# Patient Record
Sex: MALE | Race: WHITE | NOT HISPANIC OR LATINO | Employment: OTHER | URBAN - METROPOLITAN AREA
[De-identification: names, ages, dates, MRNs, and addresses within clinical notes are randomized per-mention and may not be internally consistent; named-entity substitution may affect disease eponyms.]

---

## 2017-01-04 DIAGNOSIS — R93.89 ABNORMAL X-RAY: Primary | ICD-10-CM

## 2017-01-09 ENCOUNTER — OFFICE VISIT (OUTPATIENT)
Dept: INTERNAL MEDICINE | Facility: CLINIC | Age: 47
End: 2017-01-09

## 2017-01-09 ENCOUNTER — PRE VISIT (OUTPATIENT)
Dept: ORTHOPEDICS | Facility: CLINIC | Age: 47
End: 2017-01-09

## 2017-01-09 VITALS
DIASTOLIC BLOOD PRESSURE: 79 MMHG | SYSTOLIC BLOOD PRESSURE: 113 MMHG | TEMPERATURE: 97.9 F | OXYGEN SATURATION: 98 % | RESPIRATION RATE: 18 BRPM | HEART RATE: 116 BPM | WEIGHT: 309 LBS | BODY MASS INDEX: 48.38 KG/M2

## 2017-01-09 DIAGNOSIS — G89.29 BILATERAL CHRONIC KNEE PAIN: Primary | ICD-10-CM

## 2017-01-09 DIAGNOSIS — M25.561 BILATERAL CHRONIC KNEE PAIN: Primary | ICD-10-CM

## 2017-01-09 DIAGNOSIS — M25.562 BILATERAL CHRONIC KNEE PAIN: Primary | ICD-10-CM

## 2017-01-09 DIAGNOSIS — I87.2 VENOUS INSUFFICIENCY (CHRONIC) (PERIPHERAL): ICD-10-CM

## 2017-01-09 DIAGNOSIS — E66.01 MORBID OBESITY, UNSPECIFIED OBESITY TYPE (H): ICD-10-CM

## 2017-01-09 RX ORDER — KETOCONAZOLE 20 MG/ML
SHAMPOO TOPICAL
COMMUNITY
Start: 2013-01-09 | End: 2017-12-21

## 2017-01-09 RX ORDER — FLUOCINONIDE 0.5 MG/G
OINTMENT TOPICAL
COMMUNITY
Start: 2013-01-09 | End: 2018-02-01

## 2017-01-09 RX ORDER — NEOMYCIN SULFATE, POLYMYXIN B SULFATE AND HYDROCORTISONE 10; 3.5; 1 MG/ML; MG/ML; [USP'U]/ML
SUSPENSION/ DROPS AURICULAR (OTIC)
COMMUNITY
Start: 2016-12-29 | End: 2018-02-01

## 2017-01-09 RX ORDER — NYSTATIN 100000 [USP'U]/G
POWDER TOPICAL
COMMUNITY
Start: 2016-01-28 | End: 2017-12-01

## 2017-01-09 RX ORDER — DESONIDE 0.5 MG/G
CREAM TOPICAL
COMMUNITY
Start: 2013-01-09 | End: 2018-02-01

## 2017-01-09 RX ORDER — HYDROCORTISONE 2.5 %
CREAM (GRAM) TOPICAL
COMMUNITY
Start: 2016-06-28 | End: 2018-02-01

## 2017-01-09 RX ORDER — OXYCODONE AND ACETAMINOPHEN 5; 325 MG/1; MG/1
TABLET ORAL
COMMUNITY
Start: 2016-05-13 | End: 2017-12-21

## 2017-01-09 RX ORDER — INFLUENZA A VIRUS A/GUANGDONG-MAONAN/SWL1536/2019 CNIC-1909 (H1N1) ANTIGEN (FORMALDEHYDE INACTIVATED), INFLUENZA A VIRUS A/HONG KONG/2671/2019 (H3N2) ANTIGEN (FORMALDEHYDE INACTIVATED), INFLUENZA B VIRUS B/PHUKET/3073/2013 ANTIGEN (FORMALDEHYDE INACTIVATED), AND INFLUENZA B VIRUS B/WASHINGTON/02/2019 ANTIGEN (FORMALDEHYDE INACTIVATED) 15; 15; 15; 15 UG/.5ML; UG/.5ML; UG/.5ML; UG/.5ML
INJECTION, SUSPENSION INTRAMUSCULAR
Refills: 0 | COMMUNITY
Start: 2016-10-03 | End: 2018-01-18

## 2017-01-09 RX ORDER — OMEGA-3S/DHA/EPA/FISH OIL/D3 300MG-1000
CAPSULE ORAL
Refills: 3 | COMMUNITY
Start: 2016-10-03 | End: 2017-05-31

## 2017-01-09 RX ORDER — OXYCODONE HYDROCHLORIDE 5 MG/1
TABLET ORAL
COMMUNITY
Start: 2016-05-27 | End: 2017-12-21

## 2017-01-09 RX ORDER — KETOCONAZOLE 20 MG/G
CREAM TOPICAL
COMMUNITY
Start: 2016-01-28 | End: 2018-02-01

## 2017-01-09 ASSESSMENT — PAIN SCALES - GENERAL: PAINLEVEL: WORST PAIN (10)

## 2017-01-09 NOTE — PROGRESS NOTES
"Chavez Ca is a 46 year old male who comes in for    CC: knee pain, f/u Xray  HPI:  Mr. Ca reports chronic knee pain, L>R, with problems getting up and down stairs or walking up an incline. Has a fall in 12/2016, when walking uphill, landing on his R knee. The L knee is worse than his R, knee gives out and he \"can't trust it.\"  Has a hard time standing up, takes a long time to any activity due to knee and back pain. Has been resting the knee--staying off it, and keeping the legs elevated for venous insufficiency. Walks 1/2 mile to bus stop. Uses [employer] only when necessary.   Has referral to physical therapy, but has not gone yet, hoping to schedule soon. He wants to know what can be done--he feels no one is addressing his knee pain and he would like to be evaluated for any surgical options.    L knee Xray showed arthritis, was instructed to have a follow-up Xray today. He would like to review these results.  Last knee injections were in September 2016, then had \"released\" twice, was going to do it again, but when he was put on the table, he found out that he had \"more issues than that.\" Fell on the R knee, but L knee hurts worse. Has done PT for years, pool PT, worked hard to get out of medical scooter.    Is on low-calorie diet. Taking Topamax 200 mg BID to lose weight.    Other issues discussed today:     Patient Active Problem List   Diagnosis     VERN (obstructive sleep apnea)     Morbid obesity (H)     Idiopathic edema     Depressive disorder, not elsewhere classified     Psychological factors associated with another disorder     Subglottic stenosis     Hyperlipidemia     Idiopathic progressive polyneuropathy     Varicose veins of left lower extremity     Closed nondisplaced fracture of scaphoid of right wrist, unspecified portion of scaphoid, sequela     Pain in both wrists     Shoulder pain, left     Neck pain     Neck pain, chronic       Current Outpatient Prescriptions   Medication Sig " Dispense Refill     order for DME        desonide (DESOWEN) 0.05 % cream        fluocinonide (LIDEX) 0.05 % ointment        ketoconazole (NIZORAL) 2 % shampoo        cholecalciferol (VITAMIN D) 400 UNITS tablet   3     hydrocortisone 2.5 % cream        FLUZONE QUADRIVALENT 0.5 ML injection   0     ketoconazole (NIZORAL) 2 % cream        neomycin-polymyxin-hydrocortisone (CORTISPORIN) 3.5-39587-5 otic suspension        Nystatin (NYSTOP) 808114 UNIT/GM POWD powder        oxyCODONE (ROXICODONE) 5 MG IR tablet        oxyCODONE-acetaminophen (PERCOCET) 5-325 MG per tablet        neomycin-polymyxin-hydrocortisone (CORTISPORIN) otic solution Place 3 drops Into the left ear 4 times daily 10 mL 1     COMPRESSION STOCKINGS 1 each daily Measure and fit thigh length Circaid compression device.  Bilateral. 2 each 4     zolpidem (AMBIEN) 10 MG tablet Take 1 tablet (10 mg) by mouth nightly as needed for sleep 30 tablet 2     traZODone (DESYREL) 100 MG tablet Take 1 tablet (100 mg) by mouth At Bedtime 90 tablet 1     amitriptyline (ELAVIL) 50 MG tablet Take 2 tablets (100 mg) by mouth At Bedtime 90 tablet 2     furosemide (LASIX) 20 MG tablet Take 3 tablets (60 mg) by mouth 2 times daily 540 tablet 3     topiramate (TOPAMAX) 50 MG tablet Take 4 tablets (200 mg) by mouth 2 times daily 720 tablet 1     phentermine (ADIPEX-P) 37.5 MG tablet Take 1 tablet (37.5 mg) by mouth every morning (before breakfast) 30 tablet 5     CETAPHIL (CETAPHIL) lotion Apply topically 2 times daily       pimecrolimus (ELIDEL) 1 % cream Apply topically 2 times daily 60 g 3     potassium chloride SA (POTASSIUM CHLORIDE) 20 MEQ tablet Take 1 tablet (20 mEq) by mouth daily 90 tablet 3     Vitamin D, Cholecalciferol, 400 UNITS CAPS Take 400 Units by mouth daily 100 capsule 3     COMPRESSION STOCKINGS 20-30 mmHg knee high compression stockings.  Measure and fit.  Style and brand per patient preference. 2 each 2     order for DME Equipment being ordered: Hernan chavarria  Quang.  Style per patient preference. 1 Units 1     Multiple Vitamin (MULTI VITAMIN  MENS) TABS Take 1 tablet by mouth daily 90 tablet 1     [DISCONTINUED] COMPRESSION STOCKINGS 20-30 mmHg knee high compression stockings 2 each 1         ALLERGIES: Fentanyl; Meloxicam; and Minocycline    PAST MEDICAL HX:   Past Medical History   Diagnosis Date     Obstructive sleep apnea      Obesity, Class III, BMI 40-49.9 (morbid obesity) (H)      Neck mass      parapharyngeal, benign     Shortness of breath      Gastro-oesophageal reflux disease      Allergic rhinitis      Trigeminal neuralgia      Idiopathic progressive polyneuropathy 10/15/2012     Bilateral carpal tunnel syndrome 7/18/2015     Hypertension 2008     Depressive disorder      Hearing problem      Reduced vision      Learning disability      Chest pain      Weakness 8/26/2011     Psychological factors associated with another disorder 8/21/2012     Depressive disorder, not elsewhere classified 7/30/2012     Coronary artery disease 3/8/16     right side chest pain       PAST SURGICAL HX:   Past Surgical History   Procedure Laterality Date     Tracheostomy  4/22/2011     Procedure:TRACHEOSTOMY; possible awake; Surgeon:EMELIA PHILLIPS; Location:[facility]     Tracheostomy  6/29/2011     Procedure:TRACHEOSTOMY; REMOVE AND REPLACE SHILEY 6 XLT; Surgeon:EMELIA PHILLIPS; [facility]     Excise lesion intraoral  6/29/2011     Procedure:EXCISE LESION INTRAORAL; TRANSCERVICAL APPROACH TO LEFT PHARYNGEAL SPACE MASS REMOVAL ; Surgeon:EMELIA PHILLIPS; [facility]     Laser co2 laryngoscopy  12/7/2011     Procedure:LASER CO2 LARYNGOSCOPY; Micro-Direct Laryngoscopy with CO2 Laser Standby / Excision Tracheal Grandulization, Trach Tube Change / Kenalog application. / Balloon Dilation of Subglottic Stenosis.*Latex Safe Room* Trach Tube = Shiley 6.4mm I.D. / 10.8MM O.D. / 76MM  Cuffless.; Surgeon:EMELIA PHILLIPS; Location[facility]      Laryngoscopy with biopsy(ies)  6/18/2014     Procedure: LARYNGOSCOPY WITH BIOPSY(IES);  Surgeon: Barbara Manriquez MD;  Location: [facility]     Laparoscopic cholecystectomy N/A 8/23/2015     Procedure: LAPAROSCOPIC CHOLECYSTECTOMY;  Surgeon: Kvng Witt MD;  [facility]     Endoscopic retrograde cholangiopancreatogram N/A 8/27/2015     Procedure: COMBINED ENDOSCOPIC RETROGRADE CHOLANGIOPANCREATOGRAPHY, PLACE TUBE/STENT;  Surgeon: Baldomero Zacarias MD;  [facility]     Endoscopic retrograde cholangiopancreatogram N/A 11/6/2015     Procedure: COMBINED ENDOSCOPIC RETROGRADE CHOLANGIOPANCREATOGRAPHY, REMOVE FOREIGN BODY OR STENT/TUBE;  Surgeon: Baldomero Zacarias MD;  Location: [facility]     Biopsy  2011     To figure out what kind of tumor I had     Orthopedic surgery  5/2016     Broken Right hand & Fracture Right shoulder     Vascular surgery  2008-[facility]     Hc vascular surgery procedure unlist  12/29/15       IMMUNIZATION HX:   Immunization History   Administered Date(s) Administered     Influenza (IIV3) 09/03/2011, 10/02/2012, 10/21/2014, 11/05/2015, 10/03/2016     Pneumococcal 23 valent 08/10/2011     TDAP (BOOSTRIX AGES 10-64) 11/05/2015       SOCIAL HX:   Social History     Social History Narrative    The patient has a 0 pk yr tobacco hx.  He has no active use.  Alcohol use is <1 alcoholic drinks per week.  He denies use of recreational drugs.          He is disability.         The patient is single.  Has 1 child.        Pt adopted at age 14.            ROS:   CONSTITUTIONAL: no fatigue, no unexpected change in weight  SKIN: R knee bruising and scabbing--healing  RESP: no significant cough, no shortness of breath  CV: no chest pain, no palpitations, no worsening peripheral edema  MUSCULOSKELETAL:chronic neck pain, knee pain    OBJECTIVE:  /79 mmHg  Pulse 116  Temp(Src) 97.9  F (36.6  C) (Oral)  Resp 18  Wt 140.161 kg (309 lb)  SpO2 98%   Wt Readings from Last 1  Encounters:   01/09/17 140.161 kg (309 lb)     Constitutional: no distress, pleasant, well-groomed  Cardiovascular: tachycardic with regular rhythm, normal S1 and S2, no murmurs, rubs or gallops  Respiratory: clear to auscultation with good air movement bilaterally, no wheezes or crackles, non-labored  Gastrointestinal: positive bowel sounds, nontender, no hepatosplenomegaly, no masses   Musculoskeletal: bilateral knees with ROM limited by pain, tender to palpation, strength 4/5, no edema  Skin: no concerning lesions or rash, no jaundice, temp normal   Psychological: appropriate mood, slow/pressured speech    ASSESSMENT/PLAN:    1. Bilateral chronic knee pain  Mr. Ca reports multiple attempts for PT--is planning to return to PT today. Reviewed Xray findings--no acute fractures but enthesophyte with chronic appearance. He requests to see what additional options he might have for treatment. Discussed Voltaren gel application to help with knee pain; pt has tolerated Ibuprofen well in the past (despite listed allergy to Meloxicam).  - ORTHOPEDICS ADULT REFERRAL  - diclofenac (VOLTAREN) 1 % GEL topical gel; Apply 4 grams to knees four times daily using enclosed dosing card.  Dispense: 100 g; Refill: 1    2. Venous insufficiency (chronic) (peripheral)  Pt requesting written prescription for compression stockings, as his pharmacy was unable to provide him with these.  - COMPRESSION STOCKINGS; 1 each daily Measure and fit thigh length Circaid compression device.  Bilateral.  Dispense: 2 each; Refill: 4    3. Morbid obesity, unspecified obesity type (H)  Discussed returning to Weight Management clinic for follow-up, as further weight loss will help reduce wear and tear on the knees. He declines this suggestion today, but plans to continue with low-calorie diet and Topamax.    FOLLOW UP: as needed for any changes or concerns.    ELAINE Soriano CNP

## 2017-01-09 NOTE — TELEPHONE ENCOUNTER
1.  Date/reason for appt: 1/16/17 - Bilat Chronic Knee Pain  2.  Referring provider: Dr. Martines  3.  Call to patient (Yes / No - short description): No, pt is referred  4.  Previous care at / records requested from: [facility] -- Records and imaging in Epic/Pacs

## 2017-01-09 NOTE — NURSING NOTE
Chief Complaint   Patient presents with     Results     Patient is here to follow up on x-ray results.      Leandra Gross LPN at 12:26 PM on 1/9/2017.

## 2017-01-09 NOTE — MR AVS SNAPSHOT
After Visit Summary   1/9/2017    Chavez Ca    MRN: [id number]           Patient Information     Date Of Birth          1970        Visit Information        Provider Department      1/9/2017 12:30 PM Natacha Greene APRN [facility]        Today's Diagnoses     Bilateral chronic knee pain    -  1     Venous insufficiency (chronic) (peripheral)           Care Instructions    Primary Care Center Medication Refill Request Information:  * Please contact your pharmacy regarding ANY request for medication refills.  ** [facility] Prescription Fax = [phone]  * Please allow 3 business days for routine medication refills.  * Please allow 5 business days for controlled substance medication refills.     Primary Care Center Test Result notification information:  *You will be notified with in 7-10 days of your appointment day regarding the results of your test.  If you are on MyChart you will be notified as soon as the provider has reviewed the results and signed off on them.    Primary Care Center Phone Number [phone]    [facility] [phone] (4th Floor [facility])          Follow-ups after your visit        Additional Services     ORTHOPEDICS ADULT REFERRAL       Your provider has referred you to: [facility]: Orthopaedic Clinic [facility] [phone]   [email or web address]      Please be aware that coverage of these services is subject to the terms and limitations of your health insurance plan.  Call member services at your health plan with any benefit or coverage questions.      Please bring the following to your appointment:    >>   Any x-rays, CTs or MRIs which have been performed.  Contact the facility where they were done to arrange for  prior to your scheduled appointment.    >>   List of current medications   >>   This referral request   >>   Any documents/labs given to you for this referral                  Your next  10 appointments already scheduled     Jan 09, 2017  5:05 PM   XR KNEE LEFT 3 VIEWS with UCXR1   [facility] ([facility])    [address]  1st Floor  [facility] [id number] [phone]           Please bring a list of your current medicines to your exam. (Include vitamins, minerals and over-thecounter medicines.) Leave your valuables at home.  Tell your doctor if there is a chance you may be pregnant.  You do not need to do anything special for this exam.            Jan 16, 2017  2:00 PM   (Arrive by 1:45 PM)   NEW KNEE with Geraldo Tejada MD   [facility] ([facility])    [address]  4th Floor  [facility] [id number] [phone]            Mar 03, 2017  8:30 AM   (Arrive by 8:15 AM)   Return Visit with Alexis Davies MD   [facility] ([facility] [place])    [address]  3rd [facility] [id number] [phone]              Who to contact     Please call your clinic at [phone] to:    Ask questions about your health    Make or cancel appointments    Discuss your medicines    Learn about your test results    Speak to your doctor   If you have compliments or concerns about an experience at your clinic, or if you wish to file a complaint, please contact [facility] Patient Relations at [phone] or email us at [email or web address]         Additional Information About Your Visit        [facility] Information     [employer] gives you secure access to your electronic health record. If you see a primary care provider, you can also send messages to your care team and make appointments. If you have questions, please call your primary care clinic.  If you do not have a primary care provider, please call [phone] and they will assist you.      [employer] is an electronic gateway that provides easy, online access to your  medical records. With [employer], you can request a clinic appointment, read your test results, renew a prescription or communicate with your care team.     To access your existing account, please contact your [facility] or call [phone] for assistance.        Care EveryWhere ID     This is your Care EveryWhere ID. This could be used by other organizations to access your [facility] medical records  [id number]        Your Vitals Were     Pulse Temperature Respirations Pulse Oximetry          116 97.9  F (36.6  C) (Oral) 18 98%         Blood Pressure from Last 3 Encounters:   01/09/17 113/79   12/29/16 110/75   11/30/16 113/80    Weight from Last 3 Encounters:   01/09/17 140.161 kg (309 lb)   12/29/16 138.483 kg (305 lb 4.8 oz)   11/30/16 138.619 kg (305 lb 9.6 oz)              We Performed the Following     ORTHOPEDICS ADULT REFERRAL          Today's Medication Changes          These changes are accurate as of: 1/9/17  1:28 PM.  If you have any questions, ask your nurse or doctor.               Start taking these medicines.        Dose/Directions    diclofenac 1 % Gel topical gel   Commonly known as:  VOLTAREN   Used for:  Bilateral chronic knee pain   Started by:  Natacha Greene APRN CNP        Apply 4 grams to knees four times daily using enclosed dosing card.   Quantity:  100 g   Refills:  1            Where to get your medicines      These medications were sent to [facility] - [place], [place] - [facility] [address]  [facility] [address], [place] [address]     Phone:  [phone]    - diclofenac 1 % Gel topical gel      Some of these will need a paper prescription and others can be bought over the counter.  Ask your nurse if you have questions.     Bring a paper prescription for each of these medications    - COMPRESSION STOCKINGS             Primary Care Provider Office Phone # Fax #    Cassidy Martines [phone]       [place] [address]  [facility] [address]  [facility] [address]        Thank you!     Thank you for choosing [facility]  for your care. Our goal is always to provide you with excellent care. Hearing back from our patients is one way we can continue to improve our services. Please take a few minutes to complete the written survey that you may receive in the mail after your visit with us. Thank you!             Your Updated Medication List - Protect others around you: Learn how to safely use, store and throw away your medicines at www.disposemymeds.org.          This list is accurate as of: 1/9/17  1:28 PM.  Always use your most recent med list.                   Brand Name Dispense Instructions for use    amitriptyline 50 MG tablet    ELAVIL    90 tablet    Take 2 tablets (100 mg) by mouth At Bedtime       CETAPHIL lotion      Apply topically 2 times daily       * COMPRESSION STOCKINGS     2 each    20-30 mmHg knee high compression stockings.  Measure and fit.  Style and brand per patient preference.       * COMPRESSION STOCKINGS     2 each    1 each daily Measure and fit thigh length Circaid compression device.  Bilateral.       DESOWEN 0.05 % cream   Generic drug:  desonide          diclofenac 1 % Gel topical gel    VOLTAREN    100 g    Apply 4 grams to knees four times daily using enclosed dosing card.       fluocinonide 0.05 % ointment    LIDEX         FLUZONE QUADRIVALENT 0.5 ML injection   Generic drug:  influenza quadrivalent (PF) vacc age 3 yrs and older          furosemide 20 MG tablet    LASIX    540 tablet    Take 3 tablets (60 mg) by mouth 2 times daily       hydrocortisone 2.5 % cream          MULTI vitamin  MENS Tabs     90 tablet    Take 1 tablet by mouth daily       * neomycin-polymyxin-hydrocortisone otic solution    CORTISPORIN    10 mL    Place 3 drops Into the left ear 4 times daily       * neomycin-polymyxin-hydrocortisone 3.5-70279-6 otic suspension    CORTISPORIN         * NIZORAL 2 % shampoo    Generic drug:  ketoconazole          * ketoconazole 2 % cream    NIZORAL         NYSTOP 864846 UNIT/GM Powd powder          order for DME     1 Units    Equipment being ordered: Hernan Del Rio.  Style per patient preference.       oxyCODONE 5 MG IR tablet    ROXICODONE         oxyCODONE-acetaminophen 5-325 MG per tablet    PERCOCET         phentermine 37.5 MG tablet    ADIPEX-P    30 tablet    Take 1 tablet (37.5 mg) by mouth every morning (before breakfast)       pimecrolimus 1 % cream    ELIDEL    60 g    Apply topically 2 times daily       potassium chloride SA 20 MEQ CR tablet    potassium chloride    90 tablet    Take 1 tablet (20 mEq) by mouth daily       topiramate 50 MG tablet    TOPAMAX    720 tablet    Take 4 tablets (200 mg) by mouth 2 times daily       traZODone 100 MG tablet    DESYREL    90 tablet    Take 1 tablet (100 mg) by mouth At Bedtime       * Vitamin D (Cholecalciferol) 400 UNITS Caps     100 capsule    Take 400 Units by mouth daily       * cholecalciferol 400 UNITS tablet    Vitamin D         zolpidem 10 MG tablet    AMBIEN    30 tablet    Take 1 tablet (10 mg) by mouth nightly as needed for sleep       * Notice:  This list has 8 medication(s) that are the same as other medications prescribed for you. Read the directions carefully, and ask your doctor or other care provider to review them with you.

## 2017-01-09 NOTE — PATIENT INSTRUCTIONS
Primary Care Center Medication Refill Request Information:  * Please contact your pharmacy regarding ANY request for medication refills.  ** [facility] Prescription Fax = [phone]  * Please allow 3 business days for routine medication refills.  * Please allow 5 business days for controlled substance medication refills.     Primary Care Center Test Result notification information:  *You will be notified with in 7-10 days of your appointment day regarding the results of your test.  If you are on MyChart you will be notified as soon as the provider has reviewed the results and signed off on them.    Primary Care Center Phone Number [phone]    [facility] [phone] (4th Floor [facility])

## 2017-01-30 ENCOUNTER — TELEPHONE (OUTPATIENT)
Dept: INTERNAL MEDICINE | Facility: CLINIC | Age: 47
End: 2017-01-30

## 2017-01-30 NOTE — TELEPHONE ENCOUNTER
Prior Authorization Retail Medication Request  Medication/Dose: voltaren  Diagnosis and ICD code: Chronic Bilateral Knee Pain  New/Renewal/Insurance Change PA: New  Previously Tried and Failed Therapies: see chart    Insurance ID (if provided):   Insurance Phone (if provided):     Any additional info from fax request:     If you received a fax notification from an outside Pharmacy:  Pharmacy Name:  Pharmacy #:  Pharmacy Fax:

## 2017-02-01 NOTE — TELEPHONE ENCOUNTER
[facility] Prior Authorization Team   Phone: [phone]  Fax: [phone]    PA Initiation    Medication: voltaren  Insurance Company: [employer] ([facility]) - Phone [phone] Fax [phone]  Pharmacy Filling the Rx: [facility] - [place], [place] - [address]  Filling Pharmacy Phone: [phone]  Filling Pharmacy Fax: [phone]  Start Date: 2/1/2017    ALSO FAXED CLINIC NOTES.

## 2017-02-02 NOTE — TELEPHONE ENCOUNTER
Prior Authorization Approval    Authorization Effective Date: 2/1/2017  Authorization Expiration Date: 12/31/2017  Medication: voltaren- approved  Approved Dose/Quantity:   Reference #: [id number]   Insurance Company: [employer] ([facility]) - Phone [phone] Fax [phone]  Expected CoPay: $0.00     CoPay Card Available:      Foundation Assistance Needed:    Which Pharmacy is filling the prescription (Not needed for infusion/clinic administered): [facility] - [place], [place] - [facility] [address]  Pharmacy Notified: Yes  Patient Notified: No, pt's phone # on file does NOT accept incoming calls

## 2017-04-05 ENCOUNTER — OFFICE VISIT (OUTPATIENT)
Dept: INTERNAL MEDICINE | Facility: CLINIC | Age: 47
End: 2017-04-05

## 2017-04-05 VITALS
DIASTOLIC BLOOD PRESSURE: 75 MMHG | WEIGHT: 297 LBS | HEART RATE: 102 BPM | SYSTOLIC BLOOD PRESSURE: 119 MMHG | BODY MASS INDEX: 46.52 KG/M2

## 2017-04-05 DIAGNOSIS — R60.9 IDIOPATHIC EDEMA: Primary | ICD-10-CM

## 2017-04-05 DIAGNOSIS — G47.33 OSA (OBSTRUCTIVE SLEEP APNEA): ICD-10-CM

## 2017-04-05 DIAGNOSIS — E87.6 HYPOKALEMIA: ICD-10-CM

## 2017-04-05 DIAGNOSIS — R79.89 OTHER SPECIFIED ABNORMAL FINDINGS OF BLOOD CHEMISTRY: ICD-10-CM

## 2017-04-05 DIAGNOSIS — E78.5 HYPERLIPIDEMIA, UNSPECIFIED HYPERLIPIDEMIA TYPE: ICD-10-CM

## 2017-04-05 DIAGNOSIS — I87.2 VENOUS INSUFFICIENCY (CHRONIC) (PERIPHERAL): ICD-10-CM

## 2017-04-05 DIAGNOSIS — G47.01 INSOMNIA DUE TO MEDICAL CONDITION: ICD-10-CM

## 2017-04-05 LAB
ALBUMIN SERPL-MCNC: 3.6 G/DL (ref 3.4–5)
ALP SERPL-CCNC: 78 U/L (ref 40–150)
ALT SERPL W P-5'-P-CCNC: 27 U/L (ref 0–70)
ANION GAP SERPL CALCULATED.3IONS-SCNC: 10 MMOL/L (ref 3–14)
AST SERPL W P-5'-P-CCNC: 23 U/L (ref 0–45)
BILIRUB SERPL-MCNC: 0.5 MG/DL (ref 0.2–1.3)
BUN SERPL-MCNC: 13 MG/DL (ref 7–30)
CALCIUM SERPL-MCNC: 8.6 MG/DL (ref 8.5–10.1)
CHLORIDE SERPL-SCNC: 103 MMOL/L (ref 94–109)
CHOLEST SERPL-MCNC: 172 MG/DL
CO2 SERPL-SCNC: 27 MMOL/L (ref 20–32)
CREAT SERPL-MCNC: 0.99 MG/DL (ref 0.66–1.25)
GFR SERPL CREATININE-BSD FRML MDRD: 81 ML/MIN/1.7M2
GLUCOSE SERPL-MCNC: 131 MG/DL (ref 70–99)
HBA1C MFR BLD: 5.6 % (ref 4.3–6)
HDLC SERPL-MCNC: 62 MG/DL
LDLC SERPL CALC-MCNC: 92 MG/DL
NONHDLC SERPL-MCNC: 110 MG/DL
POTASSIUM SERPL-SCNC: 2.8 MMOL/L (ref 3.4–5.3)
PROT SERPL-MCNC: 8 G/DL (ref 6.8–8.8)
SODIUM SERPL-SCNC: 139 MMOL/L (ref 133–144)
TRIGL SERPL-MCNC: 88 MG/DL

## 2017-04-05 RX ORDER — ZOLPIDEM TARTRATE 10 MG/1
10 TABLET ORAL
Qty: 30 TABLET | Refills: 2 | Status: SHIPPED | OUTPATIENT
Start: 2017-04-05 | End: 2017-12-21

## 2017-04-05 ASSESSMENT — ANXIETY QUESTIONNAIRES
GAD7 TOTAL SCORE: 16
1. FEELING NERVOUS, ANXIOUS, OR ON EDGE: MORE THAN HALF THE DAYS
7. FEELING AFRAID AS IF SOMETHING AWFUL MIGHT HAPPEN: NEARLY EVERY DAY
IF YOU CHECKED OFF ANY PROBLEMS ON THIS QUESTIONNAIRE, HOW DIFFICULT HAVE THESE PROBLEMS MADE IT FOR YOU TO DO YOUR WORK, TAKE CARE OF THINGS AT HOME, OR GET ALONG WITH OTHER PEOPLE: SOMEWHAT DIFFICULT
5. BEING SO RESTLESS THAT IT IS HARD TO SIT STILL: NEARLY EVERY DAY
2. NOT BEING ABLE TO STOP OR CONTROL WORRYING: MORE THAN HALF THE DAYS
6. BECOMING EASILY ANNOYED OR IRRITABLE: NEARLY EVERY DAY
3. WORRYING TOO MUCH ABOUT DIFFERENT THINGS: MORE THAN HALF THE DAYS

## 2017-04-05 ASSESSMENT — PAIN SCALES - GENERAL: PAINLEVEL: WORST PAIN (10)

## 2017-04-05 ASSESSMENT — PATIENT HEALTH QUESTIONNAIRE - PHQ9: 5. POOR APPETITE OR OVEREATING: SEVERAL DAYS

## 2017-04-05 NOTE — PROGRESS NOTES
Chief complaint:  Chavez Ca is a 46 year old male presents for   Chief Complaint   Patient presents with     Recheck Medication     Pt is here to follow up on his medications.        SUBJECTIVE:  Will be going to [place] for a visit soon.  Probably stay ~2 months.  Previously lived in [place] but moved here when he got sick.  He is hoping to meet with his father in [place], and is excited but nervous.  Has only met his biological father once before and sounds like he has struggled with incarceration and legal issues.    Fell again recently.  Tripped. Continues to have pain in his knees but is going to PT.    Still has not gotten compression stockings.  I'm not clear exactly the problem, but it sounds like if he shows up to the store with his old stockngs he will be able to get them.         Medications and allergies were reviewed by me today.     SocHx:   History   Smoking Status     Never Smoker   Smokeless Tobacco     Never Used     Comment: Never started        Patient Active Problem List   Diagnosis     VERN (obstructive sleep apnea)     Morbid obesity (H)     Idiopathic edema     Depressive disorder, not elsewhere classified     Psychological factors associated with another disorder     Subglottic stenosis     Hyperlipidemia     Idiopathic progressive polyneuropathy     Varicose veins of left lower extremity     Closed nondisplaced fracture of scaphoid of right wrist, unspecified portion of scaphoid, sequela     Pain in both wrists     Shoulder pain, left     Neck pain     Neck pain, chronic       Review Of Systems   5 point ROS completed and negative except noted above, including Gen, CV, Resp, GI, MS    PE:  /75  Pulse 102  Wt 134.7 kg (297 lb)  BMI 46.52 kg/m2  Gen: no distress, comfortable, pleasant   Eyes: anicteric, normal extra-ocular movements   Skin: no concerning lesions, no jaundice   Psychological: appropriate mood       ASSESSMENT/PLAN:    Insomnia due to medical condition  3 mo refill  -  zolpidem (AMBIEN) 10 MG tablet  Dispense: 30 tablet; Refill: 2    Venous insufficiency (chronic) (peripheral)  - COMPRESSION STOCKINGS  Dispense: 2 each; Refill: 2    VERN (obstructive sleep apnea), Hyperlipidemia, unspecified hyperlipidemia type, elevated glucose  - Lipid panel reflex to direct LDL  - Comprehensive metabolic panel  - Hemoglobin A1c    Obesity:   - reviewed weight trend and has been steadily decreasing.  Continue topamax with Dr. Chang    >25 minutes, over half the time of the visit, spent as face to face time in discussion regarding chronic medical issues      HM:  As above    RTC: 3-6 mo    Nida Martines MD

## 2017-04-05 NOTE — PATIENT INSTRUCTIONS
Primary Care Center Medication Refill Request Information:  * Please contact your pharmacy regarding ANY request for medication refills.  ** [facility] Prescription Fax = [phone]  * Please allow 3 business days for routine medication refills.  * Please allow 5 business days for controlled substance medication refills.     Primary Care Center Test Result notification information:  *You will be notified within 7-10 days of your appointment day regarding the results of your test.  If you are on MyChart you will be notified as soon as the provider has reviewed the results and signed off on them.

## 2017-04-05 NOTE — NURSING NOTE
Chief Complaint   Patient presents with     Recheck Medication     Pt is here to follow up on his medications.     Pilar Corona LPN April 5, 2017 1:04 PM

## 2017-04-05 NOTE — MR AVS SNAPSHOT
After Visit Summary   4/5/2017    Chavez Ca    MRN: [id number]           Patient Information     Date Of Birth          1970        Visit Information        Provider Department      4/5/2017 1:00 PM Cassidy Martines MD [facility]        Today's Diagnoses     Idiopathic edema    -  1    Insomnia due to medical condition        Venous insufficiency (chronic) (peripheral)        VERN (obstructive sleep apnea)        Hyperlipidemia, unspecified hyperlipidemia type        Other specified abnormal findings of blood chemistry           Care Instructions    Primary Care Center Medication Refill Request Information:  * Please contact your pharmacy regarding ANY request for medication refills.  ** [facility] Prescription Fax = [phone]  * Please allow 3 business days for routine medication refills.  * Please allow 5 business days for controlled substance medication refills.     Primary Care Center Test Result notification information:  *You will be notified within 7-10 days of your appointment day regarding the results of your test.  If you are on MyChart you will be notified as soon as the provider has reviewed the results and signed off on them.          Follow-ups after your visit        Future tests that were ordered for you today     Open Future Orders        Priority Expected Expires Ordered    Lipid panel reflex to direct LDL Routine 4/5/2017 4/19/2017 4/5/2017    Comprehensive metabolic panel Routine 4/5/2017 4/19/2017 4/5/2017    Hemoglobin A1c Routine 4/5/2017 4/19/2017 4/5/2017            Who to contact     Please call your clinic at [phone] to:    Ask questions about your health    Make or cancel appointments    Discuss your medicines    Learn about your test results    Speak to your doctor   If you have compliments or concerns about an experience at your clinic, or if you wish to file a complaint, please contact [facility] Patient  Relations at [phone] or email us at [email or web address]         Additional Information About Your Visit        [employer] Information     [employer] gives you secure access to your electronic health record. If you see a primary care provider, you can also send messages to your care team and make appointments. If you have questions, please call your primary care clinic.  If you do not have a primary care provider, please call [phone] and they will assist you.      [employer] is an electronic gateway that provides easy, online access to your medical records. With [employer], you can request a clinic appointment, read your test results, renew a prescription or communicate with your care team.     To access your existing account, please contact your [facility] or call [phone] for assistance.        Care EveryWhere ID     This is your Care EveryWhere ID. This could be used by other organizations to access your [facility] medical records  [id number]        Your Vitals Were     Pulse BMI (Body Mass Index)                102 46.52 kg/m2           Blood Pressure from Last 3 Encounters:   04/05/17 119/75   01/09/17 113/79   12/29/16 110/75    Weight from Last 3 Encounters:   04/05/17 134.7 kg (297 lb)   01/09/17 (!) 140.2 kg (309 lb)   12/29/16 (!) 138.5 kg (305 lb 4.8 oz)                 Today's Medication Changes          These changes are accurate as of: 4/5/17  1:37 PM.  If you have any questions, ask your nurse or doctor.               These medicines have changed or have updated prescriptions.        Dose/Directions    * COMPRESSION STOCKINGS   This may have changed:  Another medication with the same name was added. Make sure you understand how and when to take each.   Used for:  Venous insufficiency (chronic) (peripheral)   Changed by:  Natacha Greene APRN CNP        Dose:  1 each   1 each daily Measure and fit thigh length Circaid compression device.  Bilateral.    Quantity:  2 each   Refills:  4       * COMPRESSION STOCKINGS   This may have changed:  You were already taking a medication with the same name, and this prescription was added. Make sure you understand how and when to take each.   Used for:  Venous insufficiency (chronic) (peripheral)   Changed by:  Cassidy Martines MD        20-30 mmHg knee high compression stockings.  Measure and fit.  Style and brand per patient preference.   Quantity:  2 each   Refills:  2       * Notice:  This list has 2 medication(s) that are the same as other medications prescribed for you. Read the directions carefully, and ask your doctor or other care provider to review them with you.         Where to get your medicines      Some of these will need a paper prescription and others can be bought over the counter.  Ask your nurse if you have questions.     Bring a paper prescription for each of these medications     COMPRESSION STOCKINGS    zolpidem 10 MG tablet                Primary Care Provider Office Phone # Fax #    Cassidy Martines [phone]       [address] [address]        Thank you!     Thank you for choosing [facility]  for your care. Our goal is always to provide you with excellent care. Hearing back from our patients is one way we can continue to improve our services. Please take a few minutes to complete the written survey that you may receive in the mail after your visit with us. Thank you!             Your Updated Medication List - Protect others around you: Learn how to safely use, store and throw away your medicines at www.disposemymeds.org.          This list is accurate as of: 4/5/17  1:37 PM.  Always use your most recent med list.                   Brand Name Dispense Instructions for use    amitriptyline 50 MG tablet    ELAVIL    90 tablet    Take 2 tablets (100 mg) by mouth At Bedtime       CETAPHIL lotion      Apply  topically 2 times daily       * COMPRESSION STOCKINGS     2 each    1 each daily Measure and fit thigh length Circaid compression device.  Bilateral.       * COMPRESSION STOCKINGS     2 each    20-30 mmHg knee high compression stockings.  Measure and fit.  Style and brand per patient preference.       DESOWEN 0.05 % cream   Generic drug:  desonide          diclofenac 1 % Gel topical gel    VOLTAREN    100 g    Apply 4 grams to knees four times daily using enclosed dosing card.       fluocinonide 0.05 % ointment    LIDEX         FLUZONE QUADRIVALENT 0.5 ML injection   Generic drug:  influenza quadrivalent (PF) vacc age 3 yrs and older          furosemide 20 MG tablet    LASIX    540 tablet    Take 3 tablets (60 mg) by mouth 2 times daily       hydrocortisone 2.5 % cream          MULTI vitamin  MENS Tabs     90 tablet    Take 1 tablet by mouth daily       * neomycin-polymyxin-hydrocortisone otic solution    CORTISPORIN    10 mL    Place 3 drops Into the left ear 4 times daily       * neomycin-polymyxin-hydrocortisone 3.5-39627-5 otic suspension    CORTISPORIN         * NIZORAL 2 % shampoo   Generic drug:  ketoconazole          * ketoconazole 2 % cream    NIZORAL         NYSTOP 119920 UNIT/GM Powd powder          order for DME     1 Units    Equipment being ordered: Hernan [place].  Style per patient preference.       oxyCODONE 5 MG IR tablet    ROXICODONE         oxyCODONE-acetaminophen 5-325 MG per tablet    PERCOCET         phentermine 37.5 MG tablet    ADIPEX-P    30 tablet    Take 1 tablet (37.5 mg) by mouth every morning (before breakfast)       pimecrolimus 1 % cream    ELIDEL    60 g    Apply topically 2 times daily       potassium chloride SA 20 MEQ CR tablet    potassium chloride    90 tablet    Take 1 tablet (20 mEq) by mouth daily       topiramate 50 MG tablet    TOPAMAX    720 tablet    Take 4 tablets (200 mg) by mouth 2 times daily       traZODone 100 MG tablet    DESYREL    90 tablet    Take 1 tablet (100  mg) by mouth At Bedtime       * Vitamin D (Cholecalciferol) 400 UNITS Caps     100 capsule    Take 400 Units by mouth daily       * cholecalciferol 400 UNITS tablet    Vitamin D         zolpidem 10 MG tablet    AMBIEN    30 tablet    Take 1 tablet (10 mg) by mouth nightly as needed for sleep       * Notice:  This list has 8 medication(s) that are the same as other medications prescribed for you. Read the directions carefully, and ask your doctor or other care provider to review them with you.

## 2017-04-06 ENCOUNTER — TELEPHONE (OUTPATIENT)
Dept: INTERNAL MEDICINE | Facility: CLINIC | Age: 47
End: 2017-04-06

## 2017-04-06 ENCOUNTER — PRE VISIT (OUTPATIENT)
Dept: ORTHOPEDICS | Facility: CLINIC | Age: 47
End: 2017-04-06

## 2017-04-06 DIAGNOSIS — I87.2 VENOUS INSUFFICIENCY (CHRONIC) (PERIPHERAL): ICD-10-CM

## 2017-04-06 ASSESSMENT — PATIENT HEALTH QUESTIONNAIRE - PHQ9: SUM OF ALL RESPONSES TO PHQ QUESTIONS 1-9: 13

## 2017-04-06 ASSESSMENT — ANXIETY QUESTIONNAIRES: GAD7 TOTAL SCORE: 16

## 2017-04-06 NOTE — TELEPHONE ENCOUNTER
Regarding: Prescription  Contact: [phone]  Kim calling from [facility]   States they recieved a prescription order yesterday from Dr. Call for medical supplies  Wants to let us know they do not do medical supplies, please send to medical supplies store instead of them   This is regarding the Pt's compression stocking

## 2017-04-06 NOTE — TELEPHONE ENCOUNTER
1.  Date/reason for appt: 4/18/17 - Bilateral feet calluses, crack     2.  Referring provider: Self     3.  Call to patient (Yes / No - short description): No, patient's records are internal.     4.  Previous care at / records requested from:     1. [facility] - Dr. Martines   2. Established in Ortho with Dr. Hinson for hand    3. [facility]

## 2017-04-12 ENCOUNTER — TELEPHONE (OUTPATIENT)
Dept: INTERVENTIONAL RADIOLOGY/VASCULAR | Facility: CLINIC | Age: 47
End: 2017-04-12

## 2017-04-12 NOTE — TELEPHONE ENCOUNTER
I called to find out patients symptoms and reason for clinic return to see Dr Hubbard.  Per Dr. Hubbard the plan at last encounter was to offer patient additional treatment and no need to see in clinic prior.  The message on his phone number said person not accepting calls at this time, and there was not an option to leave a voicemail.  LANETTE Can RN, BSN  Interventional Radiology Care Coordinator   Phone:  [phone]

## 2017-04-12 NOTE — TELEPHONE ENCOUNTER
I called [facility] emergency contact, she gave me home number [phone], which I called and left a voicemail including my call back information.  LANETTE Can RN, BSN  Interventional Radiology Care Coordinator   Phone:  [phone]  Pt returned my call.  I told him my reason for calling to see what his symptoms are or if he would like to get scheduled for treatment.  We discussed that he was having worse pain in his right leg than left.  He described as pain behind his knee, and that he cannot stand due to the pain in his feet.  We discussed that his last treatment he didn't think that the treatment that Dr. Hubbard helped with his symptoms.  He states he has bulging veins in his leg, but was hostile towards me during the conversation.  I tried to be clear with him that some of the pain and issues he was describing may not be fixed by a treatment that Dr. Hubbard is offering.  Pt started complaining about his primary care physician not caring and not wanting to help with his issues.  I tried to redirect him when he was hostile and angry.  He stated he would seek treatment elsewhere.  I wished him well and cancelled his appointment.  LANETTE Can RN, BSN  Interventional Radiology Care Coordinator   Phone:  [phone]

## 2017-04-19 DIAGNOSIS — E87.6 HYPOKALEMIA: ICD-10-CM

## 2017-04-19 LAB
ANION GAP SERPL CALCULATED.3IONS-SCNC: 6 MMOL/L (ref 3–14)
BUN SERPL-MCNC: 15 MG/DL (ref 7–30)
CALCIUM SERPL-MCNC: 9 MG/DL (ref 8.5–10.1)
CHLORIDE SERPL-SCNC: 102 MMOL/L (ref 94–109)
CO2 SERPL-SCNC: 29 MMOL/L (ref 20–32)
CREAT SERPL-MCNC: 1.09 MG/DL (ref 0.66–1.25)
GFR SERPL CREATININE-BSD FRML MDRD: 72 ML/MIN/1.7M2
GLUCOSE SERPL-MCNC: 87 MG/DL (ref 70–99)
POTASSIUM SERPL-SCNC: 4.1 MMOL/L (ref 3.4–5.3)
SODIUM SERPL-SCNC: 138 MMOL/L (ref 133–144)

## 2017-05-16 ENCOUNTER — TELEPHONE (OUTPATIENT)
Dept: INTERNAL MEDICINE | Facility: CLINIC | Age: 47
End: 2017-05-16

## 2017-05-16 NOTE — TELEPHONE ENCOUNTER
PA Initiation    Medication: Klor-Con 20 MEQ Packets   Insurance Company: [employer] Part D - Phone [phone] Fax [phone]  Pharmacy Filling the Rx: [facility] - [place], [place] - [facility] [address]  Filling Pharmacy Phone: [phone]  Filling Pharmacy Fax: [phone]  Start Date: 5/16/2017

## 2017-05-30 ENCOUNTER — MYC REFILL (OUTPATIENT)
Dept: INTERNAL MEDICINE | Facility: CLINIC | Age: 47
End: 2017-05-30

## 2017-05-30 ENCOUNTER — MYC REFILL (OUTPATIENT)
Dept: ENDOCRINOLOGY | Facility: CLINIC | Age: 47
End: 2017-05-30

## 2017-05-30 DIAGNOSIS — G89.29 BILATERAL CHRONIC KNEE PAIN: ICD-10-CM

## 2017-05-30 DIAGNOSIS — E66.01 MORBID OBESITY DUE TO EXCESS CALORIES (H): ICD-10-CM

## 2017-05-30 DIAGNOSIS — M25.561 BILATERAL CHRONIC KNEE PAIN: ICD-10-CM

## 2017-05-30 DIAGNOSIS — G60.3 IDIOPATHIC PROGRESSIVE NEUROPATHY: ICD-10-CM

## 2017-05-30 DIAGNOSIS — G47.9 SLEEP DISORDER: ICD-10-CM

## 2017-05-30 DIAGNOSIS — E66.2 MORBID OBESITY WITH ALVEOLAR HYPOVENTILATION (H): ICD-10-CM

## 2017-05-30 DIAGNOSIS — I87.2 VENOUS INSUFFICIENCY (CHRONIC) (PERIPHERAL): ICD-10-CM

## 2017-05-30 DIAGNOSIS — E87.6 HYPOKALEMIA: ICD-10-CM

## 2017-05-30 DIAGNOSIS — E55.9 VITAMIN D DEFICIENCY: ICD-10-CM

## 2017-05-30 DIAGNOSIS — G47.01 INSOMNIA DUE TO MEDICAL CONDITION: ICD-10-CM

## 2017-05-30 DIAGNOSIS — M25.562 BILATERAL CHRONIC KNEE PAIN: ICD-10-CM

## 2017-05-30 RX ORDER — FUROSEMIDE 20 MG
60 TABLET ORAL 2 TIMES DAILY
Qty: 540 TABLET | Refills: 3 | Status: CANCELLED | OUTPATIENT
Start: 2017-05-30

## 2017-05-30 RX ORDER — POTASSIUM CHLORIDE 1.5 G/1.58G
60 POWDER, FOR SOLUTION ORAL DAILY
Qty: 100 PACKET | Refills: 6 | Status: CANCELLED | OUTPATIENT
Start: 2017-05-30

## 2017-05-30 RX ORDER — TRAZODONE HYDROCHLORIDE 100 MG/1
100 TABLET ORAL AT BEDTIME
Qty: 90 TABLET | Refills: 1 | Status: CANCELLED | OUTPATIENT
Start: 2017-05-30

## 2017-05-30 RX ORDER — ZOLPIDEM TARTRATE 10 MG/1
10 TABLET ORAL
Qty: 30 TABLET | Refills: 2 | Status: CANCELLED | OUTPATIENT
Start: 2017-05-30

## 2017-05-30 RX ORDER — AMITRIPTYLINE HYDROCHLORIDE 50 MG/1
100 TABLET ORAL AT BEDTIME
Qty: 90 TABLET | Refills: 2 | Status: CANCELLED | OUTPATIENT
Start: 2017-05-30

## 2017-05-30 RX ORDER — SWAB
400 SWAB, NON-MEDICATED MISCELLANEOUS DAILY
Qty: 100 CAPSULE | Refills: 3 | Status: CANCELLED | OUTPATIENT
Start: 2017-05-30

## 2017-05-31 DIAGNOSIS — E55.9 VITAMIN D DEFICIENCY: ICD-10-CM

## 2017-05-31 DIAGNOSIS — G60.3 IDIOPATHIC PROGRESSIVE NEUROPATHY: ICD-10-CM

## 2017-05-31 DIAGNOSIS — I87.2 VENOUS INSUFFICIENCY (CHRONIC) (PERIPHERAL): ICD-10-CM

## 2017-05-31 DIAGNOSIS — G47.01 INSOMNIA DUE TO MEDICAL CONDITION: ICD-10-CM

## 2017-05-31 DIAGNOSIS — G47.9 SLEEP DISORDER: ICD-10-CM

## 2017-05-31 DIAGNOSIS — E87.6 HYPOKALEMIA: ICD-10-CM

## 2017-05-31 RX ORDER — ZOLPIDEM TARTRATE 10 MG/1
10 TABLET ORAL
Qty: 90 TABLET | Refills: 0 | OUTPATIENT
Start: 2017-05-31

## 2017-05-31 NOTE — TELEPHONE ENCOUNTER
ambien   Last Written Prescription Date:  4/5/17  Last Fill Quantity: 30,   # refills: 2  Last Office Visit : 4/5/17  Future Office visit:  no    Routing refill request to provider for review/approval because:  Pt has moved out of state, asking for rx to be sent to Mail Order  Note from pt  Preferred pharmacy: Other - [facility] [id number] [address]     Comment:   I am in need refills on theses medications A.S.A.P. please if you could call this Pharmacy number [phone] they will give you the Fax number to send them also the address is [id number] [address] [address] The state of [place] said I will be covered by [place] until July since it takes a two months to transfer and close my coverage. I decided to stay in [place] after having to go back for a family emergency doctors cant see me until the 14th thanks

## 2017-05-31 NOTE — TELEPHONE ENCOUNTER
Message from [place]:  Original authorizing provider: ELAINE Soriano CNP OCTAVIAJodi Ca would like a refill of the following medications:  diclofenac (VOLTAREN) 1 % GEL topical gel [ELAINE Soriano CNP]    Preferred pharmacy: Other - [facility] [id number] [address]    Comment:  I am in need refills on theses medications A.S.A.P. please if you could call this Pharmacy number [phone] they will give you the Fax number to send them also the address is [id number] [address], [place] [address] [facility] said I will be covered by [place] until July since it takes a two months to transfer and close my coverage. I decided to stay in [place] after having to go back for a family emergency doctors cant see me until the 14th thanks    Medication renewals requested in this message routed to other providers:  phentermine (ADIPEX-P) 37.5 MG tablet [Fortino Chang MD]  topiramate (TOPAMAX) 50 MG tablet [Fortino Chang MD]  pimecrolimus (ELIDEL) 1 % cream [Andrez Anthony MD]  Vitamin D, Cholecalciferol, 400 UNITS CAPS [Cassidy Martines MD]  furosemide (LASIX) 20 MG tablet [Cassidy Martines MD]  traZODone (DESYREL) 100 MG tablet [Cassidy Martines MD]  amitriptyline (ELAVIL) 50 MG tablet [Cassidy Martines MD]  zolpidem (AMBIEN) 10 MG tablet [Cassidy Martines MD]  potassium chloride (KLOR-CON) 20 MEQ Packet [Cassidy Martines MD]

## 2017-05-31 NOTE — TELEPHONE ENCOUNTER
Vit D 400 units     Last Written Prescription Date:  Pt reported    Last Office Visit : 4/5/17  Future Office visit:  No     Routing refill request to provider for review/approval because:  Medication is reported/historical    lasix      Last Written Prescription Date:  9/2/16  Last Fill Quantity: 540,   # refills: 3    trazadone     Last Written Prescription Date:  12/12/16  Last Fill Quantity: 90,   # refills: 1    elavil  Last Written Prescription Date:  12/12/16  Last Fill Quantity: 90,   # refills: 2    KCL  Last Written Prescription Date:  4/26/17  Last Fill Quantity: 100,   # refills: 6  rx was sent to local pharmacy, pt has moved and needs this sent to Mail Order    Potassium   Date Value Ref Range Status   04/19/2017 4.1 3.4 - 5.3 mmol/L Final     Note from pt  Preferred pharmacy: Other - [facility] [id number] [address]     Comment:   I am in need refills on theses medications A.S.A.P. please if you could call this Pharmacy number [phone] they will give you the Fax number to send them also the address is [id number] [address] [address] The state of [place] said I will be covered by [place] until July since it takes a two months to transfer and close my coverage. I decided to stay in [place] after having to go back for a family emergency doctors cant see me until the 14th thanks

## 2017-05-31 NOTE — TELEPHONE ENCOUNTER
Zolpidem denied. It is too soon to refill. If patient is out of state, the remaining refills of zolpidem will need to be transferred from pharmacy to pharmacy.

## 2017-06-01 RX ORDER — TOPIRAMATE 50 MG/1
200 TABLET, FILM COATED ORAL 2 TIMES DAILY
Qty: 720 TABLET | Refills: 0 | Status: SHIPPED | OUTPATIENT
Start: 2017-06-01 | End: 2017-12-01 | Stop reason: DRUGHIGH

## 2017-06-01 RX ORDER — AMITRIPTYLINE HYDROCHLORIDE 50 MG/1
100 TABLET ORAL AT BEDTIME
Qty: 180 TABLET | Refills: 3 | Status: SHIPPED | OUTPATIENT
Start: 2017-06-01 | End: 2018-02-01

## 2017-06-01 RX ORDER — POTASSIUM CHLORIDE 1500 MG/1
60 TABLET, EXTENDED RELEASE ORAL DAILY
Qty: 90 TABLET | Refills: 1 | Status: SHIPPED | OUTPATIENT
Start: 2017-06-01 | End: 2018-01-18

## 2017-06-01 RX ORDER — PHENTERMINE HYDROCHLORIDE 37.5 MG/1
37.5 TABLET ORAL
Qty: 30 TABLET | Refills: 0
Start: 2017-06-01 | End: 2017-12-01

## 2017-06-01 RX ORDER — POTASSIUM CHLORIDE 1.5 G/1.58G
60 POWDER, FOR SOLUTION ORAL DAILY
Qty: 270 PACKET | Refills: 3 | Status: SHIPPED | OUTPATIENT
Start: 2017-06-01 | End: 2018-02-01

## 2017-06-01 RX ORDER — TRAZODONE HYDROCHLORIDE 100 MG/1
100 TABLET ORAL AT BEDTIME
Qty: 90 TABLET | Refills: 3 | Status: SHIPPED | OUTPATIENT
Start: 2017-06-01 | End: 2017-12-21

## 2017-06-01 RX ORDER — SWAB
400 SWAB, NON-MEDICATED MISCELLANEOUS DAILY
Qty: 90 CAPSULE | Refills: 3 | Status: SHIPPED | OUTPATIENT
Start: 2017-06-01 | End: 2020-03-26

## 2017-06-01 RX ORDER — FUROSEMIDE 20 MG
60 TABLET ORAL 2 TIMES DAILY
Qty: 540 TABLET | Refills: 3 | Status: SHIPPED | OUTPATIENT
Start: 2017-06-01 | End: 2018-01-18

## 2017-06-01 NOTE — TELEPHONE ENCOUNTER
Spoke with patient. His insurance hasn't been covering the packets of potassium chloride, so has been taking the K-Dur instead. New prescription sent.

## 2017-06-01 NOTE — TELEPHONE ENCOUNTER
Message from [place]:  Original authorizing provider: Fortino Chang MD    Chavez Ca would like a refill of the following medications:  phentermine (ADIPEX-P) 37.5 MG tablet [Fortino Chang MD]  topiramate (TOPAMAX) 50 MG tablet [Fortino Chang MD]    Preferred pharmacy: Other - [facility] [id number] [address]    Comment:  I am in need refills on theses medications A.S.A.P. please if you could call this Pharmacy number [phone] they will give you the Fax number to send them also the address is [id number] [address], [place] [address] [facility] said I will be covered by [place] until July since it takes a two months to transfer and close my coverage. I decided to stay in [place] after having to go back for a family emergency doctors cant see me until the 14th thanks    Medication renewals requested in this message routed to other providers:  pimecrolimus (ELIDEL) 1 % cream [Andrez Anthony MD]  Vitamin D, Cholecalciferol, 400 UNITS CAPS [Cassidy Martines MD]  furosemide (LASIX) 20 MG tablet [Cassidy Martines MD]  traZODone (DESYREL) 100 MG tablet [Cassidy Martines MD]  amitriptyline (ELAVIL) 50 MG tablet [Cassidy Martines MD]  zolpidem (AMBIEN) 10 MG tablet [Cassidy Martines MD]  potassium chloride (KLOR-CON) 20 MEQ Packet [Cassidy Martines MD]  diclofenac (VOLTAREN) 1 % GEL topical gel [Natacha Greene, APRN CNP]

## 2017-08-10 ENCOUNTER — TRANSFERRED RECORDS (OUTPATIENT)
Dept: HEALTH INFORMATION MANAGEMENT | Facility: CLINIC | Age: 47
End: 2017-08-10

## 2017-08-30 DIAGNOSIS — E66.2 MORBID OBESITY WITH ALVEOLAR HYPOVENTILATION (H): ICD-10-CM

## 2017-09-04 RX ORDER — TOPIRAMATE 50 MG/1
TABLET, FILM COATED ORAL
Qty: 720 TABLET | Refills: 0 | OUTPATIENT
Start: 2017-09-04

## 2017-12-01 ENCOUNTER — PRE VISIT (OUTPATIENT)
Dept: CARDIOLOGY | Facility: CLINIC | Age: 47
End: 2017-12-01

## 2017-12-01 ENCOUNTER — OFFICE VISIT (OUTPATIENT)
Dept: ENDOCRINOLOGY | Facility: CLINIC | Age: 47
End: 2017-12-01

## 2017-12-01 VITALS
HEART RATE: 102 BPM | SYSTOLIC BLOOD PRESSURE: 113 MMHG | DIASTOLIC BLOOD PRESSURE: 81 MMHG | BODY MASS INDEX: 44.76 KG/M2 | WEIGHT: 285.2 LBS | HEIGHT: 67 IN | OXYGEN SATURATION: 100 %

## 2017-12-01 DIAGNOSIS — E66.01 MORBID OBESITY (H): Primary | ICD-10-CM

## 2017-12-01 RX ORDER — VALACYCLOVIR HYDROCHLORIDE 500 MG/1
TABLET, FILM COATED ORAL
COMMUNITY
Start: 2017-08-30 | End: 2018-09-27

## 2017-12-01 RX ORDER — PHENTERMINE HYDROCHLORIDE 37.5 MG/1
37.5 TABLET ORAL EVERY MORNING
Qty: 30 TABLET | Refills: 3 | Status: SHIPPED | OUTPATIENT
Start: 2017-12-01 | End: 2018-01-22

## 2017-12-01 RX ORDER — TOPIRAMATE 50 MG/1
TABLET, FILM COATED ORAL
Qty: 240 TABLET | Refills: 3 | Status: SHIPPED | OUTPATIENT
Start: 2017-12-01 | End: 2018-01-22

## 2017-12-01 RX ORDER — METRONIDAZOLE 10 MG/G
GEL TOPICAL
COMMUNITY
Start: 2017-09-27 | End: 2020-03-26

## 2017-12-01 RX ORDER — PHENTERMINE HYDROCHLORIDE 37.5 MG/1
0.5 TABLET ORAL EVERY MORNING
Qty: 15 TABLET | Refills: 1 | Status: SHIPPED | OUTPATIENT
Start: 2017-12-01 | End: 2017-12-01 | Stop reason: DRUGHIGH

## 2017-12-01 ASSESSMENT — ENCOUNTER SYMPTOMS
DYSPNEA ON EXERTION: 1
NECK PAIN: 1
TASTE DISTURBANCE: 0
BACK PAIN: 1
JOINT SWELLING: 0
DECREASED CONCENTRATION: 1
PALPITATIONS: 0
MYALGIAS: 1
HALLUCINATIONS: 0
HEMOPTYSIS: 0
SINUS CONGESTION: 0
SEIZURES: 0
INCREASED ENERGY: 1
SPUTUM PRODUCTION: 0
NUMBNESS: 1
BRUISES/BLEEDS EASILY: 1
LOSS OF CONSCIOUSNESS: 0
HYPERTENSION: 0
STIFFNESS: 1
COUGH DISTURBING SLEEP: 0
SHORTNESS OF BREATH: 1
SORE THROAT: 0
PARALYSIS: 0
ARTHRALGIAS: 1
HOARSE VOICE: 0
TREMORS: 0
PANIC: 0
SNORES LOUDLY: 1
DEPRESSION: 1
SPEECH CHANGE: 0
TINGLING: 1
NERVOUS/ANXIOUS: 1
HYPOTENSION: 0
SYNCOPE: 0
NECK MASS: 0
DISTURBANCES IN COORDINATION: 1
LIGHT-HEADEDNESS: 0
INSOMNIA: 1
WHEEZING: 1
MEMORY LOSS: 1
HEADACHES: 0
SWOLLEN GLANDS: 0
EXERCISE INTOLERANCE: 0
SLEEP DISTURBANCES DUE TO BREATHING: 1
WEAKNESS: 1
ORTHOPNEA: 1
DIZZINESS: 1
MUSCLE WEAKNESS: 1
SMELL DISTURBANCE: 0
LEG PAIN: 1
MUSCLE CRAMPS: 1
SINUS PAIN: 0
COUGH: 0
TROUBLE SWALLOWING: 1
POSTURAL DYSPNEA: 1

## 2017-12-01 NOTE — LETTER
"2017       RE: Chavez Ca  [address] [place] [address]     Dear Colleague,    Thank you for referring your patient, Chavez Ca, to the [facility]. Please see a copy of my visit note below.          Return Medical Weight Management Note     Chavez Ca  MRN:  [id number]  :  1970  RUI:  2017    Dear Conrad, Cassidy Melendez,    I had the pleasure of seeing your patient Chavez Ca.  He is a 47 year old male who I am continuing to see for treatment of obesity related to:    INTERVAL HISTORY:  Pt was last seen about 6 mo ago--saw Bernardo jacobo, new to me today--aime del rosario note, reviewed and relevant and as extracted from that note is the following:    \"Since February has been on topiramate 200 bid. When last seen had been away for 18 months, off and on topiramate, various surgeries and stresses. Reports not eating much, not doing much activity.\"  At that visit it was decided to add phentermine which pt did--has been taking a full tablet daily.    Discussed barriers to wt loss in detail and potential solutions--  Gaps in meds--needs refills. Struggles with appetite control--needs to continue pharm support.    WEIGHT last visit:   319 lbs 12.8 oz         CURRENT WEIGHT:   285 lbs 3.2 oz    Wt Readings from Last 4 Encounters:   17 129.4 kg (285 lb 3.2 oz)   17 134.7 kg (297 lb)   17 (!) 140.2 kg (309 lb)   16 (!) 138.5 kg (305 lb 4.8 oz)       Height:  5' 6.75\"  Body Mass Index:  Body mass index is 45 kg/(m^2).  Vitals:  B/P: 113/81, P: 102, R: Data Unavailable   /81  Pulse 102  Ht 1.695 m (5' 6.75\")  Wt 129.4 kg (285 lb 3.2 oz)  SpO2 100%  BMI 45 kg/m2    Initial consult weight was 361 on 1/10/12.  Weight change since last seen on Visit date not found is down 29 pounds.   Total loss is 76 pounds.    Diet and Activity Changes Since Last Visit " "Reviewed With Patient 12/1/2017   I have made the following changes to my diet since my last visit: I have about 6-8 18oz boxes of Frosted Shredded Wheat Cereal no milk 6 boxes [place] 32oz no salt no butter just sauce I have lettuce Flour Tortillas 6\" wraps with honey mustard this would be what some of my monthly eating is I switch around    With regards to my diet, I am still struggling with: getting my weight down without having to feel like i am fasting    For breakfast, I typically eat: i don't sit and eat at 7am and done at 7:10am like most i can't do that i snack on my cereal or whatever i make off /on through the day even when i come back from walks or appointments  as long as i take my pills and keep them calories low   For lunch, I typically eat: my lunchtime is late afternoon wraps or pasta maybe piece of chicken   For supper, I typically eat: no later 8pm wraps pasta chicken cereal    For snack(s), I typically eat: cereal    I have made the following changes to my activity/exercise since my last visit: was walking little more on level surface before surgery   With regards to my activity/exercise, I am still struggling with: walking up and down stairs hills shortness of breath        ROS    MEDICATIONS:   Current Outpatient Prescriptions   Medication     metroNIDAZOLE (METROGEL) 1 % gel     valACYclovir (VALTREX) 500 MG tablet     phentermine (ADIPEX-P) 37.5 MG tablet     topiramate (TOPAMAX) 50 MG tablet     amitriptyline (ELAVIL) 50 MG tablet     furosemide (LASIX) 20 MG tablet     traZODone (DESYREL) 100 MG tablet     Vitamin D, Cholecalciferol, 400 UNITS CAPS     potassium chloride (KLOR-CON) 20 MEQ Packet     Potassium Chloride ER 20 MEQ TBCR     diclofenac (VOLTAREN) 1 % GEL topical gel     pimecrolimus (ELIDEL) 1 % cream     COMPRESSION STOCKINGS     zolpidem (AMBIEN) 10 MG tablet     desonide (DESOWEN) 0.05 % cream     fluocinonide (LIDEX) 0.05 % ointment     ketoconazole (NIZORAL) 2 % " shampoo     hydrocortisone 2.5 % cream     FLUZONE QUADRIVALENT 0.5 ML injection     ketoconazole (NIZORAL) 2 % cream     neomycin-polymyxin-hydrocortisone (CORTISPORIN) 3.5-02790-8 otic suspension     oxyCODONE (ROXICODONE) 5 MG IR tablet     oxyCODONE-acetaminophen (PERCOCET) 5-325 MG per tablet     COMPRESSION STOCKINGS     neomycin-polymyxin-hydrocortisone (CORTISPORIN) otic solution     CETAPHIL (CETAPHIL) lotion     order for DME     Multiple Vitamin (MULTI VITAMIN  MENS) TABS     [DISCONTINUED] COMPRESSION STOCKINGS     No current facility-administered medications for this visit.    [place] and doing better since it    Weight Loss Medication History Reviewed With Patient 12/1/2017   Which weight loss medications are you currently taking on a regular basis?  Phentermine, Topamax (topiramate)   If you are not taking a weight loss medication that was prescribed to you, please indicate why: -   Are you having any side effects from the weight loss medication that we have prescribed you? No       ASSESSMENT:   Morbid obesity    PLAN:   Decrease portion sizes  Decrease eating out  No meal skipping  Volumetrics eating plan  Low Calorie/low fat diet    Additionally--  Resume phentermine (adipex) 37.5 mg daily    Return to see Denice in 2 months and me in 4-5 months    return in about 1 wk (same day as another appointment if possible)--nurse visit for vitals    Topiramate--  Resume that as follows:  First week 50 mg daily  Then 50 mg twice daily--week 2,  Then 50 mg AM and 100 mg PM--week 3  Then 100 mg AM and 100 mg PM--week 4,  Then 150 mg AM and 150 mg PM after as tolerated    Please return in 2 months with me or Dr. Anthony or with Denice Teixeira         Time: 25/30 min spent on evaluation, management, counseling, education, & motivational interviewing with greater than 50 % of the total time was spent on counseling and coordinating care    Sincerely,    Tatyana Quinn MD

## 2017-12-01 NOTE — PATIENT INSTRUCTIONS
Resume phentermine (adipex) 37.5 mg daily    Return to see Denice in 2 months and Dr Quinn in 4-5 months    Please return in about 1 wk (same day as another appointment if possible)--nurse visit for vitals    Topiramate--  Resume that as follows:  First week 50 mg daily  Then 50 mg twice daily--week 2,  Then 50 mg AM and 100 mg PM--week 3  Then 100 mg AM and 100 mg PM--week 4,  Then 150 mg AM and 150 mg PM after as tolerated    Please return in 2 months with Dr. Quinn or Dr. Anthony or with Denice Teixeira  MEDICATION STARTED AT THIS APPOINTMENT  We are starting topiramate at bedtime.  Start one tab, 25 mg, for a week. Go up to 50 mg (2 tabs) for the next week. At the third week, take   3 tabs (75 mg).  Stay at 3 tabs until you are seen again. Call the nurse at [phone] if you have any questions or concerns. (Do not stop taking it if you don't think it's working. For some people it works even though they do not feel much different.)    Topiramate (Topamax) is a medication that is used most often to treat migraine headaches or for seizures. It has also been found to help with weight loss. Although it's not currently FDA approved for weight loss, it has been used safely for a number of years to help people who are carrying extra weight.     Just how topiramate helps with weight loss has not been exactly determined. However it seems to work on areas of the brain to quiet down signals related to eating.      Topiramate may make you:    >feel less interest in eating in between meals   >think less about food and eating   >find it easier to push the plate away   >find giving up pop easier    >have an easier time eating less    For some of our patients, the pills work right away. They feel and think quite differently about food. Other patients don't feel much of a change but find in fact they have lost weight! Like all weight loss medications, topiramate works best when you help it work.  This means:    1) Have less  tempting high calorie (fattening) food around the house or office    2) Have lower calorie food (fruits, vegetables,low fat meats and dairy) for snacks    3) Eat out only one time or less each week.   4) Eat your meals at a table with the TV or computer off.    Side-effects. Topiramate is generally well tolerated. The main side-effects we see are:   Tingling in hands,feet, or face (usually not very troublesome)   Mental confusion and word finding trouble (about 10% of patients have this.)     Feeling sleepy or a bit dopey- this goes away very soon after starting.    One of the dangers of topiramate is the possibility of birth defects--if you get pregnant when you are on it, there is the risk that your baby will be born with a cleft lip or palate.  If you are on topiramate and of child bearing age, you need to be on a reliable form of birth control or refrain from sexual intercourse.     Please refer to the pharmacy insert for more information on side-effects. Since many pharmacists are not familiar with the use of topiramate in weight loss, calling the clinic will get you the most accurate information on the use of this medication for weight loss.     In order to get refills of this or any medication we prescribe you must be seen in the medical weight mgmt clinic every 2-3 months. Please have your pharmacy fax a refill request to [phone].

## 2017-12-01 NOTE — PROGRESS NOTES
"      Return Medical Weight Management Note     Chavez Ca  MRN:  [id number]  :  1970  RUI:  2017    Dear Conrad, Cassidy Melendez,    I had the pleasure of seeing your patient Chavez Ca.  He is a 47 year old male who I am continuing to see for treatment of obesity related to:    INTERVAL HISTORY:  Pt was last seen about 6 mo ago--saw Bernardo jacobo, new to me today--aime del rosario note, reviewed and relevant and as extracted from that note is the following:    \"Since February has been on topiramate 200 bid. When last seen had been away for 18 months, off and on topiramate, various surgeries and stresses. Reports not eating much, not doing much activity.\"  At that visit it was decided to add phentermine which pt did--has been taking a full tablet daily.    Discussed barriers to wt loss in detail and potential solutions--  Gaps in meds--needs refills. Struggles with appetite control--needs to continue pharm support.    WEIGHT last visit:   319 lbs 12.8 oz         CURRENT WEIGHT:   285 lbs 3.2 oz    Wt Readings from Last 4 Encounters:   17 129.4 kg (285 lb 3.2 oz)   17 134.7 kg (297 lb)   17 (!) 140.2 kg (309 lb)   16 (!) 138.5 kg (305 lb 4.8 oz)       Height:  5' 6.75\"  Body Mass Index:  Body mass index is 45 kg/(m^2).  Vitals:  B/P: 113/81, P: 102, R: Data Unavailable   /81  Pulse 102  Ht 1.695 m (5' 6.75\")  Wt 129.4 kg (285 lb 3.2 oz)  SpO2 100%  BMI 45 kg/m2    Initial consult weight was 361 on 1/10/12.  Weight change since last seen on Visit date not found is down 29 pounds.   Total loss is 76 pounds.    Diet and Activity Changes Since Last Visit Reviewed With Patient 2017   I have made the following changes to my diet since my last visit: I have about 6-8 18oz boxes of Frosted Shredded Wheat Cereal no milk 6 boxes [place] 32oz no salt no butter just sauce I have lettuce Flour Tortillas 6\" wraps with honey mustard this would be what some of " my monthly eating is I switch around    With regards to my diet, I am still struggling with: getting my weight down without having to feel like i am fasting    For breakfast, I typically eat: i don't sit and eat at 7am and done at 7:10am like most i can't do that i snack on my cereal or whatever i make off /on through the day even when i come back from walks or appointments  as long as i take my pills and keep them calories low   For lunch, I typically eat: my lunchtime is late afternoon wraps or pasta maybe piece of chicken   For supper, I typically eat: no later 8pm wraps pasta chicken cereal    For snack(s), I typically eat: cereal    I have made the following changes to my activity/exercise since my last visit: was walking little more on level surface before surgery   With regards to my activity/exercise, I am still struggling with: walking up and down stairs hills shortness of breath        ROS    MEDICATIONS:   Current Outpatient Prescriptions   Medication     metroNIDAZOLE (METROGEL) 1 % gel     valACYclovir (VALTREX) 500 MG tablet     phentermine (ADIPEX-P) 37.5 MG tablet     topiramate (TOPAMAX) 50 MG tablet     amitriptyline (ELAVIL) 50 MG tablet     furosemide (LASIX) 20 MG tablet     traZODone (DESYREL) 100 MG tablet     Vitamin D, Cholecalciferol, 400 UNITS CAPS     potassium chloride (KLOR-CON) 20 MEQ Packet     Potassium Chloride ER 20 MEQ TBCR     diclofenac (VOLTAREN) 1 % GEL topical gel     pimecrolimus (ELIDEL) 1 % cream     COMPRESSION STOCKINGS     zolpidem (AMBIEN) 10 MG tablet     desonide (DESOWEN) 0.05 % cream     fluocinonide (LIDEX) 0.05 % ointment     ketoconazole (NIZORAL) 2 % shampoo     hydrocortisone 2.5 % cream     FLUZONE QUADRIVALENT 0.5 ML injection     ketoconazole (NIZORAL) 2 % cream     neomycin-polymyxin-hydrocortisone (CORTISPORIN) 3.5-90894-8 otic suspension     oxyCODONE (ROXICODONE) 5 MG IR tablet     oxyCODONE-acetaminophen (PERCOCET) 5-325 MG per tablet     COMPRESSION  STOCKINGS     neomycin-polymyxin-hydrocortisone (CORTISPORIN) otic solution     CETAPHIL (CETAPHIL) lotion     order for DME     Multiple Vitamin (MULTI VITAMIN  MENS) TABS     [DISCONTINUED] COMPRESSION STOCKINGS     No current facility-administered medications for this visit.    [place] and doing better since it    Weight Loss Medication History Reviewed With Patient 12/1/2017   Which weight loss medications are you currently taking on a regular basis?  Phentermine, Topamax (topiramate)   If you are not taking a weight loss medication that was prescribed to you, please indicate why: -   Are you having any side effects from the weight loss medication that we have prescribed you? No       ASSESSMENT:   Morbid obesity    PLAN:   Decrease portion sizes  Decrease eating out  No meal skipping  Volumetrics eating plan  Low Calorie/low fat diet    Additionally--  Resume phentermine (adipex) 37.5 mg daily    Return to see Denice in 2 months and me in 4-5 months    return in about 1 wk (same day as another appointment if possible)--nurse visit for vitals    Topiramate--  Resume that as follows:  First week 50 mg daily  Then 50 mg twice daily--week 2,  Then 50 mg AM and 100 mg PM--week 3  Then 100 mg AM and 100 mg PM--week 4,  Then 150 mg AM and 150 mg PM after as tolerated    Please return in 2 months with me or Dr. Anthony or with Denice Teixeira         Time: 25/30 min spent on evaluation, management, counseling, education, & motivational interviewing with greater than 50 % of the total time was spent on counseling and coordinating care    Sincerely,    Tatyana Quinn MD

## 2017-12-01 NOTE — NURSING NOTE
"Chief Complaint   Patient presents with     Weight Problem     RMWM       Vitals:    12/01/17 1132   BP: 113/81   Pulse: 102   SpO2: 100%   Weight: 285 lb 3.2 oz   Height: 5' 6.75\"       Body mass index is 45 kg/(m^2).  French Christensen CMA                            "

## 2017-12-01 NOTE — MR AVS SNAPSHOT
After Visit Summary   12/1/2017    Chavez Ca    MRN: [id number]           Patient Information     Date Of Birth          1970        Visit Information        Provider Department      12/1/2017 11:20 AM Tatyana Quinn MD [facility]        Today's Diagnoses     Morbid obesity (H)    -  1      Care Instructions    Resume phentermine (adipex) 37.5 mg daily    Return to see Denice in 2 months and Dr Quinn in 4-5 months    Please return in about 1 wk (same day as another appointment if possible)--nurse visit for vitals    Topiramate--  Resume that as follows:  First week 50 mg daily  Then 50 mg twice daily--week 2,  Then 50 mg AM and 100 mg PM--week 3  Then 100 mg AM and 100 mg PM--week 4,  Then 150 mg AM and 150 mg PM after as tolerated    Please return in 2 months with Dr. Quinn or Dr. Anthony or with Denice Teixeira  MEDICATION STARTED AT THIS APPOINTMENT  We are starting topiramate at bedtime.  Start one tab, 25 mg, for a week. Go up to 50 mg (2 tabs) for the next week. At the third week, take   3 tabs (75 mg).  Stay at 3 tabs until you are seen again. Call the nurse at [phone] if you have any questions or concerns. (Do not stop taking it if you don't think it's working. For some people it works even though they do not feel much different.)    Topiramate (Topamax) is a medication that is used most often to treat migraine headaches or for seizures. It has also been found to help with weight loss. Although it's not currently FDA approved for weight loss, it has been used safely for a number of years to help people who are carrying extra weight.     Just how topiramate helps with weight loss has not been exactly determined. However it seems to work on areas of the brain to quiet down signals related to eating.      Topiramate may make you:    >feel less interest in eating in between meals   >think less about food and eating   >find it easier to push  the plate away   >find giving up pop easier    >have an easier time eating less    For some of our patients, the pills work right away. They feel and think quite differently about food. Other patients don't feel much of a change but find in fact they have lost weight! Like all weight loss medications, topiramate works best when you help it work.  This means:    1) Have less tempting high calorie (fattening) food around the house or office    2) Have lower calorie food (fruits, vegetables,low fat meats and dairy) for snacks    3) Eat out only one time or less each week.   4) Eat your meals at a table with the TV or computer off.    Side-effects. Topiramate is generally well tolerated. The main side-effects we see are:   Tingling in hands,feet, or face (usually not very troublesome)   Mental confusion and word finding trouble (about 10% of patients have this.)     Feeling sleepy or a bit dopey- this goes away very soon after starting.    One of the dangers of topiramate is the possibility of birth defects--if you get pregnant when you are on it, there is the risk that your baby will be born with a cleft lip or palate.  If you are on topiramate and of child bearing age, you need to be on a reliable form of birth control or refrain from sexual intercourse.     Please refer to the pharmacy insert for more information on side-effects. Since many pharmacists are not familiar with the use of topiramate in weight loss, calling the clinic will get you the most accurate information on the use of this medication for weight loss.     In order to get refills of this or any medication we prescribe you must be seen in the medical weight mgmt clinic every 2-3 months. Please have your pharmacy fax a refill request to [phone].                  Follow-ups after your visit        Your next 10 appointments already scheduled     Dec 04, 2017  8:30 AM CST   (Arrive by 8:15 AM)   Return Visit with LEONARD Potts  [facility] ([facility])    [address] [place] [id number]   [phone]            Dec 07, 2017 10:00 AM CST   (Arrive by 9:45 AM)   RE-ESTABLISH NEW with Alen Colindres MD   [facility] ([facility])    [address] [place] [id number]   [phone]            Jan 11, 2018  8:00 AM CST   (Arrive by 7:45 AM)   Return Visit with Michele Alfred MD   [facility] ([facility])    [address] [place] [id number] [phone]            Jan 22, 2018  9:15 AM CST   (Arrive by 9:00 AM)   Return Visit with Zafar Glasgow MD   [facility] ([facility])    [address] [place] [id number]   [phone]            Feb 19, 2018  3:15 PM CST   (Arrive by 3:00 PM)   Return Visit with Jaye Nielson PA-C   [facility] ([facility])    [address] [place] [id number] [phone]              Who to contact     Please call your clinic at [phone] to:    Ask questions about your health    Make or cancel appointments    Discuss your medicines    Learn about your test results    Speak to your doctor   If you have compliments or concerns about an experience at your clinic, or if you wish to file a complaint, please contact [facility] Patient Relations at [phone] or email us at [email or web address]         Additional Information About Your Visit        MyChart Information     [employer] gives you secure access to your electronic health record. If you see a primary care provider, you can also send messages to your care team and make appointments. If you have questions, please call your primary care clinic.  If you do not have a primary care provider, please call [phone]  "and they will assist you.      Nabto is an electronic gateway that provides easy, online access to your medical records. With [employer]to, you can request a clinic appointment, read your test results, renew a prescription or communicate with your care team.     To access your existing account, please contact your [facility] or call [phone] for assistance.        Care EveryWhere ID     This is your Care EveryWhere ID. This could be used by other organizations to access your [facility] medical records  [id number]        Your Vitals Were     Pulse Height Pulse Oximetry BMI (Body Mass Index)          102 1.695 m (5' 6.75\") 100% 45 kg/m2         Blood Pressure from Last 3 Encounters:   12/01/17 113/81   04/05/17 119/75   01/09/17 113/79    Weight from Last 3 Encounters:   12/01/17 129.4 kg (285 lb 3.2 oz)   04/05/17 134.7 kg (297 lb)   01/09/17 (!) 140.2 kg (309 lb)              Today, you had the following     No orders found for display         Today's Medication Changes          These changes are accurate as of: 12/1/17 12:38 PM.  If you have any questions, ask your nurse or doctor.               These medicines have changed or have updated prescriptions.        Dose/Directions    * phentermine 37.5 MG tablet   Commonly known as:  ADIPEX-P   This may have changed:  Another medication with the same name was added. Make sure you understand how and when to take each.   Used for:  Morbid obesity due to excess calories (H)   Changed by:  Fortino Chang MD        Dose:  37.5 mg   Take 1 tablet (37.5 mg) by mouth every morning (before breakfast)   Quantity:  30 tablet   Refills:  0       * phentermine 37.5 MG tablet   Commonly known as:  ADIPEX-P   This may have changed:  You were already taking a medication with the same name, and this prescription was added. Make sure you understand how and when to take each.   Used for:  Morbid obesity (H)   Changed by:  Tatyana Quinn " MD Nelda        Dose:  0.5 tablet   Take 0.5 tablets (18.75 mg) by mouth every morning   Quantity:  15 tablet   Refills:  1       * phentermine 37.5 MG tablet   Commonly known as:  ADIPEX-P   This may have changed:  You were already taking a medication with the same name, and this prescription was added. Make sure you understand how and when to take each.   Used for:  Morbid obesity (H)   Changed by:  Tatyana Quinn MD        Dose:  37.5 mg   Take 1 tablet (37.5 mg) by mouth every morning   Quantity:  30 tablet   Refills:  3       * topiramate 50 MG tablet   Commonly known as:  TOPAMAX   This may have changed:  Another medication with the same name was added. Make sure you understand how and when to take each.   Used for:  Morbid obesity with alveolar hypoventilation (H)   Changed by:  Fortino Chang MD        Dose:  200 mg   Take 4 tablets (200 mg) by mouth 2 times daily   Quantity:  720 tablet   Refills:  0       * topiramate 50 MG tablet   Commonly known as:  TOPAMAX   This may have changed:  You were already taking a medication with the same name, and this prescription was added. Make sure you understand how and when to take each.   Used for:  Morbid obesity (H)   Changed by:  Tatyana Quinn MD        First week 50 mg daily Then 50 mg twice daily--week 2, Then 50 mg AM and 100 mg PM--week 3 Then 100 mg AM and 100 mg PM--week 4, Then 150 mg AM and 150 mg PM after as tolerated   Quantity:  240 tablet   Refills:  3       * Notice:  This list has 5 medication(s) that are the same as other medications prescribed for you. Read the directions carefully, and ask your doctor or other care provider to review them with you.      Stop taking these medicines if you haven't already. Please contact your care team if you have questions.     NYSTOP [id number] UNIT/GM Powd   Generic drug:  nystatin   Stopped by:  Tatyana Quinn MD                Where to get your medicines      Some of these  will need a paper prescription and others can be bought over the counter.  Ask your nurse if you have questions.     Bring a paper prescription for each of these medications     phentermine 37.5 MG tablet    phentermine 37.5 MG tablet    topiramate 50 MG tablet                Primary Care Provider Office Phone # Fax #    Cassidy Nora Martines [phone]       [address] [place] [phone]  [facility] [address]        Equal Access to Services     TALHA TEIXEIRA : Hadii aad ku hadasho Soomaali, waaxda luqadaha, qaybta kaalmada adeegyada, waxay idiin hayaan adeeg kharajammie lafátima . So [facility] [phone].    ATENCIÓN: Si taylorla espzenia, tiene a ackerman disposición servicios gratuitos de asistencia lingüística. [facility] [phone].    We comply with applicable federal civil rights laws and [place] laws. We do not discriminate on the basis of race, color, national origin, age, disability, sex, sexual orientation, or gender identity.            Thank you!     Thank you for choosing [facility]ICAL [facility] MANAGEMENT  for your care. Our goal is always to provide you with excellent care. Hearing back from our patients is one way we can continue to improve our services. Please take a few minutes to complete the written survey that you may receive in the mail after your visit with us. Thank you!             Your Updated Medication List - Protect others around you: Learn how to safely use, store and throw away your medicines at www.disposemymeds.org.          This list is accurate as of: 12/1/17 12:38 PM.  Always use your most recent med list.                   Brand Name Dispense Instructions for use Diagnosis    amitriptyline 50 MG tablet    ELAVIL    180 tablet    Take 2 tablets (100 mg) by mouth At Bedtime    Idiopathic progressive neuropathy       CETAPHIL lotion      Apply topically 2 times daily        * COMPRESSION STOCKINGS     2 each    1 each daily Measure and fit thigh length Circaid compression device.   Bilateral.    Venous insufficiency (chronic) (peripheral)       * COMPRESSION STOCKINGS     2 each    20-30 mmHg knee high compression stockings.  Measure and fit.  Style and brand per patient preference.    Venous insufficiency (chronic) (peripheral)       DESOWEN 0.05 % cream   Generic drug:  desonide           diclofenac 1 % Gel topical gel    VOLTAREN    100 g    Apply 4 grams to knees four times daily using enclosed dosing card.    Bilateral chronic knee pain       fluocinonide 0.05 % ointment    LIDEX          FLUZONE QUADRIVALENT 0.5 ML injection   Generic drug:  influenza quadrivalent (PF) vacc age 3 yrs and older           furosemide 20 MG tablet    LASIX    540 tablet    Take 3 tablets (60 mg) by mouth 2 times daily    Venous insufficiency (chronic) (peripheral)       hydrocortisone 2.5 % cream           METROGEL 1 % gel   Generic drug:  metroNIDAZOLE           MULTI vitamin  MENS Tabs     90 tablet    Take 1 tablet by mouth daily    Idiopathic small and large fiber sensory neuropathy       * neomycin-polymyxin-hydrocortisone otic solution    CORTISPORIN    10 mL    Place 3 drops Into the left ear 4 times daily    Ear itching       * neomycin-polymyxin-hydrocortisone 3.5-10118-9 otic suspension    CORTISPORIN          * NIZORAL 2 % shampoo   Generic drug:  ketoconazole           * ketoconazole 2 % cream    NIZORAL          order for DME     1 Units    Equipment being ordered: [place].  Style per patient preference.    Venous insufficiency (chronic) (peripheral)       oxyCODONE IR 5 MG tablet    ROXICODONE          oxyCODONE-acetaminophen 5-325 MG per tablet    PERCOCET          * phentermine 37.5 MG tablet    ADIPEX-P    30 tablet    Take 1 tablet (37.5 mg) by mouth every morning (before breakfast)    Morbid obesity due to excess calories (H)       * phentermine 37.5 MG tablet    ADIPEX-P    15 tablet    Take 0.5 tablets (18.75 mg) by mouth every morning    Morbid obesity (H)       * phentermine 37.5  MG tablet    ADIPEX-P    30 tablet    Take 1 tablet (37.5 mg) by mouth every morning    Morbid obesity (H)       pimecrolimus 1 % cream    ELIDEL    60 g    Apply topically 2 times daily    Periorificial dermatitis       * potassium chloride 20 MEQ Packet    KLOR-CON    270 packet    Take 60 mEq by mouth daily    Hypokalemia       * Potassium Chloride ER 20 MEQ Tbcr     90 tablet    Take 3 tablets (60 mEq) by mouth daily    Hypokalemia       * topiramate 50 MG tablet    TOPAMAX    720 tablet    Take 4 tablets (200 mg) by mouth 2 times daily    Morbid obesity with alveolar hypoventilation (H)       * topiramate 50 MG tablet    TOPAMAX    240 tablet    First week 50 mg daily Then 50 mg twice daily--week 2, Then 50 mg AM and 100 mg PM--week 3 Then 100 mg AM and 100 mg PM--week 4, Then 150 mg AM and 150 mg PM after as tolerated    Morbid obesity (H)       traZODone 100 MG tablet    DESYREL    90 tablet    Take 1 tablet (100 mg) by mouth At Bedtime    Sleep disorder       valACYclovir 500 MG tablet    VALTREX          Vitamin D (Cholecalciferol) 400 UNITS Caps     90 capsule    Take 400 Units by mouth daily    Vitamin D deficiency       zolpidem 10 MG tablet    AMBIEN    30 tablet    Take 1 tablet (10 mg) by mouth nightly as needed for sleep    Insomnia due to medical condition       * Notice:  This list has 13 medication(s) that are the same as other medications prescribed for you. Read the directions carefully, and ask your doctor or other care provider to review them with you.

## 2017-12-01 NOTE — TELEPHONE ENCOUNTER
Sclerotherapy check, left leg :  12/2/2016  Impression:  In the area of clinical symptoms, left lateral lower thigh and upper calf region, there are patent/compressible superficial varicosities.  No areas of noncompressible hypoechoic/trapped blood identified.     Plan:   Additional foam sclerotherapy with Dr. Hubbard at a future date.

## 2017-12-21 ENCOUNTER — RADIANT APPOINTMENT (OUTPATIENT)
Dept: GENERAL RADIOLOGY | Facility: CLINIC | Age: 47
End: 2017-12-21
Attending: INTERNAL MEDICINE
Payer: MEDICAID

## 2017-12-21 ENCOUNTER — OFFICE VISIT (OUTPATIENT)
Dept: INTERNAL MEDICINE | Facility: CLINIC | Age: 47
End: 2017-12-21
Payer: MEDICAID

## 2017-12-21 VITALS
RESPIRATION RATE: 20 BRPM | WEIGHT: 293.6 LBS | DIASTOLIC BLOOD PRESSURE: 84 MMHG | HEART RATE: 123 BPM | OXYGEN SATURATION: 96 % | SYSTOLIC BLOOD PRESSURE: 131 MMHG | BODY MASS INDEX: 46.33 KG/M2

## 2017-12-21 DIAGNOSIS — G47.01 INSOMNIA DUE TO MEDICAL CONDITION: ICD-10-CM

## 2017-12-21 DIAGNOSIS — G47.9 SLEEP DISORDER: ICD-10-CM

## 2017-12-21 DIAGNOSIS — T50.2X5A DIURETIC-INDUCED HYPOKALEMIA: ICD-10-CM

## 2017-12-21 DIAGNOSIS — R05.8 PRODUCTIVE COUGH: ICD-10-CM

## 2017-12-21 DIAGNOSIS — I87.303 STASIS EDEMA OF BOTH LOWER EXTREMITIES: Primary | ICD-10-CM

## 2017-12-21 DIAGNOSIS — I87.303 STASIS EDEMA OF BOTH LOWER EXTREMITIES: ICD-10-CM

## 2017-12-21 DIAGNOSIS — L21.9 SEBORRHEIC DERMATITIS: ICD-10-CM

## 2017-12-21 DIAGNOSIS — E87.6 DIURETIC-INDUCED HYPOKALEMIA: ICD-10-CM

## 2017-12-21 DIAGNOSIS — R06.9 ABNORMALITY OF BREATHING: ICD-10-CM

## 2017-12-21 LAB
ANION GAP SERPL CALCULATED.3IONS-SCNC: 6 MMOL/L (ref 3–14)
BUN SERPL-MCNC: 20 MG/DL (ref 7–30)
CALCIUM SERPL-MCNC: 8.9 MG/DL (ref 8.5–10.1)
CHLORIDE SERPL-SCNC: 105 MMOL/L (ref 94–109)
CO2 SERPL-SCNC: 27 MMOL/L (ref 20–32)
CREAT SERPL-MCNC: 1.05 MG/DL (ref 0.66–1.25)
ERYTHROCYTE [DISTWIDTH] IN BLOOD BY AUTOMATED COUNT: 13.1 % (ref 10–15)
GFR SERPL CREATININE-BSD FRML MDRD: 75 ML/MIN/1.7M2
GLUCOSE SERPL-MCNC: 98 MG/DL (ref 70–99)
HCT VFR BLD AUTO: 45.6 % (ref 40–53)
HGB BLD-MCNC: 14.6 G/DL (ref 13.3–17.7)
MAGNESIUM SERPL-MCNC: 2.2 MG/DL (ref 1.6–2.3)
MCH RBC QN AUTO: 29.6 PG (ref 26.5–33)
MCHC RBC AUTO-ENTMCNC: 32 G/DL (ref 31.5–36.5)
MCV RBC AUTO: 92 FL (ref 78–100)
NT-PROBNP SERPL-MCNC: 15 PG/ML (ref 0–125)
PLATELET # BLD AUTO: 286 10E9/L (ref 150–450)
POTASSIUM SERPL-SCNC: 4.6 MMOL/L (ref 3.4–5.3)
PROCALCITONIN SERPL-MCNC: <0.05 NG/ML
RBC # BLD AUTO: 4.94 10E12/L (ref 4.4–5.9)
SODIUM SERPL-SCNC: 138 MMOL/L (ref 133–144)
WBC # BLD AUTO: 8.6 10E9/L (ref 4–11)

## 2017-12-21 PROCEDURE — 84145 PROCALCITONIN (PCT): CPT | Performed by: INTERNAL MEDICINE

## 2017-12-21 RX ORDER — ZOLPIDEM TARTRATE 10 MG/1
10 TABLET ORAL
Qty: 30 TABLET | Refills: 5 | Status: SHIPPED | OUTPATIENT
Start: 2017-12-21 | End: 2018-02-01

## 2017-12-21 RX ORDER — TRAZODONE HYDROCHLORIDE 100 MG/1
100 TABLET ORAL AT BEDTIME
Qty: 90 TABLET | Refills: 3 | Status: SHIPPED | OUTPATIENT
Start: 2017-12-21 | End: 2018-02-01

## 2017-12-21 RX ORDER — KETOCONAZOLE 20 MG/ML
SHAMPOO TOPICAL
Qty: 120 ML | Refills: 3 | Status: SHIPPED | OUTPATIENT
Start: 2017-12-22 | End: 2018-02-01

## 2017-12-21 RX ORDER — FUROSEMIDE 20 MG
60 TABLET ORAL DAILY
Qty: 90 TABLET | Refills: 0 | Status: SHIPPED | OUTPATIENT
Start: 2017-12-21 | End: 2018-02-01

## 2017-12-21 ASSESSMENT — ENCOUNTER SYMPTOMS
EYE WATERING: 0
LOSS OF CONSCIOUSNESS: 0
NECK MASS: 0
POLYDIPSIA: 1
NERVOUS/ANXIOUS: 1
FATIGUE: 1
ORTHOPNEA: 1
HEMOPTYSIS: 0
DEPRESSION: 1
SPUTUM PRODUCTION: 1
HYPOTENSION: 0
EXERCISE INTOLERANCE: 1
WEIGHT GAIN: 1
DECREASED APPETITE: 0
HEADACHES: 1
BRUISES/BLEEDS EASILY: 1
EYE REDNESS: 1
COUGH: 1
POSTURAL DYSPNEA: 1
CHILLS: 1
HOARSE VOICE: 0
TROUBLE SWALLOWING: 1
DOUBLE VISION: 0
SINUS PAIN: 1
SWOLLEN GLANDS: 0
MUSCLE WEAKNESS: 1
EYE PAIN: 0
EYE IRRITATION: 1
DIZZINESS: 1
INSOMNIA: 1
MUSCLE CRAMPS: 1
SNORES LOUDLY: 1
SEIZURES: 0
TINGLING: 1
BACK PAIN: 1
SYNCOPE: 0
ARTHRALGIAS: 1
HALLUCINATIONS: 0
PALPITATIONS: 0
STIFFNESS: 1
JOINT SWELLING: 0
SORE THROAT: 1
DECREASED CONCENTRATION: 1
SPEECH CHANGE: 0
FEVER: 1
POOR WOUND HEALING: 0
POLYPHAGIA: 0
SLEEP DISTURBANCES DUE TO BREATHING: 1
WHEEZING: 1
PANIC: 0
HYPERTENSION: 0
INCREASED ENERGY: 1
LEG PAIN: 1
NUMBNESS: 1
WEAKNESS: 1
NECK PAIN: 1
COUGH DISTURBING SLEEP: 1
SHORTNESS OF BREATH: 1
MEMORY LOSS: 1
DISTURBANCES IN COORDINATION: 1
SKIN CHANGES: 0
NIGHT SWEATS: 0
LIGHT-HEADEDNESS: 1
SINUS CONGESTION: 0
NAIL CHANGES: 0
WEIGHT LOSS: 0
PARALYSIS: 0
TASTE DISTURBANCE: 0
SMELL DISTURBANCE: 0
DYSPNEA ON EXERTION: 1
MYALGIAS: 1
ALTERED TEMPERATURE REGULATION: 1
TREMORS: 0

## 2017-12-21 ASSESSMENT — PAIN SCALES - GENERAL: PAINLEVEL: WORST PAIN (10)

## 2017-12-21 NOTE — MR AVS SNAPSHOT
After Visit Summary   12/21/2017    Chavez Ca    MRN: [id number]           Patient Information     Date Of Birth          1970        Visit Information        Provider Department      12/21/2017 7:35 AM Tony Pugh MD [facility]        Today's Diagnoses     Stasis edema of both lower extremities    -  1    Diuretic-induced hypokalemia        Productive cough        Abnormality of breathing         Insomnia due to medical condition        Sleep disorder        Seborrheic dermatitis          Care Instructions    Please get blood work and chest xray today.      I will call you regarding restarting lasix and potassium doses.      Your follow-up appointments will be listed on the bottom of your checkout sheet.      Please come back and see me in two weeks.      Primary Care Center: [phone]     Primary Care Center Medication Refill Request Information:  * Please contact your pharmacy regarding ANY request for medication refills.  ** [facility] Prescription Fax = [phone]  * Please allow 3 business days for routine medication refills.  * Please allow 5 business days for controlled substance medication refills.     Primary Care Center Test Result notification information:  *You will be notified with in 7-10 days of your appointment day regarding the results of your test.  If you are on MyChart you will be notified as soon as the provider has reviewed the results and signed off on them.          Follow-ups after your visit        Follow-up notes from your care team     Return in about 2 weeks (around 1/4/2018) for Lab Work.      Your next 10 appointments already scheduled     Dec 21, 2017  8:45 AM CST   LAB with  LAB   [facility] ([facility])    [address] [place] [id number] [phone]           Please do not eat 10-12 hours before your appointment if you are coming in fasting for labs on lipids,  cholesterol, or glucose (sugar). This does not apply to pregnant women. Water, hot tea and black coffee (with nothing added) are okay. Do not drink other fluids, diet soda or chew gum.            Dec 21, 2017  9:45 AM CST   (Arrive by 9:30 AM)   XR CHEST 2 VIEWS with UCXR1   [facility] ([facility])    [address]  [address] [place] [id number] [phone]           Please bring a list of your current medicines to your exam. (Include vitamins, minerals and over-thecounter medicines.) Leave your valuables at home.  Tell your doctor if there is a chance you may be pregnant.  You do not need to do anything special for this exam.            Jan 04, 2018  8:15 AM CST   (Arrive by 8:00 AM)   Return Visit with Tony Pugh MD   [facility] ([facility])    [address] [place] [id number] [phone]            Jan 11, 2018  8:00 AM CST   (Arrive by 7:45 AM)   Return Visit with Michele Alfred MD   [facility] Neurology ([facility])    [address] [place] [id number] [phone]            Jan 22, 2018  9:15 AM CST   (Arrive by 9:00 AM)   Return Visit with Zafar Glasgow MD   [facility] Ear Nose and Throat ([facility])    [address] [place] [address]   [phone]            Feb 02, 2018  9:30 AM CST   (Arrive by 9:15 AM)   Return Weight Management Visit with Denice Teixeira PA-C   [facility] Medical Weight Management ([facility])    [address] [place] [id number] [phone]            Feb 19, 2018  3:15 PM CST   (Arrive by 3:00 PM)   Return Visit with Jaye Nielson PA-C   [facility] Dermatology ([facility])    [address] [place] [address]    [phone]            Apr 13, 2018 11:00 AM CDT   (Arrive by 10:45 AM)   Return Visit with Tatyana Quinn MD   [facility] Weight Management ([facility])    [address] [place] [id number] [phone]              Future tests that were ordered for you today     Open Future Orders        Priority Expected Expires Ordered    XR Chest 2 Views Routine 12/21/2017 12/21/2018 12/21/2017    Basic metabolic panel Routine 12/21/2017 1/4/2018 12/21/2017    CBC with platelets Routine 12/21/2017 1/4/2018 12/21/2017    N terminal pro BNP Routine 12/21/2017 12/21/2018 12/21/2017    Procalcitonin Routine 12/21/2017 1/4/2018 12/21/2017    Magnesium Routine 12/21/2017 1/4/2018 12/21/2017            Who to contact     Please call your clinic at [phone] to:    Ask questions about your health    Make or cancel appointments    Discuss your medicines    Learn about your test results    Speak to your doctor   If you have compliments or concerns about an experience at your clinic, or if you wish to file a complaint, please contact [facility] Patient Relations at [phone] or email us at [email or web address]         Additional Information About Your Visit        MyChart Information     [employer] gives you secure access to your electronic health record. If you see a primary care provider, you can also send messages to your care team and make appointments. If you have questions, please call your primary care clinic.  If you do not have a primary care provider, please call [phone] and they will assist you.      [employer] is an electronic gateway that provides easy, online access to your medical records. With [employer], you can request a clinic appointment, read your test results, renew a prescription or communicate with your care team.     To access your existing account, please contact your [facility]  Clinic or call [phone] for assistance.        Care EveryWhere ID     This is your Care EveryWhere ID. This could be used by other organizations to access your [place] medical records  [id number]        Your Vitals Were     Pulse Respirations Pulse Oximetry BMI (Body Mass Index)          123 20 96% 46.33 kg/m2         Blood Pressure from Last 3 Encounters:   12/21/17 131/84   12/01/17 113/81   04/05/17 119/75    Weight from Last 3 Encounters:   12/21/17 133.2 kg (293 lb 9.6 oz)   12/01/17 129.4 kg (285 lb 3.2 oz)   04/05/17 134.7 kg (297 lb)                 Today's Medication Changes          These changes are accurate as of: 12/21/17  8:37 AM.  If you have any questions, ask your nurse or doctor.               These medicines have changed or have updated prescriptions.        Dose/Directions    * ketoconazole 2 % cream   Commonly known as:  NIZORAL   This may have changed:  Another medication with the same name was changed. Make sure you understand how and when to take each.   Changed by:  Natacha Greene APRN CNP        Refills:  0       * ketoconazole 2 % shampoo   Commonly known as:  NIZORAL   This may have changed:  when to take this   Used for:  Seborrheic dermatitis   Changed by:  Tony Pugh MD        Start taking on:  12/22/2017   Apply topically three times a week   Quantity:  120 mL   Refills:  3       * order for DME   This may have changed:  Another medication with the same name was added. Make sure you understand how and when to take each.   Used for:  Venous insufficiency (chronic) (peripheral)   Changed by:  Alexis Davies MD        Equipment being ordered: Hernan Del Rio.  Style per patient preference.   Quantity:  1 Units   Refills:  1       * order for DME   This may have changed:  You were already taking a medication with the same name, and this prescription was added. Make sure you understand how and when to take each.   Used for:  Stasis edema of both lower  extremities   Changed by:  Tony Pugh MD        Equipment being ordered: Compression stockings 20-30mmHg.  1 pair   Quantity:  1 each   Refills:  2       * Notice:  This list has 4 medication(s) that are the same as other medications prescribed for you. Read the directions carefully, and ask your doctor or other care provider to review them with you.         Where to get your medicines      These medications were sent to [facility] - [place], [place] - [address]  [address], [place] [address]     Phone:  [phone]     ketoconazole 2 % shampoo    traZODone 100 MG tablet         Some of these will need a paper prescription and others can be bought over the counter.  Ask your nurse if you have questions.     Bring a paper prescription for each of these medications     order for DME    zolpidem 10 MG tablet                Primary Care Provider Office Phone # Fax #    Tony Pugh [phone] [address] [facility] [address]        Equal Access to Services     [facility] AH: Hadii aad ku hadasho Soomaali, waaxda luqadaha, qaybta kaalmada adeegyada, waxay idiin hayaan kajal duarte . So [facility] [phone].    ATENCIÓN: Si habla español, tiene a ackerman disposición servicios gratuitos de asistencia lingüística. Ll[facility] [phone].    We comply with applicable federal civil rights laws and [place] laws. We do not discriminate on the basis of race, color, national origin, age, disability, sex, sexual orientation, or gender identity.            Thank you!     Thank you for choosing [facility]  for your care. Our goal is always to provide you with excellent care. Hearing back from our patients is one way we can continue to improve our services. Please take a few minutes to complete the written survey that you may receive in the mail after your visit with us. Thank you!             Your Updated Medication List -  Protect others around you: Learn how to safely use, store and throw away your medicines at www.disposemymeds.org.          This list is accurate as of: 12/21/17  8:37 AM.  Always use your most recent med list.                   Brand Name Dispense Instructions for use Diagnosis    amitriptyline 50 MG tablet    ELAVIL    180 tablet    Take 2 tablets (100 mg) by mouth At Bedtime    Idiopathic progressive neuropathy       CETAPHIL lotion      Apply topically 2 times daily        * COMPRESSION STOCKINGS     2 each    1 each daily Measure and fit thigh length Circaid compression device.  Bilateral.    Venous insufficiency (chronic) (peripheral)       * COMPRESSION STOCKINGS     2 each    20-30 mmHg knee high compression stockings.  Measure and fit.  Style and brand per patient preference.    Venous insufficiency (chronic) (peripheral)       DESOWEN 0.05 % cream   Generic drug:  desonide           diclofenac 1 % Gel topical gel    VOLTAREN    100 g    Apply 4 grams to knees four times daily using enclosed dosing card.    Bilateral chronic knee pain       fluocinonide 0.05 % ointment    LIDEX          FLUZONE QUADRIVALENT 0.5 ML injection   Generic drug:  influenza quadrivalent (PF) vacc age 3 yrs and older           furosemide 20 MG tablet    LASIX    540 tablet    Take 3 tablets (60 mg) by mouth 2 times daily    Venous insufficiency (chronic) (peripheral)       hydrocortisone 2.5 % cream           * ketoconazole 2 % cream    NIZORAL          * ketoconazole 2 % shampoo   Start taking on:  12/22/2017    NIZORAL    120 mL    Apply topically three times a week    Seborrheic dermatitis       METROGEL 1 % gel   Generic drug:  metroNIDAZOLE           MULTI vitamin  MENS Tabs     90 tablet    Take 1 tablet by mouth daily    Idiopathic small and large fiber sensory neuropathy       * neomycin-polymyxin-hydrocortisone otic solution    CORTISPORIN    10 mL    Place 3 drops Into the left ear 4 times daily    Ear itching       *  neomycin-polymyxin-hydrocortisone 3.5-62629-1 otic suspension    CORTISPORIN          * order for DME     1 Units    Equipment being ordered: Hernan Del Rio.  Style per patient preference.    Venous insufficiency (chronic) (peripheral)       * order for DME     1 each    Equipment being ordered: Compression stockings 20-30mmHg.  1 pair    Stasis edema of both lower extremities       oxyCODONE IR 5 MG tablet    ROXICODONE          oxyCODONE-acetaminophen 5-325 MG per tablet    PERCOCET          phentermine 37.5 MG tablet    ADIPEX-P    30 tablet    Take 1 tablet (37.5 mg) by mouth every morning    Morbid obesity (H)       pimecrolimus 1 % cream    ELIDEL    60 g    Apply topically 2 times daily    Periorificial dermatitis       * potassium chloride 20 MEQ Packet    KLOR-CON    270 packet    Take 60 mEq by mouth daily    Hypokalemia       * Potassium Chloride ER 20 MEQ Tbcr     90 tablet    Take 3 tablets (60 mEq) by mouth daily    Hypokalemia       topiramate 50 MG tablet    TOPAMAX    240 tablet    First week 50 mg daily Then 50 mg twice daily--week 2, Then 50 mg AM and 100 mg PM--week 3 Then 100 mg AM and 100 mg PM--week 4, Then 150 mg AM and 150 mg PM after as tolerated    Morbid obesity (H)       traZODone 100 MG tablet    DESYREL    90 tablet    Take 1 tablet (100 mg) by mouth At Bedtime    Sleep disorder       valACYclovir 500 MG tablet    VALTREX          Vitamin D (Cholecalciferol) 400 UNITS Caps     90 capsule    Take 400 Units by mouth daily    Vitamin D deficiency       zolpidem 10 MG tablet    AMBIEN    30 tablet    Take 1 tablet (10 mg) by mouth nightly as needed for sleep    Insomnia due to medical condition       * Notice:  This list has 10 medication(s) that are the same as other medications prescribed for you. Read the directions carefully, and ask your doctor or other care provider to review them with you.

## 2017-12-21 NOTE — NURSING NOTE
Chief Complaint   Patient presents with     Establish Care     Patient is here to re-establish a new PCP.      Medication Refill     Patient is here for medication refills.      Leandra Gross LPN at 7:44 AM on 12/21/2017.

## 2017-12-21 NOTE — PATIENT INSTRUCTIONS
Please get blood work and chest xray today.      I will call you regarding restarting lasix and potassium doses.      Your follow-up appointments will be listed on the bottom of your checkout sheet.      Please come back and see me in two weeks.      [facility]: [phone]     [facility] Medication Refill Request Information:  * Please contact your pharmacy regarding ANY request for medication refills.  ** [facility] Prescription Fax = [phone]  * Please allow 3 business days for routine medication refills.  * Please allow 5 business days for controlled substance medication refills.     [facility] Test Result notification information:  *You will be notified with in 7-10 days of your appointment day regarding the results of your test.  If you are on MyChart you will be notified as soon as the provider has reviewed the results and signed off on them.

## 2017-12-21 NOTE — PROGRESS NOTES
[facility]         HPI:       Chavez Ca is a 47 year old male who presents for the following  Patient presents with: Establish Care (Patient is here to re-establish a new PCP. ) and Medication Refill (Patient is here for medication refills. )    Patient is here to establish care today.  He is a rambling and poor historian. He has complaints of cough for 2 weeks.  He has had subjective fevers and chills.  His roommate's boyfriend was sick.  He has not taken antibiotics in the last month.  His cough has been productive of yellow sputum without blood.  He does not have a thermometer but states he has felt hot.  He also has a sore throat and sinus drainage.  He denies sinus congestion, hearing changes, or ear congestion    He also needs medication refills today.  He has not been taking Lasix regularly last dose was November 30.  He has been intermittently taking potassium due to powder not being covered by insurance.  He is also not been taking K-Dur tabs due to feeling ill for the last few weeks.  He thinks he has gained several pounds since stopping taking his Lasix.  He states his shortness of breath is at its base baseline, and he denies chest pain, wheezing.      He also needs several medications refilled today including Ambien, ketoconazole shampoo, potassium chloride, and trazodone.  His weight loss medications, including phentermine and topamax were refilled by his endocrinologist.      He denies other complaints at this time.  Remainder of complete ROS reviewed and negative except as listed above.      Problem, Medication and Allergy Lists were   reviewed and are current.     Patient Active Problem List    Diagnosis Date Noted     Shoulder pain, left 07/22/2016     Priority: Medium     Neck pain 07/22/2016     Priority: Medium     Neck pain, chronic 07/22/2016     Priority: Medium     Pain in both wrists 06/28/2016     Priority: Medium     EMG 6/4/2015 diagnosed bl carpal tunnel.  Saw   Neema who rec'd trial of splinting and possible carpal tunnel release       Varicose veins of left lower extremity 06/22/2016     Priority: Medium     12/29/15 - Left GSV ablation with varicose vein microphlebectomy       Closed nondisplaced fracture of scaphoid of right wrist, unspecified portion of scaphoid, sequela 06/22/2016     Priority: Medium     Hyperlipidemia 11/18/2014     Priority: Medium     Problem list name updated by automated process. Provider to review       Subglottic stenosis 06/27/2014     Priority: Medium     Complication of trach, placed 1/2012       Psychological factors associated with another disorder 08/21/2012     Priority: Medium     Problem list name updated by automated process. Provider to review       Depressive disorder, not elsewhere classified 07/30/2012     Priority: Medium     Idiopathic progressive polyneuropathy 07/01/2012     Priority: Medium     Class: Chronic     Small fiber neuropathy - see report of EDNF density 11/5/2012.       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  With varicosities.  Followed with lymphedema clinic and Dr. Davies       VERN (obstructive sleep apnea) 02/01/2012     Priority: Medium     Referral for dental device, has not done this yet       Morbid obesity (H) 02/01/2012     Priority: Medium     Followed by Dr. Chang since 2012.  On topiramate/phentermine     ,     Current Outpatient Prescriptions   Medication Sig Dispense Refill     order for DME Equipment being ordered: Compression stockings 20-30mmHg.  1 pair 1 each 2     zolpidem (AMBIEN) 10 MG tablet Take 1 tablet (10 mg) by mouth nightly as needed for sleep 30 tablet 5     traZODone (DESYREL) 100 MG tablet Take 1 tablet (100 mg) by mouth At Bedtime 90 tablet 3     [START ON 12/22/2017] ketoconazole (NIZORAL) 2 % shampoo Apply topically three times a week 120 mL 3     furosemide (LASIX) 20 MG tablet Take 3 tablets (60 mg) by mouth daily 90 tablet 0     metroNIDAZOLE  (METROGEL) 1 % gel        valACYclovir (VALTREX) 500 MG tablet        topiramate (TOPAMAX) 50 MG tablet First week 50 mg daily  Then 50 mg twice daily--week 2,  Then 50 mg AM and 100 mg PM--week 3  Then 100 mg AM and 100 mg PM--week 4,  Then 150 mg AM and 150 mg PM after as tolerated 240 tablet 3     phentermine (ADIPEX-P) 37.5 MG tablet Take 1 tablet (37.5 mg) by mouth every morning 30 tablet 3     amitriptyline (ELAVIL) 50 MG tablet Take 2 tablets (100 mg) by mouth At Bedtime 180 tablet 3     furosemide (LASIX) 20 MG tablet Take 3 tablets (60 mg) by mouth 2 times daily 540 tablet 3     Vitamin D, Cholecalciferol, 400 UNITS CAPS Take 400 Units by mouth daily 90 capsule 3     potassium chloride (KLOR-CON) 20 MEQ Packet Take 60 mEq by mouth daily 270 packet 3     Potassium Chloride ER 20 MEQ TBCR Take 3 tablets (60 mEq) by mouth daily 90 tablet 1     [DISCONTINUED] traZODone (DESYREL) 100 MG tablet Take 1 tablet (100 mg) by mouth At Bedtime 90 tablet 3     diclofenac (VOLTAREN) 1 % GEL topical gel Apply 4 grams to knees four times daily using enclosed dosing card. 100 g 1     pimecrolimus (ELIDEL) 1 % cream Apply topically 2 times daily 60 g 3     COMPRESSION STOCKINGS 20-30 mmHg knee high compression stockings.  Measure and fit.  Style and brand per patient preference. 2 each 2     desonide (DESOWEN) 0.05 % cream        fluocinonide (LIDEX) 0.05 % ointment        hydrocortisone 2.5 % cream        FLUZONE QUADRIVALENT 0.5 ML injection   0     ketoconazole (NIZORAL) 2 % cream        neomycin-polymyxin-hydrocortisone (CORTISPORIN) 3.5-85230-9 otic suspension        COMPRESSION STOCKINGS 1 each daily Measure and fit thigh length Circaid compression device.  Bilateral. 2 each 4     neomycin-polymyxin-hydrocortisone (CORTISPORIN) otic solution Place 3 drops Into the left ear 4 times daily 10 mL 1     CETAPHIL (CETAPHIL) lotion Apply topically 2 times daily       order for DME Equipment being ordered: [place].  Style  per patient preference. 1 Units 1     Multiple Vitamin (MULTI VITAMIN  MENS) TABS Take 1 tablet by mouth daily 90 tablet 1     [DISCONTINUED] COMPRESSION STOCKINGS 20-30 mmHg knee high compression stockings 2 each 1   ,     Allergies   Allergen Reactions     Fentanyl Itching     Patch     Meloxicam Other (See Comments)     Side pains.     Minocycline Rash     Patient reports adverse reaction was pigmentation which has resolved with drug discontinuation.     Patient is   an established patient of this clinic.    Past Medical History:   Diagnosis Date     Allergic rhinitis      Bilateral carpal tunnel syndrome 7/18/2015     Chest pain      Coronary artery disease 3/8/16    right side chest pain     Depressive disorder      Depressive disorder, not elsewhere classified 7/30/2012     Gastro-oesophageal reflux disease      Hearing problem      Hypertension 2008     Idiopathic progressive polyneuropathy 10/15/2012     Learning disability      Neck mass     parapharyngeal, benign     Obesity, Class III, BMI 40-49.9 (morbid obesity) (H)      Obstructive sleep apnea      Psychological factors associated with another disorder 8/21/2012     Reduced vision      Shortness of breath      Trigeminal neuralgia      Weakness 8/26/2011   ,   Family History     *Patient is Adopted       Problem (# of Occurrences) Relation (Name,Age of Onset)    Family History Negative (1) Other: Does not know family       Negative family history of: Unknown/Adopted       and   Social History     Social History     Marital status: Single     Spouse name: N/A     Number of children: N/A     Years of education: N/A     Social History Main Topics     Smoking status: Never Smoker     Smokeless tobacco: Never Used      Comment: Never started     Alcohol use No      Comment: rarely     Drug use: No     Sexual activity: No     Other Topics Concern     Exercise Yes     2-3x/wk     Social History Narrative    The patient has a 0 pk yr tobacco hx.  He has no  active use.  Alcohol use is <1 alcoholic drinks per week.  He denies use of recreational drugs.          He is disability.         The patient is single.  Has 1 child.        Pt adopted at age 14.                 Review of Systems:   ROS  I have personally reviewed and updated the complete ROS on the day of the visit.           Physical Exam:   /84  Pulse 123  Resp 20  Wt 133.2 kg (293 lb 9.6 oz)  SpO2 96%  BMI 46.33 kg/m2  Body mass index is 46.33 kg/(m^2).  Vitals were reviewed       GENERAL APPEARANCE: chronically ill appearing, discheveled, alert and no distress     EYES: EOMI,  PERRL, anicteric      HENT: mouth without ulcers or lesions     NECK: obese, unable to appreciated JVD, trachea midline.      RESP: lungs clear to auscultation - no rales, rhonchi or wheezes, no crackles.  Normal WOB on RA      CV: regular rates and rhythm with frequent premature beats, normal S1 S2, no S3 or S4 and no murmur, click or rub     ABDOMEN:  Obese, nontender, no HSM or masses and bowel sounds normal     MS: 2+ pitting edema bilateral LE, no open venous stasis ulcers noted.      SKIN: scattered ecchymoses and wounds in various states of healing.       NEURO: slowed and rambling speech, CNII-XII grossly intact, normal gait      PSYCH: mentation appears slowed. and affect flattened      Results:   Lab results from today.      CBC and BMP WNL.  Magnesium normal.  Creatinine stable compared to baseline.  BNP 15.    CXR today with no consolidation, pulmonary edema or PTX.      Assessment and Plan     Chavez was seen today for establish care and medication refill.    Diagnoses and all orders for this visit:    Stasis edema of both lower extremities.  Needs compression stocking refill.  Has not been taking lasix, has been out for ~3 weeks and 9 pound weight gain.  Will restart at 60mg daily instead of BID with BMP recheck next week.  No KCl at this time given K of 4.6, but will recheck as above next week.  BNP 15, so no  overt CHF noted based on results.  No pulmonary edema on exam.    -     order for DME; Equipment being ordered: Compression stockings 20-30mmHg.  1 pair  -     N terminal pro BNP; Future  -     furosemide (LASIX) 20 MG tablet; Take 3 tablets (60 mg) by mouth daily    Diuretic-induced hypokalemia.  History of KCl supplementation while on high doses of lasix.  K 4.6 today, so will not order KCl unless hypokalemic on recheck after starting lasix. Powder is apparently not covered and he has not been taking Kdur tabs the past few days due to illness.    -     Basic metabolic panel; Future  -     Magnesium; Future    Productive cough.  Will get CXR, which was negative for opacity or consolidation.  No antibiotics at this time.  Likely just URI with postnasal drip.  CBC without leukocytosis. Procal pending at time of note.    -     CBC with platelets; Future  -     Procalcitonin; Future  -     XR Chest 2 Views; Future    Abnormality of breathing.  Concern for CHF exacerbation given high doses of lasix.  Prior echo in 2014 with normal diastolic/systolic function, however poor acoustic windows.  BNP 15, so no overt CHF at this time.    -     N terminal pro BNP; Future    Insomnia due to medical condition.  Refill of prior Rx.   -     zolpidem (AMBIEN) 10 MG tablet; Take 1 tablet (10 mg) by mouth nightly as needed for sleep    Sleep disorder.  Refill of prior rx.   -     traZODone (DESYREL) 100 MG tablet; Take 1 tablet (100 mg) by mouth At Bedtime    Seborrheic dermatitis.  Refill of prior Rx.  Will NOT refill topical steroids or other derm medications.  Must have derm appt to refill.  -     ketoconazole (NIZORAL) 2 % shampoo; Apply topically three times a week    Options for treatment and follow-up care were reviewed with the patient. Chavez Ca engaged in the decision making process and verbalized understanding of the options discussed and agreed with the final plan.    Tony Pugh MD  PGY-3,  IM  Pager: [phone]  Dec 21, 2017    Pt was seen and examined with Dr. Pugh; confirmed heart, lung exam findings;  I agree with the detailed A/P as documented above.    Lynn Schultz MD

## 2018-01-18 ENCOUNTER — OFFICE VISIT (OUTPATIENT)
Dept: INTERNAL MEDICINE | Facility: CLINIC | Age: 48
End: 2018-01-18
Payer: MEDICAID

## 2018-01-18 VITALS — BODY MASS INDEX: 46.96 KG/M2 | RESPIRATION RATE: 16 BRPM | WEIGHT: 297.6 LBS

## 2018-01-18 DIAGNOSIS — R07.0 THROAT PAIN: Primary | ICD-10-CM

## 2018-01-18 NOTE — NURSING NOTE
Chief Complaint   Patient presents with     Pharyngitis     pt is here to discuss throat pain x 1 month        Sonia Patel CMA at 2:06 PM on 1/18/2018

## 2018-01-18 NOTE — PROGRESS NOTES
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    Notes having burning throat pain for 1 month that has been constant. Saw Dr. Pugh last month. During that visit, patient complained of throat pain and cough. CXR, procal, and NTproBNP neg. Denies having chest pain or chest burning. Has some SOB due to throat burning. No history of GERD. Has pain with swallowing liquids and solids but does not feel that food gets stuck. Endorses subjective fevers and chills. Has cough with yellow sputum but notes that he feels like he is coughing because his throat is burning. Has appointment with ENT on 1/22. No abdominal pain. Has has 3-4 loose BM/day over the last month. No hematochezia, melena, dysuria, or hematuria.    Patient also requests for SW to contact him to assist for possible financial resources. Notes that he previously worked with a SW in [facility] but is no longer working with that SW.    Medications and allergies reviewed by me today.     ROS:   Constitutional, neuro, ENT, endocrine, pulmonary, cardiac, gastrointestinal, genitourinary, musculoskeletal, integument and psychiatric systems are negative, except as otherwise noted.    OBJECTIVE:  BP (P) 106/76  Pulse (P) 121  Temp (P) 98.3  F (36.8  C) (Oral)  Resp 16  Wt 135 kg (297 lb 9.6 oz)  SpO2 (P) 98%  BMI 46.96 kg/m2   Wt Readings from Last 1 Encounters:   01/18/18 135 kg (297 lb 9.6 oz)     Gen: Alert, oriented, pleasant, NAD, not having muffled voice  HEENT: NC/AT, PERRL, EOMI, moist mucous membranes, erythematous oropharynx with no exudates.  CV: RRR, normal S1 and S2, no murmurs, 2+ bilateral radial pulses  Respiratory: CTAB, good air movement bilaterally, no wheezes, rales, or  rhonchi  Abdomen: Soft, NTND. BS present. No palpable masses.  Extremities: No LE edema  Neuro: CN II-XII grossly intact, moves all extremities  Skin: No visible rashes     ASSESSMENT/PLAN:    Chavez was seen today for throat pain.    Throat pain   Suspect that patient has oropharyngeal pain and not esophagitis. Oropharynx is erythematous and appears irritated on exam. Concerned for possible recurrent lesions/nodules/mass or infection. Not likely to be strep (low Centor score - no tonsillar exudates, no recorded fever, has a cough). Unlikely to be influenza given duration of symptoms for 1 week. Patient already has scheduled appointment to see ENT on 1/22. Could be due to GERD, but does not have any chest discomfort.  -- Recommend that ENT consider completing laryngoscopy for further evaluation  -- If laryngoscopy negative, will consider further infectious workup    Social assistance   Sent an Epic message to our SW to try to contact patient.      Patient should return to clinic for follow-up with me PRN.    Patient was seen and plan of care discussed with Dr. Aguilar.     Emmie Back MD, PhD  PGY-2 Internal Medicine  Pager: [phone]    Jan 18, 2018    CC Dr. Pee FERRARI was present during the visit and the patient was seen and examined by me in conjunction with the resident.  I discussed the pertinent history, exam, results and plan with the resident and agree with the documentation above with the following exceptions: none.      Charu Aguilar MD

## 2018-01-18 NOTE — PROGRESS NOTES
Rooming Note  Health Maintenance   Health Maintenance Due   Topic Date Due     URINE DRUG SCREEN Q1 YR  10/03/1984     DEPRESSION ACTION PLAN Q1 YR  10/03/1987     PHQ-9 Q6 MONTHS  10/05/2017    All health maintenance items UP TO DATE

## 2018-01-18 NOTE — MR AVS SNAPSHOT
After Visit Summary   1/18/2018    Chavez Ca    MRN: [id number]           Patient Information     Date Of Birth          1970        Visit Information        Provider Department      1/18/2018 1:55 PM Emmie Back MD [facility]        Today's Diagnoses     Throat pain    -  1      Care Instructions    Primary Care Center Phone Number [phone]  Primary Care Center Medication Refill Request Information:  * Please contact your pharmacy regarding ANY request for medication refills.  ** [facility] Prescription Fax = [phone]  * Please allow 3 business days for routine medication refills.  * Please allow 5 business days for controlled substance medication refills.     Primary Care Center Test Result notification information:  *You will be notified with in 7-10 days of your appointment day regarding the results of your test.  If you are on MyChart you will be notified as soon as the provider has reviewed the results and signed off on them.                  Follow-ups after your visit        Your next 10 appointments already scheduled     Jan 22, 2018  9:15 AM CST   (Arrive by 9:00 AM)   Return Visit with Zafar Glasgow MD   [facility] Ear Nose and Throat ([facility])    [address] [place] [address]   [phone]            Jan 24, 2018  9:30 AM CST   (Arrive by 9:15 AM)   Return Visit with Ryan Mar MD   [facility] Neurology ([facility])    [address] [place] [address]   [phone]            Feb 02, 2018  9:30 AM CST   (Arrive by 9:15 AM)   Return Weight Management Visit with Denice Teixeira PA-C   [facility] Medical Weight Management ([facility])    [address] [place] [address]   [phone]            Feb 19, 2018  3:15 PM CST   (Arrive by 3:00 PM)   Return Visit with Jaye Estrada  LEONARD Nielson   [facility] Dermatology ([facility])    [address]  3rd Floor  [facility] [id number] [phone]            Apr 13, 2018 11:00 AM CDT   (Arrive by 10:45 AM)   Return Visit with Tatyana Quinn MD   [facility] Medical Weight Management ([facility])    [address]  4th Floor  [facility] [address] [phone]              Who to contact     Please call your clinic at [phone] to:    Ask questions about your health    Make or cancel appointments    Discuss your medicines    Learn about your test results    Speak to your doctor   If you have compliments or concerns about an experience at your clinic, or if you wish to file a complaint, please contact [facility] Patient Relations at [phone] or email us at [email or web address]         Additional Information About Your Visit        [employer] Information     [employer] gives you secure access to your electronic health record. If you see a primary care provider, you can also send messages to your care team and make appointments. If you have questions, please call your primary care clinic.  If you do not have a primary care provider, please call [phone] and they will assist you.      [employer] is an electronic gateway that provides easy, online access to your medical records. With [employer], you can request a clinic appointment, read your test results, renew a prescription or communicate with your care team.     To access your existing account, please contact your [facility] or call [phone] for assistance.        Care EveryWhere ID     This is your Care EveryWhere ID. This could be used by other organizations to access your [place] medical records  [id number]        Your Vitals Were     Respirations BMI (Body Mass Index)                16 46.96 kg/m2           Blood Pressure from Last 3  Encounters:   01/18/18 (P) 106/76   12/21/17 131/84   12/01/17 113/81    Weight from Last 3 Encounters:   01/18/18 135 kg (297 lb 9.6 oz)   12/21/17 133.2 kg (293 lb 9.6 oz)   12/01/17 129.4 kg (285 lb 3.2 oz)              Today, you had the following     No orders found for display         Today's Medication Changes          These changes are accurate as of: 1/18/18  2:53 PM.  If you have any questions, ask your nurse or doctor.               These medicines have changed or have updated prescriptions.        Dose/Directions    furosemide 20 MG tablet   Commonly known as:  LASIX   This may have changed:  Another medication with the same name was removed. Continue taking this medication, and follow the directions you see here.   Used for:  Stasis edema of both lower extremities   Changed by:  Tony Pugh MD        Dose:  60 mg   Take 3 tablets (60 mg) by mouth daily   Quantity:  90 tablet   Refills:  0       potassium chloride 20 MEQ Packet   Commonly known as:  KLOR-CON   This may have changed:  Another medication with the same name was removed. Continue taking this medication, and follow the directions you see here.   Used for:  Hypokalemia   Changed by:  Cassidy Martines MD        Dose:  60 mEq   Take 60 mEq by mouth daily   Quantity:  270 packet   Refills:  3         Stop taking these medicines if you haven't already. Please contact your care team if you have questions.     FLUZONE QUADRIVALENT 0.5 ML injection   Generic drug:  influenza quadrivalent (PF) vacc age 3 yrs and older   Stopped by:  Emmie Back MD                    Primary Care Provider Office Phone # Fax #    Tony Pugh [phone] [address] [address]        Equal Access to Services     [facility] Hospital[facility]IST[facility] AH: Ashleigh Freitas, nancy quezada, lacey barr. So [facility] [phone].    ATENCIÓN: Daren calderon  español, tiene a ackerman disposición servicios gratuitos de asistencia lingüística. David gillis [phone].    We comply with applicable federal civil rights laws and [place] laws. We do not discriminate on the basis of race, color, national origin, age, disability, sex, sexual orientation, or gender identity.            Thank you!     Thank you for choosing [facility]  for your care. Our goal is always to provide you with excellent care. Hearing back from our patients is one way we can continue to improve our services. Please take a few minutes to complete the written survey that you may receive in the mail after your visit with us. Thank you!             Your Updated Medication List - Protect others around you: Learn how to safely use, store and throw away your medicines at www.disposemymeds.org.          This list is accurate as of: 1/18/18  2:53 PM.  Always use your most recent med list.                   Brand Name Dispense Instructions for use Diagnosis    amitriptyline 50 MG tablet    ELAVIL    180 tablet    Take 2 tablets (100 mg) by mouth At Bedtime    Idiopathic progressive neuropathy       CETAPHIL lotion      Apply topically 2 times daily        * COMPRESSION STOCKINGS     2 each    1 each daily Measure and fit thigh length Circaid compression device.  Bilateral.    Venous insufficiency (chronic) (peripheral)       * COMPRESSION STOCKINGS     2 each    20-30 mmHg knee high compression stockings.  Measure and fit.  Style and brand per patient preference.    Venous insufficiency (chronic) (peripheral)       DESOWEN 0.05 % cream   Generic drug:  desonide           diclofenac 1 % Gel topical gel    VOLTAREN    100 g    Apply 4 grams to knees four times daily using enclosed dosing card.    Bilateral chronic knee pain       fluocinonide 0.05 % ointment    LIDEX          furosemide 20 MG tablet    LASIX    90 tablet    Take 3 tablets (60 mg) by mouth daily    Stasis edema of both lower extremities        hydrocortisone 2.5 % cream           * ketoconazole 2 % cream    NIZORAL          * ketoconazole 2 % shampoo    NIZORAL    120 mL    Apply topically three times a week    Seborrheic dermatitis       METROGEL 1 % gel   Generic drug:  metroNIDAZOLE           MULTI vitamin  MENS Tabs     90 tablet    Take 1 tablet by mouth daily    Idiopathic small and large fiber sensory neuropathy       * neomycin-polymyxin-hydrocortisone otic solution    CORTISPORIN    10 mL    Place 3 drops Into the left ear 4 times daily    Ear itching       * neomycin-polymyxin-hydrocortisone 3.5-30146-0 otic suspension    CORTISPORIN          * order for DME     1 Units    Equipment being ordered: Hernan Del Rio.  Style per patient preference.    Venous insufficiency (chronic) (peripheral)       * order for DME     1 each    Equipment being ordered: Compression stockings 20-30mmHg.  1 pair    Stasis edema of both lower extremities       phentermine 37.5 MG tablet    ADIPEX-P    30 tablet    Take 1 tablet (37.5 mg) by mouth every morning    Morbid obesity (H)       pimecrolimus 1 % cream    ELIDEL    60 g    Apply topically 2 times daily    Periorificial dermatitis       potassium chloride 20 MEQ Packet    KLOR-CON    270 packet    Take 60 mEq by mouth daily    Hypokalemia       topiramate 50 MG tablet    TOPAMAX    240 tablet    First week 50 mg daily Then 50 mg twice daily--week 2, Then 50 mg AM and 100 mg PM--week 3 Then 100 mg AM and 100 mg PM--week 4, Then 150 mg AM and 150 mg PM after as tolerated    Morbid obesity (H)       traZODone 100 MG tablet    DESYREL    90 tablet    Take 1 tablet (100 mg) by mouth At Bedtime    Sleep disorder       valACYclovir 500 MG tablet    VALTREX          Vitamin D (Cholecalciferol) 400 UNITS Caps     90 capsule    Take 400 Units by mouth daily    Vitamin D deficiency       zolpidem 10 MG tablet    AMBIEN    30 tablet    Take 1 tablet (10 mg) by mouth nightly as needed for sleep    Insomnia due to  medical condition       * Notice:  This list has 8 medication(s) that are the same as other medications prescribed for you. Read the directions carefully, and ask your doctor or other care provider to review them with you.

## 2018-01-18 NOTE — PATIENT INSTRUCTIONS
Primary Care Center Phone Number [phone]  Primary Care Center Medication Refill Request Information:  * Please contact your pharmacy regarding ANY request for medication refills.  ** [facility] Prescription Fax = [phone]  * Please allow 3 business days for routine medication refills.  * Please allow 5 business days for controlled substance medication refills.     Primary Care Center Test Result notification information:  *You will be notified with in 7-10 days of your appointment day regarding the results of your test.  If you are on MyChart you will be notified as soon as the provider has reviewed the results and signed off on them.

## 2018-01-19 ENCOUNTER — TELEPHONE (OUTPATIENT)
Dept: INTERNAL MEDICINE | Facility: CLINIC | Age: 48
End: 2018-01-19

## 2018-01-19 NOTE — TELEPHONE ENCOUNTER
"Social Work Brief Intervention  [facility]    Data/Intervention:    Patient Name:  Chavez Ca  /Age:  1970 (47 year old)    Visit Type: telephone  Referral Source: Dr Back  Reason for Referral:  Financial resources    Collaborated With:    -Chavez    Patient Concerns/Issues:   Familiar with Pt from past contacts a few years ago when he moved to [place] from [place]. Pt moved to [facility] for a particular surgery. He moved back to [place] 3 months ago. He is having difficulty getting MA reinstated due to multiple reasons. He expects action on his case next week and will contact me if there are barriers to that. His Medicare Savings Program ended when MA in [place] was closed and that is causing his SS income to lower until it gets reinstated thru [facility].  He is looking for housing resources. He indicated that he had many services within a few weeks of moving to [facility] [place] and is frustrated with [place]'s lack of access.   He is currently living with a friend \"who is like a sister to me\" in [place]. He doesn't want to be in adult foster care like he was in the past. He would like to find a subsidized apartment. He indicated that he can care for himself.    Intervention/Education/Resources Provided:  Provided info re how to find and get on subsidized housing waiting lists. There are long waiting lists for affordable housing. Sent links via [employer].    Assessment/Plan:  Pt has a history of having multiple living situations. He was in many different placements as a child. He seems to have trust issues at times with medical providers. Encouraged him to get on multiple waiting lists for subsidized housing to increase his chances of an opening.   He has my contact info if further needs.    Daniela Thomas, MARIA T, [facility]    [facility]  [phone]    Provided patient/family with contact information and availability to follow up as " needed.

## 2018-01-22 ENCOUNTER — CARE COORDINATION (OUTPATIENT)
Dept: ENDOCRINOLOGY | Facility: CLINIC | Age: 48
End: 2018-01-22

## 2018-01-22 ENCOUNTER — OFFICE VISIT (OUTPATIENT)
Dept: OTOLARYNGOLOGY | Facility: CLINIC | Age: 48
End: 2018-01-22
Payer: MEDICAID

## 2018-01-22 ENCOUNTER — ALLIED HEALTH/NURSE VISIT (OUTPATIENT)
Dept: INTERNAL MEDICINE | Facility: CLINIC | Age: 48
End: 2018-01-22

## 2018-01-22 VITALS — WEIGHT: 297 LBS | BODY MASS INDEX: 46.62 KG/M2 | HEIGHT: 67 IN

## 2018-01-22 VITALS
HEART RATE: 100 BPM | DIASTOLIC BLOOD PRESSURE: 80 MMHG | OXYGEN SATURATION: 98 % | HEIGHT: 67 IN | SYSTOLIC BLOOD PRESSURE: 126 MMHG | BODY MASS INDEX: 46.62 KG/M2 | WEIGHT: 297 LBS

## 2018-01-22 DIAGNOSIS — Z71.9 VISIT FOR COUNSELING: Primary | ICD-10-CM

## 2018-01-22 DIAGNOSIS — J40 BRONCHITIS: Primary | ICD-10-CM

## 2018-01-22 DIAGNOSIS — E66.01 MORBID OBESITY (H): ICD-10-CM

## 2018-01-22 DIAGNOSIS — R07.0 THROAT PAIN: ICD-10-CM

## 2018-01-22 RX ORDER — TOPIRAMATE 50 MG/1
TABLET, FILM COATED ORAL
Qty: 180 TABLET | Refills: 0 | Status: SHIPPED | OUTPATIENT
Start: 2018-01-22 | End: 2018-03-22

## 2018-01-22 RX ORDER — TOPIRAMATE 50 MG/1
TABLET, FILM COATED ORAL
Qty: 240 TABLET | Refills: 3 | Status: SHIPPED | OUTPATIENT
Start: 2018-01-22 | End: 2018-01-22

## 2018-01-22 RX ORDER — PHENTERMINE HYDROCHLORIDE 37.5 MG/1
37.5 TABLET ORAL EVERY MORNING
Qty: 30 TABLET | Refills: 3 | Status: SHIPPED | OUTPATIENT
Start: 2018-01-22 | End: 2018-09-13

## 2018-01-22 RX ORDER — AZITHROMYCIN 250 MG/1
TABLET, FILM COATED ORAL
Qty: 6 TABLET | Refills: 0 | Status: SHIPPED | OUTPATIENT
Start: 2018-01-22 | End: 2018-06-27

## 2018-01-22 ASSESSMENT — PAIN SCALES - GENERAL: PAINLEVEL: WORST PAIN (10)

## 2018-01-22 NOTE — MR AVS SNAPSHOT
After Visit Summary   1/22/2018    Chavez Ca    MRN: [id number]           Patient Information     Date Of Birth          1970        Visit Information        Provider Department      1/22/2018 9:15 AM Zafar Glasgow MD [facility]        Today's Diagnoses     Bronchitis    -  1      Care Instructions    Plan of Care:  1. ZPack script given to you  2. Referral placed to Sleep Medicine - you may call  3. Follow up with Dr. Glasgow after your sleep study     Clinic Information:  1. To schedule an appointment please call [phone], option 1  2. To talk to a Triage RN please call [phone] select option 3    Flakita Coffey RN  1/22/2018 9:36 AM    [facility]    Multiple locations   [facility]  First Floor, [address]  [address] [place] [address]  [phone]  [facility]  [address], [address]  [place] [address]  Appointments: [phone]  Sleep Center: [phone]  [facility]  [address] [place] [address]  Appointments: [phone]  Information: [phone]  [facility] Address:  [address], [address] [address]  Appointments: [phone]  [address] [place] [address]  Appointments: [phone]                  Follow-ups after your visit        Additional Services     SLEEP EVALUATION & MANAGEMENT REFERRAL - ADULT -[facility] [phone]  (Age 18 and up)       Please be aware that coverage of these services is subject to the terms and limitations of your health insurance plan.  Call member services at your health plan with any benefit or coverage questions.      Please bring the following to your appointment:    >>   List of current medications   >>   This referral request   >>   Any documents/labs given to you for this referral                      Your next 10 appointments already scheduled     Jan 24, 2018   9:30 AM CST   (Arrive by 9:15 AM)   Return Visit with Ryan Mar MD   [facility] Neurology ([facility])    [address] [place] [id number] [phone]            Feb 02, 2018  9:30 AM CST   (Arrive by 9:15 AM)   Return Weight Management Visit with LEONARD Aguilera [facility] Medical Weight Management ([facility])    [address] [place] [address]   [phone]            Feb 05, 2018  2:00 PM CST   New Sleep Patient with Ahsan Bautista MD   [facility] ([facility])    [address] [place] [address] [phone]            Feb 19, 2018  3:15 PM CST   (Arrive by 3:00 PM)   Return Visit with LEONARD Solo [facility] Dermatology ([facility])    [address] [place] [address] [phone]            Apr 13, 2018 11:00 AM CDT   (Arrive by 10:45 AM)   Return Visit with Tatyana Quinn MD   [facility] Weight Management ([facility])    [address] [place] [id number] [phone]              Future tests that were ordered for you today     Open Future Orders        Priority Expected Expires Ordered    SLEEP EVALUATION & MANAGEMENT REFERRAL - ADULT -[facility] [phone]  (Age 18 and up) Routine  1/22/2019 1/22/2018            Who to contact     Please call your clinic at [phone] to:    Ask questions about your health    Make or cancel appointments    Discuss your medicines    Learn about your test results    Speak to your doctor   If you have compliments or concerns about an experience at your clinic, or if you wish to file a complaint, please contact [facility] Patient Relations at [phone] or email us at  [email or web address]         Additional Information About Your Visit        [employer] Information     [employer] gives you secure access to your electronic health record. If you see a primary care provider, you can also send messages to your care team and make appointments. If you have questions, please call your primary care clinic.  If you do not have a primary care provider, please call [phone] and they will assist you.      [employer] is an electronic gateway that provides easy, online access to your medical records. With [employer], you can request a clinic appointment, read your test results, renew a prescription or communicate with your care team.     To access your existing account, please contact your [facility] or call [phone] for assistance.        Care EveryWhere ID     This is your Care EveryWhere ID. This could be used by other organizations to access your [facility] medical records  [id number]        Your Vitals Were     Height BMI (Body Mass Index)                1.702 m (5' 7\") 46.52 kg/m2           Blood Pressure from Last 3 Encounters:   01/18/18 (P) 106/76   12/21/17 131/84   12/01/17 113/81    Weight from Last 3 Encounters:   01/22/18 134.7 kg (297 lb)   01/18/18 135 kg (297 lb 9.6 oz)   12/21/17 133.2 kg (293 lb 9.6 oz)                 Today's Medication Changes          These changes are accurate as of: 1/22/18 10:50 AM.  If you have any questions, ask your nurse or doctor.               Start taking these medicines.        Dose/Directions    azithromycin 250 MG tablet   Commonly known as:  ZITHROMAX   Used for:  Bronchitis   Started by:  Zafar Glasgow MD        Two tablets first day, then one tablet daily for four days.   Quantity:  6 tablet   Refills:  0            Where to get your medicines      Some of these will need a paper prescription and others can be bought over the counter.  Ask your nurse if you have questions.     Bring a paper " prescription for each of these medications     azithromycin 250 MG tablet                Primary Care Provider Office Phone # Fax #    Tony Femrin Pugh [phone] [address] [facility] [address]        Equal Access to Services     TALHA TEIXEIRA : Hadii julia mtz akil Sojaimeali, waaxda luqadaha, qaybta kaalmada adejmai, lacey romero felicia jane. So [facility] [phone].    ATENCIÓN: Si habla español, tiene a ackerman disposición servicios gratuitos de asistencia lingüística. Llame al [phone].    We comply with applicable federal civil rights laws and [place] laws. We do not discriminate on the basis of race, color, national origin, age, disability, sex, sexual orientation, or gender identity.            Thank you!     Thank you for choosing [facility] E[facility] AND TH[facility]AT  for your care. Our goal is always to provide you with excellent care. Hearing back from our patients is one way we can continue to improve our services. Please take a few minutes to complete the written survey that you may receive in the mail after your visit with us. Thank you!             Your Updated Medication List - Protect others around you: Learn how to safely use, store and throw away your medicines at www.disposemymeds.org.          This list is accurate as of: 1/22/18 10:50 AM.  Always use your most recent med list.                   Brand Name Dispense Instructions for use Diagnosis    amitriptyline 50 MG tablet    ELAVIL    180 tablet    Take 2 tablets (100 mg) by mouth At Bedtime    Idiopathic progressive neuropathy       azithromycin 250 MG tablet    ZITHROMAX    6 tablet    Two tablets first day, then one tablet daily for four days.    Bronchitis       CETAPHIL lotion      Apply topically 2 times daily        * COMPRESSION STOCKINGS     2 each    1 each daily Measure and fit thigh length Circaid compression device.  Bilateral.    Venous insufficiency (chronic) (peripheral)       *  COMPRESSION STOCKINGS     2 each    20-30 mmHg knee high compression stockings.  Measure and fit.  Style and brand per patient preference.    Venous insufficiency (chronic) (peripheral)       DESOWEN 0.05 % cream   Generic drug:  desonide           diclofenac 1 % Gel topical gel    VOLTAREN    100 g    Apply 4 grams to knees four times daily using enclosed dosing card.    Bilateral chronic knee pain       fluocinonide 0.05 % ointment    LIDEX          furosemide 20 MG tablet    LASIX    90 tablet    Take 3 tablets (60 mg) by mouth daily    Stasis edema of both lower extremities       hydrocortisone 2.5 % cream           * ketoconazole 2 % cream    NIZORAL          * ketoconazole 2 % shampoo    NIZORAL    120 mL    Apply topically three times a week    Seborrheic dermatitis       METROGEL 1 % gel   Generic drug:  metroNIDAZOLE           MULTI vitamin  MENS Tabs     90 tablet    Take 1 tablet by mouth daily    Idiopathic small and large fiber sensory neuropathy       * neomycin-polymyxin-hydrocortisone otic solution    CORTISPORIN    10 mL    Place 3 drops Into the left ear 4 times daily    Ear itching       * neomycin-polymyxin-hydrocortisone 3.5-41615-4 otic suspension    CORTISPORIN          * order for DME     1 Units    Equipment being ordered: Hernan Del Rio.  Style per patient preference.    Venous insufficiency (chronic) (peripheral)       * order for DME     1 each    Equipment being ordered: Compression stockings 20-30mmHg.  1 pair    Stasis edema of both lower extremities       phentermine 37.5 MG tablet    ADIPEX-P    30 tablet    Take 1 tablet (37.5 mg) by mouth every morning    Morbid obesity (H)       pimecrolimus 1 % cream    ELIDEL    60 g    Apply topically 2 times daily    Periorificial dermatitis       potassium chloride 20 MEQ Packet    KLOR-CON    270 packet    Take 60 mEq by mouth daily    Hypokalemia       topiramate 50 MG tablet    TOPAMAX    240 tablet    First week 50 mg daily Then 50 mg  twice daily--week 2, Then 50 mg AM and 100 mg PM--week 3 Then 100 mg AM and 100 mg PM--week 4, Then 150 mg AM and 150 mg PM after as tolerated    Morbid obesity (H)       traZODone 100 MG tablet    DESYREL    90 tablet    Take 1 tablet (100 mg) by mouth At Bedtime    Sleep disorder       valACYclovir 500 MG tablet    VALTREX          Vitamin D (Cholecalciferol) 400 UNITS Caps     90 capsule    Take 400 Units by mouth daily    Vitamin D deficiency       zolpidem 10 MG tablet    AMBIEN    30 tablet    Take 1 tablet (10 mg) by mouth nightly as needed for sleep    Insomnia due to medical condition       * Notice:  This list has 8 medication(s) that are the same as other medications prescribed for you. Read the directions carefully, and ask your doctor or other care provider to review them with you.

## 2018-01-22 NOTE — PROGRESS NOTES
HISTORY OF PRESENT ILLNESS:  Mr. Ca is back to see us again today.  It has been quite some time since I last saw him.  He has since been out to [place] and apparently had a procedure on his legs as well as a repeat sleep study which did continue to show sleep apnea.  He otherwise is here because he has had a sore throat for six weeks.  He feels that he is sick.  He has been coughing up discolored mucus.  He currently is not using any intervention for his sleep apnea.      PHYSICAL EXAMINATION:  He is in no distress, alert, interactive, breathing without difficulty or stridor.  Eyes are anicteric.  Skin of the head and neck shows an indentation of his previous tracheostomy scar.  Palpation of neck shows no masses, tenderness or lymphadenopathy.  Oral cavity shows normal tongue, buccal mucosa, oropharynx.      PROCEDURE:  At that point, verbal consent is obtained.  The nose is sprayed with lidocaine and Afrin and endoscopy is performed.  Nasopharynx is normal.  Oropharynx is normal.  Hypopharynx shows no lesions, masses or ulcerations.  Larynx shows mild interarytenoid edema.  The patient does have a small indentation on the anterior wall of his trachea in conjunction with his tracheostomy scar.      ASSESSMENT AND PLAN:  A 47-year-old man with history of sleep apnea.  At this point, we need to get his records from [place] and have him initiate a trial of CPAP in conjunction with the sleep medicine doctors here.  He should followup with these results.  Going forward, we could consider revision of his tracheostomy scar as it probably does cause some dynamic collapse within his trachea.  He also will be going on azithromycin for bronchitis.

## 2018-01-22 NOTE — MR AVS SNAPSHOT
After Visit Summary   1/22/2018    Chavez Ca    MRN: [id number]           Patient Information     Date Of Birth          1970        Visit Information        Provider Department      1/22/2018 11:54 AM Daniela Thomas LICSW [facility]        Today's Diagnoses     Visit for counseling    -  1       Follow-ups after your visit        Your next 10 appointments already scheduled     Jan 24, 2018  9:30 AM CST   (Arrive by 9:15 AM)   Return Visit with Ryan Mar MD   [facility] Neurology ([facility] [place])    [address]  [address] [facility] [address]   [phone]            Feb 02, 2018  9:30 AM CST   (Arrive by 9:15 AM)   Return Weight Management Visit with Denice Teixeira PA-C   [facility] Medical Weight Management ([facility] [place])    [address] [place] [address]   [phone]            Feb 05, 2018  2:00 PM CST   New Sleep Patient with Ahsan Bautista MD   [facility] ([facility])    [address] [place] [address]   [phone]            Feb 19, 2018  3:15 PM CST   (Arrive by 3:00 PM)   Return Visit with Jaye Nielson PA-C   [facility] Dermatology ([facility] [place])    [address] [place] [address]   [phone]            Apr 13, 2018 11:00 AM CDT   (Arrive by 10:45 AM)   Return Visit with Tatyana Quinn MD   [facility] Management ([facility] [place])    [address] [place] [id number] [phone]              Future tests that were ordered for you today     Open Future Orders        Priority Expected Expires Ordered    SLEEP EVALUATION & MANAGEMENT REFERRAL - ADULT -[facility] [phone]  (Age 18 and up) Routine  1/22/2019 1/22/2018            Who to  contact     Please call your clinic at [phone] to:    Ask questions about your health    Make or cancel appointments    Discuss your medicines    Learn about your test results    Speak to your doctor   If you have compliments or concerns about an experience at your clinic, or if you wish to file a complaint, please contact [facility] Patient Relations at [phone] or email us at [email or web address]         Additional Information About Your Visit        [employer] Information     [employer] gives you secure access to your electronic health record. If you see a primary care provider, you can also send messages to your care team and make appointments. If you have questions, please call your primary care clinic.  If you do not have a primary care provider, please call [phone] and they will assist you.      [employer] is an electronic gateway that provides easy, online access to your medical records. With [employer], you can request a clinic appointment, read your test results, renew a prescription or communicate with your care team.     To access your existing account, please contact your [facility] or call [phone] for assistance.        Care EveryWhere ID     This is your Care EveryWhere ID. This could be used by other organizations to access your [place] medical records  [id number]         Blood Pressure from Last 3 Encounters:   01/18/18 (P) 106/76   12/21/17 131/84   12/01/17 113/81    Weight from Last 3 Encounters:   01/22/18 134.7 kg (297 lb)   01/18/18 135 kg (297 lb 9.6 oz)   12/21/17 133.2 kg (293 lb 9.6 oz)              Today, you had the following     No orders found for display         Today's Medication Changes          These changes are accurate as of: 1/22/18 11:59 AM.  If you have any questions, ask your nurse or doctor.               Start taking these medicines.        Dose/Directions    azithromycin 250 MG tablet   Commonly  known as:  ZITHROMAX   Used for:  Bronchitis   Started by:  Zafar Glasgow MD        Two tablets first day, then one tablet daily for four days.   Quantity:  6 tablet   Refills:  0            Where to get your medicines      Some of these will need a paper prescription and others can be bought over the counter.  Ask your nurse if you have questions.     Bring a paper prescription for each of these medications     azithromycin 250 MG tablet                Primary Care Provider Office Phone # Fax #    Tony Fermin Pugh [phone] [address] [address]        Equal Access to Services     [facility]: Hadii ujlia mtz hadasho Sosammy, waaxda luqadaha, qaybta kaalmada zainab, lacey duarte . So [facility] [phone].    ATENCIÓN: Si habla español, tiene a ackerman disposición servicios gratuitos de asistencia lingüística. [facility] [phone].    We comply with applicable federal civil rights laws and [place] laws. We do not discriminate on the basis of race, color, national origin, age, disability, sex, sexual orientation, or gender identity.            Thank you!     Thank you for choosing [facility]  for your care. Our goal is always to provide you with excellent care. Hearing back from our patients is one way we can continue to improve our services. Please take a few minutes to complete the written survey that you may receive in the mail after your visit with us. Thank you!             Your Updated Medication List - Protect others around you: Learn how to safely use, store and throw away your medicines at www.disposemymeds.org.          This list is accurate as of: 1/22/18 11:59 AM.  Always use your most recent med list.                   Brand Name Dispense Instructions for use Diagnosis    amitriptyline 50 MG tablet    ELAVIL    180 tablet    Take 2 tablets (100 mg) by mouth At Bedtime    Idiopathic progressive neuropathy        azithromycin 250 MG tablet    ZITHROMAX    6 tablet    Two tablets first day, then one tablet daily for four days.    Bronchitis       CETAPHIL lotion      Apply topically 2 times daily        * COMPRESSION STOCKINGS     2 each    1 each daily Measure and fit thigh length Circaid compression device.  Bilateral.    Venous insufficiency (chronic) (peripheral)       * COMPRESSION STOCKINGS     2 each    20-30 mmHg knee high compression stockings.  Measure and fit.  Style and brand per patient preference.    Venous insufficiency (chronic) (peripheral)       DESOWEN 0.05 % cream   Generic drug:  desonide           diclofenac 1 % Gel topical gel    VOLTAREN    100 g    Apply 4 grams to knees four times daily using enclosed dosing card.    Bilateral chronic knee pain       fluocinonide 0.05 % ointment    LIDEX          furosemide 20 MG tablet    LASIX    90 tablet    Take 3 tablets (60 mg) by mouth daily    Stasis edema of both lower extremities       hydrocortisone 2.5 % cream           * ketoconazole 2 % cream    NIZORAL          * ketoconazole 2 % shampoo    NIZORAL    120 mL    Apply topically three times a week    Seborrheic dermatitis       METROGEL 1 % gel   Generic drug:  metroNIDAZOLE           MULTI vitamin  MENS Tabs     90 tablet    Take 1 tablet by mouth daily    Idiopathic small and large fiber sensory neuropathy       * neomycin-polymyxin-hydrocortisone otic solution    CORTISPORIN    10 mL    Place 3 drops Into the left ear 4 times daily    Ear itching       * neomycin-polymyxin-hydrocortisone 3.5-39968-6 otic suspension    CORTISPORIN          * order for DME     1 Units    Equipment being ordered: [place].  Style per patient preference.    Venous insufficiency (chronic) (peripheral)       * order for DME     1 each    Equipment being ordered: Compression stockings 20-30mmHg.  1 pair    Stasis edema of both lower extremities       phentermine 37.5 MG tablet    ADIPEX-P    30 tablet    Take 1  tablet (37.5 mg) by mouth every morning    Morbid obesity (H)       pimecrolimus 1 % cream    ELIDEL    60 g    Apply topically 2 times daily    Periorificial dermatitis       potassium chloride 20 MEQ Packet    KLOR-CON    270 packet    Take 60 mEq by mouth daily    Hypokalemia       topiramate 50 MG tablet    TOPAMAX    240 tablet    First week 50 mg daily Then 50 mg twice daily--week 2, Then 50 mg AM and 100 mg PM--week 3 Then 100 mg AM and 100 mg PM--week 4, Then 150 mg AM and 150 mg PM after as tolerated    Morbid obesity (H)       traZODone 100 MG tablet    DESYREL    90 tablet    Take 1 tablet (100 mg) by mouth At Bedtime    Sleep disorder       valACYclovir 500 MG tablet    VALTREX          Vitamin D (Cholecalciferol) 400 UNITS Caps     90 capsule    Take 400 Units by mouth daily    Vitamin D deficiency       zolpidem 10 MG tablet    AMBIEN    30 tablet    Take 1 tablet (10 mg) by mouth nightly as needed for sleep    Insomnia due to medical condition       * Notice:  This list has 8 medication(s) that are the same as other medications prescribed for you. Read the directions carefully, and ask your doctor or other care provider to review them with you.

## 2018-01-22 NOTE — NURSING NOTE
Chief Complaint   Patient presents with     RECHECK     Return throat pain of 10 today.        MOISES Shabazz LPN

## 2018-01-22 NOTE — LETTER
1/22/2018       RE: Chavez Ca  [address]  [place] [address]     Dear Colleague,    Thank you for referring your patient, Chavez Ca, to the [facility] at [facility]. Please see a copy of my visit note below.    HISTORY OF PRESENT ILLNESS:  Mr. Ca is back to see us again today.  It has been quite some time since I last saw him.  He has since been out to [place] and apparently had a procedure on his legs as well as a repeat sleep study which did continue to show sleep apnea.  He otherwise is here because he has had a sore throat for six weeks.  He feels that he is sick.  He has been coughing up discolored mucus.  He currently is not using any intervention for his sleep apnea.      PHYSICAL EXAMINATION:  He is in no distress, alert, interactive, breathing without difficulty or stridor.  Eyes are anicteric.  Skin of the head and neck shows an indentation of his previous tracheostomy scar.  Palpation of neck shows no masses, tenderness or lymphadenopathy.  Oral cavity shows normal tongue, buccal mucosa, oropharynx.      PROCEDURE:  At that point, verbal consent is obtained.  The nose is sprayed with lidocaine and Afrin and endoscopy is performed.  Nasopharynx is normal.  Oropharynx is normal.  Hypopharynx shows no lesions, masses or ulcerations.  Larynx shows mild interarytenoid edema.  The patient does have a small indentation on the anterior wall of his trachea in conjunction with his tracheostomy scar.      ASSESSMENT AND PLAN:  A 47-year-old man with history of sleep apnea.  At this point, we need to get his records from [place] and have him initiate a trial of CPAP in conjunction with the sleep medicine doctors here.  He should followup with these results.  Going forward, we could consider revision of his tracheostomy scar as it probably does cause some dynamic collapse within his trachea.  He also will be going on  azithromycin for bronchitis.         Again, thank you for allowing me to participate in the care of your patient.      Sincerely,    Zafar Glasgow MD

## 2018-01-22 NOTE — PROGRESS NOTES
Social Work Follow-Up  [facility]    Data/Intervention:  Patient Name:  Chavez Ca  /Age:  1970 (47 year old)    Reason for Follow-Up:  Pt request to see me in clinic    Collaborated With:    -Chavez    Intervention/Education/Resources Provided:  Pt in clinic today and requesting I review some mail he received from his RX plan. Pt concerned that the insurance company is paying for medications he did not receive.   He plans to f/u on the links I sent for housing.     Assessment/Plan:  No f/u planned at this time.    Previously provided patient/family with writer's contact information and availability.

## 2018-01-22 NOTE — PATIENT INSTRUCTIONS
Plan of Care:  1. ZPack script given to you  2. Referral placed to Sleep Medicine - you may call  3. Follow up with Dr. Glasgow after your sleep study     Clinic Information:  1. To schedule an appointment please call [phone], option 1  2. To talk to a Triage RN please call [phone] select option 3    Flakita Coffey RN  1/22/2018 9:36 AM    [facility]    Multiple locations   [facility]  First Floor, [address]  [address] [place] [address]  [phone]  [place]  [address], [address]  [place] [address]  Appointments: [phone]  Sleep Center: [phone]  [place]  [address] [place] [address]  Appointments: [phone]  Information: [phone]  [place]  Clinic Address:  [facility] Monica DYER, [address] [address]  Appointments: [phone]  [address]   [place] [address]  Appointments: [phone]

## 2018-01-22 NOTE — PROGRESS NOTES
Patient stopped by to have phentermine RX from 12/2017 reordered as he has been ill with throat issues and never picked it up. Has switched pharmacy to [facility] . Brought in written Prescription.B/P WNL. Appointments scheduled for F/U.

## 2018-02-01 ENCOUNTER — OFFICE VISIT (OUTPATIENT)
Dept: INTERNAL MEDICINE | Facility: CLINIC | Age: 48
End: 2018-02-01
Payer: MEDICAID

## 2018-02-01 VITALS
BODY MASS INDEX: 47.9 KG/M2 | DIASTOLIC BLOOD PRESSURE: 83 MMHG | WEIGHT: 305.8 LBS | HEART RATE: 116 BPM | SYSTOLIC BLOOD PRESSURE: 125 MMHG

## 2018-02-01 DIAGNOSIS — L71.0 PERIORIFICIAL DERMATITIS: ICD-10-CM

## 2018-02-01 DIAGNOSIS — R00.0 TACHYCARDIA: ICD-10-CM

## 2018-02-01 DIAGNOSIS — T50.2X5A DIURETIC-INDUCED HYPOKALEMIA: ICD-10-CM

## 2018-02-01 DIAGNOSIS — M25.562 BILATERAL CHRONIC KNEE PAIN: ICD-10-CM

## 2018-02-01 DIAGNOSIS — G60.3 IDIOPATHIC PROGRESSIVE NEUROPATHY: ICD-10-CM

## 2018-02-01 DIAGNOSIS — E66.01 MORBID OBESITY (H): ICD-10-CM

## 2018-02-01 DIAGNOSIS — R19.7 DIARRHEA, UNSPECIFIED TYPE: ICD-10-CM

## 2018-02-01 DIAGNOSIS — E87.6 HYPOKALEMIA: ICD-10-CM

## 2018-02-01 DIAGNOSIS — G47.9 SLEEP DISORDER: ICD-10-CM

## 2018-02-01 DIAGNOSIS — M25.561 BILATERAL CHRONIC KNEE PAIN: ICD-10-CM

## 2018-02-01 DIAGNOSIS — E55.9 VITAMIN D DEFICIENCY: ICD-10-CM

## 2018-02-01 DIAGNOSIS — G89.29 BILATERAL CHRONIC KNEE PAIN: ICD-10-CM

## 2018-02-01 DIAGNOSIS — G47.01 INSOMNIA DUE TO MEDICAL CONDITION: ICD-10-CM

## 2018-02-01 DIAGNOSIS — B00.9 HSV (HERPES SIMPLEX VIRUS) INFECTION: Primary | ICD-10-CM

## 2018-02-01 DIAGNOSIS — I87.303 STASIS EDEMA OF BOTH LOWER EXTREMITIES: ICD-10-CM

## 2018-02-01 DIAGNOSIS — E87.6 DIURETIC-INDUCED HYPOKALEMIA: ICD-10-CM

## 2018-02-01 RX ORDER — PIMECROLIMUS 10 MG/G
CREAM TOPICAL 2 TIMES DAILY
Qty: 60 G | Refills: 3 | Status: SHIPPED | OUTPATIENT
Start: 2018-02-01 | End: 2018-08-07

## 2018-02-01 RX ORDER — POTASSIUM CHLORIDE 1.5 G/1.58G
60 POWDER, FOR SOLUTION ORAL DAILY
Qty: 270 PACKET | Refills: 3 | Status: SHIPPED | OUTPATIENT
Start: 2018-02-01 | End: 2018-11-08

## 2018-02-01 RX ORDER — METRONIDAZOLE 10 MG/G
GEL TOPICAL
Status: CANCELLED | OUTPATIENT
Start: 2018-02-01

## 2018-02-01 RX ORDER — ZOLPIDEM TARTRATE 10 MG/1
10 TABLET ORAL
Qty: 30 TABLET | Refills: 5 | Status: SHIPPED | OUTPATIENT
Start: 2018-02-01 | End: 2018-08-07

## 2018-02-01 RX ORDER — AMITRIPTYLINE HYDROCHLORIDE 50 MG/1
100 TABLET ORAL AT BEDTIME
Qty: 180 TABLET | Refills: 3 | Status: SHIPPED | OUTPATIENT
Start: 2018-02-01 | End: 2018-08-07

## 2018-02-01 RX ORDER — TRAZODONE HYDROCHLORIDE 100 MG/1
100 TABLET ORAL AT BEDTIME
Qty: 90 TABLET | Refills: 3 | Status: SHIPPED | OUTPATIENT
Start: 2018-02-01 | End: 2018-08-07

## 2018-02-01 RX ORDER — FUROSEMIDE 20 MG
60 TABLET ORAL 2 TIMES DAILY
Qty: 180 TABLET | Refills: 11 | Status: SHIPPED | OUTPATIENT
Start: 2018-02-01 | End: 2018-03-06

## 2018-02-01 RX ORDER — FUROSEMIDE 20 MG
60 TABLET ORAL DAILY
Qty: 90 TABLET | Refills: 0 | Status: SHIPPED | OUTPATIENT
Start: 2018-02-01 | End: 2018-02-01

## 2018-02-01 RX ORDER — VALACYCLOVIR HYDROCHLORIDE 500 MG/1
TABLET, FILM COATED ORAL
Qty: 14 TABLET | Status: CANCELLED | OUTPATIENT
Start: 2018-02-01

## 2018-02-01 ASSESSMENT — PAIN SCALES - GENERAL: PAINLEVEL: EXTREME PAIN (8)

## 2018-02-01 NOTE — MR AVS SNAPSHOT
After Visit Summary   2/1/2018    Chavez Ca    MRN: [id number]           Patient Information     Date Of Birth          1970        Visit Information        Provider Department      2/1/2018 2:45 PM Emmie Back MD [facility]        Today's Diagnoses     HSV (herpes simplex virus) infection    -  1    Idiopathic progressive neuropathy        Bilateral chronic knee pain        Stasis edema of both lower extremities        Periorificial dermatitis        Hypokalemia        Morbid obesity (H)        Sleep disorder        Vitamin D deficiency        Insomnia due to medical condition        Tachycardia        Diarrhea, unspecified type        Diuretic-induced hypokalemia          Care Instructions    Primary Care Center: [phone]     Primary Care Center Medication Refill Request Information:  * Please contact your pharmacy regarding ANY request for medication refills.  ** [facility] Prescription Fax = [phone]  * Please allow 3 business days for routine medication refills.  * Please allow 5 business days for controlled substance medication refills.     Primary Care Center Test Result notification information:  *You will be notified with in 7-10 days of your appointment day regarding the results of your test.  If you are on MyChart you will be notified as soon as the provider has reviewed the results and signed off on them.      1. Get lab work to check potassium and magnesium and to check stool.  2. Return to clinic in 5 weeks.            Follow-ups after your visit        Follow-up notes from your care team     Return in about 5 weeks (around 3/8/2018).      Your next 10 appointments already scheduled     Feb 05, 2018 10:15 AM CST   LAB with  LAB    [facility] ([facility])    [address] [place] [id number] [phone]           Please do not eat 10-12 hours before your appointment if you are coming in  fasting for labs on lipids, cholesterol, or glucose (sugar). This does not apply to pregnant women. Water, hot tea and black coffee (with nothing added) are okay. Do not drink other fluids, diet soda or chew gum.            Feb 05, 2018  2:00 PM CST   Return Sleep Patient with Ahsan Bautista MD   [facility] ([facility])    [address] [address] [phone]            Feb 19, 2018  3:15 PM CST   (Arrive by 3:00 PM)   Return Visit with Jaye Nielson PA-C   [facility] ([facility])    [address] [place] [address]  3rd [facility] [id number] [phone]            Mar 15, 2018  9:15 AM CDT   (Arrive by 9:00 AM)   Return Visit with Emmie Back MD   [facility] ([facility])    [address] [place] [address]  4th [facility] [id number] [phone]            Apr 13, 2018 11:00 AM CDT   (Arrive by 10:45 AM)   Return Visit with Tatyana Quinn MD   [facility] ([facility])    [address] [place] [address]  4th [facility] [id number] [phone]              Future tests that were ordered for you today     Open Future Orders        Priority Expected Expires Ordered    Basic metabolic panel Routine 2/1/2018 2/15/2018 2/1/2018    Magnesium Routine 2/1/2018 2/15/2018 2/1/2018    Stool Enteric Bacteria and Virus Panel Routine 2/1/2018 2/1/2019 2/1/2018    Clostridium difficile toxin B PCR Routine  2/1/2019 2/1/2018            Who to contact     Please call your clinic at [phone] to:    Ask questions about your health    Make or cancel appointments    Discuss your medicines    Learn about your test results    Speak to your doctor   If you have compliments or concerns about an experience at your clinic, or if you wish to file a complaint, please contact [facility] Patient  Relations at [phone] or email us at [email or web address]         Additional Information About Your Visit        mth senseharIdentropy Information     [facility] gives you secure access to your electronic health record. If you see a primary care provider, you can also send messages to your care team and make appointments. If you have questions, please call your primary care clinic.  If you do not have a primary care provider, please call [phone] and they will assist you.      [facility] is an electronic gateway that provides easy, online access to your medical records. With [facility], you can request a clinic appointment, read your test results, renew a prescription or communicate with your care team.     To access your existing account, please contact your [facility] or call [phone] for assistance.        Care EveryWhere ID     This is your Care EveryWhere ID. This could be used by other organizations to access your [facility] medical records  [id number]        Your Vitals Were     Pulse BMI (Body Mass Index)                116 47.9 kg/m2           Blood Pressure from Last 3 Encounters:   02/01/18 125/83   01/22/18 126/80   01/18/18 (P) 106/76    Weight from Last 3 Encounters:   02/01/18 (!) 138.7 kg (305 lb 12.8 oz)   01/22/18 134.7 kg (297 lb)   01/22/18 134.7 kg (297 lb)              We Performed the Following     EKG Performed in Clinic w/ Provider Reading Fee          Today's Medication Changes          These changes are accurate as of 2/1/18  4:31 PM.  If you have any questions, ask your nurse or doctor.               Start taking these medicines.        Dose/Directions    furosemide 20 MG tablet   Commonly known as:  LASIX   Used for:  Stasis edema of both lower extremities   Started by:  Emmie Back MD        Dose:  60 mg   Take 3 tablets (60 mg) by mouth 2 times daily   Quantity:  180 tablet   Refills:  11         These medicines have changed or have updated  prescriptions.        Dose/Directions    neomycin-polymyxin-hydrocortisone otic solution   Commonly known as:  CORTISPORIN   This may have changed:  Another medication with the same name was removed. Continue taking this medication, and follow the directions you see here.   Used for:  Ear itching   Changed by:  Emmie Back MD        Dose:  3 drop   Place 3 drops Into the left ear 4 times daily   Quantity:  10 mL   Refills:  1       order for DME   This may have changed:    - additional instructions  - Another medication with the same name was removed. Continue taking this medication, and follow the directions you see here.   Used for:  Stasis edema of both lower extremities   Changed by:  Emmie Back MD        Equipment being ordered: compression stockings - 2 pairs   Quantity:  2 Units   Refills:  0         Stop taking these medicines if you haven't already. Please contact your care team if you have questions.     fluocinonide 0.05 % ointment   Commonly known as:  LIDEX   Stopped by:  Emmie Back MD           hydrocortisone 2.5 % cream   Stopped by:  Emmie Back MD                Where to get your medicines      These medications were sent to [facility]/pharmacy #[id number] - [place], [place] - [address] [facility]  [address] [place] [address]    Hours:  24-hours Phone:  [phone]     amitriptyline 50 MG tablet    diclofenac 1 % Gel topical gel    furosemide 20 MG tablet    pimecrolimus 1 % cream    potassium chloride 20 MEQ Packet    traZODone 100 MG tablet         Some of these will need a paper prescription and others can be bought over the counter.  Ask your nurse if you have questions.     Bring a paper prescription for each of these medications     order for DME    zolpidem 10 MG tablet                Primary Care Provider Office Phone # Fax #    Emmie Back [phone] [address] [facility] [address]         Equal Access to Services     [facility] Center[facility]IST[facility]IR : Hadii julia mtz akil Freitas, waivoryda luqadaha, qaybta kankechilacey crump. So [facility] [phone].    ATENCIÓN: Si habla español, tiene a ackerman disposición servicios gratuitos de asistencia lingüística. Marioame al [phone].    We comply with applicable federal civil rights laws and [place] laws. We do not discriminate on the basis of race, color, national origin, age, disability, sex, sexual orientation, or gender identity.            Thank you!     Thank you for choosing [facility]  for your care. Our goal is always to provide you with excellent care. Hearing back from our patients is one way we can continue to improve our services. Please take a few minutes to complete the written survey that you may receive in the mail after your visit with us. Thank you!             Your Updated Medication List - Protect others around you: Learn how to safely use, store and throw away your medicines at www.disposemymeds.org.          This list is accurate as of 2/1/18  4:31 PM.  Always use your most recent med list.                   Brand Name Dispense Instructions for use Diagnosis    amitriptyline 50 MG tablet    ELAVIL    180 tablet    Take 2 tablets (100 mg) by mouth At Bedtime    Idiopathic progressive neuropathy       azithromycin 250 MG tablet    ZITHROMAX    6 tablet    Two tablets first day, then one tablet daily for four days.    Bronchitis       CETAPHIL lotion      Apply topically 2 times daily        * COMPRESSION STOCKINGS     2 each    1 each daily Measure and fit thigh length Circaid compression device.  Bilateral.    Venous insufficiency (chronic) (peripheral)       * COMPRESSION STOCKINGS     2 each    20-30 mmHg knee high compression stockings.  Measure and fit.  Style and brand per patient preference.    Venous insufficiency (chronic) (peripheral)       diclofenac 1 % Gel topical gel    VOLTAREN    100 g     Apply 4 grams to knees four times daily using enclosed dosing card.    Bilateral chronic knee pain       furosemide 20 MG tablet    LASIX    180 tablet    Take 3 tablets (60 mg) by mouth 2 times daily    Stasis edema of both lower extremities       METROGEL 1 % gel   Generic drug:  metroNIDAZOLE           MULTI vitamin  MENS Tabs     90 tablet    Take 1 tablet by mouth daily    Idiopathic small and large fiber sensory neuropathy       neomycin-polymyxin-hydrocortisone otic solution    CORTISPORIN    10 mL    Place 3 drops Into the left ear 4 times daily    Ear itching       order for DME     2 Units    Equipment being ordered: compression stockings - 2 pairs    Stasis edema of both lower extremities       phentermine 37.5 MG tablet    ADIPEX-P    30 tablet    Take 1 tablet (37.5 mg) by mouth every morning    Morbid obesity (H)       pimecrolimus 1 % cream    ELIDEL    60 g    Apply topically 2 times daily    Periorificial dermatitis       potassium chloride 20 MEQ Packet    KLOR-CON    270 packet    Take 60 mEq by mouth daily    Hypokalemia       topiramate 50 MG tablet    TOPAMAX    180 tablet    150 mg AM and 150 mg PM after as tolerated    Morbid obesity (H)       traZODone 100 MG tablet    DESYREL    90 tablet    Take 1 tablet (100 mg) by mouth At Bedtime    Sleep disorder       valACYclovir 500 MG tablet    VALTREX          Vitamin D (Cholecalciferol) 400 UNITS Caps     90 capsule    Take 400 Units by mouth daily    Vitamin D deficiency       zolpidem 10 MG tablet    AMBIEN    30 tablet    Take 1 tablet (10 mg) by mouth nightly as needed for sleep    Insomnia due to medical condition       * Notice:  This list has 2 medication(s) that are the same as other medications prescribed for you. Read the directions carefully, and ask your doctor or other care provider to review them with you.

## 2018-02-01 NOTE — NURSING NOTE
EKG performed  Results to provider  Patient tolerated well. Lorraine Viera CMA at 4:20 PM on 2/1/2018

## 2018-02-01 NOTE — PATIENT INSTRUCTIONS
[facility]: [phone]     [facility] Medication Refill Request Information:  * Please contact your pharmacy regarding ANY request for medication refills.  ** [facility] Prescription Fax = [phone]  * Please allow 3 business days for routine medication refills.  * Please allow 5 business days for controlled substance medication refills.     [facility] Test Result notification information:  *You will be notified with in 7-10 days of your appointment day regarding the results of your test.  If you are on MyChart you will be notified as soon as the provider has reviewed the results and signed off on them.      1. Get lab work to check potassium and magnesium and to check stool.  2. Return to clinic in 5 weeks.

## 2018-02-01 NOTE — PROGRESS NOTES
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    History difficult to obtain since patient was occasionally tangential. Last saw patient on 1/18/18 where he was complaining of throat pain for 1 month. Endoscopy completed by ENT showed normal oropharynx and he was prescribed azithromycin for bronchitis. Patient notes that throat pain has improved.    Patient requests for medication refills and would like the medications to be sent to a new pharmacy. Upon review of medications, patient has been taking lasix 60 mg BID. Notes that he stopped taking lasix for about 2 weeks after he saw Dr. Pugh on 12/21 and notes that he was taking lasix 60 mg BID prior to seeing Dr. Pugh. Per Dr. Pugh's note, patient endorsed not taking lasix for ~3 weeks prior to seeing him. He was restarted on lasix 60 mg daily instead of BID, however patient resumed taking lasix 60 mg BID. Last took lasix 6 days ago since he ran out of lasix. Also, patient notes that he is currently taking topiramate 200 mg BID. Appears that topiramate was changed to 150 mg BID on 12/1 per Dr. Quinn in Endocrine but patient continues to take topiramate 200 mg BID.    Medications and allergies reviewed by me today.     ROS:   10 point ROS reviewed and is positive for chronic SOB with throat tightness, no chest tightness. Patient has been following with ENT. No chest pain or palpitations. Has been having diarrhea for the last 2 months with 6 loose BM/day. Does not recall whether he was on antibiotics prior to developing diarrhea. Denies stool incontinence. No recent travel. Denies fevers, abdominal pain, hematochezia, and melena. No chills, night sweats, dysuria, or  hematuria.    OBJECTIVE:    /83  Pulse 116  Wt (!) 138.7 kg (305 lb 12.8 oz)  BMI 47.9 kg/m2   Wt Readings from Last 1 Encounters:   02/01/18 (!) 138.7 kg (305 lb 12.8 oz)     Gen: Alert, oriented, pleasant, NAD  HEENT: NC/AT, PERRL, EOMI, moist mucous membranes, oropharynx w/o erythema, exudate, or lesions.  Neck: No cervical lymphadenopathy  CV: tachycardiac with some irregular heart beats, normal S1 and S2, no murmurs, 2+ bilateral radial pulses  Respiratory: CTAB, good air movement bilaterally, no wheezes, rales, or rhonchi  Abdomen: Soft, obese, NTND. BS present. No palpable masses.  Extremities: Compression stocking on bilateral lower extremities, nonpitting bilateral LE edema  Neuro: CN II-XII grossly intact, moves all extremities  Skin: No visible rashes       ASSESSMENT/PLAN:    Chavez was seen today for medication request.    Idiopathic progressive neuropathy  -     Refilled amitriptyline (ELAVIL) 50 MG tablet; Take 2 tablets (100 mg) by mouth At Bedtime    Bilateral chronic knee pain  -     Refilled diclofenac (VOLTAREN) 1 % GEL topical gel; Apply 4 grams to knees four times daily using enclosed dosing card.    Stasis edema of both lower extremities  -     order for DME; Equipment being ordered: compression stockings - 2 pairs per patient's request  -     Refilled furosemide (LASIX) 20 MG tablet; Take 3 tablets (60 mg) by mouth 2 times daily    Periorificial dermatitis  -     Refilled pimecrolimus (ELIDEL) 1 % cream; Apply topically 2 times daily    Hypokalemia  -     Refilled potassium chloride (KLOR-CON) 20 MEQ Packet; Take 60 mEq by mouth daily    Sleep disorder  -     Refilled traZODone (DESYREL) 100 MG tablet; Take 1 tablet (100 mg) by mouth At Bedtime    Insomnia due to medical condition  -     Refilled zolpidem (AMBIEN) 10 MG tablet; Take 1 tablet (10 mg) by mouth nightly as needed for sleep    Morbid Obesity  Advised patient to call Endocrine to confirm the dose of topiramate that he  should be on. Appears that it was recently refilled for topiramate 150 mg BID.    Tachycardia  EKG showed NSR with HR 92. Patient notes that he was previously diagnosed with atrial flutter/fibrillation but could not find this information in the EMR.  -     EKG Performed in Clinic w/ Provider Reading Fee    Diarrhea, unspecified type  Will assess for possible infectious etiology given prolonged period of diarrhea. Could be C diff since patient was recently on antibiotics, however patient could not remember whether he was on antibiotics prior to developing diarrhea 2 months ago.  -     Stool Enteric Bacteria and Virus Panel; Future  -     Clostridium difficile toxin B PCR; Future    Possible diuretic-induced hypokalemia and hypomagnesemia  -     Basic metabolic panel; Future - If potassium is elevated, will consider lowering potassium supplementation dose  -     Magnesium; Future      Patient should return to clinic for follow-up with me in 5 weeks.     Plan of care discussed with Dr. Aguilar.     Emmie Back MD, PhD  PGY-2 Internal Medicine  Pager: [phone]    Feb 1, 2018      While the patient was in clinic, I reviewed the pertinent medical history and results.  I discussed the current findings on physical examination, as well as the patient s diagnosis and treatment plan with the resident and agree with the information as documented with the following exceptions: none.  Charu Aguilar MD

## 2018-02-01 NOTE — NURSING NOTE
Chief Complaint   Patient presents with     Medication Request     pt here to discuss medications     Lorraine Viera CMA at 3:13 PM on 2/1/2018.

## 2018-02-02 LAB — INTERPRETATION ECG - MUSE: NORMAL

## 2018-02-05 ENCOUNTER — TELEPHONE (OUTPATIENT)
Dept: INTERNAL MEDICINE | Facility: CLINIC | Age: 48
End: 2018-02-05

## 2018-02-05 DIAGNOSIS — M25.561 BILATERAL CHRONIC KNEE PAIN: ICD-10-CM

## 2018-02-05 DIAGNOSIS — M19.90 OSTEOARTHRITIS: Primary | ICD-10-CM

## 2018-02-05 DIAGNOSIS — M25.562 BILATERAL CHRONIC KNEE PAIN: ICD-10-CM

## 2018-02-05 DIAGNOSIS — G89.29 BILATERAL CHRONIC KNEE PAIN: ICD-10-CM

## 2018-02-05 NOTE — TELEPHONE ENCOUNTER
PRIOR AUTHORIZATION DENIED    Medication: VOLTAREN 1% GEL- DENIED    Denial Date: 2/5/2018    Denial Rational:     Appeal Information:

## 2018-02-05 NOTE — LETTER
"    Chavez Ca  [address] [place] [address]  : 1970  MRN:  [id number]      2018    To whom it concerns:    Patient was previously prescribed Voltaren 1% gel for his bilateral knee pain. This was previously ordered and covered under the diagnosis of \"bilateral chronic knee pain\". I renewed the prescription for Voltaren 1% gel on 18 with the same diagnosis that was previously used and found that the prior authorization was denied because of the diagnosis of \"bilateral chronic knee pain\". I would like to prescribe the Voltaren 1% gel under the diagnosis \"osteoarthritis\". With the diagnosis of \"osteoarthritis\", will the Voltaren gel be covered? I would like to minimize the use of oral NSAIDs due to their increased risk of side effects, including, but not limited to, gastrointestinal bleeding and increased cardiovascular risk.    Please let our office know if you have any questions or need any additional information.    Sincerely,    Dr. Emmie Back                            "

## 2018-02-05 NOTE — TELEPHONE ENCOUNTER
Central Prior Authorization Team   Phone: [phone]      PA Initiation    Medication: VOLTAREN 1% GEL-Initiated  Insurance Company: [facility] Part D - Phone [phone] Fax [phone]  Pharmacy Filling the Rx: [id number] - [place], [place] - [address] [facility]  Filling Pharmacy Phone: [phone]  Filling Pharmacy Fax:    Start Date: 2/5/2018

## 2018-02-16 NOTE — TELEPHONE ENCOUNTER
Medication Appeal Initiation    We have initiated an appeal for the requested medication:  Medication: VOLTAREN 1% GEL- DENIED-Appeal Initiated-  Appeal Start Date:  2/16/2018  Insurance Company: [employer] Part D - Phone [phone] Fax [phone]  Comments:  Appeal faxed to [phone]

## 2018-03-05 NOTE — TELEPHONE ENCOUNTER
MEDICATION APPEAL APPROVED    Medication: VOLTAREN 1% GEL- APPEAL APPROVED  Authorization Effective Date: 2/5/2018  Authorization Expiration Date: 12/31/2018  Approved Dose/Quantity:   Reference #: [id number]   Insurance Company: [employer] Part D - Phone [phone] Fax [phone]  Expected CoPay: $0.00     CoPay Card Available:      Foundation Assistance Needed:    Which Pharmacy is filling the prescription (Not needed for infusion/clinic administered): [id number] - [place] - [address] AT [facility]

## 2018-03-06 DIAGNOSIS — I87.303 STASIS EDEMA OF BOTH LOWER EXTREMITIES: ICD-10-CM

## 2018-03-06 RX ORDER — FUROSEMIDE 20 MG
60 TABLET ORAL 2 TIMES DAILY
Qty: 540 TABLET | Refills: 3 | Status: SHIPPED | OUTPATIENT
Start: 2018-02-01 | End: 2019-01-29

## 2018-03-07 NOTE — TELEPHONE ENCOUNTER
Order sent 2/1/18 for qty 180 (1 month supply) with 11 refills.  *request for 90 day supply.  Re-sent Rx as requested per date of original written order.

## 2018-03-21 ENCOUNTER — TELEPHONE (OUTPATIENT)
Dept: PODIATRY | Facility: CLINIC | Age: 48
End: 2018-03-21

## 2018-03-21 NOTE — TELEPHONE ENCOUNTER
[facility] Call Center    Phone Message    May a detailed message be left on voicemail: yes    Reason for Call: Patient called wanting to be seen by Dr. Nguyen as soon as possible for new inserts. I scheduled the 1 PM on 3/27/18 (15 min slot) Patient stated it would not take long, Please let me know if that is ok or if we need to call the patient to reschedule. Thank you    Action Taken: Message routed to:  Adult Clinics: Podiatry p [id number]

## 2018-03-22 DIAGNOSIS — E66.01 MORBID OBESITY (H): ICD-10-CM

## 2018-03-23 RX ORDER — TOPIRAMATE 50 MG/1
TABLET, FILM COATED ORAL
Qty: 180 TABLET | Refills: 0 | Status: SHIPPED | OUTPATIENT
Start: 2018-03-23 | End: 2018-04-26

## 2018-04-20 DIAGNOSIS — E66.01 MORBID OBESITY (H): ICD-10-CM

## 2018-04-23 RX ORDER — TOPIRAMATE 50 MG/1
TABLET, FILM COATED ORAL
Qty: 180 TABLET | Refills: 0 | OUTPATIENT
Start: 2018-04-23

## 2018-04-23 NOTE — TELEPHONE ENCOUNTER
Recieved refill request for topiramate (TOPAMAX) 50 MG   . Patient needs appointment scheduled prior to any refills. Clinic Coordinator notified and will follow up with the patient as appropriate. The pharmacy has been notified that the medication will not be refilled prior to an appointment being scheduled.

## 2018-04-26 DIAGNOSIS — E66.01 MORBID OBESITY (H): ICD-10-CM

## 2018-04-28 DIAGNOSIS — M25.562 BILATERAL CHRONIC KNEE PAIN: ICD-10-CM

## 2018-04-28 DIAGNOSIS — M25.561 BILATERAL CHRONIC KNEE PAIN: ICD-10-CM

## 2018-04-28 DIAGNOSIS — G89.29 BILATERAL CHRONIC KNEE PAIN: ICD-10-CM

## 2018-04-29 RX ORDER — TOPIRAMATE 50 MG/1
TABLET, FILM COATED ORAL
Qty: 180 TABLET | Refills: 1 | Status: SHIPPED | OUTPATIENT
Start: 2018-04-29 | End: 2018-06-29

## 2018-04-30 NOTE — TELEPHONE ENCOUNTER
diclofenac (VOLTAREN) 1 % GEL   Last Written Prescription Date:  2/6/18  Last Fill Quantity: 100g,   # refills: 1  Last Office Visit : 2/1/18  JANELL PALMER    Future Office visit:NONE    Routing refill request to provider for review/approval because:   AUTH/SIGN FOR JANELL PALMER

## 2018-06-13 DIAGNOSIS — E66.01 MORBID OBESITY (H): ICD-10-CM

## 2018-06-13 NOTE — TELEPHONE ENCOUNTER
[facility] Call Center    Phone Message    May a detailed message be left on voicemail: yes    Reason for Call: Medication Refill Request    Has the patient contacted the pharmacy for the refill? Yes   Name of medication being requested: phentermine (ADIPEX-P) 37.5 MG tablet  Provider who prescribed the medication: Dr. Quinn  Pharmacy: [id number] - [place], [place] - [address] AT [facility]  Date medication is needed: ASAP - Pt IS LEAVING ON TRIP TO [place] IN 3 HOURS AND IS REQUESTING MED BEFORE HE LEAVES      Action Taken: Message routed to:  [facility] ([facility]): Weight Mgmt

## 2018-06-13 NOTE — TELEPHONE ENCOUNTER
phentermine (ADIPEX-P) 37.5 MG tablet  Last Written Prescription Date:  1/22/18  Last Fill Quantity: 30,   # refills: 3  Last Office Visit : 12/1/17  Future Office visit:  None currently scheduled    Routing refill request to provider for review/approval because:  Drug not on the [facility], P or [facility] refill protocol or controlled substance

## 2018-06-13 NOTE — TELEPHONE ENCOUNTER
Attempted to call patient back to schedule follow up appointment. Phone is not accepting calls at this time. Unable to leave a message. Patient cancelled appointment with Denice Teixeira on 6/11/18, does not have return appointment scheduled. Patient NEEDS APPOINTMENT scheduled prior to med refill. Patient has not been seen since December and was advised to follow up in 2 months.

## 2018-06-15 RX ORDER — PHENTERMINE HYDROCHLORIDE 37.5 MG/1
TABLET ORAL
Qty: 30 TABLET | Refills: 1 | OUTPATIENT
Start: 2018-06-15

## 2018-06-15 NOTE — TELEPHONE ENCOUNTER
Spoke with Dr. Quinn in regards to refill request. Per Dr. Quinn, pt needs to be seen in office before any refills of Phentermine are given.

## 2018-06-27 ENCOUNTER — TELEPHONE (OUTPATIENT)
Dept: INTERNAL MEDICINE | Facility: CLINIC | Age: 48
End: 2018-06-27

## 2018-06-27 DIAGNOSIS — M25.561 BILATERAL CHRONIC KNEE PAIN: ICD-10-CM

## 2018-06-27 DIAGNOSIS — M25.562 BILATERAL CHRONIC KNEE PAIN: ICD-10-CM

## 2018-06-27 DIAGNOSIS — G89.29 BILATERAL CHRONIC KNEE PAIN: ICD-10-CM

## 2018-06-27 DIAGNOSIS — I87.2 VENOUS INSUFFICIENCY (CHRONIC) (PERIPHERAL): ICD-10-CM

## 2018-06-27 NOTE — TELEPHONE ENCOUNTER
Chavez Perez    We are sorry for the inconvenience.  I can help your refills. Please see the below.    Zolpidem : you still one more refill left at the pharmacy ([facility]), but it looks like you cannot get filled before 7/9 or 7/10. Please get this refill from your pharmacy after 7/9/18. If this does not work for you, Please let me know.  Diclofenac sodium 1 % gel & compression stockings : I will give you hard copy prescriptions   Topiramate & Phentermine : Please call weight management clinic or let your pharmacy send the refill requests to them.    I will put your hard copy prescriptions in the locked box on the 1st floor in our building tomorrow (6/28) around 3 pm. You may ask one of  people and they will hand them to you.  Our building opens between 7 am and 7 pm weekdays.  Thank you.    ALEJANDRA Shelby  ===View-only below this line===      ----- Message -----     From: Chavez Ca     Sent: 6/27/2018  9:48 AM CDT       To: Patient Appointment Cancel Request Mailing List  Subject: RE: Appointment canceled    zolpidem tartrate 10 mg tablet, topiramate 50 mg tablet, diclofenac sodium 1% gel, phentermine 37.5 mg tablet, and two pairs of compression socks and yes some of these have been sent in many of times but then finally told to see the doctor well i missed that appointment and now we are at this point I need them and if you just fill them and send them to [facility] they do me no good since i wont be around on the date they say I can pick then up also I will be go on  my trip for at least two months this is a trip of a lifetime for me going to travel around the country and meet my family for the first time in my life so please can you get the Prescriptions in hard copy please so i can have no more roadblocks thank you enjoy your day  ----- Message -----  From: Stacia JIMENEZ  Sent: 6/27/2018  8:59 AM CDT  To: Chavez Ca  Subject: RE: Appointment canceled    Chavez,    What medications are you trying to get  refilled?  I do not see any requests.  How long are you going to be gone for?    Primary Care    ----- Message -----     From: Gama Ferrari     Sent: 6/26/2018 12:30 PM CDT       To: Patient Appointment Cancel Request Mailing List  Subject: RE: Appointment canceled    if you cant get me in between tuesday & friday this week then I am basically screwed if no doctor will just look over my meds list I have already postponed my trip twice because of medication issues & have lost out on my ticket now I have a ride coming from [place] to pick me up on saturday & 2 start my trip Not sure why so hard for my meds to get prescription [facility] been [facility] I am not going 2 miss this trip because of medications either you have a doctor figure it out & fit me in or you have the doctor explain why I was off the meds I needed refilled every 30 days & needed hard copy prescription so i could get them filled outside of [place] I am sorry but I am so done with the [place] the medical people play  ----- Message -----  From: Van YEAGER  Sent: 6/26/2018 12:11 PM CDT  To: Gama Ferrari  Subject: RE: Appointment canceled    Chris Byrne,  I don't see anything for this Friday.  Any other days you would like me to check?    -Van      ----- Message -----     From: Gama Ferrari     Sent: 6/26/2018 10:46 AM CDT       To: Patient Appointment Cancel Request Mailing List  Subject: RE: Appointment canceled    if you can get me in by this friday the 29th then yes please  ----- Message -----  From: Van YEAGER  Sent: 6/26/2018 10:28 AM CDT  To: Gama Ferrari  Subject: RE: Appointment canceled    Chris Byrne,  Would you like to reschedule this appointment?    -Van      ----- Message -----     From: Gama Ferrari     Sent: 6/26/2018  5:24 AM CDT       To: Patient Appointment Cancel Request Mailing List  Subject: Appointment canceled    Appointment canceled for GAMA FERRARI ([id number])  Visit Type: UMP ACUTE/CHRONIC SINGLE  COND  Date        Time      Length    Provider                  Department  6/26/2018    8:15 AM  30 mins.  Winter Fernandez APRN CNP [facility] MEDICINE    Reason for Cancellation: Cancelled via MyChart    Patient Comments: Sorry I can't make this appointment The doctor & I was going to go over my meds & make sure I had at least two prescriptions each & hard copies for the ones have to be refilled every 30 days also I needed a prescription for two pairs of compression socks

## 2018-06-29 ENCOUNTER — CARE COORDINATION (OUTPATIENT)
Dept: ENDOCRINOLOGY | Facility: CLINIC | Age: 48
End: 2018-06-29

## 2018-06-29 DIAGNOSIS — E66.01 MORBID OBESITY (H): ICD-10-CM

## 2018-06-29 RX ORDER — TOPIRAMATE 50 MG/1
TABLET, FILM COATED ORAL
Qty: 180 TABLET | Refills: 2 | Status: SHIPPED | OUTPATIENT
Start: 2018-06-29 | End: 2018-09-13

## 2018-06-29 NOTE — PROGRESS NOTES
"Nida Vigneshcarlyle came to writer stating patient is in lobby requesting refill for Topiramate. He is going out of town and does not have any medication left.    He has had several no shows and cancellations. He has had no f/u since 12/2017. Looks like there has been several efforts to contact him to advise him no more refills until seen in clinic. As well as this was written on his prescriptions that have been sent.     Spoke to Denice Teixeira over the phone.     Ok to refill Topiramate until next available. NO refills after that. If patient \"no shows\" or cancels the next appointment we have scheduled for him 9/13/2018 at 11:30a, we will no longer prescribe these medications for him. He will need to present to ED or PCP office for additional refills.       "

## 2018-07-02 NOTE — TELEPHONE ENCOUNTER
phentermine (ADIPEX-P) 37.5 MG tablet     Last Written Prescription Date:  1/22/18  Last Fill Quantity: 30,   # refills: 3  Last Office Visit : 12/1/17   Future Office visit:  9/13/18    Routing refill request to provider for review/approval because:  Drug not on the [facility], [facility] or [facility] refill protocol or controlled substance

## 2018-07-03 RX ORDER — TOPIRAMATE 50 MG/1
TABLET, FILM COATED ORAL
Qty: 180 TABLET | Refills: 0 | OUTPATIENT
Start: 2018-07-03

## 2018-07-03 RX ORDER — PHENTERMINE HYDROCHLORIDE 37.5 MG/1
TABLET ORAL
Qty: 30 TABLET | Refills: 1 | OUTPATIENT
Start: 2018-07-03

## 2018-08-07 ENCOUNTER — OFFICE VISIT (OUTPATIENT)
Dept: INTERNAL MEDICINE | Facility: CLINIC | Age: 48
End: 2018-08-07
Payer: MEDICARE

## 2018-08-07 VITALS
RESPIRATION RATE: 20 BRPM | HEART RATE: 114 BPM | BODY MASS INDEX: 49.09 KG/M2 | DIASTOLIC BLOOD PRESSURE: 95 MMHG | WEIGHT: 313.4 LBS | SYSTOLIC BLOOD PRESSURE: 131 MMHG

## 2018-08-07 DIAGNOSIS — R00.0 TACHYCARDIA: ICD-10-CM

## 2018-08-07 DIAGNOSIS — G89.29 BILATERAL CHRONIC KNEE PAIN: ICD-10-CM

## 2018-08-07 DIAGNOSIS — G60.3 IDIOPATHIC PROGRESSIVE NEUROPATHY: ICD-10-CM

## 2018-08-07 DIAGNOSIS — G47.01 INSOMNIA DUE TO MEDICAL CONDITION: ICD-10-CM

## 2018-08-07 DIAGNOSIS — L71.0 PERIORIFICIAL DERMATITIS: ICD-10-CM

## 2018-08-07 DIAGNOSIS — M62.81 GENERALIZED MUSCLE WEAKNESS: ICD-10-CM

## 2018-08-07 DIAGNOSIS — R60.9 EDEMA, UNSPECIFIED TYPE: ICD-10-CM

## 2018-08-07 DIAGNOSIS — M25.562 BILATERAL CHRONIC KNEE PAIN: ICD-10-CM

## 2018-08-07 DIAGNOSIS — G62.9 PERIPHERAL POLYNEUROPATHY: ICD-10-CM

## 2018-08-07 DIAGNOSIS — M17.9 OSTEOARTHRITIS OF KNEE, UNSPECIFIED LATERALITY, UNSPECIFIED OSTEOARTHRITIS TYPE: ICD-10-CM

## 2018-08-07 DIAGNOSIS — G62.9 PERIPHERAL POLYNEUROPATHY: Primary | ICD-10-CM

## 2018-08-07 DIAGNOSIS — G47.9 SLEEP DISORDER: ICD-10-CM

## 2018-08-07 DIAGNOSIS — M25.561 BILATERAL CHRONIC KNEE PAIN: ICD-10-CM

## 2018-08-07 DIAGNOSIS — E66.01 MORBID OBESITY (H): ICD-10-CM

## 2018-08-07 DIAGNOSIS — L21.9 SEBORRHEIC DERMATITIS: ICD-10-CM

## 2018-08-07 DIAGNOSIS — I87.303 STASIS EDEMA OF BOTH LOWER EXTREMITIES: ICD-10-CM

## 2018-08-07 DIAGNOSIS — L71.9 ROSACEA: ICD-10-CM

## 2018-08-07 LAB
ANION GAP SERPL CALCULATED.3IONS-SCNC: 9 MMOL/L (ref 3–14)
BUN SERPL-MCNC: 20 MG/DL (ref 7–30)
CALCIUM SERPL-MCNC: 9.9 MG/DL (ref 8.5–10.1)
CHLORIDE SERPL-SCNC: 103 MMOL/L (ref 94–109)
CHOLEST SERPL-MCNC: 223 MG/DL
CO2 SERPL-SCNC: 24 MMOL/L (ref 20–32)
CREAT SERPL-MCNC: 1.1 MG/DL (ref 0.66–1.25)
ERYTHROCYTE [DISTWIDTH] IN BLOOD BY AUTOMATED COUNT: 13.4 % (ref 10–15)
FOLATE SERPL-MCNC: 38.7 NG/ML
GFR SERPL CREATININE-BSD FRML MDRD: 71 ML/MIN/1.7M2
GLUCOSE SERPL-MCNC: 90 MG/DL (ref 70–99)
HBA1C MFR BLD: 5.8 % (ref 0–5.6)
HCT VFR BLD AUTO: 48.5 % (ref 40–53)
HDLC SERPL-MCNC: 61 MG/DL
HGB BLD-MCNC: 15.6 G/DL (ref 13.3–17.7)
LDLC SERPL CALC-MCNC: 135 MG/DL
MCH RBC QN AUTO: 29.4 PG (ref 26.5–33)
MCHC RBC AUTO-ENTMCNC: 32.2 G/DL (ref 31.5–36.5)
MCV RBC AUTO: 91 FL (ref 78–100)
NONHDLC SERPL-MCNC: 162 MG/DL
PLATELET # BLD AUTO: 236 10E9/L (ref 150–450)
POTASSIUM SERPL-SCNC: 4.7 MMOL/L (ref 3.4–5.3)
RBC # BLD AUTO: 5.31 10E12/L (ref 4.4–5.9)
SODIUM SERPL-SCNC: 136 MMOL/L (ref 133–144)
T4 FREE SERPL-MCNC: 0.83 NG/DL (ref 0.76–1.46)
TRIGL SERPL-MCNC: 133 MG/DL
TSH SERPL DL<=0.005 MIU/L-ACNC: 5.91 MU/L (ref 0.4–4)
VIT B12 SERPL-MCNC: 238 PG/ML (ref 193–986)
WBC # BLD AUTO: 7.7 10E9/L (ref 4–11)

## 2018-08-07 PROCEDURE — 82746 ASSAY OF FOLIC ACID SERUM: CPT | Performed by: INTERNAL MEDICINE

## 2018-08-07 RX ORDER — AMITRIPTYLINE HYDROCHLORIDE 50 MG/1
100 TABLET ORAL AT BEDTIME
Qty: 180 TABLET | Refills: 3 | Status: SHIPPED | OUTPATIENT
Start: 2018-08-07 | End: 2018-11-08

## 2018-08-07 RX ORDER — ZOLPIDEM TARTRATE 10 MG/1
10 TABLET ORAL
Qty: 30 TABLET | Refills: 1 | Status: SHIPPED | OUTPATIENT
Start: 2018-08-07 | End: 2018-09-27

## 2018-08-07 RX ORDER — PIMECROLIMUS 10 MG/G
CREAM TOPICAL 2 TIMES DAILY
Qty: 60 G | Refills: 3 | Status: SHIPPED | OUTPATIENT
Start: 2018-08-07 | End: 2018-11-08

## 2018-08-07 RX ORDER — KETOCONAZOLE 20 MG/ML
SHAMPOO TOPICAL
Qty: 120 ML | Refills: 1 | Status: SHIPPED | OUTPATIENT
Start: 2018-08-07 | End: 2019-01-24

## 2018-08-07 RX ORDER — TRAZODONE HYDROCHLORIDE 100 MG/1
100 TABLET ORAL AT BEDTIME
Qty: 90 TABLET | Refills: 3 | Status: SHIPPED | OUTPATIENT
Start: 2018-08-07 | End: 2018-11-08

## 2018-08-07 NOTE — PROGRESS NOTES
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      Patient agitated and tangential on interview and so history limited.  Patient here to discuss the following issues.     Had recent surgery in [facility] on his 'veins' on September 24, 2017 and they did 'ejections into' his legs. He continues to feel numbness and tingling and shooting leg up legs. No back pain.  Feels some weakness. Walks very slow and was using a cane.  Feels pain is getting worse. Feels like gaining weight. Has an appointment with the weight management but was discontinued on one of his weight pills. Interested in neurology referral and physical therapy referral.     For his sleep he is requesting refill of ambien and trazodone.  Feels like he cannot sleep and has bad imsomnia without these medications.    From weight management gets phentermine and topiramate managed in weight management clinic.     Requesting referral to dermatology for rosacea. Has been using over the counter creams that have not worked.  Now noticing breakouts on chest and back.  Using elidel cream. Needing ketoconazole shampoo.     Requesting a sleep study in order for his ENT to determine best course of action for surgery. Not currently using CPAP.  Was told may consider revision of tracheostomy scar. Feels like there something is there, cannot lay down flat without feeling shortness of breath but this has not changed in some time.      Medications and allergies reviewed by me today.     ROS:   Constitutional, neuro, ENT, endocrine, pulmonary, cardiac, gastrointestinal, genitourinary, musculoskeletal, integument and psychiatric systems are negative, except as otherwise noted.    OBJECTIVE:    BP (!)  131/95  Pulse 114  Resp 20  Wt 142.2 kg (313 lb 6.4 oz)  BMI 49.09 kg/m2   Wt Readings from Last 1 Encounters:   08/07/18 142.2 kg (313 lb 6.4 oz)       GENERAL APPEARANCE: diaphoretic but in NAD      EYES: EOMI,  PERRL     HENT: ear canals and TM's normal and nose and mouth without ulcers or lesions, poor dentition      RESP: lungs clear to auscultation - no rales, rhonchi or wheezes, increased work of breathing at rest, easily winded when bending down to tie his shoe      CV: tachycardic rates and rhythm, normal S1 S2, no S3 or S4 and no murmur, click or rub     ABDOMEN:  Obese, soft, nontender, no HSM or masses and bowel sounds normal     MS: extremities normal- no gross deformities noted (however patient wearing compression stockings) no evidence of inflammation in joints, adequate range of motion,     SKIN: dry skin, erythetamous patches on face, erythetamous and skin colored papules on back      NEURO: Grossly normal strength, moves slowly but able to get on exam table without issue, bend down to tie shoes, walk to the end of the room without loss of balance, sensory exam grossly normal      PSYCH: tangential, agitated     ASSESSMENT/PLAN:    Chavez was seen today for establish care and recheck medication.    Diagnoses and all orders for this visit:    Brief Visit Summary  Patient agitated, tangential, and perseverates on prior negative experiences with health care providers in the past. Will give limited information on his own physical symptoms currently.  Patient appears to have history of feeling as though items that were discussed in clinic were not actually followed up on.  Extensively went through all the orders that were placed with patient. In summary:   1. Sleep clinic referral  2. Neurology referral  3. Dermatology referral  4. Physical therapy referral  5. Medication refills (except for phentermine and topamax which are managed at weight management)  6. Labs as noted below   7. Prescribed  ketoconazole shampoo   8. Discussed will check BMP and if potassium is normal will continue on dose of potassium chloride   9. Discussed will try to wean down medication for insomnia in the future although patient was resistant to this  10. Return to clinic for follow-up and discuss blood presure    Idiopathic progressive neuropathy  Peripheral polyneuropathy  Patient incredibly tangential and is a poor historian. Per chart review appears has had polyneuropathy for a number of years and has been on amitriptyline for this.  Has been previously evaluated by neurology.  Will screen for secondary causes of polyneuropathy prior to him seeing neurology.  No focal deficits appreciated on physical exam.   -     NEUROLOGY ADULT REFERRAL  -     PHYSICAL THERAPY REFERRAL        -     amitriptyline (ELAVIL) 50 MG tablet; Take 2 tablets (100 mg) by mouth At Bedtime  -     Vitamin B12; Future  -     TSH with free T4 reflex; Future  -     Folate; Future    Morbid obesity (H)  -     Hemoglobin A1c; Future  -     Lipid Profile NON-FASTING; Future  -     SLEEP EVALUATION & MANAGEMENT REFERRAL - ADULT -[facility]  [phone] (Age 2 and up)    Edema, unspecified type  Per patient lower extremity edema stable from prior. Most recent echo with normal LVEF  -     Basic metabolic panel; Future    Sinus Tachycardia  Known sinus tachycardia. Denies chest pain. ECG wnl last visit.  Will eval with CBC and TSH to ensure no abnormalities.   -     CBC with platelets; Future  -     TSH with free T4 reflex; Future    Bilateral chronic knee pain  Osteoarthritis of knee, unspecified laterality, unspecified osteoarthritis type  Generalized muscle weakness  -     PHYSICAL THERAPY REFERRAL  -     diclofenac (VOLTAREN) 1 % GEL topical gel; Apply 4 grams to knees four times daily using enclosed dosing card.    Rosacea  Periorificial dermatitis  Seborrheic dermatitis  -     DERMATOLOGY REFERRAL  -      ketoconazole (NIZORAL) 2 % shampoo; Apply to the affected area and wash off after 5 minutes.  -     pimecrolimus (ELIDEL) 1 % cream; Apply topically 2 times daily    Sleep disorder  Discussed with patient that combination of trazodone and ambien could be dangerous especially given he has sleep apnea. Patient insistent he has taken these medications in the past without issue.  Patient referred for sleep clinic and pending results should consider possible taper down on ambien. Patient given education on proper sleep hygiene.   -     traZODone (DESYREL) 100 MG tablet; Take 1 tablet (100 mg) by mouth At Bedtime  -     zolpidem (AMBIEN) 10 MG tablet; Take 1 tablet (10 mg) by mouth nightly as needed for sleep    Hypertension   Will monitor for now and trial of life style modifications (has  Follow-up in weight management clinic) and patient should return to clinic with re-evaluation of blood pressure.     Pt should return to clinic for f/u with me in 4 weeks.     Rod Ruiz MD PhD  PGY-2, Internal Medicine  [phone]   Aug 7, 2018    Pt was discussed with Dr. Garg.     ADDENDUM: Patient with elevated LDL. Will discuss management of cholesterol at next visit.     Teaching Physician Note:    While the patient was in clinic, I reviewed the patient's medical history and results, the resident's findings on physical examination, and the patient's diagnosis and treatment plan with the resident.  I agree with the information documented with the following exceptions: none.    Muriel Garg M.D.  Internal Medicine  [facility]   pager [phone]

## 2018-08-07 NOTE — MR AVS SNAPSHOT
After Visit Summary   8/7/2018    Chavez Ca    MRN: [id number]           Patient Information     Date Of Birth          1970        Visit Information        Provider Department      8/7/2018 9:05 AM Angel Ruiz MD [facility]        Today's Diagnoses     Peripheral polyneuropathy    -  1    Morbid obesity (H)        Generalized muscle weakness        Edema, unspecified type        Tachycardia        Rosacea        Idiopathic progressive neuropathy        Bilateral chronic knee pain        Osteoarthritis of knee, unspecified laterality, unspecified osteoarthritis type        Stasis edema of both lower extremities        Periorificial dermatitis        Sleep disorder        Insomnia due to medical condition        Seborrheic dermatitis          Care Instructions    Primary Care Center Phone Number [phone]  Primary Care Center Medication Refill Request Information:  * Please contact your pharmacy regarding ANY request for medication refills.  ** [facility] Fax = [phone]  * Please allow 3 business days for routine medication refills.  * Please allow 5 business days for controlled substance medication refills.     Primary Care Center Test Result notification information:  *You will be notified with in 7-10 days of your appointment day regarding the results of your test.  If you are on MyChart you will be notified as soon as the provider has reviewed the results and signed off on them.                 [facility]         Internal Medicine Resident                   Continuity Clinic    Who We Are    Resident Continuity Clinic is a part of the [facility].  Resident physicians see patients independently and establish a relationship with them over the course of their three-year residency program.  As with the Primary Care Clinic, our Resident Continuity Clinic models a group practice.  If your doctor is not available, you  will be able to see another resident physician.  At the end of a resident s training, patients will be transitioned to a new resident physician for ongoing care.     We treat patients with a wide array of medical needs from routine physicals, to acute illnesses, to diabetes and blood pressure management, to complex medical illness.  What is a Resident Physician?    Resident physicians hold medical degrees and are doctors. They are training to become specialists in Internal Medicine. They work under the supervision of board-certified faculty physicians.  Expectations for Your Care    We strive to provide accessible, quality care at all times.    In order to provide this care, it is best to see your primary care resident doctor consistently rather switching between providers.  In the event you do see another physician, you should schedule a follow-up visit with your usual primary care doctor.    If you are transitioning your care from another clinic, it is helpful to have your records available for your doctor to review.    We do not prescribe controlled substances, such as ADD medications or narcotic pain medications, on your first visit.  We will review your health records and concerns prior to devising a treatment plan with you in order to provide the best care.      Clinic Services     Extended clinic hours; patient  to help navigate your visit;  parking; laboratory and imaging services with evening and weekend hours    Multiple medical and surgical specialties in one building    Complementary services, including Nutrition, Integrative Medicine, Pharmacy consultations, Mental and Behavioral Health, Sports Medicine and Physical Therapy    Thank You    We would like to thank you for choosing the [facility] Resident Continuity Clinic for your primary care. You are making a priceless contribution to the training of the next generation of health care practitioners.      Contact us at [phone] for appointments or questions.    Resident Clinic Hours are Tuesdays and Thursdays, 7:30am-5:00pm    Residents  Sonia Saleem MD   (Female )   Ly Wetzel MD   (Female)   Dimas Bunch MD  (Male)   Joe Segundo MD  (Male)   Rod Ruiz MD   (Female)   Rah Macdonald MD  (Male)    Virgil Moore MD  (Male)   Bob Dahl MD  (Male)   Joe Colindres MD (Male)   Yrn Beaulieu MD  (Male)   Viviane Caraballo MD (Female)    Sandra Irvin MD (Female)   Michelle Kaye MD  (Male)   Lyubov Hernandez MD(Female)   Viki Mancuso MD  (Female)    Supervising Physicians   MD Sarah Red, MD Glen Tam MD Mary Logeais, MD Tanya Melnik, MD Charles Moldow, MD Heather Thompson Buum, MD Kathleen Watson, MD            Warnings regarding your sleep medication:    Your sleep medication may cause excessive drowsiness, dizziness, sleep talking, sleep eating, sleep walking, amnesia in the middle of the night and (as with all medications) allergic reactions.       Take your sleep medication immediately before you go to bed.  Do not take your medication unless you have 8 hours available to sleep after taking the medication.    Do not drink alcohol or use illicit drugs when you are prescribed sleep medication.  Avoid combining your sleep medication with other sedating medications or supplements..      Do not drive, operate heavy machinery, or be the sole supervisor of young children when you are drowsy.  You are at risk for legal prosecution for driving while impaired if sedating medications are found in your system.    Your sleep medication is typically intended to be for short term use, until the cause is addressed.  See tips for improving sleep below.  If the need for medication persists, you may be asked to consult with a Sleep Specialist.    Here are some tips for improving sleep without medication:  1.  Do not  "drink products with caffeine (coffee, tea, soda, etc.) except for one cup of coffee in the morning is okay for now.  2.  Exercise daily, and avoid exercising in the late evening.  3.  Avoid having heavy meals towards bed time  4.  Make sure you minimize any noises, inside or outside your bedroom, which might disturb your sleep.  5.  Use your bedroom/bed for sleep and intimate relations only.  6.  If you are awake for any more than 15-20 minutes, get out of bed and your bedroom and do something relaxing that would promote drowsiness (e.g. reading).  Return to your bedroom when you are drowsy.  7.  Do not watch TV, work on the computer, check your cell phone, play video games or use any other \"screen\" at night.  8.  If you have to use lights at night, keep them as dim as possible.  9.  Dim the lights in the evening and use low light if you have to wake up at night.  10.  Turn alarm clocks around.  Keep clocks outside your bedroom if possible.  11.  Limit your alcohol intake (beer, wine, hard liquor) to one serving a day and do not drink it at bedtime.  12.  Try some relaxing activites at night including sipping on chamomile tea, reading, taking a bath.  13.  Keep your room on the cool side when sleeping              Follow-ups after your visit        Additional Services     DERMATOLOGY REFERRAL       Your provider has referred you to: [facility]: Dermatology Clinic [facility] [phone]   [email or web address]    Please be aware that coverage of these services is subject to the terms and limitations of your health insurance plan.  Call member services at your health plan with any benefit or coverage questions.      Please bring the following with you to your appointment:    (1) Any X-Rays, CTs or MRIs which have been performed.  Contact the facility where they were done to arrange for  prior to your scheduled appointment.    (2) List of current medications  (3) This referral " request   (4) Any documents/labs given to you for this referral            NEUROLOGY ADULT REFERRAL       Your provider has referred you for the following:   Consult at [facility]: Neurology Clinic [facility] [phone]   [email or web address]  General Neurology    Please be aware that coverage of these services is subject to the terms and limitations of your health insurance plan.  Call member services at your health plan with any benefit or coverage questions.      Please bring the following with you to your appointment:    (1) Any X-Rays, CTs or MRIs which have been performed.  Contact the facility where they were done to arrange for  prior to your scheduled appointment.    (2) List of current medications  (3) This referral request   (4) Any documents/labs given to you for this referral            PHYSICAL THERAPY REFERRAL       *This therapy referral will be filtered to a centralized scheduling office at [facility] and the patient will receive a call to schedule an appointment at a [facility] location most convenient for them. *     [facility] provides Physical Therapy evaluation and treatment and many specialty services across the [facility] system.  If requesting a specialty program, please choose from the list below.    If you have not heard from the scheduling office within 2 business days, please call [phone] for all locations, with the exception of [place], please call [phone] and [facility], please call [phone]  Treatment: Evaluation & Treatment  Special Instructions/Modalities: n/a  Special Programs: None    Please be aware that coverage of these services is subject to the terms and limitations of your health insurance plan.  Call member services at your health plan with any benefit or coverage questions.      **Note to Provider:  If you are referring outside of [facility] for the therapy appointment, please list  "the name of the location in the \"special instructions\" above, print the referral and give to the patient to schedule the appointment.            SLEEP EVALUATION & MANAGEMENT REFERRAL - ADULT -[facility] / [facility]  [phone] (Age 2 and up)       Please be aware that coverage of these services is subject to the terms and limitations of your health insurance plan.  Call member services at your health plan with any benefit or coverage questions.      Please bring the following to your appointment:    >>   List of current medications   >>   This referral request   >>   Any documents/labs given to you for this referral                      Follow-up notes from your care team     Return in about 4 weeks (around 9/4/2018) for medical conditions.      Your next 10 appointments already scheduled     Aug 14, 2018  2:00 PM CDT   New Sleep Patient with ELAINE Cope CNP   [facility] [place], Sleep Study ([facility])    [address] [place] [id number] [phone]            Sep 04, 2018  2:05 PM CDT   (Arrive by 1:50 PM)   Return Visit with Angel Ruiz MD   [facility] ([facility])    [address] [place] [address]  4th [facility] [address]   [phone]            Sep 13, 2018 11:30 AM CDT   (Arrive by 11:15 AM)   Return Weight Management Visit with Denice Teixeira PA-C   [facility] Medical Weight Management ([email or web address] [place])    [address] [place] [address]  4th [facility] [id number] [phone]            Sep 18, 2018  7:30 AM CDT   (Arrive by 7:15 AM)   New Patient Visit with Marcela Abreu MD   [facility] Neurology ([email or web address] [place])    [address] [place] [address]  3rd [facility] [id number] [phone]            Sep 18, 2018  9:10 AM CDT   (Arrive by 8:55 AM)   Return Visit with Andrez Bautista " MD Gabrielle   [facility] Dermatology ([facility])    [address] [facility]  3rd Floor  [facility] [address] [phone]              Future tests that were ordered for you today     Open Future Orders        Priority Expected Expires Ordered    Hemoglobin A1c Routine 8/7/2018 8/21/2018 8/7/2018    Lipid Profile NON-FASTING Routine 8/7/2018 8/21/2018 8/7/2018    Basic metabolic panel Routine 8/7/2018 8/21/2018 8/7/2018    CBC with platelets Routine 8/7/2018 8/21/2018 8/7/2018    Vitamin B12 Routine 8/7/2018 8/7/2019 8/7/2018    TSH with free T4 reflex Routine 8/7/2018 8/21/2018 8/7/2018    Folate Routine 8/7/2018 8/7/2019 8/7/2018            Who to contact     Please call your clinic at [phone] to:    Ask questions about your health    Make or cancel appointments    Discuss your medicines    Learn about your test results    Speak to your doctor            Additional Information About Your Visit        [employer] Information     [employer] gives you secure access to your electronic health record. If you see a primary care provider, you can also send messages to your care team and make appointments. If you have questions, please call your primary care clinic.  If you do not have a primary care provider, please call [phone] and they will assist you.      [employer] is an electronic gateway that provides easy, online access to your medical records. With [employer], you can request a clinic appointment, read your test results, renew a prescription or communicate with your care team.     To access your existing account, please contact your [facility] or call [phone] for assistance.        Care EveryWhere ID     This is your Care EveryWhere ID. This could be used by other organizations to access your [facility] medical records  [id number]        Your Vitals Were     Pulse Respirations BMI (Body Mass Index)             114 20 49.09 kg/m2          Blood Pressure  from Last 3 Encounters:   08/07/18 (!) 131/95   02/01/18 125/83   01/22/18 126/80    Weight from Last 3 Encounters:   08/07/18 142.2 kg (313 lb 6.4 oz)   02/01/18 (!) 138.7 kg (305 lb 12.8 oz)   01/22/18 134.7 kg (297 lb)              We Performed the Following     DERMATOLOGY REFERRAL     NEUROLOGY ADULT REFERRAL     PHYSICAL THERAPY REFERRAL     SLEEP EVALUATION & MANAGEMENT REFERRAL - ADULT [facility] / [facility]  [phone] (Age 2 and up)          Today's Medication Changes          These changes are accurate as of 8/7/18 10:17 AM.  If you have any questions, ask your nurse or doctor.               Start taking these medicines.        Dose/Directions    ketoconazole 2 % shampoo   Commonly known as:  NIZORAL   Used for:  Seborrheic dermatitis   Started by:  Angel Ruiz MD        Apply to the affected area and wash off after 5 minutes.   Quantity:  120 mL   Refills:  1            Where to get your medicines      These medications were sent to [facility]/pharmacy #[id number] - [place], [place] - [address] AT [facility]  [address] [place] [address]    Hours:  24-hours Phone:  [phone]     amitriptyline 50 MG tablet    ketoconazole 2 % shampoo    pimecrolimus 1 % cream    traZODone 100 MG tablet         Some of these will need a paper prescription and others can be bought over the counter.  Ask your nurse if you have questions.     Bring a paper prescription for each of these medications     zolpidem 10 MG tablet         Call your pharmacy to confirm that your medication is ready for pickup. It may take up to 24 hours for them to receive the prescription. If the prescription is not ready within 3 business days, please contact your clinic or your provider.     We will let you know when these medications are ready. If you don't hear back within 3 business days, please contact us.     diclofenac 1 % Gel topical gel                Primary Care  Provider Office Phone # Fax #    Angel Jonelle Ruiz [phone]       [address] [address]        Equal Access to Services     TALHA TEIXEIRA : Ashleigh mtz chestero Sojaimeali, waaxda luqadaha, qaybta kaalmada adejami, lacey romero laTatyanabo jane. So [facility] [phone].    ATENCIÓN: Si habla español, tiene a ackerman disposición servicios gratuitos de asistencia lingüística. Llame al [phone].    We comply with applicable federal civil rights laws and [place] laws. We do not discriminate on the basis of race, color, national origin, age, disability, sex, sexual orientation, or gender identity.            Thank you!     Thank you for choosing [facility]  for your care. Our goal is always to provide you with excellent care. Hearing back from our patients is one way we can continue to improve our services. Please take a few minutes to complete the written survey that you may receive in the mail after your visit with us. Thank you!             Your Updated Medication List - Protect others around you: Learn how to safely use, store and throw away your medicines at www.disposemymeds.org.          This list is accurate as of 8/7/18 10:17 AM.  Always use your most recent med list.                   Brand Name Dispense Instructions for use Diagnosis    amitriptyline 50 MG tablet    ELAVIL    180 tablet    Take 2 tablets (100 mg) by mouth At Bedtime    Idiopathic progressive neuropathy       CETAPHIL lotion      Apply topically 2 times daily        COMPRESSION STOCKINGS     2 each    1 each daily Measure and fit thigh length Circaid compression device.  Bilateral.    Venous insufficiency (chronic) (peripheral)       COMPRESSION STOCKINGS     2 each    20-30 mmHg knee high compression stockings.  Measure and fit.  Style and brand per patient preference.    Venous insufficiency (chronic) (peripheral)       * diclofenac 1 % Gel topical gel     VOLTAREN    100 g    APPLY 4 GRAMS TO KNEES FOUR TIMES DAILY USING ENCLOSED DOSING CARD    Bilateral chronic knee pain       * diclofenac 1 % Gel topical gel    VOLTAREN    100 g    Apply 4 grams to knees four times daily using enclosed dosing card.    Osteoarthritis of knee, unspecified laterality, unspecified osteoarthritis type       furosemide 20 MG tablet    LASIX    540 tablet    Take 3 tablets (60 mg) by mouth 2 times daily    Stasis edema of both lower extremities       ketoconazole 2 % shampoo    NIZORAL    120 mL    Apply to the affected area and wash off after 5 minutes.    Seborrheic dermatitis       METROGEL 1 % gel   Generic drug:  metroNIDAZOLE           MULTI vitamin  MENS Tabs     90 tablet    Take 1 tablet by mouth daily    Idiopathic small and large fiber sensory neuropathy       neomycin-polymyxin-hydrocortisone otic solution    CORTISPORIN    10 mL    Place 3 drops Into the left ear 4 times daily    Ear itching       order for DME     2 Units    Equipment being ordered: compression stockings - 2 pairs    Stasis edema of both lower extremities       phentermine 37.5 MG tablet    ADIPEX-P    30 tablet    Take 1 tablet (37.5 mg) by mouth every morning    Morbid obesity (H)       pimecrolimus 1 % cream    ELIDEL    60 g    Apply topically 2 times daily    Periorificial dermatitis       potassium chloride 20 MEQ Packet    KLOR-CON    270 packet    Take 60 mEq by mouth daily    Hypokalemia       topiramate 50 MG tablet    TOPAMAX    180 tablet    TAKE 3 TABS (150 MG) IN THE AM AND 3 TABS (150 MG) IN THE PM AFTER AS TOLERATED    Morbid obesity (H)       traZODone 100 MG tablet    DESYREL    90 tablet    Take 1 tablet (100 mg) by mouth At Bedtime    Sleep disorder       valACYclovir 500 MG tablet    VALTREX          Vitamin D (Cholecalciferol) 400 units Caps     90 capsule    Take 400 Units by mouth daily    Vitamin D deficiency       zolpidem 10 MG tablet    AMBIEN    30 tablet    Take 1 tablet (10 mg)  by mouth nightly as needed for sleep    Insomnia due to medical condition       * Notice:  This list has 2 medication(s) that are the same as other medications prescribed for you. Read the directions carefully, and ask your doctor or other care provider to review them with you.

## 2018-08-07 NOTE — PATIENT INSTRUCTIONS
Primary Care Center Phone Number [phone]  Primary Care Center Medication Refill Request Information:  * Please contact your pharmacy regarding ANY request for medication refills.  ** [facility] Prescription Fax = [phone]  * Please allow 3 business days for routine medication refills.  * Please allow 5 business days for controlled substance medication refills.     Primary Care Center Test Result notification information:  *You will be notified with in 7-10 days of your appointment day regarding the results of your test.  If you are on MyChart you will be notified as soon as the provider has reviewed the results and signed off on them.                 [facility]         Internal Medicine Resident                   Continuity Clinic    Who We Are    Resident Continuity Clinic is a part of the [facility].  Resident physicians see patients independently and establish a relationship with them over the course of their three-year residency program.  As with the Primary Care Clinic, our Resident Continuity Clinic models a group practice.  If your doctor is not available, you will be able to see another resident physician.  At the end of a resident s training, patients will be transitioned to a new resident physician for ongoing care.     We treat patients with a wide array of medical needs from routine physicals, to acute illnesses, to diabetes and blood pressure management, to complex medical illness.  What is a Resident Physician?    Resident physicians hold medical degrees and are doctors. They are training to become specialists in Internal Medicine. They work under the supervision of board-certified faculty physicians.  Expectations for Your Care    We strive to provide accessible, quality care at all times.    In order to provide this care, it is best to see your primary care resident doctor consistently rather switching between providers.  In the event you do see another physician, you  should schedule a follow-up visit with your usual primary care doctor.    If you are transitioning your care from another clinic, it is helpful to have your records available for your doctor to review.    We do not prescribe controlled substances, such as ADD medications or narcotic pain medications, on your first visit.  We will review your health records and concerns prior to devising a treatment plan with you in order to provide the best care.      Clinic Services     Extended clinic hours; patient  to help navigate your visit;  parking; laboratory and imaging services with evening and weekend hours    Multiple medical and surgical specialties in one building    Complementary services, including Nutrition, Integrative Medicine, Pharmacy consultations, Mental and Behavioral Health, Sports Medicine and Physical Therapy    Thank You    We would like to thank you for choosing the [facility] Resident Continuity Clinic for your primary care. You are making a priceless contribution to the training of the next generation of health care practitioners.     Contact us at [phone] for appointments or questions.    Resident Clinic Hours are Tuesdays and Thursdays, 7:30am-5:00pm    Residents  Sonia Saleem MD   (Female )   Ly Wetzel MD   (Female)   Dimas Bunch MD  (Male)   Joe Segundo MD  (Male)   Rod Ruiz MD   (Female)   Rah Macdonald MD  (Male)    Virgil Moore MD  (Male)   Bob Dahl MD  (Male)   Joe Colindres MD (Male)   Yrn Beaulieu MD  (Male)   Viviane Caraballo MD (Female)    Sandra Irvin MD (Female)   Michelle Kaye MD  (Male)   Lyubov Hernandez MD(Female)   Viki Mancuso MD  (Female)    Supervising Physicians   MD Sarah Red MD Briar Duffy, MD James Langland, MD Mary Logeais, MD Tanya Melnik, MD Charles Moldow, MD Heather Thompson Buum, MD Kathleen Watson, MD Warnings  "regarding your sleep medication:    Your sleep medication may cause excessive drowsiness, dizziness, sleep talking, sleep eating, sleep walking, amnesia in the middle of the night and (as with all medications) allergic reactions.       Take your sleep medication immediately before you go to bed.  Do not take your medication unless you have 8 hours available to sleep after taking the medication.    Do not drink alcohol or use illicit drugs when you are prescribed sleep medication.  Avoid combining your sleep medication with other sedating medications or supplements..      Do not drive, operate heavy machinery, or be the sole supervisor of young children when you are drowsy.  You are at risk for legal prosecution for driving while impaired if sedating medications are found in your system.    Your sleep medication is typically intended to be for short term use, until the cause is addressed.  See tips for improving sleep below.  If the need for medication persists, you may be asked to consult with a Sleep Specialist.    Here are some tips for improving sleep without medication:  1.  Do not drink products with caffeine (coffee, tea, soda, etc.) except for one cup of coffee in the morning is okay for now.  2.  Exercise daily, and avoid exercising in the late evening.  3.  Avoid having heavy meals towards bed time  4.  Make sure you minimize any noises, inside or outside your bedroom, which might disturb your sleep.  5.  Use your bedroom/bed for sleep and intimate relations only.  6.  If you are awake for any more than 15-20 minutes, get out of bed and your bedroom and do something relaxing that would promote drowsiness (e.g. reading).  Return to your bedroom when you are drowsy.  7.  Do not watch TV, work on the computer, check your cell phone, play video games or use any other \"screen\" at night.  8.  If you have to use lights at night, keep them as dim as possible.  9.  Dim the lights in the evening and use low light if " you have to wake up at night.  10.  Turn alarm clocks around.  Keep clocks outside your bedroom if possible.  11.  Limit your alcohol intake (beer, wine, hard liquor) to one serving a day and do not drink it at bedtime.  12.  Try some relaxing activites at night including sipping on chamomile tea, reading, taking a bath.  13.  Keep your room on the cool side when sleeping

## 2018-08-07 NOTE — NURSING NOTE
Chief Complaint   Patient presents with     Establish Care     pt is here to re-establish care with new PCP     Recheck Medication     pt is here for a medication follow up       Sonia Patel CMA at 8:50 AM on 8/7/2018

## 2018-08-14 ENCOUNTER — OFFICE VISIT (OUTPATIENT)
Dept: SLEEP MEDICINE | Facility: CLINIC | Age: 48
End: 2018-08-14
Attending: INTERNAL MEDICINE
Payer: MEDICARE

## 2018-08-14 VITALS
DIASTOLIC BLOOD PRESSURE: 89 MMHG | OXYGEN SATURATION: 98 % | WEIGHT: 315 LBS | BODY MASS INDEX: 49.44 KG/M2 | SYSTOLIC BLOOD PRESSURE: 129 MMHG | HEART RATE: 113 BPM | RESPIRATION RATE: 16 BRPM | HEIGHT: 67 IN

## 2018-08-14 DIAGNOSIS — G47.33 OSA (OBSTRUCTIVE SLEEP APNEA): Primary | ICD-10-CM

## 2018-08-14 PROCEDURE — 99215 OFFICE O/P EST HI 40 MIN: CPT | Performed by: NURSE PRACTITIONER

## 2018-08-14 NOTE — Clinical Note
Dental referral for Mr. Ca, please let him know I will my chart him some personal information regarding the dentists.  ELAINE Sher, CNP-BC Sleep Medicine

## 2018-08-14 NOTE — MR AVS SNAPSHOT
After Visit Summary   8/14/2018    Chavez Ca    MRN: [id number]           Patient Information     Date Of Birth          1970        Visit Information        Provider Department      8/14/2018 2:00 PM Jackie Boles APRN [facility], [place], Sleep Study        Today's Diagnoses     Bronchitis          Care Instructions    Continue to sleep with head of bed elevated  Keep lower extremities elevated as well  Continue to work on weight loss as able-I know this is difficult  I'll review your case with our sleep dentist partners for possible dental appliance  I'll my chart you on the results, will consider face to face      Your BMI is Body mass index is 50.89 kg/(m^2).  Weight management is a personal decision.  If you are interested in exploring weight loss strategies, the following discussion covers the approaches that may be successful. Body mass index (BMI) is one way to tell whether you are at a healthy weight, overweight, or obese. It measures your weight in relation to your height.  A BMI of 18.5 to 24.9 is in the healthy range. A person with a BMI of 25 to 29.9 is considered overweight, and someone with a BMI of 30 or greater is considered obese. More than two-thirds of American adults are considered overweight or obese.  Being overweight or obese increases the risk for further weight gain. Excess weight may lead to heart disease and diabetes.  Creating and following plans for healthy eating and physical activity may help you improve your health.  Weight control is part of healthy lifestyle and includes exercise, emotional health, and healthy eating habits. Careful eating habits lifelong are the mainstay of weight control. Though there are significant health benefits from weight loss, long-term weight loss with diet alone may be very difficult to achieve- studies show long-term success with dietary management in less than 10% of people. Attaining a healthy weight may be  especially difficult to achieve in those with severe obesity. In some cases, medications, devices and surgical management might be considered.  What can you do?  If you are overweight or obese and are interested in methods for weight loss, you should discuss this with your provider.     Consider reducing daily calorie intake by 500 calories.     Keep a food journal.     Avoiding skipping meals, consider cutting portions instead.    Diet combined with exercise helps maintain muscle while optimizing fat loss. Strength training is particularly important for building and maintaining muscle mass. Exercise helps reduce stress, increase energy, and improves fitness. Increasing exercise without diet control, however, may not burn enough calories to loose weight.       Start walking three days a week 10-20 minutes at a time    Work towards walking thirty minutes five days a week     Eventually, increase the speed of your walking for 1-2 minutes at time    In addition, we recommend that you review healthy lifestyles and methods for weight loss available through the National Institutes of Health patient information sites:  http://win.niddk.nih.gov/publications/index.htm    And look into health and wellness programs that may be available through your health insurance provider, employer, local community center, or edith club.    Weight management plan: Patient was referred to their PCP to discuss a diet and exercise plan.              Follow-ups after your visit        Your next 10 appointments already scheduled     Aug 21, 2018  3:00 PM CDT   Evaluation with Monisha Montanez PT   [facility] [place], Physical Therapy - Outpatient ([facility])    [address], [address]  [place] [address]   [phone]            Sep 04, 2018  2:05 PM CDT   (Arrive by 1:50 PM)   Return Visit with Angel Ruiz MD   [facility] ([facility])    [address] [address]   [phone]            Sep 13, 2018 11:30 AM CDT   (Arrive by 11:15 AM)   Return Weight Management Visit with Denice Teixeira PA-C   [facility] Medical Weight Management ([facility])    [address] [place] [id number]   [phone]            Sep 18, 2018  7:30 AM CDT   (Arrive by 7:15 AM)   New Patient Visit with Marcela Abreu MD   [facility] Neurology ([facility])    [address] [place] [id number]   [phone]            Sep 18, 2018  9:10 AM CDT   (Arrive by 8:55 AM)   Return Visit with Andrez Anthony MD   [facility] Dermatology ([facility])    [address] [place] [id number] [phone]              Who to contact     If you have questions or need follow up information about today's clinic visit or your schedule please contact [facility], [place], SLEEP STUDY directly at [phone].  Normal or non-critical lab and imaging results will be communicated to you by [employer], letter or phone within 4 business days after the clinic has received the results. If you do not hear from us within 7 days, please contact the clinic through [employer] or phone. If you have a critical or abnormal lab result, we will notify you by phone as soon as possible.  Submit refill requests through [employer] or call your pharmacy and they will forward the refill request to us. Please allow 3 business days for your refill to be completed.          Additional Information About Your Visit        [employer] Information     [employer] gives you secure access to your electronic health record. If you see a primary care provider, you can also send messages to your care team and make appointments. If you have questions, please call your primary care clinic.  If you do not have a primary care provider, please call [phone]  "and they will assist you.        Care EveryWhere ID     This is your Care EveryWhere ID. This could be used by other organizations to access your [facility] medical records  [id number]        Your Vitals Were     Pulse Respirations Height Pulse Oximetry BMI (Body Mass Index)       113 16 1.702 m (5' 7.01\") 98% 50.89 kg/m2        Blood Pressure from Last 3 Encounters:   08/14/18 129/89   08/07/18 (!) 131/95   02/01/18 125/83    Weight from Last 3 Encounters:   08/14/18 147.4 kg (325 lb)   08/07/18 142.2 kg (313 lb 6.4 oz)   02/01/18 (!) 138.7 kg (305 lb 12.8 oz)              We Performed the Following     SLEEP EVALUATION & MANAGEMENT REFERRAL - ADULT -[facility] [phone]  (Age 18 and up)        Primary Care Provider Office Phone # Fax #    Angel Jonelle Ruiz [phone]       [place] [address]        Equal Access to Services     TALHA TEIXEIRA : Hadii julia barnard Sosammy, waaxda luyosvany, qaybta kaalsergei lamb, lacey jane. So [facility] [phone].    ATENCIÓN: Si habla español, tiene a ackerman disposición servicios gratuitos de asistencia lingüística. David al [phone].    We comply with applicable federal civil rights laws and [place] laws. We do not discriminate on the basis of race, color, national origin, age, disability, sex, sexual orientation, or gender identity.            Thank you!     Thank you for choosing [facility], SLEEP STUDY  for your care. Our goal is always to provide you with excellent care. Hearing back from our patients is one way we can continue to improve our services. Please take a few minutes to complete the written survey that you may receive in the mail after your visit with us. Thank you!             Your Updated Medication List - Protect others around you: Learn how to safely use, store and throw away your medicines at www.disposemymeds.org.          This list is " accurate as of 8/14/18  3:16 PM.  Always use your most recent med list.                   Brand Name Dispense Instructions for use Diagnosis    amitriptyline 50 MG tablet    ELAVIL    180 tablet    Take 2 tablets (100 mg) by mouth At Bedtime    Idiopathic progressive neuropathy       CETAPHIL lotion      Apply topically 2 times daily        COMPRESSION STOCKINGS     2 each    1 each daily Measure and fit thigh length Circaid compression device.  Bilateral.    Venous insufficiency (chronic) (peripheral)       COMPRESSION STOCKINGS     2 each    20-30 mmHg knee high compression stockings.  Measure and fit.  Style and brand per patient preference.    Venous insufficiency (chronic) (peripheral)       * diclofenac 1 % Gel topical gel    VOLTAREN    100 g    APPLY 4 GRAMS TO KNEES FOUR TIMES DAILY USING ENCLOSED DOSING CARD    Bilateral chronic knee pain       * diclofenac 1 % Gel topical gel    VOLTAREN    100 g    Apply 4 grams to knees four times daily using enclosed dosing card.    Osteoarthritis of knee, unspecified laterality, unspecified osteoarthritis type       furosemide 20 MG tablet    LASIX    540 tablet    Take 3 tablets (60 mg) by mouth 2 times daily    Stasis edema of both lower extremities       ketoconazole 2 % shampoo    NIZORAL    120 mL    Apply to the affected area and wash off after 5 minutes.    Seborrheic dermatitis       METROGEL 1 % gel   Generic drug:  metroNIDAZOLE           MULTI vitamin  MENS Tabs     90 tablet    Take 1 tablet by mouth daily    Idiopathic small and large fiber sensory neuropathy       neomycin-polymyxin-hydrocortisone otic solution    CORTISPORIN    10 mL    Place 3 drops Into the left ear 4 times daily    Ear itching       order for DME     2 Units    Equipment being ordered: compression stockings - 2 pairs    Stasis edema of both lower extremities       phentermine 37.5 MG tablet    ADIPEX-P    30 tablet    Take 1 tablet (37.5 mg) by mouth every morning    Morbid obesity  (H)       pimecrolimus 1 % cream    ELIDEL    60 g    Apply topically 2 times daily    Periorificial dermatitis       potassium chloride 20 MEQ Packet    KLOR-CON    270 packet    Take 60 mEq by mouth daily    Hypokalemia       topiramate 50 MG tablet    TOPAMAX    180 tablet    TAKE 3 TABS (150 MG) IN THE AM AND 3 TABS (150 MG) IN THE PM AFTER AS TOLERATED    Morbid obesity (H)       traZODone 100 MG tablet    DESYREL    90 tablet    Take 1 tablet (100 mg) by mouth At Bedtime    Sleep disorder       valACYclovir 500 MG tablet    VALTREX          Vitamin D (Cholecalciferol) 400 units Caps     90 capsule    Take 400 Units by mouth daily    Vitamin D deficiency       zolpidem 10 MG tablet    AMBIEN    30 tablet    Take 1 tablet (10 mg) by mouth nightly as needed for sleep    Insomnia due to medical condition       * Notice:  This list has 2 medication(s) that are the same as other medications prescribed for you. Read the directions carefully, and ask your doctor or other care provider to review them with you.

## 2018-08-14 NOTE — PROGRESS NOTES
Allergies:    Allergies   Allergen Reactions     Fentanyl Itching     Patch     Meloxicam Other (See Comments)     Side pains.     Minocycline Rash     Patient reports adverse reaction was pigmentation which has resolved with drug discontinuation.       Medications:    Current Outpatient Prescriptions   Medication Sig Dispense Refill     amitriptyline (ELAVIL) 50 MG tablet Take 2 tablets (100 mg) by mouth At Bedtime 180 tablet 3     CETAPHIL (CETAPHIL) lotion Apply topically 2 times daily       COMPRESSION STOCKINGS 20-30 mmHg knee high compression stockings.  Measure and fit.  Style and brand per patient preference. 2 each 0     COMPRESSION STOCKINGS 1 each daily Measure and fit thigh length Circaid compression device.  Bilateral. 2 each 4     diclofenac (VOLTAREN) 1 % GEL topical gel Apply 4 grams to knees four times daily using enclosed dosing card. 100 g 1     diclofenac (VOLTAREN) 1 % GEL topical gel APPLY 4 GRAMS TO KNEES FOUR TIMES DAILY USING ENCLOSED DOSING CARD 100 g 1     furosemide (LASIX) 20 MG tablet Take 3 tablets (60 mg) by mouth 2 times daily 540 tablet 3     ketoconazole (NIZORAL) 2 % shampoo Apply to the affected area and wash off after 5 minutes. 120 mL 1     metroNIDAZOLE (METROGEL) 1 % gel        Multiple Vitamin (MULTI VITAMIN  MENS) TABS Take 1 tablet by mouth daily 90 tablet 1     neomycin-polymyxin-hydrocortisone (CORTISPORIN) otic solution Place 3 drops Into the left ear 4 times daily 10 mL 1     order for DME Equipment being ordered: compression stockings - 2 pairs 2 Units 0     pimecrolimus (ELIDEL) 1 % cream Apply topically 2 times daily 60 g 3     potassium chloride (KLOR-CON) 20 MEQ Packet Take 60 mEq by mouth daily 270 packet 3     topiramate (TOPAMAX) 50 MG tablet TAKE 3 TABS (150 MG) IN THE AM AND 3 TABS (150 MG) IN THE PM AFTER AS TOLERATED 180 tablet 2     traZODone (DESYREL) 100 MG tablet Take 1 tablet (100 mg) by mouth At Bedtime 90 tablet 3     valACYclovir (VALTREX) 500 MG  tablet        Vitamin D, Cholecalciferol, 400 UNITS CAPS Take 400 Units by mouth daily 90 capsule 3     zolpidem (AMBIEN) 10 MG tablet Take 1 tablet (10 mg) by mouth nightly as needed for sleep 30 tablet 1     phentermine (ADIPEX-P) 37.5 MG tablet Take 1 tablet (37.5 mg) by mouth every morning (Patient not taking: Reported on 8/7/2018) 30 tablet 3     [DISCONTINUED] COMPRESSION STOCKINGS 20-30 mmHg knee high compression stockings 2 each 1       Problem List:  Patient Active Problem List    Diagnosis Date Noted     Shoulder pain, left 07/22/2016     Priority: Medium     Neck pain 07/22/2016     Priority: Medium     Neck pain, chronic 07/22/2016     Priority: Medium     Pain in both wrists 06/28/2016     Priority: Medium     EMG 6/4/2015 diagnosed bl carpal tunnel.  Saw Dr. Bethea who rec'd trial of splinting and possible carpal tunnel release       Varicose veins of left lower extremity 06/22/2016     Priority: Medium     12/29/15 - Left GSV ablation with varicose vein microphlebectomy       Closed nondisplaced fracture of scaphoid of right wrist, unspecified portion of scaphoid, sequela 06/22/2016     Priority: Medium     Hyperlipidemia 11/18/2014     Priority: Medium     Problem list name updated by automated process. Provider to review       Subglottic stenosis 06/27/2014     Priority: Medium     Complication of trach, placed 1/2012       Psychological factors associated with another disorder 08/21/2012     Priority: Medium     Problem list name updated by automated process. Provider to review       Depressive disorder, not elsewhere classified 07/30/2012     Priority: Medium     Idiopathic progressive polyneuropathy 07/01/2012     Priority: Medium     Class: Chronic     Small fiber neuropathy - see report of EDNF density 11/5/2012.       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  With varicosities.  Followed with lymphedema clinic and Dr. Davies       VERN (obstructive sleep apnea) 02/01/2012      Priority: Medium     Referral for dental device, has not done this yet       Morbid obesity (H) 02/01/2012     Priority: Medium     Followed by Dr. Chang since 2012.  On topiramate/phentermine          Past Medical/Surgical History:  Past Medical History:   Diagnosis Date     Allergic rhinitis      Benign positional vertigo 2011    never quite went away     Bilateral carpal tunnel syndrome 7/18/2015     Chest pain      Chronic sinusitis 2017    I don't know Please look     Chronic tonsillitis      Coronary artery disease 3/8/16    right side chest pain     Depressive disorder      Depressive disorder, not elsewhere classified 7/30/2012     Gastro-oesophageal reflux disease      Hearing problem      Hypertension 2008     Idiopathic progressive polyneuropathy 10/15/2012     Learning disability      Neck mass     parapharyngeal, benign     Obesity, Class III, BMI 40-49.9 (morbid obesity) (H)      Obstructive sleep apnea      Psychological factors associated with another disorder 8/21/2012     Recurrent otitis media 2016    might be what the pain in my ears     Reduced vision      Shortness of breath      Tinnitus 2016    I would hear a steady light buzzing sound and other can't     Trigeminal neuralgia      Weakness 8/26/2011     Past Surgical History:   Procedure Laterality Date     BIOPSY  2011    To figure out what kind of tumor I had     ENDOSCOPIC RETROGRADE CHOLANGIOPANCREATOGRAM N/A 8/27/2015    Procedure: COMBINED ENDOSCOPIC RETROGRADE CHOLANGIOPANCREATOGRAPHY, PLACE TUBE/STENT;  Surgeon: Baldomero Zacarias MD;  Location: [facility] OR     ENDOSCOPIC RETROGRADE CHOLANGIOPANCREATOGRAM N/A 11/6/2015    Procedure: COMBINED ENDOSCOPIC RETROGRADE CHOLANGIOPANCREATOGRAPHY, REMOVE FOREIGN BODY OR STENT/TUBE;  Surgeon: Baldomero Zacarias MD;  Location: [facility] OR     EXCISE LESION INTRAORAL  6/29/2011    Procedure:EXCISE LESION INTRAORAL; TRANSCERVICAL APPROACH TO LEFT PHARYNGEAL SPACE MASS REMOVAL ;  "Surgeon:BARBARA PHILLIPS; Location: OR      VASCULAR SURGERY PROCEDURE UNLIST  12/29/15     LAPAROSCOPIC CHOLECYSTECTOMY N/A 8/23/2015    Procedure: LAPAROSCOPIC CHOLECYSTECTOMY;  Surgeon: Kvng Witt MD;  Location: [facility] OR     LARYNGOSCOPY WITH BIOPSY(IES)  6/18/2014    Procedure: LARYNGOSCOPY WITH BIOPSY(IES);  Surgeon: Barbara Phillips MD;  Location:  OR     LASER CO2 LARYNGOSCOPY  12/7/2011    Procedure:LASER CO2 LARYNGOSCOPY; Micro-Direct Laryngoscopy with CO2 Laser Standby / Excision Tracheal Grandulization, Trach Tube Change / Kenalog application. / Balloon Dilation of Subglottic Stenosis.*Latex Safe Room* Trach Tube = Shiley 6.4mm I.D. / 10.8MM O.D. / 76MM  Cuffless.; Surgeon:BARBARA PHILLIPS; [facility]     ORTHOPEDIC SURGERY  5/2016    Broken Right hand & Fracture Right shoulder     TRACHEOSTOMY  4/22/2011    Procedure:TRACHEOSTOMY; possible awake; Surgeon:BARBARA PHILLIPS; Location:[facility] OR     TRACHEOSTOMY  6/29/2011    Procedure:TRACHEOSTOMY; REMOVE AND REPLACE SHILEY 6 XLT; Surgeon:BARBARA PHILLIPS; Location: OR     VASCULAR SURGERY  2008-[facility]asant           Physical Examination:  Vitals: /89  Pulse 113  Resp 16  Ht 1.702 m (5' 7.01\")  Wt 147.4 kg (325 lb)  SpO2 98%  BMI 50.89 kg/m2  BMI= Body mass index is 50.89 kg/(m^2).    Neck Cir (cm): 49 cm    [place] Total Score 8/14/2018   Total score - [place] 15       GENERAL APPEARANCE: alert, moderate distress and cooperative  EYES: Eyes grossly normal to inspection  HENT: oropharynx crowded, soft palate dependent, tongue base enlarged and nares patent  NECK: thyroid normal to palpation  RESP: lungs clear to auscultation - no rales, rhonchi or wheezes  CV: regular rates and rhythm, normal S1 S2, no S3 or S4 and no murmur, click or rub  Extremities are without clubbing, LE are wrapped with mild bilateral edema  Musculoskeletal:Moves without difficulty  Mallampati Class: " IV.  Tonsillar Stage: 1  hidden by pillars.  Dental: missing molars lower jaw both R & L, class III occlusion with some observed mild advancement of ja  Skin: with facial rash: rosacea on nose-pt reports inside nose as well

## 2018-08-14 NOTE — NURSING NOTE
"    Chief Complaint   Patient presents with     Sleep Problem     Follow up from visit on 07/28/16       Initial /89  Pulse 113  Resp 16  Ht 1.702 m (5' 7.01\")  Wt 147.4 kg (325 lb)  SpO2 98%  BMI 50.89 kg/m2 Estimated body mass index is 50.89 kg/(m^2) as calculated from the following:    Height as of this encounter: 1.702 m (5' 7.01\").    Weight as of this encounter: 147.4 kg (325 lb).    Medication Reconciliation: complete    Neck circumference: 19 inches / 48.5 centimeters.    DME:     [facility] ~[place]       "

## 2018-08-14 NOTE — PATIENT INSTRUCTIONS
Continue to sleep with head of bed elevated  Keep lower extremities elevated as well  Continue to work on weight loss as able-I know this is difficult  I'll review your case with our sleep dentist partners for possible dental appliance  I'll my chart you on the results, will consider face to face      Your BMI is Body mass index is 50.89 kg/(m^2).  Weight management is a personal decision.  If you are interested in exploring weight loss strategies, the following discussion covers the approaches that may be successful. Body mass index (BMI) is one way to tell whether you are at a healthy weight, overweight, or obese. It measures your weight in relation to your height.  A BMI of 18.5 to 24.9 is in the healthy range. A person with a BMI of 25 to 29.9 is considered overweight, and someone with a BMI of 30 or greater is considered obese. More than two-thirds of American adults are considered overweight or obese.  Being overweight or obese increases the risk for further weight gain. Excess weight may lead to heart disease and diabetes.  Creating and following plans for healthy eating and physical activity may help you improve your health.  Weight control is part of healthy lifestyle and includes exercise, emotional health, and healthy eating habits. Careful eating habits lifelong are the mainstay of weight control. Though there are significant health benefits from weight loss, long-term weight loss with diet alone may be very difficult to achieve- studies show long-term success with dietary management in less than 10% of people. Attaining a healthy weight may be especially difficult to achieve in those with severe obesity. In some cases, medications, devices and surgical management might be considered.  What can you do?  If you are overweight or obese and are interested in methods for weight loss, you should discuss this with your provider.     Consider reducing daily calorie intake by 500 calories.     Keep a food  journal.     Avoiding skipping meals, consider cutting portions instead.    Diet combined with exercise helps maintain muscle while optimizing fat loss. Strength training is particularly important for building and maintaining muscle mass. Exercise helps reduce stress, increase energy, and improves fitness. Increasing exercise without diet control, however, may not burn enough calories to loose weight.       Start walking three days a week 10-20 minutes at a time    Work towards walking thirty minutes five days a week     Eventually, increase the speed of your walking for 1-2 minutes at time    In addition, we recommend that you review healthy lifestyles and methods for weight loss available through the National [employer] of [employer] patient information sites:  http://win.niddk.nih.gov/publications/index.htm    And look into health and wellness programs that may be available through your health insurance provider, employer, local community center, or edith club.    Weight management plan: Patient was referred to their PCP to discuss a diet and exercise plan.

## 2018-08-14 NOTE — Clinical Note
Dr. Glasgow-is Psg necessary for determination of need for scar revision?  2014 psg done in [place], sleeping in similar elevated state with about same results as HST done last month in [place].  However, wt is up with a change in meds.  Outcome would likely be similar with mild moderate marie, mild hypoxemia.

## 2018-08-16 NOTE — PROGRESS NOTES
Visit Date:   08/14/2018      I have been asked by Dr. Jonelle Ruiz in July of this year as well as a previous referral by Dr. Glasgow in ENT to evaluate Mr. Ca for what Dr. Glasgow had proposed, a return to a trial of CPAP and to obtain outside medical records from [place], and as requested by Dr. Ruiz and patient that a sleep study be repeated for ENT to determine best course of action for possible surgery.      HISTORY OF PRESENT ILLNESS:  Mr. Ca is a 47-year-old male with a long complicated sleep history.  Original study was done in [place], [place] at [facility] and he was noted to have an AHI of 115.  Treated with CPAP up to 14 cm with a reduction in AHI down to 5.  At that point, he was noted to have kissing tonsils and was referred to ENT and recommended to start on CPAP.  He was then seen by Dr. 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  It was resected and because of his morbid obesity and longstanding obstructive sleep apnea, he required a preoperative tracheostomy.  This procedure was complicated by subglottic stenosis which required dilatation in the OR.   After that study, had a repeat polysomnogram on 04/24/2012 which revealed an AHI of 9.9 and an RDI of 18.5.  Part of the study was done with his trach plugged, sleeping laterally, resulting in an RDI of 30 and an AHI 16.  PCO2 jamal from 37 to 43.  At that time, he weighed 335 pounds.  He then slept with the trach unplugged supine and on a wedge and was noted to have an AHI of 4 and RDI of 9.4.  The titration study was done at [facility] on 05/17/2012 with a weight of 335 pounds and showed a sleep efficiency of 91.3% and the trach while supine on a wedge.  He was titrated to 11 cm with REM supine and residual AHI of 0.3, RDI of 1.6.  Six weeks after removal of his tracheostomy, weight of 303 pounds, he did  have a split-night study on 10/09/2012.  His AHI was 25.9, RDI was 46.2.  He slept supine and CPAP at a pressure of 15 cm was effective in eliminating his sleep apnea.  Last polysomnogram was done in 11/2014 with a split-night study while supine with head of bed at 60 degrees, revealing an RDI of 14 and an AHI of 11.2.  Time spent with O2 sat less than 89% was 2.5 minutes.  He was titrated to a pressure of 5 cm with a residual AHI of 0.6.  At that time, he had a hospital bed.  [place].  He was seen by Dr. Glasgow in 04/2015 and both he and Dr. Martines were hoping that of small mouth guard could be a solution to his sleep apnea, or did he need a tracheostomy or further surgery.  He returns to our sleep services today on 08/14/2018 with a previous sleep study done while he was in [place].  Study date was 08/10/2017.  This is an HST with hypopneas scored at 4%.  The patient fell asleep in 1 minute and slept about 8 hours, recording good quality of sleep.  His AHI is not calculated, but his respiratory events included 46 apneas, none of which were central, and 44 hypopneas.  His MANDY, which may be an RDI, was 13.  Oxygen desaturation index was 12.  Minimal O2 sat was 72%, baseline was 96% and mean was 96%.  He spent 7.47 minutes with O2 sats equal to or less than 88%.  This study shows evidence of mild obstructive sleep apnea with an MANDY of 13.  The patient was in the supine position throughout the entire study and I believe he did indicate to me that he was in a recliner for this night's test.  These results seem very similar to previous results that had been done as recent as 2014 in the [facility] lab.      SLEEP SCHEDULE:  Enters bed at midnight, up at 8:00 a.m.  He is usually asleep in 5-60 minutes, wakes up 1-5 times a night.  Wakeups can be about 10 minutes.  If he should fall back asleep, it becomes more difficult toward the end of his night's rest.  He wakes up due  to shortness of air and a very dry mouth and with some coughing at night.  Estimates his total sleep time anywhere between 4 and 8 hours.  He does take some naps.  He says he tries his best not to take any, but 3 is low, as far as how many days per week.  Likely, he is taking 3 or more naps a week for about an hour and usually at 2:00 p.m.  He does feel rested after his naps.  No screens or other diversions from sleep in bed.      SLEEP DISRUPTIONS:  Denies classic symptoms of RLS.  Reports pressure from swelling.   In [place] he did have a vascular surgery.  Currently, he is seeing some people now because he is having some bruising around his sides.  Does have some nightmares; however, they do not affect his daytime function.  He does clench or grind his teeth.  Bed partner reports snoring 7 days a week.  It is loud.  He does wake up with a snort or gasping arousal, has been told he does stop breathing in his sleep.  Has a dry throat in the morning, and trouble breathing through his nose.  Currently he is sleeping sitting up on a love seat.  On occasion, may use a wedge but the love seat seems to be his most comfortable.  He denies heartburn with sleeping in this fashion.  Two times in his life, he has had sleep paralysis.  Otherwise, no signs of orexin deficiency.      SOCIAL, PERSONAL, FAMILY HISTORY AND SLEEP SCALES:  Marital Status:  He is single, lives by himself with 2 roommates.  Previously worked in maintenance, construction.  Sleep environment:  Sleep apnea and issues with his throat do awaken him.      Family history:  Unaware of sleep disorders.        Substances include alcohol once a month.  He will have 5 beers when he does  his check on the 3rd.  No use of any other recreational drugs such as marijuana or cocaine.  Does drink caffeine but less than 3 servings in a day.  No use of tobacco.  Does exercise.      [place] Sleepiness Scale today is 15/21.  He does not drive.  Denies any  problems with sleepiness affecting his work since he is not employed, but 30/30 days is troubled by tiredness and fatigue.  Mental health indicates 30/30 days, mental health is not good, however, he humor has helped him handle some of his life challenges.      PAST SURGICAL HISTORY:  Vascular surgery on both legs in [place].      REVIEW OF SYSTEMS:  A 14-point comprehensive review of systems completed and negative with the exception of the following:   CONSTITUTIONAL:  Weight is up by 40 pounds.  He was taking Topamax and another weight loss drug; however, his dosage was increased and he is having a hard time getting that increased.  He was doing quite well.  His weight is down from his last polysomnogram.  He can wake up with night sweats.  He does not tolerate heat well.   EAR, NOSE AND THROAT:  Ear pain, sore throat, sinus pain.  Pain inside and outside of his nose.  He has a very bad case of rosacea which would also make masking quite difficult.   CARDIOVASCULAR:  Irregular beats at night.  He denies shortness of breath when lying down.  He does not lie down.  Swelling in feet is present and hypertension.   NERVOUS SYSTEM:  Weakness in arms and legs.   SKIN:  Rosacea on his face.   PULMONARY:  Shortness of breath at rest when lying down with an apneic event.  Two flights of stairs, his legs are too weak to do that.  Coughing up mucus, infrequently.  Occasional some wheezing.   MUSCLES AND BONES:  Muscle pain, joint pain and both upper thighs have some pain.   ENDOCRINE:  Increased thirst and depression.  ROMAN severity index is 22.     Allergies:    Allergies   Allergen Reactions     Fentanyl Itching     Patch     Meloxicam Other (See Comments)     Side pains.     Minocycline Rash     Patient reports adverse reaction was pigmentation which has resolved with drug discontinuation.       Medications:    Current Outpatient Prescriptions   Medication Sig Dispense Refill     amitriptyline (ELAVIL) 50 MG tablet Take 2  tablets (100 mg) by mouth At Bedtime 180 tablet 3     CETAPHIL (CETAPHIL) lotion Apply topically 2 times daily       COMPRESSION STOCKINGS 20-30 mmHg knee high compression stockings.  Measure and fit.  Style and brand per patient preference. 2 each 0     COMPRESSION STOCKINGS 1 each daily Measure and fit thigh length Circaid compression device.  Bilateral. 2 each 4     diclofenac (VOLTAREN) 1 % GEL topical gel Apply 4 grams to knees four times daily using enclosed dosing card. 100 g 1     diclofenac (VOLTAREN) 1 % GEL topical gel APPLY 4 GRAMS TO KNEES FOUR TIMES DAILY USING ENCLOSED DOSING CARD 100 g 1     furosemide (LASIX) 20 MG tablet Take 3 tablets (60 mg) by mouth 2 times daily 540 tablet 3     ketoconazole (NIZORAL) 2 % shampoo Apply to the affected area and wash off after 5 minutes. 120 mL 1     metroNIDAZOLE (METROGEL) 1 % gel        Multiple Vitamin (MULTI VITAMIN  MENS) TABS Take 1 tablet by mouth daily 90 tablet 1     neomycin-polymyxin-hydrocortisone (CORTISPORIN) otic solution Place 3 drops Into the left ear 4 times daily 10 mL 1     order for DME Equipment being ordered: compression stockings - 2 pairs 2 Units 0     pimecrolimus (ELIDEL) 1 % cream Apply topically 2 times daily 60 g 3     potassium chloride (KLOR-CON) 20 MEQ Packet Take 60 mEq by mouth daily 270 packet 3     topiramate (TOPAMAX) 50 MG tablet TAKE 3 TABS (150 MG) IN THE AM AND 3 TABS (150 MG) IN THE PM AFTER AS TOLERATED 180 tablet 2     traZODone (DESYREL) 100 MG tablet Take 1 tablet (100 mg) by mouth At Bedtime 90 tablet 3     valACYclovir (VALTREX) 500 MG tablet        Vitamin D, Cholecalciferol, 400 UNITS CAPS Take 400 Units by mouth daily 90 capsule 3     zolpidem (AMBIEN) 10 MG tablet Take 1 tablet (10 mg) by mouth nightly as needed for sleep 30 tablet 1     phentermine (ADIPEX-P) 37.5 MG tablet Take 1 tablet (37.5 mg) by mouth every morning (Patient not taking: Reported on 8/7/2018) 30 tablet 3     [DISCONTINUED] COMPRESSION  STOCKINGS 20-30 mmHg knee high compression stockings 2 each 1       Problem List:  Patient Active Problem List    Diagnosis Date Noted     Shoulder pain, left 07/22/2016     Priority: Medium     Neck pain 07/22/2016     Priority: Medium     Neck pain, chronic 07/22/2016     Priority: Medium     Pain in both wrists 06/28/2016     Priority: Medium     EMG 6/4/2015 diagnosed bl carpal tunnel.  Saw Dr. Bethea who rec'd trial of splinting and possible carpal tunnel release       Varicose veins of left lower extremity 06/22/2016     Priority: Medium     12/29/15 - Left GSV ablation with varicose vein microphlebectomy       Closed nondisplaced fracture of scaphoid of right wrist, unspecified portion of scaphoid, sequela 06/22/2016     Priority: Medium     Hyperlipidemia 11/18/2014     Priority: Medium     Problem list name updated by automated process. Provider to review       Subglottic stenosis 06/27/2014     Priority: Medium     Complication of trach, placed 1/2012       Psychological factors associated with another disorder 08/21/2012     Priority: Medium     Problem list name updated by automated process. Provider to review       Depressive disorder, not elsewhere classified 07/30/2012     Priority: Medium     Idiopathic progressive polyneuropathy 07/01/2012     Priority: Medium     Class: Chronic     Small fiber neuropathy - see report of EDNF density 11/5/2012.       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  With varicosities.  Followed with lymphedema clinic and Dr. Davies       VERN (obstructive sleep apnea) 02/01/2012     Priority: Medium     Referral for dental device, has not done this yet       Morbid obesity (H) 02/01/2012     Priority: Medium     Followed by Dr. Chang since 2012.  On topiramate/phentermine          Past Medical/Surgical History:  Past Medical History:   Diagnosis Date     Allergic rhinitis      Benign positional vertigo 2011    never quite went away     Bilateral  carpal tunnel syndrome 7/18/2015     Chest pain      Chronic sinusitis 2017    I don't know Please look     Chronic tonsillitis      Coronary artery disease 3/8/16    right side chest pain     Depressive disorder      Depressive disorder, not elsewhere classified 7/30/2012     Gastro-oesophageal reflux disease      Hearing problem      Hypertension 2008     Idiopathic progressive polyneuropathy 10/15/2012     Learning disability      Neck mass     parapharyngeal, benign     Obesity, Class III, BMI 40-49.9 (morbid obesity) (H)      Obstructive sleep apnea      Psychological factors associated with another disorder 8/21/2012     Recurrent otitis media 2016    might be what the pain in my ears     Reduced vision      Shortness of breath      Tinnitus 2016    I would hear a steady light buzzing sound and other can't     Trigeminal neuralgia      Weakness 8/26/2011     Past Surgical History:   Procedure Laterality Date     BIOPSY  2011    To figure out what kind of tumor I had     ENDOSCOPIC RETROGRADE CHOLANGIOPANCREATOGRAM N/A 8/27/2015    Procedure: COMBINED ENDOSCOPIC RETROGRADE CHOLANGIOPANCREATOGRAPHY, PLACE TUBE/STENT;  Surgeon: Baldomero Zacarias MD;  Location: [facility] OR     ENDOSCOPIC RETROGRADE CHOLANGIOPANCREATOGRAM N/A 11/6/2015    Procedure: COMBINED ENDOSCOPIC RETROGRADE CHOLANGIOPANCREATOGRAPHY, REMOVE FOREIGN BODY OR STENT/TUBE;  Surgeon: Baldomero Zacarias MD;  Location:  OR     EXCISE LESION INTRAORAL  6/29/2011    Procedure:EXCISE LESION INTRAORAL; TRANSCERVICAL APPROACH TO LEFT PHARYNGEAL SPACE MASS REMOVAL ; Surgeon:BARBARA PHILLIPS; Location: OR      VASCULAR SURGERY PROCEDURE UNLIST  12/29/15     LAPAROSCOPIC CHOLECYSTECTOMY N/A 8/23/2015    Procedure: LAPAROSCOPIC CHOLECYSTECTOMY;  Surgeon: Kvng Witt MD;  Location:  OR     LARYNGOSCOPY WITH BIOPSY(IES)  6/18/2014    Procedure: LARYNGOSCOPY WITH BIOPSY(IES);  Surgeon: Barbara Phillips MD;  Location:   "OR     LASER CO2 LARYNGOSCOPY  12/7/2011    Procedure:LASER CO2 LARYNGOSCOPY; Micro-Direct Laryngoscopy with CO2 Laser Standby / Excision Tracheal Grandulization, Trach Tube Change / Kenalog application. / Balloon Dilation of Subglottic Stenosis.*Latex Safe Room* Trach Tube = Shiley 6.4mm I.D. / 10.8MM O.D. / 76MM  Cuffless.; Surgeon:EMELIA PHILLIPS; Location:[facility] OR     ORTHOPEDIC SURGERY  5/2016    Broken Right hand & Fracture Right shoulder     TRACHEOSTOMY  4/22/2011    Procedure:TRACHEOSTOMY; possible awake; Surgeon:EMELIA PHILLIPS; Location:[facility] OR     TRACHEOSTOMY  6/29/2011    Procedure:TRACHEOSTOMY; REMOVE AND REPLACE SHILEY 6 XLT; Surgeon:EMELIA PHILLIPS; Location:[facility] OR     VASCULAR SURGERY  2008-[facility]           Physical Examination:  Vitals: /89  Pulse 113  Resp 16  Ht 1.702 m (5' 7.01\")  Wt 147.4 kg (325 lb)  SpO2 98%  BMI 50.89 kg/m2  BMI= Body mass index is 50.89 kg/(m^2).    Neck Cir (cm): 49 cm    [place] Total Score 8/14/2018   Total score - [place] 15       GENERAL APPEARANCE: alert, moderate distress and cooperative  EYES: Eyes grossly normal to inspection  HENT: oropharynx crowded, soft palate dependent, tongue base enlarged and nares patent  NECK: thyroid normal to palpation  RESP: lungs clear to auscultation - no rales, rhonchi or wheezes  CV: regular rates and rhythm, normal S1 S2, no S3 or S4 and no murmur, click or rub  Extremities are without clubbing, LE are wrapped with mild bilateral edema  Musculoskeletal:Moves without difficulty  Mallampati Class: IV.  Tonsillar Stage: 1  hidden by pillars.  Dental: missing molars lower jaw both R & L, class III occlusion with some observed mild advancement of ja  Skin: with facial rash: rosacea on nose-pt reports inside nose as well     ASSESSMENT AND PLAN:  It is my impression that Mr. Ca does present with about the same presentation that he has in the last couple of years with a curiosity as to whether " he would be a candidate for a repeat polysomnogram, whether a dental appliance would be appropriate and interesting question whether he indicates whether a sleep study should be done for ENT to determine the best course of action for surgery.  I have not seen this request from Dr. Glasgow' notes, but a request to retrial perhaps CPAP, of which patient has been intolerant of, or as he indicated in 2014, whether a dental appliance may be appropriate.  With regard to requirements from Medicare, I will certainly think that for a dental appliance, a home study would likely be appropriate, especially since it is directly in line with the severity that was obtained on his last polysomnogram in 11/2014, while head of bed up around 60 degrees, which is where he is sleeping currently, he had an AHI of 11.12 and an RDI of 14.  His current HST done in a recliner in [place] is about the same.  The largest question at hand is if intolerance of CPAP is the major issue, is he a candidate for a dental appliance.  He does have a class III lower jaw and with both left and right back molars missing.  It may pay to have him see a sleep dentist and see if he is a candidate and the following plan of care has been developed:    1.  Obstructive sleep apnea.  Either in a recliner or with head of bed elevated, his sleep apnea is mild to low moderate.  With a similar weight, he did not have hypoventilation and O2 sats at his latest sleep study did show some hypoxemia but only 2.47 minutes above the minimum requirement of 5 minutes.  The patient is willing to remain sleeping on his love seat at home and I will do some investigation as to what is an appropriate study, since he is Medicare, for qualification for a dental appliance and whether he is a candidate.  I will communicate with Dr. Glasgow as to whether he has a need for an in-lab study, if that is necessary for consideration of surgery.  Otherwise, there are numerous studies here with  similar habitus that certainly reflect how his breathing at this point in time.  Following his possible tracheal scar surgery, it would be interesting to do a study where he is more flat while sleeping to see if his sleep continuity would be better and sleeping more restorative in that position.    2.  Obesity.  Was losing weight but for some reason pt reports medications have been adjusted and are not as effective.  He would certainly benefit from significant weight loss.   3.  Insomnia.  He is on trazodone and Ambien.  Denies any parasomnias or abnormal behaviors with this medication.      Thank you for allowing me to participate in this kind man's care.  It was good to see him again and we will see how we can move forward here with hoping to meet the requirements that are needed for his ENT surgery, if that is still preceding and obtain something to minimize the amount of apnea Mr. Ferrari is currently experiencing, which is mild with an elevated sleep position.      Time spent with patient 60 minutes, of which greater than 50% was spent in counseling, education and coordination of care.         ELAINE ELIZADLE, CNP             D: 08/15/2018   T: 2018   MT: RICK      Name:     GAMA FERRARI   MRN:      [id number]        Account:      [id number]   :      1970           Visit Date:   2018      Document: [id number]

## 2018-08-21 ENCOUNTER — HOSPITAL ENCOUNTER (OUTPATIENT)
Dept: PHYSICAL THERAPY | Facility: CLINIC | Age: 48
Setting detail: THERAPIES SERIES
End: 2018-08-21
Attending: INTERNAL MEDICINE
Payer: MEDICARE

## 2018-08-21 PROCEDURE — G8978 MOBILITY CURRENT STATUS: HCPCS | Mod: GP,CK | Performed by: PHYSICAL THERAPIST

## 2018-08-21 PROCEDURE — 97110 THERAPEUTIC EXERCISES: CPT | Mod: GP | Performed by: PHYSICAL THERAPIST

## 2018-08-21 PROCEDURE — 40000719 ZZHC STATISTIC PT DEPARTMENT NEURO VISIT: Performed by: PHYSICAL THERAPIST

## 2018-08-21 PROCEDURE — 97161 PT EVAL LOW COMPLEX 20 MIN: CPT | Mod: GP | Performed by: PHYSICAL THERAPIST

## 2018-08-21 PROCEDURE — G8979 MOBILITY GOAL STATUS: HCPCS | Mod: GP,CJ | Performed by: PHYSICAL THERAPIST

## 2018-08-21 NOTE — PROGRESS NOTES
" 08/21/18 1400   Quick Adds   Quick Adds Certification   Type of Visit Initial OP PT Evaluation   General Information   Start of Care Date 08/21/18   Referring Physician Muriel Garg   Orders Evaluate and Treat as Indicated   Order Date 08/07/18   Medical Diagnosis Peripheral Neuropathy, Generalized muscle weakness   Onset of illness/injury or Date of Surgery 08/07/18   Precautions/Limitations fall precautions   Surgical/Medical history reviewed Yes   Pertinent history of current problem (include personal factors and/or comorbidities that impact the POC) I'm here because of the neuropathy and the weakness in my hands and legs and feet. Reports having low energy and getting more tired than usual, taking long time to complete daily activities. Pain is getting worse, increased \"lightning bolt shots\" of pain in feet especially on stairs. L ankle twisting/rolls \"feels like it's going to snap\", increasing frequency (3x in past week). Also c/o swelling/edema in ankles. Began worsening 1 year ago when medications were changed (topiramate and fenamine). Gets SOB and throbbing headache with bending forward (picking up things, putting on shoes). Back pain when standing or walking. It used to only bother me when I was walking too much.    Pertinent Visual History     Prior level of function comment Used to walk 1/2 mile to the bus stop (1 year ago)   Current Community Support (Housemate can help if needed)   Patient role/Employment history Disabled   Living environment [facility]  (Lives with best friend and her son)   Home/Community Accessibility Comments 2 steps to enter from outside with 1 bar, full flight (20) to 2nd level. Bedroom and bathroom upstairs, kitchen downstairs.  Pt does not drive, takes the bus.   Assistive Devices Comments Used to own a standard cane and a scooter; does not have either anymore   Patient/Family Goals Statement To know what I should be doing, strengthening. \"They say it's the balance but I " "think it's the weakness.\"    General Information Comments Pt reports are circuitous and requires redirection for clear answers to questions.    Fall Risk Screen   Fall screen completed by PT   Have you fallen 2 or more times in the past year? No   Have you fallen and had an injury in the past year? No   Timed Up and Go score (seconds) 14.62   Is patient a fall risk? Yes;Department fall risk interventions implemented   Fall screen comments Fell during walk to bus stop when legs gave out   Pain   Patient currently in pain Yes   Pain location Bilat lower legs   Pain rating 6/10   Pain description comment Tingling    Pain comments Also reports \"light pain\" in both hands. Back pain \"like a screwdriver\" that comes on with standing for 10 minutes, needs to take a lot of rest breaks, right side lumbar region.  Knee pain with stairs.   Vitals Signs   Heart Rate 89   Blood Pressure 127/89   Vital Signs Comments Seated, LUE   Cognitive Status Examination   Orientation orientation to person, place and time   Level of Consciousness alert   Follows Commands and Answers Questions 100% of the time;able to follow multistep instructions   Personal Safety and Judgment intact   Memory intact   Cognitive Comment Tangential, requires redirection to focus on topic at hand   Integumentary   Integumentary Comments Bilat LE edema   Strength   Strength Comments Sensitivity to MMT due to hand placement on painful distal LEs. Grossly 4/5 hip flex, kne ext, ankle DF.    Gait   Gait Comments Becomes SOB when walking even short distances. Walks without an assistive device at this time. Slow velocity, slightly narrowed JULIET but fair to good balance, turns smoothly, cautious.  Stairs are negotiated with a step-to pattern, ascending with LLE leading, descending with RLE leading, indicating likely functional strength deficit greater in RLE.    Gait Special Tests   Gait Special Tests 25 FOOT TIMED WALK   Gait Special Tests 25 Foot Timed Walk   Seconds " 11.09   Steps 16 Steps   Comments No AD   Balance   Balance Comments 5xSTS: 20.54 seconds    Sensory Examination   Sensory Perception Comments Pt reports tingling in both hands and distal LE's due to neuropathy.   Modality Interventions   Planned Modality Interventions TENS;Cryotherapy;Thermotherapy: Hydrocollator Packs   Planned Therapy Interventions   Planned Therapy Interventions balance training;bed mobility training;gait training;joint mobilization;motor coordination training;neuromuscular re-education;ROM;strengthening;stretching;transfer training   Clinical Impression   Criteria for Skilled Therapeutic Interventions Met yes, treatment indicated   PT Diagnosis Impaired gait and mobility due to sensory, force production, and balance deficits   Influenced by the following impairments Peripheral neuropathy, generalized deconditioning, functional weakness, poor endurance, decreased balance.    Functional limitations due to impairments Walking more than 2 blocks, stairs, fall risk.   Clinical Presentation Stable/Uncomplicated   Clinical Decision Making (Complexity) Low complexity   Therapy Frequency 1 time/week   Predicted Duration of Therapy Intervention (days/wks) 90 days   Risk & Benefits of therapy have been explained Yes   Patient, Family & other staff in agreement with plan of care Yes   Clinical Impression Comments Pt is a 48 y/o male with h/o obesity, idiopathic peripheral polynueropathy, learning disability, CAD, HTN, and vision/hearing changes who presents with reports of increased pain and greater difficulty with mobility in the past year. He attributes his decline to changes in his medications, and subsequent regaining of weight that was previously lost. He is generally deconditioned and also presents with sensory, strength, and balance deficits that impact his overall mobility. He would benefit from skilled PT to address these areas, update his HEP, and maximize his functional mobility and capacity.     GOALS   PT Eval Goals 1;2;3;4;5   Goal 1   Goal Identifier HEP   Goal Description Pt will independently demonstrate his home exercise program to maximize his ability to self-manage his condition and optimize his health.   Target Date 11/19/18   Goal 2   Goal Identifier TUG   Goal Description Pt will decrease his TUG time to no more than 13.5 seconds in order to minimize his falls risk and maximize his household mobility.    Target Date 11/19/18   Goal 3   Goal Identifier Gait speed   Goal Description Pt will complete the 25-foot walk test in no more than 9 seconds to improve his ability to walk to the bus station from his home.    Target Date 11/19/18   Goal 4   Goal Identifier Stairs   Goal Description Pt will negotiate 20 stairs with one railing, reciprocal pattern, and with knee pain no more than 1/10 to maximize his safety on his stairs at home.    Target Date 11/19/18   Goal 5   Goal Identifier 5xSTS   Goal Description Pt will demonstrate increased functional leg strength as measured by completing 5xSTS in 12 seconds or less.    Target Date 11/19/18   Total Evaluation Time   Total Evaluation Time (Minutes) 53   Therapy Certification   Certification date from 08/21/18   Certification date to 11/19/18   Medical Diagnosis Peripheral polyneuropathy, generalized muscle weakness   Certification I certify the need for these services furnished under this plan of treatment and while under my care.  (Physician co-signature of this document indicates review and certification of the therapy plan).

## 2018-08-21 NOTE — PROGRESS NOTES
[facility]        OUTPATIENT PHYSICAL THERAPY FUNCTIONAL EVALUATION  PLAN OF TREATMENT FOR OUTPATIENT REHABILITATION  (COMPLETE FOR INITIAL CLAIMS ONLY)  Patient's Last Name, First Name, M.I.  YOB: 1970  Chavez Ca     Provider's Name   [facility]   Medical Record No.  [id number]     Start of Care Date:  08/21/18   Onset Date:  08/07/18   Type:     _X__PT   ____OT  ____SLP Medical Diagnosis:  Peripheral polyneuropathy, generalized muscle weakness     PT Diagnosis:  Impaired gait and mobility due to sensory, force production, and balance deficits Visits from SOC:  1                              __________________________________________________________________________________  Plan of Treatment/Functional Goals:  balance training, bed mobility training, gait training, joint mobilization, motor coordination training, neuromuscular re-education, ROM, strengthening, stretching, transfer training     TENS, Cryotherapy, Thermotherapy: Hydrocollator Packs     GOALS  HEP  Pt will independently demonstrate his home exercise program to maximize his ability to self-manage his condition and optimize his health.  11/19/18    TUG  Pt will decrease his TUG time to no more than 13.5 seconds in order to minimize his falls risk and maximize his household mobility.   11/19/18    Gait speed  Pt will complete the 25-foot walk test in no more than 9 seconds to improve his ability to walk to the bus station from his home.   11/19/18    Stairs  Pt will negotiate 20 stairs with one railing, reciprocal pattern, and with knee pain no more than 1/10 to maximize his safety on his stairs at home.   11/19/18    5xSTS  Pt will demonstrate increased functional leg strength as measured by completing 5xSTS in 12 seconds or less.   11/19/18       Therapy Frequency:  1 time/week   Predicted  Duration of Therapy Intervention:  90 days    Monisha Montanez, PT                                    I CERTIFY THE NEED FOR THESE SERVICES FURNISHED UNDER        THIS PLAN OF TREATMENT AND WHILE UNDER MY CARE     (Physician co-signature of this document indicates review and certification of the therapy plan).                Certification Date From:  08/21/18   Certification Date To:  11/19/18    Referring Provider:  Muriel Garg    Initial Assessment  See Epic Evaluation- Start of Care Date: 08/21/18

## 2018-08-29 ENCOUNTER — MYC MEDICAL ADVICE (OUTPATIENT)
Dept: SLEEP MEDICINE | Facility: CLINIC | Age: 48
End: 2018-08-29

## 2018-09-07 ENCOUNTER — DOCUMENTATION ONLY (OUTPATIENT)
Dept: SLEEP MEDICINE | Facility: CLINIC | Age: 48
End: 2018-09-07

## 2018-09-13 ENCOUNTER — OFFICE VISIT (OUTPATIENT)
Dept: ENDOCRINOLOGY | Facility: CLINIC | Age: 48
End: 2018-09-13
Payer: MEDICARE

## 2018-09-13 ENCOUNTER — MYC MEDICAL ADVICE (OUTPATIENT)
Dept: FAMILY MEDICINE | Facility: CLINIC | Age: 48
End: 2018-09-13

## 2018-09-13 VITALS
BODY MASS INDEX: 49.44 KG/M2 | DIASTOLIC BLOOD PRESSURE: 88 MMHG | WEIGHT: 315 LBS | HEIGHT: 67 IN | SYSTOLIC BLOOD PRESSURE: 128 MMHG | HEART RATE: 106 BPM | OXYGEN SATURATION: 97 %

## 2018-09-13 DIAGNOSIS — E66.01 MORBID OBESITY (H): ICD-10-CM

## 2018-09-13 RX ORDER — PHENTERMINE HYDROCHLORIDE 37.5 MG/1
37.5 TABLET ORAL EVERY MORNING
Qty: 30 TABLET | Refills: 3 | Status: SHIPPED | OUTPATIENT
Start: 2018-09-13 | End: 2019-01-29

## 2018-09-13 RX ORDER — PHENTERMINE HYDROCHLORIDE 37.5 MG/1
37.5 TABLET ORAL EVERY MORNING
Qty: 30 TABLET | Refills: 5 | Status: SHIPPED | OUTPATIENT
Start: 2018-09-13 | End: 2018-09-27

## 2018-09-13 RX ORDER — TOPIRAMATE 50 MG/1
200 TABLET, FILM COATED ORAL 2 TIMES DAILY
Qty: 720 TABLET | Refills: 1 | Status: SHIPPED | OUTPATIENT
Start: 2018-09-13 | End: 2018-12-12

## 2018-09-13 NOTE — LETTER
"2018       RE: Chavez Ca  [address]  [place] [address]     Dear Colleague,    Thank you for referring your patient, Chavez Ca, to the [facility] MEDICAL WEIGHT MANAGEMENT at [facility]. Please see a copy of my visit note below.    Return Medical Weight Management Note     Chavez Ca  MRN:  [id number]  :  1970  RUI:  2018    Dear Angel Ruiz,    I had the pleasure of seeing your patient Chavez Ca.  He is a 47 year old male who I am continuing to see for treatment of obesity related to:  Patient Active Problem List   Diagnosis     VERN (obstructive sleep apnea)     Morbid obesity (H)     Idiopathic edema     Depressive disorder, not elsewhere classified     Psychological factors associated with another disorder     Subglottic stenosis     Hyperlipidemia     Idiopathic progressive polyneuropathy     Varicose veins of left lower extremity     Closed nondisplaced fracture of scaphoid of right wrist, unspecified portion of scaphoid, sequela     Pain in both wrists     Shoulder pain, left     Neck pain     Neck pain, chronic       INTERVAL HISTORY:  Saw Dr Kai patel in  and plan was:    Resume phentermine (adipex) 37.5 mg daily     Return to see Denice in 2 months and me in 4-5 months     return in about 1 wk (same day as another appointment if possible)--nurse visit for vitals     Topiramate--  Resume that as follows:  First week 50 mg daily  Then 50 mg twice daily--week 2,  Then 50 mg AM and 100 mg PM--week 3  Then 100 mg AM and 100 mg PM--week 4,  Then 150 mg AM and 150 mg PM after as tolerated     Please return in 2 months with me or Dr. Anthony or with Denice Teixeira\"      Gained 40 lbs. Since last visit.  Has been on higher dose topiramate in the past with Dr Anthony and he would like to increase again.  He also would like to resume phentermine 37.5mg      CURRENT WEIGHT:   325 lbs 12.8 " "oz    Wt Readings from Last 4 Encounters:   09/13/18 325 lb 12.8 oz   08/14/18 325 lb   08/07/18 313 lb 6.4 oz   02/01/18 (!) 305 lb 12.8 oz       Height:  5' 7.01\"  Body Mass Index:  Body mass index is 51.01 kg/(m^2).  Vitals:  BP (!) 133/97  Pulse 106  Ht 5' 7.01\"  Wt 325 lb 12.8 oz  SpO2 97%  BMI 51.01 kg/m2      Initial consult weight was 361 on 1/10/12.  Weight change since last seen on 6/29/2018 is up 40 pounds.   Total loss is 36 pounds.    Diet and Activity Changes Since Last Visit Reviewed With Patient 9/13/2018   I have made the following changes to my diet since my last visit: eat less   With regards to my diet, I am still struggling with: gaining weight   For breakfast, I typically eat: -   For lunch, I typically eat: -   For supper, I typically eat: -   For snack(s), I typically eat: -   I have made the following changes to my activity/exercise since my last visit: added physical therapy   With regards to my activity/exercise, I am still struggling with: gaining weight        MEDICATIONS:   Current Outpatient Prescriptions   Medication     amitriptyline (ELAVIL) 50 MG tablet     CETAPHIL (CETAPHIL) lotion     COMPRESSION STOCKINGS     COMPRESSION STOCKINGS     diclofenac (VOLTAREN) 1 % GEL topical gel     diclofenac (VOLTAREN) 1 % GEL topical gel     furosemide (LASIX) 20 MG tablet     ketoconazole (NIZORAL) 2 % shampoo     metroNIDAZOLE (METROGEL) 1 % gel     Multiple Vitamin (MULTI VITAMIN  MENS) TABS     neomycin-polymyxin-hydrocortisone (CORTISPORIN) otic solution     order for DME     pimecrolimus (ELIDEL) 1 % cream     potassium chloride (KLOR-CON) 20 MEQ Packet     topiramate (TOPAMAX) 50 MG tablet     traZODone (DESYREL) 100 MG tablet     valACYclovir (VALTREX) 500 MG tablet     Vitamin D, Cholecalciferol, 400 UNITS CAPS     zolpidem (AMBIEN) 10 MG tablet     phentermine (ADIPEX-P) 37.5 MG tablet     [DISCONTINUED] COMPRESSION STOCKINGS     No current facility-administered medications for " this visit.        Weight Loss Medication History Reviewed With Patient 9/13/2018   Which weight loss medications are you currently taking on a regular basis?  Topamax (topiramate)   If you are not taking a weight loss medication that was prescribed to you, please indicate why: -   Are you having any side effects from the weight loss medication that we have prescribed you? No       ASSESSMENT:   47 y.o. Male here for [facility] follow up.  He has not been seen since 2017.  He has followed with Dr Chang for many years and was up to topiramate 200mg BID.  Currently he is on topiramate 150mg BID and would like to resume higher dose.    He also has been off phentermine due to running out of prescription.  He would like to resume.    PLAN:   Refill and increase topiramate to 200 mg twice daily  Refill phentermine 37.5 mg    Follow up in 1 month to see Berenice Byrne [facility] pharmacist.  [facility] schedule will call you to set up appt. Bring all medications with to appointment.    See Dr Chang in 4-6 months for return medical weight management    FOLLOW-UP:    4 weeks.    Time: 15 min spent on evaluation, management, counseling, education, & motivational interviewing with greater than 50 % of the total time was spent on counseling and coordinating care    Sincerely,    Denice Teixeira PA-C

## 2018-09-13 NOTE — MR AVS SNAPSHOT
After Visit Summary   9/13/2018    Chavez Ca    MRN: [id number]           Patient Information     Date Of Birth          1970        Visit Information        Provider Department      9/13/2018 11:30 AM Denice Teixeira PA-C [facility] Medical Weight Management        Today's Diagnoses     Morbid obesity (H)          Care Instructions      Refill and increase topiramate to 200 mg twice daily  Refill phentermine 37.5 mg    Follow up in 1 month to see Berenice Byrne [facility] pharmacist.  [facility] schedule will call you to set up appt. Bring all medications with to appointment.    See Dr Chang in 4-6 months for return medical weight management          Follow-ups after your visit        Your next 10 appointments already scheduled     Sep 18, 2018  7:30 AM CDT   (Arrive by 7:15 AM)   New Patient Visit with Marcela Abreu MD   [facility] Neurology ([facility])    [address] [place] [address]  3rd [facility] [id number] [phone]            Sep 18, 2018  9:10 AM CDT   (Arrive by 8:55 AM)   Return Visit with Andrez Anthony MD   [facility] Dermatology ([facility])    [address] [place] [address]  3rd [facility] [id number] [phone]            Sep 27, 2018  9:20 AM CDT   (Arrive by 9:05 AM)   Return Visit with Alen Colindres MD   [facility] ([facility])    [address] [place] [id number] [phone]              Who to contact     Please call your clinic at [phone] to:    Ask questions about your health    Make or cancel appointments    Discuss your medicines    Learn about your test results    Speak to your doctor            Additional Information About Your Visit        MyChart Information     [employer] gives you secure access to your electronic health record. If you see a primary care provider, you can also send messages to your care team and  "make appointments. If you have questions, please call your primary care clinic.  If you do not have a primary care provider, please call [phone] and they will assist you.      [employer] is an electronic gateway that provides easy, online access to your medical records. With [employer] you can request a clinic appointment, read your test results, renew a prescription or communicate with your care team.     To access your existing account, please contact your [facility] or call [phone] for assistance.        Care EveryWhere ID     This is your Care EveryWhere ID. This could be used by other organizations to access your [facility] medical records  [id number]        Your Vitals Were     Pulse Height Pulse Oximetry BMI (Body Mass Index)          106 5' 7.01\" 97% 51.01 kg/m2         Blood Pressure from Last 3 Encounters:   09/13/18 (!) 133/97   08/14/18 129/89   08/07/18 (!) 131/95    Weight from Last 3 Encounters:   09/13/18 325 lb 12.8 oz   08/14/18 325 lb   08/07/18 313 lb 6.4 oz              Today, you had the following     No orders found for display         Today's Medication Changes          These changes are accurate as of 9/13/18 12:01 PM.  If you have any questions, ask your nurse or doctor.               These medicines have changed or have updated prescriptions.        Dose/Directions    topiramate 50 MG tablet   Commonly known as:  TOPAMAX   This may have changed:    - how much to take  - how to take this  - when to take this   Used for:  Morbid obesity (H)   Changed by:  Denice Teixeira PA-C        Dose:  200 mg   Take 4 tablets (200 mg) by mouth 2 times daily TAKE 3 TABS (150 MG) IN THE AM AND 3 TABS (150 MG) IN THE PM AFTER AS TOLERATED   Quantity:  720 tablet   Refills:  1            Where to get your medicines      These [id number] - [place], [place] - [address] AT [facility]  [address] [place] [facility] [address]    Hours:  24-hours Phone:  [phone]     topiramate 50 MG tablet         Some of these will need a paper prescription and others can be bought over the counter.  Ask your nurse if you have questions.     Bring a paper prescription for each of these medications     phentermine 37.5 MG tablet                Primary Care Provider Office Phone # Fax #    Angel Jonelle Ruiz [phone]       [address] [address]        Equal Access to Services     TALHA TEIXEIRA AH: Hadii aad ku hadasho Soomaali, waaxda luqadaha, qaybta kaalmada adeegyada, waxay idiin hayaan adeeg khjossy duarte . So [facility] [phone].    ATENCIÓN: Si habla español, tiene a ackerman disposición servicios gratuitos de asistencia lingüística. [facility] [phone].    We comply with applicable federal civil rights laws and [place] laws. We do not discriminate on the basis of race, color, national origin, age, disability, sex, sexual orientation, or gender identity.            Thank you!     Thank you for choosing [facility] MEDICAL WEIGHT MANAGEMENT  for your care. Our goal is always to provide you with excellent care. Hearing back from our patients is one way we can continue to improve our services. Please take a few minutes to complete the written survey that you may receive in the mail after your visit with us. Thank you!             Your Updated Medication List - Protect others around you: Learn how to safely use, store and throw away your medicines at www.disposemymeds.org.          This list is accurate as of 9/13/18 12:01 PM.  Always use your most recent med list.                   Brand Name Dispense Instructions for use Diagnosis    amitriptyline 50 MG tablet    ELAVIL    180 tablet    Take 2 tablets (100 mg) by mouth At Bedtime    Idiopathic progressive neuropathy       COMPRESSION STOCKINGS     2 each    1 each daily Measure and fit thigh length Circaid compression device.  Bilateral.     Venous insufficiency (chronic) (peripheral)       COMPRESSION STOCKINGS     2 each    20-30 mmHg knee high compression stockings.  Measure and fit.  Style and brand per patient preference.    Venous insufficiency (chronic) (peripheral)       * diclofenac 1 % Gel topical gel    VOLTAREN    100 g    APPLY 4 GRAMS TO KNEES FOUR TIMES DAILY USING ENCLOSED DOSING CARD    Bilateral chronic knee pain       * diclofenac 1 % Gel topical gel    VOLTAREN    100 g    Apply 4 grams to knees four times daily using enclosed dosing card.    Osteoarthritis of knee, unspecified laterality, unspecified osteoarthritis type       furosemide 20 MG tablet    LASIX    540 tablet    Take 3 tablets (60 mg) by mouth 2 times daily    Stasis edema of both lower extremities       ketoconazole 2 % shampoo    NIZORAL    120 mL    Apply to the affected area and wash off after 5 minutes.    Seborrheic dermatitis       METROGEL 1 % gel   Generic drug:  metroNIDAZOLE           moisturizing lotion      Apply topically 2 times daily        MULTI vitamin  MENS Tabs     90 tablet    Take 1 tablet by mouth daily    Idiopathic small and large fiber sensory neuropathy       neomycin-polymyxin-hydrocortisone otic solution    CORTISPORIN    10 mL    Place 3 drops Into the left ear 4 times daily    Ear itching       order for DME     2 Units    Equipment being ordered: compression stockings - 2 pairs    Stasis edema of both lower extremities       phentermine 37.5 MG tablet    ADIPEX-P    30 tablet    Take 1 tablet (37.5 mg) by mouth every morning    Morbid obesity (H)       pimecrolimus 1 % cream    ELIDEL    60 g    Apply topically 2 times daily    Periorificial dermatitis       potassium chloride 20 MEQ Packet    KLOR-CON    270 packet    Take 60 mEq by mouth daily    Hypokalemia       topiramate 50 MG tablet    TOPAMAX    720 tablet    Take 4 tablets (200 mg) by mouth 2 times daily TAKE 3 TABS (150 MG) IN THE AM AND 3 TABS (150 MG) IN THE PM AFTER AS  TOLERATED    Morbid obesity (H)       traZODone 100 MG tablet    DESYREL    90 tablet    Take 1 tablet (100 mg) by mouth At Bedtime    Sleep disorder       valACYclovir 500 MG tablet    VALTREX          Vitamin D (Cholecalciferol) 400 units Caps     90 capsule    Take 400 Units by mouth daily    Vitamin D deficiency       zolpidem 10 MG tablet    AMBIEN    30 tablet    Take 1 tablet (10 mg) by mouth nightly as needed for sleep    Insomnia due to medical condition       * Notice:  This list has 2 medication(s) that are the same as other medications prescribed for you. Read the directions carefully, and ask your doctor or other care provider to review them with you.

## 2018-09-13 NOTE — PATIENT INSTRUCTIONS
Refill and increase topiramate to 200 mg twice daily  Refill phentermine 37.5 mg    Follow up in 1 month to see Berenice Byrne MTM pharmacist.  MTM schedule will call you to set up appt. Bring all medications with to appointment.    See Dr Chang in 4-6 months for return medical weight management

## 2018-09-13 NOTE — NURSING NOTE
"  Chief Complaint   Patient presents with     Weight Problem     RMWM     Vitals:    09/13/18 1130   BP: (!) 133/97   Pulse: 106   SpO2: 97%   Weight: 325 lb 12.8 oz   Height: 5' 7.01\"     Body mass index is 51.01 kg/(m^2).  French Christensen CMA    "

## 2018-09-13 NOTE — PROGRESS NOTES
"Return Medical Weight Management Note     Chavez Ca  MRN:  [id number]  :  1970  RUI:  2018    Dear Joseph, Angel Sal,    I had the pleasure of seeing your patient Chavez Ca.  He is a 47 year old male who I am continuing to see for treatment of obesity related to:  Patient Active Problem List   Diagnosis     VERN (obstructive sleep apnea)     Morbid obesity (H)     Idiopathic edema     Depressive disorder, not elsewhere classified     Psychological factors associated with another disorder     Subglottic stenosis     Hyperlipidemia     Idiopathic progressive polyneuropathy     Varicose veins of left lower extremity     Closed nondisplaced fracture of scaphoid of right wrist, unspecified portion of scaphoid, sequela     Pain in both wrists     Shoulder pain, left     Neck pain     Neck pain, chronic       INTERVAL HISTORY:  Saw Dr Quinn last in  and plan was:    Resume phentermine (adipex) 37.5 mg daily     Return to see Denice in 2 months and me in 4-5 months     return in about 1 wk (same day as another appointment if possible)--nurse visit for vitals     Topiramate--  Resume that as follows:  First week 50 mg daily  Then 50 mg twice daily--week 2,  Then 50 mg AM and 100 mg PM--week 3  Then 100 mg AM and 100 mg PM--week 4,  Then 150 mg AM and 150 mg PM after as tolerated     Please return in 2 months with me or Dr. Anthony or with Denice Teixeira\"      Gained 40 lbs. Since last visit.  Has been on higher dose topiramate in the past with Dr Anthony and he would like to increase again.  He also would like to resume phentermine 37.5mg      CURRENT WEIGHT:   325 lbs 12.8 oz    Wt Readings from Last 4 Encounters:   18 325 lb 12.8 oz   18 325 lb   18 313 lb 6.4 oz   18 (!) 305 lb 12.8 oz       Height:  5' 7.01\"  Body Mass Index:  Body mass index is 51.01 kg/(m^2).  Vitals:  BP (!) 133/97  Pulse 106  Ht 5' 7.01\"  Wt 325 lb 12.8 oz  SpO2 97%  BMI " 51.01 kg/m2      Initial consult weight was 361 on 1/10/12.  Weight change since last seen on 6/29/2018 is up 40 pounds.   Total loss is 36 pounds.    Diet and Activity Changes Since Last Visit Reviewed With Patient 9/13/2018   I have made the following changes to my diet since my last visit: eat less   With regards to my diet, I am still struggling with: gaining weight   For breakfast, I typically eat: -   For lunch, I typically eat: -   For supper, I typically eat: -   For snack(s), I typically eat: -   I have made the following changes to my activity/exercise since my last visit: added physical therapy   With regards to my activity/exercise, I am still struggling with: gaining weight        MEDICATIONS:   Current Outpatient Prescriptions   Medication     amitriptyline (ELAVIL) 50 MG tablet     CETAPHIL (CETAPHIL) lotion     COMPRESSION STOCKINGS     COMPRESSION STOCKINGS     diclofenac (VOLTAREN) 1 % GEL topical gel     diclofenac (VOLTAREN) 1 % GEL topical gel     furosemide (LASIX) 20 MG tablet     ketoconazole (NIZORAL) 2 % shampoo     metroNIDAZOLE (METROGEL) 1 % gel     Multiple Vitamin (MULTI VITAMIN  MENS) TABS     neomycin-polymyxin-hydrocortisone (CORTISPORIN) otic solution     order for DME     pimecrolimus (ELIDEL) 1 % cream     potassium chloride (KLOR-CON) 20 MEQ Packet     topiramate (TOPAMAX) 50 MG tablet     traZODone (DESYREL) 100 MG tablet     valACYclovir (VALTREX) 500 MG tablet     Vitamin D, Cholecalciferol, 400 UNITS CAPS     zolpidem (AMBIEN) 10 MG tablet     phentermine (ADIPEX-P) 37.5 MG tablet     [DISCONTINUED] COMPRESSION STOCKINGS     No current facility-administered medications for this visit.        Weight Loss Medication History Reviewed With Patient 9/13/2018   Which weight loss medications are you currently taking on a regular basis?  Topamax (topiramate)   If you are not taking a weight loss medication that was prescribed to you, please indicate why: -   Are you having any side  effects from the weight loss medication that we have prescribed you? No       ASSESSMENT:   47 y.o. Male here for M follow up.  He has not been seen since 2017.  He has followed with Dr Chang for many years and was up to topiramate 200mg BID.  Currently he is on topiramate 150mg BID and would like to resume higher dose.    He also has been off phentermine due to running out of prescription.  He would like to resume.    PLAN:   Refill and increase topiramate to 200 mg twice daily  Refill phentermine 37.5 mg    Follow up in 1 month to see Berenice Byrne [facility] pharmacist.  MT schedule will call you to set up appt. Bring all medications with to appointment.    See Dr Chang in 4-6 months for return medical weight management    FOLLOW-UP:    4 weeks.    Time: 15 min spent on evaluation, management, counseling, education, & motivational interviewing with greater than 50 % of the total time was spent on counseling and coordinating care    Sincerely,    Denice Teixeira PA-C

## 2018-09-17 NOTE — TELEPHONE ENCOUNTER
FUTURE VISIT INFORMATION      FUTURE VISIT INFORMATION:    Date: 09/18/2018    Time: 7: 30 am     Location: [facility]   REFERRAL INFORMATION:  Referring provider:  Muriel Garg    Referring providers clinic:      Reason for visit/diagnosis  Internal Referral - Peripheral polyneuropathy - Schedule Per PT       NOTES (FOR ALL VISITS) STATUS DETAILS   OFFICE NOTE from referring provider Internal 08/07/20188   OFFICE NOTE from other specialist N/A    DISCHARGE SUMMARY from hospital Internal 12/02/2016, 09/30/2016, 09/06/2016, 12/29/2015, 11/06/2015, 08/22/2015, 06/18/2014, 12/07/2011   DISCHARGE REPORT from the ER Internal 05/27/2016, 05/07/2016   OPERATIVE REPORT Internal 11/06/2015, 08/27/2015, 08/23/2015, 06/18/2014, 12/07/2011, 06/29/2011, 04/22/2011   MEDICATION LIST Internal ,    IMAGING  (FOR ALL VISITS)     EMG N/A    EEG N/A    ECT N/A    MRI (HEAD, NECK, SPINE) N/A    CT (HEAD, NECK, SPINE) N/A    OTHER

## 2018-09-18 ENCOUNTER — PRE VISIT (OUTPATIENT)
Dept: NEUROLOGY | Facility: CLINIC | Age: 48
End: 2018-09-18

## 2018-09-27 ENCOUNTER — OFFICE VISIT (OUTPATIENT)
Dept: INTERNAL MEDICINE | Facility: CLINIC | Age: 48
End: 2018-09-27
Payer: MEDICARE

## 2018-09-27 VITALS — HEART RATE: 93 BPM | SYSTOLIC BLOOD PRESSURE: 124 MMHG | RESPIRATION RATE: 20 BRPM | DIASTOLIC BLOOD PRESSURE: 86 MMHG

## 2018-09-27 DIAGNOSIS — L71.9 ROSACEA: ICD-10-CM

## 2018-09-27 DIAGNOSIS — Z97.8 PRESENCE OF ORTHOTIC DEVICE: ICD-10-CM

## 2018-09-27 DIAGNOSIS — G47.33 OSA (OBSTRUCTIVE SLEEP APNEA): Primary | ICD-10-CM

## 2018-09-27 DIAGNOSIS — E66.01 MORBID OBESITY (H): ICD-10-CM

## 2018-09-27 DIAGNOSIS — G47.01 INSOMNIA DUE TO MEDICAL CONDITION: ICD-10-CM

## 2018-09-27 RX ORDER — ZOLPIDEM TARTRATE 10 MG/1
10 TABLET ORAL
Qty: 30 TABLET | Refills: 1 | Status: SHIPPED | OUTPATIENT
Start: 2018-09-27 | End: 2019-04-05

## 2018-09-27 ASSESSMENT — PAIN SCALES - GENERAL: PAINLEVEL: WORST PAIN (10)

## 2018-09-27 NOTE — MR AVS SNAPSHOT
After Visit Summary   9/27/2018    Chavez Ca    MRN: [id number]           Patient Information     Date Of Birth          1970        Visit Information        Provider Department      9/27/2018 9:20 AM Alen Colindres MD [facility]        Today's Diagnoses     VERN (obstructive sleep apnea)    -  1    Presence of orthotic device        Insomnia due to medical condition        Rosacea        Morbid obesity (H)          Care Instructions    Primary Care Center Phone Number [phone]  Primary Care Center Medication Refill Request Information:  * Please contact your pharmacy regarding ANY request for medication refills.  ** [facility] Prescription Fax = [phone]  * Please allow 3 business days for routine medication refills.  * Please allow 5 business days for controlled substance medication refills.     Primary Care Center Test Result notification information:  *You will be notified with in 7-10 days of your appointment day regarding the results of your test.  If you are on MyChart you will be notified as soon as the provider has reviewed the results and signed off on them.             [facility]         Internal Medicine Resident                   Continuity Clinic    Who We Are    Resident Continuity Clinic is a part of the [facility].  Resident physicians see patients independently and establish a relationship with them over the course of their three-year residency program.  As with the Primary Care Clinic, our Resident Continuity Clinic models a group practice.  If your doctor is not available, you will be able to see another resident physician.  At the end of a resident s training, patients will be transitioned to a new resident physician for ongoing care.     We treat patients with a wide array of medical needs from routine physicals, to acute illnesses, to diabetes and blood pressure management, to complex medical illness.  What is a  Resident Physician?    Resident physicians hold medical degrees and are doctors. They are training to become specialists in Internal Medicine. They work under the supervision of board-certified faculty physicians.  Expectations for Your Care    We strive to provide accessible, quality care at all times.    In order to provide this care, it is best to see your primary care resident doctor consistently rather switching between providers.  In the event you do see another physician, you should schedule a follow-up visit with your usual primary care doctor.    If you are transitioning your care from another clinic, it is helpful to have your records available for your doctor to review.    We do not prescribe controlled substances, such as ADD medications or narcotic pain medications, on your first visit.  We will review your health records and concerns prior to devising a treatment plan with you in order to provide the best care.      Clinic Services     Extended clinic hours; patient  to help navigate your visit;  parking; laboratory and imaging services with evening and weekend hours    Multiple medical and surgical specialties in one building    Complementary services, including Nutrition, Integrative Medicine, Pharmacy consultations, Mental and Behavioral Health, Sports Medicine and Physical Therapy    Thank You    We would like to thank you for choosing the [facility] Internal Medicine Resident Continuity Clinic for your primary care. You are making a priceless contribution to the training of the next generation of health care practitioners.     Contact us at [phone] for appointments or questions.    Resident Clinic Hours are Tuesdays and Thursdays, 7:30am-5:00pm    Residents  Sonia Saleem MD   (Female )   Ly Wetzel MD   (Female)   Dimas Bunch MD  (Male)   Joe Segundo MD  (Male)   Rod Ruiz MD   (Female)   Rah Macdonald MD  (Male)    Virgil Moore  MD  (Male)   Bob Dahl MD  (Male)   Joe Colindres MD (Male)   Yrn Beaulieu MD  (Male)   Viviane Caraballo MD (Female)    Sandra Irvin MD (Female)   Michelle Kaye MD  (Male)   Lyubov Hernandez MD(Female)   Viki Mancuso MD  (Female)    Supervising Physicians   Muriel Garg, MD Sarah Pace, MD Donovan Wolfe, MD Glen Aguirre, MD Daniela Uriostegui, MD Charu Aguilar, MD Fortino Feldman, MD Lynn Schultz, MD Malgorzata Payne MD              - schedule dental appointment  - schedule dermatology, podiatry, weight management clinic appointments    Please schedule the following appointments:  Dental  [phone] ([facility] 4th floor)  Dermatology [phone] ([facility] 3rd floor)  [facility] [phone] ([address] [place] [address])  Podiatry [phone] ([facility] 4th floor)  Weight Management [phone] ([facility] 4th floor)            Follow-ups after your visit        Additional Services     DENTAL REFERRAL       Your provider has referred you to: [facility]: Dental Clinic (Adult) - [place] [phone]   [email or web address]    Type: Pre-surgery  Urgency: Routine  Area: evaluate for VERN mouthguard prior to ENT consideration for surgical management    Please be aware that coverage of these services is subject to the terms and limitations of your health insurance plan.  Call member services at your health plan with any benefit or coverage questions.      Please bring the following with you to your appointment:    (1) Any X-Rays, CTs or MRIs which have been performed.  Contact the facility where they were done to arrange for  prior to your scheduled appointment.  Any new CT, MRI or other procedures ordered by your specialist must be performed at a [facility] facility or coordinated by your clinic's referral office.    (2) List of current medications   (3) This referral request   (4) Any documents/labs given to you for this referral             PODIATRY/FOOT & ANKLE SURGERY REFERRAL       Your provider has referred you to: [facility] [address] [place] [id number] Patient Scheduling Line: [phone] option 1 (scheduling and directions)    Please be aware that coverage of these services is subject to the terms and limitations of your health insurance plan.  Call member services at your health plan with any benefit or coverage questions.      Please bring the following to your appointment:  >>   Any x-rays, CTs or MRIs which have been performed.  Contact the facility where they were done to arrange for  prior to your scheduled appointment.    >>   List of current medications   >>   This referral request   >>   Any documents/labs given to you for this referral                  Follow-up notes from your care team     Return in about 6 weeks (around 11/8/2018).      Your next 10 appointments already scheduled     Oct 08, 2018  8:00 AM CDT   (Arrive by 7:45 AM)   New Patient Visit with Marcela Abreu MD   [facility] Neurology ([facility] [place])    [address] [place] [id number] [phone]            Nov 08, 2018  9:55 AM CST   (Arrive by 9:40 AM)   Return Visit with Alen Colindres MD   [facility] ([facility])    [address] [place] [id number] [phone]            Nov 26, 2018 10:40 AM CST   (Arrive by 10:25 AM)   [facility]    [address] [place] [id number] [phone]              Who to contact     Please call your clinic at [phone] to:    Ask questions about your health    Make or cancel appointments    Discuss your medicines    Learn about your test results    Speak to your doctor            Additional Information About Your Visit        MyChart Information      "[employer] gives you secure access to your electronic health record. If you see a primary care provider, you can also send messages to your care team and make appointments. If you have questions, please call your primary care clinic.  If you do not have a primary care provider, please call [phone] and they will assist you.      "[employer] is an electronic gateway that provides easy, online access to your medical records. With "[employer] you can request a clinic appointment, read your test results, renew a prescription or communicate with your care team.     To access your existing account, please contact your [facility] or call [phone] for assistance.        Care EveryWhere ID     This is your Care EveryWhere ID. This could be used by other organizations to access your [facility] medical records  [id number]        Your Vitals Were     Pulse Respirations                93 20           Blood Pressure from Last 3 Encounters:   09/27/18 124/86   09/13/18 128/88   08/14/18 129/89    Weight from Last 3 Encounters:   09/13/18 147.8 kg (325 lb 12.8 oz)   08/14/18 147.4 kg (325 lb)   08/07/18 142.2 kg (313 lb 6.4 oz)              We Performed the Following     DENTAL REFERRAL     PODIATRY/FOOT & ANKLE SURGERY REFERRAL          Today's Medication Changes          These changes are accurate as of 9/27/18 10:50 AM.  If you have any questions, ask your nurse or doctor.               These medicines have changed or have updated prescriptions.        Dose/Directions    diclofenac 1 % Gel topical gel   Commonly known as:  VOLTAREN   This may have changed:  Another medication with the same name was removed. Continue taking this medication, and follow the directions you see here.   Used for:  Osteoarthritis of knee, unspecified laterality, unspecified osteoarthritis type   Changed by:  Alen Colindres MD        Apply 4 grams to knees four times daily using enclosed dosing card.   Quantity:  100 g    Refills:  1       phentermine 37.5 MG tablet   Commonly known as:  ADIPEX-P   This may have changed:  Another medication with the same name was removed. Continue taking this medication, and follow the directions you see here.   Used for:  Morbid obesity (H)   Changed by:  Alen Colindres MD        Dose:  37.5 mg   Take 1 tablet (37.5 mg) by mouth every morning   Quantity:  30 tablet   Refills:  3         Stop taking these medicines if you haven't already. Please contact your care team if you have questions.     COMPRESSION STOCKINGS   Stopped by:  Alen Colindres MD           MULTI vitamin  MENS Tabs   Stopped by:  Alen Colindres MD           neomycin-polymyxin-hydrocortisone otic solution   Commonly known as:  CORTISPORIN   Stopped by:  Alen Colindres MD           order for DME   Stopped by:  Alen Colindres MD           valACYclovir 500 MG tablet   Commonly known as:  VALTREX   Stopped by:  Alen Colindres MD                Where to get your medicines      Some of these will need a paper prescription and others can be bought over the counter.  Ask your nurse if you have questions.     Bring a paper prescription for each of these medications     zolpidem 10 MG tablet                Primary Care Provider Office Phone # Fax #    [facility] [phone]       No address on file        Equal Access to Services     TALHA ETIXEIRA : Ashleigh briggso Sojaimeali, waaxda luqadaha, qaybta kaalmada adeegyada, lacey jane. So [facility] [phone].    ATENCIÓN: Si habla español, tiene a ackerman disposición servicios gratuitos de asistencia lingüística. Llame al [phone].    We comply with applicable federal civil rights laws and [place] laws. We do not discriminate on the basis of race, color, national origin, age, disability, sex, sexual orientation, or gender identity.            Thank you!     Thank you for choosing [facility]  for your care.  Our goal is always to provide you with excellent care. Hearing back from our patients is one way we can continue to improve our services. Please take a few minutes to complete the written survey that you may receive in the mail after your visit with us. Thank you!             Your Updated Medication List - Protect others around you: Learn how to safely use, store and throw away your medicines at www.disposemymeds.org.          This list is accurate as of 9/27/18 10:50 AM.  Always use your most recent med list.                   Brand Name Dispense Instructions for use Diagnosis    amitriptyline 50 MG tablet    ELAVIL    180 tablet    Take 2 tablets (100 mg) by mouth At Bedtime    Idiopathic progressive neuropathy       COMPRESSION STOCKINGS     2 each    20-30 mmHg knee high compression stockings.  Measure and fit.  Style and brand per patient preference.    Venous insufficiency (chronic) (peripheral)       diclofenac 1 % Gel topical gel    VOLTAREN    100 g    Apply 4 grams to knees four times daily using enclosed dosing card.    Osteoarthritis of knee, unspecified laterality, unspecified osteoarthritis type       furosemide 20 MG tablet    LASIX    540 tablet    Take 3 tablets (60 mg) by mouth 2 times daily    Stasis edema of both lower extremities       ketoconazole 2 % shampoo    NIZORAL    120 mL    Apply to the affected area and wash off after 5 minutes.    Seborrheic dermatitis       METROGEL 1 % gel   Generic drug:  metroNIDAZOLE           moisturizing lotion      Apply topically 2 times daily        phentermine 37.5 MG tablet    ADIPEX-P    30 tablet    Take 1 tablet (37.5 mg) by mouth every morning    Morbid obesity (H)       pimecrolimus 1 % cream    ELIDEL    60 g    Apply topically 2 times daily    Periorificial dermatitis       potassium chloride 20 MEQ Packet    KLOR-CON    270 packet    Take 60 mEq by mouth daily    Hypokalemia       topiramate 50 MG tablet    TOPAMAX    720 tablet    Take 4 tablets  (200 mg) by mouth 2 times daily TAKE 3 TABS (150 MG) IN THE AM AND 3 TABS (150 MG) IN THE PM AFTER AS TOLERATED    Morbid obesity (H)       traZODone 100 MG tablet    DESYREL    90 tablet    Take 1 tablet (100 mg) by mouth At Bedtime    Sleep disorder       Vitamin D (Cholecalciferol) 400 units Caps     90 capsule    Take 400 Units by mouth daily    Vitamin D deficiency       zolpidem 10 MG tablet    AMBIEN    30 tablet    Take 1 tablet (10 mg) by mouth nightly as needed for sleep    Insomnia due to medical condition

## 2018-09-27 NOTE — PATIENT INSTRUCTIONS
Primary Care Center Phone Number [phone]  Primary Care Center Medication Refill Request Information:  * Please contact your pharmacy regarding ANY request for medication refills.  ** [facility] Prescription Fax = [phone]  * Please allow 3 business days for routine medication refills.  * Please allow 5 business days for controlled substance medication refills.     Primary Care Center Test Result notification information:  *You will be notified with in 7-10 days of your appointment day regarding the results of your test.  If you are on MyChart you will be notified as soon as the provider has reviewed the results and signed off on them.             [facility]         Internal Medicine Resident                   Continuity Clinic    Who We Are    Resident Continuity Clinic is a part of the [facility].  Resident physicians see patients independently and establish a relationship with them over the course of their three-year residency program.  As with the Primary Care Clinic, our Resident Continuity Clinic models a group practice.  If your doctor is not available, you will be able to see another resident physician.  At the end of a resident s training, patients will be transitioned to a new resident physician for ongoing care.     We treat patients with a wide array of medical needs from routine physicals, to acute illnesses, to diabetes and blood pressure management, to complex medical illness.  What is a Resident Physician?    Resident physicians hold medical degrees and are doctors. They are training to become specialists in Internal Medicine. They work under the supervision of board-certified faculty physicians.  Expectations for Your Care    We strive to provide accessible, quality care at all times.    In order to provide this care, it is best to see your primary care resident doctor consistently rather switching between providers.  In the event you do see another physician, you  should schedule a follow-up visit with your usual primary care doctor.    If you are transitioning your care from another clinic, it is helpful to have your records available for your doctor to review.    We do not prescribe controlled substances, such as ADD medications or narcotic pain medications, on your first visit.  We will review your health records and concerns prior to devising a treatment plan with you in order to provide the best care.      Clinic Services     Extended clinic hours; patient  to help navigate your visit;  parking; laboratory and imaging services with evening and weekend hours    Multiple medical and surgical specialties in one building    Complementary services, including Nutrition, Integrative Medicine, Pharmacy consultations, Mental and Behavioral Health, Sports Medicine and Physical Therapy    Thank You    We would like to thank you for choosing the [facility] Resident Continuity Clinic for your primary care. You are making a priceless contribution to the training of the next generation of health care practitioners.     Contact us at [phone] for appointments or questions.    Resident Clinic Hours are Tuesdays and Thursdays, 7:30am-5:00pm    Residents  Sonia Saleem MD   (Female )   Ly Wetzel MD   (Female)   Dimas Bunch MD  (Male)   Joe Segundo MD  (Male)   Rod Ruiz MD   (Female)   Rah Macdonald MD  (Male)    Virgil Moore MD  (Male)   Bob Dahl MD  (Male)   Joe Colindres MD (Male)   Yrn Beaulieu MD  (Male)   Viviane Caraballo MD (Female)    Sandra Irvin MD (Female)   Michelle Kaye MD  (Male)   Lyubov Hernandez MD(Female)   Viki Mancuso MD  (Female)    Supervising Physicians   MD Sarah Red MD Briar Duffy, MD James Langland, MD Mary Logeais, MD Tanya Melnik, MD Charles Moldow, MD Heather Thompson Buum, MD Kathleen Watson, MD              -  schedule dental appointment  - schedule dermatology, podiatry, weight management clinic appointments    Please schedule the following appointments:  Dental  [phone] ([facility] 4th floor)  Dermatology [phone] ([facility] 3rd floor)  to Dermatology [phone] ([address] [place] [address])  Podiatry [phone] ([facility] 4th floor)  Weight Management [phone] ([facility] 4th floor)

## 2018-09-27 NOTE — PROGRESS NOTES
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      States that he is trying to find out whether he can undergo surgical management for sleep apnea.  Seen in sleep clinic on 8/14.  Reports that ENT needs a dental evaluation prior to consideration for surgery.  Complains that the \"sleep lady putting it all on me\", worried that he doesn't have the appropriate information to provide to dentist in order to set up appointment.     Patient also complains that he has had to change pharmacies due to changes in insurance.  States that he has been told that he does not have refills on multiple medications and would like to have meds refilled at this time.  When asked about which medications he needs specifically refilled, states \"I dont know...my pharmacist won't tell me\".  States that his Elidel is 1 of the meds that he needs a new prescription for.  When going over his meds, it appears that zolpidem is the only medication that should not have any refills left at this time.     He would like referral to podiatry for follow-up of his orthotics which he uses.  When asked why he needs orthotics, states that he \"does not know\" but that if he does not wear them he feels foot pain.  Patient also notes ongoing neuropathy pain and states that he has \"gotten a run around\" for multiple providers will only give him \"5 minutes\" and \"brush me off\".  Reminded patient that he has an upcoming appointment with neurology 10/8.    He was last seen in weight management clinic 9/13.  Patient states that he was taking off of phentermine during the appointment prior to this most recent one and that he noted significant weight gain since that time. " " Expresses upset at this, states that he met with a \"lady doctor\" who he had not seen prior, stating that his primary provider is Dr. Chang.  Assessment and plan from weight management visit 12/2017 noted plan to  resume phentermine and topiramate at that time.    Medications and allergies reviewed by me today.     ROS:   Constitutional, HEENT, cardiovascular, pulmonary, gi and gu systems are negative, except as otherwise noted.    OBJECTIVE:    /86  Pulse 93  Resp 20   Wt Readings from Last 1 Encounters:   09/13/18 147.8 kg (325 lb 12.8 oz)     GEN: No acute distress  HEENT: Normocephalic, atraumatic, EOMI, mild rhinophyma  CV: Regular rate rhythm, no murmurs rubs or gallops  Lung: Clear to auscultation, breathing comfortably  Abdomen: Obese, soft, nontender     ASSESSMENT/PLAN:    Chavez was seen today for recheck medication and forms.    Diagnoses and all orders for this visit:    VERN (obstructive sleep apnea)  Seeing sleep medicine, last 8/14.  Patient has seen ENT for consideration of surgery.  Supposed to see dental clinic for consideration of mouthpiece prior to consideration for surgery, this has not yet been scheduled.  -     DENTAL REFERRAL    Presence of orthotic device  -     PODIATRY/FOOT & ANKLE SURGERY REFERRAL    Insomnia due to medical condition  -     zolpidem (AMBIEN) 10 MG tablet; Take 1 tablet (10 mg) by mouth nightly as needed for sleep    Rosacea  Dermatology was placed by patient's PCP at prior visit, not scheduled.  Patient will schedule prior to leaving clinic today.  Patient on pimecrolimus.    Morbid obesity (H)  Patient following with weight management clinic, last seen 9/13, was supposed to have 4-6-month follow-up.  This is not been scheduled.  Patient will stop and have this visit scheduled prior to leaving clinic today.     Pt should return to clinic for f/u with PCP in  6-8 weeks     Alen Colindres MD  Sep 27, 2018    Pt was seen and plan of care discussed with Dr" Gaston.   Mr ortega was seen and examined with the resident Dr Colindres,I have reviewed his note and plan and I agree.  Fortino Feldman MD

## 2018-09-27 NOTE — NURSING NOTE
Chief Complaint   Patient presents with     Recheck Medication     pt is here for a medication follow up     Forms     pt is here for forms today       Sonia Patel CMA at 9:32 AM on 9/27/2018

## 2018-10-15 ENCOUNTER — APPOINTMENT (OUTPATIENT)
Dept: CT IMAGING | Facility: CLINIC | Age: 48
End: 2018-10-15
Attending: EMERGENCY MEDICINE
Payer: MEDICARE

## 2018-10-15 ENCOUNTER — HOSPITAL ENCOUNTER (EMERGENCY)
Facility: CLINIC | Age: 48
Discharge: HOME | End: 2018-10-15
Attending: EMERGENCY MEDICINE | Admitting: EMERGENCY MEDICINE
Payer: MEDICARE

## 2018-10-15 VITALS
WEIGHT: 315 LBS | OXYGEN SATURATION: 100 % | TEMPERATURE: 98.4 F | SYSTOLIC BLOOD PRESSURE: 147 MMHG | HEIGHT: 67 IN | RESPIRATION RATE: 20 BRPM | BODY MASS INDEX: 49.44 KG/M2 | DIASTOLIC BLOOD PRESSURE: 86 MMHG

## 2018-10-15 DIAGNOSIS — R10.31 ABDOMINAL PAIN, RIGHT LOWER QUADRANT: ICD-10-CM

## 2018-10-15 LAB
ALBUMIN SERPL-MCNC: 3.7 G/DL (ref 3.4–5)
ALP SERPL-CCNC: 82 U/L (ref 40–150)
ALT SERPL W P-5'-P-CCNC: 52 U/L (ref 0–70)
ANION GAP SERPL CALCULATED.3IONS-SCNC: 8 MMOL/L (ref 3–14)
AST SERPL W P-5'-P-CCNC: 32 U/L (ref 0–45)
BASOPHILS # BLD AUTO: 0.1 10E9/L (ref 0–0.2)
BASOPHILS NFR BLD AUTO: 0.7 %
BILIRUB SERPL-MCNC: 0.2 MG/DL (ref 0.2–1.3)
BUN SERPL-MCNC: 20 MG/DL (ref 7–30)
CALCIUM SERPL-MCNC: 8.2 MG/DL (ref 8.5–10.1)
CHLORIDE SERPL-SCNC: 103 MMOL/L (ref 94–109)
CO2 SERPL-SCNC: 26 MMOL/L (ref 20–32)
CREAT SERPL-MCNC: 0.99 MG/DL (ref 0.66–1.25)
DIFFERENTIAL METHOD BLD: NORMAL
EOSINOPHIL # BLD AUTO: 0.3 10E9/L (ref 0–0.7)
EOSINOPHIL NFR BLD AUTO: 3.3 %
ERYTHROCYTE [DISTWIDTH] IN BLOOD BY AUTOMATED COUNT: 13.4 % (ref 10–15)
GFR SERPL CREATININE-BSD FRML MDRD: 80 ML/MIN/1.7M2
GLUCOSE SERPL-MCNC: 118 MG/DL (ref 70–99)
HCT VFR BLD AUTO: 46.3 % (ref 40–53)
HGB BLD-MCNC: 15.1 G/DL (ref 13.3–17.7)
IMM GRANULOCYTES # BLD: 0 10E9/L (ref 0–0.4)
IMM GRANULOCYTES NFR BLD: 0.4 %
LYMPHOCYTES # BLD AUTO: 1.9 10E9/L (ref 0.8–5.3)
LYMPHOCYTES NFR BLD AUTO: 25.8 %
MCH RBC QN AUTO: 30.3 PG (ref 26.5–33)
MCHC RBC AUTO-ENTMCNC: 32.6 G/DL (ref 31.5–36.5)
MCV RBC AUTO: 93 FL (ref 78–100)
MONOCYTES # BLD AUTO: 0.9 10E9/L (ref 0–1.3)
MONOCYTES NFR BLD AUTO: 12.4 %
NEUTROPHILS # BLD AUTO: 4.3 10E9/L (ref 1.6–8.3)
NEUTROPHILS NFR BLD AUTO: 57.4 %
NRBC # BLD AUTO: 0 10*3/UL
NRBC BLD AUTO-RTO: 0 /100
PLATELET # BLD AUTO: 259 10E9/L (ref 150–450)
POTASSIUM SERPL-SCNC: 3.6 MMOL/L (ref 3.4–5.3)
PROT SERPL-MCNC: 8.3 G/DL (ref 6.8–8.8)
RBC # BLD AUTO: 4.98 10E12/L (ref 4.4–5.9)
SODIUM SERPL-SCNC: 137 MMOL/L (ref 133–144)
WBC # BLD AUTO: 7.5 10E9/L (ref 4–11)

## 2018-10-15 PROCEDURE — 80053 COMPREHEN METABOLIC PANEL: CPT | Performed by: EMERGENCY MEDICINE

## 2018-10-15 PROCEDURE — 25000128 H RX IP 250 OP 636: Performed by: STUDENT IN AN ORGANIZED HEALTH CARE EDUCATION/TRAINING PROGRAM

## 2018-10-15 PROCEDURE — 85025 COMPLETE CBC W/AUTO DIFF WBC: CPT | Performed by: EMERGENCY MEDICINE

## 2018-10-15 PROCEDURE — 74177 CT ABD & PELVIS W/CONTRAST: CPT

## 2018-10-15 PROCEDURE — 99285 EMERGENCY DEPT VISIT HI MDM: CPT | Mod: 25

## 2018-10-15 PROCEDURE — 99284 EMERGENCY DEPT VISIT MOD MDM: CPT | Mod: Z6 | Performed by: EMERGENCY MEDICINE

## 2018-10-15 RX ORDER — SIMETHICONE 125 MG
125 TABLET,CHEWABLE ORAL 2 TIMES DAILY
Qty: 120 TABLET | Refills: 0 | Status: SHIPPED | OUTPATIENT
Start: 2018-10-15 | End: 2019-01-29

## 2018-10-15 RX ORDER — IOPAMIDOL 755 MG/ML
135 INJECTION, SOLUTION INTRAVASCULAR ONCE
Status: COMPLETED | OUTPATIENT
Start: 2018-10-15 | End: 2018-10-15

## 2018-10-15 RX ADMIN — IOPAMIDOL 135 ML: 755 INJECTION, SOLUTION INTRAVENOUS at 22:23

## 2018-10-15 NOTE — ED AVS SNAPSHOT
[facility], Emergency Department    [address] [address]    Phone:  [phone]                                       Chavez Ca   MRN: [id number]    Department:  [facility], Emergency Department   Date of Visit:  10/15/2018           Patient Information     Date Of Birth          1970        Your diagnoses for this visit were:     Abdominal pain, right lower quadrant        You were seen by Geraldo Tai MD.        Discharge Instructions       Please make an appointment to follow up with Your Primary Care Provider as soon as possible if you have any concerns.  Results for orders placed or performed during the hospital encounter of 10/15/18   CT Abdomen Pelvis w Contrast    Narrative    PRELIMINARY REPORT - The following report is a preliminary  interpretation.  No acute intra-abdominal pathology to account for  patient's pain.    CBC with platelets differential   Result Value Ref Range    WBC 7.5 4.0 - 11.0 10e9/L    RBC Count 4.98 4.4 - 5.9 10e12/L    Hemoglobin 15.1 13.3 - 17.7 g/dL    Hematocrit 46.3 40.0 - 53.0 %    MCV 93 78 - 100 fl    MCH 30.3 26.5 - 33.0 pg    MCHC 32.6 31.5 - 36.5 g/dL    RDW 13.4 10.0 - 15.0 %    Platelet Count 259 150 - 450 10e9/L    Diff Method Automated Method     % Neutrophils 57.4 %    % Lymphocytes 25.8 %    % Monocytes 12.4 %    % Eosinophils 3.3 %    % Basophils 0.7 %    % Immature Granulocytes 0.4 %    Nucleated RBCs 0 0 /100    Absolute Neutrophil 4.3 1.6 - 8.3 10e9/L    Absolute Lymphocytes 1.9 0.8 - 5.3 10e9/L    Absolute Monocytes 0.9 0.0 - 1.3 10e9/L    Absolute Eosinophils 0.3 0.0 - 0.7 10e9/L    Absolute Basophils 0.1 0.0 - 0.2 10e9/L    Abs Immature Granulocytes 0.0 0 - 0.4 10e9/L    Absolute Nucleated RBC 0.0    Comprehensive metabolic panel   Result Value Ref Range    Sodium 137 133 - 144 mmol/L    Potassium 3.6 3.4 - 5.3 mmol/L    Chloride 103 94 - 109 mmol/L    Carbon Dioxide 26 20 - 32 mmol/L    Anion Gap 8 3 - 14  mmol/L    Glucose 118 (H) 70 - 99 mg/dL    Urea Nitrogen 20 7 - 30 mg/dL    Creatinine 0.99 0.66 - 1.25 mg/dL    GFR Estimate 80 >60 mL/min/1.7m2    GFR Estimate If Black >90 >60 mL/min/1.7m2    Calcium 8.2 (L) 8.5 - 10.1 mg/dL    Bilirubin Total 0.2 0.2 - 1.3 mg/dL    Albumin 3.7 3.4 - 5.0 g/dL    Protein Total 8.3 6.8 - 8.8 g/dL    Alkaline Phosphatase 82 40 - 150 U/L    ALT 52 0 - 70 U/L    AST 32 0 - 45 U/L     *Note: Due to a large number of results and/or encounters for the requested time period, some results have not been displayed. A complete set of results can be found in Results Review.           Your next 10 appointments already scheduled     Nov 08, 2018  9:55 AM CST   (Arrive by 9:40 AM)   Return Visit with Alen Colindres MD   [facility]    [address] [place] [id number] [phone]            Nov 26, 2018 10:40 AM CST   (Arrive by 10:25 AM)   [facility]    [address] [place] [id number] [phone]            Jan 28, 2019  9:45 AM CST   (Arrive by 9:30 AM)   Return Weight Management Visit with Fortino Chang MD   [facility] [place] [facility]    [address] [place] [id number] [phone]              24 Hour Appointment Hotline       To make an appointment at any [facility], call [phone] ([phone]). If you don't have a family doctor or clinic, we will help you find one. [facility] clinics are conveniently located to serve the needs of you and your family.             Review of your medicines      START taking        Dose / Directions Last dose taken    simethicone 125 MG Chew chewable tablet   Commonly known as:  [employer] GAS RELIEF EXTRA STRENGTH   Dose:  125 mg   Quantity:  120 tablet         Take 1 tablet (125 mg) by mouth 2 times daily   Refills:  0          Our records show that you are taking the medicines listed below. If these are incorrect, please call your family doctor or clinic.        Dose / Directions Last dose taken    amitriptyline 50 MG tablet   Commonly known as:  ELAVIL   Dose:  100 mg   Quantity:  180 tablet        Take 2 tablets (100 mg) by mouth At Bedtime   Refills:  3        COMPRESSION STOCKINGS   Quantity:  2 each        20-30 mmHg knee high compression stockings.  Measure and fit.  Style and brand per patient preference.   Refills:  0        diclofenac 1 % Gel topical gel   Commonly known as:  VOLTAREN   Quantity:  100 g        Apply 4 grams to knees four times daily using enclosed dosing card.   Refills:  1        furosemide 20 MG tablet   Commonly known as:  LASIX   Dose:  60 mg   Quantity:  540 tablet        Take 3 tablets (60 mg) by mouth 2 times daily   Refills:  3        ketoconazole 2 % shampoo   Commonly known as:  NIZORAL   Quantity:  120 mL        Apply to the affected area and wash off after 5 minutes.   Refills:  1        METROGEL 1 % gel   Generic drug:  metroNIDAZOLE        Refills:  0        moisturizing lotion        Apply topically 2 times daily   Refills:  0        phentermine 37.5 MG tablet   Commonly known as:  ADIPEX-P   Dose:  37.5 mg   Quantity:  30 tablet        Take 1 tablet (37.5 mg) by mouth every morning   Refills:  3        pimecrolimus 1 % cream   Commonly known as:  ELIDEL   Quantity:  60 g        Apply topically 2 times daily   Refills:  3        potassium chloride 20 MEQ Packet   Commonly known as:  KLOR-CON   Dose:  60 mEq   Quantity:  270 packet        Take 60 mEq by mouth daily   Refills:  3        topiramate 50 MG tablet   Commonly known as:  TOPAMAX   Dose:  200 mg   Quantity:  720 tablet        Take 4 tablets (200 mg) by mouth 2 times daily TAKE 3 TABS (150 MG) IN THE AM AND 3 TABS (150 MG) IN THE PM AFTER AS TOLERATED   Refills:  1         traZODone 100 MG tablet   Commonly known as:  DESYREL   Dose:  100 mg   Quantity:  90 tablet        Take 1 tablet (100 mg) by mouth At Bedtime   Refills:  3        Vitamin D (Cholecalciferol) 400 units Caps   Dose:  400 Units   Quantity:  90 capsule        Take 400 Units by mouth daily   Refills:  3        zolpidem 10 MG tablet   Commonly known as:  AMBIEN   Dose:  10 mg   Quantity:  30 tablet        Take 1 tablet (10 mg) by mouth nightly as needed for sleep   Refills:  1                Prescriptions were sent or printed at these locations (1 Prescription)                   Other Prescriptions                Printed at Department/Unit printer (1 of 1)         simethicone ([employer] GAS RELIEF EXTRA STRENGTH) 125 MG CHEW chewable tablet                Procedures and tests performed during your visit     CBC with platelets differential    CT Abdomen Pelvis w Contrast    Comprehensive metabolic panel      Orders Needing Specimen Collection     None      Pending Results     Date and Time Order Name Status Description    10/15/2018 2203 CT Abdomen Pelvis w Contrast Preliminary             Pending Culture Results     No orders found from 10/13/2018 to 10/16/2018.            Pending Results Instructions     If you had any lab results that were not finalized at the time of your Discharge, you can call the ED Lab Result RN at [phone]. You will be contacted by this team for any positive Lab results or changes in treatment. The nurses are available 7 days a week from 10A to 6:30P.  You can leave a message 24 hours per day and they will return your call.        Thank you for choosing [facility]       Thank you for choosing [facility] for your care. Our goal is always to provide you with excellent care. Hearing back from our patients is one way we can continue to improve our services. Please take a few minutes to complete the written survey that you may receive in the mail after you visit with us. Thank you!        [facility]  Information     [facility] gives you secure access to your electronic health record. If you see a primary care provider, you can also send messages to your care team and make appointments. If you have questions, please call your primary care clinic.  If you do not have a primary care provider, please call [phone] and they will assist you.        Care EveryWhere ID     This is your Care EveryWhere ID. This could be used by other organizations to access your [facility] medical records  [id number]        Equal Access to Services     TALHA TEIXEIRA : Hadii julia briggso Sojaimeali, waaxda luqadaha, qaybta kaalmada adeegyadanae, lacey duarte . So [facility] [phone].    ATENCIÓN: Si habla español, tiene a ackerman disposición servicios gratuitos de asistencia lingüística. Marioame al [phone].    We comply with applicable federal civil rights laws and [place] laws. We do not discriminate on the basis of race, color, national origin, age, disability, sex, sexual orientation, or gender identity.            After Visit Summary       This is your record. Keep this with you and show to your community pharmacist(s) and doctor(s) at your next visit.

## 2018-10-15 NOTE — ED AVS SNAPSHOT
[facility] [place], Emergency Department    [address] [place] [address]    Phone:  [phone]                                       Chavez Ca   MRN: [id number]    Department:  [facility], Emergency Department   Date of Visit:  10/15/2018           After Visit Summary Signature Page     I have received my discharge instructions, and my questions have been answered. I have discussed any challenges I see with this plan with the nurse or doctor.    ..........................................................................................................................................  Patient/Patient Representative Signature      ..........................................................................................................................................  Patient Representative Print Name and Relationship to Patient    ..................................................               ................................................  Date                                   Time    ..........................................................................................................................................  Reviewed by Signature/Title    ...................................................              ..............................................  Date                                               Time          22EPIC Rev 08/18

## 2018-10-15 NOTE — ED TRIAGE NOTES
"Presents with RLQ abdominal pain and bruising for 3 weeks. Patient states that he thought he \"overdid it\" but then noticed bruising in that area. Patient was referred from clinic to come here.   Denies chest pain, states he feels SOB but that is his baseline.  "

## 2018-10-16 NOTE — ED PROVIDER NOTES
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 Patient complains of right lower quadrant pain worsening for the past three weeks which he reports has made it difficult to perform his daily activities. Patient notes that he later developed a bruise located on the abdomen at the RLQ two weeks ago.     I have reviewed the Medications, Allergies, Past Medical and Surgical History, and Social History in the [employer] system.    PAST MEDICAL HISTORY:   Past Medical History:   Diagnosis Date     Allergic rhinitis      Benign positional vertigo 2011    never quite went away     Bilateral carpal tunnel syndrome 7/18/2015     Chest pain      Chronic sinusitis 2017    I don't know Please look     Chronic tonsillitis      Coronary artery disease 3/8/16    right side chest pain     Depressive disorder      Depressive disorder, not elsewhere classified 7/30/2012     Gastro-oesophageal reflux disease      Hearing problem      Hypertension 2008     Idiopathic progressive polyneuropathy 10/15/2012     Learning disability      Neck mass     parapharyngeal, benign     Obesity, Class III, BMI 40-49.9 (morbid obesity) (H)      Obstructive sleep apnea      Psychological factors associated with another disorder 8/21/2012     Recurrent otitis media 2016    might be what the pain in my ears     Reduced vision      Shortness of breath      Tinnitus 2016    I would hear a steady light buzzing sound and other can't     Trigeminal neuralgia      Weakness 8/26/2011       PAST SURGICAL HISTORY:   Past Surgical History:   Procedure Laterality Date     BIOPSY  2011    To figure out  what kind of tumor I had     ENDOSCOPIC RETROGRADE CHOLANGIOPANCREATOGRAM N/A 8/27/2015    Procedure: COMBINED ENDOSCOPIC RETROGRADE CHOLANGIOPANCREATOGRAPHY, PLACE TUBE/STENT;  Surgeon: Baldomero Zacarias MD;  Location: [facility] OR     ENDOSCOPIC RETROGRADE CHOLANGIOPANCREATOGRAM N/A 11/6/2015    Procedure: COMBINED ENDOSCOPIC RETROGRADE CHOLANGIOPANCREATOGRAPHY, REMOVE FOREIGN BODY OR STENT/TUBE;  Surgeon: Baldomero Zacarias MD;  Location: [facility] OR     EXCISE LESION INTRAORAL  6/29/2011    Procedure:EXCISE LESION INTRAORAL; TRANSCERVICAL APPROACH TO LEFT PHARYNGEAL SPACE MASS REMOVAL ; Surgeon:BARBARA PHILLIPS; [facility]     HC VASCULAR SURGERY PROCEDURE UNLIST  12/29/15     LAPAROSCOPIC CHOLECYSTECTOMY N/A 8/23/2015    Procedure: LAPAROSCOPIC CHOLECYSTECTOMY;  Surgeon: Kvng Witt MD;  Location: [facility] OR     LARYNGOSCOPY WITH BIOPSY(IES)  6/18/2014    Procedure: LARYNGOSCOPY WITH BIOPSY(IES);  Surgeon: Barbara Phillips MD;  Location: [facility] OR     LASER CO2 LARYNGOSCOPY  12/7/2011    Procedure:LASER CO2 LARYNGOSCOPY; Micro-Direct Laryngoscopy with CO2 Laser Standby / Excision Tracheal Grandulization, Trach Tube Change / Kenalog application. / Balloon Dilation of Subglottic Stenosis.*Latex Safe Room* Trach Tube = Shiley 6.4mm I.D. / 10.8MM O.D. / 76MM  Cuffless.; Surgeon:BARBARA PHILLIPS; Location: OR     ORTHOPEDIC SURGERY  5/2016    Broken Right hand & Fracture Right shoulder     TRACHEOSTOMY  4/22/2011    Procedure:TRACHEOSTOMY; possible awake; Surgeon:BARBARA PHILLIPS; Location:[facility] OR     TRACHEOSTOMY  6/29/2011    Procedure:TRACHEOSTOMY; REMOVE AND REPLACE SHILEY 6 XLT; Surgeon:BARBARA PHILLIPS; Location: OR     VASCULAR SURGERY  2008-[facility]       FAMILY HISTORY:   Family History   Problem Relation Age of Onset     Adopted: Yes     Family History Negative Other      Does not know family     Unknown/Adopted No family hx of        SOCIAL HISTORY:  "  Social History   Substance Use Topics     Smoking status: Never Smoker     Smokeless tobacco: Never Used      Comment: Never started     Alcohol use No      Comment: rarely     No current facility-administered medications for this encounter.      Current Outpatient Prescriptions   Medication     furosemide (LASIX) 20 MG tablet     simethicone ([employer] GAS RELIEF EXTRA STRENGTH) 125 MG CHEW chewable tablet     topiramate (TOPAMAX) 50 MG tablet     traZODone (DESYREL) 100 MG tablet     zolpidem (AMBIEN) 10 MG tablet     amitriptyline (ELAVIL) 50 MG tablet     CETAPHIL (CETAPHIL) lotion     COMPRESSION STOCKINGS     diclofenac (VOLTAREN) 1 % GEL topical gel     ketoconazole (NIZORAL) 2 % shampoo     metroNIDAZOLE (METROGEL) 1 % gel     phentermine (ADIPEX-P) 37.5 MG tablet     pimecrolimus (ELIDEL) 1 % cream     potassium chloride (KLOR-CON) 20 MEQ Packet     Vitamin D, Cholecalciferol, 400 UNITS CAPS     [DISCONTINUED] COMPRESSION STOCKINGS        Allergies   Allergen Reactions     Fentanyl Itching     Patch     Meloxicam Other (See Comments)     Side pains.     Minocycline Rash     Patient reports adverse reaction was pigmentation which has resolved with drug discontinuation.       Review of Systems    ROS: 14 point ROS neg other than the symptoms noted above in the HPI.      Physical Exam   BP: 123/89  Heart Rate: 120  Temp: 98.4  F (36.9  C)  Resp: 20  Height: 170.2 cm (5' 7\")  Weight: (!) 154.6 kg (340 lb 14.4 oz)  SpO2: 97 %      Physical Exam   Constitutional: He is oriented to person, place, and time. He appears well-developed and well-nourished. No distress.   HENT:   Head: Normocephalic and atraumatic.   Eyes: No scleral icterus.   Neck: Normal range of motion. Neck supple.   Cardiovascular: Tachycardia present.    Pulmonary/Chest: Effort normal.   Abdominal: Soft. There is tenderness.   Morbidly obese with a large pannus  Patient has a small round area of ecchymosis on the right lower quadrant.   Neurological: " He is alert and oriented to person, place, and time.   Skin: Skin is warm and dry. No rash noted. He is not diaphoretic. No erythema. No pallor.       ED Course     ED Course     Procedures             Critical Care time:  none             Labs Ordered and Resulted from Time of ED Arrival Up to the Time of Departure from the ED   COMPREHENSIVE METABOLIC PANEL - Abnormal; Notable for the following:        Result Value    Glucose 118 (*)     Calcium 8.2 (*)     All other components within normal limits   CBC WITH PLATELETS DIFFERENTIAL          Results for orders placed or performed during the hospital encounter of 10/15/18   CT Abdomen Pelvis w Contrast    Narrative    EXAMINATION: CT ABDOMEN PELVIS W CONTRAST, 10/15/2018 10:44 PM    TECHNIQUE:  Helical CT images from the lung bases through the  symphysis pubis were obtained with IV contrast. Contrast dose:  iopamidol (ISOVUE-370) solution 135 mL    COMPARISON: CT 8/26/2018.    HISTORY: RLQ pain with ecchymosis, no trauma;     FINDINGS:  Cholecystectomy. No biliary duct dilatation. The liver, spleen,  pancreas, adrenal glands, and left kidney are normal. Subcentimeter  hypodensity in the inferior pole right kidney, too small to  characterize and likely a cyst. Otherwise right kidney is  unremarkable. No hydronephrosis. The bladder is decompressed with  small diverticulum posterolaterally on the right. Prostate is not  enlarged.    The bowel is nonobstructed. Redundant sigmoid colon. Noninflamed  appendix. Minimal diverticulosis without diverticulitis. No free air  or free fluid. Abdominal aorta is normal caliber. Portal vein and SMV  are patent. No lymphadenopathy.    Lower thorax: Dependent atelectasis in the lung bases. Heart size is  normal without pericardial effusion.    Bones soft tissues: No acute fracture or suspicious bony lesion. Bone  islands in the left acetabulum, left sacral wing, and left eighth rib.  Mild degenerative changes of the spine. Prominent  fat in the inguinal  canals bilaterally. Prominent anterior abdominal wall collateral  vessels. Tiny soft tissue swelling involving the right lower quadrant  abdominal soft tissues.      Impression    IMPRESSION:    1. No acute intra-abdominal pathology to account for patient's pain.   2. Minimal soft tissue swelling along the right anterolateral low  abdomen.    I have personally reviewed the examination and initial interpretation  and I agree with the findings.    MAGALY HERNANDEZ MD   CBC with platelets differential   Result Value Ref Range    WBC 7.5 4.0 - 11.0 10e9/L    RBC Count 4.98 4.4 - 5.9 10e12/L    Hemoglobin 15.1 13.3 - 17.7 g/dL    Hematocrit 46.3 40.0 - 53.0 %    MCV 93 78 - 100 fl    MCH 30.3 26.5 - 33.0 pg    MCHC 32.6 31.5 - 36.5 g/dL    RDW 13.4 10.0 - 15.0 %    Platelet Count 259 150 - 450 10e9/L    Diff Method Automated Method     % Neutrophils 57.4 %    % Lymphocytes 25.8 %    % Monocytes 12.4 %    % Eosinophils 3.3 %    % Basophils 0.7 %    % Immature Granulocytes 0.4 %    Nucleated RBCs 0 0 /100    Absolute Neutrophil 4.3 1.6 - 8.3 10e9/L    Absolute Lymphocytes 1.9 0.8 - 5.3 10e9/L    Absolute Monocytes 0.9 0.0 - 1.3 10e9/L    Absolute Eosinophils 0.3 0.0 - 0.7 10e9/L    Absolute Basophils 0.1 0.0 - 0.2 10e9/L    Abs Immature Granulocytes 0.0 0 - 0.4 10e9/L    Absolute Nucleated RBC 0.0    Comprehensive metabolic panel   Result Value Ref Range    Sodium 137 133 - 144 mmol/L    Potassium 3.6 3.4 - 5.3 mmol/L    Chloride 103 94 - 109 mmol/L    Carbon Dioxide 26 20 - 32 mmol/L    Anion Gap 8 3 - 14 mmol/L    Glucose 118 (H) 70 - 99 mg/dL    Urea Nitrogen 20 7 - 30 mg/dL    Creatinine 0.99 0.66 - 1.25 mg/dL    GFR Estimate 80 >60 mL/min/1.7m2    GFR Estimate If Black >90 >60 mL/min/1.7m2    Calcium 8.2 (L) 8.5 - 10.1 mg/dL    Bilirubin Total 0.2 0.2 - 1.3 mg/dL    Albumin 3.7 3.4 - 5.0 g/dL    Protein Total 8.3 6.8 - 8.8 g/dL    Alkaline Phosphatase 82 40 - 150 U/L    ALT 52 0 - 70 U/L    AST 32 0  - 45 U/L     *Note: Due to a large number of results and/or encounters for the requested time period, some results have not been displayed. A complete set of results can be found in Results Review.       Assessments & Plan (with Medical Decision Making)   This is a 48-year-old male presenting to emergency room with complaints of increasing bilateral abdominal pain with ecchymosis over his right lower quadrant.  Patient was concerned that this might be a hernia.  Physical exam was consistent with his findings of generalized lower abdominal tenderness on palpation with a baseball sized area of ecchymosis in the right lower quadrant.  Rest of exam is completely unremarkable.  Abdominal CT was performed which shows no intra-abdominal pathology that could explain his discomfort.  His labs are otherwise unremarkable.  At this time I believe he can be safely discharged.  I will provide him with a prescription for simethicone as it does appear that he has a large amount of gas which may be contributing to some of his symptoms.    I have reviewed the nursing notes.    I have reviewed the findings, diagnosis, plan and need for follow up with the patient.    Discharge Medication List as of 10/15/2018 11:17 PM      START taking these medications    Details   simethicone ([employer] GAS RELIEF EXTRA STRENGTH) 125 MG CHEW chewable tablet Take 1 tablet (125 mg) by mouth 2 times daily, Disp-120 tablet, R-0, Local Print             Final diagnoses:   Abdominal pain, right lower quadrant     IJillian, am serving as a trained medical scribe to document services personally performed by Vishal Tai MD, based on the provider's statements to me.   IVishal MD, was physically present and have reviewed and verified the accuracy of this note documented by Jillian Swanson.    10/15/2018   [facility], [place], EMERGENCY DEPARTMENT     Geraldo Tai MD  10/17/18 0143

## 2018-10-16 NOTE — DISCHARGE INSTRUCTIONS
Please make an appointment to follow up with Your Primary Care Provider as soon as possible if you have any concerns.  Results for orders placed or performed during the hospital encounter of 10/15/18   CT Abdomen Pelvis w Contrast    Narrative    PRELIMINARY REPORT - The following report is a preliminary  interpretation.  No acute intra-abdominal pathology to account for  patient's pain.    CBC with platelets differential   Result Value Ref Range    WBC 7.5 4.0 - 11.0 10e9/L    RBC Count 4.98 4.4 - 5.9 10e12/L    Hemoglobin 15.1 13.3 - 17.7 g/dL    Hematocrit 46.3 40.0 - 53.0 %    MCV 93 78 - 100 fl    MCH 30.3 26.5 - 33.0 pg    MCHC 32.6 31.5 - 36.5 g/dL    RDW 13.4 10.0 - 15.0 %    Platelet Count 259 150 - 450 10e9/L    Diff Method Automated Method     % Neutrophils 57.4 %    % Lymphocytes 25.8 %    % Monocytes 12.4 %    % Eosinophils 3.3 %    % Basophils 0.7 %    % Immature Granulocytes 0.4 %    Nucleated RBCs 0 0 /100    Absolute Neutrophil 4.3 1.6 - 8.3 10e9/L    Absolute Lymphocytes 1.9 0.8 - 5.3 10e9/L    Absolute Monocytes 0.9 0.0 - 1.3 10e9/L    Absolute Eosinophils 0.3 0.0 - 0.7 10e9/L    Absolute Basophils 0.1 0.0 - 0.2 10e9/L    Abs Immature Granulocytes 0.0 0 - 0.4 10e9/L    Absolute Nucleated RBC 0.0    Comprehensive metabolic panel   Result Value Ref Range    Sodium 137 133 - 144 mmol/L    Potassium 3.6 3.4 - 5.3 mmol/L    Chloride 103 94 - 109 mmol/L    Carbon Dioxide 26 20 - 32 mmol/L    Anion Gap 8 3 - 14 mmol/L    Glucose 118 (H) 70 - 99 mg/dL    Urea Nitrogen 20 7 - 30 mg/dL    Creatinine 0.99 0.66 - 1.25 mg/dL    GFR Estimate 80 >60 mL/min/1.7m2    GFR Estimate If Black >90 >60 mL/min/1.7m2    Calcium 8.2 (L) 8.5 - 10.1 mg/dL    Bilirubin Total 0.2 0.2 - 1.3 mg/dL    Albumin 3.7 3.4 - 5.0 g/dL    Protein Total 8.3 6.8 - 8.8 g/dL    Alkaline Phosphatase 82 40 - 150 U/L    ALT 52 0 - 70 U/L    AST 32 0 - 45 U/L     *Note: Due to a large number of results and/or encounters for the requested time  period, some results have not been displayed. A complete set of results can be found in Results Review.

## 2018-11-08 ENCOUNTER — OFFICE VISIT (OUTPATIENT)
Dept: INTERNAL MEDICINE | Facility: CLINIC | Age: 48
End: 2018-11-08
Payer: MEDICARE

## 2018-11-08 ENCOUNTER — RADIANT APPOINTMENT (OUTPATIENT)
Dept: GENERAL RADIOLOGY | Facility: CLINIC | Age: 48
End: 2018-11-08
Payer: MEDICARE

## 2018-11-08 VITALS
BODY MASS INDEX: 54.82 KG/M2 | OXYGEN SATURATION: 100 % | SYSTOLIC BLOOD PRESSURE: 135 MMHG | HEART RATE: 93 BPM | WEIGHT: 315 LBS | DIASTOLIC BLOOD PRESSURE: 86 MMHG

## 2018-11-08 DIAGNOSIS — G47.9 SLEEP DISORDER: ICD-10-CM

## 2018-11-08 DIAGNOSIS — M17.9 OSTEOARTHRITIS OF KNEE, UNSPECIFIED LATERALITY, UNSPECIFIED OSTEOARTHRITIS TYPE: ICD-10-CM

## 2018-11-08 DIAGNOSIS — E87.6 HYPOKALEMIA: ICD-10-CM

## 2018-11-08 DIAGNOSIS — G60.3 IDIOPATHIC PROGRESSIVE NEUROPATHY: ICD-10-CM
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
## 2018-11-08 DIAGNOSIS — Z23 NEED FOR INFLUENZA VACCINATION: Primary | ICD-10-CM

## 2018-11-08 DIAGNOSIS — L71.0 PERIORIFICIAL DERMATITIS: ICD-10-CM

## 2018-11-08 RX ORDER — PIMECROLIMUS 10 MG/G
CREAM TOPICAL 2 TIMES DAILY
Qty: 60 G | Refills: 3 | Status: SHIPPED | OUTPATIENT
Start: 2018-11-08 | End: 2020-03-26

## 2018-11-08 RX ORDER — POTASSIUM CHLORIDE 1.5 G/1.58G
60 POWDER, FOR SOLUTION ORAL DAILY
Qty: 270 PACKET | Refills: 3 | Status: SHIPPED | OUTPATIENT
Start: 2018-11-08 | End: 2019-09-09

## 2018-11-08 RX ORDER — TRAZODONE HYDROCHLORIDE 100 MG/1
100 TABLET ORAL AT BEDTIME
Qty: 90 TABLET | Refills: 3 | Status: SHIPPED | OUTPATIENT
Start: 2018-11-08 | End: 2019-09-09

## 2018-11-08 RX ORDER — AMITRIPTYLINE HYDROCHLORIDE 50 MG/1
100 TABLET ORAL AT BEDTIME
Qty: 180 TABLET | Refills: 3 | Status: SHIPPED | OUTPATIENT
Start: 2018-11-08 | End: 2020-01-16

## 2018-11-08 ASSESSMENT — PAIN SCALES - GENERAL: PAINLEVEL: WORST PAIN (10)

## 2018-11-08 NOTE — PATIENT INSTRUCTIONS
[facility] Medication Refill Request Information:  * Please contact your pharmacy regarding ANY request for medication refills.  ** [facility] Prescription Fax = [phone]  * Please allow 3 business days for routine medication refills.  * Please allow 5 business days for controlled substance medication refills.     Primary [facility] Test Result notification information:  *You will be notified with in 7-10 days of your appointment day regarding the results of your test.  If you are on MyChart you will be notified as soon as the provider has reviewed the results and signed off on them.    [facility]: [phone]     Dental [phone] ([facility] [place]                [facility]         Internal Medicine Resident                   Continuity Clinic    Who We Are    Resident Continuity Clinic is a part of the [facility] Primary Care Clinic.  Resident physicians see patients independently and establish a relationship with them over the course of their three-year residency program.  As with the Primary Care Clinic, our Resident Continuity Clinic models a group practice.  If your doctor is not available, you will be able to see another resident physician.  At the end of a resident s training, patients will be transitioned to a new resident physician for ongoing care.     We treat patients with a wide array of medical needs from routine physicals, to acute illnesses, to diabetes and blood pressure management, to complex medical illness.  What is a Resident Physician?    Resident physicians hold medical degrees and are doctors. They are training to become specialists in Internal Medicine. They work under the supervision of board-certified faculty physicians.  Expectations for Your Care    We strive to provide accessible, quality care at all times.    In order to provide this care, it is best to see your primary care resident doctor consistently rather switching between providers.  In the event  you do see another physician, you should schedule a follow-up visit with your usual primary care doctor.    If you are transitioning your care from another clinic, it is helpful to have your records available for your doctor to review.    We do not prescribe controlled substances, such as ADD medications or narcotic pain medications, on your first visit.  We will review your health records and concerns prior to devising a treatment plan with you in order to provide the best care.      Clinic Services     Extended clinic hours; patient  to help navigate your visit;  parking; laboratory and imaging services with evening and weekend hours    Multiple medical and surgical specialties in one building    Complementary services, including Nutrition, Integrative Medicine, Pharmacy consultations, Mental and Behavioral Health, Sports Medicine and Physical Therapy    Thank You    We would like to thank you for choosing the [facility] Internal Medicine Resident Continuity Clinic for your primary care. You are making a priceless contribution to the training of the next generation of health care practitioners.     Contact us at [phone] for appointments or questions.    Resident Clinic Hours are Tuesdays and Thursdays, 7:30am-5:00pm    Residents  Sonia Saleem MD   (Female )   Ly Wetzel MD   (Female)   Dimas Bunch MD  (Male)   Joe Segundo MD  (Male)   Rod Ruiz MD   (Female)   Rah Macdonald MD  (Male)    Virgil Moore MD  (Male)   Bob Dahl MD  (Male)   Joe Colindres MD (Male)   Yrn Beaulieu MD  (Male)   Viviane Caraballo MD (Female)    Sandra Irvin MD (Female)   Michelle Kaye MD  (Male)   Lyubov Hernandez MD(Female)   Viki Mancuso MD  (Female)    Supervising Physicians   MD Sarah Red MD Briar Duffy, MD James Langland, MD Mary Logeais, MD Tanya Melnik, MD Charles Moldow, MD Heather Thompson Buum,  MD Malgorzata Payne MD

## 2018-11-08 NOTE — PROGRESS NOTES
"                     PRIMARY CARE CENTER       SUBJECTIVE:  Chavez Ca is a 48 year old male with a PMHx of large left parapharyngeal pleomorphic adenoma that was resected in 2011, tracheostomy 4/2011-8/2012, VERN, idiopathic progressive polyneuropathy, CAD, HTN, obesity who comes in for f/u of multiple issues.    Moceri fall without loss of consciousness on 10/27.  States that he landed on his \"butt\" and slid down the last 3 steps.  He notes worsening right leg and back pain.  Seen in the ED on 10/15 for right lower quadrant abdominal pain and large ecchymosis, CT the time without evidence of intra-abdominal pathology.  Patient cannot recall trauma to the area.  Ecchymosis has improved, still has residual abdominal pain.    Patient was not able to schedule his Derm appointment due to \"insurance issues\".  States that his rosacea is not well controlled, that his nose is \"on fire\".  He states that he [facility].  Expresses some frustration that he was put on meds that \"did not work\".  He does notes that his symptoms are worsened when water touches his skin, develops a \"quarter sized\" areas of redness on his forehead.    Hasn't heard back from dental clinic for evaluation prior to ENT VERN surgery.    Patient notes that he is continued to have difficulty obtaining his prescriptions from his pharmacy.  He continues to be told that he does not have refills available despite evidence to the contrary in our charting system.  States his situation in which he was given only 540 tabs of topiramate to despite release prescription for 720 tablets.  He has enrolled in automatic refill for a few of his meds though states that he declines to refill these when he does not need them.  He states that his ideal situation would be to fill all of his meds for 3 months at a time at the same time.    Medications and allergies reviewed by me today.     ROS:   Constitutional, HEENT, cardiovascular, " pulmonary, gi and gu systems are negative, except as otherwise noted.    OBJECTIVE:    /86 (BP Location: Right arm, Patient Position: Sitting)  Pulse 93  Wt (!) 158.8 kg (350 lb)  SpO2 100%  BMI 54.82 kg/m2   Wt Readings from Last 1 Encounters:   11/08/18 (!) 158.8 kg (350 lb)     General: No acute distress  HEENT: Normocephalic, atraumatic, extraocular movements intact, sclera anicteric  CV: Regular rate rhythm, no murmurs rubs or gallops  Lungs: Clear to auscultation bilaterally  Abdomen: Obese, soft, mild right lower quadrant tenderness, no ecchymosis  Extremities: No lower extremity edema     ASSESSMENT/PLAN:    Chavez was seen today for refill request.    Diagnoses and all orders for this visit:    Fall down stairs, initial encounter  Patient notes worsening lumbar back pain and right leg pain.  Patient does wear orthotics and has appointment with podiatry to have orthotics replaced.  Will evaluate for fracture with x-rays.  Also recommended ongoing weight loss with patient as this will likely improve his lumbar back pain as well.  -     XR Pelvis and Hip Bilateral 2 Views; Future  -     XR Lumbar Spine 2-3 Views; Future    Idiopathic progressive neuropathy  Osteoarthritis of knee, unspecified laterality, unspecified osteoarthritis type  Periorificial dermatitis  Hypokalemia  Sleep disorder  Provided patient today with physical scripts of his medications with refills to take to the pharmacy.  If he is still unable to fill these prescriptions,  there is likely to be a systemic issue or issue with insurance.  -     amitriptyline (ELAVIL) 50 MG tablet; Take 2 tablets (100 mg) by mouth At Bedtime  -     diclofenac (VOLTAREN) 1 % GEL topical gel; Apply 4 grams to knees four times daily using enclosed dosing card.  -     pimecrolimus (ELIDEL) 1 % cream; Apply topically 2 times daily  -     potassium chloride (KLOR-CON) 20 MEQ Packet; Take 60 mEq by mouth daily  -     traZODone (DESYREL) 100 MG tablet; Take  1 tablet (100 mg) by mouth At Bedtime     Need for influenza vaccination  -     FLU VACCINE, 3 YRS +, IM (FLUZONE)  -     VACCINE ADMINISTRATION, INITIAL    Pt should return to clinic for f/u in 3 month     Alen Colindres MD  Nov 8, 2018    Pt was seen and plan of care discussed with Dr Feldman.   Mr Ortega , Well known to  Me) was seen and examined with the resident Dr Colindres .  I have reviewed the note and agreered with his   Assessment.Mr ortega's weight continues ,along with his frustration, to be an impediment to his overall improvement. Will contact Dr Chang re the realistic likely alarcon of Bariatric surgery.  Arnold Feldman MD

## 2018-11-08 NOTE — NURSING NOTE
Chief Complaint   Patient presents with     Refill Request     6 weeks follow up- patient wants paper copies of all of his meds, says that the pharmacy did not get the prescriptions from last visit      Julieta Rivera at 10:21 AM on 11/8/2018.

## 2018-11-08 NOTE — MR AVS SNAPSHOT
After Visit Summary   11/8/2018    Chavez Ca    MRN: [id number]           Patient Information     Date Of Birth          1970        Visit Information        Provider Department      11/8/2018 9:55 AM Alen Colindres MD [facility]        Today's Diagnoses     Need for influenza vaccination    -  1    Fall down stairs, initial encounter        Idiopathic progressive neuropathy        Osteoarthritis of knee, unspecified laterality, unspecified osteoarthritis type        Periorificial dermatitis        Hypokalemia        Sleep disorder          Care Instructions    Primary Care Center Medication Refill Request Information:  * Please contact your pharmacy regarding ANY request for medication refills.  ** [facility] Prescription Fax = [phone]  * Please allow 3 business days for routine medication refills.  * Please allow 5 business days for controlled substance medication refills.     Primary Care Center Test Result notification information:  *You will be notified with in 7-10 days of your appointment day regarding the results of your test.  If you are on MyChart you will be notified as soon as the provider has reviewed the results and signed off on them.    Primary Care Center: [phone]     Dental [phone] ([facility] [place], 4th Floor S)                [facility]         Internal Medicine Resident                   Continuity Clinic    Who We Are    Resident Continuity Clinic is a part of the [facility].  Resident physicians see patients independently and establish a relationship with them over the course of their three-year residency program.  As with the Primary Care Clinic, our Resident Continuity Clinic models a group practice.  If your doctor is not available, you will be able to see another resident physician.  At the end of a resident s training, patients will be transitioned to a new resident physician for ongoing care.     We treat  patients with a wide array of medical needs from routine physicals, to acute illnesses, to diabetes and blood pressure management, to complex medical illness.  What is a Resident Physician?    Resident physicians hold medical degrees and are doctors. They are training to become specialists in Internal Medicine. They work under the supervision of board-certified faculty physicians.  Expectations for Your Care    We strive to provide accessible, quality care at all times.    In order to provide this care, it is best to see your primary care resident doctor consistently rather switching between providers.  In the event you do see another physician, you should schedule a follow-up visit with your usual primary care doctor.    If you are transitioning your care from another clinic, it is helpful to have your records available for your doctor to review.    We do not prescribe controlled substances, such as ADD medications or narcotic pain medications, on your first visit.  We will review your health records and concerns prior to devising a treatment plan with you in order to provide the best care.      Clinic Services     Extended clinic hours; patient  to help navigate your visit;  parking; laboratory and imaging services with evening and weekend hours    Multiple medical and surgical specialties in one building    Complementary services, including Nutrition, Integrative Medicine, Pharmacy consultations, Mental and Behavioral Health, Sports Medicine and Physical Therapy    Thank You    We would like to thank you for choosing the [facility] Internal [facility] Resident Continuity Clinic for your primary care. You are making a priceless contribution to the training of the next generation of health care practitioners.     Contact us at [phone] for appointments or questions.    Resident Clinic Hours are Tuesdays and Thursdays, 7:30am-5:00pm    Residents  Sonia Saleem MD   (Female )   Ly  MD Damari   (Female)   Dimas Bunch MD  (Male)   Joe Segundo MD  (Male)   Rod Ruiz MD   (Female)   Rah Macdonald MD  (Male)    Virgil Moore MD  (Male)   Bob Dahl MD  (Male)   Joe Colindres MD (Male)   Yrn Beaulieu MD  (Male)   Viviane Caraballo MD (Female)    Sandra Irvin MD (Female)   Michelle Kaye MD  (Male)   Lyubov Hernandez MD(Female)   Viki Mancuso MD  (Female)    Supervising Physicians   MD Sarah Red, MD Donovan Wolfe, MD Glen Aguirre, MD Daniela Uriostegui, MD Charu Aguilar, MD Lynn Vasques, MD Malgorzata Payne MD                    Follow-ups after your visit        Follow-up notes from your care team     Return in about 3 months (around 2/8/2019).      Your next 10 appointments already scheduled     Nov 13, 2018  8:30 AM CST   (Arrive by 8:15 AM)   New Patient Visit with Marcela Abreu MD   [facility] ([facility] [place])    [address] [place] [id number] [phone]            Nov 26, 2018 10:40 AM CST   (Arrive by 10:25 AM)   [facility]    [address] [place] [id number] [phone]            Jan 28, 2019  9:45 AM CST   (Arrive by 9:30 AM)   Return Weight Management Visit with Fortino Chang MD   [facility] ([facility])    [address] [place] [id number] [phone]              Future tests that were ordered for you today     Open Future Orders        Priority Expected Expires Ordered    XR Pelvis and Hip Bilateral 2 Views Routine 11/8/2018 11/8/2019 11/8/2018    XR Lumbar Spine 2-3 Views Routine 11/8/2018 11/8/2019 11/8/2018            Who to contact     Please call your clinic at [phone] to:    Ask  questions about your health    Make or cancel appointments    Discuss your medicines    Learn about your test results    Speak to your doctor            Additional Information About Your Visit        [email or web address] Information     Webs gives you secure access to your electronic health record. If you see a primary care provider, you can also send messages to your care team and make appointments. If you have questions, please call your primary care clinic.  If you do not have a primary care provider, please call [phone] and they will assist you.      Webs is an electronic gateway that provides easy, online access to your medical records. With Webs, you can request a clinic appointment, read your test results, renew a prescription or communicate with your care team.     To access your existing account, please contact your [facility] or call [phone] for assistance.        Care EveryWhere ID     This is your Care EveryWhere ID. This could be used by other organizations to access your [place] medical records  [id number]        Your Vitals Were     Pulse Pulse Oximetry BMI (Body Mass Index)             93 100% 54.82 kg/m2          Blood Pressure from Last 3 Encounters:   11/08/18 135/86   10/15/18 147/86   09/27/18 124/86    Weight from Last 3 Encounters:   11/08/18 (!) 158.8 kg (350 lb)   10/15/18 (!) 154.6 kg (340 lb 14.4 oz)   09/13/18 147.8 kg (325 lb 12.8 oz)              We Performed the Following     FLU VACCINE, 3 YRS +, IM (FLUZONE)     VACCINE ADMINISTRATION, INITIAL          Where to get your medicines      Some of these will need a paper prescription and others can be bought over the counter.  Ask your nurse if you have questions.     Bring a paper prescription for each of these medications     amitriptyline 50 MG tablet    diclofenac 1 % Gel topical gel    pimecrolimus 1 % cream    potassium chloride 20 MEQ Packet    traZODone 100 MG tablet          Primary Care  Provider Office Phone # Fax #    [facility] [phone]       No address on file        Equal Access to Services     TALHA TEIXEIRA : Hadagnieszka aad ku hadrolanda Freitas, nancy rembertokia, jd lamb, lacey ferraraaura leonid So [facility] [phone].    ATENCIÓN: Si habla español, tiene a ackerman disposición servicios gratuitos de asistencia lingüística. Llame al [phone].    We comply with applicable federal civil rights laws and [place] laws. We do not discriminate on the basis of race, color, national origin, age, disability, sex, sexual orientation, or gender identity.            Thank you!     Thank you for choosing [facility]  for your care. Our goal is always to provide you with excellent care. Hearing back from our patients is one way we can continue to improve our services. Please take a few minutes to complete the written survey that you may receive in the mail after your visit with us. Thank you!             Your Updated Medication List - Protect others around you: Learn how to safely use, store and throw away your medicines at www.disposemymeds.org.          This list is accurate as of 11/8/18 11:52 AM.  Always use your most recent med list.                   Brand Name Dispense Instructions for use Diagnosis    amitriptyline 50 MG tablet    ELAVIL    180 tablet    Take 2 tablets (100 mg) by mouth At Bedtime    Idiopathic progressive neuropathy       COMPRESSION STOCKINGS     2 each    20-30 mmHg knee high compression stockings.  Measure and fit.  Style and brand per patient preference.    Venous insufficiency (chronic) (peripheral)       diclofenac 1 % Gel topical gel    VOLTAREN    100 g    Apply 4 grams to knees four times daily using enclosed dosing card.    Osteoarthritis of knee, unspecified laterality, unspecified osteoarthritis type       furosemide 20 MG tablet    LASIX    540 tablet    Take 3 tablets (60 mg) by mouth 2 times  daily    Stasis edema of both lower extremities       ketoconazole 2 % shampoo    NIZORAL    120 mL    Apply to the affected area and wash off after 5 minutes.    Seborrheic dermatitis       METROGEL 1 % gel   Generic drug:  metroNIDAZOLE           moisturizing lotion      Apply topically 2 times daily        phentermine 37.5 MG tablet    ADIPEX-P    30 tablet    Take 1 tablet (37.5 mg) by mouth every morning    Morbid obesity (H)       pimecrolimus 1 % cream    ELIDEL    60 g    Apply topically 2 times daily    Periorificial dermatitis       potassium chloride 20 MEQ Packet    KLOR-CON    270 packet    Take 60 mEq by mouth daily    Hypokalemia       simethicone 125 MG Chew chewable tablet    [employer] GAS RELIEF EXTRA STRENGTH    120 tablet    Take 1 tablet (125 mg) by mouth 2 times daily        topiramate 50 MG tablet    TOPAMAX    720 tablet    Take 4 tablets (200 mg) by mouth 2 times daily TAKE 3 TABS (150 MG) IN THE AM AND 3 TABS (150 MG) IN THE PM AFTER AS TOLERATED    Morbid obesity (H)       traZODone 100 MG tablet    DESYREL    90 tablet    Take 1 tablet (100 mg) by mouth At Bedtime    Sleep disorder       Vitamin D (Cholecalciferol) 400 units Caps     90 capsule    Take 400 Units by mouth daily    Vitamin D deficiency       zolpidem 10 MG tablet    AMBIEN    30 tablet    Take 1 tablet (10 mg) by mouth nightly as needed for sleep    Insomnia due to medical condition

## 2018-11-08 NOTE — LETTER
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    Moceri fall without loss of consciousness on 10/27.  States that he landed on his \"butt\" and slid down the last 3 steps.  He notes worsening right leg and back pain.  Seen in the ED on 10/15 for right lower quadrant abdominal pain and large ecchymosis, CT the time without evidence of intra-abdominal pathology.  Patient cannot recall trauma to the area.  Ecchymosis has improved, still has residual abdominal pain.    Patient was not able to schedule his Derm appointment due to \"insurance issues\".  States that his rosacea is not well controlled, that his nose is \"on fire\".  He states that he [facility] [place] [facility].  Expresses some frustration that he was put on meds that \"did not work\".  He does notes that his symptoms are worsened when water touches his skin, develops a \"quarter sized\" areas of redness on his forehead.    Hasn't heard back from dental clinic for evaluation prior to ENT VERN surgery.    Patient notes that he is continued to have difficulty obtaining his prescriptions from his pharmacy.  He continues to be told that he does not have refills available despite evidence to the contrary in our charting system.  States his situation in which he was given only 540 tabs of topiramate to despite release prescription for 720 tablets.  He has enrolled in automatic refill for a few of his meds though states that he declines to refill these when he does not need them.  He states that his ideal situation would be to fill all of his meds for 3 months at a time at the same time.    Medications and " allergies reviewed by me today.     ROS:   Constitutional, HEENT, cardiovascular, pulmonary, gi and gu systems are negative, except as otherwise noted.    OBJECTIVE:    /86 (BP Location: Right arm, Patient Position: Sitting)  Pulse 93  Wt (!) 158.8 kg (350 lb)  SpO2 100%  BMI 54.82 kg/m2   Wt Readings from Last 1 Encounters:   11/08/18 (!) 158.8 kg (350 lb)     General: No acute distress  HEENT: Normocephalic, atraumatic, extraocular movements intact, sclera anicteric  CV: Regular rate rhythm, no murmurs rubs or gallops  Lungs: Clear to auscultation bilaterally  Abdomen: Obese, soft, mild right lower quadrant tenderness, no ecchymosis  Extremities: No lower extremity edema     ASSESSMENT/PLAN:    Chavez was seen today for refill request.    Diagnoses and all orders for this visit:    Fall down stairs, initial encounter  Patient notes worsening lumbar back pain and right leg pain.  Patient does wear orthotics and has appointment with podiatry to have orthotics replaced.  Will evaluate for fracture with x-rays.  Also recommended ongoing weight loss with patient as this will likely improve his lumbar back pain as well.  -     XR Pelvis and Hip Bilateral 2 Views; Future  -     XR Lumbar Spine 2-3 Views; Future    Idiopathic progressive neuropathy  Osteoarthritis of knee, unspecified laterality, unspecified osteoarthritis type  Periorificial dermatitis  Hypokalemia  Sleep disorder  Provided patient today with physical scripts of his medications with refills to take to the pharmacy.  If he is still unable to fill these prescriptions,  there is likely to be a systemic issue or issue with insurance.  -     amitriptyline (ELAVIL) 50 MG tablet; Take 2 tablets (100 mg) by mouth At Bedtime  -     diclofenac (VOLTAREN) 1 % GEL topical gel; Apply 4 grams to knees four times daily using enclosed dosing card.  -     pimecrolimus (ELIDEL) 1 % cream; Apply topically 2 times daily  -     potassium chloride (KLOR-CON) 20 MEQ  Packet; Take 60 mEq by mouth daily  -     traZODone (DESYREL) 100 MG tablet; Take 1 tablet (100 mg) by mouth At Bedtime     Need for influenza vaccination  -     FLU VACCINE, 3 YRS +, IM (FLUZONE)  -     VACCINE ADMINISTRATION, INITIAL    Pt should return to clinic for f/u in 3 month     Alen Colindres MD  Nov 8, 2018    Pt was seen and plan of care discussed with Dr Feldman.   Mr Ortega , Well known to  Me) was seen and examined with the resident Dr Colindres .  I have reviewed the note and agreered with his   Assessment.Mr ortega's weight continues ,along with his frustration, to be an impediment to his overall improvement. Will contact Dr Chang re the realistic likely alarcon of Bariatric surgery.  Arnold Colindres MD

## 2018-11-21 ENCOUNTER — OFFICE VISIT (OUTPATIENT)
Dept: NEUROLOGY | Facility: CLINIC | Age: 48
End: 2018-11-21
Payer: MEDICARE

## 2018-11-21 VITALS
HEART RATE: 114 BPM | OXYGEN SATURATION: 99 % | WEIGHT: 315 LBS | SYSTOLIC BLOOD PRESSURE: 127 MMHG | DIASTOLIC BLOOD PRESSURE: 85 MMHG | BODY MASS INDEX: 49.44 KG/M2 | HEIGHT: 67 IN

## 2018-11-21 DIAGNOSIS — G89.4 CHRONIC PAIN SYNDROME: ICD-10-CM

## 2018-11-21 DIAGNOSIS — G62.9 SMALL FIBER NEUROPATHY: Primary | ICD-10-CM

## 2018-11-21 ASSESSMENT — ENCOUNTER SYMPTOMS
JAUNDICE: 0
DIZZINESS: 1
BLOATING: 1
STIFFNESS: 1
HOARSE VOICE: 0
LEG PAIN: 1
EXERCISE INTOLERANCE: 1
TROUBLE SWALLOWING: 1
SORE THROAT: 1
BACK PAIN: 1
DECREASED CONCENTRATION: 1
LOSS OF CONSCIOUSNESS: 1
FATIGUE: 1
LIGHT-HEADEDNESS: 1
HYPERTENSION: 1
SYNCOPE: 1
DISTURBANCES IN COORDINATION: 1
SEIZURES: 0
NAUSEA: 0
CONSTIPATION: 0
HYPOTENSION: 0
DIARRHEA: 0
SINUS CONGESTION: 0
SWOLLEN GLANDS: 0
WHEEZING: 1
SINUS PAIN: 1
MUSCLE CRAMPS: 1
NIGHT SWEATS: 1
PANIC: 0
NAIL CHANGES: 0
ARTHRALGIAS: 1
SKIN CHANGES: 0
MEMORY LOSS: 1
COUGH DISTURBING SLEEP: 1
DEPRESSION: 1
NECK PAIN: 1
BRUISES/BLEEDS EASILY: 1
BLOOD IN STOOL: 0
POOR WOUND HEALING: 1
NERVOUS/ANXIOUS: 1
RECTAL PAIN: 1
PALPITATIONS: 0
HEMATURIA: 0
NECK MASS: 0
ABDOMINAL PAIN: 1
DIFFICULTY URINATING: 1
COUGH: 0
TREMORS: 0
FEVER: 0
HEARTBURN: 0
ALTERED TEMPERATURE REGULATION: 1
DYSPNEA ON EXERTION: 1
HEADACHES: 0
POLYDIPSIA: 1
WEAKNESS: 1
SPEECH CHANGE: 1
JOINT SWELLING: 0
HALLUCINATIONS: 0
MUSCLE WEAKNESS: 1
WEIGHT LOSS: 0
SPUTUM PRODUCTION: 1
HEMOPTYSIS: 0
DYSURIA: 0
PARALYSIS: 0
FLANK PAIN: 0
DECREASED APPETITE: 0
ORTHOPNEA: 1
CHILLS: 1
VOMITING: 1
POSTURAL DYSPNEA: 1
TASTE DISTURBANCE: 0
SLEEP DISTURBANCES DUE TO BREATHING: 1
WEIGHT GAIN: 1
BOWEL INCONTINENCE: 0
POLYPHAGIA: 0
SHORTNESS OF BREATH: 1
TINGLING: 1
INCREASED ENERGY: 1
SMELL DISTURBANCE: 0
NUMBNESS: 1
MYALGIAS: 1
INSOMNIA: 1
SNORES LOUDLY: 1

## 2018-11-21 NOTE — PATIENT INSTRUCTIONS
It was a pleasure to see you in clinic today.    After discussing your symptoms and overall concerns, we decided upon the following plan:    - Start alpha lipoic acid for your chronic nerve pain. This is an over-the-counter medication.     - Please make appointment to be seen in pain clinic    - Follow-up in neurology clinic as needed      Please call into neurology clinic or send a message via [employer] if you have any further questions or concerns.       Piedad Islas MD  Neurology resident

## 2018-11-21 NOTE — MR AVS SNAPSHOT
After Visit Summary   11/21/2018    Chavez Ca    MRN: [id number]           Patient Information     Date Of Birth          1970        Visit Information        Provider Department      11/21/2018 2:25 PM Piedad Islas MD [facility] Neurology        Today's Diagnoses     Small fiber neuropathy    -  1    Chronic pain syndrome          Care Instructions    It was a pleasure to see you in clinic today.    After discussing your symptoms and overall concerns, we decided upon the following plan:    - Start alpha lipoic acid for your chronic nerve pain. This is an over-the-counter medication.     - Please make appointment to be seen in pain clinic    - Follow-up in neurology clinic as needed      Please call into neurology clinic or send a message via JayCut if you have any further questions or concerns.       Piedad Islas MD  Neurology resident             Follow-ups after your visit        Additional Services     PAIN MANAGEMENT REFERRAL       Your provider has referred you to: [facility]: [facility] [place] [phone] [email or web address]      Please call [phone] to make an appointment. Clinic is located: [facility] [address] [place] [address] [id number] 4th [place] [address]      Please complete the following questions:    Procedure/Referral: Referral Only -  Comprehensive Evaluation and Management    What is your diagnosis for the patient's pain? Small fiber neuropathy with chronic pain     What are your specific questions for the pain specialist? Long term pain management     Are there any red flags that may impact the assessment or management of the patient? None         Please note the Pre-Op Pain Consult must be scheduled 2-3 weeks prior to the patient's surgery.  Patient's trying to schedule within 2 weeks of surgery may not be accommodated.     Pre-Op  Pain Consults are only good for 30 days.    **ANY DIAGNOSTIC TESTS THAT ARE NOT IN EPIC SHOULD BE SENT TO THE PAIN CENTER**    REGARDING OPIOID MEDICATIONS:  The discussion of opioids management, appropriateness of therapy, and dosing will be discussed in patients being seen for evaluation.  The pain management clinics are not long-term prescribing clinics, with transition of prescribing of medications ultimately going back to the referring provider/PCP.  If prescribing is taken over at the pain clinic, it is in actively involved patients whom are appropriate for opioids, urine drug screening is completed, and long-term prescribing plan has been determined.  Therefore, we will not be automatically taking over prescribing at the patient's first visit.  Is this agreeable to you? agrees.     Please be aware that coverage of these services is subject to the terms and limitations of your health insurance plan.  Call member services at your health plan with any benefit or coverage questions.      Please bring the following with you to your appointment:    (1) Any X-Rays, CTs or MRIs which have been performed.  Contact the facility where they were done to arrange for  prior to your scheduled appointment.    (2) List of current medications   (3) This referral request   (4) Any documents/labs given to you for this referral                  Follow-up notes from your care team     Return if symptoms worsen or fail to improve.      Your next 10 appointments already scheduled     Nov 26, 2018 10:40 AM CST   (Arrive by 10:25 AM)   [facility]    [address] [place] [id number]   [phone]            Jan 28, 2019  9:45 AM CST   (Arrive by 9:30 AM)   Return Weight Management Visit with Fortino Chang MD   [facility] ([facility])    [facility]  "[address] [id number] [phone]              Who to contact     Please call your clinic at [phone] to:    Ask questions about your health    Make or cancel appointments    Discuss your medicines    Learn about your test results    Speak to your doctor            Additional Information About Your Visit        MyChart Information     DIYt gives you secure access to your electronic health record. If you see a primary care provider, you can also send messages to your care team and make appointments. If you have questions, please call your primary care clinic.  If you do not have a primary care provider, please call [phone] and they will assist you.      [employer] is an electronic gateway that provides easy, online access to your medical records. With [employer], you can request a clinic appointment, read your test results, renew a prescription or communicate with your care team.     To access your existing account, please contact your [facility] or call [phone] for assistance.        Care EveryWhere ID     This is your Care EveryWhere ID. This could be used by other organizations to access your [place] medical records  [id number]        Your Vitals Were     Pulse Height Pulse Oximetry BMI (Body Mass Index)          114 1.702 m (5' 7\") 99% 54.66 kg/m2         Blood Pressure from Last 3 Encounters:   11/21/18 127/85   11/08/18 135/86   10/15/18 147/86    Weight from Last 3 Encounters:   11/21/18 (!) 158.3 kg (349 lb)   11/08/18 (!) 158.8 kg (350 lb)   10/15/18 (!) 154.6 kg (340 lb 14.4 oz)              We Performed the Following     PAIN MANAGEMENT REFERRAL        Primary Care Provider Office Phone # Fax #    [facility] [phone]       No address on file        Equal Access to Services     TALHA TEIXEIRA : nancy Chicas, lacey barr " sekouitalojammie epsteinTatyanaaaaura ah. So [facility] [phone].    ATENCIÓN: Si yumiko guerra, tiene a ackerman disposición servicios gratuitos de asistencia lingüística. David gillis [phone].    We comply with applicable federal civil rights laws and [place] laws. We do not discriminate on the basis of race, color, national origin, age, disability, sex, sexual orientation, or gender identity.            Thank you!     Thank you for choosing [facility] NEUROLOGY  for your care. Our goal is always to provide you with excellent care. Hearing back from our patients is one way we can continue to improve our services. Please take a few minutes to complete the written survey that you may receive in the mail after your visit with us. Thank you!             Your Updated Medication List - Protect others around you: Learn how to safely use, store and throw away your medicines at www.disposemymeds.org.          This list is accurate as of 11/21/18  3:06 PM.  Always use your most recent med list.                   Brand Name Dispense Instructions for use Diagnosis    amitriptyline 50 MG tablet    ELAVIL    180 tablet    Take 2 tablets (100 mg) by mouth At Bedtime    Idiopathic progressive neuropathy       COMPRESSION STOCKINGS     2 each    20-30 mmHg knee high compression stockings.  Measure and fit.  Style and brand per patient preference.    Venous insufficiency (chronic) (peripheral)       diclofenac 1 % topical gel    VOLTAREN    100 g    Apply 4 grams to knees four times daily using enclosed dosing card.    Osteoarthritis of knee, unspecified laterality, unspecified osteoarthritis type       furosemide 20 MG tablet    LASIX    540 tablet    Take 3 tablets (60 mg) by mouth 2 times daily    Stasis edema of both lower extremities       ketoconazole 2 % shampoo    NIZORAL    120 mL    Apply to the affected area and wash off after 5 minutes.    Seborrheic dermatitis       METROGEL 1 % gel   Generic drug:  metroNIDAZOLE           moisturizing lotion       Apply topically 2 times daily        phentermine 37.5 MG tablet    ADIPEX-P    30 tablet    Take 1 tablet (37.5 mg) by mouth every morning    Morbid obesity (H)       pimecrolimus 1 % cream    ELIDEL    60 g    Apply topically 2 times daily    Periorificial dermatitis       potassium chloride 20 MEQ Packet    KLOR-CON    270 packet    Take 60 mEq by mouth daily    Hypokalemia       simethicone 125 MG Chew chewable tablet    [employer] GAS RELIEF EXTRA STRENGTH    120 tablet    Take 1 tablet (125 mg) by mouth 2 times daily        topiramate 50 MG tablet    TOPAMAX    720 tablet    Take 4 tablets (200 mg) by mouth 2 times daily TAKE 3 TABS (150 MG) IN THE AM AND 3 TABS (150 MG) IN THE PM AFTER AS TOLERATED    Morbid obesity (H)       traZODone 100 MG tablet    DESYREL    90 tablet    Take 1 tablet (100 mg) by mouth At Bedtime    Sleep disorder       vitamin D3 400 units capsule    CHOLECALCIFEROL    90 capsule    Take 400 Units by mouth daily    Vitamin D deficiency       zolpidem 10 MG tablet    AMBIEN    30 tablet    Take 1 tablet (10 mg) by mouth nightly as needed for sleep    Insomnia due to medical condition

## 2018-11-21 NOTE — LETTER
"2018       RE: Chavez Ca  [address]  [place] [address]     Dear Colleague,    Thank you for referring your patient, Chavez Ca, to the [facility]UR[facility] at [facility]. Please see a copy of my visit note below.          DEPARTMENT OF NEUROLOGY    Patient Name:  Chavez Ca  MRN:  [id number]    :  1970  Date of Clinic Visit:  2018  Primary Care Provider:  Clinic, [facility]    CHIEF COMPLAINT: \"nerve pain\"    HISTORY OF PRESENT ILLNESS:  Chavez Ca is a 48 year old male, history of chronic pain, small fiber neuropathy (seen on previous biopsy in ), prediabetes, obstructive sleep apnea, CAD, hypertension, morbid obesity, who presents for new opinion on his ongoing nerve pain.  History is taken from the patient and available notes.  The following is known:    Patient has a years long history of multiple nerve pains.  He is previously seen Dr. Bethea in the neurology clinic, most recently in .  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  The small fiber neuropathy symptom onset was in .  Diagnosis was confirmed in  by biopsy.  Patient is somewhat tangential during the interview and has several outbursts of anger.  He states that he is frustrated that nothing has been done over his pain and he would like to know what the next step is.  He says that he is in something of a holding pattern because none of his doctors will move forward without the permission of other doctors.  When asked specifically about the pain, he notes that it is the ongoing burning stabbing pain that he is always had.  He does think it was slightly better when he lost some weight recently but that it is now worsened when he gained back that way.  Further questions try to delineate the nature and severity of the pain elicited " angry, tangential responses.  He denies any other new complaints.  He does multiple times stated his frustration with medical care is received to date.    Patient does also speak about a recent trip to [place].  He says that while he was there, doctors immediately went about fixing his leg pains.  He describes surgeries and shows some bruises over both legs.  He cannot state the exact nature of the pain but by his description it does appear that he had varicose veins removed.  As before, further questions trended to delineate the nature of this trip to [place] elicited some tangential thought and intermittent anger.    REVIEW OF SYSTEMS  A 10 point review of symptoms was negative except as indicated above in the HPI    ALLERGIES:  Allergies   Allergen Reactions     Fentanyl Itching     Patch     Meloxicam Other (See Comments)     Side pains.     Minocycline Rash     Patient reports adverse reaction was pigmentation which has resolved with drug discontinuation.     MEDICATIONS:  Current Outpatient Prescriptions   Medication Sig Dispense Refill     amitriptyline (ELAVIL) 50 MG tablet Take 2 tablets (100 mg) by mouth At Bedtime 180 tablet 3     CETAPHIL (CETAPHIL) lotion Apply topically 2 times daily       COMPRESSION STOCKINGS 20-30 mmHg knee high compression stockings.  Measure and fit.  Style and brand per patient preference. 2 each 0     diclofenac (VOLTAREN) 1 % GEL topical gel Apply 4 grams to knees four times daily using enclosed dosing card. 100 g 1     furosemide (LASIX) 20 MG tablet Take 3 tablets (60 mg) by mouth 2 times daily 540 tablet 3     ketoconazole (NIZORAL) 2 % shampoo Apply to the affected area and wash off after 5 minutes. 120 mL 1     metroNIDAZOLE (METROGEL) 1 % gel        phentermine (ADIPEX-P) 37.5 MG tablet Take 1 tablet (37.5 mg) by mouth every morning 30 tablet 3     pimecrolimus (ELIDEL) 1 % cream Apply topically 2 times daily 60 g 3     potassium chloride (KLOR-CON) 20 MEQ  Packet Take 60 mEq by mouth daily 270 packet 3     simethicone ([employer] GAS RELIEF EXTRA STRENGTH) 125 MG CHEW chewable tablet Take 1 tablet (125 mg) by mouth 2 times daily 120 tablet 0     topiramate (TOPAMAX) 50 MG tablet Take 4 tablets (200 mg) by mouth 2 times daily TAKE 3 TABS (150 MG) IN THE AM AND 3 TABS (150 MG) IN THE PM AFTER AS TOLERATED 720 tablet 1     traZODone (DESYREL) 100 MG tablet Take 1 tablet (100 mg) by mouth At Bedtime 90 tablet 3     Vitamin D, Cholecalciferol, 400 UNITS CAPS Take 400 Units by mouth daily 90 capsule 3     zolpidem (AMBIEN) 10 MG tablet Take 1 tablet (10 mg) by mouth nightly as needed for sleep 30 tablet 1     [DISCONTINUED] COMPRESSION STOCKINGS 20-30 mmHg knee high compression stockings 2 each 1     PAST MEDICAL HISTORY:  Past Medical History:   Diagnosis Date     Allergic rhinitis      Benign positional vertigo 2011    never quite went away     Bilateral carpal tunnel syndrome 7/18/2015     Chest pain      Chronic sinusitis 2017    I don't know Please look     Chronic tonsillitis      Coronary artery disease 3/8/16    right side chest pain     Depressive disorder      Depressive disorder, not elsewhere classified 7/30/2012     Gastro-oesophageal reflux disease      Hearing problem      Hypertension 2008     Idiopathic progressive polyneuropathy 10/15/2012     Learning disability      Neck mass     parapharyngeal, benign     Obesity, Class III, BMI 40-49.9 (morbid obesity) (H)      Obstructive sleep apnea      Psychological factors associated with another disorder 8/21/2012     Recurrent otitis media 2016    might be what the pain in my ears     Reduced vision      Shortness of breath      Tinnitus 2016    I would hear a steady light buzzing sound and other can't     Trigeminal neuralgia      Weakness 8/26/2011     PAST SURGICAL HISTORY:  Past Surgical History:   Procedure Laterality Date     BIOPSY  2011    To figure out what kind of tumor I had     ENDOSCOPIC RETROGRADE  CHOLANGIOPANCREATOGRAM N/A 8/27/2015    Procedure: COMBINED ENDOSCOPIC RETROGRADE CHOLANGIOPANCREATOGRAPHY, PLACE TUBE/STENT;  Surgeon: Baldomero Zacarias MD;  Location: [facility] OR     ENDOSCOPIC RETROGRADE CHOLANGIOPANCREATOGRAM N/A 11/6/2015    Procedure: COMBINED ENDOSCOPIC RETROGRADE CHOLANGIOPANCREATOGRAPHY, REMOVE FOREIGN BODY OR STENT/TUBE;  Surgeon: Baldomero Zacarias MD;  Location: [facility] OR     EXCISE LESION INTRAORAL  6/29/2011    Procedure:EXCISE LESION INTRAORAL; TRANSCERVICAL APPROACH TO LEFT PHARYNGEAL SPACE MASS REMOVAL ; Surgeon:BARBARA PHILLIPS; Location:[facility] OR     HC VASCULAR SURGERY PROCEDURE UNLIST  12/29/15     LAPAROSCOPIC CHOLECYSTECTOMY N/A 8/23/2015    Procedure: LAPAROSCOPIC CHOLECYSTECTOMY;  Surgeon: Kvng Witt MD;  Location: [facility] OR     LARYNGOSCOPY WITH BIOPSY(IES)  6/18/2014    Procedure: LARYNGOSCOPY WITH BIOPSY(IES);  Surgeon: Barbara Phillips MD;  Location: [facility] OR     LASER CO2 LARYNGOSCOPY  12/7/2011    Procedure:LASER CO2 LARYNGOSCOPY; Micro-Direct Laryngoscopy with CO2 Laser Standby / Excision Tracheal Grandulization, Trach Tube Change / Kenalog application. / Balloon Dilation of Subglottic Stenosis.*Latex Safe Room* Trach Tube = Shiley 6.4mm I.D. / 10.8MM O.D. / 76MM  Cuffless.; Surgeon:BARBARA PHILLIPS; Location: OR     ORTHOPEDIC SURGERY  5/2016    Broken Right hand & Fracture Right shoulder     TRACHEOSTOMY  4/22/2011    Procedure:TRACHEOSTOMY; possible awake; Surgeon:BARBARA PHILLIPS; Location:[facility] OR     TRACHEOSTOMY  6/29/2011    Procedure:TRACHEOSTOMY; REMOVE AND REPLACE SHILEY 6 XLT; Surgeon:BARBARA PHILLIPS; Location: OR     VASCULAR SURGERY  2008-[facility]     SOCIAL HISTORY:  Social History     Social History     Marital status: Single     Spouse name: N/A     Number of children: N/A     Years of education: N/A     Occupational History     Not on file.     Social History Main Topics     Smoking status: Never  "Smoker     Smokeless tobacco: Never Used      Comment: Never started     Alcohol use No      Comment: rarely     Drug use: No     Sexual activity: No     Other Topics Concern     Exercise Yes     2-3x/wk     Social History Narrative    The patient has a 0 pk yr tobacco hx.  He has no active use.  Alcohol use is <1 alcoholic drinks per week.  He denies use of recreational drugs.          He is disability.         The patient is single.  Has 1 child.        Pt adopted at age 14.          FAMILY HISTORY:  Family History   Problem Relation Age of Onset     Adopted: Yes     Family History Negative Other      Does not know family     Unknown/Adopted No family hx of          PHYSICAL EXAMINATION:    Vitals:   /85 (BP Location: Left arm)  Pulse 114  Ht 1.702 m (5' 7\")  Wt (!) 158.3 kg (349 lb)  SpO2 99%  BMI 54.66 kg/m2    -General: Man sitting comfortably on the chair. No acute distress.  Affect ranges from angry to subdued    -HEENT: No skin discolorations noted, no carotid bruits     -Chest: RRR without murmurs or bruits     -Abdomen: Positive bowel sounds, soft, non-tender, no organomegaly    -Musculoskeletal: No abnormalities noted     -Neurological:     --MS: Patient is alert, attentive, and oriented. Speech is clear and fluid. Names normally. Registration, recall and remote memory intact. Mental math normal.     --CNs: Visual fields are full to confrontation. Pupils are briskly reactive to light. Visual fields full. Ocular motility full without nystagmus, facial sensation intact, muscles of mastication and facial expression normal, hearing intact, gag and palate elevation normal, sternomastoid and trapezius function normal, tongue motions normal     --Motor: Normal muscle tone and bulk. 5/5 muscle strength bilaterally     --Reflexes: 2+ reflexes at knees and biceps.  1+ at the ankles. Plantar responses flexorbilaterally    --Sensory: Light touch, vibration and PP intact bilaterally in upper and lower " extremities     --Coordination: Rapidly alternating movements of fingers intact. Heel-shin and finger-nose-finger is intact. Negative Romberg.     --Gait: Stands with feet normally spaced. Gait is slow but steady.       INVESTIGATIONS:   All available and relevant labs, imaging, and other procedures were reviewed with the patient at this visit.       IMPRESSION AND RECOMMENDATIONS:  48 year old male, history of chronic pain, small fiber neuropathy (seen on previous biopsy in 2015), prediabetes, obstructive sleep apnea, CAD, hypertension, morbid obesity, who presents for new opinion on his ongoing nerve pain.  Patient is understandably quite frustrated about his ongoing pain.  He does however have a difficult time describing the exact nature of his symptoms, what is worse, and what his ultimate goals of care are related to this pain.  His examination does look grossly the same to that previously described when he was seen in neurology in 2015.  There is no evidence for a new large fiber neuropathy.  As above, he does have a known small fiber neuropathy that is confirmed via biopsy.  I do suspect that his ongoing pains are in the setting of the small fiber neuropathy as well as his known bilateral median neuropathies at the wrist.  I do worry about a component of metabolic syndrome that would allow for continued worsening as time goes on.  We did discuss that he will need to continue to work on issues related to his weight.  Does appear that he is prediabetic and will need to continue to work on this.  No indication from the neurology standpoint at this time to repeat procedure such as an EMG.  We discussed numerous medications such as gabapentin, Lyrica, and others.  However the patient, was quite adamant that he try these before and they did not work, and therefore he was not interested in discussing starting them again.  Ultimately, we recommended alpha lipoic acid which would be bought over-the-counter.  We will  also send him to pain clinic for ongoing evaluation and potential management of his chronic pain.  Patient did register his understanding and agreement with this plan.  He may return to neurology clinic at any time.    - metabolic syndrome    Patient was seen and discussed with Dr. Roberto Guillen     I saw and examined this patient, and have read and edited the resident's note.  I agree with the findings, assessment, and plan.    Roberto Guillen  Staff Neurologist   12/19/18       Piedad Islas MD  [facility] Department of Neurology PGY4  Pager: [phone]      Answers for HPI/ROS submitted by the patient on 11/21/2018   General Symptoms: Yes  Skin Symptoms: Yes  HENT Symptoms: Yes  EYE SYMPTOMS: No  HEART SYMPTOMS: Yes  LUNG SYMPTOMS: Yes  INTESTINAL SYMPTOMS: Yes  URINARY SYMPTOMS: Yes  REPRODUCTIVE SYMPTOMS: No  SKELETAL SYMPTOMS: Yes  BLOOD SYMPTOMS: Yes  NERVOUS SYSTEM SYMPTOMS: Yes  MENTAL HEALTH SYMPTOMS: Yes  Fever: No  Loss of appetite: No  Weight loss: No  Weight gain: Yes  Fatigue: Yes  Night sweats: Yes  Chills: Yes  Increased stress: Yes  Excessive hunger: No  Excessive thirst: Yes  Feeling hot or cold when others believe the temperature is normal: Yes  Loss of height: No  Post-operative complications: No  Surgical site pain: Yes  Hallucinations: No  Change in or Loss of Energy: Yes  Hyperactivity: No  Confusion: Yes  Changes in hair: No  Changes in moles/birth marks: No  Itching: Yes  Rashes: Yes  Changes in nails: No  Acne: No  Change in facial hair: No  Warts: No  Non-healing sores: Yes  Scarring: No  Flaking of skin: Yes  Color changes of hands/feet in cold : No  Sun sensitivity: Yes  Skin thickening: Yes  Ear pain: Yes  Ear discharge: Yes  Hearing loss: No  Tinnitus: Yes  Nosebleeds: Yes  Congestion: No  Sinus pain: Yes  Trouble swallowing: Yes   Voice hoarseness: No  Mouth sores: No  Sore throat: Yes  Tooth pain: Yes  Gum tenderness: No  Bleeding gums: No  Change in taste:  No  Change in sense of smell: No  Dry mouth: Yes  Hearing aid used: No  Neck lump: No  Cough: No  Sputum or phlegm: Yes  Coughing up blood: No  Difficulty breating or shortness of breath: Yes  Snoring: Yes  Wheezing: Yes  Difficulty breathing on exertion: Yes  Nighttime Cough: Yes  Difficulty breathing when lying flat: Yes  Chest pain or pressure: Yes  Fast or irregular heartbeat: No  Pain in legs with walking: Yes  Trouble breathing while lying down: Yes  Fingers or toes appear blue: Yes  High blood pressure: Yes  Low blood pressure: No  Fainting: Yes  Murmurs: No  Pacemaker: No  Varicose veins: Yes  Edema or swelling: Yes  Wake up at night with shortness of breath: Yes  Light-headedness: Yes  Exercise intolerance: Yes  Heart burn or indigestion: No  Nausea: No  Vomiting: Yes  Abdominal pain: Yes  Bloating: Yes  Constipation: No  Diarrhea: No  Blood in stool: No  Black stools: No  Rectal or Anal pain: Yes  Fecal incontinence: No  Yellowing of skin or eyes: No  Vomit with blood: No  Change in stools: No  Trouble holding urine or incontinence: No  Pain or burning: No  Trouble starting or stopping: Yes  Increased frequency of urination: No  Blood in urine: No  Decreased frequency of urination: No  Frequent nighttime urination: No  Flank pain: No  Difficulty emptying bladder: Yes  Back pain: Yes  Muscle aches: Yes  Neck pain: Yes  Swollen joints: No  Joint pain: Yes  Bone pain: Yes  Muscle cramps: Yes  Muscle weakness: Yes  Joint stiffness: Yes  Bone fracture: No  Anemia: No  Swollen glands: No  Easy bleeding or bruising: Yes  Trouble with coordination: Yes  Dizziness or trouble with balance: Yes  Fainting or black-out spells: Yes  Memory loss: Yes  Headache: No  Seizures: No  Speech problems: Yes  Tingling: Yes  Tremor: No  Weakness: Yes  Difficulty walking: Yes  Paralysis: No  Numbness: Yes  Nervous or Anxious: Yes  Depression: Yes  Trouble sleeping: Yes  Trouble thinking or concentrating: Yes  Mood changes:  No  Panic attacks: No

## 2018-11-21 NOTE — PROGRESS NOTES
"  DEPARTMENT OF NEUROLOGY    Patient Name:  Chavez Ca  MRN:  [id number]    :  1970  Date of Clinic Visit:  2018  Primary Care Provider:  Clinic, [facility]    CHIEF COMPLAINT: \"nerve pain\"    HISTORY OF PRESENT ILLNESS:  Chavez Ca is a 48 year old male, history of chronic pain, small fiber neuropathy (seen on previous biopsy in 2015), prediabetes, obstructive sleep apnea, CAD, hypertension, morbid obesity, who presents for new opinion on his ongoing nerve pain.  History is taken from the patient and available notes.  The following is known:    Patient has a years long history of multiple nerve pains.  He is previously seen Dr. Bethea in the neurology clinic, most recently in .  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  The small fiber neuropathy symptom onset was in .  Diagnosis was confirmed in  by biopsy.  Patient is somewhat tangential during the interview and has several outbursts of anger.  He states that he is frustrated that nothing has been done over his pain and he would like to know what the next step is.  He says that he is in something of a holding pattern because none of his doctors will move forward without the permission of other doctors.  When asked specifically about the pain, he notes that it is the ongoing burning stabbing pain that he is always had.  He does think it was slightly better when he lost some weight recently but that it is now worsened when he gained back that way.  Further questions try to delineate the nature and severity of the pain elicited angry, tangential responses.  He denies any other new complaints.  He does multiple times stated his frustration with medical care is received to date.    Patient does also speak about a recent trip to [place].  He says that while he was there, doctors immediately went about fixing his leg " pains.  He describes surgeries and shows some bruises over both legs.  He cannot state the exact nature of the pain but by his description it does appear that he had varicose veins removed.  As before, further questions trended to delineate the nature of this trip to [place] elicited some tangential thought and intermittent anger.    REVIEW OF SYSTEMS  A 10 point review of symptoms was negative except as indicated above in the HPI    ALLERGIES:  Allergies   Allergen Reactions     Fentanyl Itching     Patch     Meloxicam Other (See Comments)     Side pains.     Minocycline Rash     Patient reports adverse reaction was pigmentation which has resolved with drug discontinuation.     MEDICATIONS:  Current Outpatient Prescriptions   Medication Sig Dispense Refill     amitriptyline (ELAVIL) 50 MG tablet Take 2 tablets (100 mg) by mouth At Bedtime 180 tablet 3     CETAPHIL (CETAPHIL) lotion Apply topically 2 times daily       COMPRESSION STOCKINGS 20-30 mmHg knee high compression stockings.  Measure and fit.  Style and brand per patient preference. 2 each 0     diclofenac (VOLTAREN) 1 % GEL topical gel Apply 4 grams to knees four times daily using enclosed dosing card. 100 g 1     furosemide (LASIX) 20 MG tablet Take 3 tablets (60 mg) by mouth 2 times daily 540 tablet 3     ketoconazole (NIZORAL) 2 % shampoo Apply to the affected area and wash off after 5 minutes. 120 mL 1     metroNIDAZOLE (METROGEL) 1 % gel        phentermine (ADIPEX-P) 37.5 MG tablet Take 1 tablet (37.5 mg) by mouth every morning 30 tablet 3     pimecrolimus (ELIDEL) 1 % cream Apply topically 2 times daily 60 g 3     potassium chloride (KLOR-CON) 20 MEQ Packet Take 60 mEq by mouth daily 270 packet 3     simethicone ([employer] GAS RELIEF EXTRA STRENGTH) 125 MG CHEW chewable tablet Take 1 tablet (125 mg) by mouth 2 times daily 120 tablet 0     topiramate (TOPAMAX) 50 MG tablet Take 4 tablets (200 mg) by mouth 2 times daily TAKE 3 TABS (150 MG) IN THE AM  AND 3 TABS (150 MG) IN THE PM AFTER AS TOLERATED 720 tablet 1     traZODone (DESYREL) 100 MG tablet Take 1 tablet (100 mg) by mouth At Bedtime 90 tablet 3     Vitamin D, Cholecalciferol, 400 UNITS CAPS Take 400 Units by mouth daily 90 capsule 3     zolpidem (AMBIEN) 10 MG tablet Take 1 tablet (10 mg) by mouth nightly as needed for sleep 30 tablet 1     [DISCONTINUED] COMPRESSION STOCKINGS 20-30 mmHg knee high compression stockings 2 each 1     PAST MEDICAL HISTORY:  Past Medical History:   Diagnosis Date     Allergic rhinitis      Benign positional vertigo 2011    never quite went away     Bilateral carpal tunnel syndrome 7/18/2015     Chest pain      Chronic sinusitis 2017    I don't know Please look     Chronic tonsillitis      Coronary artery disease 3/8/16    right side chest pain     Depressive disorder      Depressive disorder, not elsewhere classified 7/30/2012     Gastro-oesophageal reflux disease      Hearing problem      Hypertension 2008     Idiopathic progressive polyneuropathy 10/15/2012     Learning disability      Neck mass     parapharyngeal, benign     Obesity, Class III, BMI 40-49.9 (morbid obesity) (H)      Obstructive sleep apnea      Psychological factors associated with another disorder 8/21/2012     Recurrent otitis media 2016    might be what the pain in my ears     Reduced vision      Shortness of breath      Tinnitus 2016    I would hear a steady light buzzing sound and other can't     Trigeminal neuralgia      Weakness 8/26/2011     PAST SURGICAL HISTORY:  Past Surgical History:   Procedure Laterality Date     BIOPSY  2011    To figure out what kind of tumor I had     ENDOSCOPIC RETROGRADE CHOLANGIOPANCREATOGRAM N/A 8/27/2015    Procedure: COMBINED ENDOSCOPIC RETROGRADE CHOLANGIOPANCREATOGRAPHY, PLACE TUBE/STENT;  Surgeon: Baldomero Zacarias MD;  [facility]     ENDOSCOPIC RETROGRADE CHOLANGIOPANCREATOGRAM N/A 11/6/2015    Procedure: COMBINED ENDOSCOPIC RETROGRADE  CHOLANGIOPANCREATOGRAPHY, REMOVE FOREIGN BODY OR STENT/TUBE;  Surgeon: Baldomero Zacarias MD;  Location:  OR     EXCISE LESION INTRAORAL  6/29/2011    Procedure:EXCISE LESION INTRAORAL; TRANSCERVICAL APPROACH TO LEFT PHARYNGEAL SPACE MASS REMOVAL ; Surgeon:BARBARA PHILLIPS; Location: OR      VASCULAR SURGERY PROCEDURE UNLIST  12/29/15     LAPAROSCOPIC CHOLECYSTECTOMY N/A 8/23/2015    Procedure: LAPAROSCOPIC CHOLECYSTECTOMY;  Surgeon: Kvng Witt MD;  Location: [facility]     LARYNGOSCOPY WITH BIOPSY(IES)  6/18/2014    Procedure: LARYNGOSCOPY WITH BIOPSY(IES);  Surgeon: Barbara Phillips MD;  Location:  OR     LASER CO2 LARYNGOSCOPY  12/7/2011    Procedure:LASER CO2 LARYNGOSCOPY; Micro-Direct Laryngoscopy with CO2 Laser Standby / Excision Tracheal Grandulization, Trach Tube Change / Kenalog application. / Balloon Dilation of Subglottic Stenosis.*Latex Safe Room* Trach Tube = Shiley 6.4mm I.D. / 10.8MM O.D. / 76MM  Cuffless.; Surgeon:BARBARA PHILLIPS; Location: OR     ORTHOPEDIC SURGERY  5/2016    Broken Right hand & Fracture Right shoulder     TRACHEOSTOMY  4/22/2011    Procedure:TRACHEOSTOMY; possible awake; Surgeon:BARBARA PHILLIPS; Location: OR     TRACHEOSTOMY  6/29/2011    Procedure:TRACHEOSTOMY; REMOVE AND REPLACE SHILEY 6 XLT; Surgeon:BARBARA PHILLIPS; Location: OR     VASCULAR SURGERY  2008-[facility]     SOCIAL HISTORY:  Social History     Social History     Marital status: Single     Spouse name: N/A     Number of children: N/A     Years of education: N/A     Occupational History     Not on file.     Social History Main Topics     Smoking status: Never Smoker     Smokeless tobacco: Never Used      Comment: Never started     Alcohol use No      Comment: rarely     Drug use: No     Sexual activity: No     Other Topics Concern     Exercise Yes     2-3x/wk     Social History Narrative    The patient has a 0 pk yr tobacco hx.  He has no  "active use.  Alcohol use is <1 alcoholic drinks per week.  He denies use of recreational drugs.          He is disability.         The patient is single.  Has 1 child.        Pt adopted at age 14.          FAMILY HISTORY:  Family History   Problem Relation Age of Onset     Adopted: Yes     Family History Negative Other      Does not know family     Unknown/Adopted No family hx of          PHYSICAL EXAMINATION:    Vitals:   /85 (BP Location: Left arm)  Pulse 114  Ht 1.702 m (5' 7\")  Wt (!) 158.3 kg (349 lb)  SpO2 99%  BMI 54.66 kg/m2    -General: Man sitting comfortably on the chair. No acute distress.  Affect ranges from angry to subdued    -HEENT: No skin discolorations noted, no carotid bruits     -Chest: RRR without murmurs or bruits     -Abdomen: Positive bowel sounds, soft, non-tender, no organomegaly    -Musculoskeletal: No abnormalities noted     -Neurological:     --MS: Patient is alert, attentive, and oriented. Speech is clear and fluid. Names normally. Registration, recall and remote memory intact. Mental math normal.     --CNs: Visual fields are full to confrontation. Pupils are briskly reactive to light. Visual fields full. Ocular motility full without nystagmus, facial sensation intact, muscles of mastication and facial expression normal, hearing intact, gag and palate elevation normal, sternomastoid and trapezius function normal, tongue motions normal     --Motor: Normal muscle tone and bulk. 5/5 muscle strength bilaterally     --Reflexes: 2+ reflexes at knees and biceps.  1+ at the ankles. Plantar responses flexorbilaterally    --Sensory: Light touch, vibration and PP intact bilaterally in upper and lower extremities     --Coordination: Rapidly alternating movements of fingers intact. Heel-shin and finger-nose-finger is intact. Negative Romberg.     --Gait: Stands with feet normally spaced. Gait is slow but steady.       INVESTIGATIONS:   All available and relevant labs, imaging, and other " procedures were reviewed with the patient at this visit.       IMPRESSION AND RECOMMENDATIONS:  48 year old male, history of chronic pain, small fiber neuropathy (seen on previous biopsy in 2015), prediabetes, obstructive sleep apnea, CAD, hypertension, morbid obesity, who presents for new opinion on his ongoing nerve pain.  Patient is understandably quite frustrated about his ongoing pain.  He does however have a difficult time describing the exact nature of his symptoms, what is worse, and what his ultimate goals of care are related to this pain.  His examination does look grossly the same to that previously described when he was seen in neurology in 2015.  There is no evidence for a new large fiber neuropathy.  As above, he does have a known small fiber neuropathy that is confirmed via biopsy.  I do suspect that his ongoing pains are in the setting of the small fiber neuropathy as well as his known bilateral median neuropathies at the wrist.  I do worry about a component of metabolic syndrome that would allow for continued worsening as time goes on.  We did discuss that he will need to continue to work on issues related to his weight.  Does appear that he is prediabetic and will need to continue to work on this.  No indication from the neurology standpoint at this time to repeat procedure such as an EMG.  We discussed numerous medications such as gabapentin, Lyrica, and others.  However the patient, was quite adamant that he try these before and they did not work, and therefore he was not interested in discussing starting them again.  Ultimately, we recommended alpha lipoic acid which would be bought over-the-counter.  We will also send him to pain clinic for ongoing evaluation and potential management of his chronic pain.  Patient did register his understanding and agreement with this plan.  He may return to neurology clinic at any time.    - metabolic syndrome    Patient was seen and discussed with Dr. Mejia  Wilmer Islas MD  [facility] Department of Neurology PGY4  Pager: [phone]     I saw and examined this patient, and have read and edited the resident's note.  I agree with the findings, assessment, and plan.    Roberto Guillen  Staff Neurologist   12/19/18           Answers for HPI/ROS submitted by the patient on 11/21/2018   General Symptoms: Yes  Skin Symptoms: Yes  HENT Symptoms: Yes  EYE SYMPTOMS: No  HEART SYMPTOMS: Yes  LUNG SYMPTOMS: Yes  INTESTINAL SYMPTOMS: Yes  URINARY SYMPTOMS: Yes  REPRODUCTIVE SYMPTOMS: No  SKELETAL SYMPTOMS: Yes  BLOOD SYMPTOMS: Yes  NERVOUS SYSTEM SYMPTOMS: Yes  MENTAL HEALTH SYMPTOMS: Yes  Fever: No  Loss of appetite: No  Weight loss: No  Weight gain: Yes  Fatigue: Yes  Night sweats: Yes  Chills: Yes  Increased stress: Yes  Excessive hunger: No  Excessive thirst: Yes  Feeling hot or cold when others believe the temperature is normal: Yes  Loss of height: No  Post-operative complications: No  Surgical site pain: Yes  Hallucinations: No  Change in or Loss of Energy: Yes  Hyperactivity: No  Confusion: Yes  Changes in hair: No  Changes in moles/birth marks: No  Itching: Yes  Rashes: Yes  Changes in nails: No  Acne: No  Change in facial hair: No  Warts: No  Non-healing sores: Yes  Scarring: No  Flaking of skin: Yes  Color changes of hands/feet in cold : No  Sun sensitivity: Yes  Skin thickening: Yes  Ear pain: Yes  Ear discharge: Yes  Hearing loss: No  Tinnitus: Yes  Nosebleeds: Yes  Congestion: No  Sinus pain: Yes  Trouble swallowing: Yes   Voice hoarseness: No  Mouth sores: No  Sore throat: Yes  Tooth pain: Yes  Gum tenderness: No  Bleeding gums: No  Change in taste: No  Change in sense of smell: No  Dry mouth: Yes  Hearing aid used: No  Neck lump: No  Cough: No  Sputum or phlegm: Yes  Coughing up blood: No  Difficulty breating or shortness of breath: Yes  Snoring: Yes  Wheezing: Yes  Difficulty breathing on exertion: Yes  Nighttime Cough:  Yes  Difficulty breathing when lying flat: Yes  Chest pain or pressure: Yes  Fast or irregular heartbeat: No  Pain in legs with walking: Yes  Trouble breathing while lying down: Yes  Fingers or toes appear blue: Yes  High blood pressure: Yes  Low blood pressure: No  Fainting: Yes  Murmurs: No  Pacemaker: No  Varicose veins: Yes  Edema or swelling: Yes  Wake up at night with shortness of breath: Yes  Light-headedness: Yes  Exercise intolerance: Yes  Heart burn or indigestion: No  Nausea: No  Vomiting: Yes  Abdominal pain: Yes  Bloating: Yes  Constipation: No  Diarrhea: No  Blood in stool: No  Black stools: No  Rectal or Anal pain: Yes  Fecal incontinence: No  Yellowing of skin or eyes: No  Vomit with blood: No  Change in stools: No  Trouble holding urine or incontinence: No  Pain or burning: No  Trouble starting or stopping: Yes  Increased frequency of urination: No  Blood in urine: No  Decreased frequency of urination: No  Frequent nighttime urination: No  Flank pain: No  Difficulty emptying bladder: Yes  Back pain: Yes  Muscle aches: Yes  Neck pain: Yes  Swollen joints: No  Joint pain: Yes  Bone pain: Yes  Muscle cramps: Yes  Muscle weakness: Yes  Joint stiffness: Yes  Bone fracture: No  Anemia: No  Swollen glands: No  Easy bleeding or bruising: Yes  Trouble with coordination: Yes  Dizziness or trouble with balance: Yes  Fainting or black-out spells: Yes  Memory loss: Yes  Headache: No  Seizures: No  Speech problems: Yes  Tingling: Yes  Tremor: No  Weakness: Yes  Difficulty walking: Yes  Paralysis: No  Numbness: Yes  Nervous or Anxious: Yes  Depression: Yes  Trouble sleeping: Yes  Trouble thinking or concentrating: Yes  Mood changes: No  Panic attacks: No

## 2018-11-21 NOTE — NURSING NOTE
Chief Complaint   Patient presents with     RECHECK     PERIPHERRAL POLYNEUROPATHY       Ammy Bowie MA

## 2018-12-11 DIAGNOSIS — E66.01 MORBID OBESITY (H): ICD-10-CM

## 2018-12-12 RX ORDER — TOPIRAMATE 50 MG/1
200 TABLET, FILM COATED ORAL 2 TIMES DAILY
Qty: 240 TABLET | Refills: 1 | Status: SHIPPED | OUTPATIENT
Start: 2018-12-12 | End: 2019-01-29 | Stop reason: DRUGHIGH

## 2018-12-12 NOTE — TELEPHONE ENCOUNTER
"  topiramate (TOPAMAX) 50 MG tablet  Last Written Prescription Date:  9/13/18  Last Fill Quantity: 720,   # refills: 1  Last Office Visit : 9/13/18  Future Office visit:  1/28/19 9/13/18  KATERINA Teixeira.  \" Refill and increase topiramate to 200 mg twice daily\"  "

## 2019-01-24 ENCOUNTER — OFFICE VISIT (OUTPATIENT)
Dept: INTERNAL MEDICINE | Facility: CLINIC | Age: 49
End: 2019-01-24
Payer: MEDICARE

## 2019-01-24 VITALS
HEART RATE: 124 BPM | DIASTOLIC BLOOD PRESSURE: 80 MMHG | SYSTOLIC BLOOD PRESSURE: 131 MMHG | BODY MASS INDEX: 52.44 KG/M2 | WEIGHT: 315 LBS

## 2019-01-24 DIAGNOSIS — G60.3 IDIOPATHIC PROGRESSIVE POLYNEUROPATHY: Chronic | ICD-10-CM

## 2019-01-24 DIAGNOSIS — J38.6 SUBGLOTTIC STENOSIS: Primary | ICD-10-CM

## 2019-01-24 DIAGNOSIS — L21.9 SEBORRHEIC DERMATITIS: ICD-10-CM

## 2019-01-24 DIAGNOSIS — R60.0 LOCALIZED EDEMA: ICD-10-CM

## 2019-01-24 RX ORDER — KETOCONAZOLE 20 MG/ML
SHAMPOO TOPICAL
Qty: 120 ML | Refills: 1 | Status: SHIPPED | OUTPATIENT
Start: 2019-01-24 | End: 2019-09-09

## 2019-01-24 ASSESSMENT — PAIN SCALES - GENERAL: PAINLEVEL: WORST PAIN (10)

## 2019-01-24 NOTE — PROGRESS NOTES
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    Patient already on disability, does not know specific diagnoses to use.  Symptoms discussed with patient, he is most limited by shortness of breath as well as foot pain and lack of dexterity.  Patient has small fiber neuropathy confirmed by right foot biopsy and 11/2012 which showed decrease epidermal nerve fiber density.  States that his foot pain prevents her from standing for more than 5-10 minutes.  Patient also has bilateral median neuropathy and left ulnar neuropathy.  He is unable to use his hands for manual tasks, states that he does not hold glasses anymore because he has dropped from so many times, unable to use computer.  Also experiences severe shortness of breath that prevents him from walking greater than 2 blocks or 20 steps.  Patient has history of subglottic stenosis secondary to preoperative tracheostomy for removal of left parapharyngeal pleomorphic adenoma 2011 s/p dilation.  Saw ENT 1/2018, laryngoscope found small indentation in the anterior wall of his trachea in conjunction with tracheostomy scar.  Concern for some dynamic airway collapse.    Needs refill of Nizoral shampoo and knee high compression stockings.    Medications and allergies reviewed by me today.     ROS:   Constitutional, HEENT, cardiovascular, pulmonary, gi and gu systems are negative, except as otherwise noted.    OBJECTIVE:    /80   Pulse 124   Wt (!) 151.9 kg (334 lb 12.8 oz)   BMI 52.44 kg/m     Wt Readings from Last 1 Encounters:   11/21/18 (!) 158.3 kg (349 lb)     General: Pleasant, no acute distress  HEENT: Normocephalic, atraumatic, EOMI, mucous members moist,  Mallampati 4  Neck: Midline tracheostomy scar present, supple, left-sided tenderness to palpation, no lymphadenopathy, no stridor or wheeze  CV: Rapid rate, regular rhythm, no murmurs rubs or gallops  Lung: Clear to auscultation bilaterally  Abdomen: Obese  Extremities: Trace lower extremity edema     ASSESSMENT/PLAN:    Chavez was seen today for consult.    Diagnoses and all orders for this visit:    Subglottic stenosis  Idiopathic progressive polyneuropathy  Is the diagnosis that appeared to be most limiting to patient.  Provided patient with letter documenting these diagnoses.  Clinic  will reach out to patient to determine whether further official documentation is needed.    Seborrheic dermatitis  -     ketoconazole (NIZORAL) 2 % external shampoo; Apply to the affected area and wash off after 5 minutes.    Localized edema  -     order for DME; Equipment being ordered: Knee high compression stockings    Pt should return to clinic for f/u with me in prn     Alen Colindres MD  Jan 24, 2019    Pt was seen and examined by me; I agree with the detailed A/P documentation above    Lynn Andersen MD

## 2019-01-24 NOTE — LETTER
Chavez Ca  [address] [place] [address]  : 1970  MRN:  [id number]      2019          To whom it may concern,    Mr Ca is experiencing disability due to small fiber neuropathy affecting hands and feet and subglottic stenosis limiting ability to walk, stand, and manual dexterity. Experiences shortness of breath that limits ability to ambulate.      Thank you,      Joe Colindres MD

## 2019-01-24 NOTE — NURSING NOTE
Chief Complaint   Patient presents with     Consult     pt here to discuss disability, would like doctor's note stating what they are       Lorraine Viera CMA at 7:44 AM on 1/24/2019.

## 2019-01-29 ENCOUNTER — OFFICE VISIT (OUTPATIENT)
Dept: ENDOCRINOLOGY | Facility: CLINIC | Age: 49
End: 2019-01-29
Payer: MEDICARE

## 2019-01-29 ENCOUNTER — TELEPHONE (OUTPATIENT)
Dept: INTERNAL MEDICINE | Facility: CLINIC | Age: 49
End: 2019-01-29

## 2019-01-29 VITALS
HEART RATE: 100 BPM | DIASTOLIC BLOOD PRESSURE: 92 MMHG | WEIGHT: 315 LBS | HEIGHT: 67 IN | OXYGEN SATURATION: 100 % | BODY MASS INDEX: 49.44 KG/M2 | SYSTOLIC BLOOD PRESSURE: 138 MMHG

## 2019-01-29 DIAGNOSIS — I87.303 STASIS EDEMA OF BOTH LOWER EXTREMITIES: ICD-10-CM

## 2019-01-29 DIAGNOSIS — E66.01 MORBID OBESITY (H): ICD-10-CM

## 2019-01-29 RX ORDER — TOPIRAMATE 100 MG/1
200 TABLET, FILM COATED ORAL 2 TIMES DAILY
Qty: 360 TABLET | Refills: 2 | Status: SHIPPED | OUTPATIENT
Start: 2019-01-29 | End: 2019-09-23

## 2019-01-29 RX ORDER — PHENTERMINE HYDROCHLORIDE 37.5 MG/1
37.5 TABLET ORAL EVERY MORNING
Qty: 30 TABLET | Refills: 3 | Status: SHIPPED | OUTPATIENT
Start: 2019-01-29 | End: 2019-09-19

## 2019-01-29 RX ORDER — FUROSEMIDE 20 MG
60 TABLET ORAL 2 TIMES DAILY
Qty: 540 TABLET | Refills: 3 | Status: SHIPPED | OUTPATIENT
Start: 2019-01-29 | End: 2019-09-09

## 2019-01-29 RX ORDER — TOPIRAMATE 100 MG/1
100 TABLET, FILM COATED ORAL 2 TIMES DAILY
Qty: 180 TABLET | Refills: 3 | Status: SHIPPED | OUTPATIENT
Start: 2019-01-29 | End: 2019-01-29

## 2019-01-29 ASSESSMENT — PATIENT HEALTH QUESTIONNAIRE - PHQ9
SUM OF ALL RESPONSES TO PHQ QUESTIONS 1-9: 16
10. IF YOU CHECKED OFF ANY PROBLEMS, HOW DIFFICULT HAVE THESE PROBLEMS MADE IT FOR YOU TO DO YOUR WORK, TAKE CARE OF THINGS AT HOME, OR GET ALONG WITH OTHER PEOPLE: VERY DIFFICULT
SUM OF ALL RESPONSES TO PHQ QUESTIONS 1-9: 16

## 2019-01-29 ASSESSMENT — MIFFLIN-ST. JEOR: SCORE: 2332.75

## 2019-01-29 NOTE — TELEPHONE ENCOUNTER
[facility] Prior Authorization Team Request    Medication: Diclofenac Sodium 1% Gel  Dosing: Apply 4 gram onto knees four times a day, Using Enclosed Dosing Card  NDC (required for Medicaid members):     Insurance   BIN: [id number]  PCN:[id number]  Grp:[id number]  ID:[id number]    CoverMyMeds Key (if applicable):     Additional documentation:     Filling Pharmacy:[facility]  Phone Number:[phone]  Contact: BRANDY [employer] JUANITO PA ([id number]) please send all responses to this pool.  Pharmacy NPI (required for Medicaid members):

## 2019-01-29 NOTE — PATIENT INSTRUCTIONS
Keep appointment with Dr. Chang as planned.   Schedule new consult for bariatric with Denice or Marlee, along with dietitian visit.

## 2019-01-29 NOTE — PROGRESS NOTES
"Return Medical Weight Management Note     Chavez Ca  MRN:  [id number]  :  1970  RUI:  2019    Dear Angel Ruiz,    I had the pleasure of seeing your patient Chavez Ca.  He is a 48 year old male who I am continuing to see for treatment of obesity related to: venous insufficiency with edema, VERN, hyperlipidemia, idiopathic progressive polyneuropathy, CAD, hypertension, depression. 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     INTERVAL HISTORY:  Saw Denice Teixeira 2018. He used to see Margret Chang and Kai in 2017.   Pt is currently taking phentermine 37.5 mg dialy and topriamate 200 mg bid. Weight is fluctuating. His main barrier is financial issue for medication and diet, lack of consistency on dietary changes and non adherence to clinic visit.    A1c 5.8% in 2018.   He does not feel that medications are effective anymore. He would like to move forward for bariatric surgery.    He has seen ENT 2019 and found to have small indentation in the anterior wall of his trachea in conjunction with tracheostomy scar.  Concern for some dynamic airway collapse. He might need to have surgery.     CURRENT WEIGHT:   332 lbs 11.2 oz    Wt Readings from Last 4 Encounters:   19 (!) 150.9 kg (332 lb 11.2 oz)   19 (!) 151.9 kg (334 lb 12.8 oz)   18 (!) 158.3 kg (349 lb)   18 (!) 158.8 kg (350 lb)       Height:  5' 7\"  Body Mass Index:  Body mass index is 52.11 kg/m .  Vitals:  BP (!) 138/92   Pulse 100   Ht 1.702 m (5' 7\")   Wt (!) 150.9 kg (332 lb 11.2 oz)   SpO2 100%   BMI 52.11 kg/m        Initial consult weight was 361 on 1/10/12.  Weight change since last seen on 2018 is gain 7 pounds.   Total loss is 29 pounds.    Diet and Activity Changes Since Last Visit Reviewed With Patient 2019   I have made the following changes to my diet since " my last visit: added more fruits and vegetables to put in smoothies or salads   With regards to my diet, I am still struggling with: gaining weight and not having very hard time being able to walk as much the distance to either a destination or my Goal for that Day   For breakfast, I typically eat: -   For lunch, I typically eat: -   For supper, I typically eat: -   For snack(s), I typically eat: -   I have made the following changes to my activity/exercise since my last visit: I bought new exercise stretch cords with carabiner clips   With regards to my activity/exercise, I am still struggling with: bend down and up without hurting and very short of breath and can't stand sit walk for more than a couple minutes before back and side and feet hurt       MEDICATIONS:   Current Outpatient Medications   Medication Sig Dispense Refill     amitriptyline (ELAVIL) 50 MG tablet Take 2 tablets (100 mg) by mouth At Bedtime 180 tablet 3     CETAPHIL (CETAPHIL) lotion Apply topically 2 times daily       COMPRESSION STOCKINGS 20-30 mmHg knee high compression stockings.  Measure and fit.  Style and brand per patient preference. 2 each 0     diclofenac (VOLTAREN) 1 % GEL topical gel Apply 4 grams to knees four times daily using enclosed dosing card. 100 g 1     furosemide (LASIX) 20 MG tablet Take 3 tablets (60 mg) by mouth 2 times daily 540 tablet 3     ketoconazole (NIZORAL) 2 % external shampoo Apply to the affected area and wash off after 5 minutes. 120 mL 1     metroNIDAZOLE (METROGEL) 1 % gel        order for DME Equipment being ordered: Knee high compression stockings 2 Units 0     phentermine (ADIPEX-P) 37.5 MG tablet Take 1 tablet (37.5 mg) by mouth every morning 30 tablet 3     pimecrolimus (ELIDEL) 1 % cream Apply topically 2 times daily 60 g 3     potassium chloride (KLOR-CON) 20 MEQ Packet Take 60 mEq by mouth daily 270 packet 3     simethicone ([employer] GAS RELIEF EXTRA STRENGTH) 125 MG CHEW chewable tablet Take 1 tablet  (125 mg) by mouth 2 times daily (Patient not taking: Reported on 1/24/2019) 120 tablet 0     topiramate (TOPAMAX) 50 MG tablet Take 4 tablets (200 mg) by mouth 2 times daily 240 tablet 1     traZODone (DESYREL) 100 MG tablet Take 1 tablet (100 mg) by mouth At Bedtime 90 tablet 3     Vitamin D, Cholecalciferol, 400 UNITS CAPS Take 400 Units by mouth daily 90 capsule 3     zolpidem (AMBIEN) 10 MG tablet Take 1 tablet (10 mg) by mouth nightly as needed for sleep 30 tablet 1         Weight Loss Medication History Reviewed With Patient 1/29/2019   Which weight loss medications are you currently taking on a regular basis?  Phentermine, Topamax (topiramate)   If you are not taking a weight loss medication that was prescribed to you, please indicate why: -   Are you having any side effects from the weight loss medication that we have prescribed you? No       ASSESSMENT:   Chavez Ca is a 48 year old male who I am continuing to see for treatment of obesity related to: venous insufficiency with edema, VERN, hyperlipidemia, idiopathic progressive polyneuropathy, CAD, hypertension, depression. 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, subglottic stenosis in 5840-5287.     He has hx of non adherence, irregular follow-up visit and financial issue  Currently he is on topiramate 200 mg bid and phentermine 37.5 mg daily  Weight is fluctuating. He lost about 30 lbs from the baseline in 2012.   Still has knowledge gap in diet/nutrition    PLAN:   continue topiramate 200 mg twice daily  Continue phentermine 37.5 mg  Discussed healthy diet    FOLLOW-UP:    Refer dietitian  Refer bariatric team for evaluation  Keep follow up visit with  in March 2019.    Time: 25 min spent on evaluation, management, counseling, education, & motivational interviewing with greater than 50 % of the total time was spent on counseling and coordinating  care    Sincerely,    Alma Pa MD

## 2019-01-29 NOTE — LETTER
"2019       RE: Chavez Ca  [address]  [place] [address]     Dear Colleague,    Thank you for referring your patient, Chavez Ca, to the [facility] MEDICAL WEIGHT MANAGEMENT at [facility]. Please see a copy of my visit note below.    Return Medical Weight Management Note     Chavez Ca  MRN:  [id number]  :  1970  RUI:  2019    Dear Angel Ruiz,    I had the pleasure of seeing your patient Chavez Ca.  He is a 48 year old male who I am continuing to see for treatment of obesity related to: venous insufficiency with edema, VERN, hyperlipidemia, idiopathic progressive polyneuropathy, CAD, hypertension, depression. 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     INTERVAL HISTORY:  Saw Denice Teixeira 2018. He used to see Margret Chang and Kai in .   Pt is currently taking phentermine 37.5 mg dialy and topriamate 200 mg bid. Weight is fluctuating. His main barrier is financial issue for medication and diet, lack of consistency on dietary changes and non adherence to clinic visit.    A1c 5.8% in 2018.   He does not feel that medications are effective anymore. He would like to move forward for bariatric surgery.    He has seen ENT 2019 and found to have small indentation in the anterior wall of his trachea in conjunction with tracheostomy scar.  Concern for some dynamic airway collapse. He might need to have surgery.     CURRENT WEIGHT:   332 lbs 11.2 oz    Wt Readings from Last 4 Encounters:   19 (!) 150.9 kg (332 lb 11.2 oz)   19 (!) 151.9 kg (334 lb 12.8 oz)   18 (!) 158.3 kg (349 lb)   18 (!) 158.8 kg (350 lb)       Height:  5' 7\"  Body Mass Index:  Body mass index is 52.11 kg/m .  Vitals:  BP (!) 138/92   Pulse 100   Ht 1.702 m (5' 7\")   Wt (!) 150.9 kg (332 lb 11.2 " oz)   SpO2 100%   BMI 52.11 kg/m         Initial consult weight was 361 on 1/10/12.  Weight change since last seen on 9/13/2018 is gain 7 pounds.   Total loss is 29 pounds.    Diet and Activity Changes Since Last Visit Reviewed With Patient 1/29/2019   I have made the following changes to my diet since my last visit: added more fruits and vegetables to put in smoothies or salads   With regards to my diet, I am still struggling with: gaining weight and not having very hard time being able to walk as much the distance to either a destination or my Goal for that Day   For breakfast, I typically eat: -   For lunch, I typically eat: -   For supper, I typically eat: -   For snack(s), I typically eat: -   I have made the following changes to my activity/exercise since my last visit: I bought new exercise stretch cords with carabiner clips   With regards to my activity/exercise, I am still struggling with: bend down and up without hurting and very short of breath and can't stand sit walk for more than a couple minutes before back and side and feet hurt       MEDICATIONS:   Current Outpatient Medications   Medication Sig Dispense Refill     amitriptyline (ELAVIL) 50 MG tablet Take 2 tablets (100 mg) by mouth At Bedtime 180 tablet 3     CETAPHIL (CETAPHIL) lotion Apply topically 2 times daily       COMPRESSION STOCKINGS 20-30 mmHg knee high compression stockings.  Measure and fit.  Style and brand per patient preference. 2 each 0     diclofenac (VOLTAREN) 1 % GEL topical gel Apply 4 grams to knees four times daily using enclosed dosing card. 100 g 1     furosemide (LASIX) 20 MG tablet Take 3 tablets (60 mg) by mouth 2 times daily 540 tablet 3     ketoconazole (NIZORAL) 2 % external shampoo Apply to the affected area and wash off after 5 minutes. 120 mL 1     metroNIDAZOLE (METROGEL) 1 % gel        order for DME Equipment being ordered: Knee high compression stockings 2 Units 0     phentermine (ADIPEX-P) 37.5 MG tablet Take  1 tablet (37.5 mg) by mouth every morning 30 tablet 3     pimecrolimus (ELIDEL) 1 % cream Apply topically 2 times daily 60 g 3     potassium chloride (KLOR-CON) 20 MEQ Packet Take 60 mEq by mouth daily 270 packet 3     simethicone ([employer] GAS RELIEF EXTRA STRENGTH) 125 MG CHEW chewable tablet Take 1 tablet (125 mg) by mouth 2 times daily (Patient not taking: Reported on 1/24/2019) 120 tablet 0     topiramate (TOPAMAX) 50 MG tablet Take 4 tablets (200 mg) by mouth 2 times daily 240 tablet 1     traZODone (DESYREL) 100 MG tablet Take 1 tablet (100 mg) by mouth At Bedtime 90 tablet 3     Vitamin D, Cholecalciferol, 400 UNITS CAPS Take 400 Units by mouth daily 90 capsule 3     zolpidem (AMBIEN) 10 MG tablet Take 1 tablet (10 mg) by mouth nightly as needed for sleep 30 tablet 1         Weight Loss Medication History Reviewed With Patient 1/29/2019   Which weight loss medications are you currently taking on a regular basis?  Phentermine, Topamax (topiramate)   If you are not taking a weight loss medication that was prescribed to you, please indicate why: -   Are you having any side effects from the weight loss medication that we have prescribed you? No       ASSESSMENT:   Chavez Ca is a 48 year old male who I am continuing to see for treatment of obesity related to: venous insufficiency with edema, VERN, hyperlipidemia, idiopathic progressive polyneuropathy, CAD, hypertension, depression. 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, subglottic stenosis in 1775-3832.     He has hx of non adherence, irregular follow-up visit and financial issue  Currently he is on topiramate 200 mg bid and phentermine 37.5 mg daily  Weight is fluctuating. He lost about 30 lbs from the baseline in 2012.   Still has knowledge gap in diet/nutrition    PLAN:   continue topiramate 200 mg twice daily  Continue phentermine 37.5 mg  Discussed healthy diet    FOLLOW-UP:    Refer  dietitian  Refer bariatric team for evaluation  Keep follow up visit with  in March 2019.    Time: 25 min spent on evaluation, management, counseling, education, & motivational interviewing with greater than 50 % of the total time was spent on counseling and coordinating care    Sincerely,    Alma Pa MD

## 2019-01-29 NOTE — NURSING NOTE
"  Chief Complaint   Patient presents with     Weight Problem     RMWM     Vitals:    01/29/19 1331   BP: (!) 138/92   Pulse: 100   SpO2: 100%   Weight: (!) 150.9 kg (332 lb 11.2 oz)   Height: 1.702 m (5' 7\")     Body mass index is 52.11 kg/m .  French Christensen CMA    "

## 2019-02-08 NOTE — TELEPHONE ENCOUNTER
Central Prior Authorization Team   [phone]    PA Initiation    Medication: Diclofenac Sodium 1% Gel  Insurance Company: [employer] Part D - Phone [phone] Fax [phone]  Pharmacy Filling the Rx: [facility] [place], [place] - [address] [place] [address] [id number]  Filling Pharmacy Phone: [phone]  Filling Pharmacy Fax: [phone]  Start Date: 2/8/2019

## 2019-02-09 NOTE — TELEPHONE ENCOUNTER
Prior Authorization Approval    Authorization Effective Date: 2/8/2019  Authorization Expiration Date: 12/31/2019  Medication: Diclofenac Sodium 1% Gel-PA APPROVED   Approved Dose/Quantity:   Reference #: [id number]   Insurance Company: [employer] Part D - Phone [phone] Fax [phone]  Expected CoPay:       CoPay Card Available:      Foundation Assistance Needed:    Which Pharmacy is filling the prescription (Not needed for infusion/clinic administered): [facility] [id number]  Pharmacy Notified: Yes  Patient Notified: Yes

## 2019-03-28 ENCOUNTER — TELEPHONE (OUTPATIENT)
Dept: INTERNAL MEDICINE | Facility: CLINIC | Age: 49
End: 2019-03-28

## 2019-03-28 NOTE — TELEPHONE ENCOUNTER
[facility] Call Center    Phone Message    May a detailed message be left on voicemail: yes    Reason for Call: Other: Riya with [facility] states pt arrived with an order from Dr. Colindres for compression stockings; she states they are needing to know the compression level. She states pt has previously had 20 or 30, and just need confirmation. Please advise.    Action Taken: Message routed to:  [facility] ([facility] [id number]

## 2019-04-01 ENCOUNTER — OFFICE VISIT (OUTPATIENT)
Dept: ENDOCRINOLOGY | Facility: CLINIC | Age: 49
End: 2019-04-01
Payer: MEDICARE

## 2019-04-01 ENCOUNTER — MEDICAL CORRESPONDENCE (OUTPATIENT)
Dept: HEALTH INFORMATION MANAGEMENT | Facility: CLINIC | Age: 49
End: 2019-04-01

## 2019-04-01 VITALS
OXYGEN SATURATION: 98 % | HEART RATE: 107 BPM | RESPIRATION RATE: 18 BRPM | BODY MASS INDEX: 49.44 KG/M2 | WEIGHT: 315 LBS | DIASTOLIC BLOOD PRESSURE: 85 MMHG | SYSTOLIC BLOOD PRESSURE: 126 MMHG | TEMPERATURE: 98.1 F | HEIGHT: 67 IN

## 2019-04-01 DIAGNOSIS — E66.01 MORBID OBESITY (H): Primary | ICD-10-CM

## 2019-04-01 ASSESSMENT — PAIN SCALES - GENERAL: PAINLEVEL: NO PAIN (0)

## 2019-04-01 ASSESSMENT — MIFFLIN-ST. JEOR: SCORE: 2301

## 2019-04-01 NOTE — NURSING NOTE
"Chief Complaint   Patient presents with     Weight Problem     Pt here for weight management follow up       Vitals:    04/01/19 1023   BP: 126/85   BP Location: Left arm   Patient Position: Sitting   Cuff Size: Adult Large   Pulse: 107   Resp: 18   Temp: 98.1  F (36.7  C)   TempSrc: Oral   SpO2: 98%   Weight: 147.7 kg (325 lb 11.2 oz)   Height: 1.702 m (5' 7\")       Body mass index is 51.01 kg/m .      KYMBERLY Chow NREMT                      "

## 2019-04-01 NOTE — LETTER
"2019       RE: Chavez Ca  [address] [place] [address]     Dear Colleague,    Thank you for referring your patient, Chavez Ca, to the [facility] MEDICAL WEIGHT MANAGEMENT at [facility]. Please see a copy of my visit note below.    Return Medical Weight Management Note     Chavez Ca  MRN:  [id number]  :  1970  RUI:  2019    Dear Dr. Colindres,    I had the pleasure of seeing your patient Chavez Ca.  He is a 48 year old male who I am continuing to see for treatment of obesity related to: venous insufficiency with edema, VERN, hyperlipidemia, idiopathic progressive polyneuropathy, CAD, hypertension, depression. 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, subglottic stenosis in 3031-4506.     CURRENT WEIGHT:   325 lbs 11.2 oz    Wt Readings from Last 4 Encounters:   19 147.7 kg (325 lb 11.2 oz)   19 (!) 150.9 kg (332 lb 11.2 oz)   19 (!) 151.9 kg (334 lb 12.8 oz)   18 (!) 158.3 kg (349 lb)     Height:  5' 7\"  Body Mass Index:  Body mass index is 51.01 kg/m .  Vitals:  /85 (BP Location: Left arm, Patient Position: Sitting, Cuff Size: Adult Large)   Pulse 107   Temp 98.1  F (36.7  C) (Oral)   Resp 18   Ht 1.702 m (5' 7\")   Wt 147.7 kg (325 lb 11.2 oz)   SpO2 98%   BMI 51.01 kg/m       Initial consult weight was 361 on 1/10/12.  Weight change since last seen on 2019 is down 7 pounds.   Total loss is 36 pounds.    INTERVAL HISTORY:  Pt is currently taking phentermine 37.5 mg dialy and topriamate 200 mg bid. Weight is fluctuating. His main barrier is financial issue for medication and diet, lack of consistency on dietary changes and non adherence to clinic visit. He does not feel that medications are effective anymore. He would like to move forward for bariatric surgery.    He has seen ENT 2019 and found to have small " indentation in the anterior wall of his trachea in conjunction with tracheostomy scar. Concern for some dynamic airway collapse. He might need to have surgery.     Diet and Activity Changes Since Last Visit Reviewed With Patient 4/1/2019   I have made the following changes to my diet since my last visit: I have eaten even more less & tried other fruits & vegetables   With regards to my diet, I am still struggling with: weight no matter walk 1 to 3 mile a day eat all day or not at all I can't fight this any longer   For breakfast, I typically eat: -   For lunch, I typically eat: -   For supper, I typically eat: -   For snack(s), I typically eat: -   I have made the following changes to my activity/exercise since my last visit: I have walked as much a day I can I am doing something  at least 18 hours a day if not more I struggle but I don't sit around   With regards to my activity/exercise, I am still struggling with: wieght back pain shortness of breath legs in total pain giving out feet     MEDICATIONS:   Current Outpatient Medications   Medication Sig Dispense Refill     amitriptyline (ELAVIL) 50 MG tablet Take 2 tablets (100 mg) by mouth At Bedtime 180 tablet 3     CETAPHIL (CETAPHIL) lotion Apply topically 2 times daily       COMPRESSION STOCKINGS 20-30 mmHg knee high compression stockings.  Measure and fit.  Style and brand per patient preference. 2 each 0     diclofenac (VOLTAREN) 1 % GEL topical gel Apply 4 grams to knees four times daily using enclosed dosing card. 100 g 1     furosemide (LASIX) 20 MG tablet Take 3 tablets (60 mg) by mouth 2 times daily 540 tablet 3     ketoconazole (NIZORAL) 2 % external shampoo Apply to the affected area and wash off after 5 minutes. 120 mL 1     metroNIDAZOLE (METROGEL) 1 % gel        order for DME Equipment being ordered: Knee high compression stockings 2 Units 0     phentermine (ADIPEX-P) 37.5 MG tablet Take 1 tablet (37.5 mg) by mouth every morning 30 tablet 3      pimecrolimus (ELIDEL) 1 % cream Apply topically 2 times daily 60 g 3     potassium chloride (KLOR-CON) 20 MEQ Packet Take 60 mEq by mouth daily 270 packet 3     topiramate (TOPAMAX) 100 MG tablet Take 2 tablets (200 mg) by mouth 2 times daily 360 tablet 2     traZODone (DESYREL) 100 MG tablet Take 1 tablet (100 mg) by mouth At Bedtime 90 tablet 3     Vitamin D, Cholecalciferol, 400 UNITS CAPS Take 400 Units by mouth daily 90 capsule 3     zolpidem (AMBIEN) 10 MG tablet Take 1 tablet (10 mg) by mouth nightly as needed for sleep 30 tablet 1       Weight Loss Medication History Reviewed With Patient 4/1/2019   Which weight loss medications are you currently taking on a regular basis?  Phentermine, Topamax (topiramate)   If you are not taking a weight loss medication that was prescribed to you, please indicate why: -   Are you having any side effects from the weight loss medication that we have prescribed you? Yes     ASSESSMENT:   Continue topiramate 200 mg twice daily but check topiramate level. Continue phentermine 37.5 mg. Refer to monthly conference    FOLLOW-UP:    RTC 3 months  Time: 15 min spent on evaluation, management, counseling, education, & motivational interviewing with greater than 50 % of the total time was spent on counseling and coordinating care    Sincerely,    Fortino Chang MD

## 2019-04-01 NOTE — PROGRESS NOTES
"Return Medical Weight Management Note     Chavez Ca  MRN:  [id number]  :  1970  RUI:  2019    Dear Dr. Colindres,    I had the pleasure of seeing your patient Chavez Ca.  He is a 48 year old male who I am continuing to see for treatment of obesity related to: venous insufficiency with edema, VERN, hyperlipidemia, idiopathic progressive polyneuropathy, CAD, hypertension, depression. 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     CURRENT WEIGHT:   325 lbs 11.2 oz    Wt Readings from Last 4 Encounters:   19 147.7 kg (325 lb 11.2 oz)   19 (!) 150.9 kg (332 lb 11.2 oz)   19 (!) 151.9 kg (334 lb 12.8 oz)   18 (!) 158.3 kg (349 lb)     Height:  5' 7\"  Body Mass Index:  Body mass index is 51.01 kg/m .  Vitals:  /85 (BP Location: Left arm, Patient Position: Sitting, Cuff Size: Adult Large)   Pulse 107   Temp 98.1  F (36.7  C) (Oral)   Resp 18   Ht 1.702 m (5' 7\")   Wt 147.7 kg (325 lb 11.2 oz)   SpO2 98%   BMI 51.01 kg/m      Initial consult weight was 361 on 1/10/12.  Weight change since last seen on 2019 is down 7 pounds.   Total loss is 36 pounds.    INTERVAL HISTORY:  Pt is currently taking phentermine 37.5 mg dialy and topriamate 200 mg bid. Weight is fluctuating. His main barrier is financial issue for medication and diet, lack of consistency on dietary changes and non adherence to clinic visit. He does not feel that medications are effective anymore. He would like to move forward for bariatric surgery.    He has seen ENT 2019 and found to have small indentation in the anterior wall of his trachea in conjunction with tracheostomy scar. Concern for some dynamic airway collapse. He might need to have surgery.     Diet and Activity Changes Since Last Visit Reviewed With Patient 2019   I have made the following changes to my diet since my last " visit: I have eaten even more less & tried other fruits & vegetables   With regards to my diet, I am still struggling with: weight no matter walk 1 to 3 mile a day eat all day or not at all I can't fight this any longer   For breakfast, I typically eat: -   For lunch, I typically eat: -   For supper, I typically eat: -   For snack(s), I typically eat: -   I have made the following changes to my activity/exercise since my last visit: I have walked as much a day I can I am doing something  at least 18 hours a day if not more I struggle but I don't sit around   With regards to my activity/exercise, I am still struggling with: wieght back pain shortness of breath legs in total pain giving out feet     MEDICATIONS:   Current Outpatient Medications   Medication Sig Dispense Refill     amitriptyline (ELAVIL) 50 MG tablet Take 2 tablets (100 mg) by mouth At Bedtime 180 tablet 3     CETAPHIL (CETAPHIL) lotion Apply topically 2 times daily       COMPRESSION STOCKINGS 20-30 mmHg knee high compression stockings.  Measure and fit.  Style and brand per patient preference. 2 each 0     diclofenac (VOLTAREN) 1 % GEL topical gel Apply 4 grams to knees four times daily using enclosed dosing card. 100 g 1     furosemide (LASIX) 20 MG tablet Take 3 tablets (60 mg) by mouth 2 times daily 540 tablet 3     ketoconazole (NIZORAL) 2 % external shampoo Apply to the affected area and wash off after 5 minutes. 120 mL 1     metroNIDAZOLE (METROGEL) 1 % gel        order for DME Equipment being ordered: Knee high compression stockings 2 Units 0     phentermine (ADIPEX-P) 37.5 MG tablet Take 1 tablet (37.5 mg) by mouth every morning 30 tablet 3     pimecrolimus (ELIDEL) 1 % cream Apply topically 2 times daily 60 g 3     potassium chloride (KLOR-CON) 20 MEQ Packet Take 60 mEq by mouth daily 270 packet 3     topiramate (TOPAMAX) 100 MG tablet Take 2 tablets (200 mg) by mouth 2 times daily 360 tablet 2     traZODone (DESYREL) 100 MG tablet Take 1  tablet (100 mg) by mouth At Bedtime 90 tablet 3     Vitamin D, Cholecalciferol, 400 UNITS CAPS Take 400 Units by mouth daily 90 capsule 3     zolpidem (AMBIEN) 10 MG tablet Take 1 tablet (10 mg) by mouth nightly as needed for sleep 30 tablet 1       Weight Loss Medication History Reviewed With Patient 4/1/2019   Which weight loss medications are you currently taking on a regular basis?  Phentermine, Topamax (topiramate)   If you are not taking a weight loss medication that was prescribed to you, please indicate why: -   Are you having any side effects from the weight loss medication that we have prescribed you? Yes     ASSESSMENT:   Continue topiramate 200 mg twice daily but check topiramate level. Continue phentermine 37.5 mg. Refer to monthly conference    FOLLOW-UP:    RTC 3 months  Time: 15 min spent on evaluation, management, counseling, education, & motivational interviewing with greater than 50 % of the total time was spent on counseling and coordinating care    Sincerely,    Fortino Chang MD

## 2019-04-04 ENCOUNTER — MEDICAL CORRESPONDENCE (OUTPATIENT)
Dept: HEALTH INFORMATION MANAGEMENT | Facility: CLINIC | Age: 49
End: 2019-04-04

## 2019-04-04 DIAGNOSIS — G47.01 INSOMNIA DUE TO MEDICAL CONDITION: ICD-10-CM

## 2019-04-05 RX ORDER — ZOLPIDEM TARTRATE 10 MG/1
10 TABLET ORAL
Qty: 30 TABLET | Refills: 1 | Status: SHIPPED | OUTPATIENT
Start: 2019-04-05 | End: 2019-09-09

## 2019-04-12 ENCOUNTER — TEAM CONFERENCE (OUTPATIENT)
Dept: SURGERY | Facility: CLINIC | Age: 49
End: 2019-04-12

## 2019-04-12 NOTE — TELEPHONE ENCOUNTER
Patient discussed at weight management conference. He has been in our program in the past and determined to be too high risk for surgery due to airway issues.  Dr Chang has been seeing him again and would like us to evaluate for sleeve gastrectomy.    Discussed it is unclear if he has seen ENT recently.  I will discuss this with him at upcoming visit.  Should also consider anesthesia evaluation for airway risk.  Can review with PAC when he comes to clinic to see if they can see him.    Recent A1C 5.8, may consider GLP1 start.    I will ask Silviano to schedule Chavez for New Bariatric Consult with Denice or Marlee and JUNIOR on a Thursday when Dr Moeller is in clinic and also for meet and greet with Dr Moeller.    Denice Teixeira PA-C

## 2019-09-09 ENCOUNTER — MYC REFILL (OUTPATIENT)
Dept: ENDOCRINOLOGY | Facility: CLINIC | Age: 49
End: 2019-09-09

## 2019-09-09 ENCOUNTER — MYC REFILL (OUTPATIENT)
Dept: INTERNAL MEDICINE | Facility: CLINIC | Age: 49
End: 2019-09-09

## 2019-09-09 DIAGNOSIS — I87.303 STASIS EDEMA OF BOTH LOWER EXTREMITIES: ICD-10-CM

## 2019-09-09 DIAGNOSIS — G47.01 INSOMNIA DUE TO MEDICAL CONDITION: ICD-10-CM

## 2019-09-09 DIAGNOSIS — E87.6 HYPOKALEMIA: ICD-10-CM

## 2019-09-09 DIAGNOSIS — L21.9 SEBORRHEIC DERMATITIS: ICD-10-CM

## 2019-09-09 DIAGNOSIS — E66.01 MORBID OBESITY (H): ICD-10-CM

## 2019-09-09 DIAGNOSIS — G47.9 SLEEP DISORDER: ICD-10-CM

## 2019-09-09 RX ORDER — ZOLPIDEM TARTRATE 10 MG/1
10 TABLET ORAL
Qty: 30 TABLET | Refills: 1 | Status: SHIPPED | OUTPATIENT
Start: 2019-09-09 | End: 2020-01-16

## 2019-09-09 RX ORDER — TRAZODONE HYDROCHLORIDE 100 MG/1
100 TABLET ORAL AT BEDTIME
Qty: 90 TABLET | Refills: 0 | Status: SHIPPED | OUTPATIENT
Start: 2019-09-09 | End: 2020-01-16

## 2019-09-09 RX ORDER — FUROSEMIDE 20 MG
60 TABLET ORAL 2 TIMES DAILY
Qty: 540 TABLET | Refills: 3 | Status: SHIPPED | OUTPATIENT
Start: 2019-09-09 | End: 2020-03-26

## 2019-09-09 RX ORDER — POTASSIUM CHLORIDE 1.5 G/1.58G
60 POWDER, FOR SOLUTION ORAL DAILY
Qty: 270 PACKET | Refills: 0 | Status: SHIPPED | OUTPATIENT
Start: 2019-09-09 | End: 2020-07-07

## 2019-09-09 RX ORDER — KETOCONAZOLE 20 MG/ML
SHAMPOO TOPICAL
Qty: 120 ML | Refills: 1 | Status: SHIPPED | OUTPATIENT
Start: 2019-09-09 | End: 2020-03-26

## 2019-09-09 NOTE — TELEPHONE ENCOUNTER
Patient Requested  zolpidem (AMBIEN) 10 MG tablet  Last Filled  01/29/2019  Last Office Visit  01/24/2019-Resident  Next Office Visit  Nothing Scheduled   Checked  09/09/2019    DX: Insomnia due to medical condition     Pharmacy: [facility] [place], [place] - [address] [place] [address] [phone]    LEYDI WINCHESTER CMA at 8:12 AM on 9/9/2019.

## 2019-09-09 NOTE — TELEPHONE ENCOUNTER
Phentermine      Last Written Prescription Date:  1-29-19  Last Fill Quantity: 30,   # refills: 3  Last Office Visit : 4-1-19  Future Office visit:  10-14-19    Routing refill request to provider for review/approval because:  Controlled medication.    Kathleen M Doege RN

## 2019-09-23 ENCOUNTER — MYC REFILL (OUTPATIENT)
Dept: ENDOCRINOLOGY | Facility: CLINIC | Age: 49
End: 2019-09-23

## 2019-09-23 DIAGNOSIS — E66.01 MORBID OBESITY (H): ICD-10-CM

## 2019-09-23 RX ORDER — TOPIRAMATE 100 MG/1
200 TABLET, FILM COATED ORAL 2 TIMES DAILY
Qty: 360 TABLET | Refills: 2 | Status: SHIPPED | OUTPATIENT
Start: 2019-09-23 | End: 2020-05-07 | Stop reason: DRUGHIGH

## 2019-09-23 RX ORDER — PHENTERMINE HYDROCHLORIDE 37.5 MG/1
37.5 TABLET ORAL EVERY MORNING
Qty: 30 TABLET | Refills: 3 | Status: SHIPPED | OUTPATIENT
Start: 2019-09-23 | End: 2020-03-23

## 2019-09-23 NOTE — TELEPHONE ENCOUNTER
Received a refill request for Topiramate. Patient has made his appointment with Dr Chang. Refill e-scribed to patient's preferred pharmacy

## 2019-12-17 ENCOUNTER — TELEPHONE (OUTPATIENT)
Dept: INTERNAL MEDICINE | Facility: CLINIC | Age: 49
End: 2019-12-17

## 2019-12-17 NOTE — TELEPHONE ENCOUNTER
Left a message for Jamila that the patient would need to be seen in clinic to discuss further, as the last time the patient was seen in clinic was 1/23/19. Patient will need to schedule an appointment with a provider to re-establish care as Dr. Colindres is no longer at the clinic. Ivy Rausch LPN 12/17/2019 11:17 AM

## 2019-12-17 NOTE — TELEPHONE ENCOUNTER
Health Call Center    Phone Message    May a detailed message be left on voicemail: yes    Reason for Call: Other: The pt was seen for an annual visit by [facility]. The NP states the pt is hypertensive, 150/110 with a heart rate of 98. He is not on an hypertensive rx, so should be seen to discuiss poss meds. He is also not up to date on his flu shot, etc. Please call Jamila Campuzano at [phone], and this is a conf vm. Thanks     Action Taken: Message routed to:  [facility]

## 2020-01-16 ENCOUNTER — MYC REFILL (OUTPATIENT)
Dept: INTERNAL MEDICINE | Facility: CLINIC | Age: 50
End: 2020-01-16

## 2020-01-16 DIAGNOSIS — I87.303 STASIS EDEMA OF BOTH LOWER EXTREMITIES: ICD-10-CM

## 2020-01-16 DIAGNOSIS — G60.3 IDIOPATHIC PROGRESSIVE NEUROPATHY: ICD-10-CM

## 2020-01-16 DIAGNOSIS — R60.0 LOCALIZED EDEMA: ICD-10-CM

## 2020-01-16 DIAGNOSIS — G47.9 SLEEP DISORDER: ICD-10-CM

## 2020-01-16 DIAGNOSIS — G47.01 INSOMNIA DUE TO MEDICAL CONDITION: ICD-10-CM

## 2020-01-16 DIAGNOSIS — I87.2 VENOUS INSUFFICIENCY (CHRONIC) (PERIPHERAL): ICD-10-CM

## 2020-01-16 RX ORDER — AMITRIPTYLINE HYDROCHLORIDE 50 MG/1
100 TABLET ORAL AT BEDTIME
Qty: 180 TABLET | Refills: 0 | Status: SHIPPED | OUTPATIENT
Start: 2020-01-16 | End: 2020-03-26

## 2020-01-16 RX ORDER — FUROSEMIDE 20 MG
60 TABLET ORAL 2 TIMES DAILY
Qty: 540 TABLET | Refills: 3 | Status: CANCELLED | OUTPATIENT
Start: 2020-01-16

## 2020-01-16 RX ORDER — TRAZODONE HYDROCHLORIDE 100 MG/1
100 TABLET ORAL AT BEDTIME
Qty: 90 TABLET | Refills: 0 | Status: SHIPPED | OUTPATIENT
Start: 2020-01-16 | End: 2020-03-26

## 2020-01-16 NOTE — TELEPHONE ENCOUNTER
Last Clinic Visit: 1/24/2019  [facility]  Scheduling has been notified to contact the pt for appointment.

## 2020-01-20 NOTE — TELEPHONE ENCOUNTER
Last office visit 1/24/19 with Dr. Colindres. Dr. Colindres has graduated residency, so patient does need to re-establish care.  site reviewed today. Last zolpidem refill received 01/29/19. Lasix refills are available, though patient does need updated labs and clinic visit. No appointment scheduled at this time.     Will send to Dr. Andersen for review. MyChart will also be sent to Elicia Carroll RN (Brasch)

## 2020-01-22 RX ORDER — ZOLPIDEM TARTRATE 10 MG/1
10 TABLET ORAL
Qty: 30 TABLET | Refills: 0 | Status: SHIPPED | OUTPATIENT
Start: 2020-01-22 | End: 2020-03-26

## 2020-03-01 ENCOUNTER — HEALTH MAINTENANCE LETTER (OUTPATIENT)
Age: 50
End: 2020-03-01

## 2020-03-23 ENCOUNTER — VIRTUAL VISIT (OUTPATIENT)
Dept: ENDOCRINOLOGY | Facility: CLINIC | Age: 50
End: 2020-03-23
Payer: COMMERCIAL

## 2020-03-23 DIAGNOSIS — E66.01 MORBID OBESITY (H): ICD-10-CM

## 2020-03-23 RX ORDER — TOPIRAMATE 25 MG/1
TABLET, FILM COATED ORAL
Qty: 90 TABLET | Refills: 5 | Status: SHIPPED | OUTPATIENT
Start: 2020-03-23 | End: 2020-05-07

## 2020-03-23 RX ORDER — PHENTERMINE HYDROCHLORIDE 37.5 MG/1
37.5 TABLET ORAL EVERY MORNING
Qty: 30 TABLET | Refills: 3 | Status: SHIPPED | OUTPATIENT
Start: 2020-03-23 | End: 2020-08-24

## 2020-03-23 NOTE — PROGRESS NOTES
Return Medical Weight Management Note     Chavez Ca  MRN:  [id number]  :  1970  RUI:  3/23/20    Dear Dr. Colindres,    I had the pleasure of seeing your patient Chavez Ca.  He is a 48 year old male who I am continuing to see for treatment of obesity related to: venous insufficiency with edema, VERN, hyperlipidemia, idiopathic progressive polyneuropathy, CAD, hypertension, depression. 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     CURRENT WEIGHT:   0 lbs 0 oz Self report 365    Wt Readings from Last 4 Encounters:   19 147.7 kg (325 lb 11.2 oz)   19 (!) 150.9 kg (332 lb 11.2 oz)   19 (!) 151.9 kg (334 lb 12.8 oz)   18 (!) 158.3 kg (349 lb)     Height:  Data Unavailable  Body Mass Index:  There is no height or weight on file to calculate BMI.  Vitals:  There were no vitals taken for this visit.    Initial consult weight was 361 on 1/10/12.  Weight change since last seen on 19 is down 7 pounds.   Total gain is 4 pounds.    INTERVAL HISTORY:  No medication since September. Has gained much weight. Still wants barisurgery but is too high risk even for tracheal issues.    Saw ENT 2019 and found to have small indentation in the anterior wall of his trachea in conjunction with tracheostomy scar. Concern for some dynamic airway collapse.     No flowsheet data found.     MEDICATIONS:   Current Outpatient Medications   Medication Sig Dispense Refill     amitriptyline (ELAVIL) 50 MG tablet Take 2 tablets (100 mg) by mouth At Bedtime Call clinic to schedule follow up appointment. [phone] 180 tablet 0     CETAPHIL (CETAPHIL) lotion Apply topically 2 times daily       COMPRESSION STOCKINGS 20-30 mmHg knee high compression stockings.  Measure and fit.  Style and brand per patient preference. 2 each 0     diclofenac (VOLTAREN) 1 % GEL topical gel Apply 4 grams to knees four  "times daily using enclosed dosing card. 100 g 1     furosemide (LASIX) 20 MG tablet Take 3 tablets (60 mg) by mouth 2 times daily 540 tablet 3     ketoconazole (NIZORAL) 2 % external shampoo Apply to the affected area and wash off after 5 minutes. 120 mL 1     metroNIDAZOLE (METROGEL) 1 % gel        phentermine (ADIPEX-P) 37.5 MG tablet Take 1 tablet (37.5 mg) by mouth every morning 30 tablet 3     phentermine (ADIPEX-P) 37.5 MG tablet Take 1 tablet (37.5 mg) by mouth every morning 30 tablet 1     pimecrolimus (ELIDEL) 1 % cream Apply topically 2 times daily 60 g 3     potassium chloride (KLOR-CON) 20 MEQ packet Take 60 mEq by mouth daily 270 packet 0     topiramate (TOPAMAX) 100 MG tablet Take 2 tablets (200 mg) by mouth 2 times daily 360 tablet 2     traZODone (DESYREL) 100 MG tablet Take 1 tablet (100 mg) by mouth At Bedtime Call clinic to schedule follow up appointment. [phone] 90 tablet 0     Vitamin D, Cholecalciferol, 400 UNITS CAPS Take 400 Units by mouth daily 90 capsule 3     zolpidem (AMBIEN) 10 MG tablet Take 1 tablet (10 mg) by mouth nightly as needed for sleep 30 tablet 0       Weight Loss Medication History Reviewed With Patient 3/23/2020   Which weight loss medications are you currently taking on a regular basis?  Phentermine, Topamax (topiramate)   If you are not taking a weight loss medication that was prescribed to you, please indicate why: Other   Are you having any side effects from the weight loss medication that we have prescribed you? No     ASSESSMENT:   Restart topiramate 25=>75. Restart phentermine 37.5 mg.    FOLLOW-UP:    RTC 3 months  The patient is being evaluated via a billable telephone visit and has been notified of following:     \"This telephone visit will be conducted via a call between you and your physician/provider. We have found that certain health care needs can be provided without the need for a physical exam.  This service lets us provide the care you need with a short " "phone conversation.  If a prescription is necessary we can send it directly to your pharmacy.  If lab work is needed we can place an order for that and you can then stop by our lab to have the test done at a later time.    Telephone visits are billed at different rates depending on your insurance coverage. During this emergency period, for some insurers they may be billed the same as an in-person visit.  Please reach out to your insurance provider with any questions.    If during the course of the call the physician/provider feels a telephone visit is not appropriate, you will not be charged for this service.\"    Patient has given verbal consent for Telephone visit?  Yes    How would you like to obtain your AVS? MyChart    Phone call duration: 15 minutes.    Sincerely,    Fortino Chang MD  "

## 2020-03-26 ENCOUNTER — VIRTUAL VISIT (OUTPATIENT)
Dept: INTERNAL MEDICINE | Facility: CLINIC | Age: 50
End: 2020-03-26
Payer: COMMERCIAL

## 2020-03-26 DIAGNOSIS — R73.03 PREDIABETES: ICD-10-CM

## 2020-03-26 DIAGNOSIS — R60.9 EDEMA, UNSPECIFIED TYPE: Primary | ICD-10-CM

## 2020-03-26 DIAGNOSIS — Z87.898 HISTORY OF PREDIABETES: ICD-10-CM

## 2020-03-26 DIAGNOSIS — L21.9 SEBORRHEIC DERMATITIS: ICD-10-CM

## 2020-03-26 DIAGNOSIS — I87.2 VENOUS INSUFFICIENCY (CHRONIC) (PERIPHERAL): ICD-10-CM

## 2020-03-26 DIAGNOSIS — G47.01 INSOMNIA DUE TO MEDICAL CONDITION: ICD-10-CM

## 2020-03-26 DIAGNOSIS — R06.09 DYSPNEA ON EXERTION: ICD-10-CM

## 2020-03-26 DIAGNOSIS — G47.9 SLEEP DISORDER: ICD-10-CM

## 2020-03-26 DIAGNOSIS — I87.303 STASIS EDEMA OF BOTH LOWER EXTREMITIES: ICD-10-CM

## 2020-03-26 DIAGNOSIS — G60.3 IDIOPATHIC PROGRESSIVE NEUROPATHY: ICD-10-CM

## 2020-03-26 RX ORDER — KETOCONAZOLE 20 MG/ML
SHAMPOO TOPICAL
Qty: 120 ML | Refills: 1 | Status: SHIPPED | OUTPATIENT
Start: 2020-03-26 | End: 2021-01-10

## 2020-03-26 RX ORDER — AMITRIPTYLINE HYDROCHLORIDE 50 MG/1
100 TABLET ORAL AT BEDTIME
Qty: 180 TABLET | Refills: 0 | Status: SHIPPED | OUTPATIENT
Start: 2020-03-26 | End: 2020-03-26

## 2020-03-26 RX ORDER — ZOLPIDEM TARTRATE 10 MG/1
10 TABLET ORAL
Qty: 30 TABLET | Refills: 0 | Status: SHIPPED | OUTPATIENT
Start: 2020-03-26 | End: 2020-06-09

## 2020-03-26 RX ORDER — TRAZODONE HYDROCHLORIDE 100 MG/1
100 TABLET ORAL AT BEDTIME
Qty: 90 TABLET | Refills: 0 | Status: SHIPPED | OUTPATIENT
Start: 2020-03-26 | End: 2020-07-07

## 2020-03-26 RX ORDER — AMITRIPTYLINE HYDROCHLORIDE 50 MG/1
100 TABLET ORAL AT BEDTIME
Qty: 180 TABLET | Refills: 0 | Status: SHIPPED | OUTPATIENT
Start: 2020-03-26 | End: 2020-07-07

## 2020-03-26 RX ORDER — FUROSEMIDE 20 MG
60 TABLET ORAL 2 TIMES DAILY
Qty: 540 TABLET | Refills: 3 | Status: SHIPPED | OUTPATIENT
Start: 2020-03-26 | End: 2021-05-04

## 2020-03-26 ASSESSMENT — PAIN SCALES - GENERAL: PAINLEVEL: EXTREME PAIN (8)

## 2020-03-26 NOTE — PROGRESS NOTES
"Resident telephone visit documentation    This patient is being evaluated via a billable telephone visit; THIS VISIT WAS INITIATED BY THE PT, as AN ALTERNATIVE TO IN PERSON VISIT .       The patient has been notified of following:      \"This telephone visit will be conducted via a call between you and your physician/provider. We have found that certain health care needs can be provided without the need for a physical exam.  This service lets us provide the care you need with a short phone conversation.  If a prescription is necessary we can send it directly to your pharmacy.  If lab work is needed we can place an order for that and you can then stop by our lab to have the test done at a later time.     If during the course of the call the physician/provider feels a telephone visit is not appropriate, you will not be charged for this service.\"     Person spoken to: Chavez Ca    Due to efforts to reduce the spread of COVID-19 in the clinic, state, nation, telephone visits are encouraged currently. Patient understands that diagnose and advice is limited by the inability to exam him/her/them face-to-face.    Time call initiated:  1215  Time call ended:  1245  Total length of call: 30mins    Person spoken to: (patient name and anyone else listening in--family member, care giver, , etc.)     Patient opted to conduct today's return visit via telephone versus an in person visit to the clinic, due to efforts to reduce the spread of COVID-19 clinic, world, nation and state-wide.     Time call initiated: ( for example--7:03 a.m.)  Time call ended: (for example--7:22 a.m.)   Total length of call: ( for example--19 minutes)    Chavez Ca is a 49 year old male who is being evaluated via a billable telephone visit.      The patient has been notified of following:     \"This telephone visit will be conducted via a call between you and your physician/provider. We have found that certain health care needs can " "be provided without the need for a physical exam.  This service lets us provide the care you need with a short phone conversation.  If a prescription is necessary we can send it directly to your pharmacy.  If lab work is needed we can place an order for that and you can then stop by our lab to have the test done at a later time.    If during the course of the call the physician/provider feels a telephone visit is not appropriate, you will not be charged for this service.\"     Chavez Ca complains of    Chief Complaint   Patient presents with     Medication Refill     Pt needs medication refills. Pt would like you to to erase past/old medication on list.        I have reviewed and updated the patient's Past Medical History, Social History, Family History and Medication List.    ALLERGIES  Fentanyl; Meloxicam; and Minocycline    Assessment/Plan:    REFILLS:   Compression stockings  Zolpidem 10mg  amitryptiline 100mg  trazone 100mg  Ketoconazole 2%  Furosemide 60mg BID    He checks his weight at home and it has increased to a weight 370 lbs this morning.  Patient has CRUZ after walking 1-2 blocks.  He tries to walk daily with his dog.  Patient denies CP.      1. Idiopathic progressive neuropathy  - amitriptyline (ELAVIL) 50 MG tablet; Take 2 tablets (100 mg) by mouth At Bedtime Call clinic to schedule follow up appointment. [phone]  Dispense: 180 tablet; Refill: 0    2. Insomnia due to medical condition  - zolpidem (AMBIEN) 10 MG tablet; Take 1 tablet (10 mg) by mouth nightly as needed for sleep  Dispense: 30 tablet; Refill: 0    3. Stasis edema of both lower extremities  - order for DME; Equipment being ordered: compression stockings  Dispense: 2 each; Refill: 0  - furosemide (LASIX) 20 MG tablet; Take 3 tablets (60 mg) by mouth 2 times daily  Dispense: 540 tablet; Refill: 3    4. Seborrheic dermatitis  - ketoconazole (NIZORAL) 2 % external shampoo; Apply to the affected area and wash off after 5 " minutes.  Dispense: 120 mL; Refill: 1    5. Sleep disorder  - traZODone (DESYREL) 100 MG tablet; Take 1 tablet (100 mg) by mouth At Bedtime Call clinic to schedule follow up appointment. [phone]  Dispense: 90 tablet; Refill: 0    6. Edema, unspecified type  - Comprehensive metabolic panel; Future  - N terminal pro BNP; Future    7. Dyspnea on exertion   - N terminal pro BNP; Future    8. Prediabetes  - HgbA1C    Will follow up with me in 1 month.    For staff coding purposes:  5-10 minutes:  LOS [id number]  11-20 minutes:  53711  21-30 minutes:  88636    Sonia Saleem MD  Internal Medicine PGY3    Staffed with:  Dr. Aguilar    Teaching Physician Note:  I was present during the telephone visit.  I discussed the case with the resident and agree with the note as documented by the resident.  Charu Aguilar MD

## 2020-03-26 NOTE — NURSING NOTE
Chief Complaint   Patient presents with     Medication Refill     Pt needs medication refills. Pt would like you to to erase past/old medication on list.      Leandra Gross LPN at 10:48 AM on 3/26/2020.

## 2020-04-07 DIAGNOSIS — R73.03 PREDIABETES: ICD-10-CM

## 2020-04-07 DIAGNOSIS — R60.9 EDEMA, UNSPECIFIED TYPE: ICD-10-CM

## 2020-04-07 DIAGNOSIS — R06.09 DYSPNEA ON EXERTION: ICD-10-CM

## 2020-04-07 DIAGNOSIS — Z87.898 HISTORY OF PREDIABETES: ICD-10-CM

## 2020-04-07 DIAGNOSIS — E66.01 MORBID OBESITY (H): ICD-10-CM

## 2020-04-07 LAB
ALBUMIN SERPL-MCNC: 3.7 G/DL (ref 3.4–5)
ALP SERPL-CCNC: 93 U/L (ref 40–150)
ALT SERPL W P-5'-P-CCNC: 55 U/L (ref 0–70)
ANION GAP SERPL CALCULATED.3IONS-SCNC: 5 MMOL/L (ref 3–14)
AST SERPL W P-5'-P-CCNC: 33 U/L (ref 0–45)
BILIRUB SERPL-MCNC: 0.4 MG/DL (ref 0.2–1.3)
BUN SERPL-MCNC: 17 MG/DL (ref 7–30)
CALCIUM SERPL-MCNC: 9 MG/DL (ref 8.5–10.1)
CHLORIDE SERPL-SCNC: 102 MMOL/L (ref 94–109)
CO2 SERPL-SCNC: 28 MMOL/L (ref 20–32)
CREAT SERPL-MCNC: 0.92 MG/DL (ref 0.66–1.25)
ERYTHROCYTE [DISTWIDTH] IN BLOOD BY AUTOMATED COUNT: 13 % (ref 10–15)
GFR SERPL CREATININE-BSD FRML MDRD: >90 ML/MIN/{1.73_M2}
GLUCOSE SERPL-MCNC: 124 MG/DL (ref 70–99)
HBA1C MFR BLD: 5.9 % (ref 0–5.6)
HCT VFR BLD AUTO: 45.6 % (ref 40–53)
HGB BLD-MCNC: 14.7 G/DL (ref 13.3–17.7)
MCH RBC QN AUTO: 29.5 PG (ref 26.5–33)
MCHC RBC AUTO-ENTMCNC: 32.2 G/DL (ref 31.5–36.5)
MCV RBC AUTO: 91 FL (ref 78–100)
NT-PROBNP SERPL-MCNC: 12 PG/ML (ref 0–125)
PLATELET # BLD AUTO: 243 10E9/L (ref 150–450)
POTASSIUM SERPL-SCNC: 4.2 MMOL/L (ref 3.4–5.3)
PROT SERPL-MCNC: 8.1 G/DL (ref 6.8–8.8)
RBC # BLD AUTO: 4.99 10E12/L (ref 4.4–5.9)
SODIUM SERPL-SCNC: 135 MMOL/L (ref 133–144)
WBC # BLD AUTO: 7.5 10E9/L (ref 4–11)

## 2020-04-08 LAB — TOPIRAMATE SERPL-MCNC: <1.5 UG/ML (ref 5–20)

## 2020-05-06 ENCOUNTER — TELEPHONE (OUTPATIENT)
Dept: INTERNAL MEDICINE | Facility: CLINIC | Age: 50
End: 2020-05-06

## 2020-05-06 ENCOUNTER — TELEPHONE (OUTPATIENT)
Dept: ENDOCRINOLOGY | Facility: CLINIC | Age: 50
End: 2020-05-06

## 2020-05-06 DIAGNOSIS — R60.0 LOWER EXTREMITY EDEMA: Primary | ICD-10-CM

## 2020-05-06 DIAGNOSIS — E66.01 MORBID OBESITY (H): ICD-10-CM

## 2020-05-06 NOTE — TELEPHONE ENCOUNTER
[facility] Call Center    Phone Message    May a detailed message be left on voicemail: yes     Reason for Call: Medication Question or concern regarding medication   Prescription Clarification  Name of Medication:COMPRESSION STOCKINGS   Prescribing Provider: Harper   Pharmacy: [facility]    What on the order needs clarification? Patient is wanting them to be sent to [facility] for them to be delivered.           Action Taken: Message routed to:  [facility]    Travel Screening: Not Applicable

## 2020-05-06 NOTE — TELEPHONE ENCOUNTER
Reason for call:  Other   Patient called regarding (reason for call): prescription  Additional comments: Patient is wanting to up his dose of topiramate, and was instructed if wanting to do so, to reach out to Darleen. Please contact the patient with any further questions.     Phone number to reach patient:  Cell number on file:    Telephone Information:   Mobile [phone]       Best Time:  asap    Can we leave a detailed message on this number?  YES    Travel screening: Not Applicable

## 2020-05-07 ENCOUNTER — DOCUMENTATION ONLY (OUTPATIENT)
Dept: CARE COORDINATION | Facility: CLINIC | Age: 50
End: 2020-05-07

## 2020-05-07 RX ORDER — TOPIRAMATE 25 MG/1
TABLET, FILM COATED ORAL
Qty: 180 TABLET | Refills: 1 | Status: SHIPPED | OUTPATIENT
Start: 2020-05-07 | End: 2020-07-06

## 2020-05-07 NOTE — TELEPHONE ENCOUNTER
Discussed Topiramate dose increase with Dr. Chang. Will increase from 75mg once daily to 75mg BID with ramp up. Called patient to inform. New rx sent to pharmacy.

## 2020-06-04 ENCOUNTER — VIRTUAL VISIT (OUTPATIENT)
Dept: INTERNAL MEDICINE | Facility: CLINIC | Age: 50
End: 2020-06-04
Payer: COMMERCIAL

## 2020-06-04 DIAGNOSIS — J30.2 SEASONAL ALLERGIC RHINITIS, UNSPECIFIED TRIGGER: ICD-10-CM

## 2020-06-04 DIAGNOSIS — G89.29 BILATERAL CHRONIC KNEE PAIN: ICD-10-CM

## 2020-06-04 DIAGNOSIS — R60.0 LOWER EXTREMITY EDEMA: ICD-10-CM

## 2020-06-04 DIAGNOSIS — K64.9 BLEEDING HEMORRHOID: ICD-10-CM

## 2020-06-04 DIAGNOSIS — R06.2 WHEEZING: Primary | ICD-10-CM

## 2020-06-04 DIAGNOSIS — Z12.11 SPECIAL SCREENING FOR MALIGNANT NEOPLASMS, COLON: ICD-10-CM

## 2020-06-04 DIAGNOSIS — I87.303 STASIS EDEMA OF BOTH LOWER EXTREMITIES: ICD-10-CM

## 2020-06-04 DIAGNOSIS — K59.00 CONSTIPATION, UNSPECIFIED CONSTIPATION TYPE: ICD-10-CM

## 2020-06-04 DIAGNOSIS — M62.81 GENERALIZED MUSCLE WEAKNESS: ICD-10-CM

## 2020-06-04 DIAGNOSIS — M25.561 BILATERAL CHRONIC KNEE PAIN: ICD-10-CM

## 2020-06-04 DIAGNOSIS — M25.562 BILATERAL CHRONIC KNEE PAIN: ICD-10-CM

## 2020-06-04 DIAGNOSIS — R29.6 RECURRENT FALLS: ICD-10-CM

## 2020-06-04 RX ORDER — ALBUTEROL SULFATE 90 UG/1
2 AEROSOL, METERED RESPIRATORY (INHALATION) EVERY 6 HOURS PRN
Qty: 1 INHALER | Refills: 3 | Status: ON HOLD | OUTPATIENT
Start: 2020-06-04 | End: 2020-10-16

## 2020-06-04 RX ORDER — CETIRIZINE HYDROCHLORIDE 10 MG/1
10 TABLET ORAL DAILY PRN
Qty: 90 TABLET | Refills: 3 | Status: ON HOLD | OUTPATIENT
Start: 2020-06-04 | End: 2020-10-16

## 2020-06-04 RX ORDER — POLYETHYLENE GLYCOL 3350 17 G/17G
1 POWDER, FOR SOLUTION ORAL DAILY
Qty: 850 G | Refills: 3 | Status: SHIPPED | OUTPATIENT
Start: 2020-06-04 | End: 2020-10-14

## 2020-06-04 ASSESSMENT — PAIN SCALES - GENERAL: PAINLEVEL: WORST PAIN (10)

## 2020-06-04 NOTE — PROGRESS NOTES
"Resident telephone visit documentation    Person spoken to: Chavez Ca (patient)     Patient opted to conduct today's return visit via telephone versus an in person visit to the clinic, due to efforts to reduce the spread of COVID-19 clinic, world, nation and state-wide.     Time call initiated: 8:20  Time call ended: 9:10   Total length of call: 40 mins    Chavez Ca is a 49 year old male who is being evaluated via a billable telephone visit.      The patient has been notified of following:     \"This telephone visit will be conducted via a call between you and your physician/provider. We have found that certain health care needs can be provided without the need for a physical exam.  This service lets us provide the care you need with a short phone conversation.  If a prescription is necessary we can send it directly to your pharmacy.  If lab work is needed we can place an order for that and you can then stop by our lab to have the test done at a later time.    If during the course of the call the physician/provider feels a telephone visit is not appropriate, you will not be charged for this service.\"     Chavez Ca complains of    Chief Complaint   Patient presents with     Medication Refill     Pt would like to discuss medications.        I have reviewed and updated the patient's Past Medical History, Social History, Family History and Medication List.    ALLERGIES  Fentanyl; Meloxicam; and Minocycline      Assessment/Plan:    #falls  #knee and ankle weakness  Right worse than left.  Has had multiple falls but denies hitting his head.  Not related to pain.  He falls without warning feeling like his knees give out.  Patient is able to catch himself.  Patient is willing to work on strengthening and balance with PT.  -PT referral    #lower extremity edema, idiopathic  He needs a prescription compression stockings faxed to [facility] fax #[phone].  He received a printed rx at last " visit that was not accepted at the facility.  -compression stockings  -OT referral    #obesity vs volume status  Weight to 365lbs and was at 359lbs.  No change in SOB or CRUZ.  His legs have increased edema but he hasn't received the compression stockings.  -continue to weigh daily  -begin to increase exercise  -PT  -continue lasix 60mg BID    #possible COVID-19  He felt sick on April 10. Symptoms diarrhea, tight throat, swollen tongue, SOB, weak, fatigue.  He felt sick for 2 weeks and he self quarantined for 3 weeks.  Patient did not get tested.  He is no longer symptomatic.  No fevers.    #seasonal allergies  #possible mild asthma  Rhinitis and wheezing that got worse after picking up mulch with his roommates.  No hx of asthma that he is aware of.  Has episodes of audible wheezing during activity and humid days.  Could get PFTs  -zyrtec PRN  -albuterol PRN    #hidden penis  Patient has to use a urinal because he is unable to aim.     #colonoscopy for screening  -GI referral    #constipation  #possible hemorrhoids  -miralax    Follow up with me in 2-3 weeks for in clinic visit .    For staff coding purposes:  5-10 minutes:  LOS [id number]  11-20 minutes:  59740  21-30 minutes:  98897    Sonia Saleem MD  Internal Medicine PGY3    Staffed with:  Dr. Garg    Teaching Physician Note:  I was not present during the telephone visit.  I discussed the case with the resident, however, and agree with the resident's plan.    Muriel Garg M.D.  Internal Medicine  [facility]   pager [phone]

## 2020-06-08 ENCOUNTER — TELEPHONE (OUTPATIENT)
Dept: INTERNAL MEDICINE | Facility: CLINIC | Age: 50
End: 2020-06-08

## 2020-06-08 DIAGNOSIS — G47.01 INSOMNIA DUE TO MEDICAL CONDITION: ICD-10-CM

## 2020-06-08 NOTE — TELEPHONE ENCOUNTER
[facility] Call Center    Phone Message    May a detailed message be left on voicemail: yes     Reason for Call: Medication Question or concern regarding medication   Prescription Clarification  Name of Medication: albuterol (PROAIR HFA/PROVENTIL HFA/VENTOLIN HFA) 108 (90 Base) MCG/ACT inhaler  Prescribing Provider: Sonia Cain   Pharmacy: [facility]    What on the order needs clarification?  PT states that the pharmacy did not receive this prescription??    Name of Medication: albuterol (PROAIR HFA/PROVENTIL HFA/VENTOLIN HFA) 108 (90 Base) MCG/ACT inhaler  Prescribing Provider: Sonia Cain   [facility]acy[id number]    What on the order needs clarification?   cetirizine (ZYRTEC) 10 MG tablet         Name of Medication: zolpidem (AMBIEN) 10 MG tablet   Prescribing Provider: Dr Aguilar   Pharmacy: [facility]    What on the order needs clarification?   REFILL NEEDED              Action Taken: Message routed to:  [facility]    Travel Screening: Not Applicable

## 2020-06-08 NOTE — TELEPHONE ENCOUNTER
Message sent via my chart to schedule appointment AVS sent via my chart  Lorraine Viera CMA at 12:21 PM on 6/8/2020.

## 2020-06-09 RX ORDER — ZOLPIDEM TARTRATE 10 MG/1
10 TABLET ORAL
Qty: 30 TABLET | Refills: 0 | Status: SHIPPED | OUTPATIENT
Start: 2020-06-09 | End: 2020-11-05

## 2020-06-09 NOTE — TELEPHONE ENCOUNTER
Patient Requested  zolpidem (AMBIEN) 10 MG tablet  Last Filled  03/26/2020  Last Office Visit  06/04/2020  Next Office Visit  Nothing Scheduled   Checked  06/09/2020    DX: Insomnia due to medical condition     Pharmacy: [facility] [place], [place] - [address] [place] [address] [phone]    LEYDI WINCHESTER CMA at 2:44 PM on 6/9/2020.

## 2020-06-18 ENCOUNTER — HOSPITAL ENCOUNTER (OUTPATIENT)
Dept: PHYSICAL THERAPY | Facility: CLINIC | Age: 50
Setting detail: THERAPIES SERIES
End: 2020-06-18
Attending: INTERNAL MEDICINE
Payer: COMMERCIAL

## 2020-06-18 DIAGNOSIS — G89.29 BILATERAL CHRONIC KNEE PAIN: ICD-10-CM

## 2020-06-18 DIAGNOSIS — R29.6 RECURRENT FALLS: ICD-10-CM

## 2020-06-18 DIAGNOSIS — M25.562 BILATERAL CHRONIC KNEE PAIN: ICD-10-CM

## 2020-06-18 DIAGNOSIS — I87.303 STASIS EDEMA OF BOTH LOWER EXTREMITIES: ICD-10-CM

## 2020-06-18 DIAGNOSIS — M62.81 GENERALIZED MUSCLE WEAKNESS: ICD-10-CM

## 2020-06-18 DIAGNOSIS — R60.0 LOWER EXTREMITY EDEMA: ICD-10-CM

## 2020-06-18 DIAGNOSIS — M25.561 BILATERAL CHRONIC KNEE PAIN: ICD-10-CM

## 2020-06-18 PROCEDURE — 97140 MANUAL THERAPY 1/> REGIONS: CPT | Mod: GP

## 2020-06-18 PROCEDURE — 97161 PT EVAL LOW COMPLEX 20 MIN: CPT | Mod: GP | Performed by: PHYSICAL THERAPIST

## 2020-06-18 PROCEDURE — 97110 THERAPEUTIC EXERCISES: CPT | Mod: GP | Performed by: PHYSICAL THERAPIST

## 2020-06-18 PROCEDURE — 97535 SELF CARE MNGMENT TRAINING: CPT | Mod: GO

## 2020-06-18 NOTE — PROGRESS NOTES
"   06/18/20 0900   Quick Adds   Type of Visit Initial OP PT Evaluation   General Information   Start of Care Date 06/18/20   Referring Physician Sonia Saleem MD; Muriel Garg MD   Orders Evaluate and Treat as Indicated   Order Date 06/04/20   Medical Diagnosis Lower extremity edema, Generalized muscle weakness, Bilateral chronic knee pain, Stasis edema of both LE's; Recurrent falls   Onset of illness/injury or Date of Surgery 06/04/20  (date of orders)   Precautions/Limitations fall precautions   Surgical/Medical history reviewed Yes   Pertinent history of current problem (include personal factors and/or comorbidities that impact the POC) Pt reporting ongoing neuropathy of hands/feet and chronic R LBP, also behind R knee. Reports \"things getting worse\" - unable to safely do stairs. States he has been in process for 2 surgeries \"but they aren't getting done\". Frustrated that upcoming MD appointments are \"phone appointments\"   Pertinent Visual History  OK per pt   Transfers Difficult to get out of lower surfaces. Currently has \"beat up\" recliner. Sleeps sitting up in chair.. Needs to elevate legs. Current recliner doesn't allow feet up without back too flat.    Ambulation Stairs are hard. Knees \"buckle\" daya R. \"I push myself to walk\"Tries to mow the lawn. Had a cane but it is broken. Had a medical scooter \"but I worked my way out of that with PT\"   ADL does indep \"but takes forever\"   Previous/Current Treatment Physical Therapy  (\"haven't done more PT since it's just a waste\")   Current Community Support   (reports he contacted [facility] - not helpful)   Living environment House/[facility]  (Lives with friend's son in house)   Home/Community Accessibility Comments 3 steps into house; no rail. Stairs to bedroom level - partial rail.    Current Assistive Devices   (none - cane is broken)   Patient/Family Goals Statement \"I want to be less fatigued, more energetic, to be able to walk to the busstop without " stopping every 10 steps without increase in pain - low back or knee.    General Information Comments Pt L-hand dominant   Fall Risk Screen   Fall screen completed by PT   Have you fallen 2 or more times in the past year? Yes   Have you fallen and had an injury in the past year? No   Timed Up and Go score (seconds) 21.44, no AD  (after TUG: SOB, reports increased pain; SpO2 93%, HR 91)   Is patient a fall risk? Yes;Department fall risk interventions implemented   Fall screen comments reports falls are weekly; improved stability with SEC   Pain   Patient currently in pain Yes   Pain location R low back; posterior R knee   Pain rating 6   Pain description comment worst past 24 hours 10/10 - cleaning garage - uses wagon to avoid carrying things; best past 24 hours 6   Pain comments also paresthesias hands/feet   Vitals Signs   Heart Rate 95   Blood Pressure 116/77  (seated, L UE, pre activity)   Cognitive Status Examination   Orientation orientation to person, place and time   Level of Consciousness alert   Follows Commands and Answers Questions 100% of the time   Personal Safety and Judgment impaired   Cognitive Comment tangential frequently   Integumentary   Integumentary No deficits were identified   Integumentary Comments LE edema - has knee high comrpession stockings on today. Has Eval with Lymphedema today following PT Eval   Posture   Posture Comments R iliac crest lower vs L - compensatory trunk lean to L. Foot/ankle positioning fairly neutral - does usually use shoe inserts but needs new ones   Palpation   Palpation tenderness posterior R knee; B lower legs/ankles    Range of Motion (ROM)   ROM Comment ankle DF 10+* B; knee extension 0*, flexion limited to 90* R, 115* L; hip flexion to 90* B due to soft tissue limits; hip ER/IR ~ 50% limited daya R due to c/o pain (normal end feel); hip abduction WFL B although painful on R at end-range   Strength   Strength Comments LE: 5/5 B hip flexion/ER/IR/hams/glut  "medius/Ant Tibialis on MMT;    Bed Mobility   Bed Mobility Comments indep with high difficulty due to obesity, deconditioning and back pain. Unable to lie flat supine - sleeps in recliner chair. Uncomfortable lying on R side > briefly due to hip pain   Transfer Skills   Transfer Comments unstable on turns without AD; able to do STS without arms from standard height chair   Gait   Gait Comments Pt here without AD - has had cane in past that broke; also has access to a new 4WW that was ordered for someone else - hasn't used yet. Reports in past had power scooter that he no longer needs, doesn't have currently.   Gait Special Tests 25 Foot Timed Walk   Seconds 14.6   Steps 20 Steps   Comments no AD   Balance Special Tests Single Leg Stance Right,   Right, seconds 3 Seconds   Balance Special Tests Single Leg Stance Left   Left, seconds 4 Seconds   Balance Special Tests Sharpened Romberg   Seconds 30 Seconds  (EO)   Comments Eyes closed: 30 sec   Planned Therapy Interventions   Planned Therapy Interventions strengthening;other (see comments)   Planned Therapy Interventions Comment walking    Clinical Impression   Criteria for Skilled Therapeutic Interventions Met yes, treatment indicated   PT Diagnosis deconditioning   Influenced by the following impairments LBP with average rating of 7.3/10; knee ROM limitation R>L; mild postural asymmetries; extremely impaired activity tolerance   Functional limitations due to impairments pt usually uses public transportation but currently unable to walk the 2 blocks to bus stop; reports episodes of R knee \"giving way\" with resultant falls/near-falls; HR/SOB result from brief activity ie/25 foot walk, TUG, bed mobility; likes to help with yard chores ie/mowing but only able to do a few minutes   Clinical Presentation Evolving/Changing   Clinical Presentation Rationale Pt reports increasing difficulty with walking, ADL's; awaiting 2 surgeries that have been post-poned due to Covid-19 " precautions   Clinical Decision Making (Complexity) Low complexity   Therapy Frequency   (1X appointment)   Predicted Duration of Therapy Intervention (days/wks) 1 visit   Risk & Benefits of therapy have been explained Yes   Patient, Family & other staff in agreement with plan of care Yes   Clinical Impression Comments Pt has had extensive past PT - incl pool PT. He is agreeable to do condensed HEP and verbalizes agreement re: 1X visit   Education Assessment   Barriers to Learning Cognitive   Goal 1   Goal Identifier safety, fall prevention   Goal Description 1. Pt to demonstrate ability to safely use SEC/4WW for improved safety in the community and outdoors to prevent falls.   Target Date 06/18/20   Date Met 06/18/20   Goal 2   Goal Identifier HEP   Goal Description 2. Pt able to demo condensed HEP with targeted ex's to maximimize his functional mobility and safety.   Target Date 06/18/20   Date Met 06/18/20   Total Evaluation Time   PT Eval, Low Complexity Minutes ([id number]) 60

## 2020-06-30 ENCOUNTER — MEDICAL CORRESPONDENCE (OUTPATIENT)
Dept: HEALTH INFORMATION MANAGEMENT | Facility: CLINIC | Age: 50
End: 2020-06-30

## 2020-07-06 ENCOUNTER — VIRTUAL VISIT (OUTPATIENT)
Dept: ENDOCRINOLOGY | Facility: CLINIC | Age: 50
End: 2020-07-06
Payer: COMMERCIAL

## 2020-07-06 VITALS — WEIGHT: 315 LBS | BODY MASS INDEX: 49.44 KG/M2 | HEIGHT: 67 IN

## 2020-07-06 DIAGNOSIS — E66.01 MORBID OBESITY (H): ICD-10-CM

## 2020-07-06 RX ORDER — TOPIRAMATE 100 MG/1
100 TABLET, FILM COATED ORAL 2 TIMES DAILY
Qty: 60 TABLET | Refills: 11 | Status: SHIPPED | OUTPATIENT
Start: 2020-07-06 | End: 2020-08-24

## 2020-07-06 ASSESSMENT — PAIN SCALES - GENERAL: PAINLEVEL: NO PAIN (0)

## 2020-07-06 ASSESSMENT — MIFFLIN-ST. JEOR: SCORE: 2451.58

## 2020-07-06 NOTE — PROGRESS NOTES
"Chavez Ca is a 49 year old male who is being evaluated via a billable telephone visit.      The patient has been notified of following:     \"This telephone visit will be conducted via a call between you and your physician/provider. We have found that certain health care needs can be provided without the need for a physical exam.  This service lets us provide the care you need with a short phone conversation.  If a prescription is necessary we can send it directly to your pharmacy.  If lab work is needed we can place an order for that and you can then stop by our lab to have the test done at a later time.    Telephone visits are billed at different rates depending on your insurance coverage. During this emergency period, for some insurers they may be billed the same as an in-person visit.  Please reach out to your insurance provider with any questions.    If during the course of the call the physician/provider feels a telephone visit is not appropriate, you will not be charged for this service.\"    Patient has given verbal consent for Telephone visit?  Yes    What phone number would you like to be contacted at? [phone]    How would you like to obtain your AVS? Shanahart    Phone call duration: 15 minutes.  I explained the conditions and plans (more than 50% of time was counseling/coordination of weight management).    Fortino Chang MD      "

## 2020-07-06 NOTE — NURSING NOTE
"Chief Complaint   Patient presents with     Weight Problem     Virtual follow up       Vitals:    07/06/20 0918   Weight: (!) 163.3 kg (360 lb)   Height: 1.702 m (5' 7\")       Body mass index is 56.38 kg/m .      KYMBERLY ChowT                      "

## 2020-07-06 NOTE — LETTER
"2020       RE: Chavez Ca  [address]  [place] [address]     Dear Colleague,    Thank you for referring your patient, Chavez Ca, to the [facility] MEDICAL WEIGHT MANAGEMENT at [facility]. Please see a copy of my visit note below.    Chavez Ca is a 49 year old male who is being evaluated via a billable telephone visit.      The patient has been notified of following:     \"This telephone visit will be conducted via a call between you and your physician/provider. We have found that certain health care needs can be provided without the need for a physical exam.  This service lets us provide the care you need with a short phone conversation.  If a prescription is necessary we can send it directly to your pharmacy.  If lab work is needed we can place an order for that and you can then stop by our lab to have the test done at a later time.    Telephone visits are billed at different rates depending on your insurance coverage. During this emergency period, for some insurers they may be billed the same as an in-person visit.  Please reach out to your insurance provider with any questions.    If during the course of the call the physician/provider feels a telephone visit is not appropriate, you will not be charged for this service.\"    Patient has given verbal consent for Telephone visit?  Yes    What phone number would you like to be contacted at? [phone]    How would you like to obtain your AVS? Shanahart    Phone call duration: 15 minutes.  I explained the conditions and plans (more than 50% of time was counseling/coordination of weight management).    Fortino Chang MD        Return Medical Weight Management Note     Chavez Ca  MRN:  [id number]  :  1970  RUI:  20    Dear Dr. Colindres,    I had the pleasure of seeing your patient Chavez Ca.  He is a 48 year old male who I am continuing to see for " "treatment of obesity related to: venous insufficiency with edema, VERN, hyperlipidemia, idiopathic progressive polyneuropathy, CAD, hypertension, depression. 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     CURRENT WEIGHT:   360 lbs 0 oz     Wt Readings from Last 4 Encounters:   07/06/20 (!) 163.3 kg (360 lb)   04/01/19 147.7 kg (325 lb 11.2 oz)   01/29/19 (!) 150.9 kg (332 lb 11.2 oz)   01/24/19 (!) 151.9 kg (334 lb 12.8 oz)     Height:  5' 7\"  Body Mass Index:  Body mass index is 56.38 kg/m .  Vitals:  Ht 1.702 m (5' 7\")   Wt (!) 163.3 kg (360 lb)   BMI 56.38 kg/m      Initial consult weight was 361 on 1/10/12.  Weight change since last seen on 3/23/20 is down 5 pounds.   Total gain is 1 pounds.    INTERVAL HISTORY:  Not sure medication is helping. Food life and weight about the same. Still wants barisurgery but is too high risk even for tracheal issues.    Saw ENT 1/2019 and found to have small indentation in the anterior wall of his trachea in conjunction with tracheostomy scar. Concern for some dynamic airway collapse.     No flowsheet data found.     MEDICATIONS:   Current Outpatient Medications   Medication Sig Dispense Refill     albuterol (PROAIR HFA/PROVENTIL HFA/VENTOLIN HFA) 108 (90 Base) MCG/ACT inhaler Inhale 2 puffs into the lungs every 6 hours as needed for shortness of breath / dyspnea or wheezing 1 Inhaler 3     amitriptyline (ELAVIL) 50 MG tablet Take 2 tablets (100 mg) by mouth At Bedtime Call clinic to schedule follow up appointment. [phone] 180 tablet 0     CETAPHIL (CETAPHIL) lotion Apply topically 2 times daily       cetirizine (ZYRTEC) 10 MG tablet Take 1 tablet (10 mg) by mouth daily as needed for allergies 90 tablet 3     COMPRESSION STOCKINGS 20-30 mmHg knee high compression stockings.  Measure and fit.  Style and brand per patient preference. 2 each 0     diclofenac (VOLTAREN) 1 % GEL " topical gel Apply 4 grams to knees four times daily using enclosed dosing card. 100 g 1     furosemide (LASIX) 20 MG tablet Take 3 tablets (60 mg) by mouth 2 times daily 540 tablet 3     ketoconazole (NIZORAL) 2 % external shampoo Apply to the affected area and wash off after 5 minutes. 120 mL 1     order for DME Equipment being ordered: compression stockings    Needs to be faxed to 1 Device 0     order for DME Compression stockings pair, patient preference    To be used 8 hours per day 1 Device 3     order for DME Equipment being ordered: compression stockings 2 each 0     phentermine (ADIPEX-P) 37.5 MG tablet Take 1 tablet (37.5 mg) by mouth every morning 30 tablet 3     polyethylene glycol (MIRALAX) 17 GM/SCOOP powder Take 17 g (1 capful) by mouth daily 850 g 3     potassium chloride (KLOR-CON) 20 MEQ packet Take 60 mEq by mouth daily 270 packet 0     topiramate (TOPAMAX) 25 MG tablet Take 50mg (2 tabs) by mouth BID for 1 week. Then increase to 75mg (3 tabs) by mouth BID thereafter. 180 tablet 1     traZODone (DESYREL) 100 MG tablet Take 1 tablet (100 mg) by mouth At Bedtime Call clinic to schedule follow up appointment. [phone] 90 tablet 0     zolpidem (AMBIEN) 10 MG tablet Take 1 tablet (10 mg) by mouth nightly as needed for sleep 30 tablet 0     phentermine (ADIPEX-P) 37.5 MG tablet Take 1 tablet (37.5 mg) by mouth every morning 30 tablet 1     Weight Loss Medication History Reviewed With Patient 7/5/2020   Which weight loss medications are you currently taking on a regular basis?  Phentermine, Topamax (topiramate)   If you are not taking a weight loss medication that was prescribed to you, please indicate why: -   Are you having any side effects from the weight loss medication that we have prescribed you? No     ASSESSMENT:   Increase topiramate from 75 BID to 100 BID. Maintain phentermine 37.5 mg.    FOLLOW-UP:    Phone call duration: 15 minutes.  I explained the conditions and plans (more than 50% of  "time was counseling/coordination of weight management).  RTC 3 months    Sincerely,    Fortino Chang MD     The patient was evaluated via a billable telephone visit and notified:  \"This visit will be via telephone call between you and your physician. If lab work is needed we can place an order and you can later stop by the lab. Telephone visits are billed at different rates depending on your insurance coverage. During this emergency period, some insurers may be billed the same as an in-person visit.  Please check with your insurance provider with any questions. If the physician feels a telephone visit is not appropriate, you will not be charged for this service.\" Patient has given verbal consent for Telephone visit?  Yes. How would you like to obtain your AVS? MyChart.              "

## 2020-07-06 NOTE — PROGRESS NOTES
"Return Medical Weight Management Note     Chavez Ca  MRN:  [id number]  :  1970  RUI:  20    Dear Dr. Colindres,    I had the pleasure of seeing your patient Chavez Ca.  He is a 48 year old male who I am continuing to see for treatment of obesity related to: venous insufficiency with edema, VERN, hyperlipidemia, idiopathic progressive polyneuropathy, CAD, hypertension, depression. 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     CURRENT WEIGHT:   360 lbs 0 oz     Wt Readings from Last 4 Encounters:   20 (!) 163.3 kg (360 lb)   19 147.7 kg (325 lb 11.2 oz)   19 (!) 150.9 kg (332 lb 11.2 oz)   19 (!) 151.9 kg (334 lb 12.8 oz)     Height:  5' 7\"  Body Mass Index:  Body mass index is 56.38 kg/m .  Vitals:  Ht 1.702 m (5' 7\")   Wt (!) 163.3 kg (360 lb)   BMI 56.38 kg/m      Initial consult weight was 361 on 1/10/12.  Weight change since last seen on 3/23/20 is down 5 pounds.   Total gain is 1 pounds.    INTERVAL HISTORY:  Not sure medication is helping. Food life and weight about the same. Still wants barisurgery but is too high risk even for tracheal issues.    Saw ENT 2019 and found to have small indentation in the anterior wall of his trachea in conjunction with tracheostomy scar. Concern for some dynamic airway collapse.     No flowsheet data found.     MEDICATIONS:   Current Outpatient Medications   Medication Sig Dispense Refill     albuterol (PROAIR HFA/PROVENTIL HFA/VENTOLIN HFA) 108 (90 Base) MCG/ACT inhaler Inhale 2 puffs into the lungs every 6 hours as needed for shortness of breath / dyspnea or wheezing 1 Inhaler 3     amitriptyline (ELAVIL) 50 MG tablet Take 2 tablets (100 mg) by mouth At Bedtime Call clinic to schedule follow up appointment. [phone] 180 tablet 0     CETAPHIL (CETAPHIL) lotion Apply topically 2 times daily       cetirizine (ZYRTEC) 10 " MG tablet Take 1 tablet (10 mg) by mouth daily as needed for allergies 90 tablet 3     COMPRESSION STOCKINGS 20-30 mmHg knee high compression stockings.  Measure and fit.  Style and brand per patient preference. 2 each 0     diclofenac (VOLTAREN) 1 % GEL topical gel Apply 4 grams to knees four times daily using enclosed dosing card. 100 g 1     furosemide (LASIX) 20 MG tablet Take 3 tablets (60 mg) by mouth 2 times daily 540 tablet 3     ketoconazole (NIZORAL) 2 % external shampoo Apply to the affected area and wash off after 5 minutes. 120 mL 1     order for DME Equipment being ordered: compression stockings    Needs to be faxed to 1 Device 0     order for DME Compression stockings pair, patient preference    To be used 8 hours per day 1 Device 3     order for DME Equipment being ordered: compression stockings 2 each 0     phentermine (ADIPEX-P) 37.5 MG tablet Take 1 tablet (37.5 mg) by mouth every morning 30 tablet 3     polyethylene glycol (MIRALAX) 17 GM/SCOOP powder Take 17 g (1 capful) by mouth daily 850 g 3     potassium chloride (KLOR-CON) 20 MEQ packet Take 60 mEq by mouth daily 270 packet 0     topiramate (TOPAMAX) 25 MG tablet Take 50mg (2 tabs) by mouth BID for 1 week. Then increase to 75mg (3 tabs) by mouth BID thereafter. 180 tablet 1     traZODone (DESYREL) 100 MG tablet Take 1 tablet (100 mg) by mouth At Bedtime Call clinic to schedule follow up appointment. [phone] 90 tablet 0     zolpidem (AMBIEN) 10 MG tablet Take 1 tablet (10 mg) by mouth nightly as needed for sleep 30 tablet 0     phentermine (ADIPEX-P) 37.5 MG tablet Take 1 tablet (37.5 mg) by mouth every morning 30 tablet 1     Weight Loss Medication History Reviewed With Patient 7/5/2020   Which weight loss medications are you currently taking on a regular basis?  Phentermine, Topamax (topiramate)   If you are not taking a weight loss medication that was prescribed to you, please indicate why: -   Are you having any side effects from the  "weight loss medication that we have prescribed you? No     ASSESSMENT:   Increase topiramate from 75 BID to 100 BID. Maintain phentermine 37.5 mg.    FOLLOW-UP:    Phone call duration: 15 minutes.  I explained the conditions and plans (more than 50% of time was counseling/coordination of weight management).  RTC 3 months    Sincerely,    Fortino Chang MD     The patient was evaluated via a billable telephone visit and notified:  \"This visit will be via telephone call between you and your physician. If lab work is needed we can place an order and you can later stop by the lab. Telephone visits are billed at different rates depending on your insurance coverage. During this emergency period, some insurers may be billed the same as an in-person visit.  Please check with your insurance provider with any questions. If the physician feels a telephone visit is not appropriate, you will not be charged for this service.\" Patient has given verbal consent for Telephone visit?  Yes. How would you like to obtain your AVS? MyChart.      "

## 2020-07-07 ENCOUNTER — VIRTUAL VISIT (OUTPATIENT)
Dept: INTERNAL MEDICINE | Facility: CLINIC | Age: 50
End: 2020-07-07
Payer: COMMERCIAL

## 2020-07-07 ENCOUNTER — TELEPHONE (OUTPATIENT)
Dept: INTERNAL MEDICINE | Facility: CLINIC | Age: 50
End: 2020-07-07

## 2020-07-07 DIAGNOSIS — E87.6 HYPOKALEMIA: ICD-10-CM

## 2020-07-07 DIAGNOSIS — G47.9 SLEEP DISORDER: ICD-10-CM

## 2020-07-07 DIAGNOSIS — G60.3 IDIOPATHIC PROGRESSIVE NEUROPATHY: ICD-10-CM

## 2020-07-07 RX ORDER — TRAZODONE HYDROCHLORIDE 100 MG/1
100 TABLET ORAL AT BEDTIME
Qty: 90 TABLET | Refills: 1 | Status: SHIPPED | OUTPATIENT
Start: 2020-07-07 | End: 2021-03-16

## 2020-07-07 RX ORDER — POTASSIUM CHLORIDE 1.5 G/1.58G
60 POWDER, FOR SOLUTION ORAL DAILY
Qty: 270 PACKET | Refills: 1 | Status: SHIPPED | OUTPATIENT
Start: 2020-07-07 | End: 2020-10-14

## 2020-07-07 RX ORDER — AMITRIPTYLINE HYDROCHLORIDE 50 MG/1
100 TABLET ORAL AT BEDTIME
Qty: 180 TABLET | Refills: 1 | Status: SHIPPED | OUTPATIENT
Start: 2020-07-07 | End: 2021-03-16

## 2020-07-07 NOTE — PROGRESS NOTES
48 yo M with hx obesity, venous insufficiency, VERN, HL, progressive polyneuropathy, CAD, HBP and depression presents for a phone visit for F/U regarding a cancelled in clinic visit.  He was instructed to come in and not certain what is going on.  Wants medication refills on the trazodone, potassium and amitriptyline which I took care of and instructed him to reschedule his in clinic appointment.  (6 minutes)  Glen Aguirre MD

## 2020-07-07 NOTE — NURSING NOTE
Chief Complaint   Patient presents with     Pain     pain and aching in arms, legs, wrist, and ankles. Chronic      Kimberly Nissen, EMT at 1:30 PM on 7/7/2020

## 2020-07-23 ENCOUNTER — OFFICE VISIT (OUTPATIENT)
Dept: INTERNAL MEDICINE | Facility: CLINIC | Age: 50
End: 2020-07-23
Payer: COMMERCIAL

## 2020-07-23 VITALS
SYSTOLIC BLOOD PRESSURE: 128 MMHG | BODY MASS INDEX: 56.7 KG/M2 | WEIGHT: 315 LBS | HEART RATE: 114 BPM | DIASTOLIC BLOOD PRESSURE: 84 MMHG | OXYGEN SATURATION: 98 %

## 2020-07-23 DIAGNOSIS — D68.9 COAGULOPATHY (H): ICD-10-CM

## 2020-07-23 DIAGNOSIS — M75.41 IMPINGEMENT SYNDROME OF SHOULDER REGION, RIGHT: ICD-10-CM

## 2020-07-23 DIAGNOSIS — L71.9 ROSACEA: Primary | ICD-10-CM

## 2020-07-23 RX ORDER — METRONIDAZOLE 7.5 MG/G
GEL TOPICAL 2 TIMES DAILY
Qty: 45 G | Refills: 0 | Status: ON HOLD | OUTPATIENT
Start: 2020-07-23 | End: 2020-10-16

## 2020-07-23 ASSESSMENT — PATIENT HEALTH QUESTIONNAIRE - PHQ9: SUM OF ALL RESPONSES TO PHQ QUESTIONS 1-9: 22

## 2020-07-23 NOTE — NURSING NOTE
Chief Complaint   Patient presents with     Establish Care     pt here to re-establish care from Dr. Harper Laura Medication     pt would like to discuss charlenes       Roderick Long CMA, EMT at 12:59 PM on 7/23/2020.

## 2020-07-23 NOTE — NURSING NOTE
Chief Complaint   Patient presents with     Establish Care     pt here to establish care       Roderick Long CMA, EMT at 12:55 PM on 7/23/2020.

## 2020-07-23 NOTE — PATIENT INSTRUCTIONS
[facility] Medication Refill Request Information:  * Please contact your pharmacy regarding ANY request for medication refills.  ** [facility] Prescription Fax = [phone]  * Please allow 3 business days for routine medication refills.  * Please allow 5 business days for controlled substance medication refills.     [facility] Test Result notification information:  *You will be notified with in 7-10 days of your appointment day regarding the results of your test.  If you are on MyChart you will be notified as soon as the provider has reviewed the results and signed off on them.    [facility]: [phone]

## 2020-07-23 NOTE — PROGRESS NOTES
CC: Bilateral shoulder pain      HPI: In his usual state of health until 2 weeks ago when he developed bilateral shoulder pain, worse on the right, sudden onset, no preceding trauma, rated as 6/10 on a 10-point scale, worsening in severity, aggravated by reaching overhead to grab things. Also c/o head pain, worse on touching scalp, severe (has avoided dyeing his hair o/a/o pain), currently using ketoconazole shampoo.     Denies cough. Endorses SOB. Endorses orthopnea, bilateral foot swelling, currently on lasix. Concerned about skin irritation in groin region, mentions groin mass. Has had difficulty with urination due to mechanical issues with panniculus.  Attributes groin skin irritation to urine. Denies change in bowel movements.    ROS: Bruising, recurrent, no preceding trauma. Endorses gingival bleeding when brushing. Notes occasional epistaxis, seasonal, attributed to dry air. Loss of dentition, attributed to medications.      Bilateral foot pain with paraesthesias, improved with topiramate.    Sleep difficulties after changing bed.      Past Medical History:   Diagnosis Date     Allergic rhinitis      Benign positional vertigo 2011    never quite went away     Bilateral carpal tunnel syndrome 7/18/2015     Chest pain      Chronic sinusitis 2017    I don't know Please look     Chronic tonsillitis      Coronary artery disease 3/8/16    right side chest pain     Depressive disorder      Depressive disorder, not elsewhere classified 7/30/2012     Gastro-oesophageal reflux disease      Hearing problem      Hypertension 2008     Idiopathic progressive polyneuropathy 10/15/2012     Learning disability      Neck mass     parapharyngeal, benign     Obesity, Class III, BMI 40-49.9 (morbid obesity) (H)      Obstructive sleep apnea      Psychological factors associated with another disorder 8/21/2012     Recurrent otitis media 2016    might be what the pain in my ears     Reduced vision      Shortness of breath       Tinnitus 2016    I would hear a steady light buzzing sound and other can't     Trigeminal neuralgia      Weakness 8/26/2011     Past Surgical History:   Procedure Laterality Date     BIOPSY  2011    To figure out what kind of tumor I had     ENDOSCOPIC RETROGRADE CHOLANGIOPANCREATOGRAM N/A 8/27/2015    Procedure: COMBINED ENDOSCOPIC RETROGRADE CHOLANGIOPANCREATOGRAPHY, PLACE TUBE/STENT;  Surgeon: Baldomero Zacarias MD;  Location: [facility] OR     ENDOSCOPIC RETROGRADE CHOLANGIOPANCREATOGRAM N/A 11/6/2015    Procedure: COMBINED ENDOSCOPIC RETROGRADE CHOLANGIOPANCREATOGRAPHY, REMOVE FOREIGN BODY OR STENT/TUBE;  Surgeon: Baldomero Zacarias MD;  Location: [facility] OR     EXCISE LESION INTRAORAL  6/29/2011    Procedure:EXCISE LESION INTRAORAL; TRANSCERVICAL APPROACH TO LEFT PHARYNGEAL SPACE MASS REMOVAL ; Surgeon:BARBARA PHILLPIS; Location: OR      VASCULAR SURGERY PROCEDURE UNLIST  12/29/15     LAPAROSCOPIC CHOLECYSTECTOMY N/A 8/23/2015    Procedure: LAPAROSCOPIC CHOLECYSTECTOMY;  Surgeon: Kvng Witt MD;  Location:  OR     LARYNGOSCOPY WITH BIOPSY(IES)  6/18/2014    Procedure: LARYNGOSCOPY WITH BIOPSY(IES);  Surgeon: Barbara Phillips MD;  Location:  OR     LASER CO2 LARYNGOSCOPY  12/7/2011    Procedure:LASER CO2 LARYNGOSCOPY; Micro-Direct Laryngoscopy with CO2 Laser Standby / Excision Tracheal Grandulization, Trach Tube Change / Kenalog application. / Balloon Dilation of Subglottic Stenosis.*Latex Safe Room* Trach Tube = Shiley 6.4mm I.D. / 10.8MM O.D. / 76MM  Cuffless.; Surgeon:BARBARA PHILLIPS; Location: OR     ORTHOPEDIC SURGERY  5/2016    Broken Right hand & Fracture Right shoulder     TRACHEOSTOMY  4/22/2011    Procedure:TRACHEOSTOMY; possible awake; Surgeon:BARBARA PHILLIPS; Location: OR     TRACHEOSTOMY  6/29/2011    Procedure:TRACHEOSTOMY; REMOVE AND REPLACE SHILEY 6 XLT; Surgeon:BARBARA PHILLIPS; Location: OR     VASCULAR SURGERY   [facility]     Family History   Adopted: Yes   Problem Relation Age of Onset     Family History Negative Other         Does not know family     Unknown/Adopted No family hx of      Social History     Tobacco Use     Smoking status: Never Smoker     Smokeless tobacco: Never Used     Tobacco comment: Never started   Substance Use Topics     Alcohol use: No     Alcohol/week: 0.0 standard drinks     Comment: rarely     Drug use: No     Current Outpatient Medications   Medication Sig Dispense Refill     amitriptyline (ELAVIL) 50 MG tablet Take 2 tablets (100 mg) by mouth At Bedtime Call clinic to schedule follow up appointment. [phone] 180 tablet 1     CETAPHIL (CETAPHIL) lotion Apply topically 2 times daily       cetirizine (ZYRTEC) 10 MG tablet Take 1 tablet (10 mg) by mouth daily as needed for allergies 90 tablet 3     COMPRESSION STOCKINGS 20-30 mmHg knee high compression stockings.  Measure and fit.  Style and brand per patient preference. 2 each 0     furosemide (LASIX) 20 MG tablet Take 3 tablets (60 mg) by mouth 2 times daily 540 tablet 3     ketoconazole (NIZORAL) 2 % external shampoo Apply to the affected area and wash off after 5 minutes. 120 mL 1     metroNIDAZOLE (METROGEL) 0.75 % external gel Apply topically 2 times daily 45 g 0     order for DME Equipment being ordered: compression stockings    Needs to be faxed to 1 Device 0     order for DME Compression stockings pair, patient preference    To be used 8 hours per day 1 Device 3     order for DME Equipment being ordered: compression stockings 2 each 0     phentermine (ADIPEX-P) 37.5 MG tablet Take 1 tablet (37.5 mg) by mouth every morning 30 tablet 3     potassium chloride (KLOR-CON) 20 MEQ packet Take 60 mEq by mouth daily 270 packet 1     topiramate (TOPAMAX) 100 MG tablet Take 1 tablet (100 mg) by mouth 2 times daily 60 tablet 11     traZODone (DESYREL) 100 MG tablet Take 1 tablet (100 mg) by mouth At Bedtime Call clinic to schedule follow up  appointment. [phone] 90 tablet 1     zolpidem (AMBIEN) 10 MG tablet Take 1 tablet (10 mg) by mouth nightly as needed for sleep 30 tablet 0     albuterol (PROAIR HFA/PROVENTIL HFA/VENTOLIN HFA) 108 (90 Base) MCG/ACT inhaler Inhale 2 puffs into the lungs every 6 hours as needed for shortness of breath / dyspnea or wheezing (Patient not taking: Reported on 7/23/2020) 1 Inhaler 3     phentermine (ADIPEX-P) 37.5 MG tablet Take 1 tablet (37.5 mg) by mouth every morning 30 tablet 1     polyethylene glycol (MIRALAX) 17 GM/SCOOP powder Take 17 g (1 capful) by mouth daily (Patient not taking: Reported on 7/23/2020) 850 g 3        Allergies   Allergen Reactions     Fentanyl Itching     Patch     Meloxicam Other (See Comments)     Side pains.     Minocycline Rash     Patient reports adverse reaction was pigmentation which has resolved with drug discontinuation.         /84 (BP Location: Right arm, Patient Position: Sitting, Cuff Size: Adult Large)   Pulse 114   Wt (!) 164.2 kg (362 lb)   SpO2 98%   BMI 56.70 kg/m    Physical Examination:    General:  Conversant, dyspneic   Head: atraumatic  Eyes:  Pupils 2-3 mm, sclera white, EOM's full, conjunctiva moist and plethoric, no periorbital swelling    Ears:  TM's normal, EAC's patent, clear of cerumen  Nose:  Reddish coarse nasal skin.  Throat/Mouth: Loss of dentition.  No pharyngeal erythema, exudate, ulcers, oral mucosa and tongue moist, normal hard and soft palate  Neck:  Trachea midline, Full AROM, supple, thyroid smooth, symmetric, not enlarged, no nodules, no neck lymphadenopathy  Lungs:  Clear to auscultation throughout, no wheezes, rhonchi or rales. Normal respiratory effort and no intercostal retractions.  C/V:  Regular rate and rhythm, no murmurs, rubs or gallops.  No JVD, no carotid bruits. Radial and DP pulses 2+, equal and regular.  Abdomen:  Obese, soft, non-tender  Neuro: Alert and oriented, face symmetric. Able to get on/off exam table without  assistance.  Strength grossly intact. No tremor.  Gait steady.   M/S:   No joint deformities noted.  No joint swelling.  Skin:   Normal temperature, turgor and texture. Erythematous maculopapular lesions on nape of neck  2 cm ovoid raised firm non-tender mass on mons pubis, or jaundice on exposed skin surfaces.   Extremities:  Bilateral pitting pedal edema, allodynia  Psych:  Alert and oriented. Appropriate affect.  Not psychomotor slowed.  No signs of anxiety or agitation.      A&P:  50 yo gentleman with a pmhx of VERN, neuropathy, and morbid obesity. Presents today with c/o bilateral shoulder pain, worse on raising arms. ROS is significant for bruising, gingival bleeding, and loss of dentition. Physical exam was positive for allodynia, erythematous skin rash on the nape of the neck, and groin skin lesion. Patient will benefit from a dermatology referral for skin lesion and rosacea, and a physical therapy for likely shoulder impingement.  He might require shoulder xrays and possibly, intraarticular injections if pain does not improve.     #Rosacea  #Shoulder impingement  #R/O Coagulopathy vs Nutritional deficiency ([employer])  Loss of dentition , gingival bleeding indicate possible Vitamin C deficiency. He also mentions repeated episodes of bruising and two episodes of epistaxis which raise concern for coagulopathy.   #Polycythemia  Hx of VERN, breathless at rest, plethoric conjunctivae, erythematous skin lesions    Plan:  -- Metrogel 0.75% ointment BD  -- Referral to Dermatology for Rosacea  -- Referral to Physical therapy for shoulder pain  -- Rotator cuff exercises discussed with patient. Exercise sheets given to patient.  -- CBC and diff, PT, PTT, INR, and Vitamin C levels    Marvin Aguirre MD  Internal Medicine Resident PGY 1  Pager: [phone]    Patient was seen and discussed with Dr. Aguirre.  Pt was seen and examined with Dr. Aguirre.  I agree with his documentation as noted above.    My additional  comments: None    Glen Aguirre MD

## 2020-08-12 ENCOUNTER — TELEPHONE (OUTPATIENT)
Dept: DERMATOLOGY | Facility: CLINIC | Age: 50
End: 2020-08-12

## 2020-08-12 NOTE — TELEPHONE ENCOUNTER
Spoke with patient and rosacea is not the real reason for his visit (he has had that for 9 years and its well controlled). He is having painful areas (5) that are bigger than a skin tag. They are painful bumps by the groin. Patient states he is over weight so he does not know if that is the issue for the painful bumps. The bumps are around the penis and groin. He states there was one but now has more.   He is not able to take a photo of the area due to his weight and does not have anyone available to help. Patient states he has tried to take photo in the past but was unsuccessful.

## 2020-08-12 NOTE — TELEPHONE ENCOUNTER
[facility] Call Center    Phone Message    May a detailed message be left on voicemail: yes     Reason for Call: Other: Pt stated that he cannot do virtual appt for this issue, pt stated he needs to get seen in clinic. please call pt back.  Pt stated rosacea is not the main issue.     Action Taken: Message routed to:  [facility] Derm    Travel Screening: Not Applicable

## 2020-08-12 NOTE — TELEPHONE ENCOUNTER
Felipe Arellano MD [place], Kayrene, [facility]    Caller: Unspecified (Today,  9:21 AM)               He was referred for rosacea - what is the other issue he wants to discuss? That will determine whether an in-person visit is appropriate or not.     Thanks,   Darrion

## 2020-08-12 NOTE — TELEPHONE ENCOUNTER
Are you okay with this being an in person visit?    Mukesh White, [facility]

## 2020-08-13 NOTE — TELEPHONE ENCOUNTER
Felipe Arellano MD Stadtlander, Kayrene, [facility]    Caller: Unspecified (Yesterday,  9:21 AM)               I'll see him in-person.     Thanks,   Darrion

## 2020-08-17 ENCOUNTER — OFFICE VISIT (OUTPATIENT)
Dept: DERMATOLOGY | Facility: CLINIC | Age: 50
End: 2020-08-17
Payer: COMMERCIAL

## 2020-08-17 DIAGNOSIS — L82.0 INFLAMED SEBORRHEIC KERATOSIS: ICD-10-CM

## 2020-08-17 DIAGNOSIS — D48.5 NEOPLASM OF UNCERTAIN BEHAVIOR OF SKIN: Primary | ICD-10-CM

## 2020-08-17 DIAGNOSIS — L30.9 DERMATITIS: ICD-10-CM

## 2020-08-17 RX ORDER — TRIAMCINOLONE ACETONIDE 1 MG/G
OINTMENT TOPICAL 2 TIMES DAILY
Qty: 30 G | Refills: 3 | Status: SHIPPED | OUTPATIENT
Start: 2020-08-17 | End: 2020-10-14

## 2020-08-17 RX ORDER — LIDOCAINE HYDROCHLORIDE AND EPINEPHRINE 10; 10 MG/ML; UG/ML
3 INJECTION, SOLUTION INFILTRATION; PERINEURAL ONCE
Status: DISCONTINUED | OUTPATIENT
Start: 2020-08-17 | End: 2021-05-04

## 2020-08-17 ASSESSMENT — PAIN SCALES - GENERAL: PAINLEVEL: NO PAIN (0)

## 2020-08-17 NOTE — PROGRESS NOTES
"[facility] Dermatology Note      Dermatology Problem List:  1. Seborrheic dermatitis  2. Rosacea  3. Perioral dermatitis secondary to topical steroid use  4. Acrochordon/inflamed seborrheic keratoses, inguinal folds  - s/p cryotherapy 08/17/20  5. Sessile plaque on right inguinal fold - DDx condyloma vs seborrheic keratosis vs Bowenoid papulosis  - s/p shave biopsy 08/17/20  6. Buttocks dermatitis - triamcinolone    Encounter Date: Aug 17, 2020    CC:   Chief Complaint   Patient presents with     Derm Problem     Chavez Javed does not think his treatment is very effective.     Derm Problem     Chavez states \" I have lumps and bumps in my groin.\"          History of Present Illness:  Mr. Chavez Ca is a 49 year old male with a past history of seborrheic dermatitis, rosacea, and perioral dermatitis (last seen on 2016), who presents for lesions on his inguinal region and buttock.     Patient notes some bumps on his inguinal region that appeared 1 year ago, with two more smaller bumps appearing within the past 2 months. These bumps are tender when washing or moving around, but they are not itchy or bleeding. The first bump has grown in size since it first appeared, but he has not noticed any changes to the smaller lesions.     Patient also reports a new-onset rash on his buttocks that appeared 3 days ago. It is itchy and tender to touch, but has not grown in size since it first appeared.    Patient notes that his rosacea and seborrheic dermatitis is currently well-controlled, and has kept his medication regimen since his last dermatology visit. For his rosacea, he is on Elidel 1% cream BID, doxycycline 100 mg BID, and ketoconazole 2% cream to the face BID. For his seborrheic dermatitis, he is on ketoconazole 2% shampoo 3x/week, head and shulders shampoo, and fluocinonide 0.05% solution as needed for itching.       Past Medical History:   Patient Active Problem List   Diagnosis     VERN " (obstructive sleep apnea)     Morbid obesity (H)     Idiopathic edema     Depressive disorder, not elsewhere classified     Psychological factors associated with another disorder     Subglottic stenosis     Hyperlipidemia     Idiopathic progressive polyneuropathy     Varicose veins of left lower extremity     Closed nondisplaced fracture of scaphoid of right wrist, unspecified portion of scaphoid, sequela     Pain in both wrists     Shoulder pain, left     Neck pain     Neck pain, chronic     Past Medical History:   Diagnosis Date     Allergic rhinitis      Benign positional vertigo 2011    never quite went away     Bilateral carpal tunnel syndrome 7/18/2015     Chest pain      Chronic sinusitis 2017    I don't know Please look     Chronic tonsillitis      Coronary artery disease 3/8/16    right side chest pain     Depressive disorder      Depressive disorder, not elsewhere classified 7/30/2012     Gastro-oesophageal reflux disease      Hearing problem      Hypertension 2008     Idiopathic progressive polyneuropathy 10/15/2012     Learning disability      Neck mass     parapharyngeal, benign     Obesity, Class III, BMI 40-49.9 (morbid obesity) (H)      Obstructive sleep apnea      Psychological factors associated with another disorder 8/21/2012     Recurrent otitis media 2016    might be what the pain in my ears     Reduced vision      Shortness of breath      Tinnitus 2016    I would hear a steady light buzzing sound and other can't     Trigeminal neuralgia      Weakness 8/26/2011     Past Surgical History:   Procedure Laterality Date     BIOPSY  2011    To figure out what kind of tumor I had     ENDOSCOPIC RETROGRADE CHOLANGIOPANCREATOGRAM N/A 8/27/2015    Procedure: COMBINED ENDOSCOPIC RETROGRADE CHOLANGIOPANCREATOGRAPHY, PLACE TUBE/STENT;  Surgeon: Baldomero Zacarias MD;  Location: [facility] OR     ENDOSCOPIC RETROGRADE CHOLANGIOPANCREATOGRAM N/A 11/6/2015    Procedure: COMBINED ENDOSCOPIC RETROGRADE  CHOLANGIOPANCREATOGRAPHY, REMOVE FOREIGN BODY OR STENT/TUBE;  Surgeon: Baldomero Zacarias MD;  Location:  OR     EXCISE LESION INTRAORAL  6/29/2011    Procedure:EXCISE LESION INTRAORAL; TRANSCERVICAL APPROACH TO LEFT PHARYNGEAL SPACE MASS REMOVAL ; Surgeon:BARBARA PHILLIPS; Location: OR      VASCULAR SURGERY PROCEDURE UNLIST  12/29/15     LAPAROSCOPIC CHOLECYSTECTOMY N/A 8/23/2015    Procedure: LAPAROSCOPIC CHOLECYSTECTOMY;  Surgeon: Kvng Witt MD;  Location: [facility] OR     LARYNGOSCOPY WITH BIOPSY(IES)  6/18/2014    Procedure: LARYNGOSCOPY WITH BIOPSY(IES);  Surgeon: Barbara Phillips MD;  Location:  OR     LASER CO2 LARYNGOSCOPY  12/7/2011    Procedure:LASER CO2 LARYNGOSCOPY; Micro-Direct Laryngoscopy with CO2 Laser Standby / Excision Tracheal Grandulization, Trach Tube Change / Kenalog application. / Balloon Dilation of Subglottic Stenosis.*Latex Safe Room* Trach Tube = Shiley 6.4mm I.D. / 10.8MM O.D. / 76MM  Cuffless.; Surgeon:BARBARA PHILLIPS; Location: OR     ORTHOPEDIC SURGERY  5/2016    Broken Right hand & Fracture Right shoulder     TRACHEOSTOMY  4/22/2011    Procedure:TRACHEOSTOMY; possible awake; Surgeon:BARBARA PHILLIPS; Location: OR     TRACHEOSTOMY  6/29/2011    Procedure:TRACHEOSTOMY; REMOVE AND REPLACE SHILEY 6 XLT; Surgeon:BARBARA PHILLIPS; Location: OR     VASCULAR SURGERY  2008-[facility]       Social History:  Patient reports that he has never smoked. He has never used smokeless tobacco. He reports that he does not drink alcohol or use drugs.    Family History:  Family History   Adopted: Yes   Problem Relation Age of Onset     Family History Negative Other         Does not know family     Unknown/Adopted No family hx of        Medications:  Current Outpatient Medications   Medication Sig Dispense Refill     albuterol (PROAIR HFA/PROVENTIL HFA/VENTOLIN HFA) 108 (90 Base) MCG/ACT inhaler Inhale 2 puffs into the lungs every 6  hours as needed for shortness of breath / dyspnea or wheezing (Patient not taking: Reported on 7/23/2020) 1 Inhaler 3     amitriptyline (ELAVIL) 50 MG tablet Take 2 tablets (100 mg) by mouth At Bedtime Call clinic to schedule follow up appointment. [phone] 180 tablet 1     CETAPHIL (CETAPHIL) lotion Apply topically 2 times daily       cetirizine (ZYRTEC) 10 MG tablet Take 1 tablet (10 mg) by mouth daily as needed for allergies 90 tablet 3     COMPRESSION STOCKINGS 20-30 mmHg knee high compression stockings.  Measure and fit.  Style and brand per patient preference. 2 each 0     furosemide (LASIX) 20 MG tablet Take 3 tablets (60 mg) by mouth 2 times daily 540 tablet 3     ketoconazole (NIZORAL) 2 % external shampoo Apply to the affected area and wash off after 5 minutes. 120 mL 1     metroNIDAZOLE (METROGEL) 0.75 % external gel Apply topically 2 times daily 45 g 0     order for DME Equipment being ordered: compression stockings    Needs to be faxed to 1 Device 0     order for DME Compression stockings pair, patient preference    To be used 8 hours per day 1 Device 3     order for DME Equipment being ordered: compression stockings 2 each 0     phentermine (ADIPEX-P) 37.5 MG tablet Take 1 tablet (37.5 mg) by mouth every morning 30 tablet 3     phentermine (ADIPEX-P) 37.5 MG tablet Take 1 tablet (37.5 mg) by mouth every morning 30 tablet 1     polyethylene glycol (MIRALAX) 17 GM/SCOOP powder Take 17 g (1 capful) by mouth daily (Patient not taking: Reported on 7/23/2020) 850 g 3     potassium chloride (KLOR-CON) 20 MEQ packet Take 60 mEq by mouth daily 270 packet 1     topiramate (TOPAMAX) 100 MG tablet Take 1 tablet (100 mg) by mouth 2 times daily 60 tablet 11     traZODone (DESYREL) 100 MG tablet Take 1 tablet (100 mg) by mouth At Bedtime Call clinic to schedule follow up appointment. [phone] 90 tablet 1     zolpidem (AMBIEN) 10 MG tablet Take 1 tablet (10 mg) by mouth nightly as needed for sleep 30 tablet 0         Allergies   Allergen Reactions     Fentanyl Itching     Patch     Meloxicam Other (See Comments)     Side pains.     Minocycline Rash     Patient reports adverse reaction was pigmentation which has resolved with drug discontinuation.         Review of Systems:  -As per HPI  -Constitutional: Otherwise feeling well today, in usual state of health.  -Skin: As above in HPI. No additional skin concerns.    Physical exam:  Vitals: There were no vitals taken for this visit.  GEN: This is a well developed, well-nourished male in no acute distress, in a pleasant mood.    SKIN: Focused examination of the genitalia and buttocks was performed.  -Quintero skin type: II  - One 1 cm erythematous plaque in the groin with verrucous architecture  - Two 2mm skin-colored pendunculated papules in left and right inguinal crease  - Two ~2 cm erythematous plaques with secondary scale and overlying erosions on the left buttock  -No other lesions of concern on areas examined.     Impression/Plan:  1. Sessile Lesion in left inguinal fold  Patient reports a lesion on his left inguinal fold that is very irritated. Will perform a shave biopsy.  - Ddx Inflamed seborrheic keratosis vs. Verruca Vulgaris vs. Bowenoid Papulosis   - Shave biopsy:  After discussion of benefits and risks including but not limited to bleeding/bruising, pain/swelling, infection, scar, incomplete removal, nerve damage/numbness, recurrence, and non-diagnostic biopsy, written consent, verbal consent and photographs were obtained. Time-out was performed. The area was cleaned with isopropyl alcohol. 0.5ml of 1% lidocaine with 1:100,000 epinephrine was injected to obtain adequate anesthesia. A shave biopsy was performed. Hemostasis was achieved with aluminium chloride. Vaseline was applied. The patient tolerated the procedure and no complications were noted. The patient was provided with verbal and written post care instructions.    2. Acrochordon vs inflamed  seborrheic keratoses x2, inguinal folds  - Cryotherapy procedure note: After verbal consent and discussion of risks and benefits including but no limited to dyspigmentation/scar, blister, and pain, were treated with 1-2mm freeze border for 2 cycles with liquid nitrogen. Post cryotherapy instructions were provided.    3. Dermatitis, left buttock  Two erythematous plaques with secondary excoriation is consistent with dermatitis. Will treat with triamcinolone 0.1% ointment.  - Triamcinolone 0.1% ointment BID to the left buttock    Follow-up prn for new or changing lesions.        staffed the patient.    Staff Involved:  Prabhu Angeles, MS4    Staff Physician:  I was present with the medical student who participated in the service and in the documentation of the note. I have verified the history and personally performed the physical exam and medical decision making. I edited the assessment and plan of care as documented in the note.     The procedure(s) was(were) performed by myself.    Felipe Arellano MD   of Dermatology  Department of Dermatology  [facility]

## 2020-08-17 NOTE — LETTER
"8/17/2020       RE: Chavez Ca  [address] [place] [address]     Dear Colleague,    Thank you for referring your patient, Chavez Ca, to the [facility] DERMATOLOGY at [facility]. Please see a copy of my visit note below.    [facility] Dermatology Note      Dermatology Problem List:  1. Seborrheic dermatitis  2. Rosacea  3. Perioral dermatitis secondary to topical steroid use  4. Acrochordon/inflamed seborrheic keratoses, inguinal folds  - s/p cryotherapy 08/17/20  5. Sessile plaque on right inguinal fold - DDx condyloma vs seborrheic keratosis vs Bowenoid papulosis  - s/p shave biopsy 08/17/20  6. Buttocks dermatitis - triamcinolone    Encounter Date: Aug 17, 2020    CC:   Chief Complaint   Patient presents with     Derm Problem     Rosacea, Chavez does not think his treatment is very effective.     Derm Problem     Chavez states \" I have lumps and bumps in my groin.\"          History of Present Illness:  Mr. Chavez Ca is a 49 year old male with a past history of seborrheic dermatitis, rosacea, and perioral dermatitis (last seen on 2016), who presents for lesions on his inguinal region and buttock.     Patient notes some bumps on his inguinal region that appeared 1 year ago, with two more smaller bumps appearing within the past 2 months. These bumps are tender when washing or moving around, but they are not itchy or bleeding. The first bump has grown in size since it first appeared, but he has not noticed any changes to the smaller lesions.     Patient also reports a new-onset rash on his buttocks that appeared 3 days ago. It is itchy and tender to touch, but has not grown in size since it first appeared.    Patient notes that his rosacea and seborrheic dermatitis is currently well-controlled, and has kept his medication regimen since his last dermatology visit. For his rosacea, he is on Elidel 1% cream BID, " doxycycline 100 mg BID, and ketoconazole 2% cream to the face BID. For his seborrheic dermatitis, he is on ketoconazole 2% shampoo 3x/week, head and shulders shampoo, and fluocinonide 0.05% solution as needed for itching.       Past Medical History:   Patient Active Problem List   Diagnosis     VERN (obstructive sleep apnea)     Morbid obesity (H)     Idiopathic edema     Depressive disorder, not elsewhere classified     Psychological factors associated with another disorder     Subglottic stenosis     Hyperlipidemia     Idiopathic progressive polyneuropathy     Varicose veins of left lower extremity     Closed nondisplaced fracture of scaphoid of right wrist, unspecified portion of scaphoid, sequela     Pain in both wrists     Shoulder pain, left     Neck pain     Neck pain, chronic     Past Medical History:   Diagnosis Date     Allergic rhinitis      Benign positional vertigo 2011    never quite went away     Bilateral carpal tunnel syndrome 7/18/2015     Chest pain      Chronic sinusitis 2017    I don't know Please look     Chronic tonsillitis      Coronary artery disease 3/8/16    right side chest pain     Depressive disorder      Depressive disorder, not elsewhere classified 7/30/2012     Gastro-oesophageal reflux disease      Hearing problem      Hypertension 2008     Idiopathic progressive polyneuropathy 10/15/2012     Learning disability      Neck mass     parapharyngeal, benign     Obesity, Class III, BMI 40-49.9 (morbid obesity) (H)      Obstructive sleep apnea      Psychological factors associated with another disorder 8/21/2012     Recurrent otitis media 2016    might be what the pain in my ears     Reduced vision      Shortness of breath      Tinnitus 2016    I would hear a steady light buzzing sound and other can't     Trigeminal neuralgia      Weakness 8/26/2011     Past Surgical History:   Procedure Laterality Date     BIOPSY  2011    To figure out what kind of tumor I had     ENDOSCOPIC RETROGRADE  CHOLANGIOPANCREATOGRAM N/A 8/27/2015    Procedure: COMBINED ENDOSCOPIC RETROGRADE CHOLANGIOPANCREATOGRAPHY, PLACE TUBE/STENT;  Surgeon: Baldomero Zacarias MD;  Location: [facility] OR     ENDOSCOPIC RETROGRADE CHOLANGIOPANCREATOGRAM N/A 11/6/2015    Procedure: COMBINED ENDOSCOPIC RETROGRADE CHOLANGIOPANCREATOGRAPHY, REMOVE FOREIGN BODY OR STENT/TUBE;  Surgeon: Baldomero Zacarias MD;  Location: [facility] OR     EXCISE LESION INTRAORAL  6/29/2011    Procedure:EXCISE LESION INTRAORAL; TRANSCERVICAL APPROACH TO LEFT PHARYNGEAL SPACE MASS REMOVAL ; Surgeon:BARBARA PHILLIPS; [facility] OR     HC VASCULAR SURGERY PROCEDURE UNLIST  12/29/15     LAPAROSCOPIC CHOLECYSTECTOMY N/A 8/23/2015    Procedure: LAPAROSCOPIC CHOLECYSTECTOMY;  Surgeon: Kvng Witt MD;  Location: [facility] OR     LARYNGOSCOPY WITH BIOPSY(IES)  6/18/2014    Procedure: LARYNGOSCOPY WITH BIOPSY(IES);  Surgeon: Barbara Phillips MD;  Location: [facility] OR     LASER CO2 LARYNGOSCOPY  12/7/2011    Procedure:LASER CO2 LARYNGOSCOPY; Micro-Direct Laryngoscopy with CO2 Laser Standby / Excision Tracheal Grandulization, Trach Tube Change / Kenalog application. / Balloon Dilation of Subglottic Stenosis.*Latex Safe Room* Trach Tube = Shiley 6.4mm I.D. / 10.8MM O.D. / 76MM  Cuffless.; Surgeon:BARBARA PHILLIPS; Location: OR     ORTHOPEDIC SURGERY  5/2016    Broken Right hand & Fracture Right shoulder     TRACHEOSTOMY  4/22/2011    Procedure:TRACHEOSTOMY; possible awake; Surgeon:BARBARA PHILLIPS; Location:[facility] OR     TRACHEOSTOMY  6/29/2011    Procedure:TRACHEOSTOMY; REMOVE AND REPLACE SHILEY 6 XLT; Surgeon:BARBARA PHILLIPS; Location: OR     VASCULAR SURGERY  2008-[facility]       Social History:  Patient reports that he has never smoked. He has never used smokeless tobacco. He reports that he does not drink alcohol or use drugs.    Family History:  Family History   Adopted: Yes   Problem Relation Age of Onset     Family History  Negative Other         Does not know family     Unknown/Adopted No family hx of        Medications:  Current Outpatient Medications   Medication Sig Dispense Refill     albuterol (PROAIR HFA/PROVENTIL HFA/VENTOLIN HFA) 108 (90 Base) MCG/ACT inhaler Inhale 2 puffs into the lungs every 6 hours as needed for shortness of breath / dyspnea or wheezing (Patient not taking: Reported on 7/23/2020) 1 Inhaler 3     amitriptyline (ELAVIL) 50 MG tablet Take 2 tablets (100 mg) by mouth At Bedtime Call clinic to schedule follow up appointment. [phone] 180 tablet 1     CETAPHIL (CETAPHIL) lotion Apply topically 2 times daily       cetirizine (ZYRTEC) 10 MG tablet Take 1 tablet (10 mg) by mouth daily as needed for allergies 90 tablet 3     COMPRESSION STOCKINGS 20-30 mmHg knee high compression stockings.  Measure and fit.  Style and brand per patient preference. 2 each 0     furosemide (LASIX) 20 MG tablet Take 3 tablets (60 mg) by mouth 2 times daily 540 tablet 3     ketoconazole (NIZORAL) 2 % external shampoo Apply to the affected area and wash off after 5 minutes. 120 mL 1     metroNIDAZOLE (METROGEL) 0.75 % external gel Apply topically 2 times daily 45 g 0     order for DME Equipment being ordered: compression stockings    Needs to be faxed to 1 Device 0     order for DME Compression stockings pair, patient preference    To be used 8 hours per day 1 Device 3     order for DME Equipment being ordered: compression stockings 2 each 0     phentermine (ADIPEX-P) 37.5 MG tablet Take 1 tablet (37.5 mg) by mouth every morning 30 tablet 3     phentermine (ADIPEX-P) 37.5 MG tablet Take 1 tablet (37.5 mg) by mouth every morning 30 tablet 1     polyethylene glycol (MIRALAX) 17 GM/SCOOP powder Take 17 g (1 capful) by mouth daily (Patient not taking: Reported on 7/23/2020) 850 g 3     potassium chloride (KLOR-CON) 20 MEQ packet Take 60 mEq by mouth daily 270 packet 1     topiramate (TOPAMAX) 100 MG tablet Take 1 tablet (100 mg) by mouth  2 times daily 60 tablet 11     traZODone (DESYREL) 100 MG tablet Take 1 tablet (100 mg) by mouth At Bedtime Call clinic to schedule follow up appointment. [phone] 90 tablet 1     zolpidem (AMBIEN) 10 MG tablet Take 1 tablet (10 mg) by mouth nightly as needed for sleep 30 tablet 0        Allergies   Allergen Reactions     Fentanyl Itching     Patch     Meloxicam Other (See Comments)     Side pains.     Minocycline Rash     Patient reports adverse reaction was pigmentation which has resolved with drug discontinuation.         Review of Systems:  -As per HPI  -Constitutional: Otherwise feeling well today, in usual state of health.  -Skin: As above in HPI. No additional skin concerns.    Physical exam:  Vitals: There were no vitals taken for this visit.  GEN: This is a well developed, well-nourished male in no acute distress, in a pleasant mood.    SKIN: Focused examination of the genitalia and buttocks was performed.  -Quintero skin type: II  - One 1 cm erythematous plaque in the groin with verrucous architecture  - Two 2mm skin-colored pendunculated papules in left and right inguinal crease  - Two ~2 cm erythematous plaques with secondary scale and overlying erosions on the left buttock  -No other lesions of concern on areas examined.     Impression/Plan:  1. Sessile Lesion in left inguinal fold  Patient reports a lesion on his left inguinal fold that is very irritated. Will perform a shave biopsy.  - Ddx Inflamed seborrheic keratosis vs. Verruca Vulgaris vs. Bowenoid Papulosis   - Shave biopsy:  After discussion of benefits and risks including but not limited to bleeding/bruising, pain/swelling, infection, scar, incomplete removal, nerve damage/numbness, recurrence, and non-diagnostic biopsy, written consent, verbal consent and photographs were obtained. Time-out was performed. The area was cleaned with isopropyl alcohol. 0.5ml of 1% lidocaine with 1:100,000 epinephrine was injected to obtain adequate  anesthesia. A shave biopsy was performed. Hemostasis was achieved with aluminium chloride. Vaseline was applied. The patient tolerated the procedure and no complications were noted. The patient was provided with verbal and written post care instructions.    2. Acrochordon vs inflamed seborrheic keratoses x2, inguinal folds  - Cryotherapy procedure note: After verbal consent and discussion of risks and benefits including but no limited to dyspigmentation/scar, blister, and pain, were treated with 1-2mm freeze border for 2 cycles with liquid nitrogen. Post cryotherapy instructions were provided.    3. Dermatitis, left buttock  Two erythematous plaques with secondary excoriation is consistent with dermatitis. Will treat with triamcinolone 0.1% ointment.  - Triamcinolone 0.1% ointment BID to the left buttock    Follow-up prn for new or changing lesions.        staffed the patient.    Staff Involved:  Prabhu Angeles, MS4    Staff Physician:  I was present with the medical student who participated in the service and in the documentation of the note. I have verified the history and personally performed the physical exam and medical decision making. I edited the assessment and plan of care as documented in the note.     The procedure(s) was(were) performed by myself.    Felipe Arellano MD   of Dermatology  Department of Dermatology  [facility] [employer]

## 2020-08-17 NOTE — PATIENT INSTRUCTIONS
Cryotherapy    What is it?    Use of a very cold liquid, such as liquid nitrogen, to freeze and destroy abnormal skin cells that need to be removed    What should I expect?    Tenderness and redness    A small blister that might grow and fill with dark purple blood. There may be crusting.    More than one treatment may be needed if the lesions do not go away.    How do I care for the treated area?    Gently wash the area with your hands when bathing.    Use a thin layer of Vaseline to help with healing. You may use a Band-Aid.     The area should heal within 7-10 days and may leave behind a pink or lighter color.     Do not use an antibiotic or Neosporin ointment.     You may take acetaminophen (Tylenol) for pain.     Call your Doctor if you have:    Severe pain    Signs of infection (warmth, redness, cloudy yellow drainage, and or a bad smell)    Questions or concerns    Who should I call with questions?       [facility]: [phone]       [facility]: [phone]       For urgent needs outside of business hours call the [facility] at [phone]        and ask for the dermatology resident on call  Wound Care After a Biopsy    What is a skin biopsy?  A skin biopsy allows the doctor to examine a very small piece of tissue under the microscope to determine the diagnosis and the best treatment for the skin condition. A local anesthetic (numbing medicine)  is injected with a very small needle into the skin area to be tested. A small piece of skin is taken from the area. Sometimes a suture (stitch) is used.     What are the risks of a skin biopsy?  I will experience scar, bleeding, swelling, pain, crusting and redness. I may experience incomplete removal or recurrence. Risks of this procedure are excessive bleeding, bruising, infection, nerve damage, numbness, thick (hypertrophic or keloidal) scar and non-diagnostic biopsy.    How should I care for  my wound for the first 24 hours?    Keep the wound dry and covered for 24 hours    If it bleeds, hold direct pressure on the area for 15 minutes. If bleeding does not stop then go to the emergency room    Avoid strenuous exercise the first 1-2 days or as your doctor instructs you    How should I care for the wound after 24 hours?    After 24 hours, remove the bandage    You may bathe or shower as normal    If you had a scalp biopsy, you can shampoo as usual and can use shower water to clean the biopsy site daily    Clean the wound twice a day with gentle soap and water    Do not scrub, be gentle    Apply white petroleum/Vaseline after cleaning the wound with a cotton swab or a clean finger, and keep the site covered with a Bandaid /bandage. Bandages are not necessary with a scalp biopsy    If you are unable to cover the site with a Bandaid /bandage, re-apply ointment 2-3 times a day to keep the site moist. Moisture will help with healing    Avoid strenuous activity for first 1-2 days    Avoid lakes, rivers, pools, and oceans until the stitches are removed or the site is healed    How do I clean my wound?    Wash hands thoroughly with soap or use hand  before all wound care    Clean the wound with gentle soap and water    Apply white petroleum/Vaseline  to wound after it is clean    Replace the Bandaid /bandage to keep the wound covered for the first few days or as instructed by your doctor    If you had a scalp biopsy, warm shower water to the area on a daily basis should suffice    What should I use to clean my wound?     Cotton-tipped applicators (Qtips )    White petroleum jelly (Vaseline ). Use a clean new container and use Q-tips to apply.    Bandaids   as needed    Gentle soap     How should I care for my wound long term?    Do not get your wound dirty    Keep up with wound care for one week or until the area is healed.    A small scab will form and fall off by itself when the area is completely  healed. The area will be red and will become pink in color as it heals. Sun protection is very important for how your scar will turn out. Sunscreen with an SPF 30 or greater is recommended once the area is healed.    If you have stitches, stitches need to be removed in  days. You may return to our clinic for this or you may have it done locally at your doctor s office.    You should have some soreness but it should be mild and slowly go away over several days. Talk to your doctor about using tylenol for pain,    When should I call my doctor?  If you have increased:     Pain or swelling    Pus or drainage (clear or slightly yellow drainage is ok)    Temperature over 100F    Spreading redness or warmth around wound    When will I hear about my results?  The biopsy results can take 2-3 weeks to come back. The clinic will call you with the results, send you a [employer] message, or have you schedule a follow-up clinic or phone time to discuss the results. Contact our clinics if you do not hear from us in 3 weeks.     Who should I call with questions?    [facility]: [phone]     [facility]: [phone]    For urgent needs outside of business hours call the [facility] at [phone] and ask for the dermatology resident on call

## 2020-08-17 NOTE — NURSING NOTE
"Dermatology Rooming Note    Chavez Ca's goals for this visit include:   Chief Complaint   Patient presents with     Derm Problem     Rosacea, Chavez does not think his treatment is very effective.     Derm Problem     Chavez states \" I have lumps and bumps in my groin.\"      Sarah Rolon,TAMEKA  "

## 2020-08-19 LAB — COPATH REPORT: NORMAL

## 2020-08-23 ENCOUNTER — TELEPHONE (OUTPATIENT)
Dept: GASTROENTEROLOGY | Facility: CLINIC | Age: 50
End: 2020-08-23

## 2020-08-23 ENCOUNTER — HOSPITAL ENCOUNTER (OUTPATIENT)
Facility: AMBULATORY SURGERY CENTER | Age: 50
End: 2020-08-23
Attending: INTERNAL MEDICINE
Payer: COMMERCIAL

## 2020-08-23 DIAGNOSIS — Z11.59 ENCOUNTER FOR SCREENING FOR OTHER VIRAL DISEASES: Primary | ICD-10-CM

## 2020-08-23 NOTE — TELEPHONE ENCOUNTER
Patient is scheduled for Colonoscopy with Dr. Leventhal    Spoke with: Patient    Date of Procedure: 9/16/2020    Location: [facility]    Sedation Type MAC    Informed patient they will need an adult  Yes    Informed Patient of COVID Test Requirement Yes    Preferred Pharmacy for Pre Prescription [place], [place] - [address] [place] [address] [phone]     Confirmed Nurse will call to complete assessment Yes    Additional comments: None

## 2020-08-24 ENCOUNTER — VIRTUAL VISIT (OUTPATIENT)
Dept: ENDOCRINOLOGY | Facility: CLINIC | Age: 50
End: 2020-08-24
Payer: COMMERCIAL

## 2020-08-24 VITALS — BODY MASS INDEX: 49.44 KG/M2 | WEIGHT: 315 LBS | HEIGHT: 67 IN

## 2020-08-24 DIAGNOSIS — E66.01 MORBID OBESITY (H): ICD-10-CM

## 2020-08-24 RX ORDER — PHENTERMINE HYDROCHLORIDE 37.5 MG/1
37.5 TABLET ORAL EVERY MORNING
Qty: 30 TABLET | Refills: 5 | Status: SHIPPED | OUTPATIENT
Start: 2020-08-24 | End: 2021-03-05

## 2020-08-24 RX ORDER — TOPIRAMATE 100 MG/1
200 TABLET, FILM COATED ORAL 2 TIMES DAILY
Qty: 120 TABLET | Refills: 11 | Status: SHIPPED | OUTPATIENT
Start: 2020-08-24 | End: 2021-03-15

## 2020-08-24 ASSESSMENT — MIFFLIN-ST. JEOR: SCORE: 2406.22

## 2020-08-24 ASSESSMENT — PAIN SCALES - GENERAL: PAINLEVEL: NO PAIN (0)

## 2020-08-24 NOTE — LETTER
"2020       RE: Chavez Ca  [address]  [place] [address]     Dear Colleague,    Thank you for referring your patient, Chavez Ca, to the [facility] MEDICAL WEIGHT MANAGEMENT at [facility]. Please see a copy of my visit note below.    Chavez Ca is a 49 year old male who is being evaluated via a billable telephone visit.      The patient has been notified of following:     \"This telephone visit will be conducted via a call between you and your physician/provider. We have found that certain health care needs can be provided without the need for a physical exam.  This service lets us provide the care you need with a short phone conversation.  If a prescription is necessary we can send it directly to your pharmacy.  If lab work is needed we can place an order for that and you can then stop by our lab to have the test done at a later time.    Telephone visits are billed at different rates depending on your insurance coverage. During this emergency period, for some insurers they may be billed the same as an in-person visit.  Please reach out to your insurance provider with any questions.    If during the course of the call the physician/provider feels a telephone visit is not appropriate, you will not be charged for this service.\"    Patient has given verbal consent for Telephone visit?  Yes    What phone number would you like to be contacted at?  [phone]    How would you like to [facility] your AV[facility]? [facility]    Phone call duration: 15 minutes.  I explained the conditions and plans (more than 50% of time was counseling/coordination of weight management).    Fortino Chang MD        Return Medical Weight Management Note     Chavez Ca  MRN:  [id number]  :  1970  RUI:  20    Dear Dr. Colindres,    I had the pleasure of seeing your patient Chavez Ca.  He is a 48 year old male who I am continuing to see " "for treatment of obesity related to: venous insufficiency with edema, VERN, hyperlipidemia, idiopathic progressive polyneuropathy, CAD, hypertension, depression. 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     CURRENT WEIGHT:   350 lbs 0 oz     Wt Readings from Last 4 Encounters:   08/24/20 (!) 158.8 kg (350 lb)   07/23/20 (!) 164.2 kg (362 lb)   07/06/20 (!) 163.3 kg (360 lb)   04/01/19 147.7 kg (325 lb 11.2 oz)     Height:  5' 7\"[per pt[  Body Mass Index:  Body mass index is 54.82 kg/m .  Vitals:  Ht 1.702 m (5' 7\")   Wt (!) 158.8 kg (350 lb)   BMI 54.82 kg/m      Initial consult weight was 361 on 1/10/12.  Weight change since last seen on 7/6/20 is down 10 pounds.   Total loss is 11 pounds.    INTERVAL HISTORY:  Struggling and wanting to increase topiramate \"to my full dose\". Food life and weight about the same. Still wants barisurgery but is too high risk even for tracheal issues.    Saw ENT 1/2019 and found to have small indentation in the anterior wall of his trachea in conjunction with tracheostomy scar. Concern for some dynamic airway collapse.     No flowsheet data found.     MEDICATIONS:   Current Outpatient Medications   Medication Sig Dispense Refill     amitriptyline (ELAVIL) 50 MG tablet Take 2 tablets (100 mg) by mouth At Bedtime Call clinic to schedule follow up appointment. [phone] 180 tablet 1     CETAPHIL (CETAPHIL) lotion Apply topically 2 times daily       cetirizine (ZYRTEC) 10 MG tablet Take 1 tablet (10 mg) by mouth daily as needed for allergies 90 tablet 3     COMPRESSION STOCKINGS 20-30 mmHg knee high compression stockings.  Measure and fit.  Style and brand per patient preference. 2 each 0     furosemide (LASIX) 20 MG tablet Take 3 tablets (60 mg) by mouth 2 times daily 540 tablet 3     ketoconazole (NIZORAL) 2 % external shampoo Apply to the affected area and wash off after 5 minutes. 120 " mL 1     metroNIDAZOLE (METROGEL) 0.75 % external gel Apply topically 2 times daily 45 g 0     order for DME Equipment being ordered: compression stockings    Needs to be faxed to 1 Device 0     order for DME Compression stockings pair, patient preference    To be used 8 hours per day 1 Device 3     order for DME Equipment being ordered: compression stockings 2 each 0     phentermine (ADIPEX-P) 37.5 MG tablet Take 1 tablet (37.5 mg) by mouth every morning 30 tablet 3     potassium chloride (KLOR-CON) 20 MEQ packet Take 60 mEq by mouth daily 270 packet 1     topiramate (TOPAMAX) 100 MG tablet Take 1 tablet (100 mg) by mouth 2 times daily 60 tablet 11     traZODone (DESYREL) 100 MG tablet Take 1 tablet (100 mg) by mouth At Bedtime Call clinic to schedule follow up appointment. [phone] 90 tablet 1     triamcinolone (KENALOG) 0.1 % external ointment Apply topically 2 times daily For rash on buttocks 30 g 3     zolpidem (AMBIEN) 10 MG tablet Take 1 tablet (10 mg) by mouth nightly as needed for sleep 30 tablet 0     albuterol (PROAIR HFA/PROVENTIL HFA/VENTOLIN HFA) 108 (90 Base) MCG/ACT inhaler Inhale 2 puffs into the lungs every 6 hours as needed for shortness of breath / dyspnea or wheezing (Patient not taking: Reported on 7/23/2020) 1 Inhaler 3     polyethylene glycol (MIRALAX) 17 GM/SCOOP powder Take 17 g (1 capful) by mouth daily (Patient not taking: Reported on 7/23/2020) 850 g 3     Weight Loss Medication History Reviewed With Patient 8/23/2020   Which weight loss medications are you currently taking on a regular basis?  Phentermine, Topamax (topiramate)   If you are not taking a weight loss medication that was prescribed to you, please indicate why: -   Are you having any side effects from the weight loss medication that we have prescribed you? No     ASSESSMENT:   Increase topiramate to 200 BID. Maintain phentermine 37.5 mg.    FOLLOW-UP:    Phone call duration: 15 minutes.  I explained the conditions and  "plans (more than 50% of time was counseling/coordination of weight management).  RTC 3 months    Sincerely,    Fortino Chang MD     The patient was evaluated via a billable telephone visit and notified:  \"This visit will be via telephone call between you and your physician. If lab work is needed we can place an order and you can later stop by the lab. Telephone visits are billed at different rates depending on your insurance coverage. During this emergency period, some insurers may be billed the same as an in-person visit.  Please check with your insurance provider with any questions. If the physician feels a telephone visit is not appropriate, you will not be charged for this service.\" Patient has given verbal consent for Telephone visit?  Yes. How would you like to obtain your AVS? MyChart.      "

## 2020-08-24 NOTE — PROGRESS NOTES
"Return Medical Weight Management Note     Chavez Ca  MRN:  [id number]  :  1970  RUI:  20    Dear Dr. Colindres,    I had the pleasure of seeing your patient Chavez Ca.  He is a 48 year old male who I am continuing to see for treatment of obesity related to: venous insufficiency with edema, VERN, hyperlipidemia, idiopathic progressive polyneuropathy, CAD, hypertension, depression. 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     CURRENT WEIGHT:   350 lbs 0 oz     Wt Readings from Last 4 Encounters:   20 (!) 158.8 kg (350 lb)   20 (!) 164.2 kg (362 lb)   20 (!) 163.3 kg (360 lb)   19 147.7 kg (325 lb 11.2 oz)     Height:  5' 7\"[per pt[  Body Mass Index:  Body mass index is 54.82 kg/m .  Vitals:  Ht 1.702 m (5' 7\")   Wt (!) 158.8 kg (350 lb)   BMI 54.82 kg/m      Initial consult weight was 361 on 1/10/12.  Weight change since last seen on 20 is down 10 pounds.   Total loss is 11 pounds.    INTERVAL HISTORY:  Struggling and wanting to increase topiramate \"to my full dose\". Food life and weight about the same. Still wants barisurgery but is too high risk even for tracheal issues.    Saw ENT 2019 and found to have small indentation in the anterior wall of his trachea in conjunction with tracheostomy scar. Concern for some dynamic airway collapse.     No flowsheet data found.     MEDICATIONS:   Current Outpatient Medications   Medication Sig Dispense Refill     amitriptyline (ELAVIL) 50 MG tablet Take 2 tablets (100 mg) by mouth At Bedtime Call clinic to schedule follow up appointment. [phone] 180 tablet 1     CETAPHIL (CETAPHIL) lotion Apply topically 2 times daily       cetirizine (ZYRTEC) 10 MG tablet Take 1 tablet (10 mg) by mouth daily as needed for allergies 90 tablet 3     COMPRESSION STOCKINGS 20-30 mmHg knee high compression stockings.  Measure and fit. "  Style and brand per patient preference. 2 each 0     furosemide (LASIX) 20 MG tablet Take 3 tablets (60 mg) by mouth 2 times daily 540 tablet 3     ketoconazole (NIZORAL) 2 % external shampoo Apply to the affected area and wash off after 5 minutes. 120 mL 1     metroNIDAZOLE (METROGEL) 0.75 % external gel Apply topically 2 times daily 45 g 0     order for DME Equipment being ordered: compression stockings    Needs to be faxed to 1 Device 0     order for DME Compression stockings pair, patient preference    To be used 8 hours per day 1 Device 3     order for DME Equipment being ordered: compression stockings 2 each 0     phentermine (ADIPEX-P) 37.5 MG tablet Take 1 tablet (37.5 mg) by mouth every morning 30 tablet 3     potassium chloride (KLOR-CON) 20 MEQ packet Take 60 mEq by mouth daily 270 packet 1     topiramate (TOPAMAX) 100 MG tablet Take 1 tablet (100 mg) by mouth 2 times daily 60 tablet 11     traZODone (DESYREL) 100 MG tablet Take 1 tablet (100 mg) by mouth At Bedtime Call clinic to schedule follow up appointment. [phone] 90 tablet 1     triamcinolone (KENALOG) 0.1 % external ointment Apply topically 2 times daily For rash on buttocks 30 g 3     zolpidem (AMBIEN) 10 MG tablet Take 1 tablet (10 mg) by mouth nightly as needed for sleep 30 tablet 0     albuterol (PROAIR HFA/PROVENTIL HFA/VENTOLIN HFA) 108 (90 Base) MCG/ACT inhaler Inhale 2 puffs into the lungs every 6 hours as needed for shortness of breath / dyspnea or wheezing (Patient not taking: Reported on 7/23/2020) 1 Inhaler 3     polyethylene glycol (MIRALAX) 17 GM/SCOOP powder Take 17 g (1 capful) by mouth daily (Patient not taking: Reported on 7/23/2020) 850 g 3     Weight Loss Medication History Reviewed With Patient 8/23/2020   Which weight loss medications are you currently taking on a regular basis?  Phentermine, Topamax (topiramate)   If you are not taking a weight loss medication that was prescribed to you, please indicate why: -   Are  "you having any side effects from the weight loss medication that we have prescribed you? No     ASSESSMENT:   Increase topiramate to 200 BID. Maintain phentermine 37.5 mg.    FOLLOW-UP:    Phone call duration: 15 minutes.  I explained the conditions and plans (more than 50% of time was counseling/coordination of weight management).  RTC 3 months    Sincerely,    Fortino Chang MD     The patient was evaluated via a billable telephone visit and notified:  \"This visit will be via telephone call between you and your physician. If lab work is needed we can place an order and you can later stop by the lab. Telephone visits are billed at different rates depending on your insurance coverage. During this emergency period, some insurers may be billed the same as an in-person visit.  Please check with your insurance provider with any questions. If the physician feels a telephone visit is not appropriate, you will not be charged for this service.\" Patient has given verbal consent for Telephone visit?  Yes. How would you like to obtain your AVS? MyChart.      "

## 2020-08-24 NOTE — NURSING NOTE
"(   Chief Complaint   Patient presents with     Weight Problem    )    ( Weight: (!) 158.8 kg (350 lb)(per pt) )  ( Height: 170.2 cm (5' 7\")(per pt) )  ( BMI (Calculated): 54.82 )  (   )  ( Cumulative weight loss (lbs): 11 )  ( Last Visits Weight: 163.7 kg (361 lb) )  ( Wt change since last visit (lbs): -11 )  (   )  (   )    (   )  (   )  (   )  (   )  (   )  (   )  (   )    (   Patient Active Problem List   Diagnosis     VERN (obstructive sleep apnea)     Obesity, unspecified     Idiopathic edema     Depressive disorder, not elsewhere classified     Psychological factors associated with another disorder     Subglottic stenosis     Hyperlipidemia     Idiopathic progressive polyneuropathy     Varicose veins of left lower extremity     Closed nondisplaced fracture of scaphoid of right wrist, unspecified portion of scaphoid, sequela     Pain in both wrists     Shoulder pain, left     Neck pain     Neck pain, chronic     Seasonal allergies     Radicular pain of right lower back    )  (   Current Outpatient Medications   Medication Sig Dispense Refill     amitriptyline (ELAVIL) 50 MG tablet Take 2 tablets (100 mg) by mouth At Bedtime Call clinic to schedule follow up appointment. [phone] 180 tablet 1     CETAPHIL (CETAPHIL) lotion Apply topically 2 times daily       cetirizine (ZYRTEC) 10 MG tablet Take 1 tablet (10 mg) by mouth daily as needed for allergies 90 tablet 3     COMPRESSION STOCKINGS 20-30 mmHg knee high compression stockings.  Measure and fit.  Style and brand per patient preference. 2 each 0     furosemide (LASIX) 20 MG tablet Take 3 tablets (60 mg) by mouth 2 times daily 540 tablet 3     ketoconazole (NIZORAL) 2 % external shampoo Apply to the affected area and wash off after 5 minutes. 120 mL 1     metroNIDAZOLE (METROGEL) 0.75 % external gel Apply topically 2 times daily 45 g 0     order for DME Equipment being ordered: compression stockings    Needs to be faxed to 1 Device 0     order for DME " Compression stockings pair, patient preference    To be used 8 hours per day 1 Device 3     order for DME Equipment being ordered: compression stockings 2 each 0     phentermine (ADIPEX-P) 37.5 MG tablet Take 1 tablet (37.5 mg) by mouth every morning 30 tablet 3     potassium chloride (KLOR-CON) 20 MEQ packet Take 60 mEq by mouth daily 270 packet 1     topiramate (TOPAMAX) 100 MG tablet Take 1 tablet (100 mg) by mouth 2 times daily 60 tablet 11     traZODone (DESYREL) 100 MG tablet Take 1 tablet (100 mg) by mouth At Bedtime Call clinic to schedule follow up appointment. [phone] 90 tablet 1     triamcinolone (KENALOG) 0.1 % external ointment Apply topically 2 times daily For rash on buttocks 30 g 3     zolpidem (AMBIEN) 10 MG tablet Take 1 tablet (10 mg) by mouth nightly as needed for sleep 30 tablet 0     albuterol (PROAIR HFA/PROVENTIL HFA/VENTOLIN HFA) 108 (90 Base) MCG/ACT inhaler Inhale 2 puffs into the lungs every 6 hours as needed for shortness of breath / dyspnea or wheezing (Patient not taking: Reported on 7/23/2020) 1 Inhaler 3     polyethylene glycol (MIRALAX) 17 GM/SCOOP powder Take 17 g (1 capful) by mouth daily (Patient not taking: Reported on 7/23/2020) 850 g 3    )  ( Diabetes Eval:    )    ( Pain Eval:  No Pain (0) )    ( Wound Eval:       )    (   History   Smoking Status     Never Smoker   Smokeless Tobacco     Never Used     Comment: Never started    )    ( Signed By:  Reese Quesada, EMT; August 24, 2020; 9:57 AM )

## 2020-09-03 ASSESSMENT — ENCOUNTER SYMPTOMS
COUGH: 0
POSTURAL DYSPNEA: 1
BACK PAIN: 1
SKIN CHANGES: 0
SNORES LOUDLY: 1
DECREASED CONCENTRATION: 1
PALPITATIONS: 0
NECK PAIN: 1
HEADACHES: 0
SPEECH CHANGE: 1
LEG PAIN: 1
SLEEP DISTURBANCES DUE TO BREATHING: 1
POOR WOUND HEALING: 1
MYALGIAS: 1
WEAKNESS: 1
INSOMNIA: 0
SMELL DISTURBANCE: 0
NUMBNESS: 1
MUSCLE CRAMPS: 1
PARALYSIS: 0
EXERCISE INTOLERANCE: 1
DEPRESSION: 1
HYPOTENSION: 0
SYNCOPE: 0
HEMOPTYSIS: 0
MUSCLE WEAKNESS: 1
HOARSE VOICE: 0
WHEEZING: 1
DIZZINESS: 1
DYSPNEA ON EXERTION: 1
SINUS PAIN: 1
HYPERTENSION: 0
SINUS CONGESTION: 0
NECK MASS: 0
SPUTUM PRODUCTION: 0
ARTHRALGIAS: 1
TREMORS: 0
JOINT SWELLING: 1
MEMORY LOSS: 1
DISTURBANCES IN COORDINATION: 0
TROUBLE SWALLOWING: 1
TASTE DISTURBANCE: 0
LOSS OF CONSCIOUSNESS: 1
STIFFNESS: 1
SEIZURES: 0
SHORTNESS OF BREATH: 1
TINGLING: 1
NERVOUS/ANXIOUS: 1
LIGHT-HEADEDNESS: 1
ORTHOPNEA: 0
SORE THROAT: 0
PANIC: 0
COUGH DISTURBING SLEEP: 0
NAIL CHANGES: 1

## 2020-09-04 ENCOUNTER — TELEPHONE (OUTPATIENT)
Dept: INTERNAL MEDICINE | Facility: CLINIC | Age: 50
End: 2020-09-04

## 2020-09-04 ENCOUNTER — OFFICE VISIT (OUTPATIENT)
Dept: INTERNAL MEDICINE | Facility: CLINIC | Age: 50
End: 2020-09-04
Payer: COMMERCIAL

## 2020-09-04 VITALS
TEMPERATURE: 98.2 F | BODY MASS INDEX: 54.82 KG/M2 | OXYGEN SATURATION: 100 % | DIASTOLIC BLOOD PRESSURE: 83 MMHG | HEART RATE: 116 BPM | SYSTOLIC BLOOD PRESSURE: 119 MMHG | WEIGHT: 315 LBS

## 2020-09-04 DIAGNOSIS — R53.83 FATIGUE, UNSPECIFIED TYPE: ICD-10-CM

## 2020-09-04 DIAGNOSIS — R53.83 FATIGUE, UNSPECIFIED TYPE: Primary | ICD-10-CM

## 2020-09-04 DIAGNOSIS — R79.9 ABNORMAL FINDING OF BLOOD CHEMISTRY, UNSPECIFIED: ICD-10-CM

## 2020-09-04 DIAGNOSIS — R06.02 SOB (SHORTNESS OF BREATH): ICD-10-CM

## 2020-09-04 DIAGNOSIS — E87.6 POTASSIUM (K) DEFICIENCY: Primary | ICD-10-CM

## 2020-09-04 DIAGNOSIS — G47.30 SLEEP APNEA, UNSPECIFIED TYPE: ICD-10-CM

## 2020-09-04 LAB
ALBUMIN SERPL-MCNC: 3.9 G/DL (ref 3.4–5)
ALP SERPL-CCNC: 82 U/L (ref 40–150)
ALT SERPL W P-5'-P-CCNC: 29 U/L (ref 0–70)
ANION GAP SERPL CALCULATED.3IONS-SCNC: 6 MMOL/L (ref 3–14)
AST SERPL W P-5'-P-CCNC: 12 U/L (ref 0–45)
BASOPHILS # BLD AUTO: 0.1 10E9/L (ref 0–0.2)
BASOPHILS NFR BLD AUTO: 0.7 %
BILIRUB SERPL-MCNC: 0.5 MG/DL (ref 0.2–1.3)
BUN SERPL-MCNC: 15 MG/DL (ref 7–30)
CALCIUM SERPL-MCNC: 9.3 MG/DL (ref 8.5–10.1)
CHLORIDE SERPL-SCNC: 103 MMOL/L (ref 94–109)
CHOLEST SERPL-MCNC: 203 MG/DL
CO2 SERPL-SCNC: 28 MMOL/L (ref 20–32)
CREAT SERPL-MCNC: 1.14 MG/DL (ref 0.66–1.25)
DIFFERENTIAL METHOD BLD: NORMAL
EOSINOPHIL # BLD AUTO: 0.2 10E9/L (ref 0–0.7)
EOSINOPHIL NFR BLD AUTO: 2.1 %
ERYTHROCYTE [DISTWIDTH] IN BLOOD BY AUTOMATED COUNT: 14 % (ref 10–15)
GFR SERPL CREATININE-BSD FRML MDRD: 74 ML/MIN/{1.73_M2}
GLUCOSE SERPL-MCNC: 110 MG/DL (ref 70–99)
HBA1C MFR BLD: 5.8 % (ref 0–5.6)
HCT VFR BLD AUTO: 43.6 % (ref 40–53)
HDLC SERPL-MCNC: 56 MG/DL
HGB BLD-MCNC: 14.4 G/DL (ref 13.3–17.7)
IMM GRANULOCYTES # BLD: 0 10E9/L (ref 0–0.4)
IMM GRANULOCYTES NFR BLD: 0.3 %
LDLC SERPL CALC-MCNC: 122 MG/DL
LYMPHOCYTES # BLD AUTO: 2.3 10E9/L (ref 0.8–5.3)
LYMPHOCYTES NFR BLD AUTO: 30.7 %
MCH RBC QN AUTO: 29.6 PG (ref 26.5–33)
MCHC RBC AUTO-ENTMCNC: 33 G/DL (ref 31.5–36.5)
MCV RBC AUTO: 90 FL (ref 78–100)
MONOCYTES # BLD AUTO: 0.8 10E9/L (ref 0–1.3)
MONOCYTES NFR BLD AUTO: 10 %
NEUTROPHILS # BLD AUTO: 4.2 10E9/L (ref 1.6–8.3)
NEUTROPHILS NFR BLD AUTO: 56.2 %
NONHDLC SERPL-MCNC: 147 MG/DL
NRBC # BLD AUTO: 0 10*3/UL
NRBC BLD AUTO-RTO: 0 /100
PLATELET # BLD AUTO: 259 10E9/L (ref 150–450)
POTASSIUM SERPL-SCNC: 2.9 MMOL/L (ref 3.4–5.3)
PROT SERPL-MCNC: 8.1 G/DL (ref 6.8–8.8)
RBC # BLD AUTO: 4.87 10E12/L (ref 4.4–5.9)
SODIUM SERPL-SCNC: 137 MMOL/L (ref 133–144)
TRIGL SERPL-MCNC: 128 MG/DL
TSH SERPL DL<=0.005 MIU/L-ACNC: 3.95 MU/L (ref 0.4–4)
WBC # BLD AUTO: 7.5 10E9/L (ref 4–11)

## 2020-09-04 RX ORDER — POTASSIUM CHLORIDE 1.5 G/1.58G
20 POWDER, FOR SOLUTION ORAL DAILY
Qty: 7 PACKET | Refills: 0 | Status: SHIPPED | OUTPATIENT
Start: 2020-09-04 | End: 2021-03-16

## 2020-09-04 RX ORDER — POTASSIUM CHLORIDE 1.5 G/1.58G
20 POWDER, FOR SOLUTION ORAL DAILY
Qty: 7 PACKET | Refills: 0 | COMMUNITY
Start: 2020-09-04 | End: 2020-09-04

## 2020-09-04 NOTE — PROGRESS NOTES
HPI:    Pt. With h/o  obesity related to: venous insufficiency with edema, VERN, hyperlipidemia, idiopathic progressive polyneuropathy, hypertension, depression. 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  He is frustrated with his medical care and condition, particularly his functional status regarding his weight. He states he has no family and only a few friends in the [facility]. He has chronic SOB but no worse than before. He has gotten a walker to be more functional. He has been considered for gastric wt. Loss surgery in the past. He would like to see Dr. Pee REIS again. He has been evaluated in sleep medicine and does not want to see them at this point. He needs to follow up in Dental for poor dentition. He would like to visit with a SW to discuss various issues including changing payment structures and coverage's.     Past Medical History:   Diagnosis Date     Allergic rhinitis      Benign positional vertigo 2011    never quite went away     Bilateral carpal tunnel syndrome 7/18/2015     Chest pain      Chronic sinusitis 2017    I don't know Please look     Chronic tonsillitis      Coronary artery disease 3/8/16    right side chest pain     Depressive disorder      Depressive disorder, not elsewhere classified 7/30/2012     Gastro-oesophageal reflux disease      Hearing problem      Hypertension 2008     Idiopathic progressive polyneuropathy 10/15/2012     Learning disability      Neck mass     parapharyngeal, benign     Obesity, Class III, BMI 40-49.9 (morbid obesity) (H)      Obstructive sleep apnea      Psychological factors associated with another disorder 8/21/2012     Recurrent otitis media 2016    might be what the pain in my ears     Reduced vision      Shortness of breath      Tinnitus 2016    I would hear a steady light buzzing sound and other can't     Trigeminal neuralgia      Weakness 8/26/2011      Past Surgical History:   Procedure Laterality Date     BIOPSY  2011    To figure out what kind of tumor I had     ENDOSCOPIC RETROGRADE CHOLANGIOPANCREATOGRAM N/A 8/27/2015    Procedure: COMBINED ENDOSCOPIC RETROGRADE CHOLANGIOPANCREATOGRAPHY, PLACE TUBE/STENT;  Surgeon: Baldomero Zacarias MD;  Location: [facility] OR     ENDOSCOPIC RETROGRADE CHOLANGIOPANCREATOGRAM N/A 11/6/2015    Procedure: COMBINED ENDOSCOPIC RETROGRADE CHOLANGIOPANCREATOGRAPHY, REMOVE FOREIGN BODY OR STENT/TUBE;  Surgeon: Baldomero Zacarias MD;  Location: [facility] OR     EXCISE LESION INTRAORAL  6/29/2011    Procedure:EXCISE LESION INTRAORAL; TRANSCERVICAL APPROACH TO LEFT PHARYNGEAL SPACE MASS REMOVAL ; Surgeon:BARBARA PHILLIPS; Location: OR      VASCULAR SURGERY PROCEDURE UNLIST  12/29/15     LAPAROSCOPIC CHOLECYSTECTOMY N/A 8/23/2015    Procedure: LAPAROSCOPIC CHOLECYSTECTOMY;  Surgeon: Kvng Witt MD;  Location: [facility] OR     LARYNGOSCOPY WITH BIOPSY(IES)  6/18/2014    Procedure: LARYNGOSCOPY WITH BIOPSY(IES);  Surgeon: Barbara Phillips MD;  Location: [facility] OR     LASER CO2 LARYNGOSCOPY  12/7/2011    Procedure:LASER CO2 LARYNGOSCOPY; Micro-Direct Laryngoscopy with CO2 Laser Standby / Excision Tracheal Grandulization, Trach Tube Change / Kenalog application. / Balloon Dilation of Subglottic Stenosis.*Latex Safe Room* Trach Tube = Shiley 6.4mm I.D. / 10.8MM O.D. / 76MM  Cuffless.; Surgeon:BARBARA PHILLIPS; Location: OR     ORTHOPEDIC SURGERY  5/2016    Broken Right hand & Fracture Right shoulder     TRACHEOSTOMY  4/22/2011    Procedure:TRACHEOSTOMY; possible awake; Surgeon:BARBARA PHILLIPS; Location:[facility] OR     TRACHEOSTOMY  6/29/2011    Procedure:TRACHEOSTOMY; REMOVE AND REPLACE SHILEY 6 XLT; Surgeon:BARBARA PHILLIPS; Location: OR     VASCULAR SURGERY  2008-[facility]     PE:    Vitals noted, gen, nad, cooperative, alert, he breathing is stable but he gets SOB with minor exertion. He  is missing several front teeth. He is wearing a mask, neck with previous anterior scar, neck is thick. Lungs with fair air movement, RRR, S1, S2, abdomen obese no acute findings. He has chronic leg edema. Grossly normal neurological exam.         Procedure 8/28/2014    Echocardiogram with two-dimensional, color and spectral Doppler performed.  Contrast Optison.  Technically difficult study.Extremely poor acoustic windows.     Left Ventricle  Global and regional left ventricular function is normal with an EF of 55-60%.  Left ventricular size is normal.  Left ventricular wall thickness is normal.     Right Ventricle  The right ventricle is normal size.  Global right ventricular function is normal.     Atria  Both atria appear normal.     Mitral Valve  The mitral valve is normal.  Trace mitral insufficiency is present.     Aortic Valve  Aortic valve is normal in structure and function.     Tricuspid Valve  The tricuspid valve is normal.  Trace tricuspid insufficiency is present.     Pulmonic Valve  The pulmonic valve cannot be assessed.     Vessels  The inferior vena cava was normal in size with preserved respiratory   variability.  Sinuses of Valsalva 4.0 cm.     Pericardium  No pericardial effusion is present.        A/P:    1. Obesity; seen by Dr. Chang, Endocrine/Wt. Management 8/24/2020. He would like to consider gastric sugery.  2. Seen by Dr. Arellano, Dermatology 8/17/2020 for several skin issues  3. Seen in Neurology 11/21/2018 for neuropathy  4. Seen in Sleep Clinic 8/14/2018. As mentioned above he does not want to revisit at this time in the Sleep Clinic  5. Venous insufficiency; see Dr. Hubbard's, IR procedure note from 9/30/2016  6. ENT note from Dr. Glasgow 12/28/2015. Placed referral again and he wants to visit with Dr. Glasgow  7. SW referral today  8. Cardiology referral for chronic SOB and issues related to Sleep apnea, and obesity.   9. He needs to follow up for Dental care.  10. Labs  today    Total time spent 40 minutes.  More than 50% of the time spent with Mr. Ca on counseling / coordinating his care

## 2020-09-04 NOTE — TELEPHONE ENCOUNTER
Dear Justine;    I met Mr. Ca today and his K+ is 2.9 and placed order for 20 meq KCL PO every day for 7 days,  today historical. Could you give him a call to see where he wants this sent.    Thanks, RASHARD Zarate

## 2020-09-04 NOTE — NURSING NOTE
Chief Complaint   Patient presents with     Establish Care     previous pt of Dr. Moldow Kimberly Nissen, EMT at 8:55 AM on 9/4/2020

## 2020-09-05 ENCOUNTER — TELEPHONE (OUTPATIENT)
Dept: INTERNAL MEDICINE | Facility: CLINIC | Age: 50
End: 2020-09-05

## 2020-09-05 DIAGNOSIS — I10 BENIGN ESSENTIAL HYPERTENSION: Primary | ICD-10-CM

## 2020-09-05 NOTE — TELEPHONE ENCOUNTER
Placed future lab order for low potassium this encounter      Dear Chavez;    Your labs are more-or-less stable except for lower potassium. My nurse Justine gave you a call yesterday for supplementation and we will recheck next Friday    RASHARD Zarate MD

## 2020-09-09 DIAGNOSIS — Z12.11 SPECIAL SCREENING FOR MALIGNANT NEOPLASMS, COLON: Primary | ICD-10-CM

## 2020-09-09 RX ORDER — BISACODYL 5 MG/1
10 TABLET, DELAYED RELEASE ORAL DAILY
Qty: 4 TABLET | Refills: 0 | Status: SHIPPED | OUTPATIENT
Start: 2020-09-09 | End: 2020-09-11

## 2020-09-10 ENCOUNTER — TELEPHONE (OUTPATIENT)
Dept: GASTROENTEROLOGY | Facility: CLINIC | Age: 50
End: 2020-09-10

## 2020-09-10 NOTE — NURSING NOTE
eRx Dulcolax et 2-day Golytely: [facility] [place], [place] - [phone]    Eliza Hollis RN   [facility]

## 2020-09-14 ENCOUNTER — TELEPHONE (OUTPATIENT)
Dept: OTOLARYNGOLOGY | Facility: CLINIC | Age: 50
End: 2020-09-14

## 2020-09-14 NOTE — TELEPHONE ENCOUNTER
Called patient to schedule, as advised by Alannah Singh RN:    Appointment Type - [facility] NEW  Provider - Dr. Glasgow  Appointment Notes - Previous trach, SOB, sleep apnea - re-referred by Dr Zarate ([facility])    LVM with scheduling instructions and the ENT call center number. Patient is welcome to schedule in next available slot of provider. Please waitlist if sooner availability is desired.

## 2020-09-14 NOTE — PATIENT INSTRUCTIONS
[facility] Medication Refill Request Information:  * Please contact your pharmacy regarding ANY request for medication refills.  ** [facility] Prescription Fax = [phone]  * Please allow 3 business days for routine medication refills.  * Please allow 5 business days for controlled substance medication refills.     [facility] Test Result notification information:  *You will be notified with in 7-10 days of your appointment day regarding the results of your test.  If you are on MyChart you will be notified as soon as the provider has reviewed the results and signed off on them.    [facility] Care Center: [phone]               [facility]         Internal Medicine Resident                   Continuity Clinic    Who We Are    Resident Continuity Clinic is a part of the [facility] Primary Care Clinic.  Resident physicians see patients independently and establish a relationship with them over the course of their three-year residency program.  As with the Primary Care Clinic, our Resident Continuity Clinic models a group practice.  If your doctor is not available, you will be able to see another resident physician.  At the end of a resident s training, patients will be transitioned to a new resident physician for ongoing care.     We treat patients with a wide array of medical needs from routine physicals, to acute illnesses, to diabetes and blood pressure management, to complex medical illness.  What is a Resident Physician?    Resident physicians hold medical degrees and are doctors. They are training to become specialists in Internal Medicine. They work under the supervision of board-certified faculty physicians.  Expectations for Your Care    We strive to provide accessible, quality care at all times.    In order to provide this care, it is best to see your primary care resident doctor consistently rather switching between providers.  In the event you do see another physician, you should  schedule a follow-up visit with your usual primary care doctor.    If you are transitioning your care from another clinic, it is helpful to have your records available for your doctor to review.    We do not prescribe controlled substances, such as ADD medications or narcotic pain medications, on your first visit.  We will review your health records and concerns prior to devising a treatment plan with you in order to provide the best care.      Clinic Services     Extended clinic hours; patient  to help navigate your visit;  parking; laboratory and imaging services with evening and weekend hours    Multiple medical and surgical specialties in one building    Complementary services, including Nutrition, Integrative Medicine, Pharmacy consultations, Mental and Behavioral Health, Sports Medicine and Physical Therapy    Thank You    We would like to thank you for choosing the [facility] Internal Medicine Resident Continuity Clinic for your primary care. You are making a priceless contribution to the training of the next generation of health care practitioners.     Contact us at [phone] for appointments or questions.    Resident Clinic Hours are Tuesdays and Thursdays, 7:30am-5:00pm    Residents   Levi Longo MD   (Male)   Dorothy Markham MD  (Female)  Sonia Saleem MD   (Female)   Ly Wetzel MD   (Female)   Dimas Bunch MD  (Male)   Joe Segundo MD  (Male)   Rod Ruiz MD    (Female)   Armin Desouza MD  (Male)   Rah Macdonald MD  (Male)    Ammy Johnson MD  (Female)   Virgil Moore MD  (Male)   Sunny Dodd MD  (Female)   Eber Sheppard MD    (Female)   Bob Dahl MD  (Male)   Antione Calle MD  (Male)   Joe Colindres MD (Male)   Yrn Beaulieu MD  (Male)   Viviane Caraballo MD (Female)    Sandra Irvin MD (Female)   Michelle Kaye MD  (Male)   Lyubov Hernandez MD    (Female)   Viki Mancuso MD  (Female)    Supervising Physicians   Muriel  MD Sarah Garg MD Briar Duffy, MD James Langland, MD Mary Logeais, MD Tanya Melnik, MD Charles Moldow, MD Heather Thompson Buum, MD Kathleen Watson, MD          - Our  will call you to address further needs for disability documentation  - Your diagnoses are small fiber neuropathy and subglottic stenosis  - Keep your upcoming appointment with Dr Chang   ambulatory

## 2020-09-15 ENCOUNTER — PATIENT OUTREACH (OUTPATIENT)
Dept: CARE COORDINATION | Facility: CLINIC | Age: 50
End: 2020-09-15

## 2020-09-15 NOTE — PROGRESS NOTES
Social Work Intervention  [facility] [place]    Data/Intervention:    Patient Name:  Chavez Ca  /Age:  1970 (49 year old)    Visit Type: telephone  Referral Source: [facility]  Reason for Referral:  Insurance/financial issues    Collaborated With:    -Patient  -Pharmacy billing  -Clinic financial counselor    Patient Concerns/Issues:   Patient reported he has [employer] (Medicare) and Medical Assistance for insurance, but has been receiving bills and doesn't know why.    Intervention/Education/Resources Provided:   checked with a financial counselor here in the clinic and it seems that Patient has no outstanding medical bills.    also called the pharmacy billing office. They indicated that Patient does  have an outstanding balance with them. They said that [employer] plan is the primary insurance, which means Medical Assistance will defer to them when paying for prescriptions. Most of the prescriptions will have a $1.30 copay, however, one prescription (Phentermine) was not covered by any  insurances, so the pharmacy used a patient assistance card which left Patient with $15.38 to pay.    Assessment/Plan:  Patient asked that  communicate with him via [employer], so above information was sent to Patient.  asked that Patient check with his bills to make sure this makes sense and to contact  with any further concerns.    Provided patient/family with contact information and availability.    Marly Martinez [facility]  Outpatient Specialty Clinics  Direct Phone: [phone]  Pager:  [phone]

## 2020-09-26 DIAGNOSIS — Z11.59 ENCOUNTER FOR SCREENING FOR OTHER VIRAL DISEASES: ICD-10-CM

## 2020-09-27 LAB
SARS-COV-2 RNA SPEC QL NAA+PROBE: NOT DETECTED
SPECIMEN SOURCE: NORMAL

## 2020-09-30 ENCOUNTER — ANESTHESIA (OUTPATIENT)
Dept: CALL CENTER | Age: 50
End: 2020-09-30

## 2020-09-30 ENCOUNTER — ANESTHESIA EVENT (OUTPATIENT)
Dept: CALL CENTER | Age: 50
End: 2020-09-30

## 2020-09-30 RX ORDER — LIDOCAINE 40 MG/G
CREAM TOPICAL
Status: CANCELLED | OUTPATIENT
Start: 2020-09-30

## 2020-09-30 RX ORDER — ONDANSETRON 2 MG/ML
4 INJECTION INTRAMUSCULAR; INTRAVENOUS
Status: CANCELLED | OUTPATIENT
Start: 2020-09-30

## 2020-10-02 DIAGNOSIS — Z11.59 ENCOUNTER FOR SCREENING FOR OTHER VIRAL DISEASES: Primary | ICD-10-CM

## 2020-10-05 ENCOUNTER — OFFICE VISIT (OUTPATIENT)
Dept: OTOLARYNGOLOGY | Facility: CLINIC | Age: 50
End: 2020-10-05
Attending: INTERNAL MEDICINE
Payer: COMMERCIAL

## 2020-10-05 VITALS — HEART RATE: 121 BPM | OXYGEN SATURATION: 98 % | BODY MASS INDEX: 52.16 KG/M2 | WEIGHT: 315 LBS

## 2020-10-05 DIAGNOSIS — K02.9 DENTAL DECAY: Primary | ICD-10-CM

## 2020-10-05 PROCEDURE — 99213 OFFICE O/P EST LOW 20 MIN: CPT | Performed by: OTOLARYNGOLOGY

## 2020-10-05 ASSESSMENT — PAIN SCALES - GENERAL: PAINLEVEL: NO PAIN (0)

## 2020-10-05 NOTE — PROGRESS NOTES
HISTORY OF PRESENT ILLNESS:  Mr. Ca was seen here again today.  It has been a couple years since I last saw him.  He is here with a number of concerns, primarily his concern is that his sleep apnea continues to be an issue.  He did not get an oral appliance made.  He has noted that he feels his untreated sleep apnea is contributing to some of his symptoms of fatigue, which has contributed to his weight gain.  He also reports that he has continued to have issues with his teeth.  He has chronic dry mouth and has had many problems with dental decay.  He has substantial pain associated with his remaining teeth, especially sensitivity to cold.      PHYSICAL EXAMINATION:  A 50-year-old gentleman in no acute distress.  Normal ability to communicate.  Alert and interactive.  Breathing without any difficulty or stridor.  Eyes are anicteric.  Examination of the mouth shows numerous teeth in an assortment of states of decay.  Oropharynx shows crowded oropharyngeal tissues, large tongue.  Palpation of the neck shows no masses, tenderness or lymphadenopathy.  The patient does have a previous tracheotomy scar with area of significant indentation.  Anterior nasal exam is unremarkable.  Examination of the ears shows normal external auditory canals and tympanic membranes.      ASSESSMENT:  A 50-year-old with untreated sleep apnea.  I do think that his best treatment option is an oral appliance, but he currently has dentition in such a condition that it would not support an oral appliance.        PLAN:  I have recommended an appointment with Dentistry for consideration of numerous tooth extractions.  Otherwise, after that, we could consider renewed effort to get an oral appliance in place.  I will see him back in three months.

## 2020-10-05 NOTE — PATIENT INSTRUCTIONS
You were seen in the ENT clinic today with Dr. Glasgow    Recommendations for you:    -Dental Referral      We would like you to follow up in 3 months       Please call our clinic for any questions, concerns, and/or worsening symptoms.      Clinic #[phone]       Option 1 for scheduling.    Thank you for allowing us to be apart of your care!    Alannah QUISPE RNCC    If you need to reach me my direct line is: [phone]

## 2020-10-05 NOTE — LETTER
10/5/2020       RE: Chavez Ca  [address]  [place] [address]     Dear Colleague,    Thank you for referring your patient, Chavez Ca, to the [facility] [place] at [facility]. Please see a copy of my visit note below.    HISTORY OF PRESENT ILLNESS:  Mr. Ca was seen here again today.  It has been a couple years since I last saw him.  He is here with a number of concerns, primarily his concern is that his sleep apnea continues to be an issue.  He did not get an oral appliance made.  He has noted that he feels his untreated sleep apnea is contributing to some of his symptoms of fatigue, which has contributed to his weight gain.  He also reports that he has continued to have issues with his teeth.  He has chronic dry mouth and has had many problems with dental decay.  He has substantial pain associated with his remaining teeth, especially sensitivity to cold.      PHYSICAL EXAMINATION:  A 50-year-old gentleman in no acute distress.  Normal ability to communicate.  Alert and interactive.  Breathing without any difficulty or stridor.  Eyes are anicteric.  Examination of the mouth shows numerous teeth in an assortment of states of decay.  Oropharynx shows crowded oropharyngeal tissues, large tongue.  Palpation of the neck shows no masses, tenderness or lymphadenopathy.  The patient does have a previous tracheotomy scar with area of significant indentation.  Anterior nasal exam is unremarkable.  Examination of the ears shows normal external auditory canals and tympanic membranes.      ASSESSMENT:  A 50-year-old with untreated sleep apnea.  I do think that his best treatment option is an oral appliance, but he currently has dentition in such a condition that it would not support an oral appliance.        PLAN:  I have recommended an appointment with Dentistry for consideration of numerous tooth extractions.  Otherwise,  after that, we could consider renewed effort to get an oral appliance in place.  I will see him back in three months.           Again, thank you for allowing me to participate in the care of your patient.      Sincerely,    Zafar Glasgow MD

## 2020-10-05 NOTE — NURSING NOTE
Chief Complaint   Patient presents with     RECHECK     trach follow up         Pulse 121, weight (!) 151 kg (333 lb), SpO2 98 %.    Kamilla Pulliam EMT

## 2020-10-12 DIAGNOSIS — Z11.8 SPECIAL SCREENING EXAMINATION FOR CHLAMYDIAL DISEASE: Primary | ICD-10-CM

## 2020-10-12 RX ORDER — BISACODYL 5 MG/1
10 TABLET, DELAYED RELEASE ORAL DAILY
Qty: 4 TABLET | Refills: 0 | Status: SHIPPED | OUTPATIENT
Start: 2020-10-12 | End: 2020-10-14

## 2020-10-12 NOTE — NURSING NOTE
eRx Dulcolax et Golytely: [facility] - [place], [place] - [phone]     Eliza Hollis RN   [facility]

## 2020-10-14 DIAGNOSIS — Z11.59 ENCOUNTER FOR SCREENING FOR OTHER VIRAL DISEASES: ICD-10-CM

## 2020-10-14 PROCEDURE — U0003 INFECTIOUS AGENT DETECTION BY NUCLEIC ACID (DNA OR RNA); SEVERE ACUTE RESPIRATORY SYNDROME CORONAVIRUS 2 (SARS-COV-2) (CORONAVIRUS DISEASE [COVID-19]), AMPLIFIED PROBE TECHNIQUE, MAKING USE OF HIGH THROUGHPUT TECHNOLOGIES AS DESCRIBED BY CMS-2020-01-R: HCPCS | Performed by: INTERNAL MEDICINE

## 2020-10-15 ENCOUNTER — OFFICE VISIT (OUTPATIENT)
Dept: FAMILY MEDICINE | Facility: CLINIC | Age: 50
End: 2020-10-15
Payer: COMMERCIAL

## 2020-10-15 VITALS
RESPIRATION RATE: 16 BRPM | WEIGHT: 315 LBS | TEMPERATURE: 98 F | HEART RATE: 93 BPM | SYSTOLIC BLOOD PRESSURE: 133 MMHG | HEIGHT: 66 IN | OXYGEN SATURATION: 98 % | BODY MASS INDEX: 50.62 KG/M2 | DIASTOLIC BLOOD PRESSURE: 73 MMHG

## 2020-10-15 DIAGNOSIS — Z01.818 PRE-OP EXAM: Primary | ICD-10-CM

## 2020-10-15 LAB
ALBUMIN SERPL-MCNC: 3.7 G/DL (ref 3.4–5)
ALP SERPL-CCNC: 108 U/L (ref 40–150)
ALT SERPL W P-5'-P-CCNC: 53 U/L (ref 0–70)
ANION GAP SERPL CALCULATED.3IONS-SCNC: 5 MMOL/L (ref 3–14)
AST SERPL W P-5'-P-CCNC: 31 U/L (ref 0–45)
BASOPHILS # BLD AUTO: 0.1 10E9/L (ref 0–0.2)
BASOPHILS NFR BLD AUTO: 1.1 %
BILIRUB SERPL-MCNC: 0.3 MG/DL (ref 0.2–1.3)
BUN SERPL-MCNC: 11 MG/DL (ref 7–30)
CALCIUM SERPL-MCNC: 9.3 MG/DL (ref 8.5–10.1)
CHLORIDE SERPL-SCNC: 110 MMOL/L (ref 94–109)
CO2 SERPL-SCNC: 23 MMOL/L (ref 20–32)
CREAT SERPL-MCNC: 0.88 MG/DL (ref 0.66–1.25)
DIFFERENTIAL METHOD BLD: NORMAL
EOSINOPHIL # BLD AUTO: 0.2 10E9/L (ref 0–0.7)
EOSINOPHIL NFR BLD AUTO: 3.6 %
ERYTHROCYTE [DISTWIDTH] IN BLOOD BY AUTOMATED COUNT: 14.1 % (ref 10–15)
GFR SERPL CREATININE-BSD FRML MDRD: >90 ML/MIN/{1.73_M2}
GLUCOSE SERPL-MCNC: 100 MG/DL (ref 70–99)
HCT VFR BLD AUTO: 47.2 % (ref 40–53)
HGB BLD-MCNC: 14.9 G/DL (ref 13.3–17.7)
IMM GRANULOCYTES # BLD: 0 10E9/L (ref 0–0.4)
IMM GRANULOCYTES NFR BLD: 0.3 %
LABORATORY COMMENT REPORT: NORMAL
LYMPHOCYTES # BLD AUTO: 1.8 10E9/L (ref 0.8–5.3)
LYMPHOCYTES NFR BLD AUTO: 28 %
MCH RBC QN AUTO: 29.2 PG (ref 26.5–33)
MCHC RBC AUTO-ENTMCNC: 31.6 G/DL (ref 31.5–36.5)
MCV RBC AUTO: 93 FL (ref 78–100)
MONOCYTES # BLD AUTO: 0.6 10E9/L (ref 0–1.3)
MONOCYTES NFR BLD AUTO: 9.3 %
NEUTROPHILS # BLD AUTO: 3.7 10E9/L (ref 1.6–8.3)
NEUTROPHILS NFR BLD AUTO: 57.7 %
NRBC # BLD AUTO: 0 10*3/UL
NRBC BLD AUTO-RTO: 0 /100
PLATELET # BLD AUTO: 285 10E9/L (ref 150–450)
POTASSIUM SERPL-SCNC: 4 MMOL/L (ref 3.4–5.3)
PROT SERPL-MCNC: 7.6 G/DL (ref 6.8–8.8)
RBC # BLD AUTO: 5.1 10E12/L (ref 4.4–5.9)
SARS-COV-2 RNA SPEC QL NAA+PROBE: NEGATIVE
SARS-COV-2 RNA SPEC QL NAA+PROBE: NORMAL
SODIUM SERPL-SCNC: 138 MMOL/L (ref 133–144)
SPECIMEN SOURCE: NORMAL
SPECIMEN SOURCE: NORMAL
WBC # BLD AUTO: 6.4 10E9/L (ref 4–11)

## 2020-10-15 ASSESSMENT — PATIENT HEALTH QUESTIONNAIRE - PHQ9
SUM OF ALL RESPONSES TO PHQ QUESTIONS 1-9: 22
5. POOR APPETITE OR OVEREATING: NEARLY EVERY DAY

## 2020-10-15 ASSESSMENT — ANXIETY QUESTIONNAIRES
6. BECOMING EASILY ANNOYED OR IRRITABLE: NEARLY EVERY DAY
GAD7 TOTAL SCORE: 17
IF YOU CHECKED OFF ANY PROBLEMS ON THIS QUESTIONNAIRE, HOW DIFFICULT HAVE THESE PROBLEMS MADE IT FOR YOU TO DO YOUR WORK, TAKE CARE OF THINGS AT HOME, OR GET ALONG WITH OTHER PEOPLE: VERY DIFFICULT
1. FEELING NERVOUS, ANXIOUS, OR ON EDGE: MORE THAN HALF THE DAYS
5. BEING SO RESTLESS THAT IT IS HARD TO SIT STILL: NEARLY EVERY DAY
2. NOT BEING ABLE TO STOP OR CONTROL WORRYING: MORE THAN HALF THE DAYS
7. FEELING AFRAID AS IF SOMETHING AWFUL MIGHT HAPPEN: MORE THAN HALF THE DAYS
3. WORRYING TOO MUCH ABOUT DIFFERENT THINGS: MORE THAN HALF THE DAYS

## 2020-10-15 ASSESSMENT — MIFFLIN-ST. JEOR: SCORE: 2373.55

## 2020-10-15 NOTE — PROGRESS NOTES
[facility]  [address] [place] [id number]  Phone: [phone]  Fax: [phone]  Primary Provider: Sonia Saleem  Pre-op Performing Provider: DELGADO HARRIS    PREOPERATIVE EVALUATION:  Today's date: 10/15/2020    Chavez Ca is a 50 year old male who presents for a preoperative evaluation.    Surgical Information:  Surgery/Procedure: Colonoscopy  Surgery Location: [address] [place] [address]  Surgeon: Dr. Greenberg  Surgery Date: 10/16/2020  Time of Surgery: 10:00 am  Where patient plans to recover: At home with Home Care  Fax number for surgical facility: Note does not need to be faxed, will be available electronically in Epic.    Type of Anesthesia Anticipated: to be determined    Subjective     HPI related to upcoming procedure: Chavez had [place] [facility] to high BMI and history of throat surgery they canceled (when he was there for the procedure) and rescheduled at [facility].  It was also recommended that he have a pre-op exam for this colonoscopy.  This is a screening colonoscopy, he has not had symptoms of concern.  He believes the reason to get a pre-op for this procedure is due to his obesity and his difficulty lying flat.    Chavez's primary care provider is Dr. Zarate, his PMH is extensive and well-documented in the chart.  He denies chest pain.  He does have difficulty breathing when he is lying flat, he also has obstructive sleep apnea, he uses a CPAP machine, he is currently waiting to replace something on that.        Preop Questions 10/14/2020   1. Have you ever had a heart attack or stroke? No   2. Have you ever had surgery on your heart or blood vessels, such as a stent placement, a coronary artery bypass, or surgery on an artery in your head, neck, heart, or legs? No   3. Do you have chest pain with activity? No   4. Do you have a history of  heart failure? No   5. Do you currently have a cold, bronchitis or symptoms of other infection? No    6. Do you have a cough, shortness of breath, or wheezing? YES - no   7. Do you or anyone in your family have previous history of blood clots? UNKNOWN - no   8. Do you or does anyone in your family have a serious bleeding problem such as prolonged bleeding following surgeries or cuts? UNKNOWN - no   9. Have you ever had problems with anemia or been told to take iron pills? No   10. Have you had any abnormal blood loss such as black, tarry or bloody stools? No   11. Have you ever had a blood transfusion? No   12. Are you willing to have a blood transfusion if it is medically needed before, during, or after your surgery? Yes   13. Have you or any of your relatives ever had problems with anesthesia? UNKNOWN - no   14. Do you have sleep apnea, excessive snoring or daytime drowsiness? YES -    14a. Do you have a CPAP machine? Yes   15. Do you have any artifical heart valves or other implanted medical devices like a pacemaker, defibrillator, or continuous glucose monitor? No   16. Do you have artificial joints? No   17. Are you allergic to latex? No     Health Care Directive:  Patient has a Health Care Directive on file      Preoperative Review of :   reviewed - controlled substances reflected in medication list.  956}    Status of Chronic Conditions:  See problem list for active medical problems.  Problems all longstanding and stable, except as noted/documented.  See ROS for pertinent symptoms related to these conditions.      Review of Systems  CONSTITUTIONAL: NEGATIVE for fever, chills, change in weight  INTEGUMENTARY/SKIN: NEGATIVE for worrisome rashes, moles or lesions  EYES: NEGATIVE for vision changes or irritation  ENT/MOUTH: NEGATIVE for ear, mouth and throat problems  RESP:has shortness of breath if lying down.  VERN- uses CPAP  BREAST: NEGATIVE for masses, tenderness or discharge  CV: NEGATIVE for chest pain, palpitations or peripheral edema  GI: NEGATIVE for nausea, abdominal pain, heartburn, or change  in bowel habits  : NEGATIVE for frequency, dysuria, or hematuria  MUSCULOSKELETAL: NEGATIVE for significant arthralgias or myalgia  NEURO: NEGATIVE for weakness, dizziness or paresthesias  ENDOCRINE: NEGATIVE for temperature intolerance, skin/hair changes  HEME: NEGATIVE for bleeding problems  PSYCHIATRIC: NEGATIVE for changes in mood or affect    Patient Active Problem List    Diagnosis Date Noted     Shoulder pain, left 07/22/2016     Priority: Medium     Neck pain 07/22/2016     Priority: Medium     Neck pain, chronic 07/22/2016     Priority: Medium     Pain in both wrists 06/28/2016     Priority: Medium     EMG 6/4/2015 diagnosed bl carpal tunnel.  Saw Dr. Bethea who rec'd trial of splinting and possible carpal tunnel release       Varicose veins of left lower extremity 06/22/2016     Priority: Medium     12/29/15 - Left GSV ablation with varicose vein microphlebectomy       Closed nondisplaced fracture of scaphoid of right wrist, unspecified portion of scaphoid, sequela 06/22/2016     Priority: Medium     Radicular pain of right lower back 01/11/2016     Priority: Medium     IMO Regulatory Load OCT 2020       Hyperlipidemia 11/18/2014     Priority: Medium     Problem list name updated by automated process. Provider to review       Subglottic stenosis 06/27/2014     Priority: Medium     Complication of trach, placed 1/2012       Psychological factors associated with another disorder 08/21/2012     Priority: Medium     Problem list name updated by automated process. Provider to review       Depressive disorder, not elsewhere classified 07/30/2012     Priority: Medium     Idiopathic progressive polyneuropathy 07/01/2012     Priority: Medium     Class: Chronic     Small fiber neuropathy - see report of EDNF density 11/5/2012.       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  With varicosities.  Followed with lymphedema clinic and Dr. Davies       VERN (obstructive sleep apnea) 04/19/2011      Priority: Medium     Referral for dental device, has not done this yet    Overview:   [facility] 12/2010  Severe VERN    Oxygen Sly 51%  CPAP [id number]       Obesity, unspecified 04/19/2011     Priority: Medium     Followed by Dr. Chang since 2012.  On topiramate/phentermine       Seasonal allergies 04/19/2011     Priority: Medium      Past Medical History:   Diagnosis Date     Allergic rhinitis      Benign positional vertigo 2011    never quite went away     Bilateral carpal tunnel syndrome 7/18/2015     Chest pain      Chronic sinusitis 2017    I don't know Please look     Chronic tonsillitis      Coronary artery disease 3/8/16    right side chest pain     Depressive disorder      Depressive disorder, not elsewhere classified 7/30/2012     Gastro-oesophageal reflux disease      Hearing problem      Hypertension 2008     Idiopathic progressive polyneuropathy 10/15/2012     Learning disability      Neck mass     parapharyngeal, benign     Obesity, Class III, BMI 40-49.9 (morbid obesity) (H)      Obstructive sleep apnea      Psychological factors associated with another disorder 8/21/2012     Recurrent otitis media 2016    might be what the pain in my ears     Reduced vision      Shortness of breath      Tinnitus 2016    I would hear a steady light buzzing sound and other can't     Trigeminal neuralgia      Weakness 8/26/2011     Past Surgical History:   Procedure Laterality Date     BIOPSY  2011    To figure out what kind of tumor I had     ENDOSCOPIC RETROGRADE CHOLANGIOPANCREATOGRAM N/A 8/27/2015    Procedure: COMBINED ENDOSCOPIC RETROGRADE CHOLANGIOPANCREATOGRAPHY, PLACE TUBE/STENT;  Surgeon: Baldomero Zacarias MD;  Location: [facility] OR     ENDOSCOPIC RETROGRADE CHOLANGIOPANCREATOGRAM N/A 11/6/2015    Procedure: COMBINED ENDOSCOPIC RETROGRADE CHOLANGIOPANCREATOGRAPHY, REMOVE FOREIGN BODY OR STENT/TUBE;  Surgeon: Baldomero Zacarias MD;  Location:  OR     EXCISE LESION INTRAORAL  6/29/2011     Procedure:EXCISE LESION INTRAORAL; TRANSCERVICAL APPROACH TO LEFT PHARYNGEAL SPACE MASS REMOVAL ; Surgeon:BARBARA PHILLIPS; Location: OR      VASCULAR SURGERY PROCEDURE UNLIST  12/29/15     LAPAROSCOPIC CHOLECYSTECTOMY N/A 8/23/2015    Procedure: LAPAROSCOPIC CHOLECYSTECTOMY;  Surgeon: Kvng Witt MD;  Location: [facility] OR     LARYNGOSCOPY WITH BIOPSY(IES)  6/18/2014    Procedure: LARYNGOSCOPY WITH BIOPSY(IES);  Surgeon: Barbara Phillips MD;  Location: [facility] OR     LASER CO2 LARYNGOSCOPY  12/7/2011    Procedure:LASER CO2 LARYNGOSCOPY; Micro-Direct Laryngoscopy with CO2 Laser Standby / Excision Tracheal Grandulization, Trach Tube Change / Kenalog application. / Balloon Dilation of Subglottic Stenosis.*Latex Safe Room* Trach Tube = Shiley 6.4mm I.D. / 10.8MM O.D. / 76MM  Cuffless.; Surgeon:BARBARA PHILLIPS; [facility]     ORTHOPEDIC SURGERY  5/2016    Broken Right hand & Fracture Right shoulder     TRACHEOSTOMY  4/22/2011    Procedure:TRACHEOSTOMY; possible awake; Surgeon:BARBARA PHILLIPS; Location:[facility] OR     TRACHEOSTOMY  6/29/2011    Procedure:TRACHEOSTOMY; REMOVE AND REPLACE SHILEY 6 XLT; Surgeon:BARBARA PHILLIPS; Location: OR     VASCULAR SURGERY  2008-[facility]     Current Outpatient Medications   Medication Sig Dispense Refill     amitriptyline (ELAVIL) 50 MG tablet Take 2 tablets (100 mg) by mouth At Bedtime Call clinic to schedule follow up appointment. [phone] 180 tablet 1     COMPRESSION STOCKINGS 20-30 mmHg knee high compression stockings.  Measure and fit.  Style and brand per patient preference. 2 each 0     diclofenac (VOLTAREN) 1 % topical gel Place 2 g onto the skin 3 times daily as needed for moderate pain 1 Tube 1     ketoconazole (NIZORAL) 2 % external shampoo Apply to the affected area and wash off after 5 minutes. 120 mL 1     order for DME Equipment being ordered: compression stockings    Needs to be faxed to 1 Device 0     order  for DME Compression stockings pair, patient preference    To be used 8 hours per day 1 Device 3     order for DME Equipment being ordered: compression stockings 2 each 0     phentermine (ADIPEX-P) 37.5 MG tablet Take 1 tablet (37.5 mg) by mouth every morning 30 tablet 5     potassium chloride (KLOR-CON) 20 MEQ packet Take 20 mEq by mouth daily 7 packet 0     topiramate (TOPAMAX) 100 MG tablet Take 2 tablets (200 mg) by mouth 2 times daily 120 tablet 11     traZODone (DESYREL) 100 MG tablet Take 1 tablet (100 mg) by mouth At Bedtime Call clinic to schedule follow up appointment. [phone] 90 tablet 1     zolpidem (AMBIEN) 10 MG tablet Take 1 tablet (10 mg) by mouth nightly as needed for sleep 30 tablet 0     albuterol (PROAIR HFA/PROVENTIL HFA/VENTOLIN HFA) 108 (90 Base) MCG/ACT inhaler Inhale 2 puffs into the lungs every 6 hours as needed for shortness of breath / dyspnea or wheezing (Patient not taking: Reported on 10/15/2020) 1 Inhaler 3     CETAPHIL (CETAPHIL) lotion Apply topically 2 times daily       cetirizine (ZYRTEC) 10 MG tablet Take 1 tablet (10 mg) by mouth daily as needed for allergies (Patient not taking: Reported on 10/15/2020) 90 tablet 3     furosemide (LASIX) 20 MG tablet Take 3 tablets (60 mg) by mouth 2 times daily (Patient not taking: Reported on 10/15/2020) 540 tablet 3     metroNIDAZOLE (METROGEL) 0.75 % external gel Apply topically 2 times daily (Patient not taking: Reported on 10/15/2020) 45 g 0       Allergies   Allergen Reactions     Fentanyl Itching     Patch     Meloxicam Other (See Comments)     Side pains.     Minocycline Rash     Patient reports adverse reaction was pigmentation which has resolved with drug discontinuation.        Social History     Tobacco Use     Smoking status: Never Smoker     Smokeless tobacco: Never Used     Tobacco comment: Never started   Substance Use Topics     Alcohol use: No     Alcohol/week: 0.0 standard drinks     Comment: rarely       History   Drug  "Use No         Objective     /73   Pulse 93   Temp 98  F (36.7  C) (Oral)   Resp 16   Ht 1.687 m (5' 6.4\")   Wt (!) 156.9 kg (346 lb)   SpO2 98%   BMI 55.18 kg/m      Physical Exam    GENERAL APPEARANCE: healthy, alert and no distress     EYES: EOMI,  PERRL     HENT: ear canals and TM's normal and nose and mouth without ulcers or lesions     NECK: no adenopathy, no asymmetry, masses, or scars and thyroid normal to palpation     RESP: lungs clear to auscultation - no rales, rhonchi or wheezes     CV: regular rates and rhythm, normal S1 S2, no S3 or S4 and no murmur, click or rub     ABDOMEN:  soft, nontender, no HSM or masses and bowel sounds normal     MS: extremities normal- no gross deformities noted, no evidence of inflammation in joints, FROM in all extremities.     SKIN: no suspicious lesions or rashes     NEURO: Normal strength and tone, sensory exam grossly normal, mentation intact and speech normal     PSYCH: mentation appears normal. and affect normal/bright     LYMPHATICS: No cervical adenopathy    Recent Labs   Lab Test 09/04/20  1037 04/07/20  1044   HGB 14.4 14.7    243    135   POTASSIUM 2.9* 4.2   CR 1.14 0.92   A1C 5.8* 5.9*        Diagnostics:  Labs pending at this time.  Results will be reviewed when available.   EKG done due to history of VERN    Revised Cardiac Risk Index (RCRI):  The patient has the following serious cardiovascular risks for perioperative complications:   - No serious cardiac risks = 0 points     RCRI Interpretation: 1 point: Class II (low risk - 0.9% complication rate)  }     Assessment & Plan   The proposed surgical procedure is considered LOW risk.    I believe this patient was seen for a pre-procedure exam due to shortness of breath lying down, history of throat surgery, VERN, morbid obesity       1. Pre-op exam    - CBC with platelets differential  - Comprehensive metabolic panel Potassium 2.9 on 09/04/20.  - EKG 12-lead complete w/read - " Clinics    Risks and Recommendations:  The patient has the following additional risks and recommendations for perioperative complications:   - Morbid obesity (BMI >40)    RECOMMENDATION:  APPROVAL GIVEN to proceed with proposed procedure, without further diagnostic evaluation.    Of note- this gentleman has food insecurity due to his living situation.  He will need social service follow up at some time, ordered and followed through his PCP.    Signed Electronically by: Jessica French NP    Copy of this evaluation report is provided to requesting physician.    [facility] Preop Guidelines    Revised Cardiac Risk Index

## 2020-10-15 NOTE — NURSING NOTE
"50 year old  Chief Complaint   Patient presents with     Pre-Op Exam     Patient presents to the clinic today for a pre op prior to colonoscopy 10/16/2020       Blood pressure 133/73, pulse 93, temperature 98  F (36.7  C), temperature source Oral, resp. rate 16, height 1.687 m (5' 6.4\"), weight (!) 156.9 kg (346 lb), SpO2 98 %. Body mass index is 55.18 kg/m .  BP completed using cuff size:    Patient Active Problem List   Diagnosis     VERN (obstructive sleep apnea)     Obesity, unspecified     Idiopathic edema     Depressive disorder, not elsewhere classified     Psychological factors associated with another disorder     Subglottic stenosis     Hyperlipidemia     Idiopathic progressive polyneuropathy     Varicose veins of left lower extremity     Closed nondisplaced fracture of scaphoid of right wrist, unspecified portion of scaphoid, sequela     Pain in both wrists     Shoulder pain, left     Neck pain     Neck pain, chronic     Seasonal allergies     Radicular pain of right lower back       Wt Readings from Last 2 Encounters:   10/15/20 (!) 156.9 kg (346 lb)   10/05/20 (!) 151 kg (333 lb)     BP Readings from Last 3 Encounters:   10/15/20 133/73   09/04/20 119/83   07/23/20 128/84       Allergies   Allergen Reactions     Fentanyl Itching     Patch     Meloxicam Other (See Comments)     Side pains.     Minocycline Rash     Patient reports adverse reaction was pigmentation which has resolved with drug discontinuation.       Current Outpatient Medications   Medication     amitriptyline (ELAVIL) 50 MG tablet     COMPRESSION STOCKINGS     diclofenac (VOLTAREN) 1 % topical gel     ketoconazole (NIZORAL) 2 % external shampoo     order for DME     order for DME     order for DME     phentermine (ADIPEX-P) 37.5 MG tablet     potassium chloride (KLOR-CON) 20 MEQ packet     topiramate (TOPAMAX) 100 MG tablet     traZODone (DESYREL) 100 MG tablet     zolpidem (AMBIEN) 10 MG tablet     albuterol (PROAIR HFA/PROVENTIL " HFA/VENTOLIN HFA) 108 (90 Base) MCG/ACT inhaler     CETAPHIL (CETAPHIL) lotion     cetirizine (ZYRTEC) 10 MG tablet     furosemide (LASIX) 20 MG tablet     metroNIDAZOLE (METROGEL) 0.75 % external gel     Current Facility-Administered Medications   Medication     lidocaine 1% with EPINEPHrine 1:100,000 injection 3 mL       Social History     Tobacco Use     Smoking status: Never Smoker     Smokeless tobacco: Never Used     Tobacco comment: Never started   Substance Use Topics     Alcohol use: No     Alcohol/week: 0.0 standard drinks     Comment: rarely     Drug use: No       Honoring Choices - Health Care Directive Guide offered to patient at time of visit.    Health Maintenance Due   Topic Date Due     URINE DRUG SCREEN  1970     DEPRESSION ACTION PLAN  1969     COLORECTAL CANCER SCREENING  10/03/1979     MEDICARE ANNUAL WELLNESS VISIT  10/03/1987     ZOSTER IMMUNIZATION (1 of 2) 10/03/2019     INFLUENZA VACCINE (1) 09/01/2020       Immunization History   Administered Date(s) Administered     Influenza (IIV3) PF 09/03/2011, 10/02/2012, 10/21/2014, 11/05/2015, 10/03/2016     Influenza Vaccine IM > 6 months Valent IIV4 11/08/2018     Pneumococcal 23 valent 08/10/2011     TDAP Vaccine (Boostrix) 11/05/2015       No results found for: PAP      Recent Labs   Lab Test 09/04/20  1037 04/07/20  1044 10/15/18  1709 08/07/18  1050 04/05/17  1407 04/05/17  1407   A1C 5.8* 5.9*  --  5.8*  --  5.6   *  --   --  135*  --  92   HDL 56  --   --  61  --  62   TRIG 128  --   --  133  --  88   ALT 29 55 52  --   --  27   CR 1.14 0.92 0.99 1.10   < > 0.99   GFRESTIMATED 74 >90 80 71   < > 81   GFRESTBLACK 86 >90 >90 86   < > >90   GFR Calc     ALBUMIN 3.9 3.7 3.7  --   --  3.6   POTASSIUM 2.9* 4.2 3.6 4.7   < > 2.8*   TSH 3.95  --   --  5.91*  --   --     < > = values in this interval not displayed.       PHQ-2 ( 1999 Pfizer) 10/15/2020 7/24/2015   Q1: Little interest or pleasure in doing things 3 1    Q2: Feeling down, depressed or hopeless 3 0   PHQ-2 Score 6 1       PHQ-9 SCORE 4/5/2017 1/29/2019 7/23/2020 10/15/2020   PHQ-9 Total Score - - - -   PHQ-9 Total Score MyChart - 16 (Moderately severe depression) - -   PHQ-9 Total Score 13 16 22 22       KAIN-7 SCORE 4/5/2017 10/15/2020   Total Score 16 17       No flowsheet data found.    EKG was done.      Padmaja Valdez CMA  October 15, 2020 8:22 AM

## 2020-10-16 ENCOUNTER — HOSPITAL ENCOUNTER (OUTPATIENT)
Facility: CLINIC | Age: 50
Discharge: HOME | End: 2020-10-16
Attending: INTERNAL MEDICINE | Admitting: INTERNAL MEDICINE
Payer: COMMERCIAL

## 2020-10-16 ENCOUNTER — ANESTHESIA EVENT (OUTPATIENT)
Dept: SURGERY | Facility: CLINIC | Age: 50
End: 2020-10-16
Payer: COMMERCIAL

## 2020-10-16 ENCOUNTER — ANESTHESIA (OUTPATIENT)
Dept: SURGERY | Facility: CLINIC | Age: 50
End: 2020-10-16
Payer: COMMERCIAL

## 2020-10-16 VITALS
HEART RATE: 85 BPM | DIASTOLIC BLOOD PRESSURE: 81 MMHG | BODY MASS INDEX: 49.44 KG/M2 | RESPIRATION RATE: 16 BRPM | SYSTOLIC BLOOD PRESSURE: 114 MMHG | TEMPERATURE: 98.2 F | OXYGEN SATURATION: 100 % | HEIGHT: 67 IN | WEIGHT: 315 LBS

## 2020-10-16 LAB
COLONOSCOPY: NORMAL
GLUCOSE BLDC GLUCOMTR-MCNC: 105 MG/DL (ref 70–99)

## 2020-10-16 PROCEDURE — 360N000022 HC SURGERY LEVEL 3 1ST 30 MIN - UMMC: Performed by: INTERNAL MEDICINE

## 2020-10-16 PROCEDURE — G0121 COLON CA SCRN NOT HI RSK IND: HCPCS | Mod: 53 | Performed by: INTERNAL MEDICINE

## 2020-10-16 PROCEDURE — 360N000023 HC SURGERY LEVEL 3 EA 15 ADDTL MIN UMMC: Performed by: INTERNAL MEDICINE

## 2020-10-16 PROCEDURE — 250N000009 HC RX 250: Performed by: INTERNAL MEDICINE

## 2020-10-16 PROCEDURE — 999N000139 HC STATISTIC PRE-PROCEDURE ASSESSMENT II: Performed by: INTERNAL MEDICINE

## 2020-10-16 PROCEDURE — 272N000001 HC OR GENERAL SUPPLY STERILE: Performed by: INTERNAL MEDICINE

## 2020-10-16 PROCEDURE — 999N001017 HC STATISTIC GLUCOSE BY METER IP

## 2020-10-16 PROCEDURE — 250N000009 HC RX 250: Performed by: STUDENT IN AN ORGANIZED HEALTH CARE EDUCATION/TRAINING PROGRAM

## 2020-10-16 PROCEDURE — 258N000003 HC RX IP 258 OP 636: Performed by: STUDENT IN AN ORGANIZED HEALTH CARE EDUCATION/TRAINING PROGRAM

## 2020-10-16 PROCEDURE — 370N000002 HC ANESTHESIA TECHNICAL FEE, EACH ADDTL 15 MIN: Performed by: INTERNAL MEDICINE

## 2020-10-16 PROCEDURE — 250N000011 HC RX IP 250 OP 636: Performed by: STUDENT IN AN ORGANIZED HEALTH CARE EDUCATION/TRAINING PROGRAM

## 2020-10-16 PROCEDURE — 761N000007 HC RECOVERY PHASE 2 EACH 15 MINS: Performed by: INTERNAL MEDICINE

## 2020-10-16 PROCEDURE — 258N000003 HC RX IP 258 OP 636: Performed by: INTERNAL MEDICINE

## 2020-10-16 PROCEDURE — 370N000001 HC ANESTHESIA TECHNICAL FEE, 1ST 30 MIN: Performed by: INTERNAL MEDICINE

## 2020-10-16 RX ORDER — PROCHLORPERAZINE MALEATE 10 MG
10 TABLET ORAL EVERY 6 HOURS PRN
Status: CANCELLED | OUTPATIENT
Start: 2020-10-16

## 2020-10-16 RX ORDER — SODIUM CHLORIDE, SODIUM LACTATE, POTASSIUM CHLORIDE, CALCIUM CHLORIDE 600; 310; 30; 20 MG/100ML; MG/100ML; MG/100ML; MG/100ML
INJECTION, SOLUTION INTRAVENOUS CONTINUOUS PRN
Status: DISCONTINUED | OUTPATIENT
Start: 2020-10-16 | End: 2020-10-16

## 2020-10-16 RX ORDER — FENTANYL CITRATE 50 UG/ML
INJECTION, SOLUTION INTRAMUSCULAR; INTRAVENOUS PRN
Status: DISCONTINUED | OUTPATIENT
Start: 2020-10-16 | End: 2020-10-16

## 2020-10-16 RX ORDER — LIDOCAINE 40 MG/G
CREAM TOPICAL
Status: DISCONTINUED | OUTPATIENT
Start: 2020-10-16 | End: 2020-10-16 | Stop reason: HOSPADM

## 2020-10-16 RX ORDER — ONDANSETRON 2 MG/ML
4 INJECTION INTRAMUSCULAR; INTRAVENOUS
Status: DISCONTINUED | OUTPATIENT
Start: 2020-10-16 | End: 2020-10-16 | Stop reason: HOSPADM

## 2020-10-16 RX ORDER — DEXMEDETOMIDINE HYDROCHLORIDE 4 UG/ML
0.2-1.2 INJECTION, SOLUTION INTRAVENOUS CONTINUOUS
Status: DISCONTINUED | OUTPATIENT
Start: 2020-10-16 | End: 2020-10-16 | Stop reason: HOSPADM

## 2020-10-16 RX ORDER — NALOXONE HYDROCHLORIDE 0.4 MG/ML
.1-.4 INJECTION, SOLUTION INTRAMUSCULAR; INTRAVENOUS; SUBCUTANEOUS
Status: CANCELLED | OUTPATIENT
Start: 2020-10-16 | End: 2020-10-17

## 2020-10-16 RX ORDER — FLUMAZENIL 0.1 MG/ML
0.2 INJECTION, SOLUTION INTRAVENOUS
Status: CANCELLED | OUTPATIENT
Start: 2020-10-16 | End: 2020-10-17

## 2020-10-16 RX ORDER — ONDANSETRON 2 MG/ML
4 INJECTION INTRAMUSCULAR; INTRAVENOUS EVERY 6 HOURS PRN
Status: CANCELLED | OUTPATIENT
Start: 2020-10-16

## 2020-10-16 RX ORDER — PROPOFOL 10 MG/ML
INJECTION, EMULSION INTRAVENOUS PRN
Status: DISCONTINUED | OUTPATIENT
Start: 2020-10-16 | End: 2020-10-16

## 2020-10-16 RX ORDER — PROPOFOL 10 MG/ML
INJECTION, EMULSION INTRAVENOUS CONTINUOUS PRN
Status: DISCONTINUED | OUTPATIENT
Start: 2020-10-16 | End: 2020-10-16

## 2020-10-16 RX ORDER — ONDANSETRON 4 MG/1
4 TABLET, ORALLY DISINTEGRATING ORAL EVERY 6 HOURS PRN
Status: CANCELLED | OUTPATIENT
Start: 2020-10-16

## 2020-10-16 RX ADMIN — PROPOFOL 30 MG: 10 INJECTION, EMULSION INTRAVENOUS at 12:10

## 2020-10-16 RX ADMIN — DEXMEDETOMIDINE 0.7 MCG/KG/HR: 100 INJECTION, SOLUTION, CONCENTRATE INTRAVENOUS at 11:56

## 2020-10-16 RX ADMIN — PROPOFOL 30 MG: 10 INJECTION, EMULSION INTRAVENOUS at 12:21

## 2020-10-16 RX ADMIN — DEXMEDETOMIDINE HYDROCHLORIDE 8 MCG: 100 INJECTION, SOLUTION INTRAVENOUS at 12:21

## 2020-10-16 RX ADMIN — MIDAZOLAM 2 MG: 1 INJECTION INTRAMUSCULAR; INTRAVENOUS at 11:48

## 2020-10-16 RX ADMIN — PROPOFOL 30 MG: 10 INJECTION, EMULSION INTRAVENOUS at 12:02

## 2020-10-16 RX ADMIN — PROPOFOL 30 MG: 10 INJECTION, EMULSION INTRAVENOUS at 12:07

## 2020-10-16 RX ADMIN — MIDAZOLAM 2 MG: 1 INJECTION INTRAMUSCULAR; INTRAVENOUS at 11:59

## 2020-10-16 RX ADMIN — PROPOFOL 50 MCG/KG/MIN: 10 INJECTION, EMULSION INTRAVENOUS at 11:59

## 2020-10-16 RX ADMIN — FENTANYL CITRATE 100 MCG: 50 INJECTION, SOLUTION INTRAMUSCULAR; INTRAVENOUS at 11:56

## 2020-10-16 RX ADMIN — SODIUM CHLORIDE, POTASSIUM CHLORIDE, SODIUM LACTATE AND CALCIUM CHLORIDE: 600; 310; 30; 20 INJECTION, SOLUTION INTRAVENOUS at 11:55

## 2020-10-16 ASSESSMENT — MIFFLIN-ST. JEOR: SCORE: 2350.63

## 2020-10-16 ASSESSMENT — ANXIETY QUESTIONNAIRES: GAD7 TOTAL SCORE: 17

## 2020-10-16 NOTE — OR NURSING
Reviewed discharge instructions over the telephone with pt and friend/ashleye Christ Jules.  All questions addressed.  Pt feels ready to go home.

## 2020-10-16 NOTE — ANESTHESIA PREPROCEDURE EVALUATION
Anesthesia Pre-Procedure Evaluation    Patient: Chavez Ca   MRN:     [id number] Gender:   male   Age:    50 year old :      1970        Preoperative Diagnosis: Special screening for malignant neoplasms, colon [Z12.11]   Procedure(s):  INCOMPLETE COLONOSCOPY     LABS:  CBC:   Lab Results   Component Value Date    WBC 6.4 10/15/2020    WBC 7.5 2020    HGB 14.9 10/15/2020    HGB 14.4 2020    HCT 47.2 10/15/2020    HCT 43.6 2020     10/15/2020     2020     BMP:   Lab Results   Component Value Date     10/15/2020     2020    POTASSIUM 4.0 10/15/2020    POTASSIUM 2.9 (L) 2020    CHLORIDE 110 (H) 10/15/2020    CHLORIDE 103 2020    CO2 23 10/15/2020    CO2 28 2020    BUN 11 10/15/2020    BUN 15 2020    CR 0.88 10/15/2020    CR 1.14 2020     (H) 10/15/2020     (H) 2020     COAGS:   Lab Results   Component Value Date    PTT 33 2011    INR 1.16 (H) 2015     POC:   Lab Results   Component Value Date     (H) 10/16/2020     OTHER:   Lab Results   Component Value Date    PH 7.40 2011    LACT 0.8 2015    A1C 5.8 (H) 2020    SUKHDEV 9.3 10/15/2020    PHOS 4.3 2011    MAG 2.2 2017    ALBUMIN 3.7 10/15/2020    PROTTOTAL 7.6 10/15/2020    ALT 53 10/15/2020    AST 31 10/15/2020    ALKPHOS 108 10/15/2020    BILITOTAL 0.3 10/15/2020    LIPASE 214 2015    AMYLASE 41 2015    TSH 3.95 2020    T4 0.83 2018    CRP 9.9 (H) 2016    SED 26 (H) 2016        Preop Vitals    BP Readings from Last 3 Encounters:   10/16/20 112/80   10/15/20 133/73   20 119/83    Pulse Readings from Last 3 Encounters:   10/16/20 84   10/15/20 93   10/05/20 121      Resp Readings from Last 3 Encounters:   10/16/20 16   10/15/20 16   19 18    SpO2 Readings from Last 3 Encounters:   10/16/20 100%   10/15/20 98%   10/05/20 98%      Temp Readings from Last 1  "Encounters:   10/16/20 36.9  C (98.4  F) (Oral)    Ht Readings from Last 1 Encounters:   10/16/20 1.702 m (5' 7\")      Wt Readings from Last 1 Encounters:   10/16/20 (!) 153.7 kg (338 lb 13.6 oz)    Estimated body mass index is 53.07 kg/m  as calculated from the following:    Height as of this encounter: 1.702 m (5' 7\").    Weight as of this encounter: 153.7 kg (338 lb 13.6 oz).     LDA:        Past Medical History:   Diagnosis Date     Allergic rhinitis      Benign positional vertigo 2011    never quite went away     Bilateral carpal tunnel syndrome 7/18/2015     Chest pain      Chronic sinusitis 2017    I don't know Please look     Chronic tonsillitis      Coronary artery disease 3/8/16    right side chest pain     Depressive disorder      Depressive disorder, not elsewhere classified 7/30/2012     Gastro-oesophageal reflux disease      Hearing problem      Hypertension 2008     Idiopathic progressive polyneuropathy 10/15/2012     Learning disability      Neck mass     parapharyngeal, benign     Obesity, Class III, BMI 40-49.9 (morbid obesity) (H)      Obstructive sleep apnea      Psychological factors associated with another disorder 8/21/2012     Recurrent otitis media 2016    might be what the pain in my ears     Reduced vision      Shortness of breath      Tinnitus 2016    I would hear a steady light buzzing sound and other can't     Trigeminal neuralgia      Weakness 8/26/2011      Past Surgical History:   Procedure Laterality Date     BIOPSY  2011    To figure out what kind of tumor I had     ENDOSCOPIC RETROGRADE CHOLANGIOPANCREATOGRAM N/A 8/27/2015    Procedure: COMBINED ENDOSCOPIC RETROGRADE CHOLANGIOPANCREATOGRAPHY, PLACE TUBE/STENT;  Surgeon: Baldomero Zacarias MD;  Location:  OR     ENDOSCOPIC RETROGRADE CHOLANGIOPANCREATOGRAM N/A 11/6/2015    Procedure: COMBINED ENDOSCOPIC RETROGRADE CHOLANGIOPANCREATOGRAPHY, REMOVE FOREIGN BODY OR STENT/TUBE;  Surgeon: Baldomero Zacarias MD;  Location:  " OR     EXCISE LESION INTRAORAL  6/29/2011    Procedure:EXCISE LESION INTRAORAL; TRANSCERVICAL APPROACH TO LEFT PHARYNGEAL SPACE MASS REMOVAL ; Surgeon:BARBARA PHILLIPS; Location: OR      VASCULAR SURGERY PROCEDURE UNLIST  12/29/15     LAPAROSCOPIC CHOLECYSTECTOMY N/A 8/23/2015    Procedure: LAPAROSCOPIC CHOLECYSTECTOMY;  Surgeon: Kvng Witt MD;  [facility]     LARYNGOSCOPY WITH BIOPSY(IES)  6/18/2014    Procedure: LARYNGOSCOPY WITH BIOPSY(IES);  Surgeon: Barbara Phillips MD;  [facility]     LASER CO2 LARYNGOSCOPY  12/7/2011    Procedure:LASER CO2 LARYNGOSCOPY; Micro-Direct Laryngoscopy with CO2 Laser Standby / Excision Tracheal Grandulization, Trach Tube Change / Kenalog application. / Balloon Dilation of Subglottic Stenosis.*Latex Safe Room* Trach Tube = Shiley 6.4mm I.D. / 10.8MM O.D. / 76MM  Cuffless.; Surgeon:BARBARA PHILLIPS; Location: OR     ORTHOPEDIC SURGERY  5/2016    Broken Right hand & Fracture Right shoulder     TRACHEOSTOMY  4/22/2011    Procedure:TRACHEOSTOMY; possible awake; Surgeon:BARBARA PHILLIPS; [facility] OR     TRACHEOSTOMY  6/29/2011    Procedure:TRACHEOSTOMY; REMOVE AND REPLACE SHILEY 6 XLT; Surgeon:BARBARA PHILLIPS; [facility]     VASCULAR SURGERY  2008[facility]      Allergies   Allergen Reactions     Fentanyl Itching     Patch     Meloxicam Other (See Comments)     Side pains.     Minocycline Rash     Patient reports adverse reaction was pigmentation which has resolved with drug discontinuation.        Anesthesia Evaluation     . Pt has had prior anesthetic. Type: General    No history of anesthetic complications          ROS/MED HX    ENT/Pulmonary:     (+)sleep apnea, , . Other pulmonary disease Persistent dyspnea.    Neurologic:     (+)neuropathy     Cardiovascular:  - neg cardiovascular ROS   (+) hypertension--CAD, --. : . . . :. . Previous cardiac testing date:results:date: results:ECG reviewed  date:10/22/15 results:Sinus rhythm with marked sinus arrhythmia  Otherwise normal ECG   date: results:          METS/Exercise Tolerance:     Hematologic:  - neg hematologic  ROS       Musculoskeletal:  - neg musculoskeletal ROS       GI/Hepatic:     (+) GERD       Renal/Genitourinary:         Endo:     (+) Obesity, .      Psychiatric:  - neg psychiatric ROS       Infectious Disease:  - neg infectious disease ROS       Malignancy:      - no malignancy   Other:    - neg other ROS                     PHYSICAL EXAM:   Mental Status/Neuro: A/A/O   Airway: Facies: Feasible  Mallampati: II  Mouth/Opening: Full  TM distance: > 6 cm  Neck ROM: Full   Respiratory:    CV:    Comments: Poor dental hygiene                     Assessment:   ASA SCORE: 3    H&P: History and physical reviewed and following examination; no interval change.   Smoking Status:  Non-Smoker/Unknown   NPO Status: NPO Appropriate     Plan:   Anes. Type:  MAC   Pre-Medication: None   Induction:  N/a   Airway: Native Airway   Access/Monitoring: PIV   Maintenance: N/a     Postop Plan:   Postop Pain: None  Postop Sedation/Airway: Not planned     PONV Management:   Adult Risk Factors:, Non-Smoker     CONSENT: Direct conversation   Plan and risks discussed with: Patient   Blood Products: Consent Deferred (Minimal Blood Loss)                   Elizabeth Reilly MD

## 2020-10-16 NOTE — DISCHARGE INSTRUCTIONS
Resume all medications.  Resume pre-procedure diet  No activity restrictions    [facility], [place]  Same-Day Surgery   Adult Discharge Orders & Instructions     For 24 hours after surgery    1. Get plenty of rest.  A responsible adult must stay with you for at least 24 hours after you leave the hospital.   2. Do not drive or use heavy equipment.  If you have weakness or tingling, don't drive or use heavy equipment until this feeling goes away.  3. Do not drink alcohol.  4. Avoid strenuous or risky activities.  Ask for help when climbing stairs.   5. You may feel lightheaded.  IF so, sit for a few minutes before standing.  Have someone help you get up.   6. If you have nausea (feel sick to your stomach): Drink only clear liquids such as apple juice, ginger ale, broth or 7-Up.  Rest may also help.  Be sure to drink enough fluids.  Move to a regular diet as you feel able.  7. You may have a slight fever. Call the doctor if your fever is over 100 F (37.7 C) (taken under the tongue) or lasts longer than 24 hours.  8. You may have a dry mouth, a sore throat, muscle aches or trouble sleeping.  These should go away after 24 hours.  9. Do not make important or legal decisions.   Call your doctor for any of the followin.  Signs of infection (fever, growing tenderness at the surgery site, a large amount of drainage or bleeding, severe pain, foul-smelling drainage, redness, swelling).    2. It has been over 8 to 10 hours since surgery and you are still not able to urinate (pass water).    3.  Headache for over 24 hours.    4.  Numbness, tingling or weakness the day after surgery (if you had spinal anesthesia).  To contact a doctor, call Dr. Cherry at [phone] (clinic) or [phone] (office)  or:        [phone] and ask for the resident on call for GI (answered 24 hours a day)      Emergency Department:    [facility]: [phone]       (TTY for hearing impaired:  [phone])    [facility]: [phone]       (TTY for hearing impaired: [phone])

## 2020-10-16 NOTE — ANESTHESIA POSTPROCEDURE EVALUATION
Anesthesia POST Procedure Evaluation    Patient: Chavez Ca   MRN:     [id number] Gender:   male   Age:    50 year old :      1970        Preoperative Diagnosis: Special screening for malignant neoplasms, colon [Z12.11]   Procedure(s):  INCOMPLETE COLONOSCOPY   Postop Comments: No value filed.     Anesthesia Type: MAC       Disposition: Outpatient   Postop Pain Control: Uneventful            Sign Out: Well controlled pain   PONV: No   Neuro/Psych: Uneventful            Sign Out: Acceptable/Baseline neuro status   Airway/Respiratory: Uneventful            Sign Out: Acceptable/Baseline resp. status   CV/Hemodynamics: Uneventful            Sign Out: Acceptable CV status   Other NRE: NONE   DID A NON-ROUTINE EVENT OCCUR? No         Last Anesthesia Record Vitals:  CRNA VITALS  10/16/2020 1209 - 10/16/2020 1309      10/16/2020             Pulse:  85    Ht Rate:  99    SpO2:  99 %    Resp Rate (observed):  (!) 1          Last PACU Vitals:  No vitals data found for the desired time range.        Electronically Signed By: Elizabeth Reilly MD, 2020, 1:44 PM

## 2020-10-16 NOTE — ANESTHESIA CARE TRANSFER NOTE
Patient: Chavez Ca    Procedure(s):  INCOMPLETE COLONOSCOPY    Diagnosis: Special screening for malignant neoplasms, colon [Z12.11]  Diagnosis Additional Information: No value filed.    Anesthesia Type:   No value filed.     Note:  Airway :Room Air  Patient transferred to:Phase II        Vitals: (Last set prior to Anesthesia Care Transfer)    CRNA VITALS  10/16/2020 1209 - 10/16/2020 1251      10/16/2020             Pulse:  85    Ht Rate:  99    SpO2:  99 %    Resp Rate (observed):  (!) 1                Electronically Signed By: ELAINE Camacho CRNA  October 16, 2020  12:51 PM

## 2020-10-18 ENCOUNTER — TELEPHONE (OUTPATIENT)
Dept: INTERNAL MEDICINE | Facility: CLINIC | Age: 50
End: 2020-10-18

## 2020-10-18 DIAGNOSIS — Z12.11 SCREEN FOR COLON CANCER: Primary | ICD-10-CM

## 2020-10-18 NOTE — TELEPHONE ENCOUNTER
Dear Justine;    Please see GI note regarding colonoscopy and placed order for a FIT test this encounter    Please help coordinate    RASHARD Garcia    Pt called and states he had the colonoscopy already and will have the fit test on Nov 3rd when he see's Dr Zarate in clinic.  Clinic numbers given to pt for questions or concerns.  Justine Fregoso RN 12:42 PM on 10/19/2020.

## 2020-11-02 ENCOUNTER — TELEPHONE (OUTPATIENT)
Dept: ENDOCRINOLOGY | Facility: CLINIC | Age: 50
End: 2020-11-02

## 2020-11-02 ENCOUNTER — MYC MEDICAL ADVICE (OUTPATIENT)
Dept: NURSING | Facility: CLINIC | Age: 50
End: 2020-11-02

## 2020-11-02 DIAGNOSIS — E66.01 MORBID OBESITY (H): ICD-10-CM

## 2020-11-02 DIAGNOSIS — G47.01 INSOMNIA DUE TO MEDICAL CONDITION: ICD-10-CM

## 2020-11-02 NOTE — PROGRESS NOTES
HPI:    Pt. With h/o  obesity related to: venous insufficiency with edema, VERN, hyperlipidemia, idiopathic progressive polyneuropathy, depression. 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, subglottic stenosis in [id number]      Past Medical History:   Diagnosis Date     Allergic rhinitis      Benign positional vertigo 2011    never quite went away     Bilateral carpal tunnel syndrome 7/18/2015     Chest pain      Chronic sinusitis 2017    I don't know Please look     Chronic tonsillitis      Coronary artery disease 3/8/16    right side chest pain     Depressive disorder      Depressive disorder, not elsewhere classified 7/30/2012     Gastro-oesophageal reflux disease      Hearing problem      Hypertension 2008     Idiopathic progressive polyneuropathy 10/15/2012     Learning disability      Neck mass     parapharyngeal, benign     Obesity, Class III, BMI 40-49.9 (morbid obesity) (H)      Obstructive sleep apnea      Psychological factors associated with another disorder 8/21/2012     Recurrent otitis media 2016    might be what the pain in my ears     Reduced vision      Shortness of breath      Tinnitus 2016    I would hear a steady light buzzing sound and other can't     Trigeminal neuralgia      Weakness 8/26/2011     Past Surgical History:   Procedure Laterality Date     BIOPSY  2011    To figure out what kind of tumor I had     COLONOSCOPY N/A 10/16/2020    Procedure: INCOMPLETE COLONOSCOPY;  Surgeon: Ham Cherry MD;  Location[facility]     ENDOSCOPIC RETROGRADE CHOLANGIOPANCREATOGRAM N/A 8/27/2015    Procedure: COMBINED ENDOSCOPIC RETROGRADE CHOLANGIOPANCREATOGRAPHY, PLACE TUBE/STENT;  Surgeon: Baldomero Zacarias MD;  Location[facility] OR     ENDOSCOPIC RETROGRADE CHOLANGIOPANCREATOGRAM N/A 11/6/2015    Procedure: COMBINED ENDOSCOPIC RETROGRADE CHOLANGIOPANCREATOGRAPHY, REMOVE FOREIGN BODY OR STENT/TUBE;  Surgeon: Baldomero Zacarias MD;   [facility]     EXCISE LESION INTRAORAL  6/29/2011    Procedure:EXCISE LESION INTRAORAL; TRANSCERVICAL APPROACH TO LEFT PHARYNGEAL SPACE MASS REMOVAL ; Surgeon:BARBARA PHILLIPS; [facility]      VASCULAR SURGERY PROCEDURE UNLIST  12/29/15     LAPAROSCOPIC CHOLECYSTECTOMY N/A 8/23/2015    Procedure: LAPAROSCOPIC CHOLECYSTECTOMY;  Surgeon: Kvng Witt MD;  [facility]     LARYNGOSCOPY WITH BIOPSY(IES)  6/18/2014    Procedure: LARYNGOSCOPY WITH BIOPSY(IES);  Surgeon: Barbara Phillips MD;  [facility]     LASER CO2 LARYNGOSCOPY  12/7/2011    Procedure:LASER CO2 LARYNGOSCOPY; Micro-Direct Laryngoscopy with CO2 Laser Standby / Excision Tracheal Grandulization, Trach Tube Change / Kenalog application. / Balloon Dilation of Subglottic Stenosis.*Latex Safe Room* Trach Tube = Shiley 6.4mm I.D. / 10.8MM O.D. / 76MM  Cuffless.; Surgeon:BARBARA PHILLIPS; [facility]     ORTHOPEDIC SURGERY  5/2016    Broken Right hand & Fracture Right shoulder     TRACHEOSTOMY  4/22/2011    Procedure:TRACHEOSTOMY; possible awake; Surgeon:BARBARA PHILLIPS; [facility]     TRACHEOSTOMY  6/29/2011    Procedure:TRACHEOSTOMY; REMOVE AND REPLACE SHILEY 6 XLT; Surgeon:BARBARA PHILLIPS; Location[facility]     VASCULAR SURGERY  2008-[facility]     PE:    Vitals noted, gen, he is wearing a mask, neck with tracheostomy scar, lungs with fair air movement, RRR, S1, S2, no MRG, abdomen, obese w/o acute findings, grossly unchanged neurological exam.      A/P:    1. Cardiology follow up with Dr. Suzanne Toledo 11/20/2020 for chronic SOB related to VERN? Or other issues.   2. Colon cancer screening; FIT testing ordered 10/18/2020. Colonoscopy 10/16/2020:    Incomplete procedure due to redundant colon with looping                        despite position changes (as able) and manual pressure.                        Right colon was not inspected. Transverse, descending,                         and rectosigmoid colon normal.                        - The recto-sigmoid colon, descending colon and                        transverse colon are normal.                        - Diverticulosis in the recto-sigmoid colon and in the                        descending colon.                        - The examination was otherwise normal on direct and                        retroflexion views.     - Recommend FIT for screening. If positive, then he                        should have colonoscopy with another gastroentologist.                        Alternatively, could consider CT colonography.     3.  Influenza vaccination? Check with insurance regarding Shingrix  4. Seen ENT, Dr. Glasgow, 10/5/2020 for Sleep Apnea follow up and previous tracheostomy. He also recommends dental evaluation.   5. See SW olinda, Ms. Martinez 9/15/2020 regarding financial issues  6. Wt.  Management; seen by Dr. Chang 8/24/2020. He is still on phentermine.   7. A1c 5.8% 9/4/2020; lipids  and HDL 56  8. Ordered PSA today 11/3/2020  9. Neuropathy on Amitriptyline     Total time spent 25 minutes.  More than 50% of the time spent with Mr. Ca on counseling / coordinating his care

## 2020-11-02 NOTE — TELEPHONE ENCOUNTER
Reason for call:  Medication   If this is a refill request, has the caller requested the refill from the pharmacy already? Yes  Will the patient be using a [facility]? Yes  Name of the pharmacy and phone number for the current request: [phone]    Name of the medication requested: Topiramate    Other request:     Phone number to reach patient:  Home number on file [phone] (home)    Best Time:  anytime    Can we leave a detailed message on this number?  YES    Travel screening: Not Applicable

## 2020-11-02 NOTE — TELEPHONE ENCOUNTER
Centralized Medication Refill Team note:   topiramate (TOPAMAX) 100 MG tablet  Take 2 tablets (200 mg) by mouth 2 times daily   Last Written Prescription Date:  8/24/20  Last Fill Quantity: 120,   # refills: 11  [place], [place] - [address] [place] [address] [phone]  Last Office Visit : 8/24/20  Future Office visit:  None    Refills available per Epic medication list. Messaged patient via [employer]ent with this information.

## 2020-11-03 ENCOUNTER — ANCILLARY PROCEDURE (OUTPATIENT)
Dept: GENERAL RADIOLOGY | Facility: CLINIC | Age: 50
End: 2020-11-03
Attending: INTERNAL MEDICINE
Payer: COMMERCIAL

## 2020-11-03 ENCOUNTER — PATIENT OUTREACH (OUTPATIENT)
Dept: CARE COORDINATION | Facility: CLINIC | Age: 50
End: 2020-11-03

## 2020-11-03 ENCOUNTER — OFFICE VISIT (OUTPATIENT)
Dept: INTERNAL MEDICINE | Facility: CLINIC | Age: 50
End: 2020-11-03
Payer: COMMERCIAL

## 2020-11-03 VITALS
DIASTOLIC BLOOD PRESSURE: 66 MMHG | BODY MASS INDEX: 52.41 KG/M2 | WEIGHT: 315 LBS | HEART RATE: 114 BPM | OXYGEN SATURATION: 98 % | SYSTOLIC BLOOD PRESSURE: 109 MMHG

## 2020-11-03 DIAGNOSIS — M25.511 RIGHT SHOULDER PAIN, UNSPECIFIED CHRONICITY: ICD-10-CM

## 2020-11-03 DIAGNOSIS — M79.604 PAIN IN BOTH LOWER EXTREMITIES: ICD-10-CM

## 2020-11-03 DIAGNOSIS — Z23 NEED FOR INFLUENZA VACCINATION: ICD-10-CM

## 2020-11-03 DIAGNOSIS — Z12.5 SCREENING FOR PROSTATE CANCER: Primary | ICD-10-CM

## 2020-11-03 DIAGNOSIS — Z12.5 SCREENING FOR PROSTATE CANCER: ICD-10-CM

## 2020-11-03 DIAGNOSIS — M79.605 PAIN IN BOTH LOWER EXTREMITIES: ICD-10-CM

## 2020-11-03 LAB — PSA SERPL-ACNC: 0.34 UG/L (ref 0–4)

## 2020-11-03 PROCEDURE — 36415 COLL VENOUS BLD VENIPUNCTURE: CPT | Performed by: PATHOLOGY

## 2020-11-03 PROCEDURE — G0008 ADMIN INFLUENZA VIRUS VAC: HCPCS | Performed by: INTERNAL MEDICINE

## 2020-11-03 PROCEDURE — 90686 IIV4 VACC NO PRSV 0.5 ML IM: CPT | Performed by: INTERNAL MEDICINE

## 2020-11-03 PROCEDURE — 99214 OFFICE O/P EST MOD 30 MIN: CPT | Mod: 25 | Performed by: INTERNAL MEDICINE

## 2020-11-03 PROCEDURE — 73030 X-RAY EXAM OF SHOULDER: CPT | Mod: RT | Performed by: RADIOLOGY

## 2020-11-03 PROCEDURE — G0103 PSA SCREENING: HCPCS | Mod: 90 | Performed by: PATHOLOGY

## 2020-11-03 RX ORDER — TOPIRAMATE 25 MG/1
TABLET, FILM COATED ORAL
Qty: 180 TABLET | Refills: 1 | OUTPATIENT
Start: 2020-11-03

## 2020-11-03 ASSESSMENT — PAIN SCALES - GENERAL: PAINLEVEL: NO PAIN (0)

## 2020-11-03 NOTE — NURSING NOTE
Chief Complaint   Patient presents with     Refill Request     Pt would like to recheck medications      Taya Cabrera, EMT at 1:21 PM sign on 11/3/2020

## 2020-11-03 NOTE — TELEPHONE ENCOUNTER
Social Work Intervention  [facility] [place]    Data/Intervention:    Patient Name:  Chavez Ca  /Age:  1970 (50 year old)    Visit Type: in person  Referral Source: Patient contacted   Reason for Referral:  [employer] paperwork    Collaborated With:    -Patient  -[employer]    Patient Concerns/Issues:   Patient presented to [facility] with [employer] recertification paperwork. Patient received an email from [facility] requesting Patient to send recertification paperwork back to [facility] via email. Patient does not have ability to scan documents.     Intervention/Education/Resources Provided:   scanned completed [employer] paperwork and emailed the form to [facility] [employer] ([email or web address] and CC'd Patient ([email or web address]). Original form was given back to Patient for his records.     Assessment/Plan:  [employer] to reach out to Patient with next steps.    Provided patient/family with contact information and availability.    Marly Martinez [facility]  Outpatient Specialty Clinics  Direct Phone: [phone]

## 2020-11-03 NOTE — TELEPHONE ENCOUNTER
"-Per Dr Chang's note of 8/24/20    \"Increase topiramate to 200 BID. Maintain phentermine 37.5 mg.\"    Medication recent history:    1. 5/7/20 Dr Chang visit:  Topiramate 25 MG \"Take 50mg (2 tabs) by mouth BID for 1 week. Then increase to 75mg (3 tabs) by mouth BID thereafter.\"disp 180/ 1 Rf This strength and dosage was  discontinued on 7/6/20.    2. 7/6/20 Dr Chang visit: dose was increased on 7/6/20 to topiramate 100 mg Take 1 tablet (100 mg) by mouth 2 times daily , disp #60/11 RF.Discontinued as reorder with increased dose on 8/24/20     3. On 8/24/20/Dr Chang: dose again increased and topiramate 100 mg:Take 2 tablets (200 mg) by mouth 2 times daily  #120/11 RF prescription to [facility]     "

## 2020-11-03 NOTE — NURSING NOTE
Patient received Flu vaccine. Vaccine was given under the supervision of Dr. Zarate. See immunization list for administration details. Pt was advised to remain in [facility] lobby for 15 minutes in case of an averse reaction. Given by Taya Cabrera CMA, EMT 3:14 PM 11/3/2020

## 2020-11-03 NOTE — TELEPHONE ENCOUNTER
"Spoke with pharmacy. Patient had requested previous dosage of 25 MG topiramate which was discontinued, not the topiramate 100 MG which is current on medication list tab.    -Per Dr Chang's note of 8/24/20    \"Increase topiramate to 200 BID. Maintain phentermine 37.5 mg.\"    Medication recent history:    1. 5/7/20 Dr Chang visit:  Topiramate 25 MG \"Take 50mg (2 tabs) by mouth BID for 1 week. Then increase to 75mg (3 tabs) by mouth BID thereafter.\"disp 180/ 1 Rf This strength and dosage was  discontinued on 7/6/20.    2. 7/6/20 Dr Chang visit: dose was increased on 7/6/20 to topiramate 100 mg Take 1 tablet (100 mg) by mouth 2 times daily , disp #60/11 RF.     3. On 8/24/20/Dr Chang: dose again increased and topiramate 100 mg:Take 2 tablets (200 mg) by mouth 2 times daily  #120/11 RF   "

## 2020-11-04 ENCOUNTER — TELEPHONE (OUTPATIENT)
Dept: OTOLARYNGOLOGY | Facility: CLINIC | Age: 50
End: 2020-11-04

## 2020-11-04 NOTE — TELEPHONE ENCOUNTER
[facility] Call Center    Phone Message    May a detailed message be left on voicemail: yes     Reason for Call: Other: Pt called stating that he called the Dental clinic to schedule as Dr. Glasgow had instructed. Pt stated that they need a referral or an order faxed over to them in order to get him in. Pt provided the fax number which is: [phone]. Please advise, thank you!     Action Taken: Message routed to:  [facility] ([facility] ENT    Travel Screening: Not Applicable

## 2020-11-04 NOTE — TELEPHONE ENCOUNTER
Referral originally placed on 10/5. Repeat referral sent today and faxed to dental clinic requesting they call patient to schedule.     Alannah Singh RN

## 2020-11-05 RX ORDER — ZOLPIDEM TARTRATE 10 MG/1
TABLET ORAL
Qty: 30 TABLET | Refills: 0 | Status: SHIPPED | OUTPATIENT
Start: 2020-11-05 | End: 2020-12-11

## 2020-11-05 NOTE — TELEPHONE ENCOUNTER
Patient Requested  zolpidem (AMBIEN) 10 MG tablet  Last Filled  06/09/2020  Last Office Visit  11/03/2020-[facility]  Next Office Visit  Nothing Scheduled   Checked  11/05/2020    DX: Insomnia due to medical condition     Pharmacy: [facility] [place], [place] - [address] [place] [address] [phone]    LEYDI WINCHESTER CMA at 10:18 AM on 11/5/2020.

## 2020-11-20 ENCOUNTER — PRE VISIT (OUTPATIENT)
Dept: CARDIOLOGY | Facility: CLINIC | Age: 50
End: 2020-11-20

## 2020-11-23 NOTE — TELEPHONE ENCOUNTER
.  RECORDS RECEIVED FROM: Internal    DATE RECEIVED: 1.22.21   NOTES STATUS DETAILS   OFFICE NOTE from referring provider    Internal  MANUEL BAUER    OFFICE NOTE from other cardiologist    Internal  3.26.20 Ziyad DUDLEY     DISCHARGE SUMMARY from hospital    na    DISCHARGE REPORT from the ER   na    OPERATIVE REPORT    na    MEDICATION LIST   Internal     LABS     BMP   Internal  8.7.18     CBC   Internal  4.7.20    CMP   Internal  10.15.20    Lipids   Internal  9.4.20    TSH   Internal  9.4.20    DIAGNOSTIC PROCEDURES     EKG   Internal  10.16.20    Monitor Reports   na    IMAGING (DISC & REPORT)      Echo   na    Stress Tests   na    Cath   na    MRI/MRA   na    CT/CTA   na

## 2020-12-03 DIAGNOSIS — G47.01 INSOMNIA DUE TO MEDICAL CONDITION: ICD-10-CM

## 2020-12-11 RX ORDER — ZOLPIDEM TARTRATE 10 MG/1
TABLET ORAL
Qty: 30 TABLET | Refills: 0 | Status: SHIPPED | OUTPATIENT
Start: 2020-12-11 | End: 2021-01-11

## 2020-12-11 NOTE — TELEPHONE ENCOUNTER
Patient Requested  zolpidem (AMBIEN) 10 MG tablet  Last Filled  11/05/2020  Last Office Visit  11/03/2020-[facility]  Next Office Visit  Nothing Scheduled   Checked  12/11/2020    DX: Insomnia due to medical condition     Pharmacy: [facility] [place], [place] - [address] [place] [address] [phone]    LEYDI WINCHESTER CMA at 9:37 AM on 12/11/2020.

## 2021-01-01 ENCOUNTER — MEDICAL CORRESPONDENCE (OUTPATIENT)
Dept: HEALTH INFORMATION MANAGEMENT | Facility: CLINIC | Age: 51
End: 2021-01-01

## 2021-01-06 DIAGNOSIS — L21.9 SEBORRHEIC DERMATITIS: ICD-10-CM

## 2021-01-06 DIAGNOSIS — G47.01 INSOMNIA DUE TO MEDICAL CONDITION: ICD-10-CM

## 2021-01-10 RX ORDER — KETOCONAZOLE 20 MG/ML
SHAMPOO TOPICAL
Qty: 120 ML | Refills: 1 | Status: SHIPPED | OUTPATIENT
Start: 2021-01-10 | End: 2021-05-04

## 2021-01-10 NOTE — TELEPHONE ENCOUNTER
ketoconazole (NIZORAL) 2 % external shampoo    Last Written Prescription Date:  3/26/20  Last Fill Quantity: 120ml,   # refills: 1  Last Office Visit : 11/3/20  Future Office visit:  None    zolpidem (AMBIEN) 10 MG tablet    Routing refill request to provider for review/approval because:  controlled

## 2021-01-11 RX ORDER — ZOLPIDEM TARTRATE 10 MG/1
TABLET ORAL
Qty: 30 TABLET | Refills: 0 | Status: SHIPPED | OUTPATIENT
Start: 2021-01-11 | End: 2021-03-16

## 2021-01-18 ENCOUNTER — TELEPHONE (OUTPATIENT)
Dept: CARDIOLOGY | Facility: CLINIC | Age: 51
End: 2021-01-18

## 2021-01-18 NOTE — TELEPHONE ENCOUNTER
Patient previously had an appointment scheduled on 1/22 with a general cardiologist per a referral.    Pt cancelled and said he needs an appointment with any general cardiologist after 2/2/21.    Please link order when scheduling.

## 2021-01-19 NOTE — TELEPHONE ENCOUNTER
RECORDS RECEIVED FROM: Internal    DATE RECEIVED: 2.12.21   NOTES STATUS DETAILS   OFFICE NOTE from referring provider     Internal  MANUEL BAUER    OFFICE NOTE from other cardiologist     Internal  3.26.20 Ziyad DUDLEY      DISCHARGE SUMMARY from hospital     na     DISCHARGE REPORT from the ER    na     OPERATIVE REPORT     na     MEDICATION LIST    Internal      LABS       BMP    Internal  8.7.18      CBC    Internal  4.7.20    CMP    Internal  10.15.20    Lipids    Internal  9.4.20    TSH    Internal  9.4.20    DIAGNOSTIC PROCEDURES       EKG    Internal  10.16.20    Monitor Reports    na     IMAGING (DISC & REPORT)       Echo    na     Stress Tests    na     Cath    na     MRI/MRA    na     CT/CTA    na

## 2021-01-22 ENCOUNTER — PRE VISIT (OUTPATIENT)
Dept: CARDIOLOGY | Facility: CLINIC | Age: 51
End: 2021-01-22

## 2021-02-12 ENCOUNTER — PRE VISIT (OUTPATIENT)
Dept: CARDIOLOGY | Facility: CLINIC | Age: 51
End: 2021-02-12

## 2021-03-05 DIAGNOSIS — E66.01 MORBID OBESITY (H): ICD-10-CM

## 2021-03-05 RX ORDER — PHENTERMINE HYDROCHLORIDE 37.5 MG/1
TABLET ORAL
Qty: 30 TABLET | Refills: 5
Start: 2021-03-05

## 2021-03-15 ENCOUNTER — VIRTUAL VISIT (OUTPATIENT)
Dept: ENDOCRINOLOGY | Facility: CLINIC | Age: 51
End: 2021-03-15
Payer: COMMERCIAL

## 2021-03-15 VITALS — HEIGHT: 67 IN | BODY MASS INDEX: 49.44 KG/M2 | WEIGHT: 315 LBS

## 2021-03-15 DIAGNOSIS — E66.01 MORBID OBESITY (H): ICD-10-CM

## 2021-03-15 PROCEDURE — 99212 OFFICE O/P EST SF 10 MIN: CPT | Mod: TEL | Performed by: INTERNAL MEDICINE

## 2021-03-15 RX ORDER — PHENTERMINE HYDROCHLORIDE 37.5 MG/1
37.5 TABLET ORAL EVERY MORNING
Qty: 90 TABLET | Refills: 3 | Status: SHIPPED | OUTPATIENT
Start: 2021-03-15 | End: 2022-04-27

## 2021-03-15 RX ORDER — TOPIRAMATE 100 MG/1
200 TABLET, FILM COATED ORAL 2 TIMES DAILY
Qty: 360 TABLET | Refills: 3 | Status: SHIPPED | OUTPATIENT
Start: 2021-03-15 | End: 2022-04-27

## 2021-03-15 ASSESSMENT — PAIN SCALES - GENERAL: PAINLEVEL: EXTREME PAIN (8)

## 2021-03-15 ASSESSMENT — MIFFLIN-ST. JEOR: SCORE: 2310.5

## 2021-03-15 NOTE — PROGRESS NOTES
"Return Medical Weight Management Note     Chavez Ca  MRN:  [id number]  :  1970  RUI:  20    Dear Dr. Colindres,    I had the pleasure of seeing your patient Chavez Ca.  He is a 48 year old male who I am continuing to see for treatment of obesity related to: venous insufficiency with edema, VERN, hyperlipidemia, idiopathic progressive polyneuropathy, CAD, hypertension, depression. 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     CURRENT WEIGHT:   330 lbs 0 oz     Wt Readings from Last 4 Encounters:   03/15/21 149.7 kg (330 lb)   20 (!) 151.8 kg (334 lb 9.6 oz)   10/16/20 (!) 153.7 kg (338 lb 13.6 oz)   10/15/20 (!) 156.9 kg (346 lb)     Height:  5' 7\"  Body Mass Index:  Body mass index is 51.69 kg/m .  Vitals:  Ht 1.702 m (5' 7\")   Wt 149.7 kg (330 lb)   BMI 51.69 kg/m      Initial consult weight was 361 on 1/10/12.  Weight change since last seen on 20 is down 20 pounds.   Total loss is 31 pounds.    INTERVAL HISTORY:  Maintain topiramate \"full dose\". Food life and weight about the same. Says his eating like a ''rabbit\" and is \"not going to starve myself\". Still wants barisurgery but is too high risk even for tracheal issues.    Saw ENT 2019 and found to have small indentation in the anterior wall of his trachea in conjunction with tracheostomy scar. Concern for some dynamic airway collapse.     No flowsheet data found.     MEDICATIONS:   Current Outpatient Medications   Medication Sig Dispense Refill     amitriptyline (ELAVIL) 50 MG tablet Take 2 tablets (100 mg) by mouth At Bedtime Call clinic to schedule follow up appointment. [phone] 180 tablet 1     CETAPHIL (CETAPHIL) lotion Apply topically 2 times daily       COMPRESSION STOCKINGS 20-30 mmHg knee high compression stockings.  Measure and fit.  Style and brand per patient preference. 2 each 0     diclofenac (VOLTAREN) " "1 % topical gel Place 2 g onto the skin 3 times daily as needed for moderate pain 1 Tube 1     furosemide (LASIX) 20 MG tablet Take 3 tablets (60 mg) by mouth 2 times daily 540 tablet 3     ketoconazole (NIZORAL) 2 % external shampoo APPLY TO AFFECTED AREA AND WASH OFF AFTER 5 MINUTES 120 mL 1     order for DME Equipment being ordered: compression stockings    Needs to be faxed to 1 Device 0     order for DME Compression stockings pair, patient preference    To be used 8 hours per day 1 Device 3     order for DME Equipment being ordered: compression stockings 2 each 0     phentermine (ADIPEX-P) 37.5 MG tablet Take 1 tablet (37.5 mg) by mouth every morning 30 tablet 3     topiramate (TOPAMAX) 100 MG tablet Take 2 tablets (200 mg) by mouth 2 times daily 120 tablet 11     traZODone (DESYREL) 100 MG tablet Take 1 tablet (100 mg) by mouth At Bedtime Call clinic to schedule follow up appointment. [phone] 90 tablet 1     zolpidem (AMBIEN) 10 MG tablet TAKE ONE TABLET BY MOUTH NIGHTLY AS NEEDED FOR SLEEP 30 tablet 0     potassium chloride (KLOR-CON) 20 MEQ packet Take 20 mEq by mouth daily (Patient not taking: Reported on 3/15/2021) 7 packet 0     Weight Loss Medication History Reviewed With Patient 3/15/2021   Which weight loss medications are you currently taking on a regular basis?  Phentermine, Topamax (topiramate)   If you are not taking a weight loss medication that was prescribed to you, please indicate why: -   Are you having any side effects from the weight loss medication that we have prescribed you? No     ASSESSMENT:   Maintain topiramate 200 BID and phentermine 37.5 mg. Reinforce food life of \"eating like a rabbit\".    FOLLOW-UP:    Phone call duration: 15 minutes.  I explained the conditions and plans (more than 50% of time was counseling/coordination of weight management).  RTC 3 months    Sincerely,  Fortino Chang MD     The patient was evaluated via a billable telephone visit and notified:  " "\"This visit will be via telephone call between you and your physician. If lab work is needed we can place an order and you can later stop by the lab. Telephone visits are billed at different rates depending on your insurance coverage. During this emergency period, some insurers may be billed the same as an in-person visit.  Please check with your insurance provider with any questions. If the physician feels a telephone visit is not appropriate, you will not be charged for this service.\" Patient has given verbal consent for Telephone visit?  Yes. How would you like to obtain your AVS? MyChart.      "

## 2021-03-15 NOTE — LETTER
"3/15/2021       RE: Chavez Ca  [address]  [place] [address]     Dear Colleague,    Thank you for referring your patient, Chavez Ca, to the [facility] [place] [facility]. Please see a copy of my visit note below.    Chavez is a 50 year old who is being evaluated via a billable telephone visit.      What phone number would you like to be contacted at? [phone]  How would you like to obtain your AVS? MyChart     During this virtual visit the patient is located in [place], patient verifies this as the location during the entirety of this visit.     Phone call duration: 15 minutes.  I explained the conditions and plans (more than 50% of time was counseling/coordination of weight management).      Return Medical Weight Management Note     Chavez Ca  MRN:  [id number]  :  1970  RUI:  20    Dear Dr. Colindres,    I had the pleasure of seeing your patient Chavez Ca.  He is a 48 year old male who I am continuing to see for treatment of obesity related to: venous insufficiency with edema, VERN, hyperlipidemia, idiopathic progressive polyneuropathy, CAD, hypertension, depression. 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     CURRENT WEIGHT:   330 lbs 0 oz     Wt Readings from Last 4 Encounters:   03/15/21 149.7 kg (330 lb)   20 (!) 151.8 kg (334 lb 9.6 oz)   10/16/20 (!) 153.7 kg (338 lb 13.6 oz)   10/15/20 (!) 156.9 kg (346 lb)     Height:  5' 7\"  Body Mass Index:  Body mass index is 51.69 kg/m .  Vitals:  Ht 1.702 m (5' 7\")   Wt 149.7 kg (330 lb)   BMI 51.69 kg/m      Initial consult weight was 361 on 1/10/12.  Weight change since last seen on 20 is down 20 pounds.   Total loss is 31 pounds.    INTERVAL HISTORY:  Maintain topiramate \"full dose\". Food life and weight " "about the same. Says his eating like a ''rabbit\" and is \"not going to starve myself\". Still wants barisurgery but is too high risk even for tracheal issues.    Saw ENT 1/2019 and found to have small indentation in the anterior wall of his trachea in conjunction with tracheostomy scar. Concern for some dynamic airway collapse.     No flowsheet data found.     MEDICATIONS:   Current Outpatient Medications   Medication Sig Dispense Refill     amitriptyline (ELAVIL) 50 MG tablet Take 2 tablets (100 mg) by mouth At Bedtime Call clinic to schedule follow up appointment. [phone] 180 tablet 1     CETAPHIL (CETAPHIL) lotion Apply topically 2 times daily       COMPRESSION STOCKINGS 20-30 mmHg knee high compression stockings.  Measure and fit.  Style and brand per patient preference. 2 each 0     diclofenac (VOLTAREN) 1 % topical gel Place 2 g onto the skin 3 times daily as needed for moderate pain 1 Tube 1     furosemide (LASIX) 20 MG tablet Take 3 tablets (60 mg) by mouth 2 times daily 540 tablet 3     ketoconazole (NIZORAL) 2 % external shampoo APPLY TO AFFECTED AREA AND WASH OFF AFTER 5 MINUTES 120 mL 1     order for DME Equipment being ordered: compression stockings    Needs to be faxed to 1 Device 0     order for DME Compression stockings pair, patient preference    To be used 8 hours per day 1 Device 3     order for DME Equipment being ordered: compression stockings 2 each 0     phentermine (ADIPEX-P) 37.5 MG tablet Take 1 tablet (37.5 mg) by mouth every morning 30 tablet 3     topiramate (TOPAMAX) 100 MG tablet Take 2 tablets (200 mg) by mouth 2 times daily 120 tablet 11     traZODone (DESYREL) 100 MG tablet Take 1 tablet (100 mg) by mouth At Bedtime Call clinic to schedule follow up appointment. [phone] 90 tablet 1     zolpidem (AMBIEN) 10 MG tablet TAKE ONE TABLET BY MOUTH NIGHTLY AS NEEDED FOR SLEEP 30 tablet 0     potassium chloride (KLOR-CON) 20 MEQ packet Take 20 mEq by mouth daily (Patient not taking: " "Reported on 3/15/2021) 7 packet 0     Weight Loss Medication History Reviewed With Patient 3/15/2021   Which weight loss medications are you currently taking on a regular basis?  Phentermine, Topamax (topiramate)   If you are not taking a weight loss medication that was prescribed to you, please indicate why: -   Are you having any side effects from the weight loss medication that we have prescribed you? No     ASSESSMENT:   Maintain topiramate 200 BID and phentermine 37.5 mg. Reinforce food life of \"eating like a rabbit\".    FOLLOW-UP:    Phone call duration: 15 minutes.  I explained the conditions and plans (more than 50% of time was counseling/coordination of weight management).  RTC 3 months    Sincerely,  Fortino Chang MD     The patient was evaluated via a billable telephone visit and notified:  \"This visit will be via telephone call between you and your physician. If lab work is needed we can place an order and you can later stop by the lab. Telephone visits are billed at different rates depending on your insurance coverage. During this emergency period, some insurers may be billed the same as an in-person visit.  Please check with your insurance provider with any questions. If the physician feels a telephone visit is not appropriate, you will not be charged for this service.\" Patient has given verbal consent for Telephone visit?  Yes. How would you like to obtain your AVS? MyChart.      "

## 2021-03-15 NOTE — PROGRESS NOTES
Chavez is a 50 year old who is being evaluated via a billable telephone visit.      What phone number would you like to be contacted at? [phone]  How would you like to obtain your AVS? Cristina     During this virtual visit the patient is located in [place], patient verifies this as the location during the entirety of this visit.     Phone call duration: 15 minutes.  I explained the conditions and plans (more than 50% of time was counseling/coordination of weight management).

## 2021-03-15 NOTE — NURSING NOTE
"Chief Complaint   Patient presents with     Follow Up     [facility] follow up       Vitals:    03/15/21 1105   Weight: 149.7 kg (330 lb)   Height: 1.702 m (5' 7\")       Body mass index is 51.69 kg/m .                            "

## 2021-03-16 ENCOUNTER — OFFICE VISIT (OUTPATIENT)
Dept: INTERNAL MEDICINE | Facility: CLINIC | Age: 51
End: 2021-03-16
Payer: COMMERCIAL

## 2021-03-16 VITALS
OXYGEN SATURATION: 100 % | WEIGHT: 315 LBS | SYSTOLIC BLOOD PRESSURE: 122 MMHG | BODY MASS INDEX: 52.16 KG/M2 | DIASTOLIC BLOOD PRESSURE: 81 MMHG | HEART RATE: 115 BPM

## 2021-03-16 DIAGNOSIS — G47.01 INSOMNIA DUE TO MEDICAL CONDITION: ICD-10-CM

## 2021-03-16 DIAGNOSIS — E87.6 POTASSIUM (K) DEFICIENCY: ICD-10-CM

## 2021-03-16 DIAGNOSIS — E61.1 IRON DEFICIENCY: ICD-10-CM

## 2021-03-16 DIAGNOSIS — K08.9 POOR DENTITION: ICD-10-CM

## 2021-03-16 DIAGNOSIS — H81.10 BENIGN PAROXYSMAL POSITIONAL VERTIGO, UNSPECIFIED LATERALITY: ICD-10-CM

## 2021-03-16 DIAGNOSIS — A63.0 CONDYLOMA ACUMINATA: ICD-10-CM

## 2021-03-16 DIAGNOSIS — G47.9 SLEEP DISORDER: ICD-10-CM

## 2021-03-16 DIAGNOSIS — R73.03 PREDIABETES: ICD-10-CM

## 2021-03-16 DIAGNOSIS — M62.81 GENERALIZED MUSCLE WEAKNESS: ICD-10-CM

## 2021-03-16 DIAGNOSIS — R60.9 IDIOPATHIC EDEMA: ICD-10-CM

## 2021-03-16 DIAGNOSIS — R06.02 SOB (SHORTNESS OF BREATH): ICD-10-CM

## 2021-03-16 DIAGNOSIS — G60.3 IDIOPATHIC PROGRESSIVE NEUROPATHY: Primary | ICD-10-CM

## 2021-03-16 DIAGNOSIS — G47.33 OSA (OBSTRUCTIVE SLEEP APNEA): ICD-10-CM

## 2021-03-16 DIAGNOSIS — G60.3 IDIOPATHIC PROGRESSIVE POLYNEUROPATHY: Chronic | ICD-10-CM

## 2021-03-16 DIAGNOSIS — E87.6 HYPOKALEMIA: ICD-10-CM

## 2021-03-16 DIAGNOSIS — E66.01 MORBID OBESITY (H): ICD-10-CM

## 2021-03-16 PROCEDURE — 99417 PROLNG OP E/M EACH 15 MIN: CPT | Performed by: PEDIATRICS

## 2021-03-16 PROCEDURE — 99215 OFFICE O/P EST HI 40 MIN: CPT | Performed by: PEDIATRICS

## 2021-03-16 RX ORDER — POTASSIUM CHLORIDE 1.5 G/1.58G
20 POWDER, FOR SOLUTION ORAL DAILY
Qty: 7 PACKET | Refills: 0 | Status: SHIPPED | OUTPATIENT
Start: 2021-03-16 | End: 2021-05-04

## 2021-03-16 RX ORDER — ZOLPIDEM TARTRATE 10 MG/1
10 TABLET ORAL
Qty: 30 TABLET | Refills: 0 | Status: SHIPPED | OUTPATIENT
Start: 2021-03-16 | End: 2021-05-04

## 2021-03-16 RX ORDER — AMITRIPTYLINE HYDROCHLORIDE 50 MG/1
100 TABLET ORAL AT BEDTIME
Qty: 180 TABLET | Refills: 1 | Status: SHIPPED | OUTPATIENT
Start: 2021-03-16 | End: 2021-05-04

## 2021-03-16 RX ORDER — TRAZODONE HYDROCHLORIDE 100 MG/1
100 TABLET ORAL AT BEDTIME
Qty: 90 TABLET | Refills: 1 | Status: SHIPPED | OUTPATIENT
Start: 2021-03-16 | End: 2021-05-04

## 2021-03-16 NOTE — NURSING NOTE
Chief Complaint   Patient presents with     Recheck Medication     med check and refills       Delfina Pa MA, at 4:28 PM on 3/16/2021.

## 2021-03-16 NOTE — PROGRESS NOTES
Dear patient. I use the medical record to document (to the best of my ability) my understanding of:   - What you told me,  - Relevant details from my exam, records review, and/or test results, and  - My assessment and plan  If you have questions, you are welcome to contact me; I will reply as soon as time allows.    About this visit:  PCP: switching to Chavez Milligan MD  Visit type: problem-oriented  Time note ([id number], 69-83'): The total time (on the date of service) for this service was 80 minutes, including discussion/face-to-face, chart review, interpretation not otherwise reported, documentation, and updating of the computerized record.      Subjective     Chavez Ca is a 50 year old man, with concerns including:  Chief Complaint   Patient presents with     med refills     med check and refills - referral for dermatology       Disposition comment: Mr. Ca was seen by several providers since Dr. Feldman retired, most recently on 11/3/2020 by Dr. Zarate (previously has seen several providers). He wishes to see a regular provider. We only had some limited time to explore his history and extensive records, but we made a plan to meet regularly until I can be caught up in detail.     His goal today was medication refill, and a return referral to dermatology.    He has multiple medical problems and has been disabled, but is hoping to recapture some of his functionality.   Dr. Zarate signed off on [employer] mobility paperwork, until next October.       The remainder of this note is problem-oriented, with each problem listed in the Assessment and Plan section with its own subsections.       Review of Systems      Objective     /81 (BP Location: Left arm, Patient Position: Sitting, Cuff Size: Adult Large)   Pulse 115   Wt (!) 151 kg (333 lb)   SpO2 100%   BMI 52.16 kg/m      Physical Exam  Constitutional:       Appearance: He is obese.   HENT:      Head: Normocephalic.      Right Ear: External  ear normal.      Left Ear: External ear normal.      Nose: Nose normal.      Mouth/Throat:      Pharynx: Posterior oropharyngeal erythema (mild, at arches) present.      Comments: Poor dentition, fractures  Eyes:      General: No scleral icterus.        Right eye: No discharge.         Left eye: No discharge.      Extraocular Movements: Extraocular movements intact.   Pulmonary:      Effort: Pulmonary effort is normal. No respiratory distress.      Breath sounds: No stridor. No wheezing.   Skin:     Findings: No rash.   Neurological:      Mental Status: He is alert.   Psychiatric:         Mood and Affect: Mood normal.         Behavior: Behavior normal.         Thought Content: Thought content normal.         Judgment: Judgment normal.                  Assessment & Plan     Disposition comment: Mr. Ca was seen by several providers since Dr. Feldman retired, most recently on 11/3/2020 by Dr. Zarate (previously has seen several providers). He wishes to see a regular provider. We only had some limited time to explore his history and extensive records, but we made a plan       Leg edema  Hypokalemia  UPDATE: He is not sure why he has edema, but he takes regular furosemide to reduce this.  Potassium dosage was reduced in November with his last blood draw  ASSESSMENT & PLAN: There are several possible reasons for leg edema, not the least of which is venous stasis with his abdominal size and pressure blocking return.  I see there have been several attempts to image his heart using echocardiography, with poor acoustic windows.  This will need some further review.  For now I renewed his potassium, and this may need to be the first priority upon his return to me.    Small fiber neuropathy, idiopathic.    UPDATE: Numbness, tingling, sharp pains. He had a biopsy in his foot. He takes amitriptyline.  He doesn't recall being told why he had the SFN.  ASSESSMENT & PLAN: Refilled the amitriptyline, and I will need to do further  chart review, see if further blood tests needed.       Poor dentition, fractured teeth  SUBJECTIVE: This is relevant because he has been stuck between VERN treatment, a dental appliance, and fractured teeth. I gather he has had trouble with appointments and finding a dentist. There are several comments about things that needed to be done.  OBJECTIVE: Several fractures and problematic dentition  ASSESSMENT and PLAN:     It seems like priority is: dental removal (for general health, as much as VERN), then VERN and whatever additional dental care for function and nutrition.         Somnolence  Obstructive sleep apnea, not treated  Insomnia, long-term use of trazodone and zolpidem.  UPDATE: He takes trazodone and ambien for sleeping problems. Sometimes too alert to sleep. Sometimes he dozes off. He has obstructive sleep apnea and 'CPAP doesn't work,' and they are talking about a mouthpiece. Teeth have fallen out over time, and there have been some difficulties with teeth versus mouthguard.  Sleeps sitting up because of breathing obstruction.  ASSESSMENT & PLAN: This will need further chart review. He seemed skeptical about the entire idea of CPAP, and I did not have sufficient time to investigate this.  He commented wryly that 'they might as well just put the trach back in.' I agree his poor dentition makes him a limited candidate for a variety of interventions.     I am concerned about use of 2 sedatives in this patient but I will continue for now given the previous use by several of my colleagues.         Condyloma acuminata  Multiple skin problems  SUBJECTIVE: He wants to return to see dermatology, after having a biopsy. He was told there was just a wart. He has a lingering discomfort there, with what sounds like abrading from movement.   Dr. Arellano's note from 8/17/2020 is the last entry, he had a shave biopsy consistent with condyloma acuminata  OBJECTIVE: (insufficient time for exam today)  ASSESSMENT and PLAN: This  definitely is going to need examination, and from the previous rheumatology note it sounds like there are variety of skin problems. Mr. Ca says that the dermatologist said 'it was just a wart,' although from the note I am not sure that is the extent of what was recommended.  There are a variety of treatments that can be applied.  It looks to me that he can get back into dermatology without another referral, but because of his difficulty with appointments I decided to put in a referral anyway.       Morbid obesity  UPDATE: He reports seeing an obesity doctor to get back on track with meds. There are worries about him being a high-risk patient for gastric bypass.  ASSESSMENT & PLAN: This will need review by me. Clearly he needs help with obesity treatment, but I am not sure about his candidacy for the program.      Vertigo, ear pressure, tinnitus   Ear pressure comes and goes. He gets dizziness and has tinnitus. He doesn't recall the term Meniere's disease.        Additional issues:    Questions about previous vitamin deficiency.  I will try to review his records, but for now I put a ferritin and his planned blood draw.    He has trouble breathing with a variety of odors, chemicals, cat litter box, a bonfire across the street in his neighborhood.    COVID shot set up to get.    History of depression and anxiety. He improves with listening to music and stress management.         TSH normal 9/4/2020.

## 2021-03-17 NOTE — ASSESSMENT & PLAN NOTE
Morbid obesity  UPDATE: He reports seeing an obesity doctor to get back on track with meds. There are worries about him being a high-risk patient for gastric bypass.  ASSESSMENT & PLAN: This will need review by me. Clearly he needs help with obesity treatment, but I am not sure about his candidacy for the program.

## 2021-03-17 NOTE — ASSESSMENT & PLAN NOTE
Ear pressure comes and goes. He gets dizziness and has tinnitus. He doesn't recall the term Meniere's disease.

## 2021-03-17 NOTE — ASSESSMENT & PLAN NOTE
Small fiber neuropathy, idiopathic.    UPDATE: Numbness, tingling, sharp pains. He had a biopsy in his foot. He takes amitriptyline.  He doesn't recall being told why he had the SFN.  ASSESSMENT & PLAN: Refilled the amitriptyline, and I will need to do further chart review, see if further blood tests needed.

## 2021-03-17 NOTE — ASSESSMENT & PLAN NOTE
Condyloma acuminata  Multiple skin problems  SUBJECTIVE: He wants to return to see dermatology, after having a biopsy. He was told there was just a wart. He has a lingering discomfort there, with what sounds like abrading from movement.   Dr. Arellano's note from 8/17/2020 is the last entry, he had a shave biopsy consistent with condyloma acuminata  OBJECTIVE: (insufficient time for exam today)  ASSESSMENT and PLAN: This definitely is going to need examination, and from the previous rheumatology note it sounds like there are variety of skin problems. Mr. Ca says that the dermatologist said 'it was just a wart,' although from the note I am not sure that is the extent of what was recommended.  There are a variety of treatments that can be applied.  It looks to me that he can get back into dermatology without another referral, but because of his difficulty with appointments I decided to put in a referral anyway.

## 2021-03-17 NOTE — ASSESSMENT & PLAN NOTE
Poor dentition, fractured teeth  SUBJECTIVE: This is relevant because he has been stuck between VERN treatment, a dental appliance, and fractured teeth. I gather he has had trouble with appointments and finding a dentist. There are several comments about things that needed to be done.  OBJECTIVE: Several fractures and problematic dentition  ASSESSMENT and PLAN:     It seems like priority is: dental removal (for general health, as much as VERN), then VERN and whatever additional dental care for function and nutrition.

## 2021-03-17 NOTE — ASSESSMENT & PLAN NOTE
Leg edema  Hypokalemia  UPDATE: He is not sure why he has edema, but he takes regular furosemide to reduce this.  Potassium dosage was reduced in November with his last blood draw  ASSESSMENT & PLAN: There are several possible reasons for leg edema, not the least of which is venous stasis with his abdominal size and pressure blocking return.  I see there have been several attempts to image his heart using echocardiography, with poor acoustic windows.  This will need some further review.  For now I renewed his potassium, and this may need to be the first priority upon his return to me.

## 2021-03-17 NOTE — ASSESSMENT & PLAN NOTE
He has trouble breathing with a variety of odors, chemicals, cat litter box, a bonfire across the street in his neighborhood.

## 2021-03-17 NOTE — ASSESSMENT & PLAN NOTE
Somnolence  Obstructive sleep apnea, not treated  Insomnia, long-term use of trazodone and zolpidem.  UPDATE: He takes trazodone and ambien for sleeping problems. Sometimes too alert to sleep. Sometimes he dozes off. He has obstructive sleep apnea and 'CPAP doesn't work,' and they are talking about a mouthpiece. Teeth have fallen out over time, and there have been some difficulties with teeth versus mouthguard.  Sleeps sitting up because of breathing obstruction.  ASSESSMENT & PLAN: This will need further chart review. He seemed skeptical about the entire idea of CPAP, and I did not have sufficient time to investigate this.  He commented wryly that 'they might as well just put the trach back in.' I agree his poor dentition makes him a limited candidate for a variety of interventions.     I am concerned about use of 2 sedatives in this patient but I will continue for now given the previous use by several of my colleagues.

## 2021-03-19 ENCOUNTER — IMMUNIZATION (OUTPATIENT)
Dept: NURSING | Facility: CLINIC | Age: 51
End: 2021-03-19
Payer: COMMERCIAL

## 2021-03-19 PROCEDURE — 0001A PR COVID VAC PFIZER DIL RECON 30 MCG/0.3 ML IM: CPT

## 2021-03-19 PROCEDURE — 91300 PR COVID VAC PFIZER DIL RECON 30 MCG/0.3 ML IM: CPT

## 2021-03-22 NOTE — PROGRESS NOTES
Outpatient Physical Therapy Discharge Note     Patient: Chavez Ca  : 1970    Beginning/End Dates of Reporting Period:  2020 to 3/22/2021    Referring Provider: Sonia Saleem MD    Therapy Diagnosis: Venous insufficiency edema     Client Self Report: Pt was seen for Neuro/ balance/ gait eval immediately prior to Edema session. Required frequent redirection to task. Reports no recent change to BLE, but having difficulty getting new compression stockings. Has multiple other edema garments, but due to weight gain, they do not currently fit.     Objective Measurements:   Objective Measure: Edema  Details: 1+ pitting of dorsal foot, no pitting at pretibial area bilaterally  Objective Measure: Skin  Details: Intact overall, but significant callous to left heel with cracks. Denies any open areas, weeping or infection of either leg.      Outcome Measures (most recent score):  Lymphedema Life Impact Scale (score range 0-72). A higher score indicates greater impairment.: 41    Goals:  Goal Identifier HEP   Goal Description Pt will demonstrate accurate completion of self MLD for independent completion to facilitate improved lymph drainage.   Target Date 20   Date Met      Progress:     Goal Identifier Compression   Goal Description Pt will get and utilize new compression garments to prevent worsening of edema to prevent skin breakdown and improve fit of clothing   Target Date 20   Date Met      Progress:         Progress Toward Goals:   Progress limited due to: Pt failed to return for further Edema therapy appointments with no known progress to report.       Plan:  Discharge from therapy.    Discharge:    Reason for Discharge: Patient has failed to schedule further appointments.    Equipment Issued: HEP, order requested for new compression garments    Discharge Plan: Patient to continue home program.

## 2021-04-09 ENCOUNTER — IMMUNIZATION (OUTPATIENT)
Dept: NURSING | Facility: CLINIC | Age: 51
End: 2021-04-09
Attending: INTERNAL MEDICINE
Payer: COMMERCIAL

## 2021-04-09 PROCEDURE — 0002A PR COVID VAC PFIZER DIL RECON 30 MCG/0.3 ML IM: CPT

## 2021-04-09 PROCEDURE — 91300 PR COVID VAC PFIZER DIL RECON 30 MCG/0.3 ML IM: CPT

## 2021-04-17 ENCOUNTER — HEALTH MAINTENANCE LETTER (OUTPATIENT)
Age: 51
End: 2021-04-17

## 2021-04-30 ENCOUNTER — TELEPHONE (OUTPATIENT)
Dept: INTERNAL MEDICINE | Facility: CLINIC | Age: 51
End: 2021-04-30

## 2021-04-30 NOTE — TELEPHONE ENCOUNTER
Patient walks in to the clinic today asking for refill of controlled medication. Patient had cancelled previous re-check visit with Dr. Milligan as he had not visited referrals. States dental referral is incorrect and derm referral was not placed.     On review of his chart the derm referral was not placed at his last visit. It is likely that [facility] dental clinic is not accepting new patients and that is why he could not schedule. I would maybe recommend social work referral to help him with this, he is not good at following up on these kinds of things on his own.     I let the patient know that the medication he is requesting is a controlled one, and he for sure needs to follow up with the provider in person.

## 2021-05-03 ENCOUNTER — TELEPHONE (OUTPATIENT)
Dept: INTERNAL MEDICINE | Facility: CLINIC | Age: 51
End: 2021-05-03

## 2021-05-03 DIAGNOSIS — G47.01 INSOMNIA DUE TO MEDICAL CONDITION: ICD-10-CM

## 2021-05-03 DIAGNOSIS — E87.6 POTASSIUM (K) DEFICIENCY: ICD-10-CM

## 2021-05-03 RX ORDER — ZOLPIDEM TARTRATE 10 MG/1
10 TABLET ORAL
Qty: 30 TABLET | Refills: 0 | Status: CANCELLED | OUTPATIENT
Start: 2021-05-03

## 2021-05-03 RX ORDER — POTASSIUM CHLORIDE 1.5 G/1.58G
20 POWDER, FOR SOLUTION ORAL DAILY
Status: CANCELLED | OUTPATIENT
Start: 2021-05-03

## 2021-05-03 NOTE — TELEPHONE ENCOUNTER
Fax from [facility] requests refill of zolpidem (AMBIEN) 10 MG tablet.    Louise Willson RN on 5/3/2021 at 7:47 AM

## 2021-05-03 NOTE — TELEPHONE ENCOUNTER
potassium chloride (KLOR-CON) 20 MEQ packet    Last Written Prescription Date:  3/16/2021  Last Fill Quantity: 7,   # refills: 0  Last Office Visit : 3/16/2021  Future Office visit: 5/4/2021    Routing refill request to provider for review/approval because:  Only 7 packets sent to pharm last order.     Would Provider like a 90 day supply with refills?   Or a follow up lab for Pt care?     Please advise       Kusum Wise RN  Central Triage Red Flags/Med Refills

## 2021-05-04 ENCOUNTER — OFFICE VISIT (OUTPATIENT)
Dept: INTERNAL MEDICINE | Facility: CLINIC | Age: 51
End: 2021-05-04
Payer: COMMERCIAL

## 2021-05-04 ENCOUNTER — ANCILLARY PROCEDURE (OUTPATIENT)
Dept: GENERAL RADIOLOGY | Facility: CLINIC | Age: 51
End: 2021-05-04
Attending: PEDIATRICS
Payer: COMMERCIAL

## 2021-05-04 ENCOUNTER — TELEPHONE (OUTPATIENT)
Dept: INTERNAL MEDICINE | Facility: CLINIC | Age: 51
End: 2021-05-04

## 2021-05-04 ENCOUNTER — HOSPITAL ENCOUNTER (INPATIENT)
Facility: CLINIC | Age: 51
LOS: 2 days | Discharge: HOME | DRG: 641 | End: 2021-05-06
Attending: EMERGENCY MEDICINE | Admitting: FAMILY MEDICINE
Payer: COMMERCIAL

## 2021-05-04 VITALS
HEART RATE: 101 BPM | SYSTOLIC BLOOD PRESSURE: 139 MMHG | OXYGEN SATURATION: 99 % | DIASTOLIC BLOOD PRESSURE: 84 MMHG | BODY MASS INDEX: 49.41 KG/M2 | WEIGHT: 315 LBS

## 2021-05-04 DIAGNOSIS — E87.6 POTASSIUM (K) DEFICIENCY: ICD-10-CM

## 2021-05-04 DIAGNOSIS — R06.02 SOB (SHORTNESS OF BREATH): ICD-10-CM

## 2021-05-04 DIAGNOSIS — G60.3 IDIOPATHIC PROGRESSIVE POLYNEUROPATHY: Chronic | ICD-10-CM

## 2021-05-04 DIAGNOSIS — G47.9 SLEEP DISORDER: ICD-10-CM

## 2021-05-04 DIAGNOSIS — G60.3 IDIOPATHIC PROGRESSIVE NEUROPATHY: ICD-10-CM

## 2021-05-04 DIAGNOSIS — G47.01 INSOMNIA DUE TO MEDICAL CONDITION: ICD-10-CM

## 2021-05-04 DIAGNOSIS — L21.9 SEBORRHEIC DERMATITIS: ICD-10-CM

## 2021-05-04 DIAGNOSIS — K08.9 POOR DENTITION: ICD-10-CM

## 2021-05-04 DIAGNOSIS — R06.00 DYSPNEA, UNSPECIFIED TYPE: ICD-10-CM

## 2021-05-04 DIAGNOSIS — R73.03 PREDIABETES: ICD-10-CM

## 2021-05-04 DIAGNOSIS — G89.29 CHRONIC PAIN OF LEFT KNEE: ICD-10-CM

## 2021-05-04 DIAGNOSIS — Z87.19 HISTORY OF DENTAL PROBLEMS: ICD-10-CM

## 2021-05-04 DIAGNOSIS — E61.1 IRON DEFICIENCY: ICD-10-CM

## 2021-05-04 DIAGNOSIS — E87.6 HYPOKALEMIA: ICD-10-CM

## 2021-05-04 DIAGNOSIS — Z20.822 COVID-19 RULED OUT BY LABORATORY TESTING: ICD-10-CM

## 2021-05-04 DIAGNOSIS — M25.562 CHRONIC PAIN OF LEFT KNEE: ICD-10-CM

## 2021-05-04 DIAGNOSIS — M62.81 GENERALIZED MUSCLE WEAKNESS: ICD-10-CM

## 2021-05-04 DIAGNOSIS — I87.303 STASIS EDEMA OF BOTH LOWER EXTREMITIES: ICD-10-CM

## 2021-05-04 DIAGNOSIS — Z74.09 IMPAIRED MOBILITY: Primary | ICD-10-CM

## 2021-05-04 LAB
ALBUMIN SERPL-MCNC: 4.3 G/DL (ref 3.4–5)
ALP SERPL-CCNC: 80 U/L (ref 40–150)
ALT SERPL W P-5'-P-CCNC: 29 U/L (ref 0–70)
ANION GAP SERPL CALCULATED.3IONS-SCNC: 10 MMOL/L (ref 3–14)
AST SERPL W P-5'-P-CCNC: 27 U/L (ref 0–45)
BILIRUB SERPL-MCNC: 0.5 MG/DL (ref 0.2–1.3)
BUN SERPL-MCNC: 28 MG/DL (ref 7–30)
CALCIUM SERPL-MCNC: 9.5 MG/DL (ref 8.5–10.1)
CHLORIDE SERPL-SCNC: 100 MMOL/L (ref 94–109)
CO2 SERPL-SCNC: 27 MMOL/L (ref 20–32)
CREAT SERPL-MCNC: 1.23 MG/DL (ref 0.66–1.25)
ERYTHROCYTE [DISTWIDTH] IN BLOOD BY AUTOMATED COUNT: 13.3 % (ref 10–15)
FERRITIN SERPL-MCNC: 129 NG/ML (ref 26–388)
GFR SERPL CREATININE-BSD FRML MDRD: 67 ML/MIN/{1.73_M2}
GLUCOSE SERPL-MCNC: 105 MG/DL (ref 70–99)
HBA1C MFR BLD: 6 % (ref 0–5.6)
HCT VFR BLD AUTO: 44.1 % (ref 40–53)
HGB BLD-MCNC: 14.9 G/DL (ref 13.3–17.7)
LABORATORY COMMENT REPORT: NORMAL
MAGNESIUM SERPL-MCNC: 2.1 MG/DL (ref 1.6–2.3)
MAGNESIUM SERPL-MCNC: 2.2 MG/DL (ref 1.6–2.3)
MCH RBC QN AUTO: 29.8 PG (ref 26.5–33)
MCHC RBC AUTO-ENTMCNC: 33.8 G/DL (ref 31.5–36.5)
MCV RBC AUTO: 88 FL (ref 78–100)
NT-PROBNP SERPL-MCNC: 29 PG/ML (ref 0–900)
PLATELET # BLD AUTO: 291 10E9/L (ref 150–450)
POTASSIUM SERPL-SCNC: 2.3 MMOL/L (ref 3.4–5.3)
POTASSIUM SERPL-SCNC: 2.4 MMOL/L (ref 3.4–5.3)
PROT SERPL-MCNC: 8.8 G/DL (ref 6.8–8.8)
RBC # BLD AUTO: 5 10E12/L (ref 4.4–5.9)
SARS-COV-2 RNA RESP QL NAA+PROBE: NEGATIVE
SODIUM SERPL-SCNC: 138 MMOL/L (ref 133–144)
SPECIMEN SOURCE: NORMAL
TROPONIN I SERPL-MCNC: <0.015 UG/L (ref 0–0.04)
WBC # BLD AUTO: 9.1 10E9/L (ref 4–11)

## 2021-05-04 PROCEDURE — 85027 COMPLETE CBC AUTOMATED: CPT | Performed by: PATHOLOGY

## 2021-05-04 PROCEDURE — C9803 HOPD COVID-19 SPEC COLLECT: HCPCS | Performed by: EMERGENCY MEDICINE

## 2021-05-04 PROCEDURE — 99285 EMERGENCY DEPT VISIT HI MDM: CPT | Mod: 25 | Performed by: EMERGENCY MEDICINE

## 2021-05-04 PROCEDURE — 93005 ELECTROCARDIOGRAM TRACING: CPT | Mod: 76 | Performed by: EMERGENCY MEDICINE

## 2021-05-04 PROCEDURE — 83880 ASSAY OF NATRIURETIC PEPTIDE: CPT | Performed by: EMERGENCY MEDICINE

## 2021-05-04 PROCEDURE — 250N000013 HC RX MED GY IP 250 OP 250 PS 637: Performed by: STUDENT IN AN ORGANIZED HEALTH CARE EDUCATION/TRAINING PROGRAM

## 2021-05-04 PROCEDURE — 36415 COLL VENOUS BLD VENIPUNCTURE: CPT | Performed by: PATHOLOGY

## 2021-05-04 PROCEDURE — 36415 COLL VENOUS BLD VENIPUNCTURE: CPT | Performed by: FAMILY MEDICINE

## 2021-05-04 PROCEDURE — 99215 OFFICE O/P EST HI 40 MIN: CPT | Performed by: PEDIATRICS

## 2021-05-04 PROCEDURE — 99222 1ST HOSP IP/OBS MODERATE 55: CPT | Mod: AI | Performed by: FAMILY MEDICINE

## 2021-05-04 PROCEDURE — 250N000011 HC RX IP 250 OP 636: Performed by: EMERGENCY MEDICINE

## 2021-05-04 PROCEDURE — 83735 ASSAY OF MAGNESIUM: CPT | Performed by: EMERGENCY MEDICINE

## 2021-05-04 PROCEDURE — 99417 PROLNG OP E/M EACH 15 MIN: CPT | Performed by: PEDIATRICS

## 2021-05-04 PROCEDURE — 120N000002 HC R&B MED SURG/OB UMMC

## 2021-05-04 PROCEDURE — 80053 COMPREHEN METABOLIC PANEL: CPT | Performed by: PATHOLOGY

## 2021-05-04 PROCEDURE — 96365 THER/PROPH/DIAG IV INF INIT: CPT | Performed by: EMERGENCY MEDICINE

## 2021-05-04 PROCEDURE — U0003 INFECTIOUS AGENT DETECTION BY NUCLEIC ACID (DNA OR RNA); SEVERE ACUTE RESPIRATORY SYNDROME CORONAVIRUS 2 (SARS-COV-2) (CORONAVIRUS DISEASE [COVID-19]), AMPLIFIED PROBE TECHNIQUE, MAKING USE OF HIGH THROUGHPUT TECHNOLOGIES AS DESCRIBED BY CMS-2020-01-R: HCPCS | Performed by: EMERGENCY MEDICINE

## 2021-05-04 PROCEDURE — 96366 THER/PROPH/DIAG IV INF ADDON: CPT | Performed by: EMERGENCY MEDICINE

## 2021-05-04 PROCEDURE — 82728 ASSAY OF FERRITIN: CPT | Performed by: PATHOLOGY

## 2021-05-04 PROCEDURE — 84484 ASSAY OF TROPONIN QUANT: CPT | Performed by: EMERGENCY MEDICINE

## 2021-05-04 PROCEDURE — 83036 HEMOGLOBIN GLYCOSYLATED A1C: CPT | Performed by: PATHOLOGY

## 2021-05-04 PROCEDURE — 71046 X-RAY EXAM CHEST 2 VIEWS: CPT | Performed by: RADIOLOGY

## 2021-05-04 PROCEDURE — 83735 ASSAY OF MAGNESIUM: CPT | Performed by: FAMILY MEDICINE

## 2021-05-04 PROCEDURE — 84132 ASSAY OF SERUM POTASSIUM: CPT | Performed by: FAMILY MEDICINE

## 2021-05-04 PROCEDURE — U0005 INFEC AGEN DETEC AMPLI PROBE: HCPCS | Performed by: EMERGENCY MEDICINE

## 2021-05-04 PROCEDURE — 250N000013 HC RX MED GY IP 250 OP 250 PS 637: Performed by: EMERGENCY MEDICINE

## 2021-05-04 PROCEDURE — 93010 ELECTROCARDIOGRAM REPORT: CPT | Performed by: EMERGENCY MEDICINE

## 2021-05-04 PROCEDURE — 93005 ELECTROCARDIOGRAM TRACING: CPT | Performed by: EMERGENCY MEDICINE

## 2021-05-04 RX ORDER — PHENTERMINE HYDROCHLORIDE 37.5 MG/1
37.5 TABLET ORAL EVERY MORNING
Status: DISCONTINUED | OUTPATIENT
Start: 2021-05-05 | End: 2021-05-05

## 2021-05-04 RX ORDER — FUROSEMIDE 20 MG
60 TABLET ORAL 2 TIMES DAILY
Qty: 540 TABLET | Refills: 3 | Status: SHIPPED | OUTPATIENT
Start: 2021-05-04 | End: 2023-02-02

## 2021-05-04 RX ORDER — ACETAMINOPHEN 325 MG/1
650 TABLET ORAL EVERY 4 HOURS PRN
Status: DISCONTINUED | OUTPATIENT
Start: 2021-05-04 | End: 2021-05-06 | Stop reason: HOSPADM

## 2021-05-04 RX ORDER — AMITRIPTYLINE HYDROCHLORIDE 100 MG/1
100 TABLET ORAL AT BEDTIME
Qty: 90 TABLET | Refills: 3 | Status: SHIPPED | OUTPATIENT
Start: 2021-05-04 | End: 2024-01-11

## 2021-05-04 RX ORDER — KETOCONAZOLE 20 MG/ML
SHAMPOO TOPICAL
Qty: 120 ML | Refills: 3 | Status: SHIPPED | OUTPATIENT
Start: 2021-05-04 | End: 2022-08-09

## 2021-05-04 RX ORDER — POLYETHYLENE GLYCOL 3350 17 G/17G
17 POWDER, FOR SOLUTION ORAL DAILY
Status: DISCONTINUED | OUTPATIENT
Start: 2021-05-05 | End: 2021-05-06 | Stop reason: HOSPADM

## 2021-05-04 RX ORDER — TRAZODONE HYDROCHLORIDE 100 MG/1
100 TABLET ORAL AT BEDTIME
Qty: 90 TABLET | Refills: 3 | Status: SHIPPED | OUTPATIENT
Start: 2021-05-04 | End: 2024-01-11

## 2021-05-04 RX ORDER — ZOLPIDEM TARTRATE 10 MG/1
10 TABLET ORAL
Status: DISCONTINUED | OUTPATIENT
Start: 2021-05-04 | End: 2021-05-06 | Stop reason: HOSPADM

## 2021-05-04 RX ORDER — POTASSIUM CHLORIDE 1.5 G/1.58G
20 POWDER, FOR SOLUTION ORAL DAILY
Qty: 7 PACKET | Refills: 0 | Status: CANCELLED | OUTPATIENT
Start: 2021-05-04

## 2021-05-04 RX ORDER — POTASSIUM CHLORIDE 1.5 G/1.58G
20 POWDER, FOR SOLUTION ORAL DAILY
Qty: 90 PACKET | Refills: 3 | Status: ON HOLD | OUTPATIENT
Start: 2021-05-04 | End: 2021-05-06

## 2021-05-04 RX ORDER — ZOLPIDEM TARTRATE 10 MG/1
10 TABLET ORAL
Qty: 30 TABLET | Refills: 2 | Status: SHIPPED | OUTPATIENT
Start: 2021-05-04 | End: 2022-04-23

## 2021-05-04 RX ORDER — LIDOCAINE 40 MG/G
CREAM TOPICAL
Status: DISCONTINUED | OUTPATIENT
Start: 2021-05-04 | End: 2021-05-06 | Stop reason: HOSPADM

## 2021-05-04 RX ORDER — TOPIRAMATE 200 MG/1
200 TABLET, FILM COATED ORAL 2 TIMES DAILY
Status: DISCONTINUED | OUTPATIENT
Start: 2021-05-04 | End: 2021-05-06 | Stop reason: HOSPADM

## 2021-05-04 RX ORDER — AMITRIPTYLINE HYDROCHLORIDE 100 MG/1
100 TABLET ORAL AT BEDTIME
Status: DISCONTINUED | OUTPATIENT
Start: 2021-05-04 | End: 2021-05-06 | Stop reason: HOSPADM

## 2021-05-04 RX ORDER — ONDANSETRON 2 MG/ML
4 INJECTION INTRAMUSCULAR; INTRAVENOUS EVERY 6 HOURS PRN
Status: DISCONTINUED | OUTPATIENT
Start: 2021-05-04 | End: 2021-05-06 | Stop reason: HOSPADM

## 2021-05-04 RX ORDER — ONDANSETRON 4 MG/1
4 TABLET, ORALLY DISINTEGRATING ORAL EVERY 6 HOURS PRN
Status: DISCONTINUED | OUTPATIENT
Start: 2021-05-04 | End: 2021-05-06 | Stop reason: HOSPADM

## 2021-05-04 RX ORDER — POTASSIUM CHLORIDE 1.5 G/1.58G
40 POWDER, FOR SOLUTION ORAL ONCE
Status: COMPLETED | OUTPATIENT
Start: 2021-05-04 | End: 2021-05-04

## 2021-05-04 RX ORDER — POTASSIUM CHLORIDE 7.45 MG/ML
10 INJECTION INTRAVENOUS ONCE
Status: COMPLETED | OUTPATIENT
Start: 2021-05-04 | End: 2021-05-04

## 2021-05-04 RX ADMIN — POTASSIUM CHLORIDE 10 MEQ: 7.46 INJECTION, SOLUTION INTRAVENOUS at 19:07

## 2021-05-04 RX ADMIN — POTASSIUM CHLORIDE 40 MEQ: 1.5 POWDER, FOR SOLUTION ORAL at 19:07

## 2021-05-04 RX ADMIN — AMITRIPTYLINE HYDROCHLORIDE 100 MG: 100 TABLET, FILM COATED ORAL at 21:10

## 2021-05-04 RX ADMIN — TOPIRAMATE 200 MG: 200 TABLET ORAL at 21:10

## 2021-05-04 ASSESSMENT — ENCOUNTER SYMPTOMS
DYSURIA: 0
PALPITATIONS: 0
SHORTNESS OF BREATH: 1
SORE THROAT: 1
ROS GI COMMENTS: INTERMITTENT DIARRHEA
STRIDOR: 0
COUGH: 1
ABDOMINAL PAIN: 0
FEVER: 0
FATIGUE: 1
VOMITING: 0
NAUSEA: 0
DIARRHEA: 1
WHEEZING: 0

## 2021-05-04 ASSESSMENT — MIFFLIN-ST. JEOR: SCORE: 2242.46

## 2021-05-04 NOTE — NURSING NOTE
Chief Complaint   Patient presents with     Refill Request     refill Ambien and potassium     Dental Problem     hasn't been able to see dentist because requiring Ambien prescription       Marisol Villegas, EMT at 11:59 AM on 5/4/2021

## 2021-05-04 NOTE — ED PROVIDER NOTES
ED Provider Note  [facility]      History   No chief complaint on file.    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 Patient was seen in clinic today for multiple concerns, including increased dyspnea on exertion x3 weeks and generalized weakness. He was found to have a potassium of 2.3 and advised to be seen in the ED.     Patient is a difficult and meandering historian and it is difficult to get a clear story from him. It does appear that he went to primary care today to establish primary care, because he has had inconsistant primary care the past several years. The patient reports that while he was there he was discussing his frustrations with primary care physician regards to the inconsistency of medications and refills of medications. While he was speaking to the patient the primary clinic physician noticed that the patient was quite dyspneic. The patient told his primary care physician that he has been quite short of breath more quite some time, though he states it has been getting worse, he has a difficult time nailing down when or for how long it's been getting worse for him. He reports that he has a cough that is productive of clear sputum. He is not certain how long this has been going on. He does report some chronic nasal congestion and dry throat. He denies chest pain or palpitations. He sleeps in a recliner and cannot sleep flat due to SOB. He has previously been evaluated for VERN but cannot tolerate CPAP. The patient reports that he has had both COVID vaccines. The second one in early April. The patient does report that he is quite dyspneic to the extent that he has difficulty  walking across the room without shortness of breath. Patient does not have a history of heart failure. Patient had previously had a tracheotomy many years ago and was trached for approximately 18 months before decannulation. 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 Prior notes make mention of concern for potential dynamic airway collapse but it does not appear he has followed up with regard to this.    This part of the medical record was transcribed by Dony Rosa Scribpatricia.     Past Medical History  Past Medical History:   Diagnosis Date     Allergic rhinitis      Benign positional vertigo 2011    never quite went away     Bilateral carpal tunnel syndrome 7/18/2015     Chest pain      Chronic sinusitis 2017    I don't know Please look     Chronic tonsillitis      Coronary artery disease 3/8/16    right side chest pain     Depressive disorder      Depressive disorder, not elsewhere classified 7/30/2012     Gastro-oesophageal reflux disease      Hearing problem      Hypertension 2008     Idiopathic progressive polyneuropathy 10/15/2012     Learning disability      Neck mass     parapharyngeal, benign     Obesity, Class III, BMI 40-49.9 (morbid obesity) (H)      Obstructive sleep apnea      Psychological factors associated with another disorder 8/21/2012     Recurrent otitis media 2016    might be what the pain in my ears     Reduced vision      Tinnitus 2016    I would hear a steady light buzzing sound and other can't     Trigeminal neuralgia      Weakness 8/26/2011     Past Surgical History:   Procedure Laterality Date     BIOPSY  2011    To figure out what kind of tumor I had     COLONOSCOPY N/A 10/16/2020    Procedure: INCOMPLETE COLONOSCOPY;  Surgeon: Ham Cherry MD;  Location: [facility] OR     ENDOSCOPIC RETROGRADE CHOLANGIOPANCREATOGRAM N/A 8/27/2015    Procedure: COMBINED ENDOSCOPIC RETROGRADE CHOLANGIOPANCREATOGRAPHY,  PLACE TUBE/STENT;  Surgeon: Baldomero Zacarias MD;  Location: [facility] OR     ENDOSCOPIC RETROGRADE CHOLANGIOPANCREATOGRAM N/A 11/6/2015    Procedure: COMBINED ENDOSCOPIC RETROGRADE CHOLANGIOPANCREATOGRAPHY, REMOVE FOREIGN BODY OR STENT/TUBE;  Surgeon: Baldomero Zacarias MD;  Location: [facility] OR     EXCISE LESION INTRAORAL  6/29/2011    Procedure:EXCISE LESION INTRAORAL; TRANSCERVICAL APPROACH TO LEFT PHARYNGEAL SPACE MASS REMOVAL ; Surgeon:BARBARA PHILLIPS; Location:[facility] OR     HC VASCULAR SURGERY PROCEDURE UNLIST  12/29/15     LAPAROSCOPIC CHOLECYSTECTOMY N/A 8/23/2015    Procedure: LAPAROSCOPIC CHOLECYSTECTOMY;  Surgeon: Kvng Witt MD;  Location: [facility] OR     LARYNGOSCOPY WITH BIOPSY(IES)  6/18/2014    Procedure: LARYNGOSCOPY WITH BIOPSY(IES);  Surgeon: Barbara Phillips MD;  Location: [facility] OR     LASER CO2 LARYNGOSCOPY  12/7/2011    Procedure:LASER CO2 LARYNGOSCOPY; Micro-Direct Laryngoscopy with CO2 Laser Standby / Excision Tracheal Grandulization, Trach Tube Change / Kenalog application. / Balloon Dilation of Subglottic Stenosis.*Latex Safe Room* Trach Tube = Shiley 6.4mm I.D. / 10.8MM O.D. / 76MM  Cuffless.; Surgeon:BARBARA PHILLIPS; [facility]     ORTHOPEDIC SURGERY  5/2016    Broken Right hand & Fracture Right shoulder     TRACHEOSTOMY  4/22/2011    Procedure:TRACHEOSTOMY; possible awake; Surgeon:BARBARA PHILLIPS; Location:[facility] OR     TRACHEOSTOMY  6/29/2011    Procedure:TRACHEOSTOMY; REMOVE AND REPLACE SHILEY 6 XLT; Surgeon:BARBARA PHILLIPS; Location:[facility] OR     VASCULAR SURGERY  2008-[facility]     amitriptyline (ELAVIL) 100 MG tablet  CETAPHIL (CETAPHIL) lotion  COMPRESSION STOCKINGS  diclofenac (VOLTAREN) 1 % topical gel  furosemide (LASIX) 20 MG tablet  ketoconazole (NIZORAL) 2 % external shampoo  order for DME  order for DME  order for DME  phentermine (ADIPEX-P) 37.5 MG tablet  potassium chloride (KLOR-CON) 20 MEQ packet  topiramate (TOPAMAX) 100 MG  "tablet  traZODone (DESYREL) 100 MG tablet  zolpidem (AMBIEN) 10 MG tablet      Allergies   Allergen Reactions     Fentanyl Itching     Patch     Meloxicam Other (See Comments)     Side pains.     Minocycline Rash     Patient reports adverse reaction was pigmentation which has resolved with drug discontinuation.     Family History  Family History   Adopted: Yes   Problem Relation Age of Onset     Family History Negative Other         Does not know family     Unknown/Adopted No family hx of      Social History   Social History     Tobacco Use     Smoking status: Never Smoker     Smokeless tobacco: Never Used     Tobacco comment: Never started   Substance Use Topics     Alcohol use: No     Alcohol/week: 0.0 standard drinks     Comment: rarely     Drug use: No      Past medical history, past surgical history, medications, allergies, family history, and social history were reviewed with the patient. No additional pertinent items.       Review of Systems   Constitutional: Positive for fatigue. Negative for fever.   HENT: Positive for congestion and sore throat.    Respiratory: Positive for cough (clear sputum) and shortness of breath. Negative for wheezing and stridor.    Cardiovascular: Positive for leg swelling. Negative for chest pain and palpitations.        Wears compression stockings   Gastrointestinal: Positive for diarrhea. Negative for abdominal pain, nausea and vomiting.        Intermittent diarrhea   Genitourinary: Negative for dysuria.   Musculoskeletal:        Ambulation difficulty (hx of idiopathic neuropathy)   All other systems reviewed and are negative.    A complete review of systems was performed with pertinent positives and negatives noted in the HPI, and all other systems negative.    Physical Exam   BP: (!) 141/97  Pulse: 110  Temp: 98  F (36.7  C)  Resp: 16  Height: 170.2 cm (5' 7\")  Weight: 142.9 kg (315 lb)  SpO2: 97 %  Physical Exam  Vitals signs and nursing note reviewed.   Constitutional:       " Appearance: He is well-developed.      Comments: Adult male, obese, alert, appears to have some difficulty completing full sentences due to dyspnea   HENT:      Head: Normocephalic.      Mouth/Throat:      Mouth: Mucous membranes are moist.   Eyes:      Pupils: Pupils are equal, round, and reactive to light.   Neck:      Comments: Healed trach scar  Cardiovascular:      Rate and Rhythm: Normal rate and regular rhythm.      Heart sounds: Normal heart sounds. No murmur. No gallop.    Pulmonary:      Effort: Pulmonary effort is normal. No respiratory distress.      Breath sounds: Normal breath sounds. No wheezing or rales.   Abdominal:      General: Bowel sounds are normal. There is no distension.      Palpations: Abdomen is soft.      Tenderness: There is no abdominal tenderness. There is no guarding or rebound.      Comments: Obese, soft, no focal TTP   Musculoskeletal:      Comments: Compression stockings in place   Skin:     General: Skin is warm and dry.   Neurological:      Mental Status: He is alert and oriented to person, place, and time.   Psychiatric:      Comments: tangential         ED Course     5:02 PM  The patient was seen and examined by Samina Puente MD in Room ED20.    Procedures             EKG Interpretation:      Interpreted by Samina Puente MD  Time reviewed: 1820  Symptoms at time of EKG: SOB   Rhythm: NSR with PACs  Rate: Normal  Axis: Right Axis Deviation  Ectopy: none  Conduction: normal  ST Segments/ T Waves: No acute ischemic changes  Q Waves: none  Comparison to prior: Unchanged from 2/2018    Clinical Impression: no acute changes      The medical record was reviewed and interpreted.  Current labs reviewed and interpreted.  EKG reviewed and interpreted: no acute changes.       Results for orders placed or performed during the hospital encounter of 05/04/21   Magnesium     Status: None   Result Value Ref Range    Magnesium 2.1 1.6 - 2.3 mg/dL   Nt probnp inpatient     Status: None    Result Value Ref Range    N-Terminal Pro BNP Inpatient 29 0 - 900 pg/mL   Troponin I     Status: None   Result Value Ref Range    Troponin I ES <0.015 0.000 - 0.045 ug/L   Results for orders placed or performed in visit on 05/04/21   XR Chest 2 Views     Status: None    Narrative    Exam: XR CHEST 2 VW, 5/4/2021 1:56 PM    Indication: dyspnea; SOB (shortness of breath)    Comparison: 12/21/2017    Findings:   Borderline cardiomegaly is stable. Pulmonary vasculature within normal  limits. Pleural spaces are clear. Lungs are clear.      Impression    Impression: Borderline cardiomegaly without significant edema. Lungs  are clear.    MAGALY HERNANDEZ MD   Results for orders placed or performed in visit on 05/04/21   Ferritin     Status: None   Result Value Ref Range    Ferritin 129 26 - 388 ng/mL   CBC with platelets     Status: None   Result Value Ref Range    WBC 9.1 4.0 - 11.0 10e9/L    RBC Count 5.00 4.4 - 5.9 10e12/L    Hemoglobin 14.9 13.3 - 17.7 g/dL    Hematocrit 44.1 40.0 - 53.0 %    MCV 88 78 - 100 fl    MCH 29.8 26.5 - 33.0 pg    MCHC 33.8 31.5 - 36.5 g/dL    RDW 13.3 10.0 - 15.0 %    Platelet Count 291 150 - 450 10e9/L   Comprehensive metabolic panel     Status: Abnormal   Result Value Ref Range    Sodium 138 133 - 144 mmol/L    Potassium 2.3 (LL) 3.4 - 5.3 mmol/L    Chloride 100 94 - 109 mmol/L    Carbon Dioxide 27 20 - 32 mmol/L    Anion Gap 10 3 - 14 mmol/L    Glucose 105 (H) 70 - 99 mg/dL    Urea Nitrogen 28 7 - 30 mg/dL    Creatinine 1.23 0.66 - 1.25 mg/dL    GFR Estimate 67 >60 mL/min/[1.73_m2]    GFR Estimate If Black 78 >60 mL/min/[1.73_m2]    Calcium 9.5 8.5 - 10.1 mg/dL    Bilirubin Total 0.5 0.2 - 1.3 mg/dL    Albumin 4.3 3.4 - 5.0 g/dL    Protein Total 8.8 6.8 - 8.8 g/dL    Alkaline Phosphatase 80 40 - 150 U/L    ALT 29 0 - 70 U/L    AST 27 0 - 45 U/L   Hemoglobin A1c     Status: Abnormal   Result Value Ref Range    Hemoglobin A1C 6.0 (H) 0 - 5.6 %     Medications   potassium chloride 10 mEq in  100 mL sterile water intermittent infusion (premix) (10 mEq Intravenous New Bag 5/4/21 1907)   potassium chloride (KLOR-CON) Packet 40 mEq (40 mEq Oral Given 5/4/21 1907)        Assessments & Plan (with Medical Decision Making)   Patient presents for the above complaints. On my evaluation patient is alert, cooperative. Does appear to have some dyspnea and having difficulty speaking complete sentences. Records were reviewed including the note from the primary visit from today along with today's labs. Potassium today was 2.3. The remainder of the electrolytes were within normal limits, the CBC was within normal limits. CXR today was done from the clinic today which shows borderline cardiomegaly without significant edema. EKG was not done today in clinic. I do see that an order for an ECHO was placed for the future.     Given the patient's degree of dyspnea, combined with lack of consistent primary care follow up and severe hypokalemia I believe he would benefit from admission and hospital at this point.  Though his dyspnea could well be chronic, he does have prior history of tracheostomy and subglottic stenosis with prior notes that mention dynamic airway collapse.  He is not had this evaluated.  This could certainly be contributing to his dyspnea as well as any potential cardiac etiology.  Additionally, pulmonary hypertension also needs to be considered.  He has not had a recent echocardiogram and I do believe that he would benefit from an echo.  Chest x-ray from clinic visit today shows cardiomegaly without evidence of pulmonary edema.  BNP here is within normal limits and troponin is negative.  We will add on a magnesium to check his magnesium levels and start replacing his postassium.  Magnesium level was normal.  Potassium replacement has begun with 40 mEq of oral potassium and 10 mEq of IV potassium.  EKG here was order and this was without acute change. Patient will need an ECHO while in the hospital. It may be  reasonable to get ENT to scope him given his known subglottic stenosis and prior tracheostomy, patient is a very poor candidate for outpatient follow up. Patient is agreeable to plan at this time.  Discussed with triage hospitalist who is in agreement with plan.    This part of the medical record was transcribed by Dony Rosa Scribpatricia.     I have reviewed the nursing notes. I have reviewed the findings, diagnosis, plan and need for follow up with the patient.    New Prescriptions    No medications on file       Final diagnoses:   Hypokalemia   Dyspnea, unspecified type       --  Samina Puente MD  formerly [facility] EMERGENCY DEPARTMENT  5/4/2021     Samina Puente MD  05/04/21 1916

## 2021-05-04 NOTE — PROGRESS NOTES
"Dear patient. Thank you for visiting with me. I want you to feel respected, understood, and empowered. \"Respect\" is valuing you as much as I would a close family member. \"Empowerment\" happens when you are fully informed, and can make the best possible decision for you.  Please ask me questions!  Challenge anything that is not clear.    Medical records are primarily used as memory aids for me and my colleagues. Things to know about my documentation style:  - The 'problem list' includes current symptoms or diagnoses, and some problems that are resolved but may return. I use the past medical history for problems not expected to return.  - I use single quotation marks for things that you or I said, when I want to clarify who was speaking.  - I use double quotation marks when copying a term from elsewhere in your records. Italics (besides here) may also denote a quotation.    If you have questions or concerns, please contact me; I will reply as soon as time allows.    About this visit:  PCP: Chavez Milligan MD   Visit type: problem-oriented  Time note ([id number], 69-83'): The total time (on the date of service) for this service was 70 minutes, including discussion/face-to-face, chart review, interpretation not otherwise reported, documentation, and updating of the computerized record.        Subjective     Chavez Ca is a 50 year old man, with concerns including:  Chief Complaint   Patient presents with     Refill Request     refill Ambien and potassium     Dental Problem     hasn't been able to see dentist because requiring Ambien prescription       Mr. Ca has a number of chronic medical issues, and has had intermittent care from several providers. He has had some medication refill problems, including recently receiving an inhaler from a provider he saw '7 years ago.'    He has been taking furosemide for idiopathic edema, along with intermittent use of compression stockings. In the past he was taking " potassium, but I had only renewed the potassium pending a blood draw, which did not happen.     He has been feeling more tired lately.    Objective     /84 (BP Location: Right arm, Patient Position: Sitting, Cuff Size: Adult Regular)   Pulse 101   Wt 143.1 kg (315 lb 8 oz)   SpO2 99%   BMI 49.41 kg/m      Physical Exam     Physical exam  * WNL = within normal limits    General: awake, alert, cooperative, no apparent distress, and appears stated age,  Head: normocephalic, atraumatic  Eyes: extraocular motion intact, sclera and conjunctivae WNL  Nose: externally WNL  Mouth: moist. Movement and mucosa WNL.  Neck exam:, supple, No remarkable lymphadenopathy, thyroid seems prominent but may be nearby adipose tissue  Heart: rate and rhythm WNL, no murmur or extra sounds noted  Respiratory exam:  cannot complete sentences without shortness of breath. He walked down to the bathroom and appeared dyspneic afterwards.    No sweating noted        Assessment & Plan     Dyspnea on exertion  Fatigue  Furosemide use without potassium  I am concerned about several different possible etiologies for his weakness and shortness of breath.  Also with this long-term use of Lasix, I could imagine concerned about long-term/worsening heart condition.  I have no immediate concern for the possibility of coronary syndrome however.  I can start with chest x-ray and blood tests.    Idiopathic edema  I informed him again that I am not sure that a diuretic is the best approach for his condition.  However I agree if he is going to be on furosemide, then he should be on adequate doses of potassium.  The medication refill issues he experienced may be because of intermittent care, which has been an issue for him because of Covid concerns.  I would like to have him come in on a monthly basis for the foreseeable future.        Addendum: Potassium critical low: 2.3.  Chronic hypokalemia is a reasonable explanation for at least some of his recent  dyspnea and subjective weakness.  I assume this has been an issue for several weeks.  With this timeline and his symptomatic status, he will need to have observation for treatment rather than simply restarting his medicine.  I called him immediately and got him on the bus.  He voiced a preference for coming back to the ER tomorrow, but I asked him to turn around and return now.                        No health maintenance letter needed   Noah Wu LPN 9:07 AM February 3, 2016

## 2021-05-04 NOTE — ED TRIAGE NOTES
Triage Assessment & Note:    Patient presents with: PT sent here from clinic and was advised to come to the ED for abnormal labs and an enlarged heart.  PT reports SOB that he has been getting more SOB x 3 weeks.      Home Treatments/Remedies: None    Febrile / Afebrile? Afebrile     Duration of C/o: Months     Jared Riddle RN  May 4, 2021

## 2021-05-04 NOTE — TELEPHONE ENCOUNTER
[facility] Call Center    Phone Message    May a detailed message be left on voicemail: yes     Reason for Call: Other: The patient would like a call back from the care team to understand why he was suggested to check in the [facility], please review and follow up with the patient with clarification.      Action Taken: Message routed to:  [facility] pcc    Travel Screening: Not Applicable

## 2021-05-05 LAB
ALBUMIN UR-MCNC: 30 MG/DL
ANION GAP SERPL CALCULATED.3IONS-SCNC: 8 MMOL/L (ref 3–14)
APPEARANCE UR: CLEAR
BILIRUB UR QL STRIP: NEGATIVE
BUN SERPL-MCNC: 25 MG/DL (ref 7–30)
CALCIUM SERPL-MCNC: 8.9 MG/DL (ref 8.5–10.1)
CHLORIDE SERPL-SCNC: 104 MMOL/L (ref 94–109)
CK SERPL-CCNC: 478 U/L (ref 30–300)
CO2 SERPL-SCNC: 26 MMOL/L (ref 20–32)
COLOR UR AUTO: YELLOW
CREAT SERPL-MCNC: 1.25 MG/DL (ref 0.66–1.25)
ERYTHROCYTE [DISTWIDTH] IN BLOOD BY AUTOMATED COUNT: 13.6 % (ref 10–15)
GFR SERPL CREATININE-BSD FRML MDRD: 66 ML/MIN/{1.73_M2}
GLUCOSE SERPL-MCNC: 104 MG/DL (ref 70–99)
GLUCOSE UR STRIP-MCNC: NEGATIVE MG/DL
HCT VFR BLD AUTO: 39.5 % (ref 40–53)
HGB BLD-MCNC: 12.9 G/DL (ref 13.3–17.7)
HGB UR QL STRIP: NEGATIVE
HYALINE CASTS #/AREA URNS LPF: 1 /LPF (ref 0–2)
INTERPRETATION ECG - MUSE: NORMAL
KETONES UR STRIP-MCNC: NEGATIVE MG/DL
LEUKOCYTE ESTERASE UR QL STRIP: NEGATIVE
MAGNESIUM SERPL-MCNC: 2.4 MG/DL (ref 1.6–2.3)
MCH RBC QN AUTO: 29 PG (ref 26.5–33)
MCHC RBC AUTO-ENTMCNC: 32.7 G/DL (ref 31.5–36.5)
MCV RBC AUTO: 89 FL (ref 78–100)
MUCOUS THREADS #/AREA URNS LPF: PRESENT /LPF
NITRATE UR QL: NEGATIVE
PH UR STRIP: 6.5 PH (ref 5–7)
PLATELET # BLD AUTO: 232 10E9/L (ref 150–450)
POTASSIUM SERPL-SCNC: 2.2 MMOL/L (ref 3.4–5.3)
POTASSIUM SERPL-SCNC: 2.6 MMOL/L (ref 3.4–5.3)
POTASSIUM SERPL-SCNC: 2.6 MMOL/L (ref 3.4–5.3)
POTASSIUM SERPL-SCNC: 3.4 MMOL/L (ref 3.4–5.3)
POTASSIUM SERPL-SCNC: 3.4 MMOL/L (ref 3.4–5.3)
RBC # BLD AUTO: 4.45 10E12/L (ref 4.4–5.9)
RBC #/AREA URNS AUTO: 2 /HPF (ref 0–2)
SODIUM SERPL-SCNC: 138 MMOL/L (ref 133–144)
SOURCE: ABNORMAL
SP GR UR STRIP: 1.03 (ref 1–1.03)
SQUAMOUS #/AREA URNS AUTO: 0 /HPF (ref 0–1)
UROBILINOGEN UR STRIP-MCNC: NORMAL MG/DL (ref 0–2)
WBC # BLD AUTO: 7 10E9/L (ref 4–11)
WBC #/AREA URNS AUTO: 1 /HPF (ref 0–5)

## 2021-05-05 PROCEDURE — 250N000011 HC RX IP 250 OP 636: Performed by: STUDENT IN AN ORGANIZED HEALTH CARE EDUCATION/TRAINING PROGRAM

## 2021-05-05 PROCEDURE — 84132 ASSAY OF SERUM POTASSIUM: CPT | Performed by: INTERNAL MEDICINE

## 2021-05-05 PROCEDURE — 36415 COLL VENOUS BLD VENIPUNCTURE: CPT | Performed by: STUDENT IN AN ORGANIZED HEALTH CARE EDUCATION/TRAINING PROGRAM

## 2021-05-05 PROCEDURE — 80048 BASIC METABOLIC PNL TOTAL CA: CPT | Performed by: STUDENT IN AN ORGANIZED HEALTH CARE EDUCATION/TRAINING PROGRAM

## 2021-05-05 PROCEDURE — 120N000002 HC R&B MED SURG/OB UMMC

## 2021-05-05 PROCEDURE — 99232 SBSQ HOSP IP/OBS MODERATE 35: CPT | Mod: GC | Performed by: FAMILY MEDICINE

## 2021-05-05 PROCEDURE — 84132 ASSAY OF SERUM POTASSIUM: CPT | Performed by: STUDENT IN AN ORGANIZED HEALTH CARE EDUCATION/TRAINING PROGRAM

## 2021-05-05 PROCEDURE — 250N000013 HC RX MED GY IP 250 OP 250 PS 637: Performed by: STUDENT IN AN ORGANIZED HEALTH CARE EDUCATION/TRAINING PROGRAM

## 2021-05-05 PROCEDURE — 82550 ASSAY OF CK (CPK): CPT | Performed by: STUDENT IN AN ORGANIZED HEALTH CARE EDUCATION/TRAINING PROGRAM

## 2021-05-05 PROCEDURE — 83735 ASSAY OF MAGNESIUM: CPT | Performed by: STUDENT IN AN ORGANIZED HEALTH CARE EDUCATION/TRAINING PROGRAM

## 2021-05-05 PROCEDURE — 93005 ELECTROCARDIOGRAM TRACING: CPT | Mod: 76 | Performed by: EMERGENCY MEDICINE

## 2021-05-05 PROCEDURE — 84132 ASSAY OF SERUM POTASSIUM: CPT | Performed by: FAMILY MEDICINE

## 2021-05-05 PROCEDURE — 96366 THER/PROPH/DIAG IV INF ADDON: CPT | Performed by: EMERGENCY MEDICINE

## 2021-05-05 PROCEDURE — 85027 COMPLETE CBC AUTOMATED: CPT | Performed by: STUDENT IN AN ORGANIZED HEALTH CARE EDUCATION/TRAINING PROGRAM

## 2021-05-05 PROCEDURE — 81001 URINALYSIS AUTO W/SCOPE: CPT | Performed by: STUDENT IN AN ORGANIZED HEALTH CARE EDUCATION/TRAINING PROGRAM

## 2021-05-05 RX ORDER — POTASSIUM CHLORIDE 7.45 MG/ML
10 INJECTION INTRAVENOUS ONCE
Status: COMPLETED | OUTPATIENT
Start: 2021-05-05 | End: 2021-05-05

## 2021-05-05 RX ORDER — POTASSIUM CHLORIDE 7.45 MG/ML
10 INJECTION INTRAVENOUS ONCE
Status: DISCONTINUED | OUTPATIENT
Start: 2021-05-05 | End: 2021-05-05

## 2021-05-05 RX ORDER — POTASSIUM CHLORIDE 750 MG/1
40 TABLET, EXTENDED RELEASE ORAL ONCE
Status: COMPLETED | OUTPATIENT
Start: 2021-05-05 | End: 2021-05-05

## 2021-05-05 RX ORDER — POTASSIUM CHLORIDE 7.45 MG/ML
10 INJECTION INTRAVENOUS
Status: COMPLETED | OUTPATIENT
Start: 2021-05-05 | End: 2021-05-05

## 2021-05-05 RX ORDER — POTASSIUM CHLORIDE 1.5 G/1.58G
40 POWDER, FOR SOLUTION ORAL 3 TIMES DAILY
Status: DISCONTINUED | OUTPATIENT
Start: 2021-05-05 | End: 2021-05-06 | Stop reason: HOSPADM

## 2021-05-05 RX ORDER — POTASSIUM CHLORIDE 750 MG/1
40 TABLET, EXTENDED RELEASE ORAL 3 TIMES DAILY
Status: DISCONTINUED | OUTPATIENT
Start: 2021-05-05 | End: 2021-05-05

## 2021-05-05 RX ADMIN — POTASSIUM CHLORIDE 40 MEQ: 750 TABLET, EXTENDED RELEASE ORAL at 01:06

## 2021-05-05 RX ADMIN — TOPIRAMATE 200 MG: 200 TABLET ORAL at 21:09

## 2021-05-05 RX ADMIN — POTASSIUM CHLORIDE 10 MEQ: 7.46 INJECTION, SOLUTION INTRAVENOUS at 09:07

## 2021-05-05 RX ADMIN — POTASSIUM CHLORIDE 40 MEQ: 1.5 POWDER, FOR SOLUTION ORAL at 09:38

## 2021-05-05 RX ADMIN — POTASSIUM CHLORIDE 40 MEQ: 750 TABLET, EXTENDED RELEASE ORAL at 16:06

## 2021-05-05 RX ADMIN — POTASSIUM CHLORIDE 40 MEQ: 1.5 POWDER, FOR SOLUTION ORAL at 21:09

## 2021-05-05 RX ADMIN — ACETAMINOPHEN 650 MG: 325 TABLET, FILM COATED ORAL at 09:37

## 2021-05-05 RX ADMIN — AMITRIPTYLINE HYDROCHLORIDE 100 MG: 100 TABLET, FILM COATED ORAL at 21:10

## 2021-05-05 RX ADMIN — TOPIRAMATE 200 MG: 200 TABLET ORAL at 09:07

## 2021-05-05 RX ADMIN — POTASSIUM CHLORIDE 10 MEQ: 7.46 INJECTION, SOLUTION INTRAVENOUS at 10:40

## 2021-05-05 RX ADMIN — POTASSIUM CHLORIDE 10 MEQ: 7.46 INJECTION, SOLUTION INTRAVENOUS at 12:58

## 2021-05-05 RX ADMIN — POTASSIUM CHLORIDE 10 MEQ: 7.46 INJECTION, SOLUTION INTRAVENOUS at 11:55

## 2021-05-05 RX ADMIN — POTASSIUM CHLORIDE 10 MEQ: 7.46 INJECTION, SOLUTION INTRAVENOUS at 03:43

## 2021-05-05 RX ADMIN — POTASSIUM CHLORIDE 40 MEQ: 1.5 POWDER, FOR SOLUTION ORAL at 13:52

## 2021-05-05 RX ADMIN — POTASSIUM CHLORIDE 40 MEQ: 750 TABLET, EXTENDED RELEASE ORAL at 05:38

## 2021-05-05 RX ADMIN — POTASSIUM CHLORIDE 10 MEQ: 7.46 INJECTION, SOLUTION INTRAVENOUS at 01:05

## 2021-05-05 NOTE — PROGRESS NOTES
BRIEF PROGRESS NOTE    Notified of critical K level of 2.4, from 2.3. Discussed with RN; when orders released, K/Mag replacement protocol labs are drawn immediately, and patient was still receiving IV K and had just finished oral K.    A:   Severe hypokalemia noted when patient had not yet had replacement.     P:  - Plan to recheck K levels at appropriate interval based on oral replacement, ~1200.    Megan Angel MD

## 2021-05-05 NOTE — PHARMACY-ADMISSION MEDICATION HISTORY
Admission medication history interview status for the 5/4/2021 admission is complete. See Epic admission navigator for allergy information, pharmacy, prior to admission medications and immunization status.     Medication history interview sources: Patient, Surescripts    Changes made to PTA medication list (reason)  Added: None  Deleted: None  Changed: None    Additional medication history information (including reliability of information, actions taken by pharmacist):  -Patient states he has not been able to take his potassium supplement for about one week due to lack of supply at home.  -He also uses an OTC nasal spray occasionally as needed. He is not sure of the name and last used 2 sprays in each nostril this morning.  -The patient denies any additional prescription or over-the-counter medications.  -Preferred pharmacy: [phone]      Prior to Admission medications    Medication Sig Last Dose Taking? Auth Provider   amitriptyline (ELAVIL) 100 MG tablet Take 1 tablet (100 mg) by mouth At Bedtime Past Week Yes Chavez Milligan MD   CETAPHIL (CETAPHIL) lotion Apply topically 2 times daily Past Week Yes Reported, Patient   furosemide (LASIX) 20 MG tablet Take 3 tablets (60 mg) by mouth 2 times daily 5/4/2021 at 0600 Yes Chavez Milligan MD   ketoconazole (NIZORAL) 2 % external shampoo APPLY TO AFFECTED AREA AND WASH OFF AFTER 5 MINUTES 5/3/2021 Yes Chavez Milligan MD   phentermine (ADIPEX-P) 37.5 MG tablet Take 1 tablet (37.5 mg) by mouth every morning 5/4/2021 at 0600 Yes Fortino Chang MD   potassium chloride (KLOR-CON) 20 MEQ packet Take 20 mEq by mouth daily Past Week Yes Chavez Milligan MD   topiramate (TOPAMAX) 100 MG tablet Take 2 tablets (200 mg) by mouth 2 times daily 5/4/2021 at 0600 Yes Fortino Chang MD   COMPRESSION STOCKINGS 20-30 mmHg knee high compression stockings.  Measure  and fit.  Style and brand per patient preference.   Charu Aguilar MD   diclofenac (VOLTAREN) 1 % topical gel Apply 2 g topically 4 times daily More than a month  Chavez Milligan MD   traZODone (DESYREL) 100 MG tablet Take 1 tablet (100 mg) by mouth At Bedtime Call clinic to schedule follow up appointment. [phone] 5/2/2021  Chavez Milligan MD   zolpidem (AMBIEN) 10 MG tablet Take 1 tablet (10 mg) by mouth nightly as needed for sleep 5/2/2021  Chavez Milligan MD       Medication history completed by: Marcela Guzman, PD4

## 2021-05-05 NOTE — PHARMACY
"The following home medications were NOT continued on inpatient admission per \"Discontinuation of nonessential home medications during hospitalization\" policy: phentermine    If a therapeutic holiday is deemed inappropriate per the prescriber, please notify the pharmacist regarding the medication order.    The pharmacist is available to answer any questions and/or concerns the patient may have regarding discontinuation of non-essential medications.    Please ensure that these medications are restarted as needed upon discharge via the medication reconciliation discharge process and included on the discharge medication reconciliation report.    Thank you,  Betsey Barraza, PharmD Resident    "

## 2021-05-05 NOTE — PROGRESS NOTES
[facility]    Progress Note - [facility]        Date of Admission:  5/4/2021    Main plans for today:  - continue aggressive K replacement  - echo  - consider PICC placement if requiring prolonged course of IV    Assessment & Plan     Chavez Ca is a 50 year old male admitted on 5/4/2021. 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     # Severe Hypokalemia  # Loop diuretic use  Admitted with moderate-severe hypokalemia 2.3. Mg, EKG normal, Cr normal, . Patient reporting weakness and muscle cramping. Etiology of  likely furosemide use without adequate K+ supplementation.   - Telemetry until K >3  - Hold PTA furosemide (60mg BID) while hypokalemic  - K, mag HIGH replacement protocols + po scheduled  - K rechecks Q4hr (per protocol)     # Dyspnea  # History of subglottic stenosis  # History of benign parapharyngeal space pleomorphic adenoma, s/p resection in 2011  Patient attributes dyspnea to history of ENT surgery and subglottic stenosis.  More likely related to obesity and VERN. Exam is significant for bulky tissue around neck, no stridor, clear lung fields.   - echo pending  - Strict intake/output, daily weights  - Recommend follow-up with ENT at outpatient follow-up     # Obesity Class III, BMI 49  # Possible obesity hypoventilation syndrome  # VERN  Likely contributor of ongoing dyspnea. No polycythemia.  Carries diagnosis of VERN, does not use CPAP at home, has been evaluated for dental appliance, does not currently have due to poor dentition.  - PTA phenteramine  - PTA topamax  - Overnight oximetry     # Tachycardia  Noted on  admission. EKG reassuring with NSR. During exam, pulse was within normal limits, but HR has intermittently been in 100s-110s on vital checks.  Per chart review this is an ongoing, chronic issue.  He is currently hemodynamically stable.  We will continue to monitor closely.  -Telemetry     # Borderline cardiomegaly  Noted on CXR, no pulmonary edema noted.  Pro-BNP WNL, no JVD on exam.  Patient has been evaluated by cardiology in the past, prior echo 8/2014 showed no evidence of right or left-sided heart failure.  Will repeat echo this admission.  - follow up echo     # Idiopathic edema  Followed by cardiology for chronic venous insufficiency and bilateral lower extremity edema.  Per chart review, patient has been chronically hypokalemic in the past, did not tolerate a trial of spironolactone with reduced Lasix.  Will hold Lasix while he is in the hospital now and hypokalemic.  - re-visit adjustment in home regimen: recommending rico + reduced lasix  - Strict intake/output, daily weights  - 2g Na restricted diet  - Hold PTA Lasix        # Insomnia  - PTA ambien  - Holding PTA trazodone        Diet: Combination Diet Regular Diet Adult; 2 gm NA Diet    Fluids: PO  Lines: PIV  DVT Prophylaxis: Pneumatic Compression Devices  Russo Catheter: not present  Code Status: Full Code           Disposition Plan   Expected discharge: 1-2d, recommended to prior living arrangement once off IV K replacement.  Entered: Stacia Flores MD 05/05/2021, 10:51 AM       The patient's care was discussed with the Attending Physician, Dr. Wallace.    Stacia Flores MD  [facility]  Please see sign in/sign out for up to date coverage information  ______________________________________________________________________    Interval History   Overnight recieved >100meq K. Continues to report muscle weakness and cramping. Denies heart racing. Denies n/v. Having normal BMs.     Data reviewed  today: I reviewed all medications, new labs and imaging results over the last 24 hours. I personally reviewed the EKG tracing showing normal sinus and .    Physical Exam   Vital Signs: Temp: 98  F (36.7  C) Temp src: Oral BP: (!) 123/94 Pulse: 91   Resp: 22 SpO2: 97 % O2 Device: None (Room air)    Weight: 315 lbs 0 oz  Physical Exam  Vitals signs and nursing note reviewed.   Constitutional:       General: He is not in acute distress.     Appearance: He is well-developed. He is obese.   HENT:      Head: Normocephalic and atraumatic.   Neck:      Musculoskeletal: Normal range of motion.   Cardiovascular:      Rate and Rhythm: Regular rhythm. Tachycardia present.      Comments: 1-2+ pitting edema noted bilateral on bilateral lower extremities.  Compression stockings in place.  Pulmonary:      Effort: Pulmonary effort is normal. No respiratory distress.      Breath sounds: Normal breath sounds. No wheezing.   Abdominal:      Tenderness: There is no abdominal tenderness.   Musculoskeletal: Normal range of motion.   Skin:     General: Skin is warm and dry.   Neurological:      Mental Status: He is alert and oriented to person, place, and time.   Psychiatric:         Mood and Affect: Mood normal.           Data   Recent Labs   Lab 05/05/21  0645 05/05/21  0512 05/05/21  0028 05/04/21  1656 05/04/21  1656 05/04/21  1350   WBC 7.0  --   --   --   --  9.1   HGB 12.9*  --   --   --   --  14.9   MCV 89  --   --   --   --  88     --   --   --   --  291     --   --   --   --  138   POTASSIUM 2.6* 2.6* 2.2*   < >  --  2.3*   CHLORIDE 104  --   --   --   --  100   CO2 26  --   --   --   --  27   BUN 25  --   --   --   --  28   CR 1.25  --   --   --   --  1.23   ANIONGAP 8  --   --   --   --  10   SUKHDEV 8.9  --   --   --   --  9.5   *  --   --   --   --  105*   ALBUMIN  --   --   --   --   --  4.3   PROTTOTAL  --   --   --   --   --  8.8   BILITOTAL  --   --   --   --   --  0.5   ALKPHOS  --   --   --   --    --  80   ALT  --   --   --   --   --  29   AST  --   --   --   --   --  27   TROPI  --   --   --   --  <0.015  --     < > = values in this interval not displayed.

## 2021-05-05 NOTE — PROGRESS NOTES
Post-event documentation    At 1230 AM was made aware of critically low K at 2.2, despite 10mEq IV and 40 mEq PO given prior. Rechecked EKG, which was stable.     Plan: 10mEq  IV K x2 (back to back), 40mEq K PO. Plan to recheck after.     Megan Angel MD    Addendum [phone] K: 2.6  Plan: 40meq PO, recheck in 4 hours

## 2021-05-05 NOTE — ED NOTES
[facility]   ED Nurse to Floor Handoff     Chavez Ca is a 50 year old male who speaks English and lives alone,  in a home  They arrived in the ED by car from clinic    ED Chief Complaint: No chief complaint on file.    ED Dx;   Final diagnoses:   Hypokalemia   Dyspnea, unspecified type         Needed?: No    Allergies:   Allergies   Allergen Reactions     Fentanyl Itching     Patch     Meloxicam Other (See Comments)     Side pains.     Minocycline Rash     Patient reports adverse reaction was pigmentation which has resolved with drug discontinuation.   .  Past Medical Hx:   Past Medical History:   Diagnosis Date     Allergic rhinitis      Benign positional vertigo 2011    never quite went away     Bilateral carpal tunnel syndrome 7/18/2015     Chest pain      Chronic sinusitis 2017    I don't know Please look     Chronic tonsillitis      Coronary artery disease 3/8/16    right side chest pain     Depressive disorder      Depressive disorder, not elsewhere classified 7/30/2012     Gastro-oesophageal reflux disease      Hearing problem      Hypertension 2008     Idiopathic progressive polyneuropathy 10/15/2012     Learning disability      Neck mass     parapharyngeal, benign     Obesity, Class III, BMI 40-49.9 (morbid obesity) (H)      Obstructive sleep apnea      Psychological factors associated with another disorder 8/21/2012     Recurrent otitis media 2016    might be what the pain in my ears     Reduced vision      Tinnitus 2016    I would hear a steady light buzzing sound and other can't     Trigeminal neuralgia      Weakness 8/26/2011      Baseline Mental status: WDL  Current Mental Status changes: at basesline    Infection present or suspected this encounter: no  Sepsis suspected: No  Isolation type: No active isolations  Patient tested for COVID 19 prior to admission: YES     Activity level - Baseline/Home:  Independent  Activity Level -  "Current:   Independent    Bariatric equipment needed?: Yes    In the ED these meds were given:   Medications   potassium chloride 10 mEq in 100 mL sterile water intermittent infusion (premix) (10 mEq Intravenous New Bag 5/4/21 1907)   potassium chloride (KLOR-CON) Packet 40 mEq (40 mEq Oral Given 5/4/21 1907)       Drips running?  Yes    Home pump  No    Current LDAs  Peripheral IV 05/04/21 Left Lower forearm (Active)   Site Assessment WDL 05/04/21 1655   Line Status Saline locked 05/04/21 1655   Dressing Intervention New dressing  05/04/21 1655   Phlebitis Scale 0-->no symptoms 05/04/21 1655   Number of days: 0       Incision/Surgical Site 08/23/15 Bilateral Abdomen (Active)   Number of days: 2081       Labs results:   Labs Ordered and Resulted from Time of ED Arrival Up to the Time of Departure from the ED   MAGNESIUM   NT PROBNP INPATIENT   TROPONIN I   SARS-COV-2 (COVID-19) VIRUS RT-PCR   INTAKE AND OUTPUT       Imaging Studies:   Recent Results (from the past 24 hour(s))   XR Chest 2 Views    Narrative    Exam: XR CHEST 2 VW, 5/4/2021 1:56 PM    Indication: dyspnea; SOB (shortness of breath)    Comparison: 12/21/2017    Findings:   Borderline cardiomegaly is stable. Pulmonary vasculature within normal  limits. Pleural spaces are clear. Lungs are clear.      Impression    Impression: Borderline cardiomegaly without significant edema. Lungs  are clear.    MAGALY HERNANDEZ MD       Recent vital signs:   /81   Pulse 105   Temp 98  F (36.7  C) (Oral)   Resp 16   Ht 1.702 m (5' 7\")   Wt 142.9 kg (315 lb)   SpO2 99%   BMI 49.34 kg/m      [place] Coma Scale Score: 15 (05/04/21 1548)       Cardiac Rhythm: Tachycardia  Pt needs tele? Yes  Skin/wound Issues: None    Code Status: Full Code    Pain control: pt had none    Nausea control: pt had none    Abnormal labs/tests/findings requiring intervention: potassium low, replaced    Family present during ED course? No   Family Comments/Social Situation comments: "     Tasks needing completion: None    Promise Pacheco, RN  [phone] [facility]

## 2021-05-05 NOTE — H&P
[facility]    History and Physical - Janelle[facility] Service        Date of Admission:  5/4/2021    Assessment & Plan   Chavez Ca is a 50 year old male admitted on 5/4/2021. 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     # Severe Hypokalemia  # Loop diuretic use  Patient admitted with fatigue, found to have moderate-severe hypokalemia to 2.3 on admission.  Normal magnesium.  EKG without any acute changes.  Patient without significant muscle pain or weakness, or GI symptoms.  Likely chronic.  Etiology of this is likely furosemide use without adequate potassium supplementation.  No recent history of GI losses, no history of diabetes.  Per chart review, patient was hypokalemic to 2.9 in September 2020, was sent supplementation, but patient did not receive.    - Admit to family medicine service  - Telemetry  - Hold PTA furosemide (60mg BID) while hypokalemic  - K, mag HIGH replacement protocols  - AM EKG  - VS q4 hours overnight, may then space to every 8 if he remains stable    # Dyspnea  # History of subglottic stenosis  # History of benign parapharyngeal space pleomorphic adenoma, s/p resection in 2011  Patient with significant CRUZ, which he attributes mostly to history of ENT surgery and subglottic stenosis.  More likely, this is related to his obesity and VERN (see A/P below).  His exam is significant for obesity, bulky tissue around neck, no stridor, clear lung fields.  Low concern for tracheal collapse or hemodynamically/respiratorily significant subglottic stenosis at this time.  - follow up echo  - Strict intake/output, daily weights  - Recommend  follow-up with ENT at outpatient follow-up    # Obesity Class III, BMI 49  # Possible obesity hypoventilation syndrome  # VREN  Likely contributor of ongoing dyspnea. No polycythemia, venous CO2 27.  Carries diagnosis of VERN, does not use CPAP at home, has been evaluated for dental appliance, does not currently have due to poor dentition.  - PTA phenteramine  - PTA topamax  - Overnight oximetry    # Tachycardia  Noted on admission. EKG reassuring with NSR. During exam, pulse was within normal limits, but HR has intermittently been in 100s-110s on vital checks.  Per chart review this is an ongoing, chronic issue.  He is currently hemodynamically stable.  We will continue to monitor closely.  - VS q4 overnight  -Telemetry    # Borderline cardiomegaly  Noted on CXR, no pulmonary edema noted.  Pro-BNP WNL, no JVD on exam.  Patient has been evaluated by cardiology in the past, prior echo 8/2014 showed no evidence of right or left-sided heart failure.  Will repeat echo this admission.  - follow up echo    # Idiopathic edema  Followed by cardiology for chronic venous insufficiency and bilateral lower extremity edema.  Per chart review, patient has been chronically hypokalemic in the past, did not tolerate a trial of spironolactone with reduced Lasix.  Will hold Lasix while he is in the hospital now and hypokalemic.  - Strict intake/output, daily weights  - 2g Na restricted diet  - Hold PTA Lasix      # Insomnia  - PTA ambien  - Holding PTA trazodone           Diet: Combination Diet Regular Diet Adult; 2 gm NA Diet    Fluids: PO ad randal  DVT Prophylaxis: Pneumatic Compression Devices, Padua score 1  Russo Catheter: not present  Code Status: Full Code         Disposition Plan   Expected discharge: Tomorrow, recommended to prior living arrangement once Electrolytes stabilized and outpatient follow-up planned..  Entered: Megan Angel MD 05/04/2021, 11:17 PM       The patient's care was discussed with the Attending  Physician, Dr. Deleon.    Megan Angel MD  [facility]  Contact information available via [facility] Paging/Directory  Please see sign in/sign out for up to date coverage information  ______________________________________________________________________    Chief Complaint   Told to present to the emergency department for electrolyte abnormalities  Shortness of breath  Fatigue    History is obtained from the patient    History of Present Illness   Chavez Ca is a 50 year old male who has medical history significant for benign parapharyngeal adenoma s/p resection, complicated by emergent tracheostomy and subsequent glottic stenosis, morbid obesity, 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    Patient reports to me that he has been having ongoing shortness of breath for over 10 years, which he relates exclusively to his a history of parapharyngeal adenoma, which he believes has recurred.  On chart review, it appears that the last CT of his neck was in 2014, which showed no recurrence of neck mass.  In 2011 he underwent resection of that mass, which was complicated by an emergent tracheostomy, and subsequent imaging notes subglottic stenosis.  He reports that he has not seen ENT for at least a few years.  His shortness of breath is much worse with activity, goes away with rest.  Has not changed significantly in the past several years.  He does endorse occasional wheeze with exertion, and notes that he has had a cough over the past few weeks productive of yellow sputum.  There is occasional blood in the cough, but he believes this is from his teeth.  He has frequent gum bleeding, and has poor dentition.    Patient also notes that he has recently changed primary care physicians, and his new primary care doctor was concerned about his use of Lasix for his peripheral edema without  concurrent use of potassium.  Mr. Ca states that up until about a year ago he was prescribed 20 mEq of potassium twice per day, and received refills monthly.  Approximately 1 year ago, the amount of potassium he had received on a monthly basis decreased to a 7-day supply per month.  He does not know why that happened.  He has continued on his regimen of Lasix 60 mg twice daily in the interval.    He denies weakness, muscle aches or pains, abdominal pain, chest pain, fevers, chills, new rashes, problems with urination.  He does endorse occasional constipation and diarrhea.    ED Course:  Labs: Potassium of 2.8.  Magnesium 2.1, CBC WNL, hemoglobin A1c of 6.0, negative N-terminal proBNP, negative troponin.  Imaging: CXR shows borderline cardiomegaly, otherwise clear lungs.  EKG: NSR with PACs, no T wave flattening or inversions, no acute ischemic changes, unchanged from 2/2018.  Medications: KCl 40 mEq p.o., KCl 10 mEq IV      Review of Systems    Negative except as noted in HPI    Past Medical History    I have reviewed this patient's medical history and updated it with pertinent information if needed.   Past Medical History:   Diagnosis Date     Allergic rhinitis      Benign positional vertigo 2011    never quite went away     Bilateral carpal tunnel syndrome 7/18/2015     Chest pain      Chronic sinusitis 2017    I don't know Please look     Chronic tonsillitis      Coronary artery disease 3/8/16    right side chest pain     Depressive disorder      Depressive disorder, not elsewhere classified 7/30/2012     Gastro-oesophageal reflux disease      Hearing problem      Hypertension 2008     Idiopathic progressive polyneuropathy 10/15/2012     Learning disability      Neck mass     parapharyngeal, benign     Obesity, Class III, BMI 40-49.9 (morbid obesity) (H)      Obstructive sleep apnea      Psychological factors associated with another disorder 8/21/2012     Recurrent otitis media 2016    might be what the pain  in my ears     Reduced vision      Tinnitus 2016    I would hear a steady light buzzing sound and other can't     Trigeminal neuralgia      Weakness 8/26/2011        Past Surgical History   I have reviewed this patient's surgical history and updated it with pertinent information if needed.  Past Surgical History:   Procedure Laterality Date     BIOPSY  2011    To figure out what kind of tumor I had     COLONOSCOPY N/A 10/16/2020    Procedure: INCOMPLETE COLONOSCOPY;  Surgeon: Ham Cherry MD;  Location: [facility] OR     ENDOSCOPIC RETROGRADE CHOLANGIOPANCREATOGRAM N/A 8/27/2015    Procedure: COMBINED ENDOSCOPIC RETROGRADE CHOLANGIOPANCREATOGRAPHY, PLACE TUBE/STENT;  Surgeon: Baldomero Zacarias MD;  Location: [facility] OR     ENDOSCOPIC RETROGRADE CHOLANGIOPANCREATOGRAM N/A 11/6/2015    Procedure: COMBINED ENDOSCOPIC RETROGRADE CHOLANGIOPANCREATOGRAPHY, REMOVE FOREIGN BODY OR STENT/TUBE;  Surgeon: Baldomero Zacarias MD;  Location: [facility] OR     EXCISE LESION INTRAORAL  6/29/2011    Procedure:EXCISE LESION INTRAORAL; TRANSCERVICAL APPROACH TO LEFT PHARYNGEAL SPACE MASS REMOVAL ; Surgeon:BARBARA PHILLIPS; Location: OR      VASCULAR SURGERY PROCEDURE UNLIST  12/29/15     LAPAROSCOPIC CHOLECYSTECTOMY N/A 8/23/2015    Procedure: LAPAROSCOPIC CHOLECYSTECTOMY;  Surgeon: Kvng Witt MD;  Location: [facility] OR     LARYNGOSCOPY WITH BIOPSY(IES)  6/18/2014    Procedure: LARYNGOSCOPY WITH BIOPSY(IES);  Surgeon: Barbara Phillips MD;  Location: [facility] OR     LASER CO2 LARYNGOSCOPY  12/7/2011    Procedure:LASER CO2 LARYNGOSCOPY; Micro-Direct Laryngoscopy with CO2 Laser Standby / Excision Tracheal Grandulization, Trach Tube Change / Kenalog application. / Balloon Dilation of Subglottic Stenosis.*Latex Safe Room* Trach Tube = Shiley 6.4mm I.D. / 10.8MM O.D. / 76MM  Cuffless.; Surgeon:BARBARA PHILLIPS; Location:[facility] OR     ORTHOPEDIC SURGERY  5/2016    Broken Right hand & Fracture Right shoulder      TRACHEOSTOMY  2011    Procedure:TRACHEOSTOMY; possible awake; Surgeon:EMELIA PHILLIPS; Location:[facility] OR     TRACHEOSTOMY  2011    Procedure:TRACHEOSTOMY; REMOVE AND REPLACE SHILEY 6 XLT; Surgeon:EMELIA PHILLIPS; Location:[facility] OR     VASCULAR SURGERY  -Preasant        Social History   I have reviewed this patient's social history and updated it with pertinent information if needed. Chavez Ca  reports that he has never smoked. He has never used smokeless tobacco. He reports that he does not drink alcohol or use drugs.    Family History   I have reviewed this patient's family history and updated it with pertinent information if needed.  Family History   Adopted: Yes   Problem Relation Age of Onset     Family History Negative Other         Does not know family     Unknown/Adopted No family hx of        Prior to Admission Medications   Prior to Admission Medications   Prescriptions Last Dose Informant Patient Reported? Taking?   CETAPHIL (CETAPHIL) lotion Past Week  Yes Yes   Sig: Apply topically 2 times daily   COMPRESSION STOCKINGS 2021 at Unknown time  No Yes   Si-30 mmHg knee high compression stockings.  Measure and fit.  Style and brand per patient preference.   amitriptyline (ELAVIL) 100 MG tablet Past Week  No Yes   Sig: Take 1 tablet (100 mg) by mouth At Bedtime   diclofenac (VOLTAREN) 1 % topical gel More than a month  No No   Sig: Apply 2 g topically 4 times daily   furosemide (LASIX) 20 MG tablet 2021 at 0600  No Yes   Sig: Take 3 tablets (60 mg) by mouth 2 times daily   ketoconazole (NIZORAL) 2 % external shampoo 5/3/2021  No Yes   Sig: APPLY TO AFFECTED AREA AND WASH OFF AFTER 5 MINUTES   order for DME   No No   Sig: Equipment being ordered: compression stockings   order for DME Unknown at Unknown time  No No   Sig: Compression stockings pair, patient preference    To be used 8 hours per day   order for DME Unknown at Unknown time  No No   Sig:  Equipment being ordered: compression stockings    Needs to be faxed to   phentermine (ADIPEX-P) 37.5 MG tablet 5/4/2021 at 0600  No Yes   Sig: Take 1 tablet (37.5 mg) by mouth every morning   potassium chloride (KLOR-CON) 20 MEQ packet Past Week  No Yes   Sig: Take 20 mEq by mouth daily   topiramate (TOPAMAX) 100 MG tablet 5/4/2021 at 0600  No Yes   Sig: Take 2 tablets (200 mg) by mouth 2 times daily   traZODone (DESYREL) 100 MG tablet Past Week at Unknown time  No Yes   Sig: Take 1 tablet (100 mg) by mouth At Bedtime Call clinic to schedule follow up appointment. [phone]   zolpidem (AMBIEN) 10 MG tablet 5/3/2021 at Unknown time  No Yes   Sig: Take 1 tablet (10 mg) by mouth nightly as needed for sleep      Facility-Administered Medications: None     Allergies   Allergies   Allergen Reactions     Fentanyl Itching     Patch     Meloxicam Other (See Comments)     Side pains.     Minocycline Rash     Patient reports adverse reaction was pigmentation which has resolved with drug discontinuation.       Physical Exam   Vital Signs: Temp: 98  F (36.7  C) Temp src: Oral BP: 92/67 Pulse: 95   Resp: 16 SpO2: 97 % O2 Device: None (Room air)    Weight: 315 lbs 0 oz    General Appearance: Standing at bedside, becoming very short of breath when getting into bed.  This improves with rest.  He is pleasant and tangential.  Eyes: EOMI, no scleral icterus.  HEENT: NC/AT  Respiratory: CTA B.  Saturating 97% on room air.  Notably short of breath with exertion, but able to speak in full sentences at rest.  Cardiovascular: RRR, NL S1/S2.  No murmurs appreciated.  Radial pulses 2+.  Warm and well-perfused.  1-2+ pitting edema noted bilateral on bilateral lower extremities.  Compression stockings in place.  GI: Abdomen is very obese, nondistended, nontender.  Skin: No obvious rashes or skin breakdown noted.  Dysmorphic toenails noted bilaterally.  Neurologic: No ben muscle weakness.  No focal neurologic deficits.  .  Psychiatric:   Alert and oriented, though tangential    Data   Data reviewed today: I reviewed all medications, new labs and imaging results over the last 24 hours.     Recent Labs   Lab 05/04/21  2124 05/04/21  1656 05/04/21  1350   WBC  --   --  9.1   HGB  --   --  14.9   MCV  --   --  88   PLT  --   --  291   NA  --   --  138   POTASSIUM 2.4*  --  2.3*   CHLORIDE  --   --  100   CO2  --   --  27   BUN  --   --  28   CR  --   --  1.23   ANIONGAP  --   --  10   SUKHDEV  --   --  9.5   GLC  --   --  105*   ALBUMIN  --   --  4.3   PROTTOTAL  --   --  8.8   BILITOTAL  --   --  0.5   ALKPHOS  --   --  80   ALT  --   --  29   AST  --   --  27   TROPI  --  <0.015  --

## 2021-05-05 NOTE — TELEPHONE ENCOUNTER
Yes this is resolved. It appears this call came from him after I called him. I'm glad he was treated for his hypokalemia, and was admitted for evaluation.

## 2021-05-06 ENCOUNTER — PATIENT OUTREACH (OUTPATIENT)
Dept: CARE COORDINATION | Facility: CLINIC | Age: 51
End: 2021-05-06

## 2021-05-06 ENCOUNTER — APPOINTMENT (OUTPATIENT)
Dept: CARDIOLOGY | Facility: CLINIC | Age: 51
DRG: 641 | End: 2021-05-06
Payer: COMMERCIAL

## 2021-05-06 VITALS
HEIGHT: 67 IN | BODY MASS INDEX: 49.44 KG/M2 | OXYGEN SATURATION: 100 % | HEART RATE: 98 BPM | RESPIRATION RATE: 20 BRPM | WEIGHT: 315 LBS | DIASTOLIC BLOOD PRESSURE: 64 MMHG | SYSTOLIC BLOOD PRESSURE: 106 MMHG | TEMPERATURE: 95.4 F

## 2021-05-06 LAB
ANION GAP SERPL CALCULATED.3IONS-SCNC: 5 MMOL/L (ref 3–14)
BUN SERPL-MCNC: 25 MG/DL (ref 7–30)
CALCIUM SERPL-MCNC: 9.1 MG/DL (ref 8.5–10.1)
CHLORIDE SERPL-SCNC: 110 MMOL/L (ref 94–109)
CO2 SERPL-SCNC: 24 MMOL/L (ref 20–32)
CREAT SERPL-MCNC: 1.08 MG/DL (ref 0.66–1.25)
ERYTHROCYTE [DISTWIDTH] IN BLOOD BY AUTOMATED COUNT: 13.8 % (ref 10–15)
GFR SERPL CREATININE-BSD FRML MDRD: 79 ML/MIN/{1.73_M2}
GLUCOSE SERPL-MCNC: 95 MG/DL (ref 70–99)
HCT VFR BLD AUTO: 39.3 % (ref 40–53)
HGB BLD-MCNC: 12.5 G/DL (ref 13.3–17.7)
MAGNESIUM SERPL-MCNC: 2.4 MG/DL (ref 1.6–2.3)
MCH RBC QN AUTO: 29 PG (ref 26.5–33)
MCHC RBC AUTO-ENTMCNC: 31.8 G/DL (ref 31.5–36.5)
MCV RBC AUTO: 91 FL (ref 78–100)
PLATELET # BLD AUTO: 210 10E9/L (ref 150–450)
POTASSIUM SERPL-SCNC: 3.6 MMOL/L (ref 3.4–5.3)
POTASSIUM SERPL-SCNC: 3.9 MMOL/L (ref 3.4–5.3)
POTASSIUM SERPL-SCNC: 4 MMOL/L (ref 3.4–5.3)
RBC # BLD AUTO: 4.31 10E12/L (ref 4.4–5.9)
SODIUM SERPL-SCNC: 139 MMOL/L (ref 133–144)
WBC # BLD AUTO: 5.2 10E9/L (ref 4–11)

## 2021-05-06 PROCEDURE — 250N000013 HC RX MED GY IP 250 OP 250 PS 637: Performed by: STUDENT IN AN ORGANIZED HEALTH CARE EDUCATION/TRAINING PROGRAM

## 2021-05-06 PROCEDURE — 999N000208 ECHOCARDIOGRAM COMPLETE

## 2021-05-06 PROCEDURE — 120N000002 HC R&B MED SURG/OB UMMC

## 2021-05-06 PROCEDURE — 80048 BASIC METABOLIC PNL TOTAL CA: CPT | Performed by: STUDENT IN AN ORGANIZED HEALTH CARE EDUCATION/TRAINING PROGRAM

## 2021-05-06 PROCEDURE — 255N000002 HC RX 255 OP 636: Performed by: INTERNAL MEDICINE

## 2021-05-06 PROCEDURE — 93306 TTE W/DOPPLER COMPLETE: CPT | Mod: 26 | Performed by: STUDENT IN AN ORGANIZED HEALTH CARE EDUCATION/TRAINING PROGRAM

## 2021-05-06 PROCEDURE — 36415 COLL VENOUS BLD VENIPUNCTURE: CPT | Performed by: STUDENT IN AN ORGANIZED HEALTH CARE EDUCATION/TRAINING PROGRAM

## 2021-05-06 PROCEDURE — 85027 COMPLETE CBC AUTOMATED: CPT | Performed by: STUDENT IN AN ORGANIZED HEALTH CARE EDUCATION/TRAINING PROGRAM

## 2021-05-06 PROCEDURE — 83735 ASSAY OF MAGNESIUM: CPT | Performed by: STUDENT IN AN ORGANIZED HEALTH CARE EDUCATION/TRAINING PROGRAM

## 2021-05-06 PROCEDURE — 84132 ASSAY OF SERUM POTASSIUM: CPT | Performed by: STUDENT IN AN ORGANIZED HEALTH CARE EDUCATION/TRAINING PROGRAM

## 2021-05-06 PROCEDURE — 99239 HOSP IP/OBS DSCHRG MGMT >30: CPT | Mod: GC | Performed by: FAMILY MEDICINE

## 2021-05-06 RX ORDER — POTASSIUM CHLORIDE 1.5 G/1.58G
20 POWDER, FOR SOLUTION ORAL 3 TIMES DAILY
Qty: 270 PACKET | Refills: 3 | Status: SHIPPED | OUTPATIENT
Start: 2021-05-06 | End: 2022-04-28

## 2021-05-06 RX ADMIN — ACETAMINOPHEN 650 MG: 325 TABLET, FILM COATED ORAL at 14:06

## 2021-05-06 RX ADMIN — POTASSIUM CHLORIDE 40 MEQ: 1.5 POWDER, FOR SOLUTION ORAL at 08:28

## 2021-05-06 RX ADMIN — TOPIRAMATE 200 MG: 200 TABLET ORAL at 08:29

## 2021-05-06 RX ADMIN — HUMAN ALBUMIN MICROSPHERES AND PERFLUTREN 6 ML: 10; .22 INJECTION, SOLUTION INTRAVENOUS at 14:34

## 2021-05-06 RX ADMIN — POTASSIUM CHLORIDE 40 MEQ: 1.5 POWDER, FOR SOLUTION ORAL at 14:06

## 2021-05-06 RX ADMIN — POLYETHYLENE GLYCOL 3350 17 G: 17 POWDER, FOR SOLUTION ORAL at 08:28

## 2021-05-06 ASSESSMENT — ACTIVITIES OF DAILY LIVING (ADL)
TOILETING_ISSUES: NO
ADLS_ACUITY_SCORE: 12
ADLS_ACUITY_SCORE: 12
DRESSING/BATHING_DIFFICULTY: NO
PATIENT_/_FAMILY_COMMUNICATION_STYLE: SPOKEN LANGUAGE (ENGLISH OR BILINGUAL)
DIFFICULTY_COMMUNICATING: NO
EQUIPMENT_CURRENTLY_USED_AT_HOME: CANE, QUAD
CONCENTRATING,_REMEMBERING_OR_MAKING_DECISIONS_DIFFICULTY: NO
DIFFICULTY_EATING/SWALLOWING: NO
WALKING_OR_CLIMBING_STAIRS_DIFFICULTY: NO
HEARING_DIFFICULTY_OR_DEAF: NO
WEAR_GLASSES_OR_BLIND: NO

## 2021-05-06 ASSESSMENT — MIFFLIN-ST. JEOR: SCORE: 2300.52

## 2021-05-06 NOTE — PLAN OF CARE
[id number]:  VSS on room air, AOx4, up ab randal in room, steady on feet. CRUZ- baseline for pt. O2 sats %.  LS- clear, diminished in bases. K replacement given as ordered from ED. Tylenol given for pt's generalized pain. Pt denies n/v. Tolerated regular diet- appetite good. Skin intact. Shower taken upon admission. Echo completed.  Pt medically stable for discharge. Last K drawn: 4.0.  PIV removed without incident, cannula intact. All belongings gathered by pt.  Discharge orders reviewed with pt, home medications (KCL) will be picked up at [facility] discharge pharmacy.  Pt plans to take shuttle to [facility] and then bus home. Pt steady on feet for plan. Pt eager for discharge home.  Pt calls appropriately and can make needs known.

## 2021-05-06 NOTE — ED NOTES
[facility]   ED Nurse to Floor Handoff     Chavez Ca is a 50 year old male who speaks English and lives alone,  in a home  They arrived in the ED by car from home    ED Chief Complaint: Shortness of Breath    ED Dx;   Final diagnoses:   Hypokalemia   Dyspnea, unspecified type         Needed?: No    Allergies:   Allergies   Allergen Reactions     Fentanyl Itching     Patch     Meloxicam Other (See Comments)     Side pains.     Minocycline Rash     Patient reports adverse reaction was pigmentation which has resolved with drug discontinuation.   .  Past Medical Hx:   Past Medical History:   Diagnosis Date     Allergic rhinitis      Benign positional vertigo 2011    never quite went away     Bilateral carpal tunnel syndrome 7/18/2015     Chest pain      Chronic sinusitis 2017    I don't know Please look     Chronic tonsillitis      Coronary artery disease 3/8/16    right side chest pain     Depressive disorder      Depressive disorder, not elsewhere classified 7/30/2012     Gastro-oesophageal reflux disease      Hearing problem      Hypertension 2008     Idiopathic progressive polyneuropathy 10/15/2012     Learning disability      Neck mass     parapharyngeal, benign     Obesity, Class III, BMI 40-49.9 (morbid obesity) (H)      Obstructive sleep apnea      Psychological factors associated with another disorder 8/21/2012     Recurrent otitis media 2016    might be what the pain in my ears     Reduced vision      Tinnitus 2016    I would hear a steady light buzzing sound and other can't     Trigeminal neuralgia      Weakness 8/26/2011      Baseline Mental status: WDL  Current Mental Status changes: at basesline    Infection present or suspected this encounter: no  Sepsis suspected: No  Isolation type: No active isolations  Patient tested for COVID 19 prior to admission: YES     Activity level - Baseline/Home:  Stand with Assist  Activity Level - Current:    Stand with Assist    Bariatric equipment needed?: No    In the ED these meds were given:   Medications   amitriptyline (ELAVIL) tablet 100 mg (100 mg Oral Given 5/5/21 2110)   furosemide (LASIX) tablet 60 mg ( Oral Automatically Held 5/10/21 2000)   topiramate (TOPAMAX) tablet 200 mg (200 mg Oral Given 5/5/21 2109)   zolpidem (AMBIEN) tablet 10 mg (has no administration in time range)   lidocaine 1 % 0.1-1 mL (has no administration in time range)   lidocaine (LMX4) cream (has no administration in time range)   sodium chloride (PF) 0.9% PF flush 3 mL (0 mLs Intracatheter Hold 5/5/21 1230)   sodium chloride (PF) 0.9% PF flush 3 mL (has no administration in time range)   melatonin tablet 1 mg (has no administration in time range)   acetaminophen (TYLENOL) tablet 650 mg ( Oral Canceled Entry 5/5/21 0938)   polyethylene glycol (MIRALAX) Packet 17 g (17 g Oral Not Given 5/5/21 0938)   ondansetron (ZOFRAN-ODT) ODT tab 4 mg (has no administration in time range)     Or   ondansetron (ZOFRAN) injection 4 mg (has no administration in time range)   potassium chloride (KLOR-CON) Packet 40 mEq (40 mEq Oral Given 5/5/21 2109)   potassium chloride 10 mEq in 100 mL sterile water intermittent infusion (premix) (0 mEq Intravenous Stopped 5/4/21 2107)   potassium chloride (KLOR-CON) Packet 40 mEq (40 mEq Oral Given 5/4/21 1907)   potassium chloride ER (KLOR-CON M) CR tablet 40 mEq (40 mEq Oral Given 5/5/21 0106)   potassium chloride 10 mEq in 100 mL sterile water intermittent infusion (premix) (0 mEq Intravenous Stopped 5/5/21 0213)   potassium chloride 10 mEq in 100 mL sterile water intermittent infusion (premix) (0 mEq Intravenous Stopped 5/5/21 0512)   potassium chloride ER (KLOR-CON M) CR tablet 40 mEq (40 mEq Oral Given 5/5/21 0538)   potassium chloride 10 mEq in 100 mL sterile water intermittent infusion (premix) (0 mEq Intravenous Stopped 5/5/21 1454)   potassium chloride ER (KLOR-CON M) CR tablet 40 mEq (40 mEq Oral Given  5/5/21 1606)       Drips running?  No    Home pump  No    Current LDAs  Peripheral IV 05/04/21 Left Lower forearm (Active)   Site Assessment WDL 05/04/21 1655   Line Status Saline locked 05/04/21 1655   Dressing Intervention New dressing  05/04/21 1655   Phlebitis Scale 0-->no symptoms 05/04/21 1655   Number of days: 1       Incision/Surgical Site 08/23/15 Bilateral Abdomen (Active)   Number of days: 2082       Labs results:   Labs Ordered and Resulted from Time of ED Arrival Up to the Time of Departure from the ED   POTASSIUM - Abnormal; Notable for the following components:       Result Value    Potassium 2.4 (*)     All other components within normal limits   POTASSIUM - Abnormal; Notable for the following components:    Potassium 2.2 (*)     All other components within normal limits   BASIC METABOLIC PANEL - Abnormal; Notable for the following components:    Potassium 2.6 (*)     Glucose 104 (*)     All other components within normal limits   MAGNESIUM - Abnormal; Notable for the following components:    Magnesium 2.4 (*)     All other components within normal limits   CBC WITH PLATELETS - Abnormal; Notable for the following components:    Hemoglobin 12.9 (*)     Hematocrit 39.5 (*)     All other components within normal limits   POTASSIUM - Abnormal; Notable for the following components:    Potassium 2.6 (*)     All other components within normal limits   URINE MACROSCOPIC WITH REFLEX TO MICRO - Abnormal; Notable for the following components:    Protein Albumin Urine 30 (*)     Mucous Urine Present (*)     All other components within normal limits   CK TOTAL - Abnormal; Notable for the following components:    CK Total 478 (*)     All other components within normal limits   MAGNESIUM   NT PROBNP INPATIENT   TROPONIN I   SARS-COV-2 (COVID-19) VIRUS RT-PCR   MAGNESIUM   POTASSIUM   POTASSIUM   POTASSIUM   INTAKE AND OUTPUT   IP ASSIGN PROVIDER TEAM TO TREATMENT TEAM   PERIPHERAL IV CATHETER   TELEMETRY MONITORING  "MED/SURG   INTAKE AND OUTPUT   APPLY PNEUMATIC COMPRESSION DEVICE (PCD)   DAILY WEIGHTS   VITAL SIGNS   NOTIFY PROVIDER   NOTIFY PROVIDER       Imaging Studies: No results found for this or any previous visit (from the past 24 hour(s)).    Recent vital signs:   /74   Pulse 95   Temp 98  F (36.7  C) (Oral)   Resp 19   Ht 1.702 m (5' 7\")   Wt 142.9 kg (315 lb)   SpO2 98%   BMI 49.34 kg/m      Josh Coma Scale Score: 15 (05/05/21 0940)       Cardiac Rhythm: Normal Sinus  Pt needs tele? No  Skin/wound Issues: None    Code Status: Full Code    Pain control: good    Nausea control: good    Abnormal labs/tests/findings requiring intervention: hypokalemia     Family present during ED course? No   Family Comments/Social Situation comments: none    Tasks needing completion: None    Winsome Crane, RN    [phone] [facility]    "

## 2021-05-06 NOTE — PROGRESS NOTES
Pt arrived from [facility] ED.  Admission started. Pt would like to take a shower- PIV wrapped.  All belongings staying with pt. Toiletries given and pt now in shower.

## 2021-05-06 NOTE — DISCHARGE SUMMARY
[facility] Medicine Discharge Summary       Date of Admission:  5/4/2021  Date of Discharge:  5/6/2021  Date of Service: 5/6/2021  Discharging Attending Provider: Dr. Natalie Wallace  Discharge Team: [facility] Service    Discharge Diagnoses      Hypokalemia  Dyspnea, unspecified type  Potassium (K) deficiency    Follow-ups Needed After Discharge     Follow-up with your primary care physician within 1 week of hospital discharge, recommend a BMP at that time to check potassium level.    Hospital Course   Chavez Ca is a 50 year old male admitted on 5/4/2021. 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     # Severe Hypokalemia  # Loop diuretic use  Admitted with moderate-severe hypokalemia 2.3. Mg, EKG normal, Cr normal, . Etiology likely furosemide use without adequate K+ supplementation. Following IV and PO potassium repletion, K stable at 4.0 5/6. Patient declining use of spironalactone at this time.  - resume furosemide 60mg PO BID  - potassium chloride packets 20 mEq TID     # Dyspnea  # History of subglottic stenosis  # History of benign parapharyngeal space pleomorphic adenoma, s/p resection in 2011  Patient attributes dyspnea to history of ENT surgery and subglottic stenosis.  More likely related to obesity and VERN. Exam is significant for bulky tissue around neck, no stridor, clear lung fields.   - Recommend follow-up with ENT at outpatient follow-up     # Obesity Class III, BMI 49  # Possible obesity hypoventilation syndrome  # VERN  Likely contributor of ongoing dyspnea. No polycythemia.  Carries diagnosis  of VERN, does not use CPAP at home, has been evaluated for dental appliance, does not currently have due to poor dentition.  - continue phenteramine  - continue topamax  - Recommend review of further VERN evaluation and treatment outpatient     # Tachycardia - improved  Noted on admission. EKG reassuring with NSR. Per chart review this is an ongoing, chronic issue.  He was hemodynamically stable throughout admission.     # Borderline cardiomegaly  Noted on CXR, no pulmonary edema noted.  Pro-BNP WNL, no JVD on exam.  Patient has been evaluated by cardiology in the past, prior echo 8/2014 showed no evidence of right or left-sided heart failure. Echo 5/6/21 RA pressure elevation, normal LV function with EF 55-60%.      # Idiopathic edema  Followed by cardiology for chronic venous insufficiency and bilateral lower extremity edema.  Per chart review, patient has been chronically hypokalemic in the past, did not tolerate a trial of spironolactone with reduced Lasix. Patient not interested in spironalactone addition at this time.  -lasix 60mg po BID     # Insomnia  - resume PTA ambien  - resume PTA trazodone    # Discharge Pain Plan:   - Patient is not being prescribed pain medications on discharge.    Consultations This Hospital Stay   VASCULAR ACCESS FOR PICC PLACEMENT ADULT IP CONSULT    Code Status   Full Code     The patient was discussed with and seen by Dr. Natalie Luis's Family [facility] Inpatient Service  [facility]   Pager: [phone]  ______________________________________________________________________  Review of Systems   Endorses right axillary muscle pain after stretching right arm. Denies muscle weakness and cramping. Denies nausea/vomiting tolerated breakfast without issue. Otherwise neg ROS.    Physical Exam   Vital Signs: Temp: 95.4  F (35.2  C) Temp src: Oral BP: 106/64 Pulse: 98   Resp: 20 SpO2: 100 % O2 Device: None (Room air)    Weight: 327 lbs 12.8  oz    Physical Exam  Vitals signs and nursing note reviewed.   Constitutional:       General: He is not in acute distress.     Appearance: He is well-developed. He is obese.   HENT:      Head: Normocephalic and atraumatic.   Neck:      Musculoskeletal: Normal range of motion.   Cardiovascular:      Rate and Rhythm: Normal rate and regular rhythm.      Comments: 1+ pitting edema noted bilateral on bilateral lower extremities.    Pulmonary:      Effort: Pulmonary effort is normal. No respiratory distress.      Breath sounds: Normal breath sounds. No wheezing.   Abdominal:      Tenderness: There is no abdominal tenderness.   Musculoskeletal: Normal range of motion.   Skin:     General: Skin is warm and dry.   Neurological:      Mental Status: He is alert and oriented to person, place, and time.     Significant Results and Procedures   Most Recent 3 CBC's:  Recent Labs   Lab Test 05/06/21  0602 05/05/21  0645 05/04/21  1350   WBC 5.2 7.0 9.1   HGB 12.5* 12.9* 14.9   MCV 91 89 88    232 291     Most Recent 3 BMP's:  Recent Labs   Lab Test 05/06/21  1205 05/06/21  0602 05/06/21  0123 05/05/21  0645 05/05/21  0645 05/04/21  1350 05/04/21  1350   NA  --  139  --   --  138  --  138   POTASSIUM 4.0 3.6 3.9   < > 2.6*   < > 2.3*   CHLORIDE  --  110*  --   --  104  --  100   CO2  --  24  --   --  26  --  27   BUN  --  25  --   --  25  --  28   CR  --  1.08  --   --  1.25  --  1.23   ANIONGAP  --  5  --   --  8  --  10   SUKHDEV  --  9.1  --   --  8.9  --  9.5   GLC  --  95  --   --  104*  --  105*    < > = values in this interval not displayed.     Most Recent 3 Troponin's:  Recent Labs   Lab Test 05/04/21  1656 08/22/15  0204   TROPI <0.015 <0.015  The 99th percentile for upper reference range is 0.045 ug/L.  Troponin values in   the range of 0.045 - 0.120 ug/L may be associated with risks of adverse   clinical events.       Most Recent 3 BNP's:  Recent Labs   Lab Test 05/04/21  1656 04/07/20  1044 12/21/17  0900  14  1405   NTBNPI 29  --   --   --    NTBNP  --  12 15 15     Most Recent D-dimer:No lab results found.,   Results for orders placed or performed during the hospital encounter of 21   Echo Complete    Narrative    [id number]  [id number]  [id number]  [id number]^STEVIE^NATALIIA     [facility],[place]  Echocardiography Laboratory  [address] [place] [address]     Name: GAMA FERRARI  MRN: [id number]  : 1970  Study Date: 2021 02:21 PM  Age: 50 yrs  Gender: Male  Patient Location: [facility]  Reason For Study: Cardiomegaly  Ordering Physician: NATALIIA HUBBARD  Performed By: Geena Pulliam RDCS     BSA: 2.5 m2  Height: 67 in  Weight: 315 lb  HR: 94  BP: 108/87 mmHg  ______________________________________________________________________________  Procedure  Complete Portable Echo Adult. Contrast Optison. Technically difficult study.  Poor acoustic windows. Optison (NDC #6594-7923-73) given intravenously.  Patient was given 6 ml mixture of 3 ml Optison and 6 ml saline. 3 ml wasted.  ______________________________________________________________________________  Interpretation Summary  Technically difficult study. Poor acoustic windows.  Global and regional left ventricular function is normal with an EF of 55-60%.  Left ventricular diastolic function is normal.  The RV is not clearly visualized but the size appears mildly enlarged with  mild dysfunction.  There are no significant valvular abnormalities.  IVC diameter >2.1 cm collapsing <50% with sniff suggests a high RA pressure  estimated at 15 mmHg or greater.  There is no prior study for direct comparison.  ______________________________________________________________________________  Left Ventricle  Global and regional left ventricular function is normal with an EF of 55-60%.  Left ventricular wall thickness is normal. Left ventricular size is normal.  Left ventricular diastolic function is normal.     Right  Ventricle  The RV is not clearly visualized but the size appears mildly enlarged with  mild dysfunction.     Atria  Both atria appear normal.     Mitral Valve  The mitral valve is normal. Trace mitral insufficiency is present.     Aortic Valve  The aortic valve is tricuspid. On Doppler interrogation, there is no  significant stenosis or regurgitation.     Tricuspid Valve  The valve leaflets are not well visualized. Trace tricuspid insufficiency is  present. Pulmonary artery systolic pressure cannot be assessed.     Pulmonic Valve  The valve leaflets are not well visualized. On Doppler interrogation, there is  no significant stenosis or regurgitation.     Vessels  Sinuses of Valsalva 4.2 cm. Ascending aorta 3.9 cm. Both are normal in caliber  when indexed to BSA. IVC diameter >2.1 cm collapsing <50% with sniff suggests  a high RA pressure estimated at 15 mmHg or greater.     Pericardium  No pericardial effusion is present.     Compared to Previous Study  There is no prior study for direct comparison.  ______________________________________________________________________________  MMode/2D Measurements & Calculations     IVSd: 0.95 cm  LVIDd: 4.2 cm  LVIDs: 3.0 cm  LVPWd: 0.79 cm  FS: 28.7 %  LV mass(C)d: 114.5 grams  LV mass(C)dI: 46.7 grams/m2  Ao root diam: 4.2 cm  asc Aorta Diam: 3.9 cm  LVOT diam: 2.6 cm  LVOT area: 5.3 cm2  RWT: 0.37     Doppler Measurements & Calculations  MV E max dilcia: 50.6 cm/sec  MV A max dilcia: 72.0 cm/sec  MV E/A: 0.70  MV dec slope: 350.0 cm/sec2  MV dec time: 0.14 sec  PA acc time: 0.08 sec  E/E' av.0  Lateral E/e': 4.2  Medial E/e': 5.7     ______________________________________________________________________________  Report approved by: Castro Muhammad 2021 04:07 PM           *Note: Due to a large number of results and/or encounters for the requested time period, some results have not been displayed. A complete set of results can be found in Results Review.       Pending  Results   These results will be followed up by N/A  Unresulted Labs Ordered in the Past 30 Days of this Admission     No orders found from 4/4/2021 to 5/5/2021.             Primary Care Physician   Chavez Milligan    Discharge Disposition   Discharged to home  Condition at discharge: Stable    Discharge Orders      Reason for your hospital stay    You were admitted to treat a critically low potassium level.     Adult [facility] Follow-up and recommended labs and tests    Follow up with primary care provider, Chavez Milligan, within 7 days for hospital follow- up.  The following labs/tests are recommended: BMP.      Appointments on [facility] and/or [facility] (with [facility] or [facility] provider or service). Call [phone] if you haven't heard regarding these appointments within 7 days of discharge.     Activity    Your activity upon discharge: activity as tolerated     When to contact your care team    Call your primary doctor if you have any of the following:  increased shortness of breath.     Full Code     Diet    Follow this diet upon discharge: Orders Placed This Encounter      Combination Diet Regular Diet Adult; 2 gm NA Diet     Discharge Medications   Current Discharge Medication List      CONTINUE these medications which have CHANGED    Details   potassium chloride (KLOR-CON) 20 MEQ packet Take 20 mEq by mouth 3 times daily  Qty: 270 packet, Refills: 3    Associated Diagnoses: Potassium (K) deficiency         CONTINUE these medications which have NOT CHANGED    Details   amitriptyline (ELAVIL) 100 MG tablet Take 1 tablet (100 mg) by mouth At Bedtime  Qty: 90 tablet, Refills: 3    Associated Diagnoses: Insomnia due to medical condition; Idiopathic progressive neuropathy      CETAPHIL (CETAPHIL) lotion Apply topically 2 times daily      COMPRESSION STOCKINGS 20-30 mmHg knee high compression stockings.  Measure and fit.  Style and brand per patient preference.  Qty: 2 each, Refills: 0    Associated  Diagnoses: Venous insufficiency (chronic) (peripheral)      furosemide (LASIX) 20 MG tablet Take 3 tablets (60 mg) by mouth 2 times daily  Qty: 540 tablet, Refills: 3    Associated Diagnoses: Stasis edema of both lower extremities      ketoconazole (NIZORAL) 2 % external shampoo APPLY TO AFFECTED AREA AND WASH OFF AFTER 5 MINUTES  Qty: 120 mL, Refills: 3    Associated Diagnoses: Seborrheic dermatitis      phentermine (ADIPEX-P) 37.5 MG tablet Take 1 tablet (37.5 mg) by mouth every morning  Qty: 90 tablet, Refills: 3    Associated Diagnoses: Morbid obesity (H)      topiramate (TOPAMAX) 100 MG tablet Take 2 tablets (200 mg) by mouth 2 times daily  Qty: 360 tablet, Refills: 3    Associated Diagnoses: Morbid obesity (H)      traZODone (DESYREL) 100 MG tablet Take 1 tablet (100 mg) by mouth At Bedtime Call clinic to schedule follow up appointment. [phone]  Qty: 90 tablet, Refills: 3    Associated Diagnoses: Sleep disorder      zolpidem (AMBIEN) 10 MG tablet Take 1 tablet (10 mg) by mouth nightly as needed for sleep  Qty: 30 tablet, Refills: 2    Associated Diagnoses: Insomnia due to medical condition      diclofenac (VOLTAREN) 1 % topical gel Apply 2 g topically 4 times daily  Qty: 50 g, Refills: 3    Associated Diagnoses: Chronic pain of left knee      !! order for DME Equipment being ordered: compression stockings    Needs to be faxed to  Qty: 1 Device, Refills: 0    Associated Diagnoses: Lower extremity edema; Stasis edema of both lower extremities      !! order for DME Compression stockings pair, patient preference    To be used 8 hours per day  Qty: 1 Device, Refills: 3    Associated Diagnoses: Lower extremity edema      !! order for DME Equipment being ordered: compression stockings  Qty: 2 each, Refills: 0    Associated Diagnoses: Stasis edema of both lower extremities       !! - Potential duplicate medications found. Please discuss with provider.        Allergies   Allergies   Allergen Reactions     Fentanyl  Itching     Patch     Meloxicam Other (See Comments)     Side pains.     Minocycline Rash     Patient reports adverse reaction was pigmentation which has resolved with drug discontinuation.

## 2021-05-07 NOTE — PROGRESS NOTES
Attempted to contact the patient x2 for post-hospital call without answer. Will close encounter at this time.    Adriana Erickson CMA  Post Hospital Discharge Team

## 2021-05-13 ENCOUNTER — OFFICE VISIT (OUTPATIENT)
Dept: INTERNAL MEDICINE | Facility: CLINIC | Age: 51
End: 2021-05-13
Payer: COMMERCIAL

## 2021-05-13 VITALS
DIASTOLIC BLOOD PRESSURE: 87 MMHG | OXYGEN SATURATION: 98 % | WEIGHT: 315 LBS | HEART RATE: 104 BPM | BODY MASS INDEX: 49.81 KG/M2 | SYSTOLIC BLOOD PRESSURE: 129 MMHG

## 2021-05-13 DIAGNOSIS — E87.6 HYPOKALEMIA: Primary | ICD-10-CM

## 2021-05-13 DIAGNOSIS — D68.9 COAGULOPATHY (H): ICD-10-CM

## 2021-05-13 DIAGNOSIS — E87.6 HYPOKALEMIA: ICD-10-CM

## 2021-05-13 LAB
ALBUMIN SERPL-MCNC: 3.9 G/DL (ref 3.4–5)
ALP SERPL-CCNC: 77 U/L (ref 40–150)
ALT SERPL W P-5'-P-CCNC: 28 U/L (ref 0–70)
ANION GAP SERPL CALCULATED.3IONS-SCNC: 5 MMOL/L (ref 3–14)
AST SERPL W P-5'-P-CCNC: 16 U/L (ref 0–45)
BASOPHILS # BLD AUTO: 0.1 10E9/L (ref 0–0.2)
BASOPHILS NFR BLD AUTO: 0.8 %
BILIRUB SERPL-MCNC: 0.5 MG/DL (ref 0.2–1.3)
BUN SERPL-MCNC: 27 MG/DL (ref 7–30)
CALCIUM SERPL-MCNC: 9.5 MG/DL (ref 8.5–10.1)
CHLORIDE SERPL-SCNC: 104 MMOL/L (ref 94–109)
CO2 SERPL-SCNC: 28 MMOL/L (ref 20–32)
CREAT SERPL-MCNC: 1.15 MG/DL (ref 0.66–1.25)
DIFFERENTIAL METHOD BLD: NORMAL
EOSINOPHIL # BLD AUTO: 0.1 10E9/L (ref 0–0.7)
EOSINOPHIL NFR BLD AUTO: 1.4 %
ERYTHROCYTE [DISTWIDTH] IN BLOOD BY AUTOMATED COUNT: 13.8 % (ref 10–15)
GFR SERPL CREATININE-BSD FRML MDRD: 73 ML/MIN/{1.73_M2}
GLUCOSE SERPL-MCNC: 100 MG/DL (ref 70–99)
HCT VFR BLD AUTO: 43.7 % (ref 40–53)
HGB BLD-MCNC: 14.5 G/DL (ref 13.3–17.7)
IMM GRANULOCYTES # BLD: 0 10E9/L (ref 0–0.4)
IMM GRANULOCYTES NFR BLD: 0.3 %
LYMPHOCYTES # BLD AUTO: 1.8 10E9/L (ref 0.8–5.3)
LYMPHOCYTES NFR BLD AUTO: 25.5 %
MAGNESIUM SERPL-MCNC: 2.2 MG/DL (ref 1.6–2.3)
MCH RBC QN AUTO: 29.3 PG (ref 26.5–33)
MCHC RBC AUTO-ENTMCNC: 33.2 G/DL (ref 31.5–36.5)
MCV RBC AUTO: 88 FL (ref 78–100)
MONOCYTES # BLD AUTO: 0.7 10E9/L (ref 0–1.3)
MONOCYTES NFR BLD AUTO: 9.8 %
NEUTROPHILS # BLD AUTO: 4.5 10E9/L (ref 1.6–8.3)
NEUTROPHILS NFR BLD AUTO: 62.2 %
NRBC # BLD AUTO: 0 10*3/UL
NRBC BLD AUTO-RTO: 0 /100
PLATELET # BLD AUTO: 284 10E9/L (ref 150–450)
POTASSIUM SERPL-SCNC: 3.4 MMOL/L (ref 3.4–5.3)
PROT SERPL-MCNC: 8.1 G/DL (ref 6.8–8.8)
RBC # BLD AUTO: 4.95 10E12/L (ref 4.4–5.9)
SODIUM SERPL-SCNC: 137 MMOL/L (ref 133–144)
WBC # BLD AUTO: 7.2 10E9/L (ref 4–11)

## 2021-05-13 PROCEDURE — 83735 ASSAY OF MAGNESIUM: CPT | Performed by: PATHOLOGY

## 2021-05-13 PROCEDURE — 99213 OFFICE O/P EST LOW 20 MIN: CPT | Mod: GE | Performed by: STUDENT IN AN ORGANIZED HEALTH CARE EDUCATION/TRAINING PROGRAM

## 2021-05-13 PROCEDURE — 85025 COMPLETE CBC W/AUTO DIFF WBC: CPT | Performed by: PATHOLOGY

## 2021-05-13 PROCEDURE — 80053 COMPREHEN METABOLIC PANEL: CPT | Performed by: PATHOLOGY

## 2021-05-13 PROCEDURE — 36415 COLL VENOUS BLD VENIPUNCTURE: CPT | Performed by: PATHOLOGY

## 2021-05-13 NOTE — NURSING NOTE
Chief Complaint   Patient presents with     RECHECK     f.u - lab work        Delfina Pa MA, at 2:04 PM on 5/13/2021.

## 2021-05-13 NOTE — PROGRESS NOTES
CC: Hospital follow-up     HPI:  Chavez Ca is a 50 year old male. 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    He reports at being at his baseline. Does report chronic CRUZ. Denies palpitations, CP, nausea, abdominal pain, muscle cramps. He is scheduled to be seen by Dr. Milligan on 6/4. He is currently on lasix 60 BID and KCL 60 q day. Will order CBC/CMP/Mg today.        ROS: Review Of Systems  Skin: negative  Eyes: negative  Ears/Nose/Throat: negative  Respiratory: CRUZ  Cardiovascular: negative  Gastrointestinal: negative  Genitourinary: negative  Musculoskeletal: negative  Neurologic: negative  Psychiatric: negative  Hematologic/Lymphatic/Immunologic: negative  Endocrine: negative    Past Medical History:   Diagnosis Date     Allergic rhinitis      Benign positional vertigo 2011    never quite went away     Bilateral carpal tunnel syndrome 7/18/2015     Chest pain      Chronic sinusitis 2017    I don't know Please look     Chronic tonsillitis      Coronary artery disease 3/8/16    right side chest pain     Depressive disorder      Depressive disorder, not elsewhere classified 7/30/2012     Gastro-oesophageal reflux disease      Hearing problem      Hypertension 2008     Idiopathic progressive polyneuropathy 10/15/2012     Learning disability      Neck mass     parapharyngeal, benign     Obesity, Class III, BMI 40-49.9 (morbid obesity) (H)      Obstructive sleep apnea      Psychological factors associated with another disorder 8/21/2012     Recurrent otitis media 2016    might be what the pain in my ears     Reduced vision      Tinnitus 2016    I would hear a  steady light buzzing sound and other can't     Trigeminal neuralgia      Weakness 8/26/2011     Past Surgical History:   Procedure Laterality Date     BIOPSY  2011    To figure out what kind of tumor I had     COLONOSCOPY N/A 10/16/2020    Procedure: INCOMPLETE COLONOSCOPY;  Surgeon: Ham Cherry MD;  Location: [facility] OR     ENDOSCOPIC RETROGRADE CHOLANGIOPANCREATOGRAM N/A 8/27/2015    Procedure: COMBINED ENDOSCOPIC RETROGRADE CHOLANGIOPANCREATOGRAPHY, PLACE TUBE/STENT;  Surgeon: Baldomero Zacarias MD;  Location: [facility] OR     ENDOSCOPIC RETROGRADE CHOLANGIOPANCREATOGRAM N/A 11/6/2015    Procedure: COMBINED ENDOSCOPIC RETROGRADE CHOLANGIOPANCREATOGRAPHY, REMOVE FOREIGN BODY OR STENT/TUBE;  Surgeon: Baldomero Zacarias MD;  Location: [facility] OR     EXCISE LESION INTRAORAL  6/29/2011    Procedure:EXCISE LESION INTRAORAL; TRANSCERVICAL APPROACH TO LEFT PHARYNGEAL SPACE MASS REMOVAL ; Surgeon:BARBARA PHILLIPS; Location: OR     HC VASCULAR SURGERY PROCEDURE UNLIST  12/29/15     LAPAROSCOPIC CHOLECYSTECTOMY N/A 8/23/2015    Procedure: LAPAROSCOPIC CHOLECYSTECTOMY;  Surgeon: Kvng Witt MD;  [facility]     LARYNGOSCOPY WITH BIOPSY(IES)  6/18/2014    Procedure: LARYNGOSCOPY WITH BIOPSY(IES);  Surgeon: Barbara Phillips MD;  Location:  OR     LASER CO2 LARYNGOSCOPY  12/7/2011    Procedure:LASER CO2 LARYNGOSCOPY; Micro-Direct Laryngoscopy with CO2 Laser Standby / Excision Tracheal Grandulization, Trach Tube Change / Kenalog application. / Balloon Dilation of Subglottic Stenosis.*Latex Safe Room* Trach Tube = Shiley 6.4mm I.D. / 10.8MM O.D. / 76MM  Cuffless.; Surgeon:BARBARA PHILLIPS; Location: OR     ORTHOPEDIC SURGERY  5/2016    Broken Right hand & Fracture Right shoulder     TRACHEOSTOMY  4/22/2011    Procedure:TRACHEOSTOMY; possible awake; Surgeon:BARBARA PHILLIPS; Location: OR     TRACHEOSTOMY  6/29/2011    Procedure:TRACHEOSTOMY; REMOVE AND REPLACE SHILEY  6 XLT; Surgeon:EMELIA PHILLIPS; Location:[facility] OR     VASCULAR SURGERY  2008-[facility]     Family History   Adopted: Yes   Problem Relation Age of Onset     Family History Negative Other         Does not know family     Unknown/Adopted No family hx of      Social History     Tobacco Use     Smoking status: Never Smoker     Smokeless tobacco: Never Used     Tobacco comment: Never started   Substance Use Topics     Alcohol use: No     Alcohol/week: 0.0 standard drinks     Comment: rarely     Drug use: No     Current Outpatient Medications   Medication Sig Dispense Refill     amitriptyline (ELAVIL) 100 MG tablet Take 1 tablet (100 mg) by mouth At Bedtime 90 tablet 3     CETAPHIL (CETAPHIL) lotion Apply topically 2 times daily       COMPRESSION STOCKINGS 20-30 mmHg knee high compression stockings.  Measure and fit.  Style and brand per patient preference. 2 each 0     diclofenac (VOLTAREN) 1 % topical gel Apply 2 g topically 4 times daily 50 g 3     furosemide (LASIX) 20 MG tablet Take 3 tablets (60 mg) by mouth 2 times daily 540 tablet 3     ketoconazole (NIZORAL) 2 % external shampoo APPLY TO AFFECTED AREA AND WASH OFF AFTER 5 MINUTES 120 mL 3     order for DME Equipment being ordered: compression stockings    Needs to be faxed to 1 Device 0     order for DME Compression stockings pair, patient preference    To be used 8 hours per day 1 Device 3     order for DME Equipment being ordered: compression stockings 2 each 0     phentermine (ADIPEX-P) 37.5 MG tablet Take 1 tablet (37.5 mg) by mouth every morning 90 tablet 3     potassium chloride (KLOR-CON) 20 MEQ packet Take 20 mEq by mouth 3 times daily 270 packet 3     topiramate (TOPAMAX) 100 MG tablet Take 2 tablets (200 mg) by mouth 2 times daily 360 tablet 3     traZODone (DESYREL) 100 MG tablet Take 1 tablet (100 mg) by mouth At Bedtime Call clinic to schedule follow up appointment. [phone] 90 tablet 3     zolpidem (AMBIEN) 10 MG tablet Take 1 tablet (10  mg) by mouth nightly as needed for sleep 30 tablet 2        Allergies   Allergen Reactions     Fentanyl Itching     Patch     Meloxicam Other (See Comments)     Side pains.     Minocycline Rash     Patient reports adverse reaction was pigmentation which has resolved with drug discontinuation.         There were no vitals taken for this visit.  Physical Examination:    General:  Conversant, generally healthy appearing, no acute distress  Head: atraumatic  Neck:  Trachea midline, Full AROM, supple, thyroid smooth, symmetric, not enlarged, no nodules, no neck lymphadenopathy  Lungs:  Clear to auscultation throughout, no wheezes, rhonchi or rales. Normal respiratory effort and no intercostal retractions.  C/V:  Regular rate and rhythm, no murmurs, rubs or gallops.  No JVD, no carotid bruits. Radial and DP pulses 2+, equal and regular.  Abdomen:  Not distended.  Bowel sounds active.  No tenderness, no hepatosplenomegaly or masses.  No CVA tenderness or masses.  Lymph:  No cervical lymph nodes.  Neuro: Alert and oriented, face symmetric, No focal deficits.       A&P:  Chavez was seen today for potassium recheck.    Hypokalemia 2/2 loop diuretics   -     Comprehensive metabolic panel FUTURE anytime; Future  -     Magnesium  -     CBC with platelets; Standing  -     Continue KCL 20 TID pending labs.     Manuel Box MD  Internal Medicine Resident (PGY1)  [id number]      This patient was discussed and seen with Dr. Aguilar

## 2021-05-17 NOTE — TELEPHONE ENCOUNTER
Tried calling patient, no answered. Left message with Primary Care clinic number requesting patient to call back to schedule re-establish care with annual physical as Dr. Colindres is no longer here   with patient

## 2021-10-02 ENCOUNTER — HEALTH MAINTENANCE LETTER (OUTPATIENT)
Age: 51
End: 2021-10-02

## 2021-11-24 ENCOUNTER — IMMUNIZATION (OUTPATIENT)
Dept: NURSING | Facility: CLINIC | Age: 51
End: 2021-11-24
Payer: COMMERCIAL

## 2021-11-24 PROCEDURE — 0004A PR COVID VAC PFIZER DIL RECON 30 MCG/0.3 ML IM: CPT

## 2021-11-24 PROCEDURE — 91300 PR COVID VAC PFIZER DIL RECON 30 MCG/0.3 ML IM: CPT

## 2021-11-24 PROCEDURE — G0008 ADMIN INFLUENZA VIRUS VAC: HCPCS

## 2021-11-24 PROCEDURE — 90686 IIV4 VACC NO PRSV 0.5 ML IM: CPT

## 2021-12-14 NOTE — NURSING NOTE
Chief Complaint   Patient presents with     Medication Refill     Pt would like to discuss medications.      Leandra Gross LPN at 7:46 AM on 6/4/2020.    
DC instructions

## 2022-03-16 ENCOUNTER — ANCILLARY PROCEDURE (OUTPATIENT)
Dept: GENERAL RADIOLOGY | Facility: CLINIC | Age: 52
End: 2022-03-16
Attending: PEDIATRICS
Payer: COMMERCIAL

## 2022-03-16 ENCOUNTER — OFFICE VISIT (OUTPATIENT)
Dept: INTERNAL MEDICINE | Facility: CLINIC | Age: 52
End: 2022-03-16
Payer: COMMERCIAL

## 2022-03-16 VITALS
HEIGHT: 67 IN | BODY MASS INDEX: 49.44 KG/M2 | DIASTOLIC BLOOD PRESSURE: 92 MMHG | HEART RATE: 100 BPM | WEIGHT: 315 LBS | SYSTOLIC BLOOD PRESSURE: 146 MMHG | OXYGEN SATURATION: 96 %

## 2022-03-16 DIAGNOSIS — E66.01 MORBID OBESITY (H): ICD-10-CM

## 2022-03-16 DIAGNOSIS — M25.511 CHRONIC RIGHT SHOULDER PAIN: Primary | ICD-10-CM

## 2022-03-16 DIAGNOSIS — G89.29 CHRONIC RIGHT SHOULDER PAIN: ICD-10-CM

## 2022-03-16 DIAGNOSIS — G89.29 CHRONIC RIGHT SHOULDER PAIN: Primary | ICD-10-CM

## 2022-03-16 DIAGNOSIS — M54.2 CERVICALGIA: ICD-10-CM

## 2022-03-16 DIAGNOSIS — G89.29 CHRONIC PAIN OF BOTH SHOULDERS: ICD-10-CM

## 2022-03-16 DIAGNOSIS — M25.511 CHRONIC PAIN OF BOTH SHOULDERS: ICD-10-CM

## 2022-03-16 DIAGNOSIS — M54.41 CHRONIC RIGHT-SIDED LOW BACK PAIN WITH RIGHT-SIDED SCIATICA: ICD-10-CM

## 2022-03-16 DIAGNOSIS — M25.512 CHRONIC PAIN OF BOTH SHOULDERS: ICD-10-CM

## 2022-03-16 DIAGNOSIS — M25.511 CHRONIC RIGHT SHOULDER PAIN: ICD-10-CM

## 2022-03-16 DIAGNOSIS — G89.29 CHRONIC RIGHT-SIDED LOW BACK PAIN WITH RIGHT-SIDED SCIATICA: ICD-10-CM

## 2022-03-16 DIAGNOSIS — G50.0 TRIGEMINAL NEURALGIA: ICD-10-CM

## 2022-03-16 DIAGNOSIS — G60.3 IDIOPATHIC PROGRESSIVE POLYNEUROPATHY: Chronic | ICD-10-CM

## 2022-03-16 PROCEDURE — 99214 OFFICE O/P EST MOD 30 MIN: CPT | Performed by: PEDIATRICS

## 2022-03-16 PROCEDURE — 72100 X-RAY EXAM L-S SPINE 2/3 VWS: CPT | Performed by: RADIOLOGY

## 2022-03-16 PROCEDURE — 73030 X-RAY EXAM OF SHOULDER: CPT | Mod: RT | Performed by: RADIOLOGY

## 2022-03-16 NOTE — PATIENT INSTRUCTIONS
Thank you for visiting the Primary Care Center today at the [facility]! The following is some information about our clinic:     Primary Care Center Frequently-Asked Questions    (1) How do I schedule appointments at the [facility]?     Primary Care--to schedule or make changes to an existing appointment, please call our primary care line at [phone].    Labs--to schedule a lab appointment at the [facility] you can use [employer] or call [phone]. If you have a [facility] location that is closer to home, you can reach out to that location for scheduling options.     Imaging--if you need to schedule a CT, X-ray, MRI, ultrasound, or other imaging study you can call [phone] to schedule at the [facility] or any other [facility] imaging location.     Referrals--if a referral to another specialty was ordered you can expect a phone call from their scheduling team. If you have not heard from them in a week, please call us or send us a [employer] message to check the status or get a scheduling number. Please note that this only applies to internal [facility] referrals. If the referral is external you would need to contact their office for scheduling.     (2) I have a question about my visit, who do I contact?     You can call us at the primary care line at [phone] to ask questions about your visit. You can also send a secure message through [employer], which is reviewed by clinic staff. Please note that [employer] messages have a twenty-four to forty-eight business hour turnaround time and should not be used for urgent concerns.    (3) How will I get the results of my tests?    If you are signed up for [employer] all tests will be released to you within twenty-four hours of resulting. Please allow three to five days for your doctor to review your results and place a note interpreting the results. If you do not have [employer] you will receive your  results through mail seven to ten business days following the return of the tests. Please note that if there should be any urgent or concerning results that your doctor or their registered nurse will reach out to you the same day as the tests come back. If you have follow up questions about your results or would like to discuss the results in detail please schedule a follow up with your provider either in person or virtually.     (4) How do I get refills of my prescriptions?     You should always first contact your pharmacy for refills of your medications. If submitting a refill request on [employer], please be sure to submit the request only once--repeat requests can cause delays in refill. If you are requesting a NEW medication or a medication related to new symptoms you will need to schedule an appointment with a provider prior to approval. Please note: Routine medication refills have up to one to three business day turnaround whereas controlled substances refills have up to five to seven business day turnaround.    (5) I have new symptoms, what do I do?     If you are having an immediate medical emergency, you should dial 911 for assistance.   For anything urgent that needs to be seen within a few hours to one day you should visit a local urgent care for assistance.  For non-urgent symptoms that need to be seen within a few days to a week you can schedule with an available provider in primary care by going to [employer] or calling [phone].   If you are not sure how serious your symptoms are or you would like to receive medical advice you can always call [phone] to speak with a triage nurse.

## 2022-03-16 NOTE — NURSING NOTE
Chief Complaint   Patient presents with     Recheck Medication     Musculoskeletal Problem     Arm and leg pain     Pain       ADRIENNE Alicea at 11:05 AM on 3/16/2022.

## 2022-03-16 NOTE — PROGRESS NOTES
"Dear patient. Thank you for visiting with me. I want you to feel respected, understood, and empowered. \"Respect\" is valuing you as much as I would a close family member. \"Empowerment\" happens when you are fully informed, and can make the best possible decision for you.  Please ask me questions!  Challenge anything that is not clear.    Medical records are primarily used as memory aids for me and my colleagues. Things to know about my documentation style:  - The 'problem list' includes current symptoms or diagnoses, and some problems that are resolved but may return. I use the past medical history for problems not expected to return.  - I use single quotation marks for things that you or I said, when I want to clarify who was speaking.  - I use double quotation marks when copying a term from elsewhere in your records. Italics (besides here) may also denote a quotation.  If you have questions or concerns, please contact me; I will reply as soon as time allows.    Context    Chavez Ca is a 51 year old man, with concerns including:  Chief Complaint   Patient presents with     Recheck Medication     Musculoskeletal Problem     Arm and leg pain     Pain     PCP: Chavez Milligan   Visit type: overview of problems, with several issues identified by patient discussed within limits of available time    BP (!) 146/92 (BP Location: Right arm, Patient Position: Sitting, Cuff Size: Adult Large)   Pulse 100   Ht 1.702 m (5' 7\")   SpO2 96%   BMI 49.81 kg/m      Problems and progress    Disposition comment: The patient reports unhappiness about reaching us via [employer] or telephone, although there are no records of contact attempts in epic.  Last contact we have had for him was a 5/13/2021 office visit with one of the residents.  He feels that other providers have been lying to him about gastric bypass and other issues.  He appears to have been scheduled for some testing, but these were canceled for reasons that " I could not discern in the record.  There are a great many medical issues going on in this patient, and I agree that he needs to be seen more frequently.  I am not sure what happened to his contact difficulties, but it sounds like he has a computer issue locally with my chart.  I am sorry about the telephone challenges, we can try to set him up for appointments today in person.    Morbid obesity  UPDATE: He reports seeing an obesity doctor to get back on track with meds. There are worries about him being a high-risk patient for gastric bypass.  ASSESSMENT & PLAN: This will need review by me. Clearly he needs help with obesity treatment, but I am not sure about his candidacy for the program.  Obesity. Being off his meds, he has gained a lot of weight.  He asked for refill of meds (there are at least 2 written by the weight clinic: phentermine and topiramate - these needs to be prescribed by that clinic, I don't write for phentermine and topiramate for obesity is not within my usual indications.      He had an [facility] ER visit for trigeminal neuralgia on 2/12/2022.  He had reassuring evaluation/imaging (done because of poor dentition).  He was started on Tegretol and told to follow-up with his primary on Monday mid February.          Right shoulder pain for the last year, sometimes locks in place and he has to 'snap' it down.  An acquaintance told him to ask for an MRI.  Plain film 11/3/2020 showed mild to moderate degenerative changes of the AC joint.  He feels that his problem is worsened by physical therapy, although I do not have documentation of what exactly was done.      Right leg/hip pain occurs to variable degrees, and sometimes feels as if it is moving.  He has chronic back pain.  He recalls being told that his ligaments were sore, but he is concerned about the possibility of back pain and sciatica.  He has not had back or hip imaging.  He is falling down, including with walking down steps, needs to go side  ways. Took 35 minutes to walk to bus stop.      He wants all of his medicines to be restarted, the prescription has lapsed with his difficulty in reaching us.  Was taking topiramate and phentermine for weight, which I defer back to them.  Amitriptyline was said to be for restless legs.     We talked about various options, but I will need to review his records further before starting meds that were previously done by other providers. Generally subspecialty meds should be kept in that area.  Will start with imaging (See orders for plan)        About this visit:  Time note ( The total time (on the date of service) for this service was 30 minutes, including discussion/face-to-face, chart review, interpretation not otherwise reported, documentation, and updating of the computerized record.

## 2022-03-17 NOTE — ASSESSMENT & PLAN NOTE
Right shoulder pain for the last year, sometimes locks in place and he has to 'snap' it down.  An acquaintance told him to ask for an MRI.  Plain film 11/3/2020 showed mild to moderate degenerative changes of the AC joint.  He feels that his problem is worsened by physical therapy, although I do not have documentation of what exactly was done.

## 2022-03-17 NOTE — ASSESSMENT & PLAN NOTE
He had an [facility] ER visit for trigeminal neuralgia on 2/12/2022.  He had reassuring evaluation/imaging (done because of poor dentition).  He was started on Tegretol and told to follow-up with his primary on Monday mid February.

## 2022-04-21 ENCOUNTER — OFFICE VISIT (OUTPATIENT)
Dept: INTERNAL MEDICINE | Facility: CLINIC | Age: 52
End: 2022-04-21

## 2022-04-21 VITALS
RESPIRATION RATE: 18 BRPM | HEIGHT: 67 IN | HEART RATE: 115 BPM | BODY MASS INDEX: 49.44 KG/M2 | SYSTOLIC BLOOD PRESSURE: 131 MMHG | OXYGEN SATURATION: 97 % | DIASTOLIC BLOOD PRESSURE: 82 MMHG | WEIGHT: 315 LBS

## 2022-04-21 DIAGNOSIS — G89.29 CHRONIC RIGHT SHOULDER PAIN: ICD-10-CM

## 2022-04-21 DIAGNOSIS — M25.511 CHRONIC RIGHT SHOULDER PAIN: ICD-10-CM

## 2022-04-21 DIAGNOSIS — F32.A DEPRESSION, UNSPECIFIED DEPRESSION TYPE: ICD-10-CM

## 2022-04-21 DIAGNOSIS — M54.16 RADICULOPATHY, LUMBAR REGION: Primary | ICD-10-CM

## 2022-04-21 DIAGNOSIS — E66.01 MORBID OBESITY (H): ICD-10-CM

## 2022-04-21 DIAGNOSIS — G47.01 INSOMNIA DUE TO MEDICAL CONDITION: ICD-10-CM

## 2022-04-21 PROCEDURE — 99214 OFFICE O/P EST MOD 30 MIN: CPT | Mod: GC | Performed by: STUDENT IN AN ORGANIZED HEALTH CARE EDUCATION/TRAINING PROGRAM

## 2022-04-21 ASSESSMENT — PATIENT HEALTH QUESTIONNAIRE - PHQ9
5. POOR APPETITE OR OVEREATING: MORE THAN HALF THE DAYS
SUM OF ALL RESPONSES TO PHQ QUESTIONS 1-9: 16

## 2022-04-21 ASSESSMENT — ANXIETY QUESTIONNAIRES
1. FEELING NERVOUS, ANXIOUS, OR ON EDGE: MORE THAN HALF THE DAYS
IF YOU CHECKED OFF ANY PROBLEMS ON THIS QUESTIONNAIRE, HOW DIFFICULT HAVE THESE PROBLEMS MADE IT FOR YOU TO DO YOUR WORK, TAKE CARE OF THINGS AT HOME, OR GET ALONG WITH OTHER PEOPLE: EXTREMELY DIFFICULT
3. WORRYING TOO MUCH ABOUT DIFFERENT THINGS: MORE THAN HALF THE DAYS
6. BECOMING EASILY ANNOYED OR IRRITABLE: MORE THAN HALF THE DAYS
7. FEELING AFRAID AS IF SOMETHING AWFUL MIGHT HAPPEN: NEARLY EVERY DAY
2. NOT BEING ABLE TO STOP OR CONTROL WORRYING: MORE THAN HALF THE DAYS
GAD7 TOTAL SCORE: 15
5. BEING SO RESTLESS THAT IT IS HARD TO SIT STILL: MORE THAN HALF THE DAYS

## 2022-04-21 NOTE — PATIENT INSTRUCTIONS
HEALTH PSYCHOLOGY    What is Health Psychology?  Health Psychology is a specialty that helps people cope with the stress and anxiety that often occurs with illness, emotional and psychological issues, and preparation for medical treatment including surgical procedures.    Telehealth services are now being provided to patients.    Location:    [facility]                      [address] [place] [id number]                     Please arrive 15 minutes early to check in for in-person appointments.       We can help patients affected by  Adjustment problems  Anxiety  Cancer  Chronic digestive disorders  Depression  Diabetes  Disability  Health-related behavior change  Insomnia  Other medical and nervous system conditions  People experiencing or wishing to prevent health problems  Smoking cessation  Transplants    Treatments we offer include  Psychological assessment  Psychotherapy/counseling  Cognitive behavioral therapy  Relaxation training  Behavior therap  Motivational interviewing    How We Help  Coping: We help people with the emotional issues related to their illness.  Challenges: Difficult challenges seem to increase during illness. We teach steps and strategies for managing stressful situations.  Feelings: We help people deal with anger, anxiety, confusion, depression, fear, frustration, grief, loss of control, sadness, uncertainty, and motivational issues.  Behaviors: When people are ill, it can be harder to take care of themselves. We help people manage weight, follow health regimens, relax, and quit smoking.  Counseling: We offer several types of counseling and can create a plan that meets needs of a patient. Our practice works with individuals, couples, and families.    Our Care Team  Working with primary and specialty physicians at [facility] and [facility], we see patients in the hospital and clinic, allowing us to coordinate our services  with the rest of people's medical needs.       To Schedule an Appointment  New patient appointments are generally scheduled through the Central Scheduling number: [phone].      Patients may contact our psychologists directly with questions or to make an appointment.    Betsey Houser, Ph.D.,                     [phone] website  Tavia Vargas, Ph.D.                          [phone] website  Marly Zeng, Ph.D., A.B.P.P.,       [phone] website         Brenton Santiago, Ph.D., A.B.P.P.,  [phone] website   Ashwini Vasquez, Ph.D.,                     [phone] website  Denice Rodriguez, Ph.D., A.B.P.P.,         [phone]    NOTE: Services are confidential. Insurance coverage varies. Mental health benefits of health plans may have different levels of coverage than medical benefits. Please confirm the coverage details with the insurance plan or our business office.

## 2022-04-21 NOTE — PROGRESS NOTES
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 He presents to clinic today to discuss his medications, and also requests a referral for an open-sided MRI.    Per EMR, he has MR for his lumbar spine and right shoulder ordered. Patient attempted to get these done prior to today's visit but did not fit in the machine. He requests a referral to \"[facility]\" for an open/standing MRI. He states that he is unable to lie flat to sleep.    He endorses worsening CRUZ, he attributes this to his weight gain. He reports a ~60 lbs weight gain in the past year. He states that he had been unable to get a bariatric surgery due to him been high-risk surgery.  He states that he ran out of his Topiramate and phentermine, he has not taken these medications for a couple of weeks. He plans to  his prescription today. Patient follows w/ Endocrinology for his Morbid obesity. Last visit on record was in 03/21.    Patient's PHQ9 was 16, and GAD7 was 15. No thoughts of self-harm. Patient is on amitriptyline and trazodone for RLS and insomnia respectively. He reports missing some doses, but states that he last took these medications last night.      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     Learning disability      Neck mass     parapharyngeal, benign     Obesity, Class III, BMI 40-49.9 (morbid obesity) (H)      Obstructive sleep apnea      Psychological factors associated with another disorder 8/21/2012     Recurrent otitis media 2016    might be what the pain in my ears     Reduced vision      Tinnitus 2016    I would hear a steady light buzzing sound and other can't     Trigeminal neuralgia      Weakness 8/26/2011     Past Surgical History:   Procedure Laterality Date     BIOPSY  2011    To figure out what kind of tumor I had     COLONOSCOPY N/A 10/16/2020    Procedure: INCOMPLETE COLONOSCOPY;  Surgeon: Ham Cherry MD;  Location: [facility] OR     ENDOSCOPIC RETROGRADE CHOLANGIOPANCREATOGRAM N/A 8/27/2015    Procedure: COMBINED ENDOSCOPIC RETROGRADE CHOLANGIOPANCREATOGRAPHY, PLACE TUBE/STENT;  Surgeon: Baldomero Zacarias MD;  Location: [facility] OR     ENDOSCOPIC RETROGRADE CHOLANGIOPANCREATOGRAM N/A 11/6/2015    Procedure: COMBINED ENDOSCOPIC RETROGRADE CHOLANGIOPANCREATOGRAPHY, REMOVE FOREIGN BODY OR STENT/TUBE;  Surgeon: Baldomero Zacarias MD;  Location: [facility] OR     EXCISE LESION INTRAORAL  6/29/2011    Procedure:EXCISE LESION INTRAORAL; TRANSCERVICAL APPROACH TO LEFT PHARYNGEAL SPACE MASS REMOVAL ; Surgeon:BARBARA PHILLIPS; Location:[facility] OR     HC VASCULAR SURGERY PROCEDURE UNLIST  12/29/15     LAPAROSCOPIC CHOLECYSTECTOMY N/A 8/23/2015    Procedure: LAPAROSCOPIC CHOLECYSTECTOMY;  Surgeon: Kvng Witt MD;  Location: [facility] OR     LARYNGOSCOPY WITH BIOPSY(IES)  6/18/2014    Procedure: LARYNGOSCOPY WITH BIOPSY(IES);  Surgeon: Barbara Phillips MD;  Location: [facility] OR     LASER CO2 LARYNGOSCOPY  12/7/2011    Procedure:LASER CO2 LARYNGOSCOPY; Micro-Direct Laryngoscopy with CO2 Laser Standby / Excision Tracheal Grandulization, Trach Tube Change / Kenalog  application. / Balloon Dilation of Subglottic Stenosis.*Latex Safe Room* Trach Tube = Shiley 6.4mm I.D. / 10.8MM O.D. / 76MM  Cuffless.; Surgeon:EMELIA PHILLIPS; Location:[facility] OR     ORTHOPEDIC SURGERY  5/2016    Broken Right hand & Fracture Right shoulder     TRACHEOSTOMY  4/22/2011    Procedure:TRACHEOSTOMY; possible awake; Surgeon:EMELIA PHILLIPS; Location:[facility] OR     TRACHEOSTOMY  6/29/2011    Procedure:TRACHEOSTOMY; REMOVE AND REPLACE SHILEY 6 XLT; Surgeon:EMELIA PHILLIPS; Location:[facility] OR     VASCULAR SURGERY  2008-[facility]     Family History   Adopted: Yes   Problem Relation Age of Onset     Family History Negative Other         Does not know family     Unknown/Adopted No family hx of      Social History     Tobacco Use     Smoking status: Never Smoker     Smokeless tobacco: Never Used     Tobacco comment: Never started   Substance Use Topics     Alcohol use: No     Alcohol/week: 0.0 standard drinks     Comment: rarely     Drug use: No     Current Outpatient Medications   Medication Sig Dispense Refill     amitriptyline (ELAVIL) 100 MG tablet Take 1 tablet (100 mg) by mouth At Bedtime 90 tablet 3     CETAPHIL (CETAPHIL) lotion Apply topically 2 times daily       COMPRESSION STOCKINGS 20-30 mmHg knee high compression stockings.  Measure and fit.  Style and brand per patient preference. 2 each 0     furosemide (LASIX) 20 MG tablet Take 3 tablets (60 mg) by mouth 2 times daily 540 tablet 3     ketoconazole (NIZORAL) 2 % external shampoo APPLY TO AFFECTED AREA AND WASH OFF AFTER 5 MINUTES 120 mL 3     order for DME Equipment being ordered: compression stockings    Needs to be faxed to 1 Device 0     order for DME Compression stockings pair, patient preference    To be used 8 hours per day 1 Device 3     order for DME Equipment being ordered: compression stockings 2 each 0     phentermine (ADIPEX-P) 37.5 MG tablet Take 1 tablet (37.5 mg) by mouth every morning 90 tablet 3     topiramate  "(TOPAMAX) 100 MG tablet Take 2 tablets (200 mg) by mouth 2 times daily 360 tablet 3     traZODone (DESYREL) 100 MG tablet Take 1 tablet (100 mg) by mouth At Bedtime Call clinic to schedule follow up appointment. [phone] 90 tablet 3     zolpidem (AMBIEN) 10 MG tablet Take 1 tablet (10 mg) by mouth nightly as needed for sleep 30 tablet 2     diclofenac (VOLTAREN) 1 % topical gel Apply 2 g topically 4 times daily (Patient not taking: Reported on 4/21/2022) 50 g 3     potassium chloride (KLOR-CON) 20 MEQ packet Take 20 mEq by mouth 3 times daily 270 packet 3        Allergies   Allergen Reactions     Fentanyl Itching     Patch     Meloxicam Other (See Comments)     Side pains.     Minocycline Rash     Patient reports adverse reaction was pigmentation which has resolved with drug discontinuation.         /82 (BP Location: Right arm, Patient Position: Sitting, Cuff Size: Adult Large)   Pulse 115   Resp 18   Ht 1.702 m (5' 7\")   Wt (!) 173.7 kg (383 lb)   SpO2 97%   BMI 59.99 kg/m    Physical Examination:    General:  Conversant, generally healthy appearing, no acute distress  Head: atraumatic  Eyes:  Pupils 2-3 mm, sclera white, EOM's full, conjunctiva moist, no periorbital swelling    Lungs:  Clear to auscultation throughout, no wheezes, rhonchi or rales. Normal respiratory effort and no intercostal retractions.  C/V:  Regular rate and rhythm, no murmurs, rubs or gallops.  Radial pulses 2+, equal and regular.  Neuro: Alert and oriented, face symmetric. Strength grossly intact. No tremor.    Extremities:  No peripheral edema, no digital cyanosis  Psych:  Alert and oriented. Appropriate affect.  Not psychomotor slowed.  No signs of anxiety or agitation.      A&P:    # Chronic right shoulder pain  # Chronic low back pain  Patient was unable to have previously ordered MRI exams because he did not fit in the machine. He requests an outside referral to [facility] for an Open MRI.  - MR lumbar spine w/o contrast " (reordered)  - MR right shoulder w/o contrast (reordered)  - follow-up with PCP (Dr. Milligan) as previously scheduled    # Moderate depression   PHQ9 of 16; GAD7 of 15  - mental health referral (discussed with patient, ordered)  - continue amitryptiline and trazodone (prescribed for RLS and insomnia)    # Morbid Obesity  - recommend follow-up with Endocrinology  - continue Topiramate and phentermine    Patient discussed with Dr. Charu Aguirre MD  Internal Medicine Resident PGY 2  Pager: [phone]

## 2022-04-21 NOTE — NURSING NOTE
Chavez Ca is a 51 year old male patient that presents today in clinic for the following:    Chief Complaint   Patient presents with     Recheck Medication     Would like to discuss medications     Referral     Would like referral for open sided MRI     The patient's allergies and medications were reviewed as noted. A set of vitals were recorded as noted without incident. The patient does not have any other questions for the provider.    Marlene Giron, EMT at 12:05 PM on 4/21/2022

## 2022-04-22 ASSESSMENT — ANXIETY QUESTIONNAIRES: GAD7 TOTAL SCORE: 15

## 2022-04-23 RX ORDER — ZOLPIDEM TARTRATE 10 MG/1
10 TABLET ORAL
Qty: 30 TABLET | Refills: 2 | Status: SHIPPED | OUTPATIENT
Start: 2022-04-23 | End: 2023-03-06

## 2022-04-26 RX ORDER — TOPIRAMATE 100 MG/1
TABLET, FILM COATED ORAL
Qty: 360 TABLET | Refills: 3 | OUTPATIENT
Start: 2022-04-26

## 2022-04-26 RX ORDER — PHENTERMINE HYDROCHLORIDE 37.5 MG/1
TABLET ORAL
Qty: 90 TABLET | Refills: 3 | OUTPATIENT
Start: 2022-04-26

## 2022-04-26 NOTE — TELEPHONE ENCOUNTER
TOPIRAMATE 100MG TABS      Last Written Prescription Date:  3-  Last Fill Quantity: 360,   # refills: 3      Routing refill request to provider for review/approval because:  Over a year since last clinic appointment.        PHENTERMINE HCL 37.5MG TABS      Last Written Prescription Date:  3-  Last Fill Quantity: 90,   # refills: 3      Routing refill request to provider for review/approval because:  Controlled medication - he should not have been able to refill for more than 6 months - Gap in therapy?      Last Office Visit : 3-  Future Office visit:  6-        Kathleen M Doege RN

## 2022-04-26 NOTE — TELEPHONE ENCOUNTER
Refills request erroneously routed to dermatology. Please forward to prescribing physician/speciality. Thanks!

## 2022-04-27 DIAGNOSIS — E66.01 MORBID OBESITY (H): ICD-10-CM

## 2022-04-27 RX ORDER — TOPIRAMATE 100 MG/1
200 TABLET, FILM COATED ORAL 2 TIMES DAILY
Qty: 360 TABLET | Refills: 0 | Status: SHIPPED | OUTPATIENT
Start: 2022-04-27 | End: 2022-06-24

## 2022-04-27 RX ORDER — PHENTERMINE HYDROCHLORIDE 37.5 MG/1
37.5 TABLET ORAL EVERY MORNING
Qty: 90 TABLET | Refills: 0 | Status: SHIPPED | OUTPATIENT
Start: 2022-04-27 | End: 2022-05-04

## 2022-04-27 RX ORDER — PHENTERMINE HYDROCHLORIDE 37.5 MG/1
TABLET ORAL
Qty: 90 TABLET | Refills: 3 | OUTPATIENT
Start: 2022-04-27

## 2022-05-02 ENCOUNTER — TELEPHONE (OUTPATIENT)
Dept: ENDOCRINOLOGY | Facility: CLINIC | Age: 52
End: 2022-05-02
Payer: COMMERCIAL

## 2022-05-02 NOTE — TELEPHONE ENCOUNTER
Reason for call:  Other   Patient called regarding (reason for call): prescription  Additional comments: Patient said the pharmacy denied his phentermine. He has an upcoming appointment with MD Quinn, switching from MD Chang. It also looks like Denice was working on the last refill. Please reach out to the patient to discuss/update.     Phone number to reach patient:  Home number on file [phone] (home)    Best Time:  Anytime     Can we leave a detailed message on this number?  YES    Travel screening: Not Applicable

## 2022-05-02 NOTE — TELEPHONE ENCOUNTER
Called and left message for patient informing him that rx for Phentermine was sent to  Pharmacy in [facility] on Friday 4/29/22.

## 2022-05-03 DIAGNOSIS — E66.01 MORBID OBESITY (H): ICD-10-CM

## 2022-05-03 NOTE — TELEPHONE ENCOUNTER
This prescription was already sent by Denice Teixeira PA-C on 4/27/22. Routing back to refill team member to refuse.

## 2022-05-04 ENCOUNTER — TELEPHONE (OUTPATIENT)
Dept: INTERNAL MEDICINE | Facility: CLINIC | Age: 52
End: 2022-05-04
Payer: COMMERCIAL

## 2022-05-04 DIAGNOSIS — E66.01 MORBID OBESITY (H): ICD-10-CM

## 2022-05-04 RX ORDER — PHENTERMINE HYDROCHLORIDE 37.5 MG/1
37.5 TABLET ORAL EVERY MORNING
Qty: 90 TABLET | Refills: 0 | Status: SHIPPED | OUTPATIENT
Start: 2022-05-04 | End: 2022-06-24

## 2022-05-04 RX ORDER — PHENTERMINE HYDROCHLORIDE 37.5 MG/1
TABLET ORAL
Qty: 90 TABLET | Refills: 3 | OUTPATIENT
Start: 2022-05-04

## 2022-05-04 NOTE — TELEPHONE ENCOUNTER
[facility] Call Center    Phone Message    May a detailed message be left on voicemail: yes     Reason for Call: Order(s): Other:   Reason for requested: MRI orders from Dr Milligan, Lumbar and shoulder MRI to [facility] fax: [phone]  Date needed: ASAP  Provider name: Dr Milligan       Action Taken: Message routed to:  [facility] ([facility] PCC    Travel Screening: Not Applicable

## 2022-05-11 ENCOUNTER — OFFICE VISIT (OUTPATIENT)
Dept: INTERNAL MEDICINE | Facility: CLINIC | Age: 52
End: 2022-05-11
Payer: COMMERCIAL

## 2022-05-11 VITALS
DIASTOLIC BLOOD PRESSURE: 82 MMHG | HEIGHT: 67 IN | OXYGEN SATURATION: 98 % | WEIGHT: 315 LBS | RESPIRATION RATE: 28 BRPM | BODY MASS INDEX: 49.44 KG/M2 | SYSTOLIC BLOOD PRESSURE: 125 MMHG | HEART RATE: 126 BPM

## 2022-05-11 DIAGNOSIS — G47.01 INSOMNIA DUE TO MEDICAL CONDITION: ICD-10-CM

## 2022-05-11 DIAGNOSIS — M25.511 CHRONIC PAIN OF BOTH SHOULDERS: ICD-10-CM

## 2022-05-11 DIAGNOSIS — M25.512 CHRONIC PAIN OF BOTH SHOULDERS: ICD-10-CM

## 2022-05-11 DIAGNOSIS — A63.0 CONDYLOMA ACUMINATA: Primary | ICD-10-CM

## 2022-05-11 DIAGNOSIS — M79.605 BILATERAL LEG PAIN: ICD-10-CM

## 2022-05-11 DIAGNOSIS — R06.00 DYSPNEA, UNSPECIFIED TYPE: ICD-10-CM

## 2022-05-11 DIAGNOSIS — M47.27 LUMBOSACRAL RADICULOPATHY DUE TO DEGENERATIVE JOINT DISEASE OF SPINE: ICD-10-CM

## 2022-05-11 DIAGNOSIS — R73.03 PREDIABETES: ICD-10-CM

## 2022-05-11 DIAGNOSIS — E66.01 MORBID OBESITY (H): ICD-10-CM

## 2022-05-11 DIAGNOSIS — M79.604 BILATERAL LEG PAIN: ICD-10-CM

## 2022-05-11 DIAGNOSIS — G89.29 CHRONIC PAIN OF BOTH SHOULDERS: ICD-10-CM

## 2022-05-11 DIAGNOSIS — E78.5 HYPERLIPIDEMIA, UNSPECIFIED HYPERLIPIDEMIA TYPE: ICD-10-CM

## 2022-05-11 PROCEDURE — 99215 OFFICE O/P EST HI 40 MIN: CPT | Performed by: PEDIATRICS

## 2022-05-11 PROCEDURE — 99417 PROLNG OP E/M EACH 15 MIN: CPT | Performed by: PEDIATRICS

## 2022-05-11 NOTE — PATIENT INSTRUCTIONS
Plan:  Blood test and Xray today.  Dermatology return  Lung testing: pulmonary function tests, chest Xray, echocardiogram. Try to do prior to the pulmonary appointment. May consider other tests.  I will research the weight treatment timing  I will send in new versions of the MRI orders to [facility]    To schedule your echocardiogram (ECHO), please call [phone].

## 2022-05-11 NOTE — PROGRESS NOTES
"Dear patient. Thank you for visiting with me. I want you to feel respected, understood, and empowered. \"Respect\" is valuing you as much as I would a close family member. \"Empowerment\" happens when you are fully informed, and can make the best possible decision for you.  Please ask me questions!  Challenge anything that is not clear.    Medical records are primarily used as memory aids for me and my colleagues. Things to know about my documentation style:  - The 'problem list' includes current symptoms or diagnoses, and some problems that are resolved but may return. I use the past medical history for problems not expected to return.  - I use single quotation marks for things that you or I said, when I want to clarify who was speaking.  - I use double quotation marks when copying a term from elsewhere in your records. Italics (besides here) may also denote a quotation.  If you have questions or concerns, please contact me; I will reply as soon as time allows.    Context    Chavez Ca is a 51 year old man, with concerns including:  Chief Complaint   Patient presents with     Recheck Medication     MRI imaging     [facility]  [phone]  Fax: [phone]     PCP: Chavez Milligan   Visit type: overview of problems, with several issues identified by patient discussed within limits of available time    /82 (BP Location: Right arm, Patient Position: Sitting, Cuff Size: Adult Large)   Pulse (!) 126   Resp 28   Ht 1.702 m (5' 7\")   Wt (!) 169.8 kg (374 lb 6.4 oz)   SpO2 98%   BMI 58.64 kg/m      Problems and progress        Dyspnea, Exercise intolerance  Obviously dyspneic, with coming in had trouble with completing sentences.  Pulmonary appointments coming up.  Had reassuring echo 5/2021, although technically difficult. Doesn't   Reviewed PFTs from 2014, somewhat benign when weighed . CBC 2/22 normal.  Describes noise with breathing, he showed me a video with what " "sounds like upper airway noise.  OBJECTIVE:   Also I observed he had a reflux moment that he swallowed back down       ASSESSMENT & PLAN:   I'm not sure what to make of his current dyspnea. He clearly has reflux and deconditioning.  The last echo was reassuring, but will repeat. Will want to see what pulmonary says.      Weight problem  He recalls that there was a plan to medical treatments, but he made the target and then didn't go ahead, and he gained weight again. Patient is annoyed about inconsistency.  Phentermine and topiramate appear to have been refilled now.       ASSESSMENT & PLAN: I will have to research this. My recall was the delay may also have involved COVID distancing?       Insomnia  He was able to get the zolpidem prescription.    Condyloma acuminatum, inguinal fold  Needs return to dermatology for local treatment, that was delayed by COVID distancing.      Back pain  Was previously on pain medications, but hasn't been for a few years.   Imaging in March showed: \"There is progression [of] multilevel degenerative changes of the lumbar spine, most pronounced at L3-L4 with moderate posterior disc space loss.  Needs MRI order to be sent to [facility] to fit in scanner.  Fax [phone]  ADDENDUM: I believe this ended up going electronically but will follow-up    Right shoulder and upper arm pain  Also has pain in right arm, difficulty with reaching forward. He gets occasionally spasms, and drops things. Sometimes has numbness or tingling, but also has a general problem with neuropathy.  Imaging showed \"Mild to moderate degenerative changes of the acromioclavicular joint\" but otherwise was reassuring.  Needs MRI order to be sent to [facility] to fit in scanner.  Fax [phone]  ADDENDUM: I believe this ended up going electronically but will follow-up      ADDENDUM:  Bilateral leg pain  Noted after visit, when updating problem list. He wrote,   It has been a problem a very long time but " it is now getting worse and I can only handle so much pain and am really tired of getting told deal with it for every part of my body that has a health issue  Not sure if this relates to lumbar spine problems. Will need further assessment.      Outstanding issues (Dr. Milligan)  --------------------------------------------  -- MR going to [facility]  -- Bilateral leg pain         CHRONIC CARE MANAGEMENT at [facility].             /\  (Paste with .SPECCOMM)        To expand view click this icon above .....:   ---------------------------------------------------------------------------------  CLIF Milligan RN n/a               Uses MyChart? delayed  Other [facility] [place] [facility]: n/a    ----------------------------------------------------------------------------------  REGULAR VISIT FREQUENCY: n/a  ----------------------------------------------------------------------------------  PENDING ISSUES for team:           MED REFILL NOTES:       ----------------------------------------------------------------------------------  ONGOING REFERRALS to:    -- Weight management: endo/weight Dr. Fortino Chang            About this visit:  Time note (e5+2@53654, 84-98'): The total time (on the date of service) for this service was 90 minutes, including discussion/face-to-face, chart review, interpretation not otherwise reported, documentation, and updating of the computerized record.  Comment about data reviewed: I personally reviewed, interpreted, and/or confirmed interpretation of XRs

## 2022-05-14 ENCOUNTER — HEALTH MAINTENANCE LETTER (OUTPATIENT)
Age: 52
End: 2022-05-14

## 2022-05-28 PROBLEM — G47.01 INSOMNIA DUE TO MEDICAL CONDITION: Status: ACTIVE | Noted: 2022-05-28

## 2022-05-29 NOTE — ASSESSMENT & PLAN NOTE
Noted after visit, when updating problem list. He wrote,   It has been a problem a very long time but it is now getting worse and I can only handle so much pain and am really tired of getting told deal with it for every part of my body that has a health issue  Not sure if this relates to lumbar spine problems. Will need further assessment.

## 2022-05-29 NOTE — ASSESSMENT & PLAN NOTE
Weight problem  He recalls that there was a plan to medical treatments, but he made the target and then didn't go ahead, and he gained weight again. Patient is annoyed about inconsistency.  Phentermine and topiramate appear to have been refilled now.       ASSESSMENT & PLAN: I will have to research this. My recall was the delay may also have involved COVID distancing?

## 2022-05-29 NOTE — ASSESSMENT & PLAN NOTE
Dyspnea, Exercise intolerance  Obviously dyspneic, with coming in had trouble with completing sentences.  Pulmonary appointments coming up.  Had reassuring echo 5/2021, although technically difficult. Doesn't   Reviewed PFTs from 2014, somewhat benign when weighed . CBC 2/22 normal.  Describes noise with breathing, he showed me a video with what sounds like upper airway noise.  OBJECTIVE:   Also I observed he had a reflux moment that he swallowed back down       ASSESSMENT & PLAN:   I'm not sure what to make of his current dyspnea. He clearly has reflux and deconditioning.  The last echo was reassuring, but will repeat. Will want to see what pulmonary says.

## 2022-05-29 NOTE — ASSESSMENT & PLAN NOTE
Condyloma acuminatum, inguinal fold  Needs return to dermatology for local treatment, that was delayed by COVID jovany.

## 2022-05-29 NOTE — ASSESSMENT & PLAN NOTE
"Back pain  Was previously on pain medications, but hasn't been for a few years.   Imaging in March showed: \"There is progression [of] multilevel degenerative changes of the lumbar spine, most pronounced at L3-L4 with moderate posterior disc space loss.  Needs MRI order to be sent to [facility] to fit in scanner.  Fax [phone]  "

## 2022-05-29 NOTE — ASSESSMENT & PLAN NOTE
"Right shoulder and upper arm pain  Also has pain in right arm, difficulty with reaching forward. He gets occasionally spasms, and drops things. Sometimes has numbness or tingling, but also has a general problem with neuropathy.  Imaging showed \"Mild to moderate degenerative changes of the acromioclavicular joint\" but otherwise was reassuring.  Needs MRI order to be sent to [facility] to fit in scanner.  Fax [phone]  "

## 2022-06-18 ENCOUNTER — TRANSFERRED RECORDS (OUTPATIENT)
Dept: INTERNAL MEDICINE | Facility: CLINIC | Age: 52
End: 2022-06-18

## 2022-06-24 ENCOUNTER — TELEPHONE (OUTPATIENT)
Dept: ENDOCRINOLOGY | Facility: CLINIC | Age: 52
End: 2022-06-24

## 2022-06-24 ENCOUNTER — OFFICE VISIT (OUTPATIENT)
Dept: ENDOCRINOLOGY | Facility: CLINIC | Age: 52
End: 2022-06-24
Payer: COMMERCIAL

## 2022-06-24 VITALS
SYSTOLIC BLOOD PRESSURE: 134 MMHG | OXYGEN SATURATION: 99 % | BODY MASS INDEX: 49.44 KG/M2 | WEIGHT: 315 LBS | DIASTOLIC BLOOD PRESSURE: 99 MMHG | HEART RATE: 100 BPM | HEIGHT: 67 IN

## 2022-06-24 DIAGNOSIS — E66.01 CLASS 3 SEVERE OBESITY DUE TO EXCESS CALORIES WITH SERIOUS COMORBIDITY AND BODY MASS INDEX (BMI) OF 50.0 TO 59.9 IN ADULT (H): Primary | ICD-10-CM

## 2022-06-24 DIAGNOSIS — E66.813 CLASS 3 SEVERE OBESITY DUE TO EXCESS CALORIES WITH SERIOUS COMORBIDITY AND BODY MASS INDEX (BMI) OF 50.0 TO 59.9 IN ADULT (H): Primary | ICD-10-CM

## 2022-06-24 PROCEDURE — 99215 OFFICE O/P EST HI 40 MIN: CPT | Mod: TEL | Performed by: INTERNAL MEDICINE

## 2022-06-24 RX ORDER — PHENTERMINE HYDROCHLORIDE 37.5 MG/1
37.5 TABLET ORAL EVERY MORNING
Qty: 90 TABLET | Refills: 1 | Status: SHIPPED | OUTPATIENT
Start: 2022-06-24 | End: 2023-02-10

## 2022-06-24 RX ORDER — TOPIRAMATE 100 MG/1
200 TABLET, FILM COATED ORAL 2 TIMES DAILY
Qty: 360 TABLET | Refills: 3 | Status: SHIPPED | OUTPATIENT
Start: 2022-06-24 | End: 2023-03-24

## 2022-06-24 ASSESSMENT — PAIN SCALES - GENERAL: PAINLEVEL: WORST PAIN (10)

## 2022-06-24 NOTE — LETTER
"2022       RE: Chavez Ca  [address] [place] [address]     Dear Colleague,    Thank you for referring your patient, Chavez Ca, to the [facility] [place] at [facility]. Please see a copy of my visit note below.        Return Medical Weight Management Note     Chavez Ca  MRN:  [id number]  :  1970  RUI:  2022    Dear Chavez Milligan MD,    I had the pleasure of seeing your patient Chavez Ca. He is a 51 year old male who I am continuing to see for treatment of obesity; PMH includes the following:  Past Medical History:   Diagnosis Date     Allergic rhinitis      Benign positional vertigo     never quite went away     Bilateral carpal tunnel syndrome 2015     Chest pain      Chronic sinusitis     I don't know Please look     Chronic tonsillitis      Coronary artery disease 3/8/16    right side chest pain     Depressive disorder      Depressive disorder, not elsewhere classified 2012     Gastro-oesophageal reflux disease      Hearing problem      Hypertension      Idiopathic progressive polyneuropathy 10/15/2012     Learning disability      Neck mass     parapharyngeal, benign     Obesity, Class III, BMI 40-49.9 (morbid obesity) (H)      Obstructive sleep apnea      Psychological factors associated with another disorder 2012     Recurrent otitis media     might be what the pain in my ears     Reduced vision      Tinnitus     I would hear a steady light buzzing sound and other can't     Trigeminal neuralgia      Weakness 2011         INTERVAL HISTORY:    Last visit here was with Bernardo 3/21. Reviewed and relevant history, as extracted, is as follows--  -------------------------------------------------------  \"Initial consult weight was 361 on 1/10/12.  Weight change since last seen on 20 is down 20 pounds.   Total loss " "is 31 pounds.     INTERVAL HISTORY:  Maintain topiramate \"full dose\". Food life and weight about the same. Says his eating like a ''rabbit\" and is \"not going to starve myself\". Still wants barisurgery but is too high risk even for tracheal issues.     Saw ENT 1/2019 and found to have small indentation in the anterior wall of his trachea in conjunction with tracheostomy scar. Concern for some dynamic airway collapse.\"  -------------------------------------------------------    --since last seen, notes he has a bike he would like to put together and use for low impact exercise but has not been able to do this (potential for it to be stolen)  --notes the meds he has been taking--topiramate and phentermine, do provide benefit with appetite suppression/cravings reduction and no side effects  --we discussed potential to add GLP1 agonist for further support of wt loss food goals    CURRENT WEIGHT:         Wt Readings from Last 3 Encounters:   05/11/22 (!) 169.8 kg (374 lb 6.4 oz)   04/21/22 (!) 173.7 kg (383 lb)   03/16/22 (!) 171.5 kg (378 lb 1.6 oz)          Body mass index is 57.98 kg/m .       Changes and Difficulties 6/23/2022   I have made the following changes to my diet since my last visit: I eat even less now Salads, Chicken, Yogurt, Cottage Cheese, Soups, & Single Healthy Frozen Meals   With regards to my diet, I am still struggling with: Obese Weight Gain   I have made the following changes to my activity/exercise since my last visit: I walk up/down my stairs in my house 5 times a day at least 4 times a week   With regards to my activity/exercise, I am still struggling with: Obese Weight Gain       VITALS:  There were no vitals taken for this visit.    MEDICATIONS:   Current Outpatient Medications   Medication Sig Dispense Refill     amitriptyline (ELAVIL) 100 MG tablet Take 1 tablet (100 mg) by mouth At Bedtime 90 tablet 3     CETAPHIL (CETAPHIL) lotion Apply topically 2 times daily       COMPRESSION STOCKINGS " 20-30 mmHg knee high compression stockings.  Measure and fit.  Style and brand per patient preference. 2 each 0     furosemide (LASIX) 20 MG tablet Take 3 tablets (60 mg) by mouth 2 times daily 540 tablet 3     ketoconazole (NIZORAL) 2 % external shampoo APPLY TO AFFECTED AREA AND WASH OFF AFTER 5 MINUTES 120 mL 3     order for DME Equipment being ordered: compression stockings    Needs to be faxed to 1 Device 0     order for DME Compression stockings pair, patient preference    To be used 8 hours per day 1 Device 3     order for DME Equipment being ordered: compression stockings 2 each 0     phentermine (ADIPEX-P) 37.5 MG tablet Take 1 tablet (37.5 mg) by mouth every morning 90 tablet 0     topiramate (TOPAMAX) 100 MG tablet Take 2 tablets (200 mg) by mouth 2 times daily 360 tablet 0     traZODone (DESYREL) 100 MG tablet Take 1 tablet (100 mg) by mouth At Bedtime Call clinic to schedule follow up appointment. [phone] 90 tablet 3     zolpidem (AMBIEN) 10 MG tablet Take 1 tablet (10 mg) by mouth nightly as needed for sleep 30 tablet 2       Weight Loss Medication History Reviewed With Patient 6/23/2022   Which weight loss medications are you currently taking on a regular basis?  Phentermine, Topamax (topiramate)   If you are not taking a weight loss medication that was prescribed to you, please indicate why: Other   Are you having any side effects from the weight loss medication that we have prescribed you? No       Past Medical History:   Diagnosis Date     Allergic rhinitis      Benign positional vertigo 2011    never quite went away     Bilateral carpal tunnel syndrome 7/18/2015     Chest pain      Chronic sinusitis 2017    I don't know Please look     Chronic tonsillitis      Coronary artery disease 3/8/16    right side chest pain     Depressive disorder      Depressive disorder, not elsewhere classified 7/30/2012     Gastro-oesophageal reflux disease      Hearing problem      Hypertension 2008      Idiopathic progressive polyneuropathy 10/15/2012     Learning disability      Neck mass     parapharyngeal, benign     Obesity, Class III, BMI 40-49.9 (morbid obesity) (H)      Obstructive sleep apnea      Psychological factors associated with another disorder 8/21/2012     Recurrent otitis media 2016    might be what the pain in my ears     Reduced vision      Tinnitus 2016    I would hear a steady light buzzing sound and other can't     Trigeminal neuralgia      Weakness 8/26/2011            ASSESSMENT:   Maintain topiramate 200 BID and phentermine 37.5 mg and add liraglutide (Saxenda) if adequately covered by insurance in support of food goals--smaller portions with emphasis on lean protein, nonstarchy veggies, lower carb/low sugar  --discussed the new med in terms of potential risks/possible benefits/side effects. He would like to add it.     FOLLOW-UP:    Phone call duration: 15 minutes.  I explained the conditions and plans (more than 50% of time was counseling/coordination of weight management).  Follow ups--Lauren Bloch in 4-6 wks; Dr. Quinn in 2-3 mo  --mail AVS        Time: 40 min spent on evaluation, management, counseling, education, & motivational interviewing (F2F) combined with previsit prep and post visit follow up care/charting same day    Sincerely,    Tatyana Quinn MD

## 2022-06-24 NOTE — TELEPHONE ENCOUNTER
PA Initiation    Medication: Saxenda - pa pending   Insurance Company: [employer] Part D - Phone [phone] Fax [phone]  Pharmacy Filling the Rx:    Filling Pharmacy Phone:    Filling Pharmacy Fax:    Start Date: 6/24/2022    Key: [id number]

## 2022-06-24 NOTE — PROGRESS NOTES
"    Return Medical Weight Management Note     Chavez Ca  MRN:  [id number]  :  1970  RUI:  2022    Dear Chavez Milligan MD,    I had the pleasure of seeing your patient Chavez Ca. He is a 51 year old male who I am continuing to see for treatment of obesity; PMH includes the following:  Past Medical History:   Diagnosis Date     Allergic rhinitis      Benign positional vertigo     never quite went away     Bilateral carpal tunnel syndrome 2015     Chest pain      Chronic sinusitis     I don't know Please look     Chronic tonsillitis      Coronary artery disease 3/8/16    right side chest pain     Depressive disorder      Depressive disorder, not elsewhere classified 2012     Gastro-oesophageal reflux disease      Hearing problem      Hypertension      Idiopathic progressive polyneuropathy 10/15/2012     Learning disability      Neck mass     parapharyngeal, benign     Obesity, Class III, BMI 40-49.9 (morbid obesity) (H)      Obstructive sleep apnea      Psychological factors associated with another disorder 2012     Recurrent otitis media     might be what the pain in my ears     Reduced vision      Tinnitus     I would hear a steady light buzzing sound and other can't     Trigeminal neuralgia      Weakness 2011         INTERVAL HISTORY:    Last visit here was with Bernardo 3/21. Reviewed and relevant history, as extracted, is as follows--  -------------------------------------------------------  \"Initial consult weight was 361 on 1/10/12.  Weight change since last seen on 20 is down 20 pounds.   Total loss is 31 pounds.     INTERVAL HISTORY:  Maintain topiramate \"full dose\". Food life and weight about the same. Says his eating like a ''rabbit\" and is \"not going to starve myself\". Still wants barisurgery but is too high risk even for tracheal issues.     Saw ENT 2019 and found to have small indentation in the anterior wall of his " "trachea in conjunction with tracheostomy scar. Concern for some dynamic airway collapse.\"  -------------------------------------------------------    --since last seen, notes he has a bike he would like to put together and use for low impact exercise but has not been able to do this (potential for it to be stolen)  --notes the meds he has been taking--topiramate and phentermine, do provide benefit with appetite suppression/cravings reduction and no side effects  --we discussed potential to add GLP1 agonist for further support of wt loss food goals    CURRENT WEIGHT:         Wt Readings from Last 3 Encounters:   05/11/22 (!) 169.8 kg (374 lb 6.4 oz)   04/21/22 (!) 173.7 kg (383 lb)   03/16/22 (!) 171.5 kg (378 lb 1.6 oz)          Body mass index is 57.98 kg/m .       Changes and Difficulties 6/23/2022   I have made the following changes to my diet since my last visit: I eat even less now Salads, Chicken, Yogurt, Cottage Cheese, Soups, & Single Healthy Frozen Meals   With regards to my diet, I am still struggling with: Obese Weight Gain   I have made the following changes to my activity/exercise since my last visit: I walk up/down my stairs in my house 5 times a day at least 4 times a week   With regards to my activity/exercise, I am still struggling with: Obese Weight Gain       VITALS:  There were no vitals taken for this visit.    MEDICATIONS:   Current Outpatient Medications   Medication Sig Dispense Refill     amitriptyline (ELAVIL) 100 MG tablet Take 1 tablet (100 mg) by mouth At Bedtime 90 tablet 3     CETAPHIL (CETAPHIL) lotion Apply topically 2 times daily       COMPRESSION STOCKINGS 20-30 mmHg knee high compression stockings.  Measure and fit.  Style and brand per patient preference. 2 each 0     furosemide (LASIX) 20 MG tablet Take 3 tablets (60 mg) by mouth 2 times daily 540 tablet 3     ketoconazole (NIZORAL) 2 % external shampoo APPLY TO AFFECTED AREA AND WASH OFF AFTER 5 MINUTES 120 mL 3     order for " DME Equipment being ordered: compression stockings    Needs to be faxed to 1 Device 0     order for DME Compression stockings pair, patient preference    To be used 8 hours per day 1 Device 3     order for DME Equipment being ordered: compression stockings 2 each 0     phentermine (ADIPEX-P) 37.5 MG tablet Take 1 tablet (37.5 mg) by mouth every morning 90 tablet 0     topiramate (TOPAMAX) 100 MG tablet Take 2 tablets (200 mg) by mouth 2 times daily 360 tablet 0     traZODone (DESYREL) 100 MG tablet Take 1 tablet (100 mg) by mouth At Bedtime Call clinic to schedule follow up appointment. [phone] 90 tablet 3     zolpidem (AMBIEN) 10 MG tablet Take 1 tablet (10 mg) by mouth nightly as needed for sleep 30 tablet 2       Weight Loss Medication History Reviewed With Patient 6/23/2022   Which weight loss medications are you currently taking on a regular basis?  Phentermine, Topamax (topiramate)   If you are not taking a weight loss medication that was prescribed to you, please indicate why: Other   Are you having any side effects from the weight loss medication that we have prescribed you? No       Past Medical History:   Diagnosis Date     Allergic rhinitis      Benign positional vertigo 2011    never quite went away     Bilateral carpal tunnel syndrome 7/18/2015     Chest pain      Chronic sinusitis 2017    I don't know Please look     Chronic tonsillitis      Coronary artery disease 3/8/16    right side chest pain     Depressive disorder      Depressive disorder, not elsewhere classified 7/30/2012     Gastro-oesophageal reflux disease      Hearing problem      Hypertension 2008     Idiopathic progressive polyneuropathy 10/15/2012     Learning disability      Neck mass     parapharyngeal, benign     Obesity, Class III, BMI 40-49.9 (morbid obesity) (H)      Obstructive sleep apnea      Psychological factors associated with another disorder 8/21/2012     Recurrent otitis media 2016    might be what the pain in my ears      Reduced vision      Tinnitus 2016    I would hear a steady light buzzing sound and other can't     Trigeminal neuralgia      Weakness 8/26/2011            ASSESSMENT:   Maintain topiramate 200 BID and phentermine 37.5 mg and add liraglutide (Saxenda) if adequately covered by insurance in support of food goals--smaller portions with emphasis on lean protein, nonstarchy veggies, lower carb/low sugar  --discussed the new med in terms of potential risks/possible benefits/side effects. He would like to add it.     FOLLOW-UP:    Phone call duration: 15 minutes.  I explained the conditions and plans (more than 50% of time was counseling/coordination of weight management).  Follow ups--Lauren Bloch in 4-6 wks; Dr. Quinn in 2-3 mo  --mail AVS        Time: 40 min spent on evaluation, management, counseling, education, & motivational interviewing (F2F) combined with previsit prep and post visit follow up care/charting same day    Sincerely,    Tatyana Quinn MD

## 2022-06-24 NOTE — PATIENT INSTRUCTIONS
"Welcome to our weight management program!   We are excited to join you on your weight loss journey    Thank you for allowing us the privilege of caring for you. We hope we provided you with the excellent service you deserve.   Please let us know if there is anything else we can do for you so that we can be sure you are leaving completely satisfied with your care experience.    To ensure the quality of our services you may be receiving a patient satisfaction survey from an independent patient satisfaction monitoring company.    The greatest compliment you can give is a \"Likely to Recommend\"    You saw Dr. Quinn today.    Instructions per today's visit:   Medications started today: Saxenda start; continuing the topiramate and phentermine at current doses  MTM pharmacist referral: Lauren Bloch 4-6 wks      Follow up appointments:  Lauren Bloch in 4-6 wks, Dr. Quinn in 2-3 mo    Interested in working with a health ?  Health coaches work with you to improve your overall health and wellbeing.  They look at the whole person, and may involve discussion of different areas of life, including, but not limited to the four pillars of health (sleep, exercise, nutrition, and stress management). Discuss with your care team if you would like to start working a health .  Health Coaching-3 Pack:    $99 for three health coaching visits    Visits may be done in person or via phone    Coaching is a partnership between the  and the client; Coaches do not prescribe or diagnose    Coaching helps inspire the client to reach his/her personal goals     For any questions/concerns contact Darleen Miller LPN at [phone]     To schedule appointments with our team, please call [phone]     Please call during clinic hours Monday through Friday 8:00a - 4:00p if you have questions or you can contact us via Zvoo[employer] at anytime. ?    Lab results will be communicated through My Chart or letter (if My Chart not used). Please call " the clinic if you have not received communication after 1 week or if you have any questions.?      Fax: [phone]    Thank you,  Medical Weight Management Team    GLP-1 Medications    We are considering starting a GLP-1 (Glucagon-like Peptide-1) medication. Examples of this medication include Byetta, Bydureon, or Victoza, etc. The medication chosen will depend on insurance coverage, and can be changed to the medication that is covered under your plan. These medications work the same, in that they can help you lose weight and control your blood sugar. One of the ways it works is by slowing down the rate that food leaves your stomach. You feel moore and will eat less.  It also helps regulate hormones that can help improve your blood sugars.    Usually short acting insulins or oral agents are stopped or decreased by the doctor at the appointment. Low blood sugars are rare but can happen if patients are on insulin or other oral agents. If you notice consistent low sugars or high sugars, your medication may need to be adjusted after your appointment. If this is the case, please call RN and provide her your blood sugar record from the last 3-4 days. The nurse will get in touch with the doctor and call you back/[employer] message with recommendations. We tolerate high sugars for a bit, so if sugars are running 180-200, this is ok. As weight starts dropping the blood sugars should too. If readings are consistently over 200 for 1-2 weeks, then you should call the doctor/nurse.    Types of GLP-1 medications include:    Liraglutide (Saxenda): Starting dose is 0.6 mg for a week, then 1.2 mg for a week, and then 1.8 mg daily for a week, then 2.4 mg daily for a week, then 3 mg daily thereafter (if prescribed by doctor). This medication is given daily--we can try this one initially. Pen needles need to be ordered also.  We discussed possible transition to Semaglutide (Wegovy) which is weekly when that becomes available.  Ozempic:  Starting dose is 0.25 mg once weekly for 4 weeks, and then increase to 0.5 mg weekly. If after 4 weeks of being on 0.5 mg weekly dosing and still wanting additional weight loss and/or blood sugar benefits, check with your doctor to see if they want to increase the dose to 1 mg weekly. Pen needles come in the Ozempic pen box.  Trulicity: Starting dose is 0.75 mg once weekly.  If after 1-3 months of being on 0.75 mg weekly and still wanting additional weight loss and/or blood sugar benefits, check with your doctor to see if they want to increase the dose to 1.5 mg weekly.  Bydureon: Starting dose is 2 mg and is given weekly. The patient should pick one day a week and give the medication on that day. Pen needles need to be ordered also.  Byetta: Starting dose is 5 mcg twice daily. This is given for the first month. Check with the doctor after a month to see if they want the dose increased to 10 mcg BID. Pen needles need to be ordered also.     Side effects of GLP- Medications include: The most common side effects are all GI related and consist of: nausea, constipation, diarrhea, burping, or gassiness. Patients are advised to eat slowly and less, and nausea typically passes if people can stick it out.     The risk of pancreatitis (inflammation of the pancreas) has been associated with this type of medication, but is very rare.  If you have had pancreatitis in the past, this medication may not be for you. Please let us know about any past history of pancreas problems.      Symptoms of pancreatitis include: Pain in your upper stomach area which  may travel to your back and be worse after eating. Your stomach area may be tender to the touch.  You may have vomiting or nausea and/or have a fever. If you should develop any of these symptoms, stop the medication and contact your primary care doctor. They will do a blood test to check for pancreatitis.         There is a small chance you may have some low blood sugar after  taking the medication.   The signs of low blood sugar are:  Weakness  Shaky   Hungry  Sweating  Confusion      See below for ways to treat low blood sugar without adding in lots of extra calories.      Treating Low Blood Sugar    If you have symptoms of low blood sugar (sweating, shaking, dizzy, confused) eat 15 grams of carbs and wait 15 minutes:    Glucose Tabs are best for sugars under 70 -  Dex4 or BD Glucose tablets are good, you will need to take 3-4 of these to equal 15 grams.     One small box of raisins  4 oz fruit juice box or   cup fruit juice  1 small apple  1 small banana    cup canned fruit in water    English muffin or a slice of bread with jelly   1 low fat frozen waffle with sugar-free syrup    cup cottage cheese with   cup frozen or fresh blueberries  1 cup skim or low-fat milk    cup whole grain cereal  4-6 crackers such as Triscuits      This medication is usually covered by insurance for patients with a diagnosis of Type 2 Diabetes. Depending on insurance coverage, the medication may be changed to a different formulary, but they all work in the same way. Sometimes a prior authorization is required, which may take up to 1-2 weeks for an insurance company to make a decision if they will cover the medication. Please be patient, you will be notified either way.     For any questions or concerns please send a [employer] message to our team or call our weight management call center at [phone] during regular business hours. For questions during evenings or weekends your messages will be addressed during the next business day.  For emergencies please call 911 or seek immediate medical care.     (Do not stop taking it if you don't think it's working. For some people it works without them knowing it.)     In order to get refills of this or any medication we prescribe you must be seen in the medical weight mgmt clinic every 2-4 months. Please have your pharmacy fax a refill request to  [phone].

## 2022-06-24 NOTE — NURSING NOTE
"(   Chief Complaint   Patient presents with     RECHECK     Return MW    )    ( Weight: (!) 167.9 kg (370 lb 3.2 oz) )  ( Height: 170.2 cm (5' 7\") )  ( BMI (Calculated): 57.98 )  (   )  (   )  (   )  (   )  (   )  (   )    ( BP: (!) 134/99 )  (   )  (   )  (   )  ( Pulse: 100 )  (   )  ( SpO2: 99 % )    (   Patient Active Problem List   Diagnosis     Trigeminal neuralgia     VERN (obstructive sleep apnea)     Idiopathic edema     Depressive disorder, not elsewhere classified     Psychological factors associated with another disorder     Subglottic stenosis     SOB (shortness of breath)     Hyperlipidemia     Idiopathic progressive polyneuropathy     Varicose veins of left lower extremity     Closed nondisplaced fracture of scaphoid of right wrist, unspecified portion of scaphoid, sequela     Pain in both wrists     Chronic pain of both shoulders     Neck pain     Neck pain, chronic     Seasonal allergies     Lumbosacral radiculopathy due to degenerative joint disease of spine     Morbid obesity (H)     Condyloma acuminata     Hypokalemia     Poor dentition     Benign positional vertigo     Dyspnea, unspecified type     Insomnia due to medical condition     Bilateral leg pain    )  (   Current Outpatient Medications   Medication Sig Dispense Refill     amitriptyline (ELAVIL) 100 MG tablet Take 1 tablet (100 mg) by mouth At Bedtime 90 tablet 3     CETAPHIL (CETAPHIL) lotion Apply topically 2 times daily       COMPRESSION STOCKINGS 20-30 mmHg knee high compression stockings.  Measure and fit.  Style and brand per patient preference. 2 each 0     furosemide (LASIX) 20 MG tablet Take 3 tablets (60 mg) by mouth 2 times daily 540 tablet 3     ketoconazole (NIZORAL) 2 % external shampoo APPLY TO AFFECTED AREA AND WASH OFF AFTER 5 MINUTES 120 mL 3     order for DME Equipment being ordered: compression stockings    Needs to be faxed to 1 Device 0     order for DME Compression stockings pair, patient preference    To be used 8 " hours per day 1 Device 3     order for DME Equipment being ordered: compression stockings 2 each 0     phentermine (ADIPEX-P) 37.5 MG tablet Take 1 tablet (37.5 mg) by mouth every morning 90 tablet 0     topiramate (TOPAMAX) 100 MG tablet Take 2 tablets (200 mg) by mouth 2 times daily 360 tablet 0     traZODone (DESYREL) 100 MG tablet Take 1 tablet (100 mg) by mouth At Bedtime Call clinic to schedule follow up appointment. [phone] 90 tablet 3     zolpidem (AMBIEN) 10 MG tablet Take 1 tablet (10 mg) by mouth nightly as needed for sleep 30 tablet 2    )  ( Diabetes Eval:    )    ( Pain Eval:  Worst Pain (10) )    ( Wound Eval:       )    (   History   Smoking Status     Never Smoker   Smokeless Tobacco     Never Used     Comment: Never started    )    ( Signed By:  Nicol Govea, EMT; June 24, 2022; 12:19 PM )

## 2022-06-27 ENCOUNTER — OFFICE VISIT (OUTPATIENT)
Dept: OTOLARYNGOLOGY | Facility: CLINIC | Age: 52
End: 2022-06-27
Payer: COMMERCIAL

## 2022-06-27 VITALS
OXYGEN SATURATION: 98 % | SYSTOLIC BLOOD PRESSURE: 144 MMHG | DIASTOLIC BLOOD PRESSURE: 99 MMHG | HEIGHT: 67 IN | WEIGHT: 315 LBS | BODY MASS INDEX: 49.44 KG/M2 | HEART RATE: 110 BPM

## 2022-06-27 DIAGNOSIS — G47.33 OSA (OBSTRUCTIVE SLEEP APNEA): Primary | ICD-10-CM

## 2022-06-27 PROCEDURE — 99213 OFFICE O/P EST LOW 20 MIN: CPT | Performed by: OTOLARYNGOLOGY

## 2022-06-27 ASSESSMENT — PAIN SCALES - GENERAL: PAINLEVEL: NO PAIN (0)

## 2022-06-27 NOTE — PROGRESS NOTES
HISTORY OF PRESENT ILLNESS:  Chavez Ca is back to see us today.  He has been having issues with treatment of sleep apnea.  He has been unable to get his teeth improved to consider the oral appliance.  He is intolerant of CPAP.  He has had a difficult time losing weight despite working with the obesity clinic.  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  That tracheostomy has since been removed.    DISCUSSION AND PLAN:  Today, we discussed treatment options for sleep apnea.  I do not feel like he is a great surgical candidate and therefore would like to have him reevaluated by the Sleep Medicine service prior to considering any other surgical intervention.    The patient himself suggested possibly a tracheostomy, although I think that could be considered last case scenario.  We will have him follow up with us after he sees the sleep team.    A total of 20 minutes was spent with the patient and on chart review on the date of the visit.

## 2022-06-27 NOTE — LETTER
6/27/2022       RE: Chavez Ca  [address]  [place] [address]     Dear Colleague,    Thank you for referring your patient, Chavez Ca, to the [facility] [place] at [facility]. Please see a copy of my visit note below.    HISTORY OF PRESENT ILLNESS:  Chavez Ca is back to see us today.  He has been having issues with treatment of sleep apnea.  He has been unable to get his teeth improved to consider the oral appliance.  He is intolerant of CPAP.  He has had a difficult time losing weight despite working with the obesity clinic.  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  That tracheostomy has since been removed.    DISCUSSION AND PLAN:  Today, we discussed treatment options for sleep apnea.  I do not feel like he is a great surgical candidate and therefore would like to have him reevaluated by the Sleep Medicine service prior to considering any other surgical intervention.    The patient himself suggested possibly a tracheostomy, although I think that could be considered last case scenario.  We will have him follow up with us after he sees the sleep team.    A total of 20 minutes was spent with the patient and on chart review on the date of the visit.    Again, thank you for allowing me to participate in the care of your patient.      Sincerely,    Zafar Glasgow MD

## 2022-06-27 NOTE — NURSING NOTE
"Chief Complaint   Patient presents with     RECHECK     Follow up       Blood pressure (!) 144/99, pulse 110, height 1.702 m (5' 7\"), weight (!) 161.9 kg (357 lb), SpO2 98 %.    Nicol Ware, EMT    "

## 2022-06-27 NOTE — PATIENT INSTRUCTIONS
"[facility]     Multiple locations    [facility]  First Floor, [address]  [address] [place] [address]  [phone]  [place]  [address], [address]  [place] [address]  Appointments: [phone]  Sleep Center: [phone]  [place]  [address] [place] [address]  Appointments: [phone]  Information: [phone]  [facility] Address:  [facility] [address]  [place] [address]  Appointments: [phone]  [address] [place] [address]  Appointments: [phone]     You were seen in the clinic today by Dr. Glasgow. If you have any questions or concerns after your appointment, please call the clinic at [phone]. Press \"1\" for scheduling, press \"3\" for nurse advice.    2.   The following has been recommended for you based upon your appointment today:   -Please see sleep medicine. Phone numbers are attached and a referral has been placed.    3.   Plan to return the clinic after you have had your visit with sleep medicine.       Adriana Celaya LPN  [facility]  Department of Otolaryngology  [phone]    "

## 2022-06-28 NOTE — TELEPHONE ENCOUNTER
Katty Denied: Patients plan doesn't cover any medications to help with weight loss, weight gain, and or anorexia. Please advise how you would like to proceed. Please provide medical rational if you want an appeal. It doesn't look like the patients plan even allows for an appeal. Just let me know

## 2022-06-28 NOTE — TELEPHONE ENCOUNTER
PRIOR AUTHORIZATION DENIED    Medication: Saxenda - pa denied    Denial Date: 6/24/2022    Denial Rational:    Appeal Information:not available

## 2022-06-30 ENCOUNTER — TELEPHONE (OUTPATIENT)
Dept: ENDOCRINOLOGY | Facility: CLINIC | Age: 52
End: 2022-06-30

## 2022-07-07 NOTE — TELEPHONE ENCOUNTER
Medication Appeal Request    Please initiate an appeal for the requested medication: Saxenda - pa denied    Has a letter of medical necessity been completed in EPIC?   yes    Any additional lab values/information to include?     Would you like to include any research articles?               If yes please include the hyperlink(s) below or fax to    [phone] for Specialty/Retail               [phone] for Infusion/Clinic Administered.                Include the patients name and MRN on the fax cover sheet.

## 2022-07-07 NOTE — TELEPHONE ENCOUNTER
MEDICATION APPEAL DENIED    Medication: Saxenda - pa denied    Denial Date: 7/7/2022    Denial Rational:     Second Level Appeal Information:     Second level appeals will be managed by the clinic staff and provider. Please contact the [facility] Prior Authorization Team if additional information about the denial is needed.

## 2022-07-13 RX ORDER — NALTREXONE HYDROCHLORIDE 50 MG/1
TABLET, FILM COATED ORAL
Qty: 30 TABLET | Refills: 4 | Status: SHIPPED | OUTPATIENT
Start: 2022-07-13 | End: 2023-04-04

## 2022-07-21 ENCOUNTER — ANCILLARY PROCEDURE (OUTPATIENT)
Dept: GENERAL RADIOLOGY | Facility: CLINIC | Age: 52
End: 2022-07-21
Attending: PEDIATRICS
Payer: COMMERCIAL

## 2022-07-21 ENCOUNTER — OFFICE VISIT (OUTPATIENT)
Dept: INTERNAL MEDICINE | Facility: CLINIC | Age: 52
End: 2022-07-21
Payer: COMMERCIAL

## 2022-07-21 DIAGNOSIS — E66.01 MORBID OBESITY (H): ICD-10-CM

## 2022-07-21 DIAGNOSIS — R06.00 DYSPNEA, UNSPECIFIED TYPE: Primary | ICD-10-CM

## 2022-07-21 DIAGNOSIS — M79.604 BILATERAL LEG PAIN: ICD-10-CM

## 2022-07-21 DIAGNOSIS — M79.605 BILATERAL LEG PAIN: ICD-10-CM

## 2022-07-21 DIAGNOSIS — R06.02 SOB (SHORTNESS OF BREATH): ICD-10-CM

## 2022-07-21 DIAGNOSIS — R06.00 DYSPNEA, UNSPECIFIED TYPE: ICD-10-CM

## 2022-07-21 DIAGNOSIS — G60.3 IDIOPATHIC PROGRESSIVE POLYNEUROPATHY: Chronic | ICD-10-CM

## 2022-07-21 DIAGNOSIS — G47.33 OSA (OBSTRUCTIVE SLEEP APNEA): ICD-10-CM

## 2022-07-21 DIAGNOSIS — M54.2 NECK PAIN: ICD-10-CM

## 2022-07-21 DIAGNOSIS — M47.26 OSTEOARTHRITIS OF SPINE WITH RADICULOPATHY, LUMBAR REGION: ICD-10-CM

## 2022-07-21 PROCEDURE — 99215 OFFICE O/P EST HI 40 MIN: CPT | Performed by: PEDIATRICS

## 2022-07-21 PROCEDURE — 71046 X-RAY EXAM CHEST 2 VIEWS: CPT | Mod: GC | Performed by: RADIOLOGY

## 2022-07-21 PROCEDURE — 73562 X-RAY EXAM OF KNEE 3: CPT | Mod: LT | Performed by: RADIOLOGY

## 2022-07-21 NOTE — PROGRESS NOTES
"Dear patient. Thank you for visiting with me. I want you to feel respected, understood, and empowered. \"Respect\" is valuing you as much as I would a close family member. \"Empowerment\" happens when you are fully informed, and can make the best possible decision for you.  Please ask me questions!  Challenge anything that is not clear.    Medical records are primarily used as memory aids for me and my colleagues. Things to know about my documentation style:  - The 'problem list' includes current symptoms or diagnoses, and some problems that are resolved but may return. I use the past medical history for problems not expected to return.  - I use single quotation marks for things that you or I said, when I want to clarify who was speaking.  - I use double quotation marks when copying a term from elsewhere in your records. Italics (besides here) may also denote a quotation.  If you have questions or concerns, please contact me; I will reply as soon as time allows.    Context    Chavez Ca is a 51 year old man, with concerns including:  Chief Complaint   Patient presents with     Chronic Disease Management     PCP: Chavez Milligan  Visit type: overview of problems, with several issues identified by patient discussed within limits of available time    There were no vitals taken for this visit.    Problems and progress      This was a brief walk-in appointment about several issues -had a small amount of available time, so we had Mr. Ca put in a room.        Bilateral knee and leg pain has been brought up before, but not fully evaluated.  He had an x-ray done in 2017, that seemed benign except for an unchanged suprapatellar enthesophyte.       ASSESSMENT & PLAN: Knee pain, uncertain etiology at present, known multiple joint pains without previous documentation of arthritis, likely exacerbated by weight/workload.  Given the previous history, I decided to order a new set of x-rays and bring him back for " follow-up assessment.  It may be that the x-rays will allow for a follow-up plan also.   ADDENDUM: His new x-ray showed mild medial compartment joint space loss.  Physical therapy will likely benefit via improving strength around the joint.  At future visit can consider whether MRI is needed for possible partial meniscal tear.  See comments about pain and tramadol.         NEW OVERVIEW: Several etiologies. Mild arthritis only on XR. Has a distal quadriceps insertional enthesophyte consistent with mechanical concerns. Also known small fiber neuropathy idiopathic.  PT referral. Needs in-person exam and consider if referral or MRI needed.        LS radiculopathy:  Has known small fiber neuropathy, but with DJD of Lspine we are also trying to get an MRI. This was done at an outside site because of his weight/size, but I have not yet seen results. He says he is supposed to get those for me.        Lower back pain      small fiber neuropathy   Multiple areas of pain  He brought up the tramadol idea again - he had been on this before with Dr. Feldman but had decided to go off. We talked about options - Advised to 'take the medication to recover, not to enable activity.'  Needs further investigation.        obstructive sleep apnea  Planning to get another sleep study.        Dyspnea       UPDATE: He didn't get the CXR I had asked for recently, and the order had been cancelled. Earlier plan was to repeat his PFTs.       ASSESSMENT & PLAN: Dyspnea multifactorial, as yet incompletely described. DDx is very broad.    CXR re-ordered. ADDENDUM: CXR remains unremarkable.    PFTs will be important, with methacholine challenge. With the subglottic stenosis, I'm wondering about a problem with vocal folds.    See also obstructive sleep apnea       NEW OVERVIEW: Several possible etiologies: subglottic stenosis, asthma,   Includes dyspnea on exertion and at rest.  He has trouble breathing with a variety of odors, chemicals, cat litter  box, a bonfire across the street in his neighborhood.          Weight  Couldn't get insurance approved for an injection. They are going to try naltrexone to reduce appetite. This may be a complicating factor for his tramadol. Also he is skeptical about its value.            CHRONIC CARE MANAGEMENT at [facility].             /\  (Paste with .JOY)        To expand view click this icon above .....:   ---------------------------------------------------------------------------------  CLIF Milligan RN n/a               Uses MyChart? delayed  Other [facility] team: n/a    ----------------------------------------------------------------------------------  REGULAR VISIT FREQUENCY: n/a  ----------------------------------------------------------------------------------  PENDING ISSUES for team:           MED REFILL NOTES:       ----------------------------------------------------------------------------------  ONGOING REFERRALS to:    -- Weight management: endo/weight Dr. Fortino Chang          About this visit:  Time note (e5, 40'): The total time (on the date of service) for this service was 48 minutes, including discussion/face-to-face, chart review, interpretation not otherwise reported, documentation, and updating of the computerized record.

## 2022-07-21 NOTE — PROGRESS NOTES
Knee pain  Had imaging         He brought up the tramadol idea again - he had been on this before with Dr. Feldman but had decided to go off. Advised to 'take the medication to recover, not to enable activity.'        Planning to get another sleep study.        Weight  Couldn't get insurance approved for an injection. They are going to try naltrexone to reduce appetite.

## 2022-07-22 ENCOUNTER — TELEPHONE (OUTPATIENT)
Dept: INTERNAL MEDICINE | Facility: CLINIC | Age: 52
End: 2022-07-22

## 2022-07-22 NOTE — TELEPHONE ENCOUNTER
Message sent to provider.  Provider can only prescribe this.       Danial Barnes CMA ([facility]) at 3:50 PM on 7/22/2022

## 2022-07-22 NOTE — TELEPHONE ENCOUNTER
[facility] Call Center    Phone Message    May a detailed message be left on voicemail: yes     Reason for Call: Medication Question or concern regarding medication   Prescription Clarification  Name of Medication: Tramadol  Prescribing Provider: Dr. Milligan   [place] [facility]   What on the order needs clarification? Pt stated that Dr. Milligan wanted the pt to go back on the above medication but it still has not been sent to the pt's pharmacy          Action Taken: Message routed to:  [facility] ([facility] p[facility]

## 2022-07-23 ENCOUNTER — MYC MEDICAL ADVICE (OUTPATIENT)
Dept: INTERNAL MEDICINE | Facility: CLINIC | Age: 52
End: 2022-07-23

## 2022-07-23 DIAGNOSIS — M25.511 CHRONIC PAIN OF BOTH SHOULDERS: Primary | ICD-10-CM

## 2022-07-23 DIAGNOSIS — M25.512 CHRONIC PAIN OF BOTH SHOULDERS: Primary | ICD-10-CM

## 2022-07-23 DIAGNOSIS — G89.29 CHRONIC PAIN OF BOTH SHOULDERS: Primary | ICD-10-CM

## 2022-07-23 DIAGNOSIS — G60.3 IDIOPATHIC PROGRESSIVE POLYNEUROPATHY: Chronic | ICD-10-CM

## 2022-07-23 RX ORDER — TRAMADOL HYDROCHLORIDE 50 MG/1
TABLET ORAL
Qty: 30 TABLET | Refills: 0 | Status: SHIPPED | OUTPATIENT
Start: 2022-07-23 | End: 2022-12-29

## 2022-07-23 NOTE — ASSESSMENT & PLAN NOTE
Has known small fiber neuropathy, but with DJD of Lspine we are also trying to get an MRI. This was done at an outside site because of his weight/size, but I have not yet seen results. He says he is supposed to get those for me.

## 2022-07-23 NOTE — ASSESSMENT & PLAN NOTE
Weight  Couldn't get insurance approved for an injection. They are going to try naltrexone to reduce appetite. This may be a complicating factor for his tramadol. Also he is skeptical about its value.

## 2022-07-23 NOTE — CONFIDENTIAL NOTE
Coordinators, please work with patient regarding:  -- getting MRI image and report from outside facility  -- schedule PFTs  -- Two hours of in-person appointments with me, whenever our schedules overlap: anything between four 30 minutes and two 60 minutes. OK to use one BO per month on him.  -- Echo  -- several blood tests    Advise him that ortho will be calling for other appointments.

## 2022-07-23 NOTE — ASSESSMENT & PLAN NOTE
Bilateral knee and leg pain has been brought up before, but not fully evaluated.  He had an x-ray done in 2017, that seemed benign except for an unchanged suprapatellar enthesophyte.       ASSESSMENT & PLAN: Knee pain, uncertain etiology at present, known multiple joint pains without previous documentation of arthritis, likely exacerbated by weight/workload.  Given the previous history, I decided to order a new set of x-rays and bring him back for follow-up assessment.  It may be that the x-rays will allow for a follow-up plan also.   ADDENDUM: His new x-ray showed mild medial compartment joint space loss.  Physical therapy will likely benefit via improving strength around the joint.  At future visit can consider whether MRI is needed for possible partial meniscal tear.  See comments about pain and tramadol.

## 2022-07-23 NOTE — ASSESSMENT & PLAN NOTE
Dyspnea       UPDATE: He didn't get the CXR I had asked for recently, and the order had been cancelled. Earlier plan was to repeat his PFTs.       ASSESSMENT & PLAN: Dyspnea multifactorial, as yet incompletely described. DDx is very broad.    CXR re-ordered. ADDENDUM: CXR remains unremarkable.    PFTs will be important, with methacholine challenge. With the subglottic stenosis, I'm wondering about a problem with vocal folds.    See also obstructive sleep apnea       NEW OVERVIEW: Several possible etiologies: subglottic stenosis, asthma,   Includes dyspnea on exertion and at rest.  He has trouble breathing with a variety of odors, chemicals, cat litter box, a bonfire across the street in his neighborhood.

## 2022-08-09 ENCOUNTER — OFFICE VISIT (OUTPATIENT)
Dept: INTERNAL MEDICINE | Facility: CLINIC | Age: 52
End: 2022-08-09
Payer: COMMERCIAL

## 2022-08-09 VITALS
SYSTOLIC BLOOD PRESSURE: 104 MMHG | DIASTOLIC BLOOD PRESSURE: 73 MMHG | BODY MASS INDEX: 49.44 KG/M2 | HEIGHT: 67 IN | OXYGEN SATURATION: 97 % | RESPIRATION RATE: 16 BRPM | HEART RATE: 113 BPM | WEIGHT: 315 LBS

## 2022-08-09 DIAGNOSIS — R73.03 PREDIABETES: ICD-10-CM

## 2022-08-09 DIAGNOSIS — G47.01 INSOMNIA DUE TO MEDICAL CONDITION: ICD-10-CM

## 2022-08-09 DIAGNOSIS — R20.8 SKIN PAIN: ICD-10-CM

## 2022-08-09 DIAGNOSIS — G89.29 CHRONIC PAIN OF BOTH SHOULDERS: ICD-10-CM

## 2022-08-09 DIAGNOSIS — M25.512 CHRONIC PAIN OF BOTH SHOULDERS: ICD-10-CM

## 2022-08-09 DIAGNOSIS — J38.6 SUBGLOTTIC STENOSIS: ICD-10-CM

## 2022-08-09 DIAGNOSIS — E78.5 HYPERLIPIDEMIA ASSOCIATED WITH TYPE 2 DIABETES MELLITUS (H): ICD-10-CM

## 2022-08-09 DIAGNOSIS — M79.604 BILATERAL LEG PAIN: ICD-10-CM

## 2022-08-09 DIAGNOSIS — G47.33 OSA (OBSTRUCTIVE SLEEP APNEA): ICD-10-CM

## 2022-08-09 DIAGNOSIS — R21 FACIAL RASH: ICD-10-CM

## 2022-08-09 DIAGNOSIS — L30.4 INTERTRIGO: ICD-10-CM

## 2022-08-09 DIAGNOSIS — R06.00 DYSPNEA, UNSPECIFIED TYPE: Primary | ICD-10-CM

## 2022-08-09 DIAGNOSIS — M79.605 BILATERAL LEG PAIN: ICD-10-CM

## 2022-08-09 DIAGNOSIS — M25.511 CHRONIC PAIN OF BOTH SHOULDERS: ICD-10-CM

## 2022-08-09 DIAGNOSIS — G60.3 IDIOPATHIC PROGRESSIVE POLYNEUROPATHY: Chronic | ICD-10-CM

## 2022-08-09 DIAGNOSIS — L21.9 SEBORRHEIC DERMATITIS OF SCALP: ICD-10-CM

## 2022-08-09 DIAGNOSIS — R07.89 STERNOCOSTAL PAIN: ICD-10-CM

## 2022-08-09 DIAGNOSIS — E11.69 HYPERLIPIDEMIA ASSOCIATED WITH TYPE 2 DIABETES MELLITUS (H): ICD-10-CM

## 2022-08-09 DIAGNOSIS — M47.26 OSTEOARTHRITIS OF SPINE WITH RADICULOPATHY, LUMBAR REGION: ICD-10-CM

## 2022-08-09 PROCEDURE — 99417 PROLNG OP E/M EACH 15 MIN: CPT | Performed by: PEDIATRICS

## 2022-08-09 PROCEDURE — 99215 OFFICE O/P EST HI 40 MIN: CPT | Performed by: PEDIATRICS

## 2022-08-09 RX ORDER — KETOCONAZOLE 20 MG/ML
SHAMPOO TOPICAL
Qty: 120 ML | Refills: 3 | Status: SHIPPED | OUTPATIENT
Start: 2022-08-09 | End: 2022-11-16

## 2022-08-09 NOTE — ASSESSMENT & PLAN NOTE
Dyspnea  Subglottic stenosis  One of several CXRs that I ordered was finally done, and was normal.  Needs further testing that I ordered, and then further evaluation for treatment options. Here for exam.  ENT evaluation 6/27/22 - Dr. Glasgow voiced concern about Chvaez's candidacy for surgery, and asked him to see sleep medicine before that.  The patient said to Dr. Glasgow and also to me that he wants another tracheostomy placed.  OBJECTIVE: Has difficulty completing sentences, but not as bad as some previous conversations. Effect does not change with time during visit. Lungs: Thoracic air movement sounds normal or near-normal. No rhonchi or transmitted upper airway sounds.       ASSESSMENT & PLAN: Multi etiology dyspnea, including likely asthma, definite subglottic stenosis, and presumably restrictive airway disease from body habitus.  In addition, the odor/chemical affect suggest the possibility of paradoxical vocal fold motion.  We definitely need PFTs, and my team will help him to schedule this.  The echo is now scheduled for 8/24/2022.  Planning for pulmonary evaluation after the above.  Not clear to me if he needs further imaging.

## 2022-08-09 NOTE — PATIENT INSTRUCTIONS
To schedule your echocardiogram (ECHO), please call [phone].  PFT Pulmonary Function Testing [phone] (3rd Floor [facility])

## 2022-08-09 NOTE — ASSESSMENT & PLAN NOTE
Was referred back to sleep medicine by ENT Dr. Glasgow.  He cannot tolerate CPAP due to choking problems.  He has an appointment planned for speech therapy, due to my concern about possible paradoxical vocal fold motion.  The patient himself is most interested in getting another tracheostomy.  I counseled that this is a last resort

## 2022-08-09 NOTE — ASSESSMENT & PLAN NOTE
Takes zolpidem regularly, with benefit. Even so, still has trouble getting to sleep or will be up all night.

## 2022-08-09 NOTE — NURSING NOTE
"Chavez Ca is a 51 year old male patient that presents today in clinic for the following:    Chief Complaint   Patient presents with     Follow Up     One month follow-up     Results     The patient presents with physical MRI results of his right shoulder and back. Additionally, he would like to review the X-rays of his knees from last time.      The patient's allergies and medications were reviewed as noted. A set of vitals were recorded as noted without incident: /73 (BP Location: Right arm, Patient Position: Sitting, Cuff Size: Adult Large)   Pulse 113   Resp 16   Ht 1.702 m (5' 7\")   Wt (!) 159.8 kg (352 lb 3.2 oz)   SpO2 97%   BMI 55.16 kg/m  . The patient does not have any other questions for the provider.    Gary Briggs, EMT at 3:19 PM on 8/9/2022  "

## 2022-08-09 NOTE — PROGRESS NOTES
"Dear patient. Thank you for visiting with me. I want you to feel respected, understood, and empowered. \"Respect\" is valuing you as much as I would a close family member. \"Empowerment\" happens when you are fully informed, and can make the best possible decision for you.  Please ask me questions!  Challenge anything that is not clear.    Medical records are primarily used as memory aids for me and my colleagues. Things to know about my documentation style:  - The 'problem list' includes current symptoms or diagnoses, and some problems that are resolved but may return. I use the past medical history for problems not expected to return.  - I use single quotation marks for things that you or I said, when I want to clarify who was speaking.  - I use double quotation marks when copying a term from elsewhere in your records. Italics (besides here) may also denote a quotation.  If you have questions or concerns, please contact me; I will reply as soon as time allows.    Context    Chavez Ca is a 51 year old man, with concerns including:  Chief Complaint   Patient presents with     Follow Up     One month follow-up     Results     The patient presents with physical MRI results of his right shoulder and back. Additionally, he would like to review the X-rays of his knees from last time.      PCP: Chavez Milligan   Visit type: overview of problems, with several issues identified by patient discussed within limits of available time    /73 (BP Location: Right arm, Patient Position: Sitting, Cuff Size: Adult Large)   Pulse 113   Resp 16   Ht 1.702 m (5' 7\")   Wt (!) 159.8 kg (352 lb 3.2 oz)   SpO2 97%   BMI 55.16 kg/m      Problems and progress    This was a big visit for catching up about a variety of problems.  Also there are a number of tests that have not been scheduled.    Problem List as of 8/9/2022 Reviewed: 8/9/2022  7:32 PM by Chavez Milligan MD          Noted       Nervous and " Auditory    1. Idiopathic progressive polyneuropathy 7/1/2012     Overview Addendum 8/9/2022  7:01 PM by Chavez Milligan MD      Small fiber neuropathy - ENF density markedly reduced - see report 11/5/2012.  2012: small fiber neuropathy symptom onset.  2015: diagnosis was confirmed by biopsy.  2018: last appt with neurology. No new EMG needed.  (has many other causes of pain)  2022: needs other areas of pain assessed, then consider RV with neurology  Paroxysmal foot pain bilaterally with retained polymodal sensation, strength and reflexes.            Last Assessment & Plan 8/9/2022 Office Visit Written 8/9/2022  7:01 PM by Chavez Milligan MD      Tramadol prescription was filled for #7 (insurance) and now needs to have another #23 filled. This is fine with me.  Long-term use of this has been helpful for his quality of life and he has been a reliable patient thus far.  I offered to do another prescription today, but he says the 23 coming is fine.           2. Osteoarthritis of spine with radiculopathy, lumbar region 1/11/2016     Overview Addendum 7/23/2022  2:46 PM by Chavez Milligan MD      Needs MRI.  Progression mild multilevel degenerative changes most pronounced at L3-L4.            Last Assessment & Plan 8/9/2022 Office Visit Written 8/9/2022  7:19 PM by Chavez Milligan MD      We finally have the MRI results from the outside radiology program that was necessary because of his weight.  I will have the images scanned from disc.  The paper report describes mild lumbar degenerative changes with various disc issues, varying comments about foraminal narrowing, and no significant central canal stenosis.  He has an upcoming appointment with physical therapy.  Regardless of other plans, I would hope that we can get him some improved physical activity and pool therapy.  After that, I think he could benefit from spine evaluation, to see if there might be benefit from injection.             Relevant Orders     Spine  Referral    3. Skin pain 1/1/2022     Overview Signed 8/9/2022  7:03 PM by Chavez Milligan MD      Skin pain 'like a fish ', heat sensation, and sweating sudden onset and very severe.   Helped with having a fan blowing on him most of the time.            Last Assessment & Plan 8/9/2022 Office Visit Written 8/9/2022  7:04 PM by Chavez Milligan MD      I am not sure what to make of this problem.  It sounds like the pain precedes the onset of the sweating.  There does not seem to be enough inflammation for sweating or heat to cause the discomfort.  On the other hand, central sensitization syndrome does not typically improve with the fan usage he describes.  Probably the best step for now would be having him seen by dermatology as already planned, and then regrouping after their treatment.            Relevant Medications     ketoconazole (NIZORAL) 2 % external shampoo    4. Bilateral leg pain 4/22/2011     Overview Addendum 7/23/2022  2:38 PM by Chavez Milligan MD      Several etiologies. Mild arthritis only on XR. Has a distal quadriceps insertional enthesophyte consistent with mechanical concerns. Also known small fiber neuropathy idiopathic.  PT referral. Needs in-person exam and consider if referral or MRI needed.            Last Assessment & Plan 8/9/2022 Office Visit Written 8/9/2022  7:26 PM by Chavez Milligan MD      Described findings from knee x-rays today.  Would benefit from physical therapy and pool therapy, but not sure this will be possible with his other problems currently.           5. Chronic pain of both shoulders 7/22/2016     Overview Signed 8/9/2022  7:24 PM by Chvaez Milligan MD      Physical therapy, Ortho/shoulder  Shoulder XR 3/16/2022:   Mild to moderate degenerative changes of the AC joint  Shoulder MRI 6/18/2022:  --Moderate subscapularis tendinopathy, mild infraspinatus and supraspinatus tendinopathy  -- Mild  subacromial subdeltoid bursitis            Last Assessment & Plan 8/9/2022 Office Visit Written 8/9/2022  7:22 PM by Chavez Milligan MD      We finally have the MRI results of his right shoulder from the outside imaging center that was necessary because of his weight.  The MRI showed no evidence of a partial or full-thickness rotator cuff tear.  There was moderate subscapularis tendinopathy and mild infraspinatus and supraspinatus tendinopathy without tearing.  Also there was evidence of mild subacromial subdeltoid bursitis.  He has an appointment coming up with physical therapy.  After a few sessions, I will see if we can get him in with Ortho/shoulder.           6. Sternocostal pain 3/8/2016     Overview Addendum 8/9/2022  7:13 PM by Chavez Milligan MD      Nonexertional right side point chest pain, sharp and sudden, helped somewhat by pressure.            Last Assessment & Plan 8/9/2022 Office Visit Written 8/9/2022  7:14 PM by Chavez Milligan MD      Has had several echos over time, but I didn't see a functional evaluation. He quotes previous providers as saying that he didn't need a stress test.  His pretest probability would be high for coronary artery disease, but I agree that this is not likely a anginal chest pain problem.              Respiratory    7. VERN (obstructive sleep apnea) 4/19/2011     Overview Addendum 8/9/2022  3:59 PM by Chavez Milligan MD      Not able to use CPAP due to choking (has tried two devices).  Saw someone about a dental device, they have not been willing to fit because of dental fractures, and they want him to see the sleep provider again.      PSG [facility] 12/2010  Severe VERN    Oxygen Sly 51%  CPAP [id number]            Last Assessment & Plan 8/9/2022 Office Visit Written 8/9/2022  6:58 PM by Chavez Milligan MD      Was referred back to sleep medicine by ENT Dr. Glasgow.  He cannot tolerate CPAP due to choking problems.  He has  an appointment planned for speech therapy, due to my concern about possible paradoxical vocal fold motion.  The patient himself is most interested in getting another tracheostomy.  I counseled that this is a last resort           8. Subglottic stenosis 6/27/2014     Overview Signed 7/7/2016  5:42 PM by Cassidy Martines MD      Complication of trach, placed 1/2012            Last Assessment & Plan 8/9/2022 Office Visit Written 8/9/2022  6:59 PM by Chavez Milligan MD      See other dyspnea and obstructive sleep apnea           9. Dyspnea, unspecified type - Primary 5/4/2021     Overview Addendum 7/23/2022  2:41 PM by Chavez Milligan MD      Several possible etiologies: subglottic stenosis, asthma,   Includes dyspnea on exertion and at rest.  He has trouble breathing with a variety of odors, chemicals, cat litter box, a bonfire across the street in his neighborhood.            Last Assessment & Plan 8/9/2022 Office Visit Written 8/9/2022  6:57 PM by Chavez Milligan MD      Dyspnea  Subglottic stenosis  One of several CXRs that I ordered was finally done, and was normal.  Needs further testing that I ordered, and then further evaluation for treatment options. Here for exam.  ENT evaluation 6/27/22 - Dr. Glasgow voiced concern about Chavez's candidacy for surgery, and asked him to see sleep medicine before that.  The patient said to Dr. Glasgow and also to me that he wants another tracheostomy placed.  OBJECTIVE: Has difficulty completing sentences, but not as bad as some previous conversations. Effect does not change with time during visit. Lungs: Thoracic air movement sounds normal or near-normal. No rhonchi or transmitted upper airway sounds.       ASSESSMENT & PLAN: Multi etiology dyspnea, including likely asthma, definite subglottic stenosis, and presumably restrictive airway disease from body habitus.  In addition, the odor/chemical affect suggest the possibility of paradoxical vocal  fold motion.  We definitely need PFTs, and my team will help him to schedule this.  The echo is now scheduled for 8/24/2022.  Planning for pulmonary evaluation after the above.  Not clear to me if he needs further imaging.            Relevant Orders     Adult Pulmonary Medicine Referral       Endocrine    10. Hyperlipidemia associated with prediabetes 11/18/2014     Overview Deleted 8/9/2022  7:27 PM by Chavez Milligan MD                Last Assessment & Plan 8/9/2022 Office Visit Written 8/9/2022  7:28 PM by Chavez Milligan MD      Lipid panel has been ordered but not yet done.  I was hoping he would get blood done today, but this did not happen as of this document's closure           11. Prediabetes 3/8/2016     Overview Signed 8/9/2022  7:30 PM by Chavez Milligan MD      Max A1c was 6.0 (5/4/2021).            Last Assessment & Plan 8/9/2022 Office Visit Written 8/9/2022  7:31 PM by Chavez Milligan MD      Due for rechecking A1c.  Planning to do urine albumin based on additional risk              Musculoskeletal and Integumentary    12. Facial rash (possible roseacea?) 8/9/2022     Overview Signed 8/9/2022  7:07 PM by Chavez Milligan MD      Seeing dermatology            Last Assessment & Plan 8/9/2022 Office Visit Written 8/9/2022  7:07 PM by Chvaez Milligan MD      Facial rash he reports was diagnosed previously as rosacea.  This is worsened lately, exist mainly where his mask is, and radiates to an itching sensation over skull, but separate from the seborrheic rash on his scalp reportedly  Seeing derm on 8/18.  OBJECTIVE: dermatitis, erythema, dryness.  Scalp has some modest seborrea, but nothing to the extent of his facial rash.       ASSESSMENT & PLAN: At least some of his rash appears more like contact dermatitis in the mask area than from rosacea per se.  I would consider trial of therapy, but I am grateful to defer to dermatology shortly.  In the meantime I  wrote for refill of his Nizoral for scalp seborrheic dermatitis.            Relevant Medications     ketoconazole (NIZORAL) 2 % external shampoo    13. Seborrheic dermatitis of scalp 8/9/2022     Overview Signed 8/9/2022  7:08 PM by Chavez Milligan MD      Ketoconazole shampoo used at varying times            Last Assessment & Plan 8/9/2022 Office Visit Written 8/9/2022  7:08 PM by Chavez Milligan MD      See comments under facial rash            Relevant Medications     ketoconazole (NIZORAL) 2 % external shampoo    14. Intertrigo of adipose folds 8/9/2022     Last Assessment & Plan 8/9/2022 Office Visit Written 8/9/2022  7:11 PM by Chavez Milligan MD      By report, especially with sweat. Not examined today.            Relevant Medications     ketoconazole (NIZORAL) 2 % external shampoo       Other    15. Insomnia due to medical condition 5/28/2022     Overview Signed 5/28/2022  9:33 PM by Chavez Milligan MD      Multietiology, including pain  Trying zolpidem again.            Last Assessment & Plan 8/9/2022 Office Visit Written 8/9/2022  3:58 PM by Chavez Milligan MD      Takes zolpidem regularly, with benefit. Even so, still has trouble getting to sleep or will be up all night.                   Additional issues:    Needs metro mobility form at next visit.    CHRONIC CARE MANAGEMENT at [facility].             /\  (Paste with .SPECCOMM)        To expand view click this icon above .....:   ---------------------------------------------------------------------------------  PCP Srini ROBERTS n/a               Uses MyChart? delayed  Other [facility] team: n/a    ----------------------------------------------------------------------------------  REGULAR VISIT FREQUENCY: n/a  ----------------------------------------------------------------------------------  PENDING ISSUES for team:           MED REFILL NOTES:        ----------------------------------------------------------------------------------  ONGOING REFERRALS to:    -- Weight management: endo/weight Dr. Fortino Chang    -- ENT and sleep apnea: Dr. Zafra Glasgow            Does not do myChart  Outstanding issues (Dr. Milligan)  --------------------------------------------  -- MR going to [facility]  -- Bilateral leg pain  -- Skin pain (like 'fish ') after seen by dermatology     CHRONIC CARE MANAGEMENT at [facility].             /\  (Paste with .SPECCOMM)        To expand view click this icon above .....:   ---------------------------------------------------------------------------------  CLIF Milligan RN n/a               Uses MyChart? delayed  Other [facility] team: n/a    ----------------------------------------------------------------------------------  REGULAR VISIT FREQUENCY: n/a  ----------------------------------------------------------------------------------  PENDING ISSUES for team:           MED REFILL NOTES:       ----------------------------------------------------------------------------------  ONGOING REFERRALS to:    -- Weight management: endo/weight Dr. Fortino Chang    -- ENT and sleep apnea: Dr. Zafar Glasgow        About this visit:  Time note ([id number], [id number]'): The total time (on the date of service) for this service was 72 minutes, including discussion/face-to-face, chart review, interpretation not otherwise reported, documentation, and updating of the computerized record.

## 2022-08-10 NOTE — ASSESSMENT & PLAN NOTE
Described findings from knee x-rays today.  Would benefit from physical therapy and pool therapy, but not sure this will be possible with his other problems currently.

## 2022-08-10 NOTE — ASSESSMENT & PLAN NOTE
We finally have the MRI results of his right shoulder from the outside imaging center that was necessary because of his weight.  The MRI showed no evidence of a partial or full-thickness rotator cuff tear.  There was moderate subscapularis tendinopathy and mild infraspinatus and supraspinatus tendinopathy without tearing.  Also there was evidence of mild subacromial subdeltoid bursitis.  He has an appointment coming up with physical therapy.  After a few sessions, I will see if we can get him in with Ortho/shoulder.

## 2022-08-10 NOTE — ASSESSMENT & PLAN NOTE
We finally have the MRI results from the outside radiology program that was necessary because of his weight.  I will have the images scanned from disc.  The paper report describes mild lumbar degenerative changes with various disc issues, varying comments about foraminal narrowing, and no significant central canal stenosis.  He has an upcoming appointment with physical therapy.  Regardless of other plans, I would hope that we can get him some improved physical activity and pool therapy.  After that, I think he could benefit from spine evaluation, to see if there might be benefit from injection.

## 2022-08-10 NOTE — ASSESSMENT & PLAN NOTE
Tramadol prescription was filled for #7 (insurance) and now needs to have another #23 filled. This is fine with me.  Long-term use of this has been helpful for his quality of life and he has been a reliable patient thus far.  I offered to do another prescription today, but he says the 23 coming is fine.

## 2022-08-10 NOTE — ASSESSMENT & PLAN NOTE
Lipid panel has been ordered but not yet done.  I was hoping he would get blood done today, but this did not happen as of this document's closure

## 2022-08-10 NOTE — ASSESSMENT & PLAN NOTE
Has had several echos over time, but I didn't see a functional evaluation. He quotes previous providers as saying that he didn't need a stress test.  His pretest probability would be high for coronary artery disease, but I agree that this is not likely a anginal chest pain problem.

## 2022-08-10 NOTE — ASSESSMENT & PLAN NOTE
I am not sure what to make of this problem.  It sounds like the pain precedes the onset of the sweating.  There does not seem to be enough inflammation for sweating or heat to cause the discomfort.  On the other hand, central sensitization syndrome does not typically improve with the fan usage he describes.  Probably the best step for now would be having him seen by dermatology as already planned, and then regrouping after their treatment.

## 2022-08-10 NOTE — ASSESSMENT & PLAN NOTE
Facial rash he reports was diagnosed previously as rosacea.  This is worsened lately, exist mainly where his mask is, and radiates to an itching sensation over skull, but separate from the seborrheic rash on his scalp reportedly  Seeing derm on 8/18.  OBJECTIVE: dermatitis, erythema, dryness.  Scalp has some modest seborrea, but nothing to the extent of his facial rash.       ASSESSMENT & PLAN: At least some of his rash appears more like contact dermatitis in the mask area than from rosacea per se.  I would consider trial of therapy, but I am grateful to defer to dermatology shortly.  In the meantime I wrote for refill of his Nizoral for scalp seborrheic dermatitis.

## 2022-09-03 ENCOUNTER — HEALTH MAINTENANCE LETTER (OUTPATIENT)
Age: 52
End: 2022-09-03

## 2022-10-13 NOTE — TELEPHONE ENCOUNTER
SPINE PATIENTS - NEW PROTOCOL PREVISIT    RECORDS RECEIVED FROM: internal   REASON FOR VISIT: osteoarthritis of spine with radiculopathy   Date of Appt: 10/14/22   NOTES (FOR ALL VISITS) STATUS DETAILS   OFFICE NOTE from referring provider Internal Dr Chavez Milligan @ [facility] Internal Med:  8/9/22   MEDICATION LIST Internal    IMAGING  (FOR ALL VISITS)     MRI (HEAD, NECK, SPINE) Received [facility]:  MRI Lumbar Spine 6/18/22

## 2022-10-14 ENCOUNTER — PRE VISIT (OUTPATIENT)
Dept: NEUROSURGERY | Facility: CLINIC | Age: 52
End: 2022-10-14

## 2022-10-19 NOTE — TELEPHONE ENCOUNTER
DIAGNOSIS: B/L shoulder pain / MRI / Dr. Chavez Milligan / [facility]    APPOINTMENT DATE: 10.24.22   NOTES STATUS DETAILS   OFFICE NOTE from referring provider Internal 7.23.22 Chavez Milligan, fv  8.9.22  3.16.22     DISCHARGE REPORT from the ER Internal 5.27.16 Jose Humphrey, fv  5.7.16 Barbara Ewing, fv     MEDICATION LIST Internal    XRAYS (IMAGES & REPORTS) Internal 3.16.22 R shoulder, mhfv  11.3.20 R shoulder, mhfv    5.13.16 L shoulder, mhfv  5.7.16 L humerus, mhfv  5.7.16 L shoulder, mhfv

## 2022-10-21 ENCOUNTER — TELEPHONE (OUTPATIENT)
Dept: VASCULAR SURGERY | Facility: CLINIC | Age: 52
End: 2022-10-21

## 2022-10-21 NOTE — TELEPHONE ENCOUNTER
[facility] Call Center    Phone Message    May a detailed message be left on voicemail: yes     Reason for Call: Other: Pt needs to schedule appt for vascular for buldging veins around heart. Please call [phone]. Thanks!      Action Taken: Message routed to:  [facility] ([facility] VASCULAR    Travel Screening: Not Applicable

## 2022-10-24 ENCOUNTER — PRE VISIT (OUTPATIENT)
Dept: ORTHOPEDICS | Facility: CLINIC | Age: 52
End: 2022-10-24

## 2022-10-24 ENCOUNTER — OFFICE VISIT (OUTPATIENT)
Dept: ORTHOPEDICS | Facility: CLINIC | Age: 52
End: 2022-10-24
Attending: PEDIATRICS
Payer: COMMERCIAL

## 2022-10-24 VITALS — BODY MASS INDEX: 49.44 KG/M2 | HEIGHT: 67 IN | WEIGHT: 315 LBS

## 2022-10-24 DIAGNOSIS — M75.101 ROTATOR CUFF SYNDROME OF RIGHT SHOULDER: ICD-10-CM

## 2022-10-24 DIAGNOSIS — M25.811 SHOULDER IMPINGEMENT, RIGHT: Primary | ICD-10-CM

## 2022-10-24 PROCEDURE — 99203 OFFICE O/P NEW LOW 30 MIN: CPT | Performed by: FAMILY MEDICINE

## 2022-10-24 NOTE — PROGRESS NOTES
"Right shoulder pain    Today, the patient reports that he has had right shoulder pain since July 2020. He went to physical therapy for his legs. He was doing an exercise that involved both of his shoulders and legs. His right shoulder was in a shoulder abduction position and he had sudden pain with this. Since then he has had pain with reaching, taking a shower, doing dishes, getting dressed. He reports that it is only not painful when his hand is down by his side. He is left hand dominant. He saw his PCP and they ordered XR and MRI. . He is not currently taking any medication for his pain. He has never received any steroid injections into the right shoulder.  He says that he wants to avoid cortisone shots for the shoulder.  He is on disability since 2010.    Patient relates that \"95% of the time\" his right shoulder would function as expected with overhead reaching and outstretched arm, without impingement pain.  But other times, depending upon the position of reaching he will have sharp discomfort that makes his feel like his shoulder would \"catch\".  He has a sedentary lifestyle.  He is not employed currently.  He indicates he does not spend time at a desk or computer.  He tries to do some walking with stairs and walking in the neighborhood for exercise.  At times with washing dishes he will have pain with an outstretched arm.    MRI right shoulder 6/18/2022, [place] radiology consistent with no evidence of a partial or full-thickness rotator cuff tear, moderate subscapularis tendinopathy and mild infraspinatus and supraspinatus tendinopathy.  Mild subacromial bursitis.  Normal-appearing biceps tendon.  Mild AC joint DJD.  Type II acromion.  No evidence of labral tear.  Normal-appearing cartilage.      4 views right shoulder radiographs 3/16/2022 1:39 PM     History: Chronic right shoulder pain; Chronic right shoulder pain      Comparison: 11/3/2020     Findings:     AP internal, external rotation and transscapular " Y views of the right  shoulder were obtained.      No acute osseous abnormality. Glenohumeral and acromioclavicular  joints are congruent.     Mild to moderate degenerative changes of the acromioclavicular joint.  No substantial degenerative change of the glenohumeral joint.     Soft tissue is unremarkable. The visualized lung is clear.                                                                      Impression:  1. No acute osseous abnormality.  2. No substantial glenohumeral degenerative change.     LINNETTE FLYNN MD (Joe)           PMH:  Past Medical History:   Diagnosis Date     Allergic rhinitis      Benign positional vertigo 2011    never quite went away     Bilateral carpal tunnel syndrome 07/18/2015     Chronic sinusitis 2017    I don't know Please look     Chronic tonsillitis      Depressive disorder      Depressive disorder, not elsewhere classified 07/30/2012     Gastro-oesophageal reflux disease      Hearing problem      Hypertension 2008     Learning disability      Neck mass     parapharyngeal, benign     Obesity, Class III, BMI 40-49.9 (morbid obesity) (H)      Obstructive sleep apnea      Psychological factors associated with another disorder 08/21/2012     Recurrent otitis media 2016    might be what the pain in my ears     Reduced vision      Tinnitus 2016    I would hear a steady light buzzing sound and other can't     Trigeminal neuralgia      Weakness 08/26/2011       Active problem list:  Patient Active Problem List   Diagnosis     Trigeminal neuralgia     VERN (obstructive sleep apnea)     Idiopathic edema     Depressive disorder, not elsewhere classified     Subglottic stenosis     Hyperlipidemia associated with prediabetes     Idiopathic progressive polyneuropathy     Varicose veins of left lower extremity     Pain in both wrists     Chronic pain of both shoulders     Neck pain, chronic     Seasonal allergies     Osteoarthritis of spine with radiculopathy, lumbar region     Morbid  obesity (H)     Condyloma acuminata     Hypokalemia     Poor dentition     Benign positional vertigo     Dyspnea, unspecified type     Insomnia due to medical condition     Bilateral leg pain     Skin pain     Facial rash (possible roseacea?)     Seborrheic dermatitis of scalp     Intertrigo of adipose folds     Sternocostal pain     Prediabetes       FH:  Family History   Adopted: Yes   Problem Relation Age of Onset     Family History Negative Other         Does not know family     Unknown/Adopted No family hx of        SH:  Social History     Socioeconomic History     Marital status: Single     Spouse name: Not on file     Number of children: Not on file     Years of education: Not on file     Highest education level: Not on file   Occupational History     Not on file   Tobacco Use     Smoking status: Never     Smokeless tobacco: Never     Tobacco comments:     Never started   Substance and Sexual Activity     Alcohol use: No     Alcohol/week: 0.0 standard drinks     Comment: rarely     Drug use: No     Sexual activity: Never   Other Topics Concern     Parent/sibling w/ CABG, MI or angioplasty before 65F 55M? Not Asked      Service Not Asked     Blood Transfusions Not Asked     Caffeine Concern Not Asked     Occupational Exposure Not Asked     Hobby Hazards Not Asked     Sleep Concern Not Asked     Stress Concern Not Asked     Weight Concern Not Asked     Special Diet Not Asked     Back Care Not Asked     Exercise Yes     Comment: 2-3x/wk     Bike Helmet Not Asked     Seat Belt Not Asked     Self-Exams Not Asked   Social History Narrative    Room 'no bigger than a senior care cell,' the friend who owns the house recently got , and goes back and forth. He has been put in adult foster care before. The patient is single.  Has 1 child. Pt adopted at age 14.      Social Determinants of Health     Financial Resource Strain: Not on file   Food Insecurity: Not on file   Transportation Needs: Not on file   Physical  Activity: Not on file   Stress: Not on file   Social Connections: Not on file   Intimate Partner Violence: Not on file   Housing Stability: Not on file       MEDS:  See EMR, reviewed  ALL:  See EMR, reviewed    REVIEW OF SYSTEMS:  CONSTITUTIONAL:NEGATIVE for fever, chills, change in weight  INTEGUMENTARY/SKIN: NEGATIVE for worrisome rashes, moles or lesions  EYES: NEGATIVE for vision changes or irritation  ENT/MOUTH: NEGATIVE for ear, mouth and throat problems  RESP:NEGATIVE for significant cough or SOB  BREAST: NEGATIVE for masses, tenderness or discharge  CV: NEGATIVE for chest pain, palpitations or peripheral edema  GI: NEGATIVE for nausea, abdominal pain, heartburn, or change in bowel habits  :NEGATIVE for frequency, dysuria, or hematuria  :NEGATIVE for frequency, dysuria, or hematuria  NEURO: NEGATIVE for weakness, dizziness or paresthesias  ENDOCRINE: NEGATIVE for temperature intolerance, skin/hair changes  HEME/ALLERGY/IMMUNE: NEGATIVE for bleeding problems  PSYCHIATRIC: NEGATIVE for changes in mood or affect      Objective: Above ideal weight.  He has full passive range of motion at the shoulder with no signs of a frozen shoulder.  Nontender over the AC joint.  Mildly tender over the anterior cuff, nontender over the biceps tendon.  He has 5 out of 5 strength bilaterally at deltoid, supraspinatus, infraspinatus and subscapularis.  [place]'s test is negative.  Head forward, shoulder forward posture.  No scapular winging.  No effusion at the right shoulder.  Overlying skin is normal.  Appropriate conversation affect.      I personally reviewed with the patient x-rays and MRI of his shoulder    Assessment: Right shoulder impingement symptoms, recurrent with a history of rotator cuff tendinitis    Plan: I explained the importance of shoulder dynamics and focus on the posterior of the shoulder, scapular stabilization, postural cues, rotator cuff.  He understands these are specific exercises that can be done  at home and do not take special equipment.  But it takes 4-5 visits specific to the shoulder for the patient to learn them and once he learns them he understands that have to be maintenance exercises that are focused on 4 days out of the week.  They take 15 minutes to do.  He would look for improvements with this protocol.  He would like to see [facility] for physical therapy.  He declines cortisone injection.

## 2022-10-24 NOTE — LETTER
"  10/24/2022      RE: Chavez Ca  [address] [place] [address]     Dear Colleague,    Thank you for referring your patient, Chavez Ca, to the [facility] [place]. Please see a copy of my visit note below.    Right shoulder pain    Today, the patient reports that he has had right shoulder pain since July 2020. He went to physical therapy for his legs. He was doing an exercise that involved both of his shoulders and legs. His right shoulder was in a shoulder abduction position and he had sudden pain with this. Since then he has had pain with reaching, taking a shower, doing dishes, getting dressed. He reports that it is only not painful when his hand is down by his side. He is left hand dominant. He saw his PCP and they ordered XR and MRI. . He is not currently taking any medication for his pain. He has never received any steroid injections into the right shoulder.  He says that he wants to avoid cortisone shots for the shoulder.  He is on disability since 2010.    Patient relates that \"95% of the time\" his right shoulder would function as expected with overhead reaching and outstretched arm, without impingement pain.  But other times, depending upon the position of reaching he will have sharp discomfort that makes his feel like his shoulder would \"catch\".  He has a sedentary lifestyle.  He is not employed currently.  He indicates he does not spend time at a desk or computer.  He tries to do some walking with stairs and walking in the neighborhood for exercise.  At times with washing dishes he will have pain with an outstretched arm.    MRI right shoulder 6/18/2022, [place] radiology consistent with no evidence of a partial or full-thickness rotator cuff tear, moderate subscapularis tendinopathy and mild infraspinatus and supraspinatus tendinopathy.  Mild subacromial bursitis.  Normal-appearing biceps tendon.  Mild AC joint DJD.  Type II acromion.  No " evidence of labral tear.  Normal-appearing cartilage.      4 views right shoulder radiographs 3/16/2022 1:39 PM     History: Chronic right shoulder pain; Chronic right shoulder pain      Comparison: 11/3/2020     Findings:     AP internal, external rotation and transscapular Y views of the right  shoulder were obtained.      No acute osseous abnormality. Glenohumeral and acromioclavicular  joints are congruent.     Mild to moderate degenerative changes of the acromioclavicular joint.  No substantial degenerative change of the glenohumeral joint.     Soft tissue is unremarkable. The visualized lung is clear.                                                                      Impression:  1. No acute osseous abnormality.  2. No substantial glenohumeral degenerative change.     LINNETTE FLYNN MD (Joe)           PMH:  Past Medical History:   Diagnosis Date     Allergic rhinitis      Benign positional vertigo 2011    never quite went away     Bilateral carpal tunnel syndrome 07/18/2015     Chronic sinusitis 2017    I don't know Please look     Chronic tonsillitis      Depressive disorder      Depressive disorder, not elsewhere classified 07/30/2012     Gastro-oesophageal reflux disease      Hearing problem      Hypertension 2008     Learning disability      Neck mass     parapharyngeal, benign     Obesity, Class III, BMI 40-49.9 (morbid obesity) (H)      Obstructive sleep apnea      Psychological factors associated with another disorder 08/21/2012     Recurrent otitis media 2016    might be what the pain in my ears     Reduced vision      Tinnitus 2016    I would hear a steady light buzzing sound and other can't     Trigeminal neuralgia      Weakness 08/26/2011       Active problem list:  Patient Active Problem List   Diagnosis     Trigeminal neuralgia     VERN (obstructive sleep apnea)     Idiopathic edema     Depressive disorder, not elsewhere classified     Subglottic stenosis     Hyperlipidemia associated with  prediabetes     Idiopathic progressive polyneuropathy     Varicose veins of left lower extremity     Pain in both wrists     Chronic pain of both shoulders     Neck pain, chronic     Seasonal allergies     Osteoarthritis of spine with radiculopathy, lumbar region     Morbid obesity (H)     Condyloma acuminata     Hypokalemia     Poor dentition     Benign positional vertigo     Dyspnea, unspecified type     Insomnia due to medical condition     Bilateral leg pain     Skin pain     Facial rash (possible roseacea?)     Seborrheic dermatitis of scalp     Intertrigo of adipose folds     Sternocostal pain     Prediabetes       FH:  Family History   Adopted: Yes   Problem Relation Age of Onset     Family History Negative Other         Does not know family     Unknown/Adopted No family hx of        SH:  Social History     Socioeconomic History     Marital status: Single     Spouse name: Not on file     Number of children: Not on file     Years of education: Not on file     Highest education level: Not on file   Occupational History     Not on file   Tobacco Use     Smoking status: Never     Smokeless tobacco: Never     Tobacco comments:     Never started   Substance and Sexual Activity     Alcohol use: No     Alcohol/week: 0.0 standard drinks     Comment: rarely     Drug use: No     Sexual activity: Never   Other Topics Concern     Parent/sibling w/ CABG, MI or angioplasty before 65F 55M? Not Asked      Service Not Asked     Blood Transfusions Not Asked     Caffeine Concern Not Asked     Occupational Exposure Not Asked     Hobby Hazards Not Asked     Sleep Concern Not Asked     Stress Concern Not Asked     Weight Concern Not Asked     Special Diet Not Asked     Back Care Not Asked     Exercise Yes     Comment: 2-3x/wk     Bike Helmet Not Asked     Seat Belt Not Asked     Self-Exams Not Asked   Social History Narrative    Room 'no bigger than a correction cell,' the friend who owns the house recently got , and goes  back and forth. He has been put in adult foster care before. The patient is single.  Has 1 child. Pt adopted at age 14.      Social Determinants of Health     Financial Resource Strain: Not on file   Food Insecurity: Not on file   Transportation Needs: Not on file   Physical Activity: Not on file   Stress: Not on file   Social Connections: Not on file   Intimate Partner Violence: Not on file   Housing Stability: Not on file       MEDS:  See EMR, reviewed  ALL:  See EMR, reviewed    REVIEW OF SYSTEMS:  CONSTITUTIONAL:NEGATIVE for fever, chills, change in weight  INTEGUMENTARY/SKIN: NEGATIVE for worrisome rashes, moles or lesions  EYES: NEGATIVE for vision changes or irritation  ENT/MOUTH: NEGATIVE for ear, mouth and throat problems  RESP:NEGATIVE for significant cough or SOB  BREAST: NEGATIVE for masses, tenderness or discharge  CV: NEGATIVE for chest pain, palpitations or peripheral edema  GI: NEGATIVE for nausea, abdominal pain, heartburn, or change in bowel habits  :NEGATIVE for frequency, dysuria, or hematuria  :NEGATIVE for frequency, dysuria, or hematuria  NEURO: NEGATIVE for weakness, dizziness or paresthesias  ENDOCRINE: NEGATIVE for temperature intolerance, skin/hair changes  HEME/ALLERGY/IMMUNE: NEGATIVE for bleeding problems  PSYCHIATRIC: NEGATIVE for changes in mood or affect      Objective: Above ideal weight.  He has full passive range of motion at the shoulder with no signs of a frozen shoulder.  Nontender over the AC joint.  Mildly tender over the anterior cuff, nontender over the biceps tendon.  He has 5 out of 5 strength bilaterally at deltoid, supraspinatus, infraspinatus and subscapularis.  [place]'s test is negative.  Head forward, shoulder forward posture.  No scapular winging.  No effusion at the right shoulder.  Overlying skin is normal.  Appropriate conversation affect.      I personally reviewed with the patient x-rays and MRI of his shoulder    Assessment: Right shoulder impingement  symptoms, recurrent with a history of rotator cuff tendinitis    Plan: I explained the importance of shoulder dynamics and focus on the posterior of the shoulder, scapular stabilization, postural cues, rotator cuff.  He understands these are specific exercises that can be done at home and do not take special equipment.  But it takes 4-5 visits specific to the shoulder for the patient to learn them and once he learns them he understands that have to be maintenance exercises that are focused on 4 days out of the week.  They take 15 minutes to do.  He would look for improvements with this protocol.  He would like to see [facility] for physical therapy.  He declines cortisone injection.    Again, thank you for allowing me to participate in the care of your patient.      Sincerely,    Otto Dunne MD

## 2022-10-24 NOTE — TELEPHONE ENCOUNTER
DIAGNOSIS: osteoarthritis of spine with radiculopathy   APPOINTMENT DATE: 10.27.22   NOTES STATUS DETAILS   OFFICE NOTE from other specialist Internal 8.9.22 Dr Chavez Milligan, [facility]  7.21.22 5.11.22  3.16.22   MEDICATION LIST Internal    LABS     CBC/DIFF Internal    MRI PACS 6.18.22 lumbar spine, MR   XRAYS (IMAGES & REPORTS) Internal 3.16.22 lumbar spine  9.25.12 lumbar spine

## 2022-10-25 NOTE — TELEPHONE ENCOUNTER
Called and LVM for Pt requesting a return call to clinic to discuss appt. Clinic telephone provided.    Need to clarify reason for visit. Obtain information on changes and sxs and then discuss repeat imaging and clinic F/U.    Nieves Garcia LPN

## 2022-10-26 ENCOUNTER — TELEPHONE (OUTPATIENT)
Dept: INTERVENTIONAL RADIOLOGY/VASCULAR | Facility: CLINIC | Age: 52
End: 2022-10-26

## 2022-10-26 NOTE — TELEPHONE ENCOUNTER
Returned Pt call to clinic.  LVM requesting a return call to discuss. Clinic telephone provided.     Nieves Garcia LPN

## 2022-10-26 NOTE — TELEPHONE ENCOUNTER
Pt returned call to clinic. Discussed that he was previously treated for varicose veins and the sxs in his legs have returned.  He is looking to get in for an evaluation and be assessed for treatment.  No official referral on file. He doesn't believe that one is required from his insurance, but he is planning on discussing this with his PCP at an upcoming appt. Discussed that currently [facility] location is on a pause for vein treatments.  He has been wearing prescription compression stockings daily since prescribed, so no trial would be needed. Provided Pt option of being seen for eval and waiting until treatments resume, or he could be evaluated and treated at alternate site.  Discussed locations.  He would prefer [facility].  Pt provided with clinic telephone. Pt verbalized understanding, agreed to current plan and denied any further questions.    Nieves Garcia LPN

## 2022-10-27 ENCOUNTER — PRE VISIT (OUTPATIENT)
Dept: ORTHOPEDICS | Facility: CLINIC | Age: 52
End: 2022-10-27

## 2022-10-27 ASSESSMENT — ENCOUNTER SYMPTOMS
SPEECH CHANGE: 1
POSTURAL DYSPNEA: 1
STIFFNESS: 1
TROUBLE SWALLOWING: 1
ARTHRALGIAS: 1
HEADACHES: 0
SMELL DISTURBANCE: 0
TASTE DISTURBANCE: 0
JOINT SWELLING: 0
FEVER: 0
INSOMNIA: 1
SPUTUM PRODUCTION: 1
VOMITING: 0
DIZZINESS: 1
BOWEL INCONTINENCE: 0
DIARRHEA: 0
NAIL CHANGES: 0
MEMORY LOSS: 1
SINUS CONGESTION: 1
CONSTIPATION: 1
RECTAL PAIN: 0
MUSCLE WEAKNESS: 1
INCREASED ENERGY: 1
EYE REDNESS: 1
EYE PAIN: 0
SYNCOPE: 0
DYSPNEA ON EXERTION: 1
JAUNDICE: 0
ABDOMINAL PAIN: 0
SHORTNESS OF BREATH: 1
HYPOTENSION: 0
SEIZURES: 0
ORTHOPNEA: 1
POLYPHAGIA: 0
SINUS PAIN: 1
DISTURBANCES IN COORDINATION: 0
BLOOD IN STOOL: 0
LOSS OF CONSCIOUSNESS: 0
EYE WATERING: 1
BLOATING: 0
WEAKNESS: 1
COUGH DISTURBING SLEEP: 1
POOR WOUND HEALING: 0
NUMBNESS: 1
WEIGHT GAIN: 1
NERVOUS/ANXIOUS: 1
DEPRESSION: 1
SKIN CHANGES: 0
BACK PAIN: 1
MUSCLE CRAMPS: 1
HYPERTENSION: 1
TINGLING: 1
HEARTBURN: 0
MYALGIAS: 1
EYE IRRITATION: 1
HOARSE VOICE: 0
EXERCISE INTOLERANCE: 1
SORE THROAT: 1
DOUBLE VISION: 0
COUGH: 1
LEG PAIN: 1
WHEEZING: 1
HEMOPTYSIS: 0
WEIGHT LOSS: 0
NAUSEA: 0
ALTERED TEMPERATURE REGULATION: 1
FATIGUE: 1
PALPITATIONS: 0
NIGHT SWEATS: 1
POLYDIPSIA: 1
SNORES LOUDLY: 1
HALLUCINATIONS: 0
CHILLS: 0
LIGHT-HEADEDNESS: 1
SLEEP DISTURBANCES DUE TO BREATHING: 1
PARALYSIS: 0
DECREASED CONCENTRATION: 1
NECK MASS: 0
PANIC: 0
TREMORS: 0
DECREASED APPETITE: 1
NECK PAIN: 1

## 2022-11-03 ENCOUNTER — TELEPHONE (OUTPATIENT)
Dept: INTERNAL MEDICINE | Facility: CLINIC | Age: 52
End: 2022-11-03

## 2022-11-03 NOTE — TELEPHONE ENCOUNTER
Spoke to the patient and schedule appointment with Dr Milligan for 11/16/22 at 9:00 AM confirmed by patient. Ok'd BO slot per ALEJANDRA Gil.    Brooklyn Stovall, Clinic , 11/03/22 2:06 PM

## 2022-11-07 DIAGNOSIS — M47.26 OSTEOARTHRITIS OF SPINE WITH RADICULOPATHY, LUMBAR REGION: Primary | ICD-10-CM

## 2022-11-10 ENCOUNTER — ANCILLARY PROCEDURE (OUTPATIENT)
Dept: GENERAL RADIOLOGY | Facility: CLINIC | Age: 52
End: 2022-11-10
Attending: ORTHOPAEDIC SURGERY
Payer: COMMERCIAL

## 2022-11-10 ENCOUNTER — OFFICE VISIT (OUTPATIENT)
Dept: ORTHOPEDICS | Facility: CLINIC | Age: 52
End: 2022-11-10
Payer: COMMERCIAL

## 2022-11-10 VITALS — WEIGHT: 315 LBS | BODY MASS INDEX: 49.44 KG/M2 | HEIGHT: 67 IN

## 2022-11-10 DIAGNOSIS — G89.29 CHRONIC MIDLINE LOW BACK PAIN WITHOUT SCIATICA: ICD-10-CM

## 2022-11-10 DIAGNOSIS — M47.26 OSTEOARTHRITIS OF SPINE WITH RADICULOPATHY, LUMBAR REGION: ICD-10-CM

## 2022-11-10 DIAGNOSIS — E66.01 CLASS 3 SEVERE OBESITY WITH SERIOUS COMORBIDITY AND BODY MASS INDEX (BMI) OF 50.0 TO 59.9 IN ADULT, UNSPECIFIED OBESITY TYPE (H): Primary | ICD-10-CM

## 2022-11-10 DIAGNOSIS — R06.00 DYSPNEA, UNSPECIFIED TYPE: ICD-10-CM

## 2022-11-10 DIAGNOSIS — E66.813 CLASS 3 SEVERE OBESITY WITH SERIOUS COMORBIDITY AND BODY MASS INDEX (BMI) OF 50.0 TO 59.9 IN ADULT, UNSPECIFIED OBESITY TYPE (H): Primary | ICD-10-CM

## 2022-11-10 DIAGNOSIS — M54.50 CHRONIC MIDLINE LOW BACK PAIN WITHOUT SCIATICA: ICD-10-CM

## 2022-11-10 PROCEDURE — 94729 DIFFUSING CAPACITY: CPT | Performed by: INTERNAL MEDICINE

## 2022-11-10 PROCEDURE — 72082 X-RAY EXAM ENTIRE SPI 2/3 VW: CPT | Performed by: STUDENT IN AN ORGANIZED HEALTH CARE EDUCATION/TRAINING PROGRAM

## 2022-11-10 PROCEDURE — 94060 EVALUATION OF WHEEZING: CPT | Performed by: INTERNAL MEDICINE

## 2022-11-10 PROCEDURE — 99204 OFFICE O/P NEW MOD 45 MIN: CPT | Performed by: ORTHOPAEDIC SURGERY

## 2022-11-10 PROCEDURE — 94726 PLETHYSMOGRAPHY LUNG VOLUMES: CPT | Performed by: INTERNAL MEDICINE

## 2022-11-10 NOTE — PROGRESS NOTES
In-Person Visit    REASON FOR CONSULTATION: Consult (OA of back. )     REFERRING PHYSICIAN: Referred Self  PCP:Chavez Milligan    History of Present Illness:  52 year old male, referred by PCP (Dr. Milligan) for chronic LBP.    Per patient, has had LBP for many years (at least 10); that he had brought it up numerous times with different doctors, but feels it has largely been ignored, and was told to focus on other problems first.  He said he had x-rays in 2012, and had PT in [facility] in [place].    Back 50%, Leg 50%,  Right = Left, mainly around the knees.  Previously 80% back, 20% leg.  Also previously R>L , but now the left has caught up.  Worse: anything makes it worse.  Standing is the worst, but cannot sit for long periods either.  Better: Has to lean on something or hold on to something.  Has to sleep sitting up.    Previous treatment:   Had done some courses of PT.  Last PT was 2 yrs ago ([facility] in [place]).  Per patient, made things worse.  No previous spine injections.  No previous surgeries.  OTC tylenol.  Back in July, his PCP gave him tramadol 50 mg/tab x 1 month.    Also going to a weight mgmt clinic here at [facility] (since 2011).  At one point, they were recommending bariatric surgery; but this was not done, and now they're telling him that he is very high risk.  His next appointment with them is on 1/27/2023 (Dr. Quinn, Endocrinology).  On topiramate, phenermine, lasix, etc.    Also sees ENT (here at [facility], Dr. Glasgow) for severe sleep apnea.  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    Also previously diagnosed with neuropathy of both feet and later both hands (diagnosed ~ 2011/2012).    PSHx:  Currently not working.  On Disability, for various medical issues.  Lives with a couple of friends.  Nonsmoker.    Oswestry (RUPESH) Questionnaire    OSWESTRY DISABILITY INDEX 10/27/2022   Count 10   Sum 39   Oswestry Score (%) 78   Some recent data might be hidden        Visual Analog Pain  Scale  Back Pain Scale 0-10: 8  Right leg pain: 0  Left leg pain: 0    PROMIS-10 Scores  Global Mental Health Score: (P) 7  Global Physical Health Score: (P) 5  PROMIS TOTAL - SUBSCORES: (P) 12    ROS:  A 12-point review of systems was completed and is negative except for otherwise noted above in the history of present illness.    Med Hx:  Past Medical History:   Diagnosis Date     Allergic rhinitis      Benign positional vertigo 2011    never quite went away     Bilateral carpal tunnel syndrome 07/18/2015     Chronic sinusitis 2017    I don't know Please look     Chronic tonsillitis      Depressive disorder      Depressive disorder, not elsewhere classified 07/30/2012     Gastro-oesophageal reflux disease      Hearing problem      Hypertension 2008     Learning disability      Neck mass     parapharyngeal, benign     Obesity, Class III, BMI 40-49.9 (morbid obesity) (H)      Obstructive sleep apnea      Psychological factors associated with another disorder 08/21/2012     Recurrent otitis media 2016    might be what the pain in my ears     Reduced vision      Tinnitus 2016    I would hear a steady light buzzing sound and other can't     Trigeminal neuralgia      Weakness 08/26/2011       Surg Hx:  Past Surgical History:   Procedure Laterality Date     BIOPSY  2011    To figure out what kind of tumor I had     COLONOSCOPY N/A 10/16/2020    Procedure: INCOMPLETE COLONOSCOPY;  Surgeon: Ham Cherry MD;  [facility]     ENDOSCOPIC RETROGRADE CHOLANGIOPANCREATOGRAM N/A 8/27/2015    Procedure: COMBINED ENDOSCOPIC RETROGRADE CHOLANGIOPANCREATOGRAPHY, PLACE TUBE/STENT;  Surgeon: Baldomero Zacarias MD;  [facility]     ENDOSCOPIC RETROGRADE CHOLANGIOPANCREATOGRAM N/A 11/6/2015    Procedure: COMBINED ENDOSCOPIC RETROGRADE CHOLANGIOPANCREATOGRAPHY, REMOVE FOREIGN BODY OR STENT/TUBE;  Surgeon: Baldomero Zacarias MD;  Location:  OR     EXCISE LESION INTRAORAL  6/29/2011    Procedure:EXCISE LESION  INTRAORAL; TRANSCERVICAL APPROACH TO LEFT PHARYNGEAL SPACE MASS REMOVAL ; Surgeon:BARBARA PHILLIPS; Location:[facility] OR      VASCULAR SURGERY PROCEDURE UNLIST  12/29/15     LAPAROSCOPIC CHOLECYSTECTOMY N/A 8/23/2015    Procedure: LAPAROSCOPIC CHOLECYSTECTOMY;  Surgeon: Kvng Witt MD;  [facility] OR     LARYNGOSCOPY WITH BIOPSY(IES)  6/18/2014    Procedure: LARYNGOSCOPY WITH BIOPSY(IES);  Surgeon: Barbara Phillips MD;  [facility] OR     LASER CO2 LARYNGOSCOPY  12/7/2011    Procedure:LASER CO2 LARYNGOSCOPY; Micro-Direct Laryngoscopy with CO2 Laser Standby / Excision Tracheal Grandulization, Trach Tube Change / Kenalog application. / Balloon Dilation of Subglottic Stenosis.*Latex Safe Room* Trach Tube = Shiley 6.4mm I.D. / 10.8MM O.D. / 76MM  Cuffless.; Surgeon:BARBARA PHILLIPS; [facility] OR     ORTHOPEDIC SURGERY  5/2016    Broken Right hand & Fracture Right shoulder     TRACHEOSTOMY  4/22/2011    Procedure:TRACHEOSTOMY; possible awake; Surgeon:BARBARA PHILLIPS; [facility] OR     TRACHEOSTOMY  6/29/2011    Procedure:TRACHEOSTOMY; REMOVE AND REPLACE SHILEY 6 XLT; Surgeon:BARBARA PHILLIPS; [facility] OR     VASCULAR SURGERY  2008[facility]       Allergies:  Allergies   Allergen Reactions     Fentanyl Itching     Patch     Meloxicam Other (See Comments)     Side pains.     Minocycline Rash     Patient reports adverse reaction was pigmentation which has resolved with drug discontinuation.       Meds:  Current Outpatient Medications   Medication     amitriptyline (ELAVIL) 100 MG tablet     CETAPHIL (CETAPHIL) lotion     COMPRESSION STOCKINGS     furosemide (LASIX) 20 MG tablet     ketoconazole (NIZORAL) 2 % external shampoo     naltrexone (DEPADE/REVIA) 50 MG tablet     order for DME     order for DME     order for DME     phentermine (ADIPEX-P) 37.5 MG tablet     topiramate (TOPAMAX) 100 MG tablet     traMADol (ULTRAM) 50 MG tablet     traZODone (DESYREL)  "100 MG tablet     zolpidem (AMBIEN) 10 MG tablet     No current facility-administered medications for this visit.       Fam Hx:  Family History   Adopted: Yes   Problem Relation Age of Onset     Family History Negative Other         Does not know family     Unknown/Adopted No family hx of        P/S Hx:  Social History     Tobacco Use     Smoking status: Never     Smokeless tobacco: Never     Tobacco comments:     Never started   Substance Use Topics     Alcohol use: No     Alcohol/week: 0.0 standard drinks     Comment: rarely         Physical Exam:  Very pleasant, alert, oriented x 3, cooperative.  Normal mood and affect.   Ht 1.702 m (5' 7.01\")   Wt (!) 159.7 kg (352 lb)   BMI 55.12 kg/m    Per patient, the above weight was taken a couple of clinic visits ago (sometime August 2022), and that he likely had gained even more weight since then.  Per patient, he broke his cane.  He also has a walker at home but did not bring it today.  Able to ambulate independently; however, with slow and uncomfortable gait.  Also easily gets short of breath.  Even while talking to him in clinic, he gets short of breath easily.  He also showed me a scar on his anterior neck from his previous tracheostomy.    Localizes pain at entire lumbar region, also with significant pain over both knees.  Back range of motion not assessed.    Neuro Exam:  Motor: At least 4/5 strength for all muscle groups in both lower extremities, symmetric, perhaps limited by pain and significant soft tissue size.  Sensory: Reports sensory deficit over the plantar aspect of both feet, more consistent with neuropathy.    Imaging:    Lumbar MRI 6/18/22: Some degenerative disc changes.  However, no evidence of fracture, instability, stenosis, or any form of nerve compression.  Overall, I did not appreciate any pathology that would warrant surgical treatment at this time.    EOS full spine AP lateral standing x-rays taken today reviewed.  Images somewhat obscured " by significant soft tissue due to patient body habitus.  No significant coronal malalignment or imbalance.  In the sagittal plane, there is some lower lumbar flatback deformity, but overall no fractures, no spondylolisthesis, no significant pathology warranting surgical treatment.    Assessment:    1.  Chronic axial low back pain.  2.  Class III obesity (BMI 55.12).  3.  Significant medical comorbidities, including severe sleep apnea, shortness of breath, history of tracheostomy, neuropathy, etc.    Plan:    Had a good long discussion with patient.  I reassured him regarding my clinical and radiographic findings.  I do not appreciate any significant pathology that would warrant nor benefit from any form of surgical treatment I could offer at this time.  Needless to say, he would also be very high risk for any form of surgery, from a medical standpoint.  At this point, I recommend comprehensive nonoperative management.  I recommend that he establish a relationship with the medical spine specialist team.  He lives in [place], so [place] may be a good place for him to go; [facility] has a team of medical spine specialists over there.  I explained to him that the medical spine specialists also treat spine problems, and have many different treatment options at their disposal, short of surgery.  These may include various forms of PT and exercise, injections, medication, spinal cord stimulator, alternative treatments such as acupuncture etc.  I also encouraged him to continue working with the weight management clinic.  I truly believe that his significant weight is contributing a lot to his low back symptoms.  If he is able to wait, I believe this would also help with his back symptoms.    Patient amenable.  - Medical spine referral placed ([place]).    Return to clinic as needed.  45 minutes spent on the date of the encounter doing chart review/review of outside records/review of test results/interpretation of  tests/patient visit/documentation/discussion with other provider(s)/discussion with patient and family.    Jesse Franco MD    Orthopaedic Spine Surgery  Dept Orthopaedic Surgery, [facility]  [phone] office, [phone] pager  [email or web address]

## 2022-11-10 NOTE — LETTER
11/10/2022         RE: Chavez Ca  [address]  [place] [address]        Dear Colleague,    Thank you for referring your patient, Chavez Ca, to the [facility]. Please see a copy of my visit note below.    In-Person Visit    REASON FOR CONSULTATION: Consult (OA of back. )     REFERRING PHYSICIAN: Referred Self  PCP:Chavez Milligan    History of Present Illness:  52 year old male, referred by PCP (Dr. Milligan) for chronic LBP.    Per patient, has had LBP for many years (at least 10); that he had brought it up numerous times with different doctors, but feels it has largely been ignored, and was told to focus on other problems first.  He said he had x-rays in 2012, and had PT in [facility] in [place].    Back 50%, Leg 50%,  Right = Left, mainly around the knees.  Previously 80% back, 20% leg.  Also previously R>L , but now the left has caught up.  Worse: anything makes it worse.  Standing is the worst, but cannot sit for long periods either.  Better: Has to lean on something or hold on to something.  Has to sleep sitting up.    Previous treatment:   Had done some courses of PT.  Last PT was 2 yrs ago ([facility] in [place]).  Per patient, made things worse.  No previous spine injections.  No previous surgeries.  OTC tylenol.  Back in July, his PCP gave him tramadol 50 mg/tab x 1 month.    Also going to a weight mgmt clinic here at [facility] (since 2011).  At one point, they were recommending bariatric surgery; but this was not done, and now they're telling him that he is very high risk.  His next appointment with them is on 1/27/2023 (Dr. Quinn, Endocrinology).  On topiramate, phenermine, lasix, etc.    Also sees ENT (here at [facility], Dr. Glasgow) for severe sleep apnea.  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    Also previously diagnosed with neuropathy of both feet and later both hands (diagnosed ~ 2011/2012).    PSHx:  Currently  not working.  On Disability, for various medical issues.  Lives with a couple of friends.  Nonsmoker.    Oswestry (RUPESH) Questionnaire    OSWESTRY DISABILITY INDEX 10/27/2022   Count 10   Sum 39   Oswestry Score (%) 78   Some recent data might be hidden        Visual Analog Pain Scale  Back Pain Scale 0-10: 8  Right leg pain: 0  Left leg pain: 0    PROMIS-10 Scores  Global Mental Health Score: (P) 7  Global Physical Health Score: (P) 5  PROMIS TOTAL - SUBSCORES: (P) 12    ROS:  A 12-point review of systems was completed and is negative except for otherwise noted above in the history of present illness.    Med Hx:  Past Medical History:   Diagnosis Date     Allergic rhinitis      Benign positional vertigo 2011    never quite went away     Bilateral carpal tunnel syndrome 07/18/2015     Chronic sinusitis 2017    I don't know Please look     Chronic tonsillitis      Depressive disorder      Depressive disorder, not elsewhere classified 07/30/2012     Gastro-oesophageal reflux disease      Hearing problem      Hypertension 2008     Learning disability      Neck mass     parapharyngeal, benign     Obesity, Class III, BMI 40-49.9 (morbid obesity) (H)      Obstructive sleep apnea      Psychological factors associated with another disorder 08/21/2012     Recurrent otitis media 2016    might be what the pain in my ears     Reduced vision      Tinnitus 2016    I would hear a steady light buzzing sound and other can't     Trigeminal neuralgia      Weakness 08/26/2011       Surg Hx:  Past Surgical History:   Procedure Laterality Date     BIOPSY  2011    To figure out what kind of tumor I had     COLONOSCOPY N/A 10/16/2020    Procedure: INCOMPLETE COLONOSCOPY;  Surgeon: Ham Cherry MD;  [facility]     ENDOSCOPIC RETROGRADE CHOLANGIOPANCREATOGRAM N/A 8/27/2015    Procedure: COMBINED ENDOSCOPIC RETROGRADE CHOLANGIOPANCREATOGRAPHY, PLACE TUBE/STENT;  Surgeon: Baldomero Zacarias MD;  [facility] OR     ENDOSCOPIC  RETROGRADE CHOLANGIOPANCREATOGRAM N/A 11/6/2015    Procedure: COMBINED ENDOSCOPIC RETROGRADE CHOLANGIOPANCREATOGRAPHY, REMOVE FOREIGN BODY OR STENT/TUBE;  Surgeon: Baldomero Zacarias MD;  [facility] OR     EXCISE LESION INTRAORAL  6/29/2011    Procedure:EXCISE LESION INTRAORAL; TRANSCERVICAL APPROACH TO LEFT PHARYNGEAL SPACE MASS REMOVAL ; Surgeon:BARBARA PHILLIPS; Location: OR      VASCULAR SURGERY PROCEDURE UNLIST  12/29/15     LAPAROSCOPIC CHOLECYSTECTOMY N/A 8/23/2015    Procedure: LAPAROSCOPIC CHOLECYSTECTOMY;  Surgeon: Kvng Witt MD;  [facility] OR     LARYNGOSCOPY WITH BIOPSY(IES)  6/18/2014    Procedure: LARYNGOSCOPY WITH BIOPSY(IES);  Surgeon: Barbara Phillips MD;  Location:  OR     LASER CO2 LARYNGOSCOPY  12/7/2011    Procedure:LASER CO2 LARYNGOSCOPY; Micro-Direct Laryngoscopy with CO2 Laser Standby / Excision Tracheal Grandulization, Trach Tube Change / Kenalog application. / Balloon Dilation of Subglottic Stenosis.*Latex Safe Room* Trach Tube = Shiley 6.4mm I.D. / 10.8MM O.D. / 76MM  Cuffless.; Surgeon:BARBARA PHILLIPS; Location: OR     ORTHOPEDIC SURGERY  5/2016    Broken Right hand & Fracture Right shoulder     TRACHEOSTOMY  4/22/2011    Procedure:TRACHEOSTOMY; possible awake; Surgeon:BARBARA PHILLIPS; Location: OR     TRACHEOSTOMY  6/29/2011    Procedure:TRACHEOSTOMY; REMOVE AND REPLACE SHILEY 6 XLT; Surgeon:BARBARA PHILLIPS; Location: OR     VASCULAR SURGERY  2008-[facility]       Allergies:  Allergies   Allergen Reactions     Fentanyl Itching     Patch     Meloxicam Other (See Comments)     Side pains.     Minocycline Rash     Patient reports adverse reaction was pigmentation which has resolved with drug discontinuation.       Meds:  Current Outpatient Medications   Medication     amitriptyline (ELAVIL) 100 MG tablet     CETAPHIL (CETAPHIL) lotion     COMPRESSION STOCKINGS     furosemide (LASIX) 20 MG tablet      "ketoconazole (NIZORAL) 2 % external shampoo     naltrexone (DEPADE/REVIA) 50 MG tablet     order for DME     order for DME     order for DME     phentermine (ADIPEX-P) 37.5 MG tablet     topiramate (TOPAMAX) 100 MG tablet     traMADol (ULTRAM) 50 MG tablet     traZODone (DESYREL) 100 MG tablet     zolpidem (AMBIEN) 10 MG tablet     No current facility-administered medications for this visit.       Fam Hx:  Family History   Adopted: Yes   Problem Relation Age of Onset     Family History Negative Other         Does not know family     Unknown/Adopted No family hx of        P/S Hx:  Social History     Tobacco Use     Smoking status: Never     Smokeless tobacco: Never     Tobacco comments:     Never started   Substance Use Topics     Alcohol use: No     Alcohol/week: 0.0 standard drinks     Comment: rarely         Physical Exam:  Very pleasant, alert, oriented x 3, cooperative.  Normal mood and affect.   Ht 1.702 m (5' 7.01\")   Wt (!) 159.7 kg (352 lb)   BMI 55.12 kg/m    Per patient, the above weight was taken a couple of clinic visits ago (sometime August 2022), and that he likely had gained even more weight since then.  Per patient, he broke his cane.  He also has a walker at home but did not bring it today.  Able to ambulate independently; however, with slow and uncomfortable gait.  Also easily gets short of breath.  Even while talking to him in clinic, he gets short of breath easily.  He also showed me a scar on his anterior neck from his previous tracheostomy.    Localizes pain at entire lumbar region, also with significant pain over both knees.  Back range of motion not assessed.    Neuro Exam:  Motor: At least 4/5 strength for all muscle groups in both lower extremities, symmetric, perhaps limited by pain and significant soft tissue size.  Sensory: Reports sensory deficit over the plantar aspect of both feet, more consistent with neuropathy.    Imaging:    Lumbar MRI 6/18/22: Some degenerative disc changes.  " However, no evidence of fracture, instability, stenosis, or any form of nerve compression.  Overall, I did not appreciate any pathology that would warrant surgical treatment at this time.    EOS full spine AP lateral standing x-rays taken today reviewed.  Images somewhat obscured by significant soft tissue due to patient body habitus.  No significant coronal malalignment or imbalance.  In the sagittal plane, there is some lower lumbar flatback deformity, but overall no fractures, no spondylolisthesis, no significant pathology warranting surgical treatment.    Assessment:    1.  Chronic axial low back pain.  2.  Class III obesity (BMI 55.12).  3.  Significant medical comorbidities, including severe sleep apnea, shortness of breath, history of tracheostomy, neuropathy, etc.    Plan:    Had a good long discussion with patient.  I reassured him regarding my clinical and radiographic findings.  I do not appreciate any significant pathology that would warrant nor benefit from any form of surgical treatment I could offer at this time.  Needless to say, he would also be very high risk for any form of surgery, from a medical standpoint.  At this point, I recommend comprehensive nonoperative management.  I recommend that he establish a relationship with the medical spine specialist team.  He lives in [place], so [place] may be a good place for him to go; [facility] has a team of medical spine specialists over there.  I explained to him that the medical spine specialists also treat spine problems, and have many different treatment options at their disposal, short of surgery.  These may include various forms of PT and exercise, injections, medication, spinal cord stimulator, alternative treatments such as acupuncture etc.  I also encouraged him to continue working with the weight management clinic.  I truly believe that his significant weight is contributing a lot to his low back symptoms.  If he is able to wait, I believe  this would also help with his back symptoms.    Patient amenable.  - Medical spine referral placed ([place]).    Return to clinic as needed.  45 minutes spent on the date of the encounter doing chart review/review of outside records/review of test results/interpretation of tests/patient visit/documentation/discussion with other provider(s)/discussion with patient and family.    Jesse Franco MD    Orthopaedic Spine Surgery  Dept Orthopaedic Surgery, [facility]  [phone] office, [phone] pager  [email or web address]

## 2022-11-11 LAB
DLCOUNC-%PRED-PRE: 124 %
DLCOUNC-PRE: 32.67 ML/MIN/MMHG
DLCOUNC-PRED: 26.26 ML/MIN/MMHG
ERV-%PRED-PRE: -453 %
ERV-PRE: 0.53 L
ERV-PRED: -0.12 L
EXPTIME-PRE: 6.68 SEC
FEF2575-%PRED-POST: 143 %
FEF2575-%PRED-PRE: 61 %
FEF2575-POST: 4.55 L/SEC
FEF2575-PRE: 1.96 L/SEC
FEF2575-PRED: 3.16 L/SEC
FEFMAX-%PRED-PRE: 65 %
FEFMAX-PRE: 5.86 L/SEC
FEFMAX-PRED: 9.01 L/SEC
FEV1-%PRED-PRE: 68 %
FEV1-PRE: 2.37 L
FEV1FEV6-PRE: 77 %
FEV1FEV6-PRED: 80 %
FEV1FVC-PRE: 77 %
FEV1FVC-PRED: 79 %
FEV1SVC-PRE: 67 %
FEV1SVC-PRED: 75 %
FIFMAX-PRE: 3.73 L/SEC
FRCPLETH-%PRED-PRE: 62 %
FRCPLETH-PRE: 2.11 L
FRCPLETH-PRED: 3.37 L
FVC-%PRED-PRE: 70 %
FVC-PRE: 3.08 L
FVC-PRED: 4.4 L
IC-%PRED-PRE: 63 %
IC-PRE: 3.01 L
IC-PRED: 4.74 L
RVPLETH-%PRED-PRE: 72 %
RVPLETH-PRE: 1.58 L
RVPLETH-PRED: 2.17 L
TLCPLETH-%PRED-PRE: 78 %
TLCPLETH-PRE: 5.12 L
TLCPLETH-PRED: 6.52 L
VA-%PRED-PRE: 78 %
VA-PRE: 4.63 L
VC-%PRED-PRE: 76 %
VC-PRE: 3.54 L
VC-PRED: 4.63 L

## 2022-11-12 ENCOUNTER — MYC MEDICAL ADVICE (OUTPATIENT)
Dept: INTERNAL MEDICINE | Facility: CLINIC | Age: 52
End: 2022-11-12

## 2022-11-12 DIAGNOSIS — J38.6 SUBGLOTTIC STENOSIS: ICD-10-CM

## 2022-11-12 DIAGNOSIS — J45.20 MILD INTERMITTENT ASTHMA IN ADULT WITHOUT COMPLICATION: ICD-10-CM

## 2022-11-12 DIAGNOSIS — J38.3 PARADOXICAL VOCAL FOLD MOVEMENT: Primary | ICD-10-CM

## 2022-11-12 DIAGNOSIS — R06.00 DYSPNEA, UNSPECIFIED TYPE: ICD-10-CM

## 2022-11-12 PROBLEM — J45.909 REACTIVE AIRWAY DISEASE: Status: ACTIVE | Noted: 2022-11-12

## 2022-11-12 NOTE — ASSESSMENT & PLAN NOTE
[facility]  Hello,  Thanks for getting your pulmonary function tests before we meet this week. They were helpful. There was evidence of mild asthma, but I don't think it was enough to cause your intermittently severe symptoms. The subglottic stenosis probably plays a role, too. Each of your breathing attempts seemed to follow a different pattern.  I would like you to see a pulmonologist.   I also put in a request for you to see a speech (breathing) pathologist again. 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  Someone should contact you soon about this.    You have a number of medical issues, so I recommend a visit with me every 4 months or so. I will ask my coordinator to make appointments for March 2023 (i.e., 4 months after our appointment this week). If we need a sooner appointment, we can make it at our visit.

## 2022-11-12 NOTE — CONFIDENTIAL NOTE
Coordinators please contact patient and facilitate the speech therapy referral.  Please also make an appointment with me for March 2023.  (he will be coming here this week, but that's for a different thing).

## 2022-11-14 DIAGNOSIS — R06.02 SHORTNESS OF BREATH: Primary | ICD-10-CM

## 2022-11-14 DIAGNOSIS — J45.909 ASTHMA, UNSPECIFIED ASTHMA SEVERITY, UNSPECIFIED WHETHER COMPLICATED, UNSPECIFIED WHETHER PERSISTENT: ICD-10-CM

## 2022-11-14 NOTE — TELEPHONE ENCOUNTER
RECORDS RECEIVED FROM: internal    DATE RECEIVED: 12.2.22    NOTES STATUS DETAILS   OFFICE NOTE from referring provider internal  Chavez Milligan MD   OFFICE NOTE from other specialist     DISCHARGE SUMMARY from hospital     DISCHARGE REPORT from the ER     MEDICATION LIST internal     IMAGING  (NEED IMAGES AND REPORTS)     CT SCAN     CHEST XRAY (CXR) internal   In process  11.16.22, 7/21/22, 5.4.21    TESTS     PULMONARY FUNCTION TESTING (PFT) internal  11.10.22        Action 11/14/22 sv    Action Taken Message sent to  pool to help schedule CXR order

## 2022-11-16 ENCOUNTER — OFFICE VISIT (OUTPATIENT)
Dept: INTERNAL MEDICINE | Facility: CLINIC | Age: 52
End: 2022-11-16
Payer: COMMERCIAL

## 2022-11-16 ENCOUNTER — APPOINTMENT (OUTPATIENT)
Dept: LAB | Facility: CLINIC | Age: 52
End: 2022-11-16
Payer: COMMERCIAL

## 2022-11-16 ENCOUNTER — TELEPHONE (OUTPATIENT)
Dept: PULMONOLOGY | Facility: CLINIC | Age: 52
End: 2022-11-16

## 2022-11-16 ENCOUNTER — ANCILLARY PROCEDURE (OUTPATIENT)
Dept: GENERAL RADIOLOGY | Facility: CLINIC | Age: 52
End: 2022-11-16
Attending: PEDIATRICS
Payer: COMMERCIAL

## 2022-11-16 ENCOUNTER — OFFICE VISIT (OUTPATIENT)
Dept: DERMATOLOGY | Facility: CLINIC | Age: 52
End: 2022-11-16
Attending: PEDIATRICS
Payer: COMMERCIAL

## 2022-11-16 ENCOUNTER — LAB (OUTPATIENT)
Dept: LAB | Facility: CLINIC | Age: 52
End: 2022-11-16
Payer: COMMERCIAL

## 2022-11-16 VITALS
WEIGHT: 315 LBS | HEIGHT: 67 IN | BODY MASS INDEX: 49.44 KG/M2 | HEART RATE: 124 BPM | OXYGEN SATURATION: 98 % | SYSTOLIC BLOOD PRESSURE: 117 MMHG | DIASTOLIC BLOOD PRESSURE: 82 MMHG

## 2022-11-16 DIAGNOSIS — R06.00 DYSPNEA, UNSPECIFIED TYPE: ICD-10-CM

## 2022-11-16 DIAGNOSIS — L71.0 PERIORAL DERMATITIS: ICD-10-CM

## 2022-11-16 DIAGNOSIS — L91.8 ACROCHORDON: ICD-10-CM

## 2022-11-16 DIAGNOSIS — L71.9 ACNE ROSACEA: ICD-10-CM

## 2022-11-16 DIAGNOSIS — L71.9 ROSACEA: ICD-10-CM

## 2022-11-16 DIAGNOSIS — E78.5 HYPERLIPIDEMIA, UNSPECIFIED HYPERLIPIDEMIA TYPE: ICD-10-CM

## 2022-11-16 DIAGNOSIS — M79.642 BILATERAL HAND PAIN: ICD-10-CM

## 2022-11-16 DIAGNOSIS — M79.605 BILATERAL LEG PAIN: ICD-10-CM

## 2022-11-16 DIAGNOSIS — R73.03 PREDIABETES: ICD-10-CM

## 2022-11-16 DIAGNOSIS — M47.26 OSTEOARTHRITIS OF SPINE WITH RADICULOPATHY, LUMBAR REGION: ICD-10-CM

## 2022-11-16 DIAGNOSIS — J45.909 ASTHMA, UNSPECIFIED ASTHMA SEVERITY, UNSPECIFIED WHETHER COMPLICATED, UNSPECIFIED WHETHER PERSISTENT: ICD-10-CM

## 2022-11-16 DIAGNOSIS — L21.9 SEBORRHEIC DERMATITIS OF SCALP: Primary | ICD-10-CM

## 2022-11-16 DIAGNOSIS — R00.0 TACHYCARDIA: ICD-10-CM

## 2022-11-16 DIAGNOSIS — L82.0 SEBORRHEIC KERATOSIS, INFLAMED: ICD-10-CM

## 2022-11-16 DIAGNOSIS — L30.9 DERMATITIS: ICD-10-CM

## 2022-11-16 DIAGNOSIS — R06.02 SHORTNESS OF BREATH: ICD-10-CM

## 2022-11-16 DIAGNOSIS — M25.542 ARTHRALGIA OF BOTH HANDS: ICD-10-CM

## 2022-11-16 DIAGNOSIS — J45.40 MODERATE PERSISTENT ASTHMA IN ADULT WITHOUT COMPLICATION: Primary | ICD-10-CM

## 2022-11-16 DIAGNOSIS — A63.0 CONDYLOMA ACUMINATUM: ICD-10-CM

## 2022-11-16 DIAGNOSIS — M25.541 ARTHRALGIA OF BOTH HANDS: ICD-10-CM

## 2022-11-16 DIAGNOSIS — M79.604 BILATERAL LEG PAIN: ICD-10-CM

## 2022-11-16 DIAGNOSIS — M79.641 BILATERAL HAND PAIN: ICD-10-CM

## 2022-11-16 LAB
CHOLEST SERPL-MCNC: 196 MG/DL
CREAT UR-MCNC: 203 MG/DL
HDLC SERPL-MCNC: 52 MG/DL
LDLC SERPL CALC-MCNC: 117 MG/DL
MICROALBUMIN UR-MCNC: 179 MG/L
MICROALBUMIN/CREAT UR: 88.18 MG/G CR (ref 0–17)
NONHDLC SERPL-MCNC: 144 MG/DL
TRIGL SERPL-MCNC: 137 MG/DL

## 2022-11-16 PROCEDURE — 99214 OFFICE O/P EST MOD 30 MIN: CPT | Mod: 25 | Performed by: DERMATOLOGY

## 2022-11-16 PROCEDURE — 99214 OFFICE O/P EST MOD 30 MIN: CPT | Performed by: PEDIATRICS

## 2022-11-16 PROCEDURE — 73120 X-RAY EXAM OF HAND: CPT | Mod: RT | Performed by: RADIOLOGY

## 2022-11-16 PROCEDURE — 82043 UR ALBUMIN QUANTITATIVE: CPT | Performed by: PEDIATRICS

## 2022-11-16 PROCEDURE — 71046 X-RAY EXAM CHEST 2 VIEWS: CPT | Performed by: RADIOLOGY

## 2022-11-16 PROCEDURE — 17110 DESTRUCTION B9 LES UP TO 14: CPT | Performed by: DERMATOLOGY

## 2022-11-16 PROCEDURE — 36415 COLL VENOUS BLD VENIPUNCTURE: CPT | Performed by: PATHOLOGY

## 2022-11-16 PROCEDURE — 80061 LIPID PANEL: CPT | Performed by: PATHOLOGY

## 2022-11-16 RX ORDER — KETOCONAZOLE 20 MG/ML
SHAMPOO TOPICAL
Qty: 120 ML | Refills: 3 | Status: SHIPPED | OUTPATIENT
Start: 2022-11-16 | End: 2023-12-11

## 2022-11-16 RX ORDER — FLUOCINOLONE ACETONIDE 0.1 MG/ML
SOLUTION TOPICAL
Qty: 90 ML | Refills: 11 | Status: SHIPPED | OUTPATIENT
Start: 2022-11-16 | End: 2023-04-04

## 2022-11-16 RX ORDER — AZELAIC ACID 0.15 G/G
GEL TOPICAL
Qty: 50 G | Refills: 11 | Status: SHIPPED | OUTPATIENT
Start: 2022-11-16 | End: 2023-04-04

## 2022-11-16 RX ORDER — LEVALBUTEROL INHALATION SOLUTION 1.25 MG/3ML
1 SOLUTION RESPIRATORY (INHALATION) EVERY 4 HOURS PRN
Qty: 90 ML | Refills: 1 | Status: SHIPPED | OUTPATIENT
Start: 2022-11-16 | End: 2024-01-25

## 2022-11-16 ASSESSMENT — PAIN SCALES - GENERAL: PAINLEVEL: WORST PAIN (10)

## 2022-11-16 NOTE — PROGRESS NOTES
"Dear patient. Thank you for visiting with me. I want you to feel respected, understood, and empowered. \"Respect\" is valuing you as much as I would a close family member. \"Empowerment\" happens when you are fully informed, and can make the best possible decision for you.  Please ask me questions!  Challenge anything that is not clear.    Medical records are primarily used as memory aids for me and my colleagues. Things to know about my documentation style:  - The 'problem list' includes current symptoms or diagnoses, and some problems that are resolved but may return. I use the past medical history for problems not expected to return.  - I use single quotation marks for things that you or I said, when I want to clarify who was speaking.  - I use double quotation marks when copying a term from elsewhere in your records. Italics (besides here) may also denote a quotation.  If you have questions or concerns, please contact me; I will reply as soon as time allows.    Context    Chavez Ca is a 52 year old man, with concerns including:  Chief Complaint   Patient presents with     Follow Up     Pt here for follow up after seeing Ortho     PCP: Chavez Milligan   Visit type: overview of problems, with several issues identified by patient discussed within limits of available time    /82 (BP Location: Right arm, Patient Position: Sitting, Cuff Size: Adult Large)   Pulse (!) 124   Ht 1.702 m (5' 7.01\")   Wt (!) 169.6 kg (373 lb 14.4 oz)   SpO2 98%   BMI 58.54 kg/m      Problems and progress      Asthma  Tachycardia  Seemed a little short of breath today, similar to times before. Tachycardic upon arrival.  I had to reorder the SLP to include PVFM for sure, even though I am not sure about the presence of the diagnosis - so this is a rule-out.  Last echo 5/4/21 was normal.  He is not currently on a breathing medicine - he can't hold his breath long enough to take it. They tried xopenex nebulizer and he " did well with it.   OBJECTIVE: lungs tight, with modest-moderate total effort.  PFTs show low SVC and FVC and a normal FEV1/FVC, and improvement of FEV1 and FVC with albuterol.       ASSESSMENT: asthma, difficult with body habitus and restrictive lung disease. Tachycardia seems to be secondary to insufficient respiration. Suspect swallowed albuterol at times.  Needs to schedule the echo that I wrote in May.       PLAN: neb machine           Spine history  He had what sounds like a productive meeting with Dr. Franco. No surgery seems to be doable at this point, but a variety of options were mentioned. Referral to medical spine at [facility].  He is feeling favorable about this.       ASSESSMENT: Spinal arthritis       PLAN: defer to ortho colleagues. Will need improved asthma in order to improve physical activity.           Knee pain  Impaired mobility       SUBJECTIVE: left knee is much worse than before. Walks like a snail, often uses a borrowed wheelchair as a walker.  He had a scooter before, but had to sell it in order to get back here.       OBJECTIVE: cautious gait, nonspecfic knee tenderness.       ASSESSMENT:   Apparently I was delayed on ortho order because of the other things needed before.       PLAN: placed ortho referral. Advised this may be confused with other ortho goals for him           Hands hurt  Difficult to push cart in the store. Insufficient time to work on this, will obtain hand films. May need to be assessed by hand OT given disability.      Additional issues:    Fasting so will have labs done today.          CHRONIC CARE MANAGEMENT at [facility].             /\  (Paste with .JOY)        To expand view click this icon above .....:   ---------------------------------------------------------------------------------  PCP Srini ROBERTS n/a               Uses MyChart? delayed  Other [facility] team:  n/a    ----------------------------------------------------------------------------------  REGULAR VISIT FREQUENCY: n/a  ----------------------------------------------------------------------------------  PENDING ISSUES for team:           MED REFILL NOTES:       ----------------------------------------------------------------------------------  ONGOING REFERRALS to:    -- Weight management: endo/weight Dr. Fortino Chang    -- ENT and sleep apnea: Dr. Zafar Glasgow

## 2022-11-16 NOTE — PROGRESS NOTES
[facility] Dermatology Note  Encounter Date: Nov 16, 2022  Office Visit     Dermatology Problem List:  1. Seborrheic dermatitis  2. Rosacea  3. Perioral dermatitis secondary to topical steroid use  4. Acrochordon/inflamed seborrheic keratoses, inguinal folds  - s/p cryotherapy 08/17/20  5. Condyloma acuminatum   - s/p shave biopsy 08/17/20  6. Buttocks dermatitis - triamcinolone  ____________________________________________    Assessment & Plan:    # Condyloma acuminatum, inguinal folds, mons pubis  - Cryotherapy performed today (see procedure below)    # Seborrheic dermatitis, scalp. Chronic, flare.    - Continue ketoconazole 2% shampoo 3x weekly. Can use in body folds as well.   - Start fluocinolone 0.01% solution 10-15 drops nightly     # Rosacea, face and neck. Chronic, flare.   - Start azelaic acid 15% gel once daily. Can increase to BID if not too irritating.       Procedures Performed:   - Cryotherapy procedure note, location(s): Right inner thigh, the right inguinal fold, the right mons pubis, and the left inguinal fold. After verbal consent and discussion of risks and benefits including, but not limited to, dyspigmentation/scar, blister, and pain, 4 lesion(s) was(were) treated with 1-2 mm freeze border for 1-2 cycles with liquid nitrogen. Post cryotherapy instructions were provided.    Follow-up: 6-8 week(s) in-person, or earlier for new or changing lesions    Staff and Scribe:     Scribe Disclosure:  I, Juan Alfaro, am serving as a scribe to document services personally performed by Joe Gaona MD based on data collection and the provider's statements to me.     Provider Disclosure:   The documentation recorded by the scribe accurately reflects the services I personally performed and the decisions made by me.    Joe Gaona MD    Department of Dermatology  [facility]: Phone: [phone],  Fax:[phone]  [facility]: Phone: [phone] Fax: [phone]  ____________________________________________    CC: Derm Problem (Follow up for wart that returned on right groin area. Patient concerned about it being a sign of a potential diease. Patient also concerned about a rash that routinely returns along with his rosacea worsening.)    HPI:  Mr. Chavez Ca is a(n) 52 year old male who presents today as a return patient for a wart he's concerned about and his rosacea worsening. Last seen by Dr. Arellano on 8/17/2020, at which time patient underwent a shave biopsy on his left inguinal fold which returned as condyloma acuminatum. Patient also underwent cryotherapy for treatment of acrochordon or inflamed seborrheic keratoses. Finally, patient was started on triamcinolone ointment for treatment of dermatitis.     Today, patient reports wart in the groin area is getting larger and is painful. He states rosacea is comes and goes, but is generally getting worse. Addtionally, he states his scalp flakes with a yellowish substance when he mcmullen his hair.     Patient is otherwise feeling well, without additional skin concerns.    Labs Reviewed:  N/A    Physical Exam:  Vitals: There were no vitals taken for this visit.  SKIN: Focused examination of the groin and scalp was performed.  - Mild to moderate pink erythema and white flaky scale on the bilateral scalp   - Verrucous papules on the right inner thigh, the right inguinal fold, the right mons pubis, and the left inguinal fold.   - Few papules and pustules on the bilateral cheeks and neck.   - No other lesions of concern on areas examined.     Medications:  Current Outpatient Medications   Medication     amitriptyline (ELAVIL) 100 MG tablet     CETAPHIL (CETAPHIL) lotion     COMPRESSION STOCKINGS     furosemide (LASIX) 20 MG tablet     ketoconazole (NIZORAL) 2 % external shampoo     levalbuterol (XOPENEX)  1.25 MG/3ML neb solution     naltrexone (DEPADE/REVIA) 50 MG tablet     order for DME     order for DME     order for DME     phentermine (ADIPEX-P) 37.5 MG tablet     topiramate (TOPAMAX) 100 MG tablet     traMADol (ULTRAM) 50 MG tablet     traZODone (DESYREL) 100 MG tablet     zolpidem (AMBIEN) 10 MG tablet     No current facility-administered medications for this visit.      Past Medical History:   Patient Active Problem List   Diagnosis     Trigeminal neuralgia     VERN (obstructive sleep apnea)     Idiopathic edema     Depressive disorder, not elsewhere classified     Subglottic stenosis     Hyperlipidemia associated with prediabetes     Idiopathic progressive polyneuropathy     Varicose veins of left lower extremity     Pain in both wrists     Chronic pain of both shoulders     Neck pain, chronic     Seasonal allergies     Osteoarthritis of spine with radiculopathy, lumbar region     Morbid obesity (H)     Condyloma acuminata     Hypokalemia     Poor dentition     Benign positional vertigo     Dyspnea, multifactorial     Insomnia due to medical condition     Bilateral leg pain     Skin pain     Facial rash (possible roseacea?)     Seborrheic dermatitis of scalp     Intertrigo of adipose folds     Sternocostal pain     Prediabetes     Mild intermittent asthma in adult without complication     Past Medical History:   Diagnosis Date     Allergic rhinitis      Benign positional vertigo 2011    never quite went away     Bilateral carpal tunnel syndrome 07/18/2015     Chronic sinusitis 2017    I don't know Please look     Chronic tonsillitis      Depressive disorder      Depressive disorder, not elsewhere classified 07/30/2012     Gastro-oesophageal reflux disease      Hearing problem      Hypertension 2008     Learning disability      Neck mass     parapharyngeal, benign     Obesity, Class III, BMI 40-49.9 (morbid obesity) (H)      Obstructive sleep apnea      Psychological factors associated with another disorder  08/21/2012     Recurrent otitis media 2016    might be what the pain in my ears     Reduced vision      Tinnitus 2016    I would hear a steady light buzzing sound and other can't     Trigeminal neuralgia      Weakness 08/26/2011        CC Chavez Milligan MD  [address] [place] [phone] on close of this encounter.

## 2022-11-16 NOTE — LETTER
11/16/2022       RE: Chavez Ca  [address]  [place] [address]     Dear Colleague,    Thank you for referring your patient, Chavez Ca, to the [facility]ERMATOLOG[facility] [place] at [facility]. Please see a copy of my visit note below.    [facility] Dermatology Note  Encounter Date: Nov 16, 2022  Office Visit     Dermatology Problem List:  1. Seborrheic dermatitis  2. Rosacea  3. Perioral dermatitis secondary to topical steroid use  4. Acrochordon/inflamed seborrheic keratoses, inguinal folds  - s/p cryotherapy 08/17/20  5. Condyloma acuminatum   - s/p shave biopsy 08/17/20  6. Buttocks dermatitis - triamcinolone  ____________________________________________    Assessment & Plan:    # Condyloma acuminatum, inguinal folds, mons pubis  - Cryotherapy performed today (see procedure below)    # Seborrheic dermatitis, scalp. Chronic, flare.    - Continue ketoconazole 2% shampoo 3x weekly. Can use in body folds as well.   - Start fluocinolone 0.01% solution 10-15 drops nightly     # Rosacea, face and neck. Chronic, flare.   - Start azelaic acid 15% gel once daily. Can increase to BID if not too irritating.       Procedures Performed:   - Cryotherapy procedure note, location(s): Right inner thigh, the right inguinal fold, the right mons pubis, and the left inguinal fold. After verbal consent and discussion of risks and benefits including, but not limited to, dyspigmentation/scar, blister, and pain, 4 lesion(s) was(were) treated with 1-2 mm freeze border for 1-2 cycles with liquid nitrogen. Post cryotherapy instructions were provided.    Follow-up: 6-8 week(s) in-person, or earlier for new or changing lesions    Staff and Scribe:     Scribe Disclosure:  Juan FERRARI, am serving as a scribe to document services personally performed by Joe Gaona MD based on data collection and the provider's statements to me.      Provider Disclosure:   The documentation recorded by the scribe accurately reflects the services I personally performed and the decisions made by me.    Joe Gaona MD    Department of Dermatology  [facility]: Phone: [phone], Fax:[phone]  [facility]: Phone: [phone] Fax: [phone]  ____________________________________________    CC: Derm Problem (Follow up for wart that returned on right groin area. Patient concerned about it being a sign of a potential diease. Patient also concerned about a rash that routinely returns along with his rosacea worsening.)    HPI:  Mr. Chavez Ca is a(n) 52 year old male who presents today as a return patient for a wart he's concerned about and his rosacea worsening. Last seen by Dr. Arellano on 8/17/2020, at which time patient underwent a shave biopsy on his left inguinal fold which returned as condyloma acuminatum. Patient also underwent cryotherapy for treatment of acrochordon or inflamed seborrheic keratoses. Finally, patient was started on triamcinolone ointment for treatment of dermatitis.     Today, patient reports wart in the groin area is getting larger and is painful. He states rosacea is comes and goes, but is generally getting worse. Addtionally, he states his scalp flakes with a yellowish substance when he mcmullen his hair.     Patient is otherwise feeling well, without additional skin concerns.    Labs Reviewed:  N/A    Physical Exam:  Vitals: There were no vitals taken for this visit.  SKIN: Focused examination of the groin and scalp was performed.  - Mild to moderate pink erythema and white flaky scale on the bilateral scalp   - Verrucous papules on the right inner thigh, the right inguinal fold, the right mons pubis, and the left inguinal fold.   - Few papules and pustules on the bilateral cheeks and neck.   - No  other lesions of concern on areas examined.     Medications:  Current Outpatient Medications   Medication     amitriptyline (ELAVIL) 100 MG tablet     CETAPHIL (CETAPHIL) lotion     COMPRESSION STOCKINGS     furosemide (LASIX) 20 MG tablet     ketoconazole (NIZORAL) 2 % external shampoo     levalbuterol (XOPENEX) 1.25 MG/3ML neb solution     naltrexone (DEPADE/REVIA) 50 MG tablet     order for DME     order for DME     order for DME     phentermine (ADIPEX-P) 37.5 MG tablet     topiramate (TOPAMAX) 100 MG tablet     traMADol (ULTRAM) 50 MG tablet     traZODone (DESYREL) 100 MG tablet     zolpidem (AMBIEN) 10 MG tablet     No current facility-administered medications for this visit.      Past Medical History:   Patient Active Problem List   Diagnosis     Trigeminal neuralgia     VERN (obstructive sleep apnea)     Idiopathic edema     Depressive disorder, not elsewhere classified     Subglottic stenosis     Hyperlipidemia associated with prediabetes     Idiopathic progressive polyneuropathy     Varicose veins of left lower extremity     Pain in both wrists     Chronic pain of both shoulders     Neck pain, chronic     Seasonal allergies     Osteoarthritis of spine with radiculopathy, lumbar region     Morbid obesity (H)     Condyloma acuminata     Hypokalemia     Poor dentition     Benign positional vertigo     Dyspnea, multifactorial     Insomnia due to medical condition     Bilateral leg pain     Skin pain     Facial rash (possible roseacea?)     Seborrheic dermatitis of scalp     Intertrigo of adipose folds     Sternocostal pain     Prediabetes     Mild intermittent asthma in adult without complication     Past Medical History:   Diagnosis Date     Allergic rhinitis      Benign positional vertigo 2011    never quite went away     Bilateral carpal tunnel syndrome 07/18/2015     Chronic sinusitis 2017    I don't know Please look     Chronic tonsillitis      Depressive disorder      Depressive disorder, not elsewhere  classified 07/30/2012     Gastro-oesophageal reflux disease      Hearing problem      Hypertension 2008     Learning disability      Neck mass     parapharyngeal, benign     Obesity, Class III, BMI 40-49.9 (morbid obesity) (H)      Obstructive sleep apnea      Psychological factors associated with another disorder 08/21/2012     Recurrent otitis media 2016    might be what the pain in my ears     Reduced vision      Tinnitus 2016    I would hear a steady light buzzing sound and other can't     Trigeminal neuralgia      Weakness 08/26/2011        CC Chavez Milligan MD  [address] [place] [address] on close of this encounter.      Again, thank you for allowing me to participate in the care of your patient.      Sincerely,    Joe Gaona MD

## 2022-11-16 NOTE — NURSING NOTE
Chavez Ca is a 52 year old male that presents in clinic today for the following:     Chief Complaint   Patient presents with     Follow Up     Pt here for follow up after seeing Ortho       The patient's allergies and medications were reviewed. The patient's vitals were obtained without incident. The patient does not have any other questions for the provider.     Aleida Jerome EMT at 9:17 AM on 11/16/2022.  Primary Care Clinic: [phone]

## 2022-11-16 NOTE — PATIENT INSTRUCTIONS
For the scalp:   Continue the ketoconazole 2% shampoo three times weekly to scalp. Can use in body folds in shower as well.   At nighttime before bed, start the synalar solution (topical steroid) 10-15 drops to the scalp and let sit overnight.     For the rosacea:   Start azelaic acid 15% gel once daily. Can increase to up to twice daily if not too irritating.     Cryotherapy    What is it?  Use of a very cold liquid, such as liquid nitrogen, to freeze and destroy abnormal skin cells that need to be removed    What should I expect?  Tenderness and redness  A small blister that might grow and fill with dark purple blood. There may be crusting.  More than one treatment may be needed if the lesions do not go away.    How do I care for the treated area?  Gently wash the area with your hands when bathing.  Use a thin layer of Vaseline to help with healing. You may use a Band-Aid.   The area should heal within 7-10 days and may leave behind a pink or lighter color.   Do not use an antibiotic or Neosporin ointment.   You may take acetaminophen (Tylenol) for pain.     Call your doctor if you have:  Severe pain  Signs of infection (warmth, redness, cloudy yellow drainage, and or a bad smell)  Questions or concerns    Who should I call with questions?      [facility]: [phone]      [facility]: [phone]      For urgent needs outside of business hours call the [facility] at [phone] and ask for the dermatology resident on call

## 2022-11-16 NOTE — TELEPHONE ENCOUNTER
DIAGNOSIS: Bilateral knee/leg pain /Dr Milligan/ [facility] and Medicaid/ ortho con   APPOINTMENT DATE: 11.18.22   NOTES STATUS DETAILS   OFFICE NOTE from referring provider Internal 11.16.22 Srini  7.21.22   OFFICE NOTE from other specialist Internal 1.9.17 Ramona   12.29.16 Jose  11.30.16 Luke  8.6.14 Thierno  More in epic   MEDICATION LIST Internal    Ultrasound Internal 4.27.11 B leg   XRAYS (IMAGES & REPORTS) Internal 7.21.22 B knee  8.10.16 Lower extrem    1.9.17 L knee  12.29.16 L knee

## 2022-11-16 NOTE — NURSING NOTE
Dermatology Rooming Note    Chavez Ca's goals for this visit include:   Chief Complaint   Patient presents with     Derm Problem     Follow up for wart that returned on right groin area. Patient concerned about it being a sign of a potential diease. Patient also concerned about a rash that routinely returns.     Fazal Bey, EMT-B

## 2022-11-18 ENCOUNTER — PRE VISIT (OUTPATIENT)
Dept: ORTHOPEDICS | Facility: CLINIC | Age: 52
End: 2022-11-18

## 2022-12-02 ENCOUNTER — OFFICE VISIT (OUTPATIENT)
Dept: PULMONOLOGY | Facility: CLINIC | Age: 52
End: 2022-12-02
Attending: PEDIATRICS
Payer: COMMERCIAL

## 2022-12-02 ENCOUNTER — PRE VISIT (OUTPATIENT)
Dept: PULMONOLOGY | Facility: CLINIC | Age: 52
End: 2022-12-02

## 2022-12-02 VITALS
DIASTOLIC BLOOD PRESSURE: 99 MMHG | OXYGEN SATURATION: 98 % | HEIGHT: 67 IN | BODY MASS INDEX: 49.44 KG/M2 | RESPIRATION RATE: 17 BRPM | HEART RATE: 123 BPM | WEIGHT: 315 LBS | SYSTOLIC BLOOD PRESSURE: 147 MMHG

## 2022-12-02 DIAGNOSIS — E66.2 HYPOVENTILATION ASSOCIATED WITH OBESITY SYNDROME (H): ICD-10-CM

## 2022-12-02 DIAGNOSIS — J45.20 MILD INTERMITTENT ASTHMA IN ADULT WITHOUT COMPLICATION: ICD-10-CM

## 2022-12-02 DIAGNOSIS — G47.33 OSA (OBSTRUCTIVE SLEEP APNEA): ICD-10-CM

## 2022-12-02 DIAGNOSIS — R09.81 NASAL CONGESTION: Primary | ICD-10-CM

## 2022-12-02 DIAGNOSIS — R06.00 DYSPNEA, UNSPECIFIED TYPE: ICD-10-CM

## 2022-12-02 PROCEDURE — 93005 ELECTROCARDIOGRAM TRACING: CPT

## 2022-12-02 PROCEDURE — 99204 OFFICE O/P NEW MOD 45 MIN: CPT | Mod: GC | Performed by: STUDENT IN AN ORGANIZED HEALTH CARE EDUCATION/TRAINING PROGRAM

## 2022-12-02 PROCEDURE — G0463 HOSPITAL OUTPT CLINIC VISIT: HCPCS

## 2022-12-02 RX ORDER — CETIRIZINE HYDROCHLORIDE 10 MG/1
10 TABLET ORAL DAILY
Qty: 30 TABLET | Refills: 11 | Status: SHIPPED | OUTPATIENT
Start: 2022-12-02 | End: 2023-04-04

## 2022-12-02 RX ORDER — ALBUTEROL SULFATE 90 UG/1
2 AEROSOL, METERED RESPIRATORY (INHALATION) EVERY 4 HOURS PRN
Qty: 18 G | Refills: 11 | Status: SHIPPED | OUTPATIENT
Start: 2022-12-02 | End: 2023-12-11

## 2022-12-02 RX ORDER — FLUTICASONE PROPIONATE 50 MCG
1 SPRAY, SUSPENSION (ML) NASAL DAILY
Qty: 18.2 ML | Refills: 11 | Status: SHIPPED | OUTPATIENT
Start: 2022-12-02 | End: 2023-12-11

## 2022-12-02 ASSESSMENT — ENCOUNTER SYMPTOMS
NUMBNESS: 1
SMELL DISTURBANCE: 0
POSTURAL DYSPNEA: 1
NAIL CHANGES: 0
JOINT SWELLING: 1
VOMITING: 0
NIGHT SWEATS: 0
BLOOD IN STOOL: 0
TREMORS: 0
DEPRESSION: 1
ARTHRALGIAS: 1
FEVER: 0
BLOATING: 0
EXERCISE INTOLERANCE: 1
HEMOPTYSIS: 0
JAUNDICE: 0
SEIZURES: 0
DECREASED CONCENTRATION: 1
ORTHOPNEA: 1
SNORES LOUDLY: 1
POOR WOUND HEALING: 0
SHORTNESS OF BREATH: 1
DIARRHEA: 1
MUSCLE CRAMPS: 1
SORE THROAT: 1
MEMORY LOSS: 1
CHILLS: 0
NERVOUS/ANXIOUS: 1
DYSPNEA ON EXERTION: 1
SWOLLEN GLANDS: 0
MYALGIAS: 1
BRUISES/BLEEDS EASILY: 1
HOARSE VOICE: 0
INCREASED ENERGY: 1
NAUSEA: 0
HEARTBURN: 0
SINUS CONGESTION: 0
FATIGUE: 1
WEAKNESS: 1
WEIGHT GAIN: 1
BOWEL INCONTINENCE: 0
DIZZINESS: 1
HYPOTENSION: 0
SLEEP DISTURBANCES DUE TO BREATHING: 1
SPUTUM PRODUCTION: 1
PALPITATIONS: 1
LOSS OF CONSCIOUSNESS: 0
WEIGHT LOSS: 0
DISTURBANCES IN COORDINATION: 0
LEG PAIN: 1
POLYDIPSIA: 1
STIFFNESS: 1
SINUS PAIN: 1
PANIC: 0
DECREASED APPETITE: 0
CONSTIPATION: 1
TASTE DISTURBANCE: 0
HALLUCINATIONS: 0
HYPERTENSION: 1
RECTAL PAIN: 0
PARALYSIS: 0
NECK MASS: 1
COUGH DISTURBING SLEEP: 1
WHEEZING: 1
SPEECH CHANGE: 1
BACK PAIN: 1
SKIN CHANGES: 0
COUGH: 1
HEADACHES: 1
SYNCOPE: 0
TROUBLE SWALLOWING: 1
NECK PAIN: 1
MUSCLE WEAKNESS: 1
INSOMNIA: 1
LIGHT-HEADEDNESS: 1
TINGLING: 1
ABDOMINAL PAIN: 0
POLYPHAGIA: 0
ALTERED TEMPERATURE REGULATION: 1

## 2022-12-02 ASSESSMENT — PAIN SCALES - GENERAL: PAINLEVEL: NO PAIN (0)

## 2022-12-02 NOTE — NURSING NOTE
Chief Complaint   Patient presents with     Consult     New Dyspnea + Asthma     Medications reviewed and vital signs taken.   Ajit Bishop CMA

## 2022-12-02 NOTE — PROGRESS NOTES
[facility]   Pulmonary   Clinic Note 12/2/2022  Chavez Ca MRN: [id number]    Assessment & Plan      Untreated VERN and suspected obesity hypoventilation syndrome since 2017  Borderline elevated CO2 on recent BMP (28 in 5/2022)  Clinical history concerning for right-sided heart failure (peripheral edema, CRUZ, untreated sleep apnea)  5/2021 TTE with mildly enlarged RV/mild RV dysfunction, elevated RA pressure  Super morbid obesity  Nonspecific spirometry pattern but with significant bronchodilator response greater than 600 ml  Chronic rhinosinusitis  Chronic sinus tachycardia    The patient's overall clinical history is most concerning for possible right-sided heart failure/pulmonary hypertension in the setting of prior TTE showing mildly elevated RV pressures/RV dysfunction in 2021 with now significant worsening of symptoms over the last year, untreated VERN, and significant peripheral edema.  The patient also has chronic sinus tachycardia which could be related to underlying right-sided heart failure and repeat EKG in clinic today notable for no signs of arrhythmia/A. fib.  While his recent PFTs showed a nonspecific spirometry pattern he did have a very significant bronchodilator response which could be consistent with underlying reactive airways disease/asthma so we will empirically treat with as needed bronchodilator.  Some concern for obesity hypoventilation given borderline elevated CO2 on 5/22 BMP.  Deconditioning and super morbid obesity are certainly contributing to his respiratory distress.     -Patient will undergo repeat sleep study and ideally needs to be initiated on NIPPV for VERN/suspected OHS  -Repeat TTE (patient hoping to have this done on 12/9/2022 when he is here for a orthopedic appointment)  -We will prescribe as needed albuterol inhaler in addition to Xopenex nebulizer for possible asthma (patient instructed that if he develops palpitations with albuterol he will need to  use his Xopenex instead)  -Start Zyrtec and Flonase for chronic rhinosinusitis  -BMP, CBC with differential, NT proBNP, VBG today  -Referral to pulmonary hypertension clinic given prior TTE evidence of mild RV dysfunction/enlargement and significant worsening of symptoms over the last year while not being treated for VERN/OHS.    Patient seen & discussed w/  Dr. Montano, who agrees with the above assessment and plan.    Geraldo Paez MD  Pulmonary and Critical Care Medicine Fellow  2022          History of Present Illness:   52-year-old male with pertinent pulmonary history of untreated VERN since , super morbid obesity, chronic dyspnea presenting to pulmonary clinic for evaluation of dyspnea on exertion.  Patient was last seen by Dr. Amaro in  at which point in time it was thought that his dyspnea was multifactorial due to deconditioning, super morbid obesity.  His imaging and pulmonary function test and labs at that time did not suggest a primary pulmonary process at that time.    Today the patient now returns to clinic with substantial worsening of his shortness of breath over the last year and especially over the last month.  Prolonged sentences can lead to shortness of breath and he has significant shortness of breath on walking 10 to 15 feet.  He notes feeling lightheaded when he bends over.  He does have significant shortness of breath even with getting dressed.  He denies a prior history of asthma however he notes that his breathing felt slightly easier when he uses a albuterol nebulizer during his recent pulmonary function test.  He does report bilateral lower extremity swelling.  He denies any fevers, chills, night sweats, chest pain.  He plans to undergo sleep study as his prior sleep study has  and reestablish care with sleep medicine (sleep study currently scheduled for 2023).  He does report a significant history of nasal congestion for which he does not take any medications  for and he notes nightly postnasal drainage.  He has no ability to sleep flat and sleeps essentially at a 90 degree upright angle in a chair at night.    He is a never smoker, does not use e-cigarettes or vaporized products, he used to clean factories but denies any significant exposure to inhaled chemicals or other exposures.          Review of Symptoms:   10-point ROS reviewed, & found negative w/ exceptions noted in the HPI.          Past Medical History:     Past Medical History:   Diagnosis Date     Allergic rhinitis      Benign positional vertigo 2011    never quite went away     Bilateral carpal tunnel syndrome 07/18/2015     Chronic sinusitis 2017    I don't know Please look     Chronic tonsillitis      Depressive disorder      Depressive disorder, not elsewhere classified 07/30/2012     Gastro-oesophageal reflux disease      Hearing problem      Hypertension 2008     Learning disability      Neck mass     parapharyngeal, benign     Obesity, Class III, BMI 40-49.9 (morbid obesity) (H)      Obstructive sleep apnea      Psychological factors associated with another disorder 08/21/2012     Recurrent otitis media 2016    might be what the pain in my ears     Reduced vision      Tinnitus 2016    I would hear a steady light buzzing sound and other can't     Trigeminal neuralgia      Weakness 08/26/2011       Past Surgical History:   Procedure Laterality Date     BIOPSY  2011    To figure out what kind of tumor I had     COLONOSCOPY N/A 10/16/2020    Procedure: INCOMPLETE COLONOSCOPY;  Surgeon: Ham Cherry MD;  Location: [facility] OR     ENDOSCOPIC RETROGRADE CHOLANGIOPANCREATOGRAM N/A 8/27/2015    Procedure: COMBINED ENDOSCOPIC RETROGRADE CHOLANGIOPANCREATOGRAPHY, PLACE TUBE/STENT;  Surgeon: Baldomero Zacarias MD;  Location: [facility] OR     ENDOSCOPIC RETROGRADE CHOLANGIOPANCREATOGRAM N/A 11/6/2015    Procedure: COMBINED ENDOSCOPIC RETROGRADE CHOLANGIOPANCREATOGRAPHY, REMOVE FOREIGN BODY OR STENT/TUBE;  Surgeon:  Baldomero Zacarias MD;  Location:  OR     EXCISE LESION INTRAORAL  6/29/2011    Procedure:EXCISE LESION INTRAORAL; TRANSCERVICAL APPROACH TO LEFT PHARYNGEAL SPACE MASS REMOVAL ; Surgeon:BARBARA PHILLIPS; Location: OR      VASCULAR SURGERY PROCEDURE UNLIST  12/29/15     LAPAROSCOPIC CHOLECYSTECTOMY N/A 8/23/2015    Procedure: LAPAROSCOPIC CHOLECYSTECTOMY;  Surgeon: Kvng Witt MD;  Location: [facility] OR     LARYNGOSCOPY WITH BIOPSY(IES)  6/18/2014    Procedure: LARYNGOSCOPY WITH BIOPSY(IES);  Surgeon: Barbara Phillips MD;  Location:  OR     LASER CO2 LARYNGOSCOPY  12/7/2011    Procedure:LASER CO2 LARYNGOSCOPY; Micro-Direct Laryngoscopy with CO2 Laser Standby / Excision Tracheal Grandulization, Trach Tube Change / Kenalog application. / Balloon Dilation of Subglottic Stenosis.*Latex Safe Room* Trach Tube = Shiley 6.4mm I.D. / 10.8MM O.D. / 76MM  Cuffless.; Surgeon:BARBARA PHILLIPS; Location: OR     ORTHOPEDIC SURGERY  5/2016    Broken Right hand & Fracture Right shoulder     TRACHEOSTOMY  4/22/2011    Procedure:TRACHEOSTOMY; possible awake; Surgeon:BARBARA PHILLIPS; Location: OR     TRACHEOSTOMY  6/29/2011    Procedure:TRACHEOSTOMY; REMOVE AND REPLACE SHILEY 6 XLT; Surgeon:BARBARA PHILLIPS; Location: OR     VASCULAR SURGERY  2008-[facility]            Allergies:     Allergies   Allergen Reactions     Fentanyl Itching     Patch     Meloxicam Other (See Comments)     Side pains.     Minocycline Rash     Patient reports adverse reaction was pigmentation which has resolved with drug discontinuation.             Outpatient Medications:     amitriptyline (ELAVIL) 100 MG tablet, Take 1 tablet (100 mg) by mouth At Bedtime  azelaic acid (FINACIA) 15 % external gel, Apply once daily as needed for rash on the face  CETAPHIL (CETAPHIL) lotion, Apply topically 2 times daily  COMPRESSION STOCKINGS, 20-30 mmHg knee high compression stockings.  Measure and fit.   Style and brand per patient preference.  fluocinolone (SYNALAR) 0.01 % solution, Apply 10-15 drops to scalp at nighttime before bed as needed for scalp itch  furosemide (LASIX) 20 MG tablet, Take 3 tablets (60 mg) by mouth 2 times daily  ketoconazole (NIZORAL) 2 % external shampoo, APPLY TO AFFECTED AREA AND WASH OFF AFTER 5 MINUTES  levalbuterol (XOPENEX) 1.25 MG/3ML neb solution, Take 3 mLs (1.25 mg) by nebulization every 4 hours as needed for shortness of breath / dyspnea or wheezing  naltrexone (DEPADE/REVIA) 50 MG tablet, Take 1/2 tablet week 1 before supper; week 2 and after, take a full tablet then to help with portion reduction at supper and avoiding eating between supper and bed, as tolerated  order for DME, Equipment being ordered: compression stockings    Needs to be faxed to  order for DME, Compression stockings pair, patient preference    To be used 8 hours per day  order for DME, Equipment being ordered: compression stockings  phentermine (ADIPEX-P) 37.5 MG tablet, Take 1 tablet (37.5 mg) by mouth every morning  topiramate (TOPAMAX) 100 MG tablet, Take 2 tablets (200 mg) by mouth 2 times daily  traMADol (ULTRAM) 50 MG tablet, Take 1 po PRN recovery from pain at HS or after physical therapy. Do not take to enable activity.  traZODone (DESYREL) 100 MG tablet, Take 1 tablet (100 mg) by mouth At Bedtime Call clinic to schedule follow up appointment. [phone]  zolpidem (AMBIEN) 10 MG tablet, Take 1 tablet (10 mg) by mouth nightly as needed for sleep    No current facility-administered medications on file prior to visit.            Family History:     Family History   Adopted: Yes   Problem Relation Age of Onset     Family History Negative Other         Does not know family     Unknown/Adopted No family hx of                Social History:     Social History     Tobacco Use     Smoking status: Never     Smokeless tobacco: Never     Tobacco comments:     Never started   Substance Use Topics     Alcohol  "use: No     Alcohol/week: 0.0 standard drinks     Comment: rarely     Drug use: No             Physical Exam:   BP (!) 147/99   Pulse (!) 123   Resp 17   Ht 1.702 m (5' 7\")   Wt (!) 171.5 kg (378 lb)   SpO2 98%   BMI 59.20 kg/m      General: male in no acute distress  HENT: no stridor  Lungs: CTAB, no wheezing, no crackles, lung sounds distant due to adiposity, no accessory muscle use, but he is intermittently pausing to breath when talking  Heart: Tachycardic with regular rhythm  Abdomen: Obese  Extremities: 2+ pitting edema of bilateral lower extremities, no clubbing or cyanosis  Skin: no visible rashes, no mottling  Neurologic: moving all 4 extremities spontaneously, awake and alert          Data:   Pertinent data reviewed and discussed above    Answers for HPI/ROS submitted by the patient on 12/2/2022  General Symptoms: Yes  Skin Symptoms: Yes  HENT Symptoms: Yes  EYE SYMPTOMS: No  HEART SYMPTOMS: Yes  LUNG SYMPTOMS: Yes  INTESTINAL SYMPTOMS: Yes  URINARY SYMPTOMS: No  REPRODUCTIVE SYMPTOMS: No  SKELETAL SYMPTOMS: Yes  BLOOD SYMPTOMS: Yes  NERVOUS SYSTEM SYMPTOMS: Yes  MENTAL HEALTH SYMPTOMS: Yes  Ear pain: Yes  Ear discharge: Yes  Hearing loss: No  Tinnitus: Yes  Nosebleeds: No  Congestion: No  Sinus pain: Yes  Trouble swallowing: Yes   Voice hoarseness: No  Mouth sores: Yes  Sore throat: Yes  Tooth pain: Yes  Gum tenderness: Yes  Bleeding gums: Yes  Change in taste: No  Change in sense of smell: No  Dry mouth: Yes  Hearing aid used: No  Neck lump: Yes  Fever: No  Loss of appetite: No  Weight loss: No  Weight gain: Yes  Fatigue: Yes  Night sweats: No  Chills: No  Increased stress: Yes  Excessive hunger: No  Excessive thirst: Yes  Feeling hot or cold when others believe the temperature is normal: Yes  Loss of height: No  Post-operative complications: No  Surgical site pain: No  Hallucinations: No  Change in or Loss of Energy: Yes  Hyperactivity: No  Confusion: Yes  Changes in hair: No  Changes in " moles/birth marks: No  Itching: Yes  Rashes: Yes  Changes in nails: No  Acne: No  Change in facial hair: No  Warts: No  Non-healing sores: No  Scarring: No  Flaking of skin: Yes  Color changes of hands/feet in cold : No  Sun sensitivity: Yes  Skin thickening: No  Chest pain or pressure: Yes  Fast or irregular heartbeat: Yes  Pain in legs with walking: Yes  Trouble breathing while lying down: Yes  Fingers or toes appear blue: No  High blood pressure: Yes  Low blood pressure: No  Fainting: No  Murmurs: No  Pacemaker: No  Varicose veins: Yes  Wake up at night with shortness of breath: Yes  Light-headedness: Yes  Exercise intolerance: Yes  Cough: Yes  Sputum or phlegm: Yes  Coughing up blood: No  Difficulty breating or shortness of breath: Yes  Snoring: Yes  Wheezing: Yes  Difficulty breathing on exertion: Yes  Nighttime Cough: Yes  Difficulty breathing when lying flat: Yes  Heart burn or indigestion: No  Nausea: No  Vomiting: No  Abdominal pain: No  Bloating: No  Constipation: Yes  Diarrhea: Yes  Blood in stool: No  Black stools: No  Rectal or Anal pain: No  Fecal incontinence: No  Yellowing of skin or eyes: No  Vomit with blood: No  Change in stools: No  Back pain: Yes  Muscle aches: Yes  Neck pain: Yes  Swollen joints: Yes  Joint pain: Yes  Bone pain: Yes  Muscle cramps: Yes  Muscle weakness: Yes  Joint stiffness: Yes  Bone fracture: No  Edema or swelling: Yes  Anemia: No  Swollen glands: No  Easy bleeding or bruising: Yes  Trouble with coordination: No  Dizziness or trouble with balance: Yes  Fainting or black-out spells: No  Memory loss: Yes  Headache: Yes  Seizures: No  Speech problems: Yes  Tingling: Yes  Tremor: No  Weakness: Yes  Difficulty walking: Yes  Paralysis: No  Numbness: Yes  Nervous or Anxious: Yes  Depression: Yes  Trouble sleeping: Yes  Trouble thinking or concentrating: Yes  Mood changes: Yes  Panic attacks: No

## 2022-12-02 NOTE — LETTER
12/2/2022         RE: Chavez Ca  [address]  [place] [address]        Dear Colleague,    Thank you for referring your patient, Chavez Ca, to the [facility] FOR LUNG SCIENCE AND [facility] [place]. Please see a copy of my visit note below.    [facility]   Pulmonary   Clinic Note 12/2/2022  Chavez Ca MRN: [id number]    Assessment & Plan      Untreated VERN and suspected obesity hypoventilation syndrome since 2017  Borderline elevated CO2 on recent BMP (28 in 5/2022)  Clinical history concerning for right-sided heart failure (peripheral edema, CRUZ, untreated sleep apnea)  5/2021 TTE with mildly enlarged RV/mild RV dysfunction, elevated RA pressure  Super morbid obesity  Nonspecific spirometry pattern but with significant bronchodilator response greater than 600 ml  Chronic rhinosinusitis  Chronic sinus tachycardia    The patient's overall clinical history is most concerning for possible right-sided heart failure/pulmonary hypertension in the setting of prior TTE showing mildly elevated RV pressures/RV dysfunction in 2021 with now significant worsening of symptoms over the last year, untreated VERN, and significant peripheral edema.  The patient also has chronic sinus tachycardia which could be related to underlying right-sided heart failure and repeat EKG in clinic today notable for no signs of arrhythmia/A. fib.  While his recent PFTs showed a nonspecific spirometry pattern he did have a very significant bronchodilator response which could be consistent with underlying reactive airways disease/asthma so we will empirically treat with as needed bronchodilator.  Some concern for obesity hypoventilation given borderline elevated CO2 on 5/22 BMP.  Deconditioning and super morbid obesity are certainly contributing to his respiratory distress.     -Patient will undergo repeat sleep study and ideally needs to be initiated on NIPPV for  VERN/suspected OHS  -Repeat TTE (patient hoping to have this done on 12/9/2022 when he is here for a orthopedic appointment)  -We will prescribe as needed albuterol inhaler in addition to Xopenex nebulizer for possible asthma (patient instructed that if he develops palpitations with albuterol he will need to use his Xopenex instead)  -Start Zyrtec and Flonase for chronic rhinosinusitis  -BMP, CBC with differential, NT proBNP, VBG today  -Referral to pulmonary hypertension clinic given prior TTE evidence of mild RV dysfunction/enlargement and significant worsening of symptoms over the last year while not being treated for VERN/OHS.    Patient seen & discussed w/  Dr. Montano, who agrees with the above assessment and plan.    Geraldo Paez MD  Pulmonary and Critical Care Medicine Fellow  12/2/2022          History of Present Illness:   52-year-old male with pertinent pulmonary history of untreated VERN since 2017, super morbid obesity, chronic dyspnea presenting to pulmonary clinic for evaluation of dyspnea on exertion.  Patient was last seen by Dr. Amaro in 2014 at which point in time it was thought that his dyspnea was multifactorial due to deconditioning, super morbid obesity.  His imaging and pulmonary function test and labs at that time did not suggest a primary pulmonary process at that time.    Today the patient now returns to clinic with substantial worsening of his shortness of breath over the last year and especially over the last month.  Prolonged sentences can lead to shortness of breath and he has significant shortness of breath on walking 10 to 15 feet.  He notes feeling lightheaded when he bends over.  He does have significant shortness of breath even with getting dressed.  He denies a prior history of asthma however he notes that his breathing felt slightly easier when he uses a albuterol nebulizer during his recent pulmonary function test.  He does report bilateral lower extremity swelling.  He denies  any fevers, chills, night sweats, chest pain.  He plans to undergo sleep study as his prior sleep study has  and reestablish care with sleep medicine (sleep study currently scheduled for 2023).  He does report a significant history of nasal congestion for which he does not take any medications for and he notes nightly postnasal drainage.  He has no ability to sleep flat and sleeps essentially at a 90 degree upright angle in a chair at night.    He is a never smoker, does not use e-cigarettes or vaporized products, he used to clean factories but denies any significant exposure to inhaled chemicals or other exposures.          Review of Symptoms:   10-point ROS reviewed, & found negative w/ exceptions noted in the HPI.          Past Medical History:     Past Medical History:   Diagnosis Date     Allergic rhinitis      Benign positional vertigo     never quite went away     Bilateral carpal tunnel syndrome 2015     Chronic sinusitis     I don't know Please look     Chronic tonsillitis      Depressive disorder      Depressive disorder, not elsewhere classified 2012     Gastro-oesophageal reflux disease      Hearing problem      Hypertension      Learning disability      Neck mass     parapharyngeal, benign     Obesity, Class III, BMI 40-49.9 (morbid obesity) (H)      Obstructive sleep apnea      Psychological factors associated with another disorder 2012     Recurrent otitis media     might be what the pain in my ears     Reduced vision      Tinnitus 2016    I would hear a steady light buzzing sound and other can't     Trigeminal neuralgia      Weakness 2011       Past Surgical History:   Procedure Laterality Date     BIOPSY      To figure out what kind of tumor I had     COLONOSCOPY N/A 10/16/2020    Procedure: INCOMPLETE COLONOSCOPY;  Surgeon: Ham Cherry MD;  Location: [facility] OR     ENDOSCOPIC RETROGRADE CHOLANGIOPANCREATOGRAM N/A 2015     Procedure: COMBINED ENDOSCOPIC RETROGRADE CHOLANGIOPANCREATOGRAPHY, PLACE TUBE/STENT;  Surgeon: Baldomero Zacarias MD;  Location: [facility] OR     ENDOSCOPIC RETROGRADE CHOLANGIOPANCREATOGRAM N/A 11/6/2015    Procedure: COMBINED ENDOSCOPIC RETROGRADE CHOLANGIOPANCREATOGRAPHY, REMOVE FOREIGN BODY OR STENT/TUBE;  Surgeon: Baldomero Zacarias MD;  [facility]     EXCISE LESION INTRAORAL  6/29/2011    Procedure:EXCISE LESION INTRAORAL; TRANSCERVICAL APPROACH TO LEFT PHARYNGEAL SPACE MASS REMOVAL ; Surgeon:BARBARA PHILLIPS; Location: OR      VASCULAR SURGERY PROCEDURE UNLIST  12/29/15     LAPAROSCOPIC CHOLECYSTECTOMY N/A 8/23/2015    Procedure: LAPAROSCOPIC CHOLECYSTECTOMY;  Surgeon: Kvng Witt MD;  Location:  OR     LARYNGOSCOPY WITH BIOPSY(IES)  6/18/2014    Procedure: LARYNGOSCOPY WITH BIOPSY(IES);  Surgeon: Barbara Phillips MD;  Location:  OR     LASER CO2 LARYNGOSCOPY  12/7/2011    Procedure:LASER CO2 LARYNGOSCOPY; Micro-Direct Laryngoscopy with CO2 Laser Standby / Excision Tracheal Grandulization, Trach Tube Change / Kenalog application. / Balloon Dilation of Subglottic Stenosis.*Latex Safe Room* Trach Tube = Shiley 6.4mm I.D. / 10.8MM O.D. / 76MM  Cuffless.; Surgeon:BARBARA PHILLIPS; Location: OR     ORTHOPEDIC SURGERY  5/2016    Broken Right hand & Fracture Right shoulder     TRACHEOSTOMY  4/22/2011    Procedure:TRACHEOSTOMY; possible awake; Surgeon:BARBARA PHILLIPS; Location:[facility] OR     TRACHEOSTOMY  6/29/2011    Procedure:TRACHEOSTOMY; REMOVE AND REPLACE SHILEY 6 XLT; Surgeon:BARBARA PHILLIPS; Location: OR     VASCULAR SURGERY  2008-[facility]            Allergies:     Allergies   Allergen Reactions     Fentanyl Itching     Patch     Meloxicam Other (See Comments)     Side pains.     Minocycline Rash     Patient reports adverse reaction was pigmentation which has resolved with drug discontinuation.             Outpatient Medications:      amitriptyline (ELAVIL) 100 MG tablet, Take 1 tablet (100 mg) by mouth At Bedtime  azelaic acid (FINACIA) 15 % external gel, Apply once daily as needed for rash on the face  CETAPHIL (CETAPHIL) lotion, Apply topically 2 times daily  COMPRESSION STOCKINGS, 20-30 mmHg knee high compression stockings.  Measure and fit.  Style and brand per patient preference.  fluocinolone (SYNALAR) 0.01 % solution, Apply 10-15 drops to scalp at nighttime before bed as needed for scalp itch  furosemide (LASIX) 20 MG tablet, Take 3 tablets (60 mg) by mouth 2 times daily  ketoconazole (NIZORAL) 2 % external shampoo, APPLY TO AFFECTED AREA AND WASH OFF AFTER 5 MINUTES  levalbuterol (XOPENEX) 1.25 MG/3ML neb solution, Take 3 mLs (1.25 mg) by nebulization every 4 hours as needed for shortness of breath / dyspnea or wheezing  naltrexone (DEPADE/REVIA) 50 MG tablet, Take 1/2 tablet week 1 before supper; week 2 and after, take a full tablet then to help with portion reduction at supper and avoiding eating between supper and bed, as tolerated  order for DME, Equipment being ordered: compression stockings    Needs to be faxed to  order for DME, Compression stockings pair, patient preference    To be used 8 hours per day  order for DME, Equipment being ordered: compression stockings  phentermine (ADIPEX-P) 37.5 MG tablet, Take 1 tablet (37.5 mg) by mouth every morning  topiramate (TOPAMAX) 100 MG tablet, Take 2 tablets (200 mg) by mouth 2 times daily  traMADol (ULTRAM) 50 MG tablet, Take 1 po PRN recovery from pain at HS or after physical therapy. Do not take to enable activity.  traZODone (DESYREL) 100 MG tablet, Take 1 tablet (100 mg) by mouth At Bedtime Call clinic to schedule follow up appointment. [phone]  zolpidem (AMBIEN) 10 MG tablet, Take 1 tablet (10 mg) by mouth nightly as needed for sleep    No current facility-administered medications on file prior to visit.            Family History:     Family History   Adopted: Yes  "  Problem Relation Age of Onset     Family History Negative Other         Does not know family     Unknown/Adopted No family hx of                Social History:     Social History     Tobacco Use     Smoking status: Never     Smokeless tobacco: Never     Tobacco comments:     Never started   Substance Use Topics     Alcohol use: No     Alcohol/week: 0.0 standard drinks     Comment: rarely     Drug use: No             Physical Exam:   BP (!) 147/99   Pulse (!) 123   Resp 17   Ht 1.702 m (5' 7\")   Wt (!) 171.5 kg (378 lb)   SpO2 98%   BMI 59.20 kg/m      General: male in no acute distress  HENT: no stridor  Lungs: CTAB, no wheezing, no crackles, lung sounds distant due to adiposity, no accessory muscle use, but he is intermittently pausing to breath when talking  Heart: Tachycardic with regular rhythm  Abdomen: Obese  Extremities: 2+ pitting edema of bilateral lower extremities, no clubbing or cyanosis  Skin: no visible rashes, no mottling  Neurologic: moving all 4 extremities spontaneously, awake and alert          Data:   Pertinent data reviewed and discussed above    Answers for HPI/ROS submitted by the patient on 12/2/2022  General Symptoms: Yes  Skin Symptoms: Yes  HENT Symptoms: Yes  EYE SYMPTOMS: No  HEART SYMPTOMS: Yes  LUNG SYMPTOMS: Yes  INTESTINAL SYMPTOMS: Yes  URINARY SYMPTOMS: No  REPRODUCTIVE SYMPTOMS: No  SKELETAL SYMPTOMS: Yes  BLOOD SYMPTOMS: Yes  NERVOUS SYSTEM SYMPTOMS: Yes  MENTAL HEALTH SYMPTOMS: Yes  Ear pain: Yes  Ear discharge: Yes  Hearing loss: No  Tinnitus: Yes  Nosebleeds: No  Congestion: No  Sinus pain: Yes  Trouble swallowing: Yes   Voice hoarseness: No  Mouth sores: Yes  Sore throat: Yes  Tooth pain: Yes  Gum tenderness: Yes  Bleeding gums: Yes  Change in taste: No  Change in sense of smell: No  Dry mouth: Yes  Hearing aid used: No  Neck lump: Yes  Fever: No  Loss of appetite: No  Weight loss: No  Weight gain: Yes  Fatigue: Yes  Night sweats: No  Chills: No  Increased stress: " Yes  Excessive hunger: No  Excessive thirst: Yes  Feeling hot or cold when others believe the temperature is normal: Yes  Loss of height: No  Post-operative complications: No  Surgical site pain: No  Hallucinations: No  Change in or Loss of Energy: Yes  Hyperactivity: No  Confusion: Yes  Changes in hair: No  Changes in moles/birth marks: No  Itching: Yes  Rashes: Yes  Changes in nails: No  Acne: No  Change in facial hair: No  Warts: No  Non-healing sores: No  Scarring: No  Flaking of skin: Yes  Color changes of hands/feet in cold : No  Sun sensitivity: Yes  Skin thickening: No  Chest pain or pressure: Yes  Fast or irregular heartbeat: Yes  Pain in legs with walking: Yes  Trouble breathing while lying down: Yes  Fingers or toes appear blue: No  High blood pressure: Yes  Low blood pressure: No  Fainting: No  Murmurs: No  Pacemaker: No  Varicose veins: Yes  Wake up at night with shortness of breath: Yes  Light-headedness: Yes  Exercise intolerance: Yes  Cough: Yes  Sputum or phlegm: Yes  Coughing up blood: No  Difficulty breating or shortness of breath: Yes  Snoring: Yes  Wheezing: Yes  Difficulty breathing on exertion: Yes  Nighttime Cough: Yes  Difficulty breathing when lying flat: Yes  Heart burn or indigestion: No  Nausea: No  Vomiting: No  Abdominal pain: No  Bloating: No  Constipation: Yes  Diarrhea: Yes  Blood in stool: No  Black stools: No  Rectal or Anal pain: No  Fecal incontinence: No  Yellowing of skin or eyes: No  Vomit with blood: No  Change in stools: No  Back pain: Yes  Muscle aches: Yes  Neck pain: Yes  Swollen joints: Yes  Joint pain: Yes  Bone pain: Yes  Muscle cramps: Yes  Muscle weakness: Yes  Joint stiffness: Yes  Bone fracture: No  Edema or swelling: Yes  Anemia: No  Swollen glands: No  Easy bleeding or bruising: Yes  Trouble with coordination: No  Dizziness or trouble with balance: Yes  Fainting or black-out spells: No  Memory loss: Yes  Headache: Yes  Seizures: No  Speech problems:  Yes  Tingling: Yes  Tremor: No  Weakness: Yes  Difficulty walking: Yes  Paralysis: No  Numbness: Yes  Nervous or Anxious: Yes  Depression: Yes  Trouble sleeping: Yes  Trouble thinking or concentrating: Yes  Mood changes: Yes  Panic attacks: No        Attestation signed by Luiz Montano MD at 12/3/2022  7:23 AM:    Faculty Addendum:  This patient has been seen and evaluated by me.  I have discussed care with Dr. Paez and agree with the findings and plan in this note.    Luiz Montano MD  Pulmonary/Critical Care  December 3, 2022 7:23 AM          Again, thank you for allowing me to participate in the care of your patient.        Sincerely,        Geraldo Paez MD

## 2022-12-02 NOTE — PATIENT INSTRUCTIONS
1) Start taking albuterol as needed for shortness of breath  2) Start taking cetirizine and flonase as prescribed for nasal congestion  3) Repeat your echocardiogram on 12/9/2022 (ordered but you will want to schedule via phone or at the )  4) Please present to the lab for lab analysis  5) We will refer you to cardiology for evaluation of possible pulmonary hypertension related to untreated sleep apnea  6) Be sure to keep your appointments with sleep medicine as you need repeat sleep study and to use some form of CPAP at night for your VERN

## 2022-12-03 LAB
ATRIAL RATE - MUSE: 115 BPM
DIASTOLIC BLOOD PRESSURE - MUSE: NORMAL MMHG
INTERPRETATION ECG - MUSE: NORMAL
P AXIS - MUSE: 76 DEGREES
PR INTERVAL - MUSE: 172 MS
QRS DURATION - MUSE: 92 MS
QT - MUSE: 326 MS
QTC - MUSE: 450 MS
R AXIS - MUSE: 130 DEGREES
SYSTOLIC BLOOD PRESSURE - MUSE: NORMAL MMHG
T AXIS - MUSE: 52 DEGREES
VENTRICULAR RATE- MUSE: 115 BPM

## 2022-12-05 NOTE — TELEPHONE ENCOUNTER
RECORDS RECEIVED FROM:   DATE RECEIVED:   NOTES STATUS DETAILS   OFFICE NOTE from referring provider    Internal Dr. Paez 12-2-22   OFFICE NOTE from other cardiologist    N/A    SUMMARY from hospital/ED   Internal 5-4-21 [facility]   MEDICATION LIST   Internal    DIAGNOSTIC PROCEDURES     EKG   Internal 12-2-22   Monitor Reports   N/A    IMAGING (DISC & REPORT)      Echo   Internal 5-11-22   Stress Tests   N/A    Cath   N/A    MRI/MRA   N/A    CT/CTA   N/A

## 2022-12-09 ENCOUNTER — OFFICE VISIT (OUTPATIENT)
Dept: ORTHOPEDICS | Facility: CLINIC | Age: 52
End: 2022-12-09
Attending: PEDIATRICS
Payer: COMMERCIAL

## 2022-12-09 ENCOUNTER — ANCILLARY PROCEDURE (OUTPATIENT)
Dept: GENERAL RADIOLOGY | Facility: CLINIC | Age: 52
End: 2022-12-09
Attending: FAMILY MEDICINE
Payer: COMMERCIAL

## 2022-12-09 VITALS — HEIGHT: 67 IN | BODY MASS INDEX: 49.44 KG/M2 | WEIGHT: 315 LBS

## 2022-12-09 DIAGNOSIS — M79.604 BILATERAL LEG PAIN: ICD-10-CM

## 2022-12-09 DIAGNOSIS — E66.813 CLASS 3 SEVERE OBESITY DUE TO EXCESS CALORIES WITH SERIOUS COMORBIDITY AND BODY MASS INDEX (BMI) OF 50.0 TO 59.9 IN ADULT (H): ICD-10-CM

## 2022-12-09 DIAGNOSIS — M17.0 PRIMARY OSTEOARTHRITIS OF BOTH KNEES: ICD-10-CM

## 2022-12-09 DIAGNOSIS — M79.605 BILATERAL LEG PAIN: ICD-10-CM

## 2022-12-09 DIAGNOSIS — M25.562 CHRONIC PAIN OF BOTH KNEES: Primary | ICD-10-CM

## 2022-12-09 DIAGNOSIS — G89.29 CHRONIC PAIN OF BOTH KNEES: Primary | ICD-10-CM

## 2022-12-09 DIAGNOSIS — M25.561 CHRONIC PAIN OF BOTH KNEES: Primary | ICD-10-CM

## 2022-12-09 DIAGNOSIS — E66.01 CLASS 3 SEVERE OBESITY DUE TO EXCESS CALORIES WITH SERIOUS COMORBIDITY AND BODY MASS INDEX (BMI) OF 50.0 TO 59.9 IN ADULT (H): ICD-10-CM

## 2022-12-09 PROCEDURE — 73562 X-RAY EXAM OF KNEE 3: CPT | Mod: RT | Performed by: STUDENT IN AN ORGANIZED HEALTH CARE EDUCATION/TRAINING PROGRAM

## 2022-12-09 PROCEDURE — 99214 OFFICE O/P EST MOD 30 MIN: CPT | Performed by: FAMILY MEDICINE

## 2022-12-09 NOTE — PROGRESS NOTES
Subjective:   Chavez Ca is a 52 year old male who presents for bilateral knee pain. The patient reports that his left knee is becoming worse than the right. He reports that he fell twice the other day. He fell while at [employer] and landed on his left side. His pain has worsened since the fall. Pain is worse with weightbearing activities, getting dressed. He reports that he does not feel his pain is related to his weight as he did lose weight to be at 260 lbs, but continued to have persistent knee pain.   Global knee pain, 10/10 pain  2 weeks ago- went shopping, had a grocery cart, reach for meat, knee hit the railing, dropped to the ground  Neuropathy, hands holding cart hurts  Standing and walking, gave out just touching something.  Fell 2 days ago and nothing there  Bracing up and down stairs, usually one step at a time  Out of breath, doesn't go forward any more  Bus- changes times, 1/2 [place] bus stop, [employer] [facility]- [place] for 2 years  Keeps changing PCP  Hard times with no [employer] mobility, uber's or bums rides with friends  Too many appts so times get messed up  Pulmonary since 2014, VERN with CPAP but unable to tolerate, mouthpiece  6 months to get sleep study  Teeth are falling out- hasn't been to Dentist, COVID issues, now insurance will cover Dental  [facility] numbers given, , DOMINIQUE  30 min for 1/2 mile to the bus  Weight loss clinic 10 years- Dr. Almeida, down to 260 lbs and concern high risk  Meds not covered $1400/mo  Bad effects with different meds, stopped Tramadol and med in July  Borrowed a walker, sitting with seat  Lift chair to help sleep- medical place,  $3200 to get chair  Doesn't have the $, disability soc security 2013, back to 2010  Did Pool therapy, trach out in 2011 after 18 months  Neuropathy into legs, going to PT land therapy- doing exercises at home, trying to get something new  In his room most of the time 90% of the time, some socialization with  roommates (Mom and son) but they work from home  Checking out Y close to  and University, indoor walking track  Out of work for 12 years    Background:   Date of injury: Acute 12/2/22   Duration of symptoms: Chronic, but worsening since 12/2/22  Mechanism of Injury: Chronic; Activity Related   Intensity: 10/10 at rest, 10/10 with activity   Aggravating factors: weightbearing activities  Relieving Factors: rest  Prior Evaluation: Prior Physician Evalutation:     PAST MEDICAL, SOCIAL, SURGICAL AND FAMILY HISTORY: He  has a past medical history of Allergic rhinitis, Benign positional vertigo (2011), Bilateral carpal tunnel syndrome (07/18/2015), Chronic sinusitis (2017), Chronic tonsillitis, Depressive disorder, Depressive disorder, not elsewhere classified (07/30/2012), Gastro-oesophageal reflux disease, Hearing problem, Hypertension (2008), Learning disability, Neck mass, Obesity, Class III, BMI 40-49.9 (morbid obesity) (H), Obstructive sleep apnea, Psychological factors associated with another disorder (08/21/2012), Recurrent otitis media (2016), Reduced vision, Tinnitus (2016), Trigeminal neuralgia, and Weakness (08/26/2011).    He has no past medical history of Anemia, Aortic valve disorders, Arrhythmia, Asymptomatic human immunodeficiency virus (HIV) infection status (H), Back injury, Bleeding disorder (H), Blood disease, Bone tumor, Chronic infection, Chronic kidney disease, Chronic osteoarthritis, Deep vein thrombosis (DVT) (H), Diabetes (H), Heart disease, Hepatitis, Hyperlipidaemia, Immune disorder (H), Liver disease, Lung disease, Malignant hyperthermia, Neck injuries, PONV (postoperative nausea and vomiting), RA (rheumatoid arthritis) (H), Scoliosis, Seizures (H), Stomach ulcer, Surgical complication, Thyroid disease, Tracheostomy in place (H), Tuberculosis, Ulcer, gastric, acute, or Uncomplicated asthma.  He  has a past surgical history that includes Tracheostomy (4/22/2011); Tracheostomy (6/29/2011);  "Excise lesion intraoral (6/29/2011); Laser CO2 laryngoscopy (12/7/2011); Laryngoscopy with biopsy(ies) (6/18/2014); Laparoscopic cholecystectomy (N/A, 8/23/2015); Endoscopic retrograde cholangiopancreatogram (N/A, 8/27/2015); Endoscopic retrograde cholangiopancreatogram (N/A, 11/6/2015); biopsy (2011); orthopedic surgery (5/2016); vascular surgery (2008-[facility]); VASCULAR SURGERY PROCEDURE UNLIST (12/29/15); and Colonoscopy (N/A, 10/16/2020).  His family history includes Family History Negative in an other family member. He was adopted.  He reports that he has never smoked. He has never used smokeless tobacco. He reports that he does not drink alcohol and does not use drugs.    ALLERGIES: He is allergic to fentanyl, meloxicam, and minocycline.    CURRENT MEDICATIONS: He has a current medication list which includes the following prescription(s): albuterol, amitriptyline, azelaic acid, cetaphil moisturizing, cetirizine, COMPRESSION STOCKINGS, fluocinolone, fluticasone, furosemide, ketoconazole, levalbuterol, naltrexone, order for dme, order for dme, order for dme, phentermine, topiramate, tramadol, trazodone, and zolpidem.     REVIEW OF SYSTEMS: 10 point review of systems is negative except as noted above.     Exam:   Ht 1.702 m (5' 7\")   Wt (!) 171.5 kg (378 lb)   BMI 59.20 kg/m             CONSTITUTIONAL: alert, moderate distress, cooperative and obese  HEAD: Normocephalic. No masses, lesions, tenderness or abnormalities  SKIN: no suspicious lesions or rashes  GAIT: obese pattern  NEUROLOGIC: Non-focal  PSYCHIATRIC: affect normal/bright and mentation appears normal.    MUSCULOSKELETAL: bilateral knee pain      Inspection:       AP/lateral alignment normal  Tender: global knee pain      Non-tender: patellar facets, MCL, LCL, lateral joint line, medial joint line, IT band, posterior knee   Active Range of Motion: all normal  Strength: quad  5-/5, Hamstrings  5-/5  Special tests: normal Valgus stress test, normal " Varus       Assessment/Plan:   Pt is a 51 yo white male with PMhx of idiopathic progressive polyneuropathy, history of trach, obstructive sleep apnea, patient presenting with bilateral knee pain    1.  Bilateral knee pain-mild OA  Patient with significant deconditioning  Discussed at length starting a walking program  Patient reports that he is looking into the [facility] because they have a walking track and I encouraged him  Patient is already working with the weight loss clinic  Patient struggles with stability at times and we sent DME for bariatric walker  Patient reports that he has been struggling with transportation and has been at a disadvantage without [employer] mobility  Has worked with the same  in the past and has not found the encounter helpful and he would like to have a contact with different .  This  referral was placed    RTC prn    X-RAY INTERPRETATION:   X-Ray of the left/Right Knee: 3-view, [place], lateral, sunrise   ordered and interpreted in the office today was positive for mild degenerative changes bilaterally

## 2022-12-09 NOTE — LETTER
12/9/2022      RE: Chavez Ca  [address] [place] [address]     Dear Colleague,    Thank you for referring your patient, Chavez Ca, to the [facility] [place]. Please see a copy of my visit note below.     Subjective:   Chavez Ca is a 52 year old male who presents for bilateral knee pain. The patient reports that his left knee is becoming worse than the right. He reports that he fell twice the other day. He fell while at [employer] and landed on his left side. His pain has worsened since the fall. Pain is worse with weightbearing activities, getting dressed. He reports that he does not feel his pain is related to his weight as he did lose weight to be at 260 lbs, but continued to have persistent knee pain.   Global knee pain, 10/10 pain  2 weeks ago- went shopping, had a grocery cart, reach for meat, knee hit the railing, dropped to the ground  Neuropathy, hands holding cart hurts  Standing and walking, gave out just touching something.  Fell 2 days ago and nothing there  Bracing up and down stairs, usually one step at a time  Out of breath, doesn't go forward any more  Bus- changes times, 1/2 mile to bus stop, [employer] mobility- off for 2 years  Keeps changing PCP  Hard times with no [employer] mobility, uber's or bums rides with friends  Too many appts so times get messed up  Pulmonary since 2014, VERN with CPAP but unable to tolerate, mouthpiece  6 months to get sleep study  Teeth are falling out- hasn't been to Dentist, COVID issues, now insurance will cover Dental  [facility] numbers given, , DOMINIQUE  30 min for 1/2 mile to the bus  Weight loss clinic 10 years- Dr. Almeida, down to 260 lbs and concern high risk  Meds not covered $1400/mo  Bad effects with different meds, stopped Tramadol and med in July  Borrowed a walker, sitting with seat  Lift chair to help sleep- medical place,  $3200 to get chair  Doesn't have the $,  disability soc security 2013, back to 2010  Did Pool therapy, trach out in 2011 after 18 months  Neuropathy into legs, going to PT land therapy- doing exercises at home, trying to get something new  In his room most of the time 90% of the time, some socialization with roommates (Mom and son) but they work from home  Checking out [place] [employer], indoor walking track  Out of work for 12 years    Background:   Date of injury: Acute 12/2/22   Duration of symptoms: Chronic, but worsening since 12/2/22  Mechanism of Injury: Chronic; Activity Related   Intensity: 10/10 at rest, 10/10 with activity   Aggravating factors: weightbearing activities  Relieving Factors: rest  Prior Evaluation: Prior Physician Evalutation:     PAST MEDICAL, SOCIAL, SURGICAL AND FAMILY HISTORY: He  has a past medical history of Allergic rhinitis, Benign positional vertigo (2011), Bilateral carpal tunnel syndrome (07/18/2015), Chronic sinusitis (2017), Chronic tonsillitis, Depressive disorder, Depressive disorder, not elsewhere classified (07/30/2012), Gastro-oesophageal reflux disease, Hearing problem, Hypertension (2008), Learning disability, Neck mass, Obesity, Class III, BMI 40-49.9 (morbid obesity) (H), Obstructive sleep apnea, Psychological factors associated with another disorder (08/21/2012), Recurrent otitis media (2016), Reduced vision, Tinnitus (2016), Trigeminal neuralgia, and Weakness (08/26/2011).    He has no past medical history of Anemia, Aortic valve disorders, Arrhythmia, Asymptomatic human immunodeficiency virus (HIV) infection status (H), Back injury, Bleeding disorder (H), Blood disease, Bone tumor, Chronic infection, Chronic kidney disease, Chronic osteoarthritis, Deep vein thrombosis (DVT) (H), Diabetes (H), Heart disease, Hepatitis, Hyperlipidaemia, Immune disorder (H), Liver disease, Lung disease, Malignant hyperthermia, Neck injuries, PONV (postoperative nausea and vomiting), RA (rheumatoid arthritis) (H),  "Scoliosis, Seizures (H), Stomach ulcer, Surgical complication, Thyroid disease, Tracheostomy in place (H), Tuberculosis, Ulcer, gastric, acute, or Uncomplicated asthma.  He  has a past surgical history that includes Tracheostomy (4/22/2011); Tracheostomy (6/29/2011); Excise lesion intraoral (6/29/2011); Laser CO2 laryngoscopy (12/7/2011); Laryngoscopy with biopsy(ies) (6/18/2014); Laparoscopic cholecystectomy (N/A, 8/23/2015); Endoscopic retrograde cholangiopancreatogram (N/A, 8/27/2015); Endoscopic retrograde cholangiopancreatogram (N/A, 11/6/2015); biopsy (2011); orthopedic surgery (5/2016); vascular surgery (2008-[place]); VASCULAR SURGERY PROCEDURE UNLIST (12/29/15); and Colonoscopy (N/A, 10/16/2020).  His family history includes Family History Negative in an other family member. He was adopted.  He reports that he has never smoked. He has never used smokeless tobacco. He reports that he does not drink alcohol and does not use drugs.    ALLERGIES: He is allergic to fentanyl, meloxicam, and minocycline.    CURRENT MEDICATIONS: He has a current medication list which includes the following prescription(s): albuterol, amitriptyline, azelaic acid, cetaphil moisturizing, cetirizine, COMPRESSION STOCKINGS, fluocinolone, fluticasone, furosemide, ketoconazole, levalbuterol, naltrexone, order for dme, order for dme, order for dme, phentermine, topiramate, tramadol, trazodone, and zolpidem.     REVIEW OF SYSTEMS: 10 point review of systems is negative except as noted above.     Exam:   Ht 1.702 m (5' 7\")   Wt (!) 171.5 kg (378 lb)   BMI 59.20 kg/m             CONSTITUTIONAL: alert, moderate distress, cooperative and obese  HEAD: Normocephalic. No masses, lesions, tenderness or abnormalities  SKIN: no suspicious lesions or rashes  GAIT: obese pattern  NEUROLOGIC: Non-focal  PSYCHIATRIC: affect normal/bright and mentation appears normal.    MUSCULOSKELETAL: bilateral knee pain      Inspection:       AP/lateral alignment " normal  Tender: global knee pain      Non-tender: patellar facets, MCL, LCL, lateral joint line, medial joint line, IT band, posterior knee   Active Range of Motion: all normal  Strength: quad  5-/5, Hamstrings  5-/5  Special tests: normal Valgus stress test, normal Varus       Assessment/Plan:   Pt is a 53 yo white male with PMhx of idiopathic progressive polyneuropathy, history of trach, obstructive sleep apnea, patient presenting with bilateral knee pain    1.  Bilateral knee pain-mild OA  Patient with significant deconditioning  Discussed at length starting a walking program  Patient reports that he is looking into the [facility] because they have a walking track and I encouraged him  Patient is already working with the weight loss clinic  Patient struggles with stability at times and we sent DME for bariatric walker  Patient reports that he has been struggling with transportation and has been at a disadvantage without [employer] mobility  Has worked with the same  in the past and has not found the encounter helpful and he would like to have a contact with different .  This  referral was placed    RTC prn    X-RAY INTERPRETATION:   X-Ray of the left/Right Knee: 3-view, Hubbard, lateral, sunrise   ordered and interpreted in the office today was positive for mild degenerative changes bilaterally       Again, thank you for allowing me to participate in the care of your patient.      Sincerely,    Shelley Zurita MD

## 2022-12-12 ENCOUNTER — PATIENT OUTREACH (OUTPATIENT)
Dept: CARE COORDINATION | Facility: CLINIC | Age: 52
End: 2022-12-12

## 2022-12-12 NOTE — PROGRESS NOTES
Social Work Assessment and Intervention  [facility]    Data/Intervention:    Patient Name:  Chavez Ca  /Age:  1970 (52 year old)    Visit Type: telephone  Referral Source: Sports Medicine clinic  Reason for Referral:  [employer] Mobility    Collaborated With:    -Chavez  -[facility] Pharmacist Marly [facility]ekPromise [facility] Angeles[facility]em Maier with billing- [phone]    Psychosocial Information/Concerns:  Referral received indicating request for [employer].     Per chart review, Dr. Zurita with Sports Med noted the following additional items of concern: keeps changing PCP, too many appointments so times get messed up, the bus changes times and stop is 1/2 mile away, hard time with no [employer], meds not covered$1400/month, large expense to get lift chair.    Intervention/Education/Resources Provided:  I spoke with Chavez and he reported that he previously had [employer] but it  during Covid. Chavez discussed how he kept coordinating with providers to get the re certification paperwork done but they kept saying they needed to get to know more about his medical situation. Chavez said he can't afford [employer] or to pay his friends for rides and discussed how the bus times are limited now.   Chavez also discussed his difficulty with taking public transportation and said he borrows a transport type w/c from a friend sometimes. Chavez said he was told to use a walker and get a gym membership and said he doesn't think people understand that these things won't help.     I explained that with Medicaid Chavez gets rides benefits to medical appointments but needs to call at least 2 business days before the appointment to set up the ride. Chavez said he has called the number on his card before but it hasn't worked and he keeps hearing other people saying they get rides. I provided Chavez with the number for [employer] to call to schedule rides to medical appointments.  I also explained that  it sounds like Chavez needs to completely re-apply for [employer] and explained I can do the provider portion of the form and will mail the packet to Chavez for him to do his part and he can either submit it then or ask for help with this next time he is in clinic. Chavez expressed understanding of this plan.     I inquired about the medication cost issue noted above and Chavez said the $1,400 med was an injection that wasn't covered by insurance and this was since switched to Phentermine, which he said is also not covered. Chavez said he gets his meds filled at the [facility] pharmacy and this med was filled there once for around $40. I said I will contact the [facility] pharmacy to check into this.   Chavez also discussed having needed an MRI and due to his bariatric status he was told he needed to call around to places that could accommodate this and was not given any help with this. Chavez said he went to [facility] in [place] and ended up getting a bill for $350. Chavez said they had all of his insurance information. I explained that his MA should have billed and noted maybe the center doesn't take MA or was out of network with his insurance. I explained I will call and try and figure this out as well.     I inquired about the lift chair cost barrier and Chavez said the chair was ordered by Dr. Milligan but the order didn't contain specifics as to why it is medically needed and he cannot afford the couple thousand dollars for it. I explained that only waivers will pay for a lift chair.     I explained the [place] Choices Assessment and that this would be done through [facility] and that it would assess eligibility for various [facility] services including waivered services. I provided the phone number to call and schedule an assessment and suggested that Chavez do so and advised him to do so sooner rather than later and he was agreeable to this.     Chavez asked that I send him a My Chart message with an  update from the pharmacy and [facility].     I contacted the [facility]- spoke with Marly to inquire about phentermine not being covered. Marly reported that this is a weight loss drug and is generally never covered by insurance, no matter the insurance. Marly said that even with prior auth coverage can still be an issue and there needs to be a very compelling reason for coverage. Marly also said there are no patient assistance programs for this drug. Per Marly, the only options for Chavez are to either pay the out of pocket expense or not take the medication.     I also contacted [facility]- left a  for Darrion in billing requesting a call back, waiting to hear back.     Assessment/Plan:  I will await a call from [facility] billing and send Brennan My Chart Message with the information I obtained.     I will also complete the provider portion of the [employer] then mail the whole packet to Chavez to complete his part and submit the documents.     12/13/22 addendum: I spoke with Ammy with [facility] billing but she was not able to share any information with me at all without a release of information on file.     I sent Brennan My Chart message providing the above updates and letting him know I mailed him the [employer] application with the provider portion completed.     MARIA T Pittman, [facility]    [facility]  Ph: [phone], Pgr: [phone]  12/12/2022

## 2022-12-15 ENCOUNTER — TELEPHONE (OUTPATIENT)
Dept: INTERNAL MEDICINE | Facility: CLINIC | Age: 52
End: 2022-12-15

## 2022-12-15 NOTE — TELEPHONE ENCOUNTER
Health Call Center    Phone Message    May a detailed message be left on voicemail: yes     Reason for Call: Other: Patient was referred to Bernie Zurita by Dr. Shine and he is unhappy with results of the appointment. He would like to discuss other options with Dr. Milligan regarding his legs and the pain he is still having, before Dr. Braswell next available time in March of 2023. Please call patient to discuss.      Action Taken: Message routed to:  [facility] ([facility]): [facility]    Travel Screening: Not Applicable

## 2022-12-26 ENCOUNTER — TELEPHONE (OUTPATIENT)
Dept: PULMONOLOGY | Facility: CLINIC | Age: 52
End: 2022-12-26

## 2022-12-26 NOTE — TELEPHONE ENCOUNTER
Called pt no answer lvm/sent my chart to schedule 3 month follow up with Dr. Paez in March 2023 via call center [phone]

## 2022-12-29 ENCOUNTER — MYC REFILL (OUTPATIENT)
Dept: INTERNAL MEDICINE | Facility: CLINIC | Age: 52
End: 2022-12-29

## 2022-12-29 DIAGNOSIS — M47.26 OSTEOARTHRITIS OF SPINE WITH RADICULOPATHY, LUMBAR REGION: ICD-10-CM

## 2022-12-29 DIAGNOSIS — I87.2 VENOUS INSUFFICIENCY (CHRONIC) (PERIPHERAL): ICD-10-CM

## 2022-12-29 DIAGNOSIS — G60.3 IDIOPATHIC PROGRESSIVE POLYNEUROPATHY: Chronic | ICD-10-CM

## 2022-12-29 RX ORDER — TRAMADOL HYDROCHLORIDE 50 MG/1
TABLET ORAL
Qty: 30 TABLET | Refills: 0 | Status: SHIPPED | OUTPATIENT
Start: 2022-12-29 | End: 2023-03-02

## 2022-12-31 ENCOUNTER — MYC MEDICAL ADVICE (OUTPATIENT)
Dept: INTERNAL MEDICINE | Facility: CLINIC | Age: 52
End: 2022-12-31

## 2022-12-31 DIAGNOSIS — M17.0 PRIMARY OSTEOARTHRITIS OF BOTH KNEES: Primary | ICD-10-CM

## 2022-12-31 DIAGNOSIS — R53.81 PHYSICAL DECONDITIONING: ICD-10-CM

## 2022-12-31 DIAGNOSIS — M19.042 ARTHRITIS OF BOTH HANDS: ICD-10-CM

## 2022-12-31 DIAGNOSIS — M19.041 ARTHRITIS OF BOTH HANDS: ICD-10-CM

## 2022-12-31 PROBLEM — J45.40 MODERATE PERSISTENT ASTHMA WITHOUT COMPLICATION: Status: ACTIVE | Noted: 2022-11-12

## 2022-12-31 NOTE — LETTER
Chavez Ca  [address] [place] [address]       12/31/2022 (copy of Ryannehart sent today, just in case)    Hello,       As part of an effort to catch up, I had a chance to review your records today. Unfortunately, visits that address several problems can result in late documentation and sometimes delayed orders. In the new year, I will be trying to avoid this problem, by limiting the number of problems addressed per visit. When something is left unaddressed, I recommend you make a follow-up appointment by video, or with one of my colleagues.     Here is what I found.    Asthma and evaluation for pulmonary hypertension: Dr. Montano    Knee pain, leg pain, deconditioning: this is being managed by Dr. Zurita and physical therapy. Also the meeting with Karlee the     Hand arthritis: I put in a request for ortho back in November, but it doesn't look like this happened. The schedulers might have been confused with the knee/leg order. I put in a new referral, and also a referral for occupational hand therapy.    Back and spine problems. My understanding is that you arranged for another spine opinion elsewhere, after meeting with Dr. Franco.    I will ask a coordinator to contact you, and see about getting your echo scheduled, and a follow-up with me in early Feb.    If you have any other problems that aren't being addressed, please telephone the clinic phone ([phone]) and ask for a callback from Dr. Milligan's nurse.    Mac Milligan MD  Internal Medicine & Pediatrics  [facility] [place]

## 2022-12-31 NOTE — ASSESSMENT & PLAN NOTE
Knee pain  Impaired mobility       SUBJECTIVE: left knee is much worse than before. Walks like a snail, often uses a borrowed wheelchair as a walker.  He had a scooter before, but had to sell it in order to get back here.       OBJECTIVE: cautious gait, nonspecfic knee tenderness.       ASSESSMENT:   Apparently I was delayed on ortho order because of the other things needed before.       PLAN: placed ortho referral. Advised this may be confused with other ortho goals for him

## 2022-12-31 NOTE — ASSESSMENT & PLAN NOTE
Following up on several issues unable to address previously. XR Scattered mild degeneration and post-fracture deformities (11/16/22).  Plan to be seen by OT and consider hand intervention.

## 2022-12-31 NOTE — ASSESSMENT & PLAN NOTE
Spine history  He had what sounds like a productive meeting with Dr. Franco. No surgery seems to be doable at this point, but a variety of options were mentioned. Referral to medical spine at [facility].  He is feeling favorable about this.       ASSESSMENT: Spinal arthritis       PLAN: defer to ortho colleagues. Will need improved asthma in order to improve physical activity.

## 2022-12-31 NOTE — CONFIDENTIAL NOTE
Send this by letter and myChart    Hello,       As part of an effort to catch up, I had a chance to review your records today. Unfortunately, visits that address several problems can result in late documentation and sometimes delayed orders. In the new year, I will be trying to avoid this problem, by limiting the number of problems addressed per visit. When something is left unaddressed, I recommend you make a follow-up appointment by video, or with one of my colleagues.     Here is what I found.    Asthma and evaluation for pulmonary hypertension: Dr. Montano    Knee pain, leg pain, deconditioning: this is being managed by Dr. Zurita and physical therapy. Also the meeting with Karlee the     Hand arthritis: I put in a request for ortho back in November, but it doesn't look like this happened. The schedulers might have been confused with the knee/leg order. I put in a new referral, and also a referral for occupational hand therapy.    Back and spine problems. My understanding is that you arranged for another spine opinion elsewhere, after meeting with Dr. Franco.    I will ask a coordinator to contact you, and see about getting your echo scheduled, and a follow-up with me in early Feb.    If you have any other problems that aren't being addressed, please telephone the clinic phone ([phone]) and ask for a callback from Dr. Milligan's nurse.    Mac Milligan MD  Internal Medicine & Pediatrics  [facility]           CHRONIC CARE MANAGEMENT at [facility]  -------------------------------------------  PENDING ISSUES for UP:   -------------------------------------------    ------------------------------------------------  ONGOING SPECIALTY CARE BY:  ------------------------------------------------  -- Weight management: endo/weight Dr. Fortino Chang  -- ENT and sleep apnea: Dr. Zafar Glasgow  -- Knee and legs: Sports Dr. Zurita  -- Hand: pending  -- Asthma,  pulmonary HTN: Dr. Montano      ----------------------------------------------------------------------------------  OUTSIDE PERSONNEL FOR INSURANCE, CADI, ETC:    ----------------------------------------------------------------------------------  SUPPLIERS AND VENDORS:   ---------------------------------------------------------------------------------

## 2022-12-31 NOTE — ASSESSMENT & PLAN NOTE
Hands hurt  Difficult to push cart in the store. Insufficient time to work on this, will obtain hand films. May need to be assessed by hand OT given disability.

## 2022-12-31 NOTE — ASSESSMENT & PLAN NOTE
Asthma  Tachycardia  Seemed a little short of breath today, similar to times before. Tachycardic upon arrival.  I had to reorder the SLP to include PVFM for sure, even though I am not sure about the presence of the diagnosis - so this is a rule-out.  Last echo 5/4/21 was normal.  He is not currently on a breathing medicine - he can't hold his breath long enough to take it. They tried xopenex nebulizer and he did well with it.   OBJECTIVE: lungs tight, with modest-moderate total effort.  PFTs show low SVC and FVC and a normal FEV1/FVC, and improvement of FEV1 and FVC with albuterol.       ASSESSMENT: asthma, difficult with body habitus and restrictive lung disease. Tachycardia seems to be secondary to insufficient respiration. Suspect swallowed albuterol at times.  Needs to schedule the echo that I wrote in May.       PLAN: neb machine

## 2023-01-01 PROBLEM — R80.9 MICROALBUMINURIA: Status: ACTIVE | Noted: 2023-01-01

## 2023-01-02 NOTE — TELEPHONE ENCOUNTER
RN faxed DME order for compression stockings to [employer] @ [phone].    Louise Willson RN on 1/2/2023 at 8:03 AM

## 2023-01-10 ENCOUNTER — PRE VISIT (OUTPATIENT)
Dept: ORTHOPEDICS | Facility: CLINIC | Age: 53
End: 2023-01-10

## 2023-01-14 ENCOUNTER — HEALTH MAINTENANCE LETTER (OUTPATIENT)
Age: 53
End: 2023-01-14

## 2023-01-20 ENCOUNTER — PRE VISIT (OUTPATIENT)
Dept: CARDIOLOGY | Facility: CLINIC | Age: 53
End: 2023-01-20

## 2023-01-24 ENCOUNTER — OFFICE VISIT (OUTPATIENT)
Dept: INTERNAL MEDICINE | Facility: CLINIC | Age: 53
End: 2023-01-24
Payer: COMMERCIAL

## 2023-01-24 ENCOUNTER — LAB (OUTPATIENT)
Dept: LAB | Facility: CLINIC | Age: 53
End: 2023-01-24
Payer: COMMERCIAL

## 2023-01-24 VITALS
RESPIRATION RATE: 25 BRPM | SYSTOLIC BLOOD PRESSURE: 130 MMHG | BODY MASS INDEX: 49.44 KG/M2 | OXYGEN SATURATION: 97 % | HEIGHT: 67 IN | WEIGHT: 315 LBS | DIASTOLIC BLOOD PRESSURE: 89 MMHG | HEART RATE: 107 BPM

## 2023-01-24 DIAGNOSIS — G57.93 NEUROPATHIC PAIN OF BOTH LEGS: ICD-10-CM

## 2023-01-24 DIAGNOSIS — M17.0 PRIMARY OSTEOARTHRITIS OF BOTH KNEES: ICD-10-CM

## 2023-01-24 DIAGNOSIS — G62.9 SMALL FIBER NEUROPATHY: ICD-10-CM

## 2023-01-24 DIAGNOSIS — M79.605 BILATERAL LEG PAIN: ICD-10-CM

## 2023-01-24 DIAGNOSIS — M79.604 BILATERAL LEG PAIN: ICD-10-CM

## 2023-01-24 DIAGNOSIS — G62.9 SMALL FIBER NEUROPATHY: Primary | ICD-10-CM

## 2023-01-24 LAB
HCV AB SERPL QL IA: NONREACTIVE
T4 FREE SERPL-MCNC: 1.07 NG/DL (ref 0.9–1.7)
TSH SERPL DL<=0.005 MIU/L-ACNC: 4.44 UIU/ML (ref 0.3–4.2)
VIT B12 SERPL-MCNC: 244 PG/ML (ref 232–1245)

## 2023-01-24 PROCEDURE — 84443 ASSAY THYROID STIM HORMONE: CPT | Performed by: PATHOLOGY

## 2023-01-24 PROCEDURE — 86803 HEPATITIS C AB TEST: CPT | Performed by: PEDIATRICS

## 2023-01-24 PROCEDURE — 36415 COLL VENOUS BLD VENIPUNCTURE: CPT | Performed by: PATHOLOGY

## 2023-01-24 PROCEDURE — 84439 ASSAY OF FREE THYROXINE: CPT | Performed by: PATHOLOGY

## 2023-01-24 PROCEDURE — 82607 VITAMIN B-12: CPT | Performed by: PEDIATRICS

## 2023-01-24 PROCEDURE — 99214 OFFICE O/P EST MOD 30 MIN: CPT | Mod: GC | Performed by: STUDENT IN AN ORGANIZED HEALTH CARE EDUCATION/TRAINING PROGRAM

## 2023-01-24 NOTE — NURSING NOTE
Chavez Ca is a 52 year old male patient that presents today in clinic for the following:    Chief Complaint   Patient presents with     Knee Pain     Pt here to discuss bilateral knee pain     Referral     Pt needs new compression stockings ordered     The patient's allergies and medications were reviewed as noted. A set of vitals were recorded as noted without incident. The patient does not have any other questions for the provider.    Marlene Giron, EMT at 9:50 AM on 1/24/2023

## 2023-01-24 NOTE — PATIENT INSTRUCTIONS
Thanks for your visit, Chavez.    An MRI of your legs and knees has been ordered.    Orders for blood tests have also been placed, that may help us better understand why you have pain in your legs.    Regarding your breathing, it is imperative that you get your heart echo done.    Thanks,    DK    To schedule your echocardiogram and MRIs with imaging, please call [phone].

## 2023-01-24 NOTE — PROGRESS NOTES
"I, Chavez Milligan MD saw the patient with the resident, and agree with the resident's findings and plan of care as documented in the resident's note.  /89 (BP Location: Right arm, Patient Position: Sitting, Cuff Size: Adult Large)   Pulse 115   Resp 28   Ht 1.702 m (5' 7\")   Wt (!) 182.7 kg (402 lb 11.2 oz)   SpO2 96%   BMI 63.07 kg/m    I personally reviewed vital signs and past record.  Key findings: Dyspnea apparently stable, but hasn't had his echo (needs to schedule)  Bilateral leg/knee pain, with Dr. Zurita was planning to do MR. Could be small fiber neuropathy, will add testing for follow-up.    "

## 2023-01-24 NOTE — ASSESSMENT & PLAN NOTE
Complicated leg pain especially on knees. Agree with imaging and RTC Dr. Zurita    Weight control, seeing clinic.

## 2023-01-25 NOTE — PROGRESS NOTES
[facility]         HPI:       Chavez Ca is a 52 year old male who presents to clinic for: Knee Pain (Pt here to discuss bilateral knee pain) and Referral (Pt needs new compression stockings ordered)    52-year-old man with class III obesity, VERN, pain involving multiple joints, seborrheic dermatitis, prediabetes, presents to clinic for evaluation of knee pain.    Knee pain is a chronic ailments for which evaluation has previously been initiated. He was seen by sports medicine (Dr. Zurita) in December 2022 for bilateral knee pain.  At that time, a walking program was discussed as well as weight loss. He also appears to have worked with physical therapy. Today, patient is concerned that his pain is progressing and is interested in additional evaluation.  Specifically, he is no longer able to lift his legs horizontally.  He finds it difficult to even walk to the bathroom from his bedroom.  He denies any trauma.    Patient has a history of prediabetes.  He also describes symptoms consistent concerning for neuropathic pain.  He is not on gabapentin.  He has not noted increased swelling in his lower extremities.    Patient is noted to be quite dyspneic, but says this is his baseline, without recent exacerbation.    PMHx:  Past Medical History:   Diagnosis Date     Allergic rhinitis      Benign positional vertigo 2011    never quite went away     Bilateral carpal tunnel syndrome 07/18/2015     Chronic sinusitis 2017    I don't know Please look     Chronic tonsillitis      Depressive disorder      Depressive disorder, not elsewhere classified 07/30/2012     Gastro-oesophageal reflux disease      Hearing problem      Hyperlipidemia associated with prediabetes      Hypertension 2008     Learning disability      Mild intermittent asthma in adult without complication      Neck mass     parapharyngeal, benign     Obesity, Class III, BMI 40-49.9 (morbid obesity) (H)      Psychological factors associated  with another disorder 08/21/2012     Recurrent otitis media 2016    might be what the pain in my ears     Reduced vision      Tinnitus 2016    I would hear a steady light buzzing sound and other can't     Trigeminal neuralgia      Weakness 08/26/2011       PSHx:  Past Surgical History:   Procedure Laterality Date     BIOPSY  2011    To figure out what kind of tumor I had     COLONOSCOPY N/A 10/16/2020    Procedure: INCOMPLETE COLONOSCOPY;  Surgeon: Ham Cherry MD;  [facility]     ENDOSCOPIC RETROGRADE CHOLANGIOPANCREATOGRAM N/A 8/27/2015    Procedure: COMBINED ENDOSCOPIC RETROGRADE CHOLANGIOPANCREATOGRAPHY, PLACE TUBE/STENT;  Surgeon: Baldomero Zacarias MD;  Location: [facility]     ENDOSCOPIC RETROGRADE CHOLANGIOPANCREATOGRAM N/A 11/6/2015    Procedure: COMBINED ENDOSCOPIC RETROGRADE CHOLANGIOPANCREATOGRAPHY, REMOVE FOREIGN BODY OR STENT/TUBE;  Surgeon: Baldomero Zacarias MD;  [facility]: [facility]     EXCISE LESION INTRAORAL  6/29/2011    Procedure:EXCISE LESION INTRAORAL; TRANSCERVICAL APPROACH TO LEFT PHARYNGEAL SPACE MASS REMOVAL ; Surgeon:BARBARA PHILLIPS; [facility]     HC VASCULAR SURGERY PROCEDURE UNLIST  12/29/15     LAPAROSCOPIC CHOLECYSTECTOMY N/A 8/23/2015    Procedure: LAPAROSCOPIC CHOLECYSTECTOMY;  Surgeon: Kvng Witt MD;  [facility]: [facility]     LARYNGOSCOPY WITH BIOPSY(IES)  6/18/2014    Procedure: LARYNGOSCOPY WITH BIOPSY(IES);  Surgeon: Barbara Phillips MD;  [facility]     LASER CO2 LARYNGOSCOPY  12/7/2011    Procedure:LASER CO2 LARYNGOSCOPY; Micro-Direct Laryngoscopy with CO2 Laser Standby / Excision Tracheal Grandulization, Trach Tube Change / Kenalog application. / Balloon Dilation of Subglottic Stenosis.*Latex Safe Room* Trach Tube = Shiley 6.4mm I.D. / 10.8MM O.D. / 76MM  Cuffless.; Surgeon:BARBARA PHILLIPS; Location:[facility]     ORTHOPEDIC SURGERY  5/2016    Broken Right hand & Fracture Right shoulder     TRACHEOSTOMY  4/22/2011     Procedure:TRACHEOSTOMY; possible awake; Surgeon:EMELIA PHILLIPS; [facility]     TRACHEOSTOMY  6/29/2011    Procedure:TRACHEOSTOMY; REMOVE AND REPLACE SHILEY 6 XLT; Surgeon:EMELIA PHILLIPS; [facility]     VASCULAR SURGERY  2008[facility]       FamHx:  Family History   Adopted: Yes   Problem Relation Age of Onset     Family History Negative Other         Does not know family     Unknown/Adopted No family hx of        Allergies:     Allergies   Allergen Reactions     Fentanyl Itching     Patch     Meloxicam Other (See Comments)     Side pains.     Minocycline Rash     Patient reports adverse reaction was pigmentation which has resolved with drug discontinuation.       Meds:    Current Outpatient Medications:      albuterol (PROAIR HFA/PROVENTIL HFA/VENTOLIN HFA) 108 (90 Base) MCG/ACT inhaler, Inhale 2 puffs into the lungs every 4 hours as needed for shortness of breath / dyspnea or wheezing, Disp: 18 g, Rfl: 11     amitriptyline (ELAVIL) 100 MG tablet, Take 1 tablet (100 mg) by mouth At Bedtime, Disp: 90 tablet, Rfl: 3     azelaic acid (FINACIA) 15 % external gel, Apply once daily as needed for rash on the face, Disp: 50 g, Rfl: 11     CETAPHIL (CETAPHIL) lotion, Apply topically 2 times daily, Disp: , Rfl:      cetirizine (ZYRTEC) 10 MG tablet, Take 1 tablet (10 mg) by mouth daily, Disp: 30 tablet, Rfl: 11     COMPRESSION STOCKINGS, 20-30 mmHg knee high compression stockings.  Measure and fit.  Style and brand per patient preference., Disp: 2 each, Rfl: 0     fluocinolone (SYNALAR) 0.01 % solution, Apply 10-15 drops to scalp at nighttime before bed as needed for scalp itch, Disp: 90 mL, Rfl: 11     fluticasone (FLONASE) 50 MCG/ACT nasal spray, Spray 1 spray into both nostrils daily, Disp: 18.2 mL, Rfl: 11     furosemide (LASIX) 20 MG tablet, Take 3 tablets (60 mg) by mouth 2 times daily, Disp: 540 tablet, Rfl: 3     ketoconazole (NIZORAL) 2 % external shampoo, APPLY TO AFFECTED AREA AND  WASH OFF AFTER 5 MINUTES, Disp: 120 mL, Rfl: 3     levalbuterol (XOPENEX) 1.25 MG/3ML neb solution, Take 3 mLs (1.25 mg) by nebulization every 4 hours as needed for shortness of breath / dyspnea or wheezing, Disp: 90 mL, Rfl: 1     naltrexone (DEPADE/REVIA) 50 MG tablet, Take 1/2 tablet week 1 before supper; week 2 and after, take a full tablet then to help with portion reduction at supper and avoiding eating between supper and bed, as tolerated, Disp: 30 tablet, Rfl: 4     order for DME, Equipment being ordered: compression stockings  Needs to be faxed to, Disp: 1 Device, Rfl: 0     order for DME, Compression stockings pair, patient preference  To be used 8 hours per day, Disp: 1 Device, Rfl: 3     order for DME, Equipment being ordered: compression stockings, Disp: 2 each, Rfl: 0     phentermine (ADIPEX-P) 37.5 MG tablet, Take 1 tablet (37.5 mg) by mouth every morning, Disp: 90 tablet, Rfl: 1     topiramate (TOPAMAX) 100 MG tablet, Take 2 tablets (200 mg) by mouth 2 times daily, Disp: 360 tablet, Rfl: 3     traMADol (ULTRAM) 50 MG tablet, Take 1 po PRN recovery from pain at HS or after physical therapy. Do not take to enable activity., Disp: 30 tablet, Rfl: 0     traZODone (DESYREL) 100 MG tablet, Take 1 tablet (100 mg) by mouth At Bedtime Call clinic to schedule follow up appointment. [phone], Disp: 90 tablet, Rfl: 3     zolpidem (AMBIEN) 10 MG tablet, Take 1 tablet (10 mg) by mouth nightly as needed for sleep, Disp: 30 tablet, Rfl: 2    SocHx:  Social History     Socioeconomic History     Marital status: Single     Spouse name: None     Number of children: None     Years of education: None     Highest education level: None   Tobacco Use     Smoking status: Never     Smokeless tobacco: Never     Tobacco comments:     Never started   Substance and Sexual Activity     Alcohol use: No     Alcohol/week: 0.0 standard drinks     Comment: rarely     Drug use: No     Sexual activity: Never   Other Topics Concern      "Exercise Yes     Comment: 2-3x/wk   Social History Narrative    Room 'no bigger than a FPC cell,' the friend who owns the house recently got , and goes back and forth. He has been put in adult foster care before. The patient is single.  Has 1 child. Pt adopted at age 14.        Problem, Medication and Allergy Lists were reviewed and are current.  Patient is an established patient of this clinic.         Review of Systems:     ROS  I have personally reviewed and updated the complete ROS on the day of the visit.           Physical Exam:   /89 (BP Location: Right arm, Patient Position: Sitting, Cuff Size: Adult Large)   Pulse 107   Resp 25   Ht 1.702 m (5' 7\")   Wt (!) 182.7 kg (402 lb 11.2 oz)   SpO2 97%   BMI 63.07 kg/m    Body mass index is 63.07 kg/m .  Vitals were reviewed       GENERAL APPEARANCE: morbidly obese adult male patient in tachypnea but no distress     RESP: tachypneic; lungs clear to auscultation - no rales, rhonchi or wheezes     CV: habitus-limited exam; regular rates and rhythm, normal S1 S2, no S3 or S4 and no murmur, click or rub     ABDOMEN:  Protuberant but soft and without tenderness; habitus-limited exam     MS: unable to raise legs horizontally while seated in chair; tenderness to palpation of legs.     SKIN: no suspicious lesions or rashes     NEURO:comprehensive neuro exam not performed; patient able to ambulate and grossly without FND     PSYCH: tangential speech        Results:     Office Visit on 12/02/2022   Component Date Value Ref Range Status     Ventricular Rate 12/02/2022 115  BPM Final     Atrial Rate 12/02/2022 115  BPM Final     NM Interval 12/02/2022 172  ms Final     QRS Duration 12/02/2022 92  ms Final     QT 12/02/2022 326  ms Final     QTc 12/02/2022 450  ms Final     P Axis 12/02/2022 76  degrees Final     R AXIS 12/02/2022 130  degrees Final     T Axis 12/02/2022 52  degrees Final     Interpretation ECG 12/02/2022    Final                    " Value:Sinus tachycardia  Left posterior fascicular block  Abnormal ECG  When compared with ECG of 05-MAY-2021 06:10,  No significant change was found  Confirmed by MD PAYTON, DELGADO ([id number]) on 12/3/2022 11:21:13 AM         Lab Results   Component Value Date    WBC 7.2 05/13/2021    HGB 14.5 05/13/2021    HCT 43.7 05/13/2021     05/13/2021     05/13/2021    POTASSIUM 3.4 05/13/2021    CHLORIDE 104 05/13/2021    CO2 28 05/13/2021    BUN 27 05/13/2021    CR 1.15 05/13/2021     (H) 05/13/2021    SED 26 (H) 01/28/2016    NTBNPI 29 05/04/2021    NTBNP 12 04/07/2020    TROPI <0.015 05/04/2021    AST 16 05/13/2021    ALT 28 05/13/2021    ALKPHOS 77 05/13/2021    BILITOTAL 0.5 05/13/2021    INR 1.16 (H) 08/27/2015       Assessment and Plan     Chavez was seen today for knee pain and referral.    Diagnoses and all orders for this visit:    Small fiber neuropathy  -     Vitamin B12; Future  -     Hepatitis C Screen Reflex to HCV RNA Quant and Genotype; Future  -     TSH with free T4 reflex; Future    Primary mild osteoarthritis of both knees    Bilateral leg pain  -     MR Tibia Fibula Bilateral wo Contrast; Future  -     MR Knee Left w/o Contrast; Future  -     MR Knee Right w/o Contrast; Future  -     Vitamin B12; Future  -     Hepatitis C Screen Reflex to HCV RNA Quant and Genotype; Future  -     TSH with free T4 reflex; Future    Neuropathic pain of both legs  -     TSH with free T4 reflex; Future        Options for treatment and follow-up care were reviewed with the patient. Chavez Ca engaged in the decision making process and verbalized understanding of the options discussed and agreed with the final plan.    Felipe Carvajal  Internal Medicine, PGY-3  Primary Care Clinic  [facility]  Pager: [phone]    Jan 24, 2023    Pt was seen and plan of care discussed with Dr Milligan.

## 2023-01-26 ENCOUNTER — TELEPHONE (OUTPATIENT)
Dept: INTERNAL MEDICINE | Facility: CLINIC | Age: 53
End: 2023-01-26
Payer: COMMERCIAL

## 2023-01-26 DIAGNOSIS — R79.89 ELEVATED TSH: Primary | ICD-10-CM

## 2023-01-26 RX ORDER — LEVOTHYROXINE SODIUM 50 UG/1
50 TABLET ORAL DAILY
Qty: 60 TABLET | Refills: 1 | Status: SHIPPED | OUTPATIENT
Start: 2023-01-26 | End: 2023-04-04

## 2023-01-26 NOTE — TELEPHONE ENCOUNTER
Called and lvm with results and [facility].    Arnulfo Kaur RN on 1/26/2023 at 3:18 PM

## 2023-01-26 NOTE — TELEPHONE ENCOUNTER
Please call patient (he doesn't use myChart reliably).  Testing is back and reassuring except for a mild increase in TSH.  We usually don't treat thyroid problems when they are this mild, but I think it is smart to give you a small dose of levothyroxine while your other evaluations are going on. I am curious if it will help his leg pain, and perhaps even your weight and energy level.  Please see me around a month from now.

## 2023-02-02 ENCOUNTER — OFFICE VISIT (OUTPATIENT)
Dept: INTERNAL MEDICINE | Facility: CLINIC | Age: 53
End: 2023-02-02
Payer: COMMERCIAL

## 2023-02-02 VITALS
HEART RATE: 120 BPM | HEIGHT: 67 IN | OXYGEN SATURATION: 95 % | SYSTOLIC BLOOD PRESSURE: 115 MMHG | DIASTOLIC BLOOD PRESSURE: 74 MMHG | BODY MASS INDEX: 63.06 KG/M2

## 2023-02-02 DIAGNOSIS — I87.303 STASIS EDEMA OF BOTH LOWER EXTREMITIES: ICD-10-CM

## 2023-02-02 DIAGNOSIS — M79.604 BILATERAL LEG PAIN: ICD-10-CM

## 2023-02-02 DIAGNOSIS — E66.01 CLASS 3 SEVERE OBESITY DUE TO EXCESS CALORIES WITH SERIOUS COMORBIDITY AND BODY MASS INDEX (BMI) OF 50.0 TO 59.9 IN ADULT (H): ICD-10-CM

## 2023-02-02 DIAGNOSIS — M79.604 BILATERAL LEG PAIN: Primary | ICD-10-CM

## 2023-02-02 DIAGNOSIS — M79.605 BILATERAL LEG PAIN: Primary | ICD-10-CM

## 2023-02-02 DIAGNOSIS — J45.40 MODERATE PERSISTENT ASTHMA WITHOUT COMPLICATION: Primary | ICD-10-CM

## 2023-02-02 DIAGNOSIS — R00.0 TACHYCARDIA: ICD-10-CM

## 2023-02-02 DIAGNOSIS — M79.605 BILATERAL LEG PAIN: ICD-10-CM

## 2023-02-02 DIAGNOSIS — E66.813 CLASS 3 SEVERE OBESITY DUE TO EXCESS CALORIES WITH SERIOUS COMORBIDITY AND BODY MASS INDEX (BMI) OF 50.0 TO 59.9 IN ADULT (H): ICD-10-CM

## 2023-02-02 PROCEDURE — 93005 ELECTROCARDIOGRAM TRACING: CPT | Performed by: STUDENT IN AN ORGANIZED HEALTH CARE EDUCATION/TRAINING PROGRAM

## 2023-02-02 PROCEDURE — 99213 OFFICE O/P EST LOW 20 MIN: CPT | Mod: 25 | Performed by: STUDENT IN AN ORGANIZED HEALTH CARE EDUCATION/TRAINING PROGRAM

## 2023-02-02 RX ORDER — CAPSAICIN 0.025 %
1 CREAM (GRAM) TOPICAL 3 TIMES DAILY PRN
Qty: 237 ML | Refills: 0 | Status: SHIPPED | OUTPATIENT
Start: 2023-02-02 | End: 2023-02-02

## 2023-02-02 RX ORDER — FUROSEMIDE 20 MG
60 TABLET ORAL 2 TIMES DAILY
Qty: 540 TABLET | Refills: 0 | Status: SHIPPED | OUTPATIENT
Start: 2023-02-02 | End: 2023-04-28

## 2023-02-02 RX ORDER — CAPSAICIN 0.025 %
1 CREAM (GRAM) TOPICAL 3 TIMES DAILY PRN
Qty: 237 ML | Refills: 0 | Status: SHIPPED | OUTPATIENT
Start: 2023-02-02 | End: 2023-04-04

## 2023-02-02 NOTE — TELEPHONE ENCOUNTER
Signed neb machine.  Will have to forward phentermine on to Dr. Quinn of the weight clinic. It looks like they are in the process of scheduling him again.

## 2023-02-02 NOTE — PROGRESS NOTES
"CC:  Chief Complaint   Patient presents with     RECHECK     Bilateral knee pain, medication review       HPI:  53 yo M presents to clinic today for a follow-up visit to discuss bilateral knee pain and medication review. He was last seen in clinic on 1/24/23 and had MRI of both knees and legs ordered--these imaging studies are not yet done. Since his last visit, he reports worsening knee pain, worse on the left. No fevers. Patient reports some heat intolerance. He takes ~2g of tylenol daily and reports that this has not helped. He reports that he was prescribed tramadol for his back pain, last year but he is yet to start taking this medication. He has tried a few topical analgesics in the past, but he is unsure of which; he notes that these topical analgesics have not helped. He has been seen by sports medicine for bilateral knee pain on 12/9/22. Pain is rated as ~10/10.     He reports that he fell while going to the bathroom last night, his \"feet buckled\". He landed on his left side. No head trauma. No preceding lightheadedness        Past Medical History:   Diagnosis Date     Allergic rhinitis      Benign positional vertigo 2011    never quite went away     Bilateral carpal tunnel syndrome 07/18/2015     Chronic sinusitis 2017    I don't know Please look     Chronic tonsillitis      Depressive disorder      Depressive disorder, not elsewhere classified 07/30/2012     Gastro-oesophageal reflux disease      Hearing problem      Hyperlipidemia associated with prediabetes      Hypertension 2008     Learning disability      Mild intermittent asthma in adult without complication      Neck mass     parapharyngeal, benign     Obesity, Class III, BMI 40-49.9 (morbid obesity) (H)      Psychological factors associated with another disorder 08/21/2012     Recurrent otitis media 2016    might be what the pain in my ears     Reduced vision      Tinnitus 2016    I would hear a steady light buzzing sound and other can't     " Trigeminal neuralgia      Weakness 08/26/2011     Past Surgical History:   Procedure Laterality Date     BIOPSY  2011    To figure out what kind of tumor I had     COLONOSCOPY N/A 10/16/2020    Procedure: INCOMPLETE COLONOSCOPY;  Surgeon: Ham Cherry MD;  [facility]     ENDOSCOPIC RETROGRADE CHOLANGIOPANCREATOGRAM N/A 8/27/2015    Procedure: COMBINED ENDOSCOPIC RETROGRADE CHOLANGIOPANCREATOGRAPHY, PLACE TUBE/STENT;  Surgeon: Baldomero Zacarias MD;  Location: [facility]     ENDOSCOPIC RETROGRADE CHOLANGIOPANCREATOGRAM N/A 11/6/2015    Procedure: COMBINED ENDOSCOPIC RETROGRADE CHOLANGIOPANCREATOGRAPHY, REMOVE FOREIGN BODY OR STENT/TUBE;  Surgeon: Baldomero Zacarias MD;  Location:  OR     EXCISE LESION INTRAORAL  6/29/2011    Procedure:EXCISE LESION INTRAORAL; TRANSCERVICAL APPROACH TO LEFT PHARYNGEAL SPACE MASS REMOVAL ; Surgeon:BARBARA PHILLIPS; Location: OR      VASCULAR SURGERY PROCEDURE UNLIST  12/29/15     LAPAROSCOPIC CHOLECYSTECTOMY N/A 8/23/2015    Procedure: LAPAROSCOPIC CHOLECYSTECTOMY;  Surgeon: Kvng Witt MD;  [facility]     LARYNGOSCOPY WITH BIOPSY(IES)  6/18/2014    Procedure: LARYNGOSCOPY WITH BIOPSY(IES);  Surgeon: Barbara Phillips MD;  Location:  OR     LASER CO2 LARYNGOSCOPY  12/7/2011    Procedure:LASER CO2 LARYNGOSCOPY; Micro-Direct Laryngoscopy with CO2 Laser Standby / Excision Tracheal Grandulization, Trach Tube Change / Kenalog application. / Balloon Dilation of Subglottic Stenosis.*Latex Safe Room* Trach Tube = Shiley 6.4mm I.D. / 10.8MM O.D. / 76MM  Cuffless.; Surgeon:BARBARA PHILLIPS; Location: OR     ORTHOPEDIC SURGERY  5/2016    Broken Right hand & Fracture Right shoulder     TRACHEOSTOMY  4/22/2011    Procedure:TRACHEOSTOMY; possible awake; Surgeon:BARBARA PHILLIPS; [facility]     TRACHEOSTOMY  6/29/2011    Procedure:TRACHEOSTOMY; REMOVE AND REPLACE SHILEY 6 XLT; Surgeon:BARBARA PHILLIPS;  [facility]     VASCULAR SURGERY  [facility]     Family History   Adopted: Yes   Problem Relation Age of Onset     Family History Negative Other         Does not know family     Unknown/Adopted No family hx of      Social History     Tobacco Use     Smoking status: Never     Smokeless tobacco: Never     Tobacco comments:     Never started   Substance Use Topics     Alcohol use: No     Alcohol/week: 0.0 standard drinks     Comment: rarely     Drug use: No     Current Outpatient Medications   Medication Sig Dispense Refill     albuterol (PROAIR HFA/PROVENTIL HFA/VENTOLIN HFA) 108 (90 Base) MCG/ACT inhaler Inhale 2 puffs into the lungs every 4 hours as needed for shortness of breath / dyspnea or wheezing 18 g 11     amitriptyline (ELAVIL) 100 MG tablet Take 1 tablet (100 mg) by mouth At Bedtime 90 tablet 3     azelaic acid (FINACIA) 15 % external gel Apply once daily as needed for rash on the face 50 g 11     capsaicin (ZOSTRIX) 0.025 % external cream Apply 1 Dose topically 3 times daily as needed (knee pain) 237 mL 0     CETAPHIL (CETAPHIL) lotion Apply topically 2 times daily       cetirizine (ZYRTEC) 10 MG tablet Take 1 tablet (10 mg) by mouth daily 30 tablet 11     COMPRESSION STOCKINGS 20-30 mmHg knee high compression stockings.  Measure and fit.  Style and brand per patient preference. 2 each 0     fluocinolone (SYNALAR) 0.01 % solution Apply 10-15 drops to scalp at nighttime before bed as needed for scalp itch 90 mL 11     fluticasone (FLONASE) 50 MCG/ACT nasal spray Spray 1 spray into both nostrils daily 18.2 mL 11     furosemide (LASIX) 20 MG tablet Take 3 tablets (60 mg) by mouth 2 times daily 540 tablet 3     ketoconazole (NIZORAL) 2 % external shampoo APPLY TO AFFECTED AREA AND WASH OFF AFTER 5 MINUTES 120 mL 3     levalbuterol (XOPENEX) 1.25 MG/3ML neb solution Take 3 mLs (1.25 mg) by nebulization every 4 hours as needed for shortness of breath / dyspnea or wheezing 90 mL 1     levothyroxine  "(SYNTHROID/LEVOTHROID) 50 MCG tablet Take 1 tablet (50 mcg) by mouth daily 60 tablet 1     naltrexone (DEPADE/REVIA) 50 MG tablet Take 1/2 tablet week 1 before supper; week 2 and after, take a full tablet then to help with portion reduction at supper and avoiding eating between supper and bed, as tolerated 30 tablet 4     order for DME Equipment being ordered: compression stockings    Needs to be faxed to 1 Device 0     order for DME Compression stockings pair, patient preference    To be used 8 hours per day 1 Device 3     order for DME Equipment being ordered: compression stockings 2 each 0     phentermine (ADIPEX-P) 37.5 MG tablet Take 1 tablet (37.5 mg) by mouth every morning 90 tablet 1     topiramate (TOPAMAX) 100 MG tablet Take 2 tablets (200 mg) by mouth 2 times daily 360 tablet 3     traMADol (ULTRAM) 50 MG tablet Take 1 po PRN recovery from pain at HS or after physical therapy. Do not take to enable activity. 30 tablet 0     traZODone (DESYREL) 100 MG tablet Take 1 tablet (100 mg) by mouth At Bedtime Call clinic to schedule follow up appointment. [phone] 90 tablet 3     zolpidem (AMBIEN) 10 MG tablet Take 1 tablet (10 mg) by mouth nightly as needed for sleep 30 tablet 2        Allergies   Allergen Reactions     Fentanyl Itching     Patch     Meloxicam Other (See Comments)     Side pains.     Minocycline Rash     Patient reports adverse reaction was pigmentation which has resolved with drug discontinuation.         /74 (BP Location: Right arm, Patient Position: Sitting, Cuff Size: Adult Large)   Pulse 120   Ht 1.702 m (5' 7.01\")   SpO2 95%   BMI 63.06 kg/m    Physical Examination:    General:  Conversant, generally healthy appearing, no acute distress  Head: atraumatic  Eyes:  Pupils 2-3 mm, sclera white, EOM's full, conjunctiva moist, no periorbital swelling    Lungs:  Clear to auscultation throughout, no wheezes, rhonchi or rales. Speaks in short sentenced.  C/V:  Tachycardic and regular " rhythm, no murmurs, rubs or gallops.  Radial pulses 2+, equal and regular.  Neuro: Alert and oriented, face symmetric. Examined sitting in his wheel chair  M/S:   No knee joint swelling. Skin overlying knee joint was tender, no rashes or skin lesions  Extremities:  No peripheral edema, no digital cyanosis  Psych:  Alert and oriented. Appropriate affect.  Not psychomotor slowed.  No signs of anxiety or agitation.      A&P:    Chavez was seen today for recheck.    Diagnoses and all orders for this visit:    Bilateral leg pain  -     capsaicin (ZOSTRIX) 0.025 % external cream; Apply 1 Dose topically 3 times daily as needed (knee pain)    Tachycardia  -     EKG 12-lead complete w/read - Clinics      Recommend follow-up as scheduled on 02/22/23.   MRI knees and legs pending.      Patient seen and discussed with Dr. Donovan Wolfe.    Marvin Aguirre MD  Internal Medicine Resident PGY 3  Pager: [phone]

## 2023-02-02 NOTE — NURSING NOTE
Chavez Ca is a 52 year old male patient that presents today in clinic for the following:    Chief Complaint   Patient presents with     RECHECK     Bilateral knee pain, medication review     The patient's allergies and medications were reviewed as noted. A set of vitals were recorded as noted without incident. The patient does not have any other questions for the provider.    Usama Jewell, EMT at 12:07 PM on 2/2/2023

## 2023-02-03 LAB
ATRIAL RATE - MUSE: 112 BPM
DIASTOLIC BLOOD PRESSURE - MUSE: NORMAL MMHG
INTERPRETATION ECG - MUSE: NORMAL
P AXIS - MUSE: 48 DEGREES
PR INTERVAL - MUSE: 176 MS
QRS DURATION - MUSE: 94 MS
QT - MUSE: 340 MS
QTC - MUSE: 464 MS
R AXIS - MUSE: 108 DEGREES
SYSTOLIC BLOOD PRESSURE - MUSE: NORMAL MMHG
T AXIS - MUSE: 46 DEGREES
VENTRICULAR RATE- MUSE: 112 BPM

## 2023-02-04 ENCOUNTER — ANCILLARY PROCEDURE (OUTPATIENT)
Dept: MRI IMAGING | Facility: CLINIC | Age: 53
End: 2023-02-04
Attending: PEDIATRICS
Payer: COMMERCIAL

## 2023-02-04 DIAGNOSIS — M79.604 BILATERAL LEG PAIN: ICD-10-CM

## 2023-02-04 DIAGNOSIS — M79.605 BILATERAL LEG PAIN: ICD-10-CM

## 2023-02-04 PROCEDURE — 73718 MRI LOWER EXTREMITY W/O DYE: CPT | Mod: RT | Performed by: RADIOLOGY

## 2023-02-06 ENCOUNTER — TELEPHONE (OUTPATIENT)
Dept: INTERNAL MEDICINE | Facility: CLINIC | Age: 53
End: 2023-02-06
Payer: COMMERCIAL

## 2023-02-06 NOTE — TELEPHONE ENCOUNTER
[facility] Call Center    Phone Message    May a detailed message be left on voicemail: yes     Reason for Call: Other: Patient had attempted an MRI on Saturday, February 4th. He is calling upset stating that they weren't able to do the MRI because they couldn't get him in the machine far enough, after the patient called and verified that he wouldn't have to go in all the way. He was told the MRI didn't turn out and was bad. Patient would like a call back to discuss.      Action Taken: Message routed to:  [facility] ([facility]    Travel Screening: Not Applicable

## 2023-02-07 NOTE — TELEPHONE ENCOUNTER
Please return call. BIBIANA, he has had some other frustrations also.  -- MRI of the right lower leg looked ok, no signs of cause for pain (which is more or less what we were hoping for, that the problem is his knees, small fiber neuropathy, and deconditioning because of those problems and his weight).  -- It is important to have his MRIs done before he sees Dr. Zurita later this month. There is an MRI facility ([facility], as I recall) that can handle patients of his size. Barbara Carroll may recall the location if no one at [facility] does. The last patient I had go there found the location themselves.

## 2023-02-07 NOTE — TELEPHONE ENCOUNTER
Called [facility]- they could accommodate a larger scanner and open sided MRI. Will send orders. Called patient- he had gone to them before and did what sounds like an upright MRI, eventually had it done at [facility] in [place] but had insurance issues. Patient ok with trying [facility]. Will fax refs. Patient will contact us with issues. Patient has not heard back from [employer] for the neb supplies but will follow up with them    Arnulfo Kaur RN on 2/7/2023 at 12:21 PM

## 2023-02-08 ENCOUNTER — OFFICE VISIT (OUTPATIENT)
Dept: SLEEP MEDICINE | Facility: CLINIC | Age: 53
End: 2023-02-08
Attending: OTOLARYNGOLOGY
Payer: COMMERCIAL

## 2023-02-08 VITALS
HEART RATE: 116 BPM | HEIGHT: 67 IN | OXYGEN SATURATION: 95 % | WEIGHT: 315 LBS | SYSTOLIC BLOOD PRESSURE: 137 MMHG | DIASTOLIC BLOOD PRESSURE: 88 MMHG | RESPIRATION RATE: 18 BRPM | BODY MASS INDEX: 49.44 KG/M2

## 2023-02-08 DIAGNOSIS — G47.33 OSA (OBSTRUCTIVE SLEEP APNEA): ICD-10-CM

## 2023-02-08 PROCEDURE — 99214 OFFICE O/P EST MOD 30 MIN: CPT | Performed by: INTERNAL MEDICINE

## 2023-02-08 RX ORDER — CARBAMAZEPINE 200 MG/1
1 TABLET ORAL 2 TIMES DAILY
COMMUNITY
Start: 2022-02-12 | End: 2023-04-04

## 2023-02-08 ASSESSMENT — SLEEP AND FATIGUE QUESTIONNAIRES
HOW LIKELY ARE YOU TO NOD OFF OR FALL ASLEEP IN A CAR, WHILE STOPPED FOR A FEW MINUTES IN TRAFFIC: HIGH CHANCE OF DOZING
HOW LIKELY ARE YOU TO NOD OFF OR FALL ASLEEP WHILE LYING DOWN TO REST IN THE AFTERNOON WHEN CIRCUMSTANCES PERMIT: HIGH CHANCE OF DOZING
HOW LIKELY ARE YOU TO NOD OFF OR FALL ASLEEP WHILE WATCHING TV: HIGH CHANCE OF DOZING
HOW LIKELY ARE YOU TO NOD OFF OR FALL ASLEEP WHILE SITTING AND READING: HIGH CHANCE OF DOZING
HOW LIKELY ARE YOU TO NOD OFF OR FALL ASLEEP WHILE SITTING INACTIVE IN A PUBLIC PLACE: HIGH CHANCE OF DOZING
HOW LIKELY ARE YOU TO NOD OFF OR FALL ASLEEP WHILE SITTING AND TALKING TO SOMEONE: HIGH CHANCE OF DOZING
HOW LIKELY ARE YOU TO NOD OFF OR FALL ASLEEP WHEN YOU ARE A PASSENGER IN A CAR FOR AN HOUR WITHOUT A BREAK: HIGH CHANCE OF DOZING
HOW LIKELY ARE YOU TO NOD OFF OR FALL ASLEEP WHILE SITTING QUIETLY AFTER LUNCH WITHOUT ALCOHOL: HIGH CHANCE OF DOZING

## 2023-02-08 NOTE — PROGRESS NOTES
Name: Chavez Ca MRN# [id number]   Age: 52 year old YOB: 1970     Date of Consultation: February 8, 2023  Consultation is requested by: Zafar Glasgow MD  [address] [place] [id number] Zafar Glasgow  Primary care provider: Chavez Milligan       Reason for Sleep Consult:       Chavez Ca main reason for visit in his own words: I have a severe issue with insomnia and narcolepsy and other major issues that have not been properly dealt with over the past several years So Dr Glasgow is circling back around to see if you come up with new option So he dont hear me say I want surgery  Patient states problem(s) started: 2008  Chavez Ca's goals for this visit: I want my life back You Be Honest Dont be like past sleep Dr not tell me anything but she will set everything up since she knew the Dr I wait then she mails me a list of Dr and places to go No info on why i am going what  kind Dr since there was few kind           Assessment and Plan:     Summary Sleep Diagnoses:    Insomnia complaint (ROMAN 24)    Hypersomnolence (ESS 15)    Obstructive sleep apnea current nature and severity not yet determined      Comorbid Diagnoses:    Severe obesity BMI 63 with risk of hypoventilation but relatively preserved bicarbonates and remote normal PaCO2 2011    Chronic dyspnea with dependent edema (relatively normal echo 2021)    Moderate persistent asthma (reduced vital capacity with normal FEV1/vital capacity and lung volumes in response to bronchodilator)    Chronic pain    Depression    Poor dentition with substantial missing upper teeth    Summary Recommendations(things to be done):    Polysomnography with CO2 monitoring baseline and split-night with CPAP titration using elevation of the head of the bed at least 20 to 30 degrees and low-profile nasal mask even pillow mask if possible    Patient to take his regular medications at the sleep center before going to sleep  including his zolpidem/trazodone    Summary Counseling (items discussed including some accommodations): This gentleman understands that he may have much more severe sleep apnea and possibly respiratory failure at this point related to his 150 pound weight gain since most recent testing.  The addition of tramadol for his pain may also complicate his sleep-related sleep problems with central apneas and/or hypoventilation.  He also understands that management with mandibular advancement device or surgical interventions would not be recommended in this setting.  This leaves positive airway pressure is the most appropriate approach.  A titration study with his preferred elevation of bed and lower profile nasal pillow mask would be helpful with some adaptation on the night of the study given his prior intolerance to only 5 cm of water with a full facemask.  He is also somewhat reluctant to use a chinstrap.                 HISTORY PRESENT ILLNESS:     Chavez Ca is a 52 year old year old with a previous history of mild to moderate obstructive sleep apnea and severe insomnia complaint with ROMAN 24 and perception of sleep between 3 and half and 4-1/2 hours with chronic back and knee pain as well as active mind.  This patient has gained approximately 150 pounds developed increasing peripheral edema and dyspnea on exertion with somewhat irregular sleep pattern with snoring and gasping to wakefulness despite elevation of the head of the bed throughout the night.  He has headaches on most mornings and certainly is at risk for hypoventilation although previous sleep studies in 2014 prior to his weight gain showed no hypoxemia or hypoventilation although attempted titration study was complicated by intolerance to fullface mask with sensitive to high pressure at only 5 cm of water.        PREVIOUS SLEEP TESTING:  DATE: 8/10/2017 body weight uncertain  533-minute type III recording  Respiratory event index 13; oxygen  desaturation index 12 with 7.5 minutes less than 88%  [facility] date: 2012 weight 350 pounds AHI 25.9  [facility] date: 2014 weight 258 pounds AHI 14.2 2.5 minutes of hypoxemia less than or equal to 88%    Weight gain last 10 years 280# --->405#         SLEEP-WAKE SCHEDULE:      Work/School Days: Patient goes to school/work: No   Usually gets into bed at 10PM if i have Dr next day Midnight all other nights is what Dr said stick with  Takes patient about 15 minutes half hour with the slleeping pills sometimes longer and I just give up and get up do something untill I get where I cant to fall asleep  Has trouble falling asleep Almost every night Unless I overdo it that day walking or doing things around house I sometimes push myself even if hurts nights per week  Wakes up in the middle of the night every night every half hour sometimes it can be every 15 minutes but my mind would think it was many hours if i would not ask Saira the time times.  Wakes up due to Snorting self awake, Pain, Nightmares, Other  He has trouble falling back asleep not often as I used to now that I dont get mad I just think positive and sit back down put my feet up and close my eyes and repeat many times through the night times a week.   It usually takes 10 to 15 minutes I think to get back to sleep  Patient is usually up at Alway 6AM but there has been times my body lets me sleep in no interruptions and 8AM feels good not wake up every half hour on them rare yang days only 4 this year  Uses alarm: Yes    In bed at 10 PM and up with alarm at 6 AM with sleep latency 15 minutes with sleep promoting agent with recurrent awakenings and active mind    Weekends/Non-work Days/All Other Days:  Usually gets into bed at You are new Dr to see me so I dont know if you read my info so i will explain I dont sleep flat So I am 80% always in my chair so I am most always in my chair that is not a recliner  that dont recline but the foot rest recline so my  legs and feet are up   Takes patient about 10 to 15 minutes to fall asleep  Patient is usually up at 6AM  Uses alarm: Yes    Sleep Need  Patient gets  3 1/2 hours to 4 1/2 hours out of a 6 hour night sleep on average   Patient thinks he needs about I need a full 8 hour no interruptions and even with my sleeping pills I have not been able to get that sleep    Chavez Ca prefers to sleep in this position(s): Recliner   Patient states they do the following activities in bed: Watch TV, Use phone, computer, or tablet    Naps  Patient takes a purposeful nap 7 days Trying my best not to but  with my other medical issues when I finalay cant keep getteing up and down to move around so I am doing things my body takes a nap as soon as i sit down even if i didnt feel tired i might of been in pain but not tired times a week and naps are usually 2 minutes my friend told me  and up to hour in duration  He feels better after a nap: No  He dozes off unintentionally 7 days every day days per week  Patient has had a driving accident or near-miss due to sleepiness/drowsiness: No      SLEEP DISRUPTIONS:      Breathing/Snoring    Snoring:Yes  Other people complain about his snoring: No  Others observehe stops breathing in his sleep: Yes  He has issues with the following: Morning mouth dryness, Stuffy nose when you wake up      Movement:    Pain, discomfort, with an urge to move:  Yes  Happens when he is resting:  Yes  Happens more at night:  Yes  Patient has been told he kicks his legs at night:  Yes                       Chavez Ca has experienced the following behaviors while sleeping: Recurring Nightmares, Kicking or punching, Waking up paralyzed  He has experienced sudden muscle weakness during the day: Yes      Is there anything else you would like your sleep provider to know: I want this appointment to be more then 5 to 15 minutes I want this Dr to take the time to deal with this issue Since Dr Glasgow has kept  saying no to me for surgery for 8 years now        CAFFEINE AND OTHER SUBSTANCES:    Patient consumes caffeinated beverages per day:  I drink at least 1 to 3 gallons of water every day I drink a 12 oz can of diet coke a day  Last caffeine use is usually: 12 PM to 4 PM  List of any prescribed or over the counter stimulants that patient takes:    List of any prescribed or over the counter sleep medication patient takes: zolpidem, trazodone and amitriptyline  List of previous sleep medications that patient has tried:    Patient drinks alcohol to help them sleep: No  Patient drinks alcohol near bedtime: No    Family History:  Patient has a family member been diagnosed with a sleep disorder: No                 SCALES:       EPWORTH SLEEPINESS SCALE      Douglas Sleepiness Scale ( JACQUELINE Hamilton  [id number]<br>ESS - [place]/English - Final version - 21 Nov 07 - [facility] [place].) 8/14/2018   Douglas Score (Sleep) 15         INSOMNIA SEVERITY INDEX (ROMAN)      Insomnia Severity Index (ROMAN) 10/25/2022   Difficulty falling asleep 3   Difficulty staying asleep 4   Problems waking up too early 4   How SATISFIED/DISSATISFIED are you with your CURRENT sleep pattern? 1   How NOTICEABLE to others do you think your sleep problem is in terms of impairing the quality of your life? 4   How WORRIED/DISTRESSED are you about your current sleep problem? 4   To what extent do you consider your sleep problem to INTERFERE with your daily functioning (e.g. daytime fatigue, mood, ability to function at work/daily chores, concentration, memory, mood, etc.) CURRENTLY? 4   ROMAN Total Score 24       Guidelines for Scoring/Interpretation:  Total score categories:  0-7 = No clinically significant insomnia   8-14 = Subthreshold insomnia   15-21 = Clinical insomnia (moderate severity)  22-28 = Clinical insomnia (severe)  Used via courtesy of www.myhealth.va.gov with permission from Fortino Galeano PhD., [facility]      STOP BANG     STOP BANG  "Questionnaire (  2008, the American Society of Anesthesiologists, Inc. [employer] 2/2/2023   Neck Cir (cm) Clinic: -   B/P Clinic: 115/74   BMI Clinic: -         GAD7    KAIN-7  4/21/2022   1. Feeling nervous, anxious, or on edge 2   2. Not being able to stop or control worrying 2   3. Worrying too much about different things 2   4. Trouble relaxing 2   5. Being so restless that it is hard to sit still 2   6. Becoming easily annoyed or irritable 2   7. Feeling afraid, as if something awful might happen 3   KAIN-7 Total Score 15   If you checked any problems, how difficult have they made it for you to do your work, take care of things at home, or get along with other people? Extremely difficult         CAGE-AID    No flowsheet data found.    CAGE-AID reprinted with permission from the [place] [employer] Journal, OMER Kelsey. and NUNO Chen, \"Conjoint screening questionnaires for alcohol and drug abuse\" [place] Medical Journal 94: 135-140, 1995.      PATIENT HEALTH QUESTIONNAIRE-9 (PHQ - 9)    PHQ-9 ([employer]) 4/21/2022   No Interest In Doing Things -   Feeling Depressed -   Trouble Sleeping -   Tired / No Energy -   No appetite or Over-Eating -   Feeling Bad about Self -   Trouble Concentrating -   Moving Slow or Restless -   Suicidal Thoughts -   Total Score -   1.  Little interest or pleasure in doing things 2   2.  Feeling down, depressed, or hopeless 2   3.  Trouble falling or staying asleep, or sleeping too much 1   4.  Feeling tired or having little energy 3   5.  Poor appetite or overeating 1   6.  Feeling bad about yourself 2   7.  Trouble concentrating 2   8.  Moving slowly or restless 3   9.  Suicidal or self-harm thoughts 0   PHQ-9 Total Score 16   Difficulty at work, home, or with people Extremely dIfficult   1.  Little interest or pleasure in doing things -   2.  Feeling down, depressed, or hopeless -   3.  Trouble falling or staying asleep, or sleeping too much -   4.  Feeling " tired or having little energy -   5.  Poor appetite or overeating -   6.  Feeling bad about yourself -   7.  Trouble concentrating -   8.  Moving slowly or restless -   9.  Suicidal or self-harm thoughts -   PHQ-9 via [facility] TOTAL SCORE-----> -   Difficulty at work, home, or with people -       Developed by Margret Morgan, Ashwini Tena, Roberto Celeste and colleagues, with an educational harriet from [employer] No permission required to reproduce, translate, display or distribute.        Allergies:    Allergies   Allergen Reactions     Fentanyl Itching     Patch     Meloxicam Other (See Comments)     Side pains.     Minocycline Rash     Patient reports adverse reaction was pigmentation which has resolved with drug discontinuation.            Problem List:     Patient Active Problem List   Diagnosis     Physical deconditioning     Trigeminal neuralgia     VERN (obstructive sleep apnea)     Idiopathic edema     Depressive disorder, not elsewhere classified     Subglottic stenosis     Hyperlipidemia associated with prediabetes     Arthritis of both hands     Idiopathic progressive polyneuropathy     Varicose veins of left lower extremity     Pain in both wrists     Chronic pain of both shoulders     Neck pain, chronic     Seasonal allergies     Osteoarthritis of spine with radiculopathy, lumbar region     Morbid obesity (H)     Condyloma acuminata     Hypokalemia     Poor dentition     Benign positional vertigo     Dyspnea, multifactorial     Insomnia due to medical condition     Bilateral leg pain     Skin pain     Facial rash (possible roseacea?)     Seborrheic dermatitis of scalp     Intertrigo of adipose folds     Sternocostal pain     Prediabetes     Moderate persistent asthma without complication     Primary mild osteoarthritis of both knees     Microalbuminuria     Elevated TSH            MEDICATIONS:     Current Outpatient Medications   Medication Sig Dispense Refill     albuterol (PROAIR  HFA/PROVENTIL HFA/VENTOLIN HFA) 108 (90 Base) MCG/ACT inhaler Inhale 2 puffs into the lungs every 4 hours as needed for shortness of breath / dyspnea or wheezing 18 g 11     amitriptyline (ELAVIL) 100 MG tablet Take 1 tablet (100 mg) by mouth At Bedtime 90 tablet 3     azelaic acid (FINACIA) 15 % external gel Apply once daily as needed for rash on the face 50 g 11     capsaicin (ZOSTRIX) 0.025 % external cream Apply 1 Dose topically 3 times daily as needed (knee pain) 237 mL 0     CETAPHIL (CETAPHIL) lotion Apply topically 2 times daily       cetirizine (ZYRTEC) 10 MG tablet Take 1 tablet (10 mg) by mouth daily 30 tablet 11     COMPRESSION STOCKINGS 20-30 mmHg knee high compression stockings.  Measure and fit.  Style and brand per patient preference. 2 each 0     fluocinolone (SYNALAR) 0.01 % solution Apply 10-15 drops to scalp at nighttime before bed as needed for scalp itch 90 mL 11     fluticasone (FLONASE) 50 MCG/ACT nasal spray Spray 1 spray into both nostrils daily 18.2 mL 11     furosemide (LASIX) 20 MG tablet Take 3 tablets (60 mg) by mouth 2 times daily 540 tablet 0     ketoconazole (NIZORAL) 2 % external shampoo APPLY TO AFFECTED AREA AND WASH OFF AFTER 5 MINUTES 120 mL 3     levalbuterol (XOPENEX) 1.25 MG/3ML neb solution Take 3 mLs (1.25 mg) by nebulization every 4 hours as needed for shortness of breath / dyspnea or wheezing 90 mL 1     levothyroxine (SYNTHROID/LEVOTHROID) 50 MCG tablet Take 1 tablet (50 mcg) by mouth daily 60 tablet 1     naltrexone (DEPADE/REVIA) 50 MG tablet Take 1/2 tablet week 1 before supper; week 2 and after, take a full tablet then to help with portion reduction at supper and avoiding eating between supper and bed, as tolerated 30 tablet 4     order for DME Equipment being ordered: compression stockings    Needs to be faxed to 1 Device 0     order for DME Compression stockings pair, patient preference    To be used 8 hours per day 1 Device 3     order for DME Equipment being  ordered: compression stockings 2 each 0     phentermine (ADIPEX-P) 37.5 MG tablet Take 1 tablet (37.5 mg) by mouth every morning 90 tablet 1     topiramate (TOPAMAX) 100 MG tablet Take 2 tablets (200 mg) by mouth 2 times daily 360 tablet 3     traMADol (ULTRAM) 50 MG tablet Take 1 po PRN recovery from pain at HS or after physical therapy. Do not take to enable activity. 30 tablet 0     traZODone (DESYREL) 100 MG tablet Take 1 tablet (100 mg) by mouth At Bedtime Call clinic to schedule follow up appointment. [phone] 90 tablet 3     zolpidem (AMBIEN) 10 MG tablet Take 1 tablet (10 mg) by mouth nightly as needed for sleep 30 tablet 2       Problem List:  Patient Active Problem List    Diagnosis Date Noted     Elevated TSH 01/26/2023     Priority: Medium     Microalbuminuria 01/01/2023     Priority: Medium     Unclear if related to HTN or diabetes.  Consider ARB.       Primary mild osteoarthritis of both knees 12/31/2022     Priority: Medium     See bilateral leg pain. Arthritis is mild compared to deconditioning. Mild degenerative joints with small joint effusions (12/9/22)         Moderate persistent asthma without complication 11/12/2022     Priority: Medium     Modest reactive airway disease on PFTs 11/10/22. Also has additional causes of dyspnea (including some restrictive disease from body habitus)       Facial rash (possible roseacea?) 08/09/2022     Priority: Medium     Seeing dermatology       Seborrheic dermatitis of scalp 08/09/2022     Priority: Medium     Ketoconazole shampoo used at varying times       Intertrigo of adipose folds 08/09/2022     Priority: Medium     Insomnia due to medical condition 05/28/2022     Priority: Medium     Multietiology, including pain  Trying zolpidem again.       Skin pain 01/01/2022     Priority: Medium     Skin pain 'like a fish ', heat sensation, and sweating sudden onset and very severe.   Helped with having a fan blowing on him most of the time.       Dyspnea,  multifactorial 05/04/2021     Priority: Medium     Multifactorial: mild reactive airways, subglottic stenosis,   Includes dyspnea on exertion and at rest.  He has trouble breathing with a variety of odors, chemicals, cat litter box, a bonfire across the street in his neighborhood.    PFTs 11/10/2022 show a mixture of features.  He has a variety of FLV findings. FVC and FEV1 both decreased but ratio intact, and both improved with albuterol.         Morbid obesity (H) 03/16/2021     Priority: Medium     Followed by Dr. Chang since 2012.  On topiramate/phentermine         Condyloma acuminata 03/16/2021     Priority: Medium     Right inguinal fold, shave biopsy       Hypokalemia 03/16/2021     Priority: Medium     Due to furosemide       Poor dentition 03/16/2021     Priority: Medium     Chronic pain of both shoulders 07/22/2016     Priority: Medium     Physical therapy, Ortho/shoulder  Shoulder XR 3/16/2022:   Mild to moderate degenerative changes of the AC joint  Shoulder MRI 6/18/2022:  --Moderate subscapularis tendinopathy, mild infraspinatus and supraspinatus tendinopathy  -- Mild subacromial subdeltoid bursitis       Neck pain, chronic 07/22/2016     Priority: Medium     Pain in both wrists 06/28/2016     Priority: Medium     EMG 6/4/2015 diagnosed bl carpal tunnel.  Saw Dr. Bethea who rec'd trial of splinting and possible carpal tunnel release       Varicose veins of left lower extremity 06/22/2016     Priority: Medium     12/29/15 - Left GSV ablation with varicose vein microphlebectomy       Sternocostal pain 03/08/2016     Priority: Medium     Nonexertional right side point chest pain, sharp and sudden, helped somewhat by pressure.       Prediabetes 03/08/2016     Priority: Medium     Max A1c was 6.0 (5/4/2021).       Osteoarthritis of spine with radiculopathy, lumbar region 01/11/2016     Priority: Medium     Needs MRI.  Progression mild multilevel degenerative changes most pronounced at L3-L4.        Arthritis of both hands 07/18/2015     Priority: Medium     Scattered mild degeneration and post-fracture deformities (11/16/22)       Hyperlipidemia associated with prediabetes 11/18/2014     Priority: Medium     Subglottic stenosis 06/27/2014     Priority: Medium     Complication of trach, placed 1/2012       Depressive disorder, not elsewhere classified 07/30/2012     Priority: Medium     Idiopathic progressive polyneuropathy 07/01/2012     Priority: Medium     Class: Chronic     Small fiber neuropathy - ENF density markedly reduced - see report 11/5/2012.  2012: small fiber neuropathy symptom onset.  2015: diagnosis was confirmed by biopsy.  2018: last appt with neurology. No new EMG needed.  (has many other causes of pain)  2022: needs other areas of pain assessed, then consider RV with neurology  Paroxysmal foot pain bilaterally with retained polymodal sensation, strength and reflexes.       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  With varicosities.  Followed with lymphedema clinic and Dr. Davies       Trigeminal neuralgia 10/21/2011     Priority: Medium     Physical deconditioning 08/26/2011     Priority: Medium     Due to obesity, pain, and worsening dyspnea.        Bilateral leg pain 04/22/2011     Priority: Medium     Several etiologies. Mild arthritis only on XR. Has a distal quadriceps insertional enthesophyte consistent with mechanical concerns. Also known small fiber neuropathy idiopathic.  PT referral. Needs in-person exam and consider if referral or MRI needed.       VERN (obstructive sleep apnea) 04/19/2011     Priority: Medium     Not able to use CPAP due to choking (has tried two devices).  Saw someone about a dental device, they have not been willing to fit because of dental fractures, and they want him to see the sleep provider again.      PSG [facility] 12/2010  Severe VERN    Oxygen Sly 51%  CPAP [id number]       Seasonal allergies 04/19/2011     Priority:  Medium     Benign positional vertigo 2011     Priority: Medium     never quite went away          Past Medical/Surgical History:  Past Medical History:   Diagnosis Date     Allergic rhinitis      Benign positional vertigo 2011    never quite went away     Bilateral carpal tunnel syndrome 07/18/2015     Chronic sinusitis 2017    I don't know Please look     Chronic tonsillitis      Depressive disorder      Depressive disorder, not elsewhere classified 07/30/2012     Gastro-oesophageal reflux disease      Hearing problem      Hyperlipidemia associated with prediabetes      Hypertension 2008     Learning disability      Mild intermittent asthma in adult without complication      Neck mass     parapharyngeal, benign     Obesity, Class III, BMI 40-49.9 (morbid obesity) (H)      Psychological factors associated with another disorder 08/21/2012     Recurrent otitis media 2016    might be what the pain in my ears     Reduced vision      Tinnitus 2016    I would hear a steady light buzzing sound and other can't     Trigeminal neuralgia      Weakness 08/26/2011     Past Surgical History:   Procedure Laterality Date     BIOPSY  2011    To figure out what kind of tumor I had     COLONOSCOPY N/A 10/16/2020    Procedure: INCOMPLETE COLONOSCOPY;  Surgeon: Ham Cherry MD;  Location:  OR     ENDOSCOPIC RETROGRADE CHOLANGIOPANCREATOGRAM N/A 8/27/2015    Procedure: COMBINED ENDOSCOPIC RETROGRADE CHOLANGIOPANCREATOGRAPHY, PLACE TUBE/STENT;  Surgeon: Baldomero Zacarias MD;  Location:  OR     ENDOSCOPIC RETROGRADE CHOLANGIOPANCREATOGRAM N/A 11/6/2015    Procedure: COMBINED ENDOSCOPIC RETROGRADE CHOLANGIOPANCREATOGRAPHY, REMOVE FOREIGN BODY OR STENT/TUBE;  Surgeon: Baldomero Zacarias MD;  Location:  OR     EXCISE LESION INTRAORAL  6/29/2011    Procedure:EXCISE LESION INTRAORAL; TRANSCERVICAL APPROACH TO LEFT PHARYNGEAL SPACE MASS REMOVAL ; Surgeon:EMELIA PHILLIPS; Location: OR      VASCULAR SURGERY  PROCEDURE UNLIST  12/29/15     LAPAROSCOPIC CHOLECYSTECTOMY N/A 8/23/2015    Procedure: LAPAROSCOPIC CHOLECYSTECTOMY;  Surgeon: Kvng Witt MD;  Location: [facility] OR     LARYNGOSCOPY WITH BIOPSY(IES)  6/18/2014    Procedure: LARYNGOSCOPY WITH BIOPSY(IES);  Surgeon: Barbara Manriquez MD;  Location: [facility] OR     LASER CO2 LARYNGOSCOPY  12/7/2011    Procedure:LASER CO2 LARYNGOSCOPY; Micro-Direct Laryngoscopy with CO2 Laser Standby / Excision Tracheal Grandulization, Trach Tube Change / Kenalog application. / Balloon Dilation of Subglottic Stenosis.*Latex Safe Room* Trach Tube = Shiley 6.4mm I.D. / 10.8MM O.D. / 76MM  Cuffless.; Surgeon:BARBARA MANRIQUEZ; Location:[facility] OR     ORTHOPEDIC SURGERY  5/2016    Broken Right hand & Fracture Right shoulder     TRACHEOSTOMY  4/22/2011    Procedure:TRACHEOSTOMY; possible awake; Surgeon:BARBARA MANRIQUEZ; Location:[facility]     TRACHEOSTOMY  6/29/2011    Procedure:TRACHEOSTOMY; REMOVE AND REPLACE SHILEY 6 XLT; Surgeon:BARBARA MANRIQUEZ; Location:[facility]     VASCULAR SURGERY  2008-[place]       Social History:  Social History     Socioeconomic History     Marital status: Single     Spouse name: Not on file     Number of children: Not on file     Years of education: Not on file     Highest education level: Not on file   Occupational History     Not on file   Tobacco Use     Smoking status: Never     Smokeless tobacco: Never     Tobacco comments:     Never started   Substance and Sexual Activity     Alcohol use: No     Alcohol/week: 0.0 standard drinks     Comment: rarely     Drug use: No     Sexual activity: Never   Other Topics Concern     Parent/sibling w/ CABG, MI or angioplasty before 65F 55M? Not Asked      Service Not Asked     Blood Transfusions Not Asked     Caffeine Concern Not Asked     Occupational Exposure Not Asked     Hobby Hazards Not Asked     Sleep Concern Not Asked     Stress Concern Not Asked     Weight Concern Not Asked      Special Diet Not Asked     Back Care Not Asked     Exercise Yes     Comment: 2-3x/wk     Bike Helmet Not Asked     Seat Belt Not Asked     Self-Exams Not Asked   Social History Narrative    Room 'no bigger than a [employer] cell,' the friend who owns the house recently got , and goes back and forth. He has been put in adult foster care before. The patient is single.  Has 1 child. Pt adopted at age 14.      Social Determinants of Health     Financial Resource Strain: Not on file   Food Insecurity: Not on file   Transportation Needs: Not on file   Physical Activity: Not on file   Stress: Not on file   Social Connections: Not on file   Intimate Partner Violence: Not on file   Housing Stability: Not on file       Family History:  Family History   Adopted: Yes   Problem Relation Age of Onset     Family History Negative Other         Does not know family     Unknown/Adopted No family hx of        Review of Systems:  A complete review of systems reviewed by me is negative with the exeption of what has been mentioned in the history of present illness.  In the last TWO WEEKS have you experienced any of the following symptoms?  Fevers: No  Night Sweats: No  Weight Gain: Yes  Pain at Night: Yes  Double Vision: No  Changes in Vision: No  Difficulty Breathing through Nose: Yes  Sore Throat in Morning: No  Dry Mouth in the Morning: Yes  Shortness of Breath Lying Flat: Yes  Shortness of Breath With Activity: Yes  Awakening with Shortness of Breath: Yes  Increased Cough: Yes  Heart Racing at Night: No  Swelling in Feet or Legs: Yes  Diarrhea at Night: No  Heartburn at Night: No  Urinating More than Once at Night: No  Losing Control of Urine at Night: No  Joint Pains at Night: Yes  Headaches in Morning: No  Weakness in Arms or Legs: Yes  Depressed Mood: Yes  Anxiety: Yes          Physical Examination:     Alert and in obvious pain oriented without asterixis chronic edema with stasis changes of the lower extremities  No cyanosis  but increased respiratory rate 18-20            Data: All pertinent previous laboratory data reviewed     Recent Labs   Lab Test 05/13/21  1516 05/06/21  1205 05/06/21  0602     --  139   POTASSIUM 3.4 4.0 3.6   CHLORIDE 104  --  110*   CO2 28  --  24   ANIONGAP 5  --  5   *  --  95   BUN 27  --  25   CR 1.15  --  1.08   SUKHDEV 9.5  --  9.1       Recent Labs   Lab Test 05/13/21  1516   WBC 7.2   RBC 4.95   HGB 14.5   HCT 43.7   MCV 88   MCH 29.3   MCHC 33.2   RDW 13.8          Recent Labs   Lab Test 05/13/21  1516   PROTTOTAL 8.1   ALBUMIN 3.9   BILITOTAL 0.5   ALKPHOS 77   AST 16   ALT 28       TSH   Date Value   01/24/2023 4.44 uIU/mL (H)   09/04/2020 3.95 mU/L   08/07/2018 5.91 mU/L (H)       Amphetamine Qual Urine (no units)   Date Value   01/12/2012     Negative   Cutoff for a negative amphetamine is 500 ng/mL or less.     Barbiturates Qual Urine (no units)   Date Value   01/12/2012     Negative   Cutoff for a negative barbiturate is 200 ng/mL or less.     Benzodiazepine Qual Urine (no units)   Date Value   01/12/2012     Negative   Cutoff for a negative benzodiazepine is 200 ng/mL or less.     Cannabinoids Qual Urine (no units)   Date Value   01/12/2012     Negative   Cutoff for a negative cannabinoid is 50 ng/mL or less.     Cocaine Qual Urine (no units)   Date Value   01/12/2012     Negative   Cutoff for a negative cocaine is 300 ng/mL or less.     Opiates Qualitative Urine (no units)   Date Value   01/12/2012     Negative   Cutoff for a negative opiate is 300 ng/mL or less.     PCP Qual Urine (no units)   Date Value   01/12/2012     Negative   Cutoff for a negative PCP is 25 ng/mL or less.       Ferritin   Date/Time Value Ref Range Status   05/04/2021 01:50  26 - 388 ng/mL Final       pH Arterial (pH)   Date Value   06/29/2011 7.40   06/29/2011 7.43     pO2 Arterial (mm Hg)   Date Value   06/29/2011 193 (H)   06/29/2011 187 (H)     pCO2 Arterial (mm Hg)   Date Value   06/29/2011 38    06/29/2011 35     Bicarbonate Arterial (mmol/L)   Date Value   06/29/2011 23   06/29/2011 23       @LABRCNTIPR(phv:4,pco2v:4,po2v:4,hco3v:4,zoie:4,o2per:4)@    Echocardiology: No results found for this or any previous visit (from the past 4320 hour(s)).    Chest x-ray: XR Chest 2 Views 11/16/2022    Narrative  Exam: XR CHEST 2 VIEWS, 11/16/2022 10:47 AM    Indication: dyspnea and asthma; Shortness of breath; Asthma,  unspecified asthma severity, unspecified whether complicated,  unspecified whether persistent    Comparison: Chest radiograph 7/21/2022    Findings:  PA and lateral chest radiographs. Trachea is midline.  Cardiomediastinal silhouette is within normal limits. Pulmonary  vasculature appears unremarkable. No focal airspace opacity, pleural  effusion or appreciable pneumothorax.    No acute osseous abnormality. Visualized upper abdomen is  unremarkable.    Impression  Impression: No acute abnormality identified.    DAVID MUJICA MD      SYSTEM ID:  [id number]      Chest CT: No results found for this or any previous visit from the past 365 days.      PFT: Most Recent [facility] Pulmonary Function Testing    FVC-Pred   Date Value Ref Range Status   11/10/2022 4.40 L      FVC-Pre   Date Value Ref Range Status   11/10/2022 3.08 L      FVC-%Pred-Pre   Date Value Ref Range Status   11/10/2022 70 %      FEV1-Pre   Date Value Ref Range Status   11/10/2022 2.37 L      FEV1-%Pred-Pre   Date Value Ref Range Status   11/10/2022 68 %      FEV1FVC-Pred   Date Value Ref Range Status   11/10/2022 79 %      FEV1FVC-Pre   Date Value Ref Range Status   11/10/2022 77 %      EAF1AYT-%Pred-Pre   Date Value Ref Range Status   11/05/2014 99 %      FEFMax-Pred   Date Value Ref Range Status   11/10/2022 9.01 L/sec      FEFMax-Pre   Date Value Ref Range Status   11/10/2022 5.86 L/sec      FEFMax-%Pred-Pre   Date Value Ref Range Status   11/10/2022 65 %      ExpTime-Pre   Date Value Ref Range Status   11/10/2022 6.68 sec       FIFMax-Pre   Date Value Ref Range Status   11/10/2022 3.73 L/sec      FEV1FEV6-Pred   Date Value Ref Range Status   11/10/2022 80 %      FEV1FEV6-Pre   Date Value Ref Range Status   11/10/2022 77 %      ACL4FEJ0-%Pred-Pre   Date Value Ref Range Status   11/05/2014 100 %          ABI HAMEED MD 2/8/2023     Total time spent reviewing medical records including previous testing and interpretation as well as direct patient contact and documentation on this date:

## 2023-02-08 NOTE — PATIENT INSTRUCTIONS
"      MY TREATMENT INFORMATION FOR SLEEP APNEA-  [place]    DOCTOR : ABI HAMEED MD    Am I having a sleep study at a sleep center?  --->Due to normal delays, you will be contacted within 2-4 weeks to schedule      Frequently asked questions:  1. What is Obstructive Sleep Apnea (VERN)? VERN is the most common type of sleep apnea. Apnea means, \"without breath.\"  Apnea is most often caused by narrowing or collapse of the upper airway as muscles relax during sleep.   Almost everyone has occasional apneas. Most people with sleep apnea have had brief interruptions at night frequently for many years.  The severity of sleep apnea is related to how frequent and severe the events are.   2. What are the consequences of VERN? Symptoms include: feeling sleepy during the day, snoring loudly, gasping or stopping of breathing, trouble sleeping, and occasionally morning headaches or heartburn at night.  Sleepiness can be serious and even increase the risk of falling asleep while driving. Other health consequences may include development of high blood pressure and other cardiovascular disease in persons who are susceptible. Untreated VERN  can contribute to heart disease, stroke and diabetes.   3. What are the treatment options? In most situations, sleep apnea is a lifelong disease that must be managed with daily therapy. Medications are not effective for sleep apnea and surgery is generally not considered until other therapies have been tried. Your treatment is your choice . Continuous Positive Airway (CPAP) works right away and is the therapy that is effective in nearly everyone. An oral device to hold your jaw forward is usually the next most reliable option. Other options include postioning devices (to keep you off your back), weight loss, and surgery including a tongue pacing device. There is more detail about some of these options below.  4. Are my sleep studies covered by insurance? Although we will request " verification of coverage, we advise you also check in advance of the study to ensure there is coverage.    Important tips for those choosing CPAP and similar devices   Know your equipment:  CPAP is continuous positive airway pressure that prevents obstructive sleep apnea by keeping the throat from collapsing while you are sleeping. In most cases, the device is  smart  and can slowly self-adjusts if your throat collapses and keeps a record every day of how well you are treated-this information is available to you and your care team.  BPAP is bilevel positive airway pressure that keeps your throat open and also assists each breath with a pressure boost to maintain adequate breathing.  Special kinds of BPAP are used in patients who have inadequate breathing from lung or heart disease. In most cases, the device is  smart  and can slowly self-adjusts to assist breathing. Like CPAP, the device keeps a record of how well you are treated.  Your mask is your connection to the device. You get to choose what feels most comfortable and the staff will help to make sure if fits. Here: are some examples of the different masks that are available:       Key points to remember on your journey with sleep apnea:  Sleep study.  PAP devices often need to be adjusted during a sleep study to show that they are effective and adjusted right.  Good tips to remember: Try wearing just the mask during a quiet time during the day so your body adapts to wearing it. A humidifier is recommended for comfort in most cases to prevent drying of your nose and throat. Allergy medication from your provider may help you if you are having nasal congestion.  Getting settled-in. It takes more than one night for most of us to get used to wearing a mask. Try wearing just the mask during a quiet time during the day so your body adapts to wearing it. A humidifier is recommended for comfort in most cases. Our team will work with you carefully on the first day and  will be in contact within 4 days and again at 2 and 4 weeks for advice and remote device adjustments. Your therapy is evaluated by the device each day.   Use it every night. The more you are able to sleep naturally for 7-8 hours, the more likely you will have good sleep and to prevent health risks or symptoms from sleep apnea. Even if you use it 4 hours it helps. Occasionally all of us are unable to use a medical therapy, in sleep apnea, it is not dangerous to miss one night.   Communicate. Call our skilled team on the number provided on the first day if your visit for problems that make it difficult to wear the device. Over 2 out of 3 patients can learn to wear the device long-term with help from our team. Remember to call our team or your sleep providers if you are unable to wear the device as we may have other solutions for those who cannot adapt to mask CPAP therapy. It is recommended that you sleep your sleep provider within the first 3 months and yearly after that if you are not having problems.   Use it for your health. We encourage use of CPAP masks during daytime quiet periods to allow your face and brain to adapt to the sensation of CPAP so that it will be a more natural sensation to awaken to at night or during naps. This can be very useful during the first few weeks or months of adapting to CPAP though it does not help medically to wear CPAP during wakefulness and  should not be used as a strategy just to meet guidelines.  Take care of your equipment. Make sure you clean your mask and tubing using directions every day and that your filter and mask are replaced as recommended or if they are not working.     BESIDES CPAP, WHAT OTHER THERAPIES ARE THERE?    Positioning Device  Positioning devices are generally used when sleep apnea is mild and only occurs on your back.This example shows a pillow that straps around the waist. It may be appropriate for those whose sleep study shows milder sleep apnea that  occurs primarily when lying flat on one's back. Preliminary studies have shown benefit but effectiveness at home may need to be verified by a home sleep test. These devices are generally not covered by medical insurance.  Examples of devices that maintain sleeping on the back to prevent snoring and mild sleep apnea.    Belt type body positioner  http://[employer]/    Electronic reminder  http://nightshifttherapy.com/            Oral Appliance  What is oral appliance therapy?  An oral appliance device fits on your teeth at night like a retainer used after having braces. The device is made by a specialized dentist and requires several visits over 1-2 months before a manufactured device is made to fit your teeth and is adjusted to prevent your sleep apnea. Once an oral device is working properly, snoring should be improved. A home sleep test may be recommended at that time if to determine whether the sleep apnea is adequately treated.       Some things to remember:  -Oral devices are often, but not always, covered by your medical insurance. Be sure to check with your insurance provider.   -If you are referred for oral therapy, you will be given a list of specialized dentists to consider or you may choose to visit the Web site of the American Academy of Dental Sleep Medicine  -Oral devices are less likely to work if you have severe sleep apnea or are extremely overweight.     More detailed information  An oral appliance is a small acrylic device that fits over the upper and lower teeth  (similar to a retainer or a mouth guard). This device slightly moves jaw forward, which moves the base of the tongue forward, opens the airway, improves breathing for effective treat snoring and obstructive sleep apnea in perhaps 7 out of 10 people .  The best working devices are custom-made by a dental device  after a mold is made of the teeth 1, 2, 3.  When is an oral appliance indicated?  Oral appliance therapy is  recommended as a first-line treatment for patients with primary snoring, mild sleep apnea, and for patients with moderate sleep apnea who prefer appliance therapy to use of CPAP4, 5. Severity of sleep apnea is determined by sleep testing and is based on the number of respiratory events per hour of sleep.   How successful is oral appliance therapy?  The success rate of oral appliance therapy in patients with mild sleep apnea is 75-80% while in patients with moderate sleep apnea it is 50-70%. The chance of success in patients with severe sleep apnea is 40-50%. The research also shows that oral appliances have a beneficial effect on the cardiovascular health of VERN patients at the same magnitude as CPAP therapy7.  Oral appliances should be a second-line treatment in cases of severe sleep apnea, but if not completely successful then a combination therapy utilizing CPAP plus oral appliance therapy may be effective. Oral appliances tend to be effective in a broad range of patients although studies show that the patients who have the highest success are females, younger patients, those with milder disease, and less severe obesity. 3, 6.   Finding a dentist that practices dental sleep medicine  Specific training is available through the American Academy of Dental Sleep Medicine for dentists interested in working in the field of sleep. To find a dentist who is educated in the field of sleep and the use of oral appliances, near you, visit the Web site of the American Academy of Dental Sleep Medicine.    References  1. Ana et al. Objectively measured vs self-reported compliance during oral appliance therapy for sleep-disordered breathing. Chest 2013; 144(5): 0108-9214.  2. Onur et al. Objective measurement of compliance during oral appliance therapy for sleep-disordered breathing. Thorax 2013; 68(1): 91-96.  3. Esteban et al. Mandibular advancement devices in 620 men and women with VERN and snoring:  tolerability and predictors of treatment success. Chest 2004; 125: 7205-6548.  4. Deandre et al. Oral appliances for snoring and VERN: a review. Sleep 2006; 29: 244-262.  5. Hugo et al. Oral appliance treatment for VERN: an update. J Clin Sleep Med 2014; 10(2): 215-227.  6. Zuleyma et al. Predictors of OSAH treatment outcome. J Dent Res 2007; 86: 0055-0468.      Weight Loss:    Weight loss is a long-term strategy that may improve sleep apnea in some patients.    Weight management is a personal decision and the decision should be based on your interest and the potential benefits.  If you are interested in exploring weight loss strategies, the following discussion covers the impact on weight loss on sleep apnea and the approaches that may be successful.    Being overweight does not necessarily mean you will have health consequences.  Those who have BMI over 35 or over 27 with existing medical conditions carries greater risk.   Weight loss decreases severity of sleep apnea in most people with obesity. For those with mild obesity who have developed snoring with weight gain, even 15-30 pound weight loss can improve and occasionally eliminate sleep apnea.  Structured and life-long dietary and health habits are necessary to lose weight and keep healthier weight levels.     Though there may be significant health benefits from weight loss, long-term weight loss is very difficult to achieve- studies show success with dietary management in less than 10% of people. In addition, substantial weight loss may require years of dietary control and may be difficult if patients have severe obesity. In these cases, surgical management may be considered.  Finally, older individuals who have tolerated obesity without health complications may be less likely to benefit from weight loss strategies.      [unfilled]    Surgery:    Surgery for obstructive sleep apnea is considered generally only when other therapies fail to work.  Surgery may be discussed with you if you are having a difficult time tolerating CPAP and or when there is an abnormal structure that requires surgical correction.  Nose and throat surgeries often enlarge the airway to prevent collapse.  Most of these surgeries create pain for 1-2 weeks and up to half of the most common surgeries are not effective throughout life.  You should carefully discuss the benefits and drawbacks to surgery with your sleep provider and surgeon to determine if it is the best solution for you.   More information  Surgery for VERN is directed at areas that are responsible for narrowing or complete obstruction of the airway during sleep.  There are a wide range of procedures available to enlarge and/or stabilize the airway to prevent blockage of breathing in the three major areas where it can occur: the palate, tongue, and nasal regions.  Successful surgical treatment depends on the accurate identification of the factors responsible for obstructive sleep apnea in each person.  A personalized approach is required because there is no single treatment that works well for everyone.  Because of anatomic variation, consultation with an examination by a sleep surgeon is a critical first step in determining what surgical options are best for each patient.  In some cases, examination during sedation may be recommended in order to guide the selection of procedures.  Patients will be counseled about risks and benefits as well as the typical recovery course after surgery. Surgery is typically not a cure for a person s VERN.  However, surgery will often significantly improve one s VERN severity (termed  success rate ).  Even in the absence of a cure, surgery will decrease the cardiovascular risk associated with OSA7; improve overall quality of life8 (sleepiness, functionality, sleep quality, etc).      Palate Procedures:  Patients with VERN often have narrowing of their airway in the region of their tonsils and  uvula.  The goals of palate procedures are to widen the airway in this region as well as to help the tissues resist collapse.  Modern palate procedure techniques focus on tissue conservation and soft tissue rearrangement, rather than tissue removal.  Often the uvula is preserved in this procedure. Residual sleep apnea is common in patient after pharyngoplasty with an average reduction in sleep apnea events of 33%2.      Tongue Procedures:  ExamWhile patients are awake, the muscles that surround the throat are active and keep this region open for breathing. These muscles relax during sleep, allowing the tongue and other structures to collapse and block breathing.  There are several different tongue procedures available.  Selection of a tongue base procedure depends on characteristics seen on physical exam.  Generally, procedures are aimed at removing bulky tissues in this area or preventing the back of the tongue from falling back during sleep.  Success rates for tongue surgery range from 50-62%3.    Hypoglossal Nerve Stimulation:  Hypoglossal nerve stimulation has recently received approval from the [place] Food and Drug Administration for the treatment of obstructive sleep apnea.  This is based on research showing that the system was safe and effective in treating sleep apnea6.  Results showed that the median AHI score decreased 68%, from 29.3 to 9.0. This therapy uses an implant system that senses breathing patterns and delivers mild stimulation to airway muscles, which keeps the airway open during sleep.  The system consists of three fully implanted components: a small generator (similar in size to a pacemaker), a breathing sensor, and a stimulation lead.  Using a small handheld remote, a patient turns the therapy on before bed and off upon awakening.    Candidates for this device must be greater than 18 years of age, have moderate to severe VERN (AHI between 15-65), BMI less than 35, have tried CPAP/oral  appliance for at least 8 weeks without success, and have appropriate upper airway anatomy (determined by a sleep endoscopy performed by Dr. Pastor Harrison).    Hypoglossal Nerve Stimulation Pathway:    The sleep surgeon s office will work with the patient through the insurance prior-authorization process (including communications and appeals).    Nasal Procedures:  Nasal obstruction can interfere with nasal breathing during the day and night.  Studies have shown that relief of nasal obstruction can improve the ability of some patients to tolerate positive airway pressure therapy for obstructive sleep apnea1.  Treatment options include medications such as nasal saline, topical corticosteroid and antihistamine sprays, and oral medications such as antihistamines or decongestants. Non-surgical treatments can include external nasal dilators for selected patients. If these are not successful by themselves, surgery can improve the nasal airway either alone or in combination with these other options.      Combination Procedures:  Combination of surgical procedures and other treatments may be recommended, particularly if patients have more than one area of narrowing or persistent positional disease.  The success rate of combination surgery ranges from 66-80%2,3.    References  Andrea RAMSEY. The Role of the Nose in Snoring and Obstructive Sleep Apnoea: An Update.  Eur Arch Otorhinolaryngol. 2011; 268: 1365-73.   Captorrey SM; Arnav JA; Rangel JR; Pallanch JF; Regis MB; Carleen SG; Royce PAEZ. Surgical modifications of the upper airway for obstructive sleep apnea in adults: a systematic review and meta-analysis. SLEEP 2010;33(10):9383-4066. Maeve DUDLEY. Hypopharyngeal surgery in obstructive sleep apnea: an evidence-based medicine review.  Arch Otolaryngol Head Neck Surg. 2006 Feb;132(2):206-13.  Henry YH1, Tara Y, Reese ANDREWS. The efficacy of anatomically based multilevel surgery for obstructive sleep apnea. Otolaryngol Head Neck Surg.  2003 Oct;129(4):327-35.  Kezirian E, Goldberg A. Hypopharyngeal Surgery in Obstructive Sleep Apnea: An Evidence-Based Medicine Review. Arch Otolaryngol Head Neck Surg. 2006 Feb;132(2):206-13.  Shannan WASSERMAN et al. Upper-Airway Stimulation for Obstructive Sleep Apnea.  N Engl J Med. 2014 Jan 9;370(2):139-49.  Yony Y et al. Increased Incidence of Cardiovascular Disease in Middle-aged Men with Obstructive Sleep Apnea. Am J Respir Crit Care Med; 2002 166: 159-165  Douglas EM et al. Studying Life Effects and Effectiveness of Palatopharyngoplasty (SLEEP) study: Subjective Outcomes of Isolated Uvulopalatopharyngoplasty. Otolaryngol Head Neck Surg. 2011; 144: 623-631.        WHAT IF I ONLY HAVE SNORING?    Mandibular advancement devices, lateral sleep positioning, long-term weight loss and treatment of nasal allergies have been shown to improve snoring.  Exercising tongue muscles with a game (Kappa Primettps://apps.[employer]/us/dhiraj/soundly-reduce-snoring/wc7437975178) or stimulating the tongue during the day with a device (https://doi.org/10.3390/hkh20530711) have improved snoring in some individuals.    Remember to Drive Safe... Drive Alive     Sleep health profoundly affects your health, mood, and your safety.  Thirty three percent of the population (one in three of us) is not getting enough sleep and many have a sleep disorder. Not getting enough sleep or having an untreated / undertreated sleep condition may make us sleepy without even knowing it. In fact, our driving could be dramatically impaired due to our sleep health. As your provider, here are some things I would like you to know about driving:     Here are some warning signs for impairment and dangerous drowsy driving:              -Having been awake more than 16 hours               -Looking tired               -Eyelid drooping              -Head nodding (it could be too late at this point)              -Driving for more than 30 minutes     Some things you could do to make  the driving safer if you are experiencing some drowsiness:              -Stop driving and rest              -Call for transportation              -Make sure your sleep disorder is adequately treated     Some things that have been shown NOT to work when experiencing drowsiness while driving:              -Turning on the radio              -Opening windows              -Eating any  distracting  /  entertaining  foods (e.g., sunflower seeds, candy, or any other)              -Talking on the phone      Your decision may not only impact your life, but also the life of others. Please, remember to drive safe for yourself and all of us.             Your BMI is Body mass index is 63.28 kg/m .    What is BMI?  Body mass index (BMI) is one way to tell whether you are at a healthy weight, overweight, or obese. It measures your weight in relation to your height.  A BMI of 18.5 to 24.9 is in the healthy range. A person with a BMI of 25 to 29.9 is considered overweight, and someone with a BMI of 30 or greater is considered obese.  Another way to find out if you are at risk for health problems caused by overweight and obesity is to measure your waist. If you are a woman and your waist is more than 35 inches, or if you are a man and your waist is more than 40 inches, your risk of disease may be higher.  More than two-thirds of American adults are considered overweight or obese. Being overweight or obese increases the risk for further weight gain.  Excess weight may lead to heart disease and diabetes. Creating and following plans for healthy eating and physical activity may help you improve your health.    Methods for maintaining or losing weight.  Weight control is part of healthy lifestyle and includes exercise, emotional health, and healthy eating habits.  Careful eating habits lifelong is the mainstay of weight control.  Though there are significant health benefits from weight loss, long-term weight loss with diet alone may be  very difficult to achieve- studies show long-term success with dietary management in less than 10% of people. Attaining a healthy weight may be especially difficult to achieve in those with severe obesity. In some cases, medications, devices and surgical management might be considered.    What can you do?  If you are overweight or obese and are interested in methods for weight loss, you should discuss this with your provider. In addition, we recommend that you review healthy life styles and methods for weight loss available through the National Institutes of Health patient information sites:   http://win.niddk.nih.gov/publications/index.htm

## 2023-02-08 NOTE — TELEPHONE ENCOUNTER
Patient calling to let Arnulfo know that he still hasn't received a call from [facility] to set up his MRI. He is requesting a phone call or message through [facility] to discuss.

## 2023-02-09 NOTE — TELEPHONE ENCOUNTER
Called [facility]- they have the refs and can scheduled. LVM with patient with their number. Mary: [phone]

## 2023-02-10 RX ORDER — PHENTERMINE HYDROCHLORIDE 37.5 MG/1
37.5 TABLET ORAL EVERY MORNING
Qty: 90 TABLET | Refills: 1 | Status: SHIPPED | OUTPATIENT
Start: 2023-02-10 | End: 2023-03-03

## 2023-02-15 ENCOUNTER — DOCUMENTATION ONLY (OUTPATIENT)
Dept: INTERNAL MEDICINE | Facility: CLINIC | Age: 53
End: 2023-02-15

## 2023-02-15 NOTE — PROGRESS NOTES
Type of Form Received: record request    Form Received (Date) 2/15/23   Form Filled out Yes 2/27/23   Placed in provider folder Yes

## 2023-02-20 ENCOUNTER — TELEPHONE (OUTPATIENT)
Dept: INTERNAL MEDICINE | Facility: CLINIC | Age: 53
End: 2023-02-20

## 2023-02-20 NOTE — TELEPHONE ENCOUNTER
LVM w pcc number requesting pt call to either change 2/22 visit w/ pcp to video/telephone OR reschedule for different day for in person visit. Provider is ill unable to see in person patients week of 2/20-2/25/23.

## 2023-02-21 ENCOUNTER — MYC MEDICAL ADVICE (OUTPATIENT)
Dept: INTERNAL MEDICINE | Facility: CLINIC | Age: 53
End: 2023-02-21
Payer: COMMERCIAL

## 2023-02-24 NOTE — TELEPHONE ENCOUNTER
Called [facility]- they confirmed per patients measurements none of the scans would work. They'd that we could try other hospitals in area, will get back to me with other suggestions.    Arnulfo Kaur RN on 2/24/2023 at 4:52 PM

## 2023-02-26 ENCOUNTER — MEDICAL CORRESPONDENCE (OUTPATIENT)
Dept: HEALTH INFORMATION MANAGEMENT | Facility: CLINIC | Age: 53
End: 2023-02-26
Payer: COMMERCIAL

## 2023-03-02 ENCOUNTER — MYC REFILL (OUTPATIENT)
Dept: INTERNAL MEDICINE | Facility: CLINIC | Age: 53
End: 2023-03-02
Payer: COMMERCIAL

## 2023-03-02 DIAGNOSIS — M47.26 OSTEOARTHRITIS OF SPINE WITH RADICULOPATHY, LUMBAR REGION: ICD-10-CM

## 2023-03-02 DIAGNOSIS — G60.3 IDIOPATHIC PROGRESSIVE POLYNEUROPATHY: Chronic | ICD-10-CM

## 2023-03-03 DIAGNOSIS — E66.813 CLASS 3 SEVERE OBESITY DUE TO EXCESS CALORIES WITH SERIOUS COMORBIDITY AND BODY MASS INDEX (BMI) OF 50.0 TO 59.9 IN ADULT (H): ICD-10-CM

## 2023-03-03 DIAGNOSIS — E66.01 CLASS 3 SEVERE OBESITY DUE TO EXCESS CALORIES WITH SERIOUS COMORBIDITY AND BODY MASS INDEX (BMI) OF 50.0 TO 59.9 IN ADULT (H): ICD-10-CM

## 2023-03-03 RX ORDER — PHENTERMINE HYDROCHLORIDE 37.5 MG/1
37.5 TABLET ORAL EVERY MORNING
Qty: 90 TABLET | Refills: 1 | Status: SHIPPED | OUTPATIENT
Start: 2023-03-03 | End: 2024-01-11

## 2023-03-03 NOTE — TELEPHONE ENCOUNTER
Reason for Call:  Medication or medication refill: Medication Refill     Do you use a [facility]?  Name of the pharmacy and phone number for the current request:  [place], [place] - [address] [place] [address] [phone]    Name of the medication requested: phentermine (ADIPEX-P) 37.5 MG tablet  Other request: Patient goes to [facility] Not [facility]     Can we leave a detailed message on this number? YES    Phone number patient can be reached at: Cell number on file:    Telephone Information:   Mobile [phone]       Best Time: Anytime     Call taken on 3/3/2023 at 9:20 AM by Daniela Hill

## 2023-03-03 NOTE — TELEPHONE ENCOUNTER
phentermine (ADIPEX-P) 37.5 MG tablet    Med is not on protocol.  Please send a new order to updated/alternative pharmacy for Pt care.    Thank you         Kusum Wise RN  Central Triage Red Flags/Med Refills

## 2023-03-04 ENCOUNTER — TELEPHONE (OUTPATIENT)
Dept: ENDOCRINOLOGY | Facility: CLINIC | Age: 53
End: 2023-03-04
Payer: COMMERCIAL

## 2023-03-04 NOTE — TELEPHONE ENCOUNTER
[facility] Prior Authorization Team Request    Medication: PHENTERMINE HCL 37.5MG TABS   Dosing:   NDC (required for Medicaid members):     Insurance   BIN: [id number]  PCN:   Grp:   ID: [id number]    CoverMyMeds Key (if applicable):     Additional documentation:     Filling Pharmacy: [facility]  Phone Number: [phone]  Contact: [facility] ([id number]) please send all responses to this pool.  Pharmacy NPI (required for Medicaid members): [id number]

## 2023-03-05 RX ORDER — TRAMADOL HYDROCHLORIDE 50 MG/1
TABLET ORAL
Qty: 30 TABLET | Refills: 3 | Status: SHIPPED | OUTPATIENT
Start: 2023-03-05 | End: 2023-11-03

## 2023-03-06 ENCOUNTER — OFFICE VISIT (OUTPATIENT)
Dept: INTERNAL MEDICINE | Facility: CLINIC | Age: 53
End: 2023-03-06
Payer: COMMERCIAL

## 2023-03-06 VITALS
HEART RATE: 125 BPM | OXYGEN SATURATION: 98 % | WEIGHT: 315 LBS | HEIGHT: 67 IN | DIASTOLIC BLOOD PRESSURE: 71 MMHG | BODY MASS INDEX: 49.44 KG/M2 | SYSTOLIC BLOOD PRESSURE: 128 MMHG

## 2023-03-06 DIAGNOSIS — M79.605 BILATERAL LEG PAIN: Primary | ICD-10-CM

## 2023-03-06 DIAGNOSIS — G47.01 INSOMNIA DUE TO MEDICAL CONDITION: ICD-10-CM

## 2023-03-06 DIAGNOSIS — E66.01 MORBID OBESITY (H): ICD-10-CM

## 2023-03-06 DIAGNOSIS — M79.604 BILATERAL LEG PAIN: Primary | ICD-10-CM

## 2023-03-06 DIAGNOSIS — M17.12 OSTEOARTHRITIS OF LEFT KNEE, UNSPECIFIED OSTEOARTHRITIS TYPE: ICD-10-CM

## 2023-03-06 PROCEDURE — 20610 DRAIN/INJ JOINT/BURSA W/O US: CPT | Mod: GC | Performed by: INTERNAL MEDICINE

## 2023-03-06 PROCEDURE — 99213 OFFICE O/P EST LOW 20 MIN: CPT | Mod: GC | Performed by: INTERNAL MEDICINE

## 2023-03-06 RX ORDER — TRIAMCINOLONE ACETONIDE 40 MG/ML
80 INJECTION, SUSPENSION INTRA-ARTICULAR; INTRAMUSCULAR ONCE
Status: COMPLETED | OUTPATIENT
Start: 2023-03-06 | End: 2023-03-06

## 2023-03-06 RX ORDER — ZOLPIDEM TARTRATE 10 MG/1
10 TABLET ORAL
Qty: 30 TABLET | Refills: 0 | Status: SHIPPED | OUTPATIENT
Start: 2023-03-06 | End: 2023-04-26

## 2023-03-06 RX ADMIN — TRIAMCINOLONE ACETONIDE 80 MG: 40 INJECTION, SUSPENSION INTRA-ARTICULAR; INTRAMUSCULAR at 11:00

## 2023-03-06 ASSESSMENT — PAIN SCALES - GENERAL: PAINLEVEL: WORST PAIN (10)

## 2023-03-06 NOTE — NURSING NOTE
Chavez Ca is a 52 year old male patient that presents today in clinic for the following:    Chief Complaint   Patient presents with     Knee Pain     L knee pain worse than R     Back Pain     The patient's allergies and medications were reviewed as noted. A set of vitals were recorded as noted without incident. The patient does not have any other questions for the provider.    Marlene Giron, EMT at 9:29 AM on 3/6/2023

## 2023-03-06 NOTE — PROGRESS NOTES
"                     PRIMARY CARE CENTER       SUBJECTIVE:  Chavez Ca is a 52 year old male with a PMHx of idiopathic progressive polyneuropathy, bilateral knee pain, history of trach, obstructive sleep apnea, patient presenting with bilateral knee pain. He was last seen in clinic on 2/2/23 for same concern and completed MR Tibia/fibula on 2/4/23 but has not completed other MRI of both knees and legs ordered due to not being able to fit in MRI machines because of his weight. Since his last visit, he reports worsening knee pain, worse on the left. No fevers. He was prescribed capsaicin cream but reports no improvement with its use. Also, he takes tramadol but reports no improvement. He states that the pain causes him to have decreased mobility at home, as he has not been able to get his walker approved; he gets around by holding onto the walls and other things at home. He states that he sometimes falls due to lack of adequate mobility. He has also been unable to go to the gym due to the pain. He has been seen by sports medicine for bilateral knee pain on 12/9/22. Pain is rated as ~10/10. He reports a long history of the knee pain but reports never having steroids injection in knees for pain.     He reports that he fell while going to the bathroom last night, his \"feet buckled\". He landed on his left side. No head trauma. No preceding lightheadedness    Medications and allergies reviewed by me today.     ROS:   Constitutional, neuro, ENT, endocrine, pulmonary, cardiac, gastrointestinal, genitourinary, musculoskeletal, integument and psychiatric systems are negative, except as otherwise noted.    OBJECTIVE:    /71 (BP Location: Right arm, Patient Position: Sitting, Cuff Size: Adult Large)   Pulse (!) 125   Ht 1.702 m (5' 7\")   Wt (!) 183.3 kg (404 lb 3.2 oz)   SpO2 98%   BMI 63.31 kg/m     Wt Readings from Last 1 Encounters:   03/06/23 (!) 183.3 kg (404 lb 3.2 oz)     General:  Conversant, " generally healthy appearing, no acute distress  Head: atraumatic  Eyes:  Pupils 2-3 mm, sclera white, EOM's full, conjunctiva moist, no periorbital swelling    Lungs:  Clear to auscultation throughout, no wheezes, rhonchi or rales. Speaks in short sentenced.  C/V:  Tachycardic and regular rhythm, no murmurs, rubs or gallops.  Radial pulses 2+, equal and regular.  Neuro: Alert and oriented, face symmetric. Examined sitting in his wheel chair  M/S:   No knee joint swelling. Skin overlying knee joint was tender, no rashes or skin lesions  Extremities:  No peripheral edema, no digital cyanosis  Psych:  Alert and oriented. Appropriate affect.  Not psychomotor slowed.  No signs of anxiety or agitation.     ASSESSMENT/PLAN:    Chavez was seen today for knee pain and back pain.    Diagnoses and all orders for this visit:    Bilateral knee pain  Morbid obesity (H)   Chronic bilateral knee pain with left greater than right. MRI ordered in the past but unable to complete due to size and not being able to fit in MRI machine. Knee pain likely multifactorial including neuropathic pain, OA with worsening due to morbid obesity, inactivity, and deconditioning. Less concern for acute etiologies such as gout or infectious etiology. Has tried tramadol, capsaicin cream without improvement. Was undergoing weight loss management but has actually had weight gain. Will definitely benefit from weight management. Has followed with Orthopedic clinic for knee pain and was last seen on 12/9/23.  - Knee injections today with kenalog  Procedure note: patient verbally consented to steroid injection procedure. Left knee was prepped with betadine after locating pocket medial to the left patella. 80cc of 1% lidocaine and 20cc of kenalog injected into the left knee. Patient tolerated procedure and no complications observed.   - Encourage weight loss and continuing walking program; may benefit from semaglutide injection for weight loss. Bariatric  *In progress*     60 year old female with PMHx of HTN, DM, hypothyroidism, RA (prednisone 10mg), pulmonary HTN, CHRIS, and COPD (3L at home) who was originally admitted on 12/15 for SBO s/p ex lap with lysis of adhesions (4 retention sutures) c/b 12/30 she eviscerated 2/2 a coughing episode s/p ex lap (6 retention sutures) c/b multifocal pneumonia, developed hypercapnic respiratory failure requiring intubation 1/6, s/p bronch 1/9/18, and extubated on 1/24, course further c/b Enterococci bacteremia now readmitted to MICU for hypoxic respiratory failure 2/2 atelectasis requiring intubation 01/31. Had grown ESBL in urine, now on ertapenem and levophed.     #Neuro:   - Extubated on 2/26   - Currently on fentanyl and precedex on low doses, switching to fentanyl patch    # Skin: TLC IJ line 2/24     #GI  - SBO s/p ex-lap with findings of healthy bowel c/b evisceration of bowel requiring placement of retention sutures  - NPO until can tolerate briefly off BIPAP for bedside speech assessment  - Continue Protonix    #Renal/Metabolic  - Monitor for electrolyte derangements in the setting of diuresis; replete as needed  - trend BMP for BUN/Cr  - Holding lasix and metolazone given previous pressor requirement. Overall net negative in last 24 hours, despite holding diuresis.   - Will need to readdress tomorrow if positive fluid balance.     #Endocrine  1) Diabetes Mellitus Type 2  - c/w lantus 25 qhs with sliding scale   - Gabapentin 400 TID held for now   - Endocrinology recommendations appreciated    2) Hypothyroidism   - c/w 100 mcg levothyroxine IV     3.) Rheumatoid arthritis:   - Continue solumedrol 10 daily, however monitor FSGs closely for hyperglycemia    # Volume status:   - Overall net negative, despite holding lasix 40 Q8 and metolazone    # Heme:  - Hemoglobin improved, likely from blood draws and bone marrow suppression  - s/p 1 unit pRBCs (2/20)  - WBC ct improving today    #DVT ppx  - Heparin 7500 SQ TID    #ID   - Continue ertapenem daily (started 2/18, day 9)   - Blood cultures NGTD, resending sputum culture  - Had exchanged iraheta when initially had ESBL about 1 week ago  - Off previous antibiotics as per discussion w/ ID - had 27 days of Vanc, 3 days of Ceftriaxone, and 8 days of Zosyn for enterococcal bacteremia and Klebsiella sputum culture   - Urine cx with ESBL 10-50k colonies and candida, potentially colonized  - C diff negative    #Cardiovascular  1) Previously reported to have cardiogenic shock. Overloaded currently  - Overall net positive in last 24 hours given hypotension  - Continue to hold home blood pressure medications  - c/w Simvastatin 20mg   - Holding lasix 40 Q8 with metolazone daily    #Respiratory   1) Hypoxic Respiratory Failure 2/2 atelectasis requiring intubation 1/31.   - Concern for prolonged intubation and deconditioning. Has had prolonged hospital course with multiple intubations. Previous bronchoscopy w/ findings concerning for hyperdynamic airway collapse.   - Wean PEEP; SBT  - C/w sildenafil 20 TID, now off NO.   - Continue solumedrol 10 mg daily   - Extubated on 2/26  - Ethics consult appreciated.     # Ethics:   - [facility] discussion ongoing with Palliative Care team. Pending reconsult.   - Family requesting updated HCP, however patient remains on fentanyl and precedex to keep her calm. Will readdress capacity if able to taper off sedation.   - [facility] opinion. Curtis Pisano plans to obtain accepting physician. Once accepting physician is obtained, primary team will need to call transfer center per policy.   - Curtis Pisano requests that no information be given to patient without himself present. surgery referral also placed  - Physical therapy referral    Tin Pulliam Jr., MD  Internal Medicine- PGY2  [facility]  Mar 6, 2023    Pt was seen and plan of care discussed with Dr. Aguirre.    Pt was seen and examined with Dr. Pulliam.  I agree with his documentation as noted above.    My additional comments: I was present throughout the procedure    Glen Aguirre MD        60 year old female with PMHx of HTN, DM, hypothyroidism, RA (prednisone 10mg), pulmonary HTN, CHRIS, and COPD (3L at home) who was originally admitted on 12/15 for SBO s/p ex lap with lysis of adhesions (4 retention sutures) c/b 12/30 she eviscerated 2/2 a coughing episode s/p ex lap (6 retention sutures) c/b multifocal pneumonia, developed hypercapnic respiratory failure requiring intubation 1/6, s/p bronch 1/9/18, and extubated on 1/24, course further c/b Enterococci bacteremia now readmitted to MICU for hypoxic respiratory failure 2/2 atelectasis requiring intubation 01/31. Had grown ESBL in urine, now on ertapenem and levophed.     #Neuro:   - Extubated on 2/26   - Currently on fentanyl and precedex on low doses, switching to fentanyl patch. Wean as tolerated.   - AxOx3    # Skin: TLC IJ line 2/24  - May need PIV     #GI  - SBO s/p ex-lap with findings of healthy bowel c/b evisceration of bowel requiring placement of retention sutures  - NPO until can tolerate briefly off BIPAP for bedside speech assessment  - Continue Protonix    #Renal/Metabolic  - Monitor for electrolyte derangements in the setting of diuresis; replete as needed  - trend BMP for BUN/Cr  - Holding lasix and metolazone given previous pressor requirement. Overall net negative in last 24 hours, despite holding diuresis.   - Will need to readdress tomorrow if positive fluid balance.     #Endocrine  1) Diabetes Mellitus Type 2  - c/w lantus 25 qhs with sliding scale. Doses have been held.   - Gabapentin 400 TID held for now   - Endocrinology recommendations appreciated    2) Hypothyroidism   - c/w 100 mcg levothyroxine IV     3.) Rheumatoid arthritis:   - Continue solumedrol 10 daily, however monitor FSGs closely for hyperglycemia    # Volume status:   - Overall net negative, despite holding lasix 40 Q8 and metolazone    # Heme:  - Hemoglobin improved, likely from blood draws and bone marrow suppression  - s/p 1 unit pRBCs (2/20)  - WBC ct improving today    #DVT ppx  - Heparin 7500 SQ TID    #ID   - Completed ertapenem  - Blood cultures NGTD, resending sputum culture  - Had exchanged iraheta when initially had ESBL about 1 week ago  - Off previous antibiotics as per discussion w/ ID - had 27 days of Vanc, 3 days of Ceftriaxone, and 8 days of Zosyn for enterococcal bacteremia and Klebsiella sputum culture   - Urine cx with ESBL 10-50k colonies and candida, potentially colonized  - C diff negative    #Cardiovascular  1) Previously reported to have cardiogenic shock. Overloaded currently  - Overall net positive in last 24 hours given hypotension  - Continue to hold home blood pressure medications  - c/w Simvastatin 20mg   - Holding lasix 40 Q8 with metolazone daily    #Respiratory   1) Hypoxic Respiratory Failure 2/2 atelectasis requiring intubation 1/31.   - Concern for prolonged intubation and deconditioning. Has had prolonged hospital course with multiple intubations. Previous bronchoscopy w/ findings concerning for hyperdynamic airway collapse.   - Now extubated on BIPAP 15/8. Will attempt high flow.   - C/w sildenafil 20 TID, now off NO.   - Continue solumedrol 10 mg daily   - Extubated on 2/26  - Ethics consult appreciated.     # Ethics:   - [facility] discussion ongoing with Palliative Care team. Pending reconsult.   - Family requesting updated HCP, however patient remains on fentanyl and precedex to keep her calm. Will readdress capacity if able to taper off sedation.   - Family requesting transfer to [facility] for second opinion. Curtis Norris plans to obtain accepting physician. Once accepting physician is obtained, primary team will need to call transfer center per policy.   - Curtis Pisano requests that no information be given to patient without himself present.

## 2023-03-08 ENCOUNTER — ANCILLARY PROCEDURE (OUTPATIENT)
Dept: CARDIOLOGY | Facility: CLINIC | Age: 53
End: 2023-03-08
Attending: PEDIATRICS
Payer: COMMERCIAL

## 2023-03-08 DIAGNOSIS — R06.00 DYSPNEA, UNSPECIFIED TYPE: ICD-10-CM

## 2023-03-08 PROCEDURE — 93321 DOPPLER ECHO F-UP/LMTD STD: CPT | Performed by: INTERNAL MEDICINE

## 2023-03-08 PROCEDURE — 93325 DOPPLER ECHO COLOR FLOW MAPG: CPT | Performed by: INTERNAL MEDICINE

## 2023-03-08 PROCEDURE — 99207 PR STATISTIC IV PUSH SINGLE INITIAL SUBSTANCE: CPT | Performed by: INTERNAL MEDICINE

## 2023-03-08 PROCEDURE — 93308 TTE F-UP OR LMTD: CPT | Performed by: INTERNAL MEDICINE

## 2023-03-08 RX ADMIN — Medication 5 ML: at 11:28

## 2023-03-08 NOTE — TELEPHONE ENCOUNTER
Central Prior Authorization Team   Phone: [phone]      PA Initiation    Medication: PHENTERMINE HCL 37.5MG TABS   Insurance Company: [employer] Part D - Phone [phone] Fax [phone]  Pharmacy Filling the Rx: [place], [place] - [address] [place] [address] [phone]  Filling Pharmacy Phone: [phone]  Filling Pharmacy Fax:    Start Date: 3/8/2023

## 2023-03-09 NOTE — TELEPHONE ENCOUNTER
PRIOR AUTHORIZATION DENIED    Medication: PHENTERMINE HCL 37.5MG TABS     Denial Date: 3/9/2023    Denial Rational:           Appeal Information:

## 2023-03-10 ENCOUNTER — OFFICE VISIT (OUTPATIENT)
Dept: ORTHOPEDICS | Facility: CLINIC | Age: 53
End: 2023-03-10
Payer: COMMERCIAL

## 2023-03-10 VITALS — HEIGHT: 67 IN | BODY MASS INDEX: 49.44 KG/M2 | WEIGHT: 315 LBS

## 2023-03-10 DIAGNOSIS — E66.813 CLASS 3 SEVERE OBESITY DUE TO EXCESS CALORIES WITH SERIOUS COMORBIDITY AND BODY MASS INDEX (BMI) OF 50.0 TO 59.9 IN ADULT (H): ICD-10-CM

## 2023-03-10 DIAGNOSIS — M25.562 CHRONIC PAIN OF BOTH KNEES: Primary | ICD-10-CM

## 2023-03-10 DIAGNOSIS — G89.29 CHRONIC PAIN OF BOTH KNEES: Primary | ICD-10-CM

## 2023-03-10 DIAGNOSIS — E66.01 CLASS 3 SEVERE OBESITY DUE TO EXCESS CALORIES WITH SERIOUS COMORBIDITY AND BODY MASS INDEX (BMI) OF 50.0 TO 59.9 IN ADULT (H): ICD-10-CM

## 2023-03-10 DIAGNOSIS — M25.561 CHRONIC PAIN OF BOTH KNEES: Primary | ICD-10-CM

## 2023-03-10 PROCEDURE — 99214 OFFICE O/P EST MOD 30 MIN: CPT | Performed by: FAMILY MEDICINE

## 2023-03-10 NOTE — LETTER
3/10/2023      RE: Chavez Ca  [address] [place] [address]     Dear Colleague,    Thank you for referring your patient, Chavez Ca, to the [facility] [place]. Please see a copy of my visit note below.    ASSESSMENT/PLAN:    Patient 52-year-old male with mild degenerative changes bilateral knees-  Patient agreeable to try to get to pool therapy  In the past they have discussed the possibility of whether or not he qualified for [employer] as he feels obtaining rides and has been financially difficult  Patient has decreased his walking by putting a refrigerator in his room  Patient reports that he continues to work on his weight but his gastric bypass procedures have been put on hold due to concern of complications during surgery  Patient is aware that the knee is may be affected by the amount of weight  Patient recently obtained tramadol from primary care and will refill this medication as needed  Tylenol as needed  Patient has recently had knee injection and could consider ultrasound-guided injections in the future    Pt is a 52 year old male here today for:     Evaluation of bilateral knee pain    ESTABLISHED PATIENT FOLLOW-UP:  RECHECK (Bilateral knees)       HISTORY OF PRESENT ILLNESS  Mr. Ca is a pleasant 52 year old year old male who presents to clinic today for follow-up of bilateral knees.  Meloxicam- prescribed in [place], can't remember reaction  Doesn't have $ to make all these [employer] drives take him to [employer].  Hasn't taken trash out, doesn't do litter boxes for 2 months.  Walking around the room, now put refrig into his room  Pt unable to get to [facility]  Has stayed with good friends, was to be a few months and now for years  Tylenol ES and on Tramadol for the back  Was just injected for his knee    Date of injury: 12/7/22  Date last seen: 12/9/22  Following Therapeutic Plan: Yes  Pain: Worsening  Function: Worsening  Interval  History: Overall his knee pain and function has gotten significantly worse. He hasn't been able to get his walker due to a waiver. He saw Dr. Aguirre on 3/6/23 and he injected his left knee which he said didn't help.    Additional medical/Social/Surgical histories [facility] in [facility] and updated as appropriate.  SW- 2 questions asked, medications denied.  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, [employer]  Capsaicin- OTC med, tried the cream and it doesn't work, $12.99  Knee pain, x-ray mild medial OA      REVIEW OF SYSTEMS (3/10/2023)  CONSTITUTIONAL: Denies fever and weight loss  GASTROINTESTINAL: Denies abdominal pain, nausea, vomiting  MUSCULOSKELETAL: See HPI  SKIN: Denies any recent rash or lesion  NEUROLOGICAL: Denies numbness or focal weakness      EXAM:   bilateral Knee:   ROM: 0-130; Crepitus: none   Effusion: none ; Swelling: none   Strength: Full in flexion/ extension   Tenderness: Patella - none Medial joint line - yes; Lateral joint line - none; Quad tendon - none; Patellar tendon- none; Hamstring - none.   Cruciates: anterior drawer - neg/posterior drawer -neg. Lachman - neg   Collaterals: varus -neg/valgus -neg.   Patella: patellar compression - neg; single leg bend- not performed   Meniscus: Jd - neg; Thessaly - not performed   Maneuvers: Unruly - not performed    Past Medical History:   Diagnosis Date     Allergic rhinitis      Benign positional vertigo 2011    never quite went away     Bilateral carpal tunnel syndrome 07/18/2015     Chronic sinusitis 2017    I don't know Please look     Chronic tonsillitis      Depressive disorder      Depressive disorder, not elsewhere classified 07/30/2012     Gastro-oesophageal reflux disease      Hearing problem      Hyperlipidemia associated with prediabetes      Hypertension 2008     Learning disability      Mild intermittent asthma in adult without complication      Neck mass     parapharyngeal, benign     Obesity,  Class III, BMI 40-49.9 (morbid obesity) (H)      Psychological factors associated with another disorder 08/21/2012     Recurrent otitis media 2016    might be what the pain in my ears     Reduced vision      Tinnitus 2016    I would hear a steady light buzzing sound and other can't     Trigeminal neuralgia      Weakness 08/26/2011      Past Surgical History:   Procedure Laterality Date     BIOPSY  2011    To figure out what kind of tumor I had     COLONOSCOPY N/A 10/16/2020    Procedure: INCOMPLETE COLONOSCOPY;  Surgeon: Ham Cherry MD;  Location: [facility] OR     ENDOSCOPIC RETROGRADE CHOLANGIOPANCREATOGRAM N/A 8/27/2015    Procedure: COMBINED ENDOSCOPIC RETROGRADE CHOLANGIOPANCREATOGRAPHY, PLACE TUBE/STENT;  Surgeon: Baldomero Zacarias MD;  Location: [facility] OR     ENDOSCOPIC RETROGRADE CHOLANGIOPANCREATOGRAM N/A 11/6/2015    Procedure: COMBINED ENDOSCOPIC RETROGRADE CHOLANGIOPANCREATOGRAPHY, REMOVE FOREIGN BODY OR STENT/TUBE;  Surgeon: Baldomero Zacarias MD;  [facility]     EXCISE LESION INTRAORAL  6/29/2011    Procedure:EXCISE LESION INTRAORAL; TRANSCERVICAL APPROACH TO LEFT PHARYNGEAL SPACE MASS REMOVAL ; Surgeon:BARBARA PHILLIPS; Location: OR      VASCULAR SURGERY PROCEDURE UNLIST  12/29/15     LAPAROSCOPIC CHOLECYSTECTOMY N/A 8/23/2015    Procedure: LAPAROSCOPIC CHOLECYSTECTOMY;  Surgeon: Kvng Witt MD;  Location: [facility] OR     LARYNGOSCOPY WITH BIOPSY(IES)  6/18/2014    Procedure: LARYNGOSCOPY WITH BIOPSY(IES);  Surgeon: Barbara Phillips MD;  [facility]     LASER CO2 LARYNGOSCOPY  12/7/2011    Procedure:LASER CO2 LARYNGOSCOPY; Micro-Direct Laryngoscopy with CO2 Laser Standby / Excision Tracheal Grandulization, Trach Tube Change / Kenalog application. / Balloon Dilation of Subglottic Stenosis.*Latex Safe Room* Trach Tube = Shiley 6.4mm I.D. / 10.8MM O.D. / 76MM  Cuffless.; Surgeon:BARBARA PHILLIPS; Location: OR     ORTHOPEDIC SURGERY  5/2016     Broken Right hand & Fracture Right shoulder     TRACHEOSTOMY  4/22/2011    Procedure:TRACHEOSTOMY; possible awake; Surgeon:EMELIA PHILLIPS; Location:[facility] OR     TRACHEOSTOMY  6/29/2011    Procedure:TRACHEOSTOMY; REMOVE AND REPLACE SHILEY 6 XLT; Surgeon:EMELIA PHILLIPS; Location:[facility] OR     VASCULAR SURGERY  2008-Preasant      Current Outpatient Medications   Medication Sig Dispense Refill     albuterol (PROAIR HFA/PROVENTIL HFA/VENTOLIN HFA) 108 (90 Base) MCG/ACT inhaler Inhale 2 puffs into the lungs every 4 hours as needed for shortness of breath / dyspnea or wheezing 18 g 11     amitriptyline (ELAVIL) 100 MG tablet Take 1 tablet (100 mg) by mouth At Bedtime 90 tablet 3     azelaic acid (FINACIA) 15 % external gel Apply once daily as needed for rash on the face 50 g 11     capsaicin (ZOSTRIX) 0.025 % external cream Apply 1 Dose topically 3 times daily as needed (knee pain) 237 mL 0     carBAMazepine (TEGRETOL) 200 MG tablet Take 1 tablet by mouth 2 times daily       CETAPHIL (CETAPHIL) lotion Apply topically 2 times daily       cetirizine (ZYRTEC) 10 MG tablet Take 1 tablet (10 mg) by mouth daily 30 tablet 11     COMPRESSION STOCKINGS 20-30 mmHg knee high compression stockings.  Measure and fit.  Style and brand per patient preference. 2 each 0     fluocinolone (SYNALAR) 0.01 % solution Apply 10-15 drops to scalp at nighttime before bed as needed for scalp itch 90 mL 11     fluticasone (FLONASE) 50 MCG/ACT nasal spray Spray 1 spray into both nostrils daily 18.2 mL 11     furosemide (LASIX) 20 MG tablet Take 3 tablets (60 mg) by mouth 2 times daily 540 tablet 0     ketoconazole (NIZORAL) 2 % external shampoo APPLY TO AFFECTED AREA AND WASH OFF AFTER 5 MINUTES 120 mL 3     levalbuterol (XOPENEX) 1.25 MG/3ML neb solution Take 3 mLs (1.25 mg) by nebulization every 4 hours as needed for shortness of breath / dyspnea or wheezing 90 mL 1     levothyroxine (SYNTHROID/LEVOTHROID) 50 MCG tablet Take 1  "tablet (50 mcg) by mouth daily 60 tablet 1     naltrexone (DEPADE/REVIA) 50 MG tablet Take 1/2 tablet week 1 before supper; week 2 and after, take a full tablet then to help with portion reduction at supper and avoiding eating between supper and bed, as tolerated (Patient not taking: Reported on 3/6/2023) 30 tablet 4     order for DME Equipment being ordered: compression stockings    Needs to be faxed to 1 Device 0     order for DME Compression stockings pair, patient preference    To be used 8 hours per day 1 Device 3     order for DME Equipment being ordered: compression stockings 2 each 0     phentermine (ADIPEX-P) 37.5 MG tablet Take 1 tablet (37.5 mg) by mouth every morning 90 tablet 1     topiramate (TOPAMAX) 100 MG tablet Take 2 tablets (200 mg) by mouth 2 times daily 360 tablet 3     traMADol (ULTRAM) 50 MG tablet Take 1 po PRN recovery from pain at HS or after physical therapy. Do not take to enable activity.  (With refills, covers through 7/3/2023 or later) 30 tablet 3     traZODone (DESYREL) 100 MG tablet Take 1 tablet (100 mg) by mouth At Bedtime Call clinic to schedule follow up appointment. [phone] 90 tablet 3     zolpidem (AMBIEN) 10 MG tablet Take 1 tablet (10 mg) by mouth nightly as needed for sleep 30 tablet 0      Allergies   Allergen Reactions     Fentanyl Itching     Patch     Meloxicam Other (See Comments)     Side pains.     Minocycline Rash     Patient reports adverse reaction was pigmentation which has resolved with drug discontinuation.      ROS:   Gen- no fevers/chills   Rheum - no morning stiffness   Derm - no rash/ redness   Neuro - no numbness, no tingling   Remainder of ROS negative.     Exam:   Ht 1.702 m (5' 7\")   Wt (!) 183.3 kg (404 lb)   BMI 63.28 kg/m       Xray of knees- mild degenerative changes      Again, thank you for allowing me to participate in the care of your patient.      Sincerely,    Shelley Zurita MD    "

## 2023-03-10 NOTE — PROGRESS NOTES
ASSESSMENT/PLAN:    Patient 52-year-old male with mild degenerative changes bilateral knees-  Patient agreeable to try to get to pool therapy  In the past they have discussed the possibility of whether or not he qualified for [facility] as he feels obtaining rides and has been financially difficult  Patient has decreased his walking by putting a refrigerator in his room  Patient reports that he continues to work on his weight but his gastric bypass procedures have been put on hold due to concern of complications during surgery  Patient is aware that the knee is may be affected by the amount of weight  Patient recently obtained tramadol from primary care and will refill this medication as needed  Tylenol as needed  Patient has recently had knee injection and could consider ultrasound-guided injections in the future    Pt is a 52 year old male here today for:     Evaluation of bilateral knee pain    ESTABLISHED PATIENT FOLLOW-UP:  RECHECK (Bilateral knees)       HISTORY OF PRESENT ILLNESS  Mr. Ca is a pleasant 52 year old year old male who presents to clinic today for follow-up of bilateral knees.  Meloxicam- prescribed in [place], can't remember reaction  Doesn't have $ to make all these [employer] drives take him to appts.  Hasn't taken trash out, doesn't do litter boxes for 2 months.  Walking around the room, now put refrig into his room  Pt unable to get to [facility]  Has stayed with good friends, was to be a few months and now for years  Tylenol ES and on Tramadol for the back  Was just injected for his knee    Date of injury: 12/7/22  Date last seen: 12/9/22  Following Therapeutic Plan: Yes  Pain: Worsening  Function: Worsening  Interval History: Overall his knee pain and function has gotten significantly worse. He hasn't been able to get his walker due to a waiver. He saw Dr. Aguirre on 3/6/23 and he injected his left knee which he said didn't help.    Additional medical/Social/Surgical histories reviewed  in EPIC and updated as appropriate.  SW- 2 questions asked, medications denied.  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, [employer]  Capsaicin- OTC med, tried the cream and it doesn't work, $12.99  Knee pain, x-ray mild medial OA      REVIEW OF SYSTEMS (3/10/2023)  CONSTITUTIONAL: Denies fever and weight loss  GASTROINTESTINAL: Denies abdominal pain, nausea, vomiting  MUSCULOSKELETAL: See HPI  SKIN: Denies any recent rash or lesion  NEUROLOGICAL: Denies numbness or focal weakness      EXAM:   bilateral Knee:   ROM: 0-130; Crepitus: none   Effusion: none ; Swelling: none   Strength: Full in flexion/ extension   Tenderness: Patella - none Medial joint line - yes; Lateral joint line - none; Quad tendon - none; Patellar tendon- none; Hamstring - none.   Cruciates: anterior drawer - neg/posterior drawer -neg. Lachman - neg   Collaterals: varus -neg/valgus -neg.   Patella: patellar compression - neg; single leg bend- not performed   Meniscus: Jd - neg; Thessaly - not performed   Maneuvers: Unruly - not performed    Past Medical History:   Diagnosis Date     Allergic rhinitis      Benign positional vertigo 2011    never quite went away     Bilateral carpal tunnel syndrome 07/18/2015     Chronic sinusitis 2017    I don't know Please look     Chronic tonsillitis      Depressive disorder      Depressive disorder, not elsewhere classified 07/30/2012     Gastro-oesophageal reflux disease      Hearing problem      Hyperlipidemia associated with prediabetes      Hypertension 2008     Learning disability      Mild intermittent asthma in adult without complication      Neck mass     parapharyngeal, benign     Obesity, Class III, BMI 40-49.9 (morbid obesity) (H)      Psychological factors associated with another disorder 08/21/2012     Recurrent otitis media 2016    might be what the pain in my ears     Reduced vision      Tinnitus 2016    I would hear a steady light buzzing sound and  other can't     Trigeminal neuralgia      Weakness 08/26/2011      Past Surgical History:   Procedure Laterality Date     BIOPSY  2011    To figure out what kind of tumor I had     COLONOSCOPY N/A 10/16/2020    Procedure: INCOMPLETE COLONOSCOPY;  Surgeon: Ham Cherry MD;  Location: [facility] OR     ENDOSCOPIC RETROGRADE CHOLANGIOPANCREATOGRAM N/A 8/27/2015    Procedure: COMBINED ENDOSCOPIC RETROGRADE CHOLANGIOPANCREATOGRAPHY, PLACE TUBE/STENT;  Surgeon: Baldomero Zacarias MD;  Location: [facility] OR     ENDOSCOPIC RETROGRADE CHOLANGIOPANCREATOGRAM N/A 11/6/2015    Procedure: COMBINED ENDOSCOPIC RETROGRADE CHOLANGIOPANCREATOGRAPHY, REMOVE FOREIGN BODY OR STENT/TUBE;  Surgeon: Baldomero Zacarias MD;  Location: [facility] OR     EXCISE LESION INTRAORAL  6/29/2011    Procedure:EXCISE LESION INTRAORAL; TRANSCERVICAL APPROACH TO LEFT PHARYNGEAL SPACE MASS REMOVAL ; Surgeon:BARBARA PHILLIPS; Location: OR      VASCULAR SURGERY PROCEDURE UNLIST  12/29/15     LAPAROSCOPIC CHOLECYSTECTOMY N/A 8/23/2015    Procedure: LAPAROSCOPIC CHOLECYSTECTOMY;  Surgeon: Kvng Witt MD;  Location: [facility] OR     LARYNGOSCOPY WITH BIOPSY(IES)  6/18/2014    Procedure: LARYNGOSCOPY WITH BIOPSY(IES);  Surgeon: Barbara Phillips MD;  Location:  OR     LASER CO2 LARYNGOSCOPY  12/7/2011    Procedure:LASER CO2 LARYNGOSCOPY; Micro-Direct Laryngoscopy with CO2 Laser Standby / Excision Tracheal Grandulization, Trach Tube Change / Kenalog application. / Balloon Dilation of Subglottic Stenosis.*Latex Safe Room* Trach Tube = Shiley 6.4mm I.D. / 10.8MM O.D. / 76MM  Cuffless.; Surgeon:BARBARA PHILLIPS; Location: OR     ORTHOPEDIC SURGERY  5/2016    Broken Right hand & Fracture Right shoulder     TRACHEOSTOMY  4/22/2011    Procedure:TRACHEOSTOMY; possible awake; Surgeon:BARBARA PHILLIPS; Location:[facility] OR     TRACHEOSTOMY  6/29/2011    Procedure:TRACHEOSTOMY; REMOVE AND REPLACE SHILEY 6 XLT; Surgeon:BARBARA PHILLIPS  ISAAC SUTHERLAND; Location:[facility] OR     VASCULAR SURGERY  2008-[facility]      Current Outpatient Medications   Medication Sig Dispense Refill     albuterol (PROAIR HFA/PROVENTIL HFA/VENTOLIN HFA) 108 (90 Base) MCG/ACT inhaler Inhale 2 puffs into the lungs every 4 hours as needed for shortness of breath / dyspnea or wheezing 18 g 11     amitriptyline (ELAVIL) 100 MG tablet Take 1 tablet (100 mg) by mouth At Bedtime 90 tablet 3     azelaic acid (FINACIA) 15 % external gel Apply once daily as needed for rash on the face 50 g 11     capsaicin (ZOSTRIX) 0.025 % external cream Apply 1 Dose topically 3 times daily as needed (knee pain) 237 mL 0     carBAMazepine (TEGRETOL) 200 MG tablet Take 1 tablet by mouth 2 times daily       CETAPHIL (CETAPHIL) lotion Apply topically 2 times daily       cetirizine (ZYRTEC) 10 MG tablet Take 1 tablet (10 mg) by mouth daily 30 tablet 11     COMPRESSION STOCKINGS 20-30 mmHg knee high compression stockings.  Measure and fit.  Style and brand per patient preference. 2 each 0     fluocinolone (SYNALAR) 0.01 % solution Apply 10-15 drops to scalp at nighttime before bed as needed for scalp itch 90 mL 11     fluticasone (FLONASE) 50 MCG/ACT nasal spray Spray 1 spray into both nostrils daily 18.2 mL 11     furosemide (LASIX) 20 MG tablet Take 3 tablets (60 mg) by mouth 2 times daily 540 tablet 0     ketoconazole (NIZORAL) 2 % external shampoo APPLY TO AFFECTED AREA AND WASH OFF AFTER 5 MINUTES 120 mL 3     levalbuterol (XOPENEX) 1.25 MG/3ML neb solution Take 3 mLs (1.25 mg) by nebulization every 4 hours as needed for shortness of breath / dyspnea or wheezing 90 mL 1     levothyroxine (SYNTHROID/LEVOTHROID) 50 MCG tablet Take 1 tablet (50 mcg) by mouth daily 60 tablet 1     naltrexone (DEPADE/REVIA) 50 MG tablet Take 1/2 tablet week 1 before supper; week 2 and after, take a full tablet then to help with portion reduction at supper and avoiding eating between supper and bed, as tolerated (Patient not  "taking: Reported on 3/6/2023) 30 tablet 4     order for DME Equipment being ordered: compression stockings    Needs to be faxed to 1 Device 0     order for DME Compression stockings pair, patient preference    To be used 8 hours per day 1 Device 3     order for DME Equipment being ordered: compression stockings 2 each 0     phentermine (ADIPEX-P) 37.5 MG tablet Take 1 tablet (37.5 mg) by mouth every morning 90 tablet 1     topiramate (TOPAMAX) 100 MG tablet Take 2 tablets (200 mg) by mouth 2 times daily 360 tablet 3     traMADol (ULTRAM) 50 MG tablet Take 1 po PRN recovery from pain at HS or after physical therapy. Do not take to enable activity.  (With refills, covers through 7/3/2023 or later) 30 tablet 3     traZODone (DESYREL) 100 MG tablet Take 1 tablet (100 mg) by mouth At Bedtime Call clinic to schedule follow up appointment. [phone] 90 tablet 3     zolpidem (AMBIEN) 10 MG tablet Take 1 tablet (10 mg) by mouth nightly as needed for sleep 30 tablet 0      Allergies   Allergen Reactions     Fentanyl Itching     Patch     Meloxicam Other (See Comments)     Side pains.     Minocycline Rash     Patient reports adverse reaction was pigmentation which has resolved with drug discontinuation.      ROS:   Gen- no fevers/chills   Rheum - no morning stiffness   Derm - no rash/ redness   Neuro - no numbness, no tingling   Remainder of ROS negative.     Exam:   Ht 1.702 m (5' 7\")   Wt (!) 183.3 kg (404 lb)   BMI 63.28 kg/m       Xray of knees- mild degenerative changes    "

## 2023-03-20 ENCOUNTER — THERAPY VISIT (OUTPATIENT)
Dept: PHYSICAL THERAPY | Facility: CLINIC | Age: 53
End: 2023-03-20
Attending: INTERNAL MEDICINE
Payer: COMMERCIAL

## 2023-03-20 DIAGNOSIS — G89.29 CHRONIC PAIN OF BOTH KNEES: ICD-10-CM

## 2023-03-20 DIAGNOSIS — E66.813 CLASS 3 SEVERE OBESITY DUE TO EXCESS CALORIES WITH SERIOUS COMORBIDITY AND BODY MASS INDEX (BMI) OF 50.0 TO 59.9 IN ADULT (H): ICD-10-CM

## 2023-03-20 DIAGNOSIS — M25.561 CHRONIC PAIN OF BOTH KNEES: ICD-10-CM

## 2023-03-20 DIAGNOSIS — M25.562 CHRONIC PAIN OF BOTH KNEES: ICD-10-CM

## 2023-03-20 DIAGNOSIS — M79.604 BILATERAL LEG PAIN: ICD-10-CM

## 2023-03-20 DIAGNOSIS — M79.605 BILATERAL LEG PAIN: ICD-10-CM

## 2023-03-20 DIAGNOSIS — E66.01 CLASS 3 SEVERE OBESITY DUE TO EXCESS CALORIES WITH SERIOUS COMORBIDITY AND BODY MASS INDEX (BMI) OF 50.0 TO 59.9 IN ADULT (H): ICD-10-CM

## 2023-03-20 PROCEDURE — 97110 THERAPEUTIC EXERCISES: CPT | Mod: GP

## 2023-03-20 PROCEDURE — 97161 PT EVAL LOW COMPLEX 20 MIN: CPT | Mod: GP

## 2023-03-20 NOTE — PROGRESS NOTES
Physical Therapy Initial Evaluation  Therapist Impression: Chavez is a 52 year old year old male referred to physical therapy by Dr. Aguirre for treatment of B knee pain. Subjective history and objective findings are consistent with chronic knee pain related to OA, inactivity and increased BMI. Due to these impairments, patient has difficulty with walking, stairs, standing, daily activities. Patient will benefit from skilled PT to address impairments/limitations in order to reach patient's goals, facilitate return to prior level of function, and maximize participation.    KEY FINDINGS:  1. Pain and weakness knee flexion/extension  2. Impaired gait    Would recommend pool therapy if patient agreeable as land based therapy may be too painful/difficult at this time.    Subjective:  The history is provided by the patient. No  was used.   Therapist Generated HPI Evaluation  Problem details: Pt presents with B knee pain that has been going on for at least 10 years. Has been waiting for a walker since December, but has had issues getting because of insurance. For the last 3-4 months hasn't been able to walk very much at all and has gained weight. Sleeps in a recliner chair. Has been recommended to try pool therapy, not sure if insurance covers this. Lives in a house with roommates. Has 19 steps in his home, reports he is able to do them using the walls for support, but that it is very difficult and painful.    B knee Xray Impression:  1. No acute osseous abnormality.  2. Mild degenerative changes bilaterally with small bilateral joint effusions..         Type of problem:  Bilateral knees (L>R).    This is a chronic condition.  Condition occurred with:  Insidious onset.  Where condition occurred: for unknown reasons.  Patient reports pain:  Anterior (all around the knees, lateral lower leg and proximal calf).  Pain is described as aching and sharp and is constant.    Since onset symptoms are gradually  worsening.  Associated symptoms:  Buckling/giving out (popping. Neuropathy B hands and feet). Symptoms are exacerbated by standing, walking and sitting    Special tests included:  X-ray.    Barriers include:  Stairs.    Patient Health History  Chavez Ca being seen for Since [facility] doesn't list where all my  Pain Physical Therapy Appointments are I can only guess this appointment is for both my knees and legs.     Problem began: 4/21/2009.   Problem occurred: Years ago it was because of a tumor that caused weight gaining ... Now it is just Doctors stupidity and stubbornness that is making things worse   Pain is reported as 10/10 on pain scale.  General health as reported by patient is poor.  Pertinent medical history includes: calf pain-swelling-warmth, chest pain, migraines/headaches, numbness/tingling, overweight, pain at night/rest, sleep disorder/apnea and weakness.        Surgeries include:  Other. Other surgery history details: Look on [facility].    Current medications:  Pain medication, sleep medication and other. Other medications details: Look on [facility].    Current occupation is Disabled.   Primary job tasks include:  Prolonged sitting and other.   Other job/home tasks details: I try every day for the past 2 months to do the cat boxes for my roommates and have failed Also taking out there trash every day failed I force myself to walk baby steps to go a distance and I am in the extreme pain from start to finish and out of breath.                                  Objective:    Gait:  Able to walk short distance with FWW, slow gait speed w/ decreased foot clearance.    Very SOB w/ gait and activities.                                                          Knee Evaluation:  ROM:  Strength wnl knee: Full knee extension w/ LAQ MORE.  AROM      Extension:  Left: 0    Right:  0  Flexion: Left: at least 90 deg seen in sitting    Right: at least 90 deg seen in sitting        Strength:     Extension:  Left:  4+/5    Pain:+      Right: 4+/5    Pain:+  Flexion:  Left: 4/5    Pain:+      Right: 5-/5    Pain:+    Quad Set Left: Good    Pain:   Quad Set Right: Good    Pain:  Ligament Testing:  Not Assessed                Special Tests: Not Assessed      Palpation:    Left knee tenderness present at:  Medial Joint Line  Left knee tenderness not present at:  Lateral Joint Line; Patellar Tendon; Patellar Medial; Patellar Lateral; Patellar Superior and Patellar Inferior  Right knee tenderness present at:  Medial Joint Line and Lateral Joint Line  Right knee tenderness not present at:  Patellar Tendon; Patellar Medial; Patellar Lateral; Patellar Superior and Patellar Inferior      Functional Testing:  : requires B UE to complete sit to stand.                  General     ROS    Assessment/Plan:    Patient is a 52 year old male with both sides knee complaints.    Patient has the following significant findings with corresponding treatment plan.                Diagnosis 1:  B knee pain  Pain -  manual therapy, splint/taping/bracing/orthotics, self management, education and home program  Decreased ROM/flexibility - manual therapy, therapeutic exercise, therapeutic activity and home program  Decreased strength - therapeutic exercise, therapeutic activities and home program  Impaired balance - neuro re-education, gait training, therapeutic activities, adaptive equipment/assistive device and home program  Impaired gait - gait training, assistive devices and home program  Decreased function - therapeutic activities and home program    Therapy Evaluation Codes:   Cumulative Therapy Evaluation is: Low complexity.    Previous and current functional limitations:  (See Goal Flow Sheet for this information)    Short term and Long term goals: (See Goal Flow Sheet for this information)     Communication ability:  Patient appears to be able to clearly communicate and understand verbal and written communication and follow directions  correctly.  Treatment Explanation - The following has been discussed with the patient:   RX ordered/plan of care  Anticipated outcomes  Possible risks and side effects  This patient would benefit from PT intervention to resume normal activities.   Rehab potential is fair.    Frequency:  1 X week, once daily  Duration:  for 4 weeks tapering to 2 X a month over 8 weeks  Discharge Plan:  Achieve all LTG.  Independent in home treatment program.  Reach maximal therapeutic benefit.    Please refer to the daily flowsheet for treatment today, total treatment time and time spent performing 1:1 timed codes.

## 2023-03-20 NOTE — PROGRESS NOTES
[facility]    OUTPATIENT Physical Therapy ORTHOPEDIC EVALUATION  PLAN OF TREATMENT FOR OUTPATIENT REHABILITATION  (COMPLETE FOR INITIAL CLAIMS ONLY)  Patient's Last Name, First Name, M.I.  YOB: 1970  Chavez Ca    Provider s Name:  [facility]   Medical Record No.  [id number]   Start of Care Date:  03/20/23   Onset Date:   03/06/23 (MD order)   Treatment Diagnosis:    Medical Diagnosis:     Bilateral leg pain  Chronic pain of both knees  Class 3 severe obesity due to excess calories with serious comorbidity and body mass index (BMI) of 50.0 to 59.9 in adult (H)       Goals:     03/20/23 0500   Body Part   Goals listed below are for B knee   Goal #1   Goal #1 ambulation   Previous Functional Level No restrictions   Current Functional Level Feet patient can walk   Performance Level 10' w/ pain 10/10   STG Target Performance Feet patient will be able to walk   Performance Level 30' w/ walker w/ pain no greater than 5/10   Rationale for safe household ambulation;for safe outdoor household ambulation;for safe community ambulation   Due Date 04/24/23    LTG Target Performance Feet patient will be able to walk   Performance Level 100' w/ walker pain no greater than 5/10   Rationale for safe outdoor household ambulation;for safe household ambulation;for safe community ambulation   Due Date 06/12/23         Therapy Frequency:  1x/week  Predicted Duration of Therapy Intervention:  4 weeks tapering to 2x/mo for 8 weeks    Perla Bryan, PT                 I CERTIFY THE NEED FOR THESE SERVICES FURNISHED UNDER        THIS PLAN OF TREATMENT AND WHILE UNDER MY CARE     (Physician attestation of this document indicates review and certification of the therapy plan).                     Certification Date From:  03/20/23   Certification Date To:  06/12/23    Referring  Provider:  Glen Aguirre    Initial Assessment        See Epic Evaluation SOC Date: 03/20/23

## 2023-03-22 LAB — LVEF ECHO: NORMAL

## 2023-03-24 ENCOUNTER — OFFICE VISIT (OUTPATIENT)
Dept: ENDOCRINOLOGY | Facility: CLINIC | Age: 53
End: 2023-03-24
Payer: COMMERCIAL

## 2023-03-24 VITALS
OXYGEN SATURATION: 97 % | WEIGHT: 315 LBS | HEART RATE: 71 BPM | DIASTOLIC BLOOD PRESSURE: 82 MMHG | SYSTOLIC BLOOD PRESSURE: 143 MMHG | BODY MASS INDEX: 49.44 KG/M2 | HEIGHT: 67 IN

## 2023-03-24 DIAGNOSIS — R94.6 ABNORMAL FINDING ON THYROID FUNCTION TEST: ICD-10-CM

## 2023-03-24 DIAGNOSIS — E66.01 CLASS 3 SEVERE OBESITY DUE TO EXCESS CALORIES WITH SERIOUS COMORBIDITY AND BODY MASS INDEX (BMI) OF 50.0 TO 59.9 IN ADULT (H): Primary | ICD-10-CM

## 2023-03-24 DIAGNOSIS — E66.813 CLASS 3 SEVERE OBESITY DUE TO EXCESS CALORIES WITH SERIOUS COMORBIDITY AND BODY MASS INDEX (BMI) OF 50.0 TO 59.9 IN ADULT (H): Primary | ICD-10-CM

## 2023-03-24 DIAGNOSIS — R73.03 PREDIABETES: ICD-10-CM

## 2023-03-24 PROCEDURE — 99215 OFFICE O/P EST HI 40 MIN: CPT | Performed by: INTERNAL MEDICINE

## 2023-03-24 RX ORDER — TOPIRAMATE 100 MG/1
200 TABLET, FILM COATED ORAL 2 TIMES DAILY
Qty: 360 TABLET | Refills: 3 | Status: SHIPPED | OUTPATIENT
Start: 2023-03-24 | End: 2023-10-23

## 2023-03-24 ASSESSMENT — PAIN SCALES - GENERAL: PAINLEVEL: WORST PAIN (10)

## 2023-03-24 NOTE — PROGRESS NOTES
"    Return Medical Weight Management Note     Chavez Ca  MRN:  [id number]  :  1970  RUI:  3/24/2023    Dear Chavez Milligan MD,    I had the pleasure of seeing your patient Chavez Ca. He is a 52 year old male who I am continuing to see for treatment of obesity; PMH includes the following--  Past Medical History:   Diagnosis Date     Allergic rhinitis      Benign positional vertigo     never quite went away     Bilateral carpal tunnel syndrome 2015     Chronic sinusitis     I don't know Please look     Chronic tonsillitis      Depressive disorder      Depressive disorder, not elsewhere classified 2012     Gastro-oesophageal reflux disease      Hearing problem      Hyperlipidemia associated with prediabetes      Hypertension      Learning disability      Mild intermittent asthma in adult without complication      Neck mass     parapharyngeal, benign     Obesity, Class III, BMI 40-49.9 (morbid obesity) (H)      Psychological factors associated with another disorder 2012     Recurrent otitis media     might be what the pain in my ears     Reduced vision      Tinnitus     I would hear a steady light buzzing sound and other can't     Trigeminal neuralgia      Weakness 2011       INTERVAL HISTORY:     Last saw me about 9 mo ago, has worked with [employer] . Reviewed and relevant history, as extracted, is as follows--  -------------------------------------------------------  \"Initial consult weight was 361 on 1/10/12.  Weight change since last seen on 20 is down 20 pounds.   Total loss is 31 pounds.     INTERVAL HISTORY:  Maintain topiramate \"full dose\". Food life and weight about the same. Says his eating like a ''rabbit\" and is \"not going to starve myself\". Still wants barisurgery but is too high risk even for tracheal issues.     Saw ENT 2019 and found to have small indentation in the anterior wall of his trachea in conjunction with " "tracheostomy scar. Concern for some dynamic airway collapse.\"     --since last seen, notes he has a bike he would like to put together and use for low impact exercise but has not been able to do this (potential for it to be stolen)  --notes the meds he has been taking--topiramate and phentermine, do provide benefit with appetite suppression/cravings reduction and no side effects  --we discussed potential to add GLP1 agonist for further support of wt loss food goals\"  -------------------------------------------------------     Of note is the following from our discussion today--  He is here today with a friend in support of his goals  --we discussed that there is significant childhood trauma complicating things for him and he would like to meet with a psychologist as part of his wt loss plan  --he is having difficulty ambulating, had recent steroid knee injections, given limited mibility he would also benefit from an OREN referral/program--possibly also pool therapy would be a benefit  --I had tried to get him started on GLP1 agonist (Saxenda) but that was not covered  --he is maxed on topiramate at 400 mg daily dose and on max phentermine at 37.5 mg. He tried adding naltrexone and had HAs, stopped that.  --we also discussed that he wants to be reconsidered for the bariatric surgical wt loss pathway; I have reached out to [facility] Teixeira about this  --we discussed also that we could consider a modified meal replacement approach as part of his wt loss plan; he would benefit from further work with our nutritionist  --he has starting making food changes for wt loss and has lost about 12# since early Mar.        CURRENT WEIGHT:   392 lbs 6.4 oz   Body mass index is 61.46 kg/m .     Wt Readings from Last 3 Encounters:   03/24/23 (!) 178 kg (392 lb 6.4 oz)   03/10/23 (!) 183.3 kg (404 lb)   03/06/23 (!) 183.3 kg (404 lb 3.2 oz)              Wt Readings from Last 3 Encounters:   03/24/23 (!) 178 kg (392 lb 6.4 oz)   03/10/23 " "(!) 183.3 kg (404 lb)   03/06/23 (!) 183.3 kg (404 lb 3.2 oz)             Changes and Difficulties 3/23/2023   I have made the following changes to my diet since my last visit: I have yogurt for two meals a day I eat more peas and corn I don't eat pasta anymore   With regards to my diet, I am still struggling with: Gaining weight   I have made the following changes to my activity/exercise since my last visit: JOINED A GYM 01/11/23 HAVE NOT SEEN THE INSIDE OF IT YET BUT HAVE PAID $140 IN GYM FEES   With regards to my activity/exercise, I am still struggling with: The part that my body and mostly my legs and knees are in so much pain I can't even sit or stand let alone walk without putting my body in more pain and abuse       VITALS:  BP (!) 143/82   Pulse 71   Ht 1.702 m (5' 7\")   Wt (!) 178 kg (392 lb 6.4 oz)   SpO2 97%   BMI 61.46 kg/m      MEDICATIONS:   Current Outpatient Medications   Medication Sig Dispense Refill     albuterol (PROAIR HFA/PROVENTIL HFA/VENTOLIN HFA) 108 (90 Base) MCG/ACT inhaler Inhale 2 puffs into the lungs every 4 hours as needed for shortness of breath / dyspnea or wheezing 18 g 11     amitriptyline (ELAVIL) 100 MG tablet Take 1 tablet (100 mg) by mouth At Bedtime 90 tablet 3     azelaic acid (FINACIA) 15 % external gel Apply once daily as needed for rash on the face 50 g 11     capsaicin (ZOSTRIX) 0.025 % external cream Apply 1 Dose topically 3 times daily as needed (knee pain) 237 mL 0     carBAMazepine (TEGRETOL) 200 MG tablet Take 1 tablet by mouth 2 times daily       CETAPHIL (CETAPHIL) lotion Apply topically 2 times daily       cetirizine (ZYRTEC) 10 MG tablet Take 1 tablet (10 mg) by mouth daily 30 tablet 11     COMPRESSION STOCKINGS 20-30 mmHg knee high compression stockings.  Measure and fit.  Style and brand per patient preference. 2 each 0     fluocinolone (SYNALAR) 0.01 % solution Apply 10-15 drops to scalp at nighttime before bed as needed for scalp itch 90 mL 11     " fluticasone (FLONASE) 50 MCG/ACT nasal spray Spray 1 spray into both nostrils daily 18.2 mL 11     furosemide (LASIX) 20 MG tablet Take 3 tablets (60 mg) by mouth 2 times daily 540 tablet 0     ketoconazole (NIZORAL) 2 % external shampoo APPLY TO AFFECTED AREA AND WASH OFF AFTER 5 MINUTES 120 mL 3     levalbuterol (XOPENEX) 1.25 MG/3ML neb solution Take 3 mLs (1.25 mg) by nebulization every 4 hours as needed for shortness of breath / dyspnea or wheezing 90 mL 1     levothyroxine (SYNTHROID/LEVOTHROID) 50 MCG tablet Take 1 tablet (50 mcg) by mouth daily 60 tablet 1     order for DME Equipment being ordered: compression stockings    Needs to be faxed to 1 Device 0     order for DME Compression stockings pair, patient preference    To be used 8 hours per day 1 Device 3     order for DME Equipment being ordered: compression stockings 2 each 0     phentermine (ADIPEX-P) 37.5 MG tablet Take 1 tablet (37.5 mg) by mouth every morning 90 tablet 1     topiramate (TOPAMAX) 100 MG tablet Take 2 tablets (200 mg) by mouth 2 times daily 360 tablet 3     traMADol (ULTRAM) 50 MG tablet Take 1 po PRN recovery from pain at HS or after physical therapy. Do not take to enable activity.  (With refills, covers through 7/3/2023 or later) 30 tablet 3     traZODone (DESYREL) 100 MG tablet Take 1 tablet (100 mg) by mouth At Bedtime Call clinic to schedule follow up appointment. [phone] 90 tablet 3     zolpidem (AMBIEN) 10 MG tablet Take 1 tablet (10 mg) by mouth nightly as needed for sleep 30 tablet 0     naltrexone (DEPADE/REVIA) 50 MG tablet Take 1/2 tablet week 1 before supper; week 2 and after, take a full tablet then to help with portion reduction at supper and avoiding eating between supper and bed, as tolerated (Patient not taking: Reported on 3/6/2023) 30 tablet 4       Weight Loss Medication History Reviewed With Patient 3/23/2023   Which weight loss medications are you currently taking on a regular basis? Phentermine, Topamax  (topiramate)   If you are not taking a weight loss medication that was prescribed to you, please indicate why: -   Are you having any side effects from the weight loss medication that we have prescribed you? -   If you are having side effects please describe: Naltrexone 50 MG tablet Side effect blur vision buzzing noise that is not there ringing in ears headaches   --TSH borderline high at 4.44 1/23 (free T4 then wnl)  --HbA1c 6.0 5/21       ASSESSMENT:     ## class 3 obesity with co-morbidities  ##prediabetes  ##abnl TFTs    Plans--  Maintain topiramate 200 BID and phentermine 37.5 mg  in support of food goals--smaller portions with emphasis on lean protein, nonstarchy veggies, lower carb/low sugar  --nutrition, psychology, and [facility] referrals  --we can plan return with me in 2 mo; let's also check labs (thyroid BMP, HbA1c) w/in the next month (nonfasting)           Time: 45 min spent on evaluation, management, counseling, education, & motivational interviewing (F2F) combined with previsit prep and post visit follow up care/charting same day      Sincerely,    Tatyana Quinn MD

## 2023-03-24 NOTE — PATIENT INSTRUCTIONS
"Welcome to our weight management program!   We are excited to join you on your weight loss journey    Thank you for allowing us the privilege of caring for you. We hope we provided you with the excellent service you deserve.   Please let us know if there is anything else we can do for you so that we can be sure you are leaving completely satisfied with your care experience.    To ensure the quality of our services you may be receiving a patient satisfaction survey from an independent patient satisfaction monitoring company.    The greatest compliment you can give is a \"Likely to Recommend\"    You saw Dr. Quinn today.    Instructions per today's visit:   --return with Ngozi Esteban (nutritionist); we will plan a modified meal replacement plan approach  --Dr. Quinn in 2 mo  --Dr. Quinn will contact Denice Teixeira to help you get re-established with the surgical team  --psychologist appointment will be scheduled  --we will continue with the current wt loss meds  --[facility] [employer] referral also    Interested in working with a health ?  Health coaches work with you to improve your overall health and wellbeing.  They look at the whole person, and may involve discussion of different areas of life, including, but not limited to the four pillars of health (sleep, exercise, nutrition, and stress management). Discuss with your care team if you would like to start working a health .  Health Coaching-3 Pack:    $99 for three health coaching visits    Visits may be done in person or via phone    Coaching is a partnership between the  and the client; Coaches do not prescribe or diagnose    Coaching helps inspire the client to reach his/her personal goals     For any questions/concerns contact Darleen Miller LPN at [phone]     To schedule appointments with our team, please call [phone]     Please call during clinic hours Monday through Friday 8:00a - 4:00p if you have questions or you can " contact us via [employer] at anytime. ?    Lab results will be communicated through My Chart or letter (if My Chart not used). Please call the clinic if you have not received communication after 1 week or if you have any questions.?      Fax: [phone]    Thank you,  Medical Weight Management Team

## 2023-03-24 NOTE — LETTER
"3/24/2023       RE: Chavez Ca  [address] [place] [address]     Dear Colleague,    Thank you for referring your patient, Chavez Ca, to the [facility] [place] [facility]. Please see a copy of my visit note below.      Return Medical Weight Management Note     Chavez Ca  MRN:  [id number]  :  1970  RUI:  3/24/2023    Dear Chavez Milligan MD,    I had the pleasure of seeing your patient Chavez Ca. He is a 52 year old male who I am continuing to see for treatment of obesity; PMH includes the following--  Past Medical History:   Diagnosis Date     Allergic rhinitis      Benign positional vertigo     never quite went away     Bilateral carpal tunnel syndrome 2015     Chronic sinusitis     I don't know Please look     Chronic tonsillitis      Depressive disorder      Depressive disorder, not elsewhere classified 2012     Gastro-oesophageal reflux disease      Hearing problem      Hyperlipidemia associated with prediabetes      Hypertension      Learning disability      Mild intermittent asthma in adult without complication      Neck mass     parapharyngeal, benign     Obesity, Class III, BMI 40-49.9 (morbid obesity) (H)      Psychological factors associated with another disorder 2012     Recurrent otitis media     might be what the pain in my ears     Reduced vision      Tinnitus     I would hear a steady light buzzing sound and other can't     Trigeminal neuralgia      Weakness 2011       INTERVAL HISTORY:     Last saw me about 9 mo ago, has worked with [facility] . Reviewed and relevant history, as extracted, is as follows--  -------------------------------------------------------  \"Initial consult weight was 361 on 1/10/12.  Weight change since last seen on 20 is down 20 pounds.   Total loss is 31 pounds.     INTERVAL " "HISTORY:  Maintain topiramate \"full dose\". Food life and weight about the same. Says his eating like a ''rabbit\" and is \"not going to starve myself\". Still wants barisurgery but is too high risk even for tracheal issues.     Saw ENT 1/2019 and found to have small indentation in the anterior wall of his trachea in conjunction with tracheostomy scar. Concern for some dynamic airway collapse.\"     --since last seen, notes he has a bike he would like to put together and use for low impact exercise but has not been able to do this (potential for it to be stolen)  --notes the meds he has been taking--topiramate and phentermine, do provide benefit with appetite suppression/cravings reduction and no side effects  --we discussed potential to add GLP1 agonist for further support of wt loss food goals\"  -------------------------------------------------------     Of note is the following from our discussion today--  He is here today with a friend in support of his goals  --we discussed that there is significant childhood trauma complicating things for him and he would like to meet with a psychologist as part of his wt loss plan  --he is having difficulty ambulating, had recent steroid knee injections, given limited mibility he would also benefit from an OREN referral/program--possibly also pool therapy would be a benefit  --I had tried to get him started on GLP1 agonist (Saxenda) but that was not covered  --he is maxed on topiramate at 400 mg daily dose and on max phentermine at 37.5 mg. He tried adding naltrexone and had HAs, stopped that.  --we also discussed that he wants to be reconsidered for the bariatric surgical wt loss pathway; I have reached out to Denice Teixeira about this  --we discussed also that we could consider a modified meal replacement approach as part of his wt loss plan; he would benefit from further work with our nutritionist  --he has starting making food changes for wt loss and has lost about 12# since " "early Mar.        CURRENT WEIGHT:   392 lbs 6.4 oz   Body mass index is 61.46 kg/m .     Wt Readings from Last 3 Encounters:   03/24/23 (!) 178 kg (392 lb 6.4 oz)   03/10/23 (!) 183.3 kg (404 lb)   03/06/23 (!) 183.3 kg (404 lb 3.2 oz)              Wt Readings from Last 3 Encounters:   03/24/23 (!) 178 kg (392 lb 6.4 oz)   03/10/23 (!) 183.3 kg (404 lb)   03/06/23 (!) 183.3 kg (404 lb 3.2 oz)             Changes and Difficulties 3/23/2023   I have made the following changes to my diet since my last visit: I have yogurt for two meals a day I eat more peas and corn I don't eat pasta anymore   With regards to my diet, I am still struggling with: Gaining weight   I have made the following changes to my activity/exercise since my last visit: JOINED A GYM 01/11/23 HAVE NOT SEEN THE INSIDE OF IT YET BUT HAVE PAID $140 IN GYM FEES   With regards to my activity/exercise, I am still struggling with: The part that my body and mostly my legs and knees are in so much pain I can't even sit or stand let alone walk without putting my body in more pain and abuse       VITALS:  BP (!) 143/82   Pulse 71   Ht 1.702 m (5' 7\")   Wt (!) 178 kg (392 lb 6.4 oz)   SpO2 97%   BMI 61.46 kg/m      MEDICATIONS:   Current Outpatient Medications   Medication Sig Dispense Refill     albuterol (PROAIR HFA/PROVENTIL HFA/VENTOLIN HFA) 108 (90 Base) MCG/ACT inhaler Inhale 2 puffs into the lungs every 4 hours as needed for shortness of breath / dyspnea or wheezing 18 g 11     amitriptyline (ELAVIL) 100 MG tablet Take 1 tablet (100 mg) by mouth At Bedtime 90 tablet 3     azelaic acid (FINACIA) 15 % external gel Apply once daily as needed for rash on the face 50 g 11     capsaicin (ZOSTRIX) 0.025 % external cream Apply 1 Dose topically 3 times daily as needed (knee pain) 237 mL 0     carBAMazepine (TEGRETOL) 200 MG tablet Take 1 tablet by mouth 2 times daily       CETAPHIL (CETAPHIL) lotion Apply topically 2 times daily       cetirizine (ZYRTEC) 10 " MG tablet Take 1 tablet (10 mg) by mouth daily 30 tablet 11     COMPRESSION STOCKINGS 20-30 mmHg knee high compression stockings.  Measure and fit.  Style and brand per patient preference. 2 each 0     fluocinolone (SYNALAR) 0.01 % solution Apply 10-15 drops to scalp at nighttime before bed as needed for scalp itch 90 mL 11     fluticasone (FLONASE) 50 MCG/ACT nasal spray Spray 1 spray into both nostrils daily 18.2 mL 11     furosemide (LASIX) 20 MG tablet Take 3 tablets (60 mg) by mouth 2 times daily 540 tablet 0     ketoconazole (NIZORAL) 2 % external shampoo APPLY TO AFFECTED AREA AND WASH OFF AFTER 5 MINUTES 120 mL 3     levalbuterol (XOPENEX) 1.25 MG/3ML neb solution Take 3 mLs (1.25 mg) by nebulization every 4 hours as needed for shortness of breath / dyspnea or wheezing 90 mL 1     levothyroxine (SYNTHROID/LEVOTHROID) 50 MCG tablet Take 1 tablet (50 mcg) by mouth daily 60 tablet 1     order for DME Equipment being ordered: compression stockings    Needs to be faxed to 1 Device 0     order for DME Compression stockings pair, patient preference    To be used 8 hours per day 1 Device 3     order for DME Equipment being ordered: compression stockings 2 each 0     phentermine (ADIPEX-P) 37.5 MG tablet Take 1 tablet (37.5 mg) by mouth every morning 90 tablet 1     topiramate (TOPAMAX) 100 MG tablet Take 2 tablets (200 mg) by mouth 2 times daily 360 tablet 3     traMADol (ULTRAM) 50 MG tablet Take 1 po PRN recovery from pain at HS or after physical therapy. Do not take to enable activity.  (With refills, covers through 7/3/2023 or later) 30 tablet 3     traZODone (DESYREL) 100 MG tablet Take 1 tablet (100 mg) by mouth At Bedtime Call clinic to schedule follow up appointment. [phone] 90 tablet 3     zolpidem (AMBIEN) 10 MG tablet Take 1 tablet (10 mg) by mouth nightly as needed for sleep 30 tablet 0     naltrexone (DEPADE/REVIA) 50 MG tablet Take 1/2 tablet week 1 before supper; week 2 and after, take a full  tablet then to help with portion reduction at supper and avoiding eating between supper and bed, as tolerated (Patient not taking: Reported on 3/6/2023) 30 tablet 4       Weight Loss Medication History Reviewed With Patient 3/23/2023   Which weight loss medications are you currently taking on a regular basis? Phentermine, Topamax (topiramate)   If you are not taking a weight loss medication that was prescribed to you, please indicate why: -   Are you having any side effects from the weight loss medication that we have prescribed you? -   If you are having side effects please describe: Naltrexone 50 MG tablet Side effect blur vision buzzing noise that is not there ringing in ears headaches   --TSH borderline high at 4.44 1/23 (free T4 then wnl)  --HbA1c 6.0 5/21       ASSESSMENT:     ## class 3 obesity with co-morbidities  ##prediabetes  ##abnl TFTs    Plans--  Maintain topiramate 200 BID and phentermine 37.5 mg  in support of food goals--smaller portions with emphasis on lean protein, nonstarchy veggies, lower carb/low sugar  --nutrition, psychology, and [facility] referrals  --we can plan return with me in 2 mo; let's also check labs (thyroid BMP, HbA1c) w/in the next month (nonfasting)           Time: 45 min spent on evaluation, management, counseling, education, & motivational interviewing (F2F) combined with previsit prep and post visit follow up care/charting same day      Sincerely,    Taytana Quinn MD

## 2023-03-24 NOTE — NURSING NOTE
"Chief Complaint   Patient presents with     RECHECK       Vitals:    03/24/23 1248   BP: (!) 143/82   Pulse: 71   SpO2: 97%   Weight: (!) 178 kg (392 lb 6.4 oz)   Height: 1.702 m (5' 7\")       Body mass index is 61.46 kg/m .          PETRA DELEON    "

## 2023-03-27 ENCOUNTER — THERAPY VISIT (OUTPATIENT)
Dept: PHYSICAL THERAPY | Facility: CLINIC | Age: 53
End: 2023-03-27
Attending: INTERNAL MEDICINE
Payer: COMMERCIAL

## 2023-03-27 DIAGNOSIS — M25.561 CHRONIC PAIN OF BOTH KNEES: Primary | ICD-10-CM

## 2023-03-27 DIAGNOSIS — M25.562 CHRONIC PAIN OF BOTH KNEES: Primary | ICD-10-CM

## 2023-03-27 DIAGNOSIS — M79.604 BILATERAL LEG PAIN: ICD-10-CM

## 2023-03-27 DIAGNOSIS — M79.605 BILATERAL LEG PAIN: ICD-10-CM

## 2023-03-27 DIAGNOSIS — G89.29 CHRONIC PAIN OF BOTH KNEES: Primary | ICD-10-CM

## 2023-03-27 PROCEDURE — 97110 THERAPEUTIC EXERCISES: CPT | Mod: GP

## 2023-03-27 PROCEDURE — 97530 THERAPEUTIC ACTIVITIES: CPT | Mod: GP

## 2023-03-28 ENCOUNTER — DOCUMENTATION ONLY (OUTPATIENT)
Dept: INTERNAL MEDICINE | Facility: CLINIC | Age: 53
End: 2023-03-28
Payer: COMMERCIAL

## 2023-03-28 NOTE — PROGRESS NOTES
Type of Form Received: Transportation    Form Received (Date) 3/28/23   Form Filled out No   Placed in provider folder Yes

## 2023-03-31 NOTE — PROGRESS NOTES
ASSESSMENT: Chavez Ca is a 52 year old male with past medical history significant for idiopathic progressive polyneuropathy, trigeminal neuralgia, obstructive sleep apnea, moderate persistent asthma, morbid obesity, hyperlipidemia, prediabetes who presents today for new patient evaluation of chronic right low back pain without radicular symptoms.  Patient reports that pain is severe, rated 10 out of 10 today.  My review of an MRI lumbar spine shows mild lumbar degenerative changes.  There is mild bilateral foraminal stenosis L3-4 and left L5-S1.  There is lower lumbar facet arthropathy.  Patient appears to be primarily symptomatic from the lumbar facet arthropathy.  He had reproduction of pain with lumbar facet loading maneuvers on the right.  He has secondary myofascial pain.    PLAN:  A shared decision making model was used.  The patient's values and choices were respected.  The following represents what was discussed and decided upon by the physician assistant and the patient.      1.  DIAGNOSTIC TESTS: I reviewed the MRI lumbar spine from [facility] from June 18, 2022.  No further diagnostic tests were ordered.    2.  PHYSICAL THERAPY:  - Patient has had multiple rounds of physical therapy over the years.  He is currently in physical therapy for leg pain and knee pain.  - Entered a new referral for the patient begin physical therapy specifically for the lower back.    3.  MEDICATIONS:  - I prescribed nabumetone 500 milligrams twice daily as needed.  - Over-the-counter NSAIDs have not been helpful.  - We could consider adding a muscle relaxer if needed, such as methocarbamol 500 mg 3 times daily as needed.  - Patient is already on amitriptyline 100 mg at bedtime for neuropathy.  - Patient already takes tramadol 1 tab daily as needed.  I told the patient I would not recommend escalating opiates for his chronic pain.    4.  INTERVENTIONS: No interventions were ordered today.  Patient is reluctant  to consider interventional pain management.  He is going to trial conservative treatments first.  - If symptoms fail to improve, I would likely begin with right L3, L4, L5 medial branch blocks as a work-up toward radiofrequency ablation.  However, due to his body habitus I am not sure he will be technically possible to perform the injections.  - If he failed medial branch blocks we could also consider a sacroiliac joint injection on the right.      5.  PATIENT EDUCATION: Patient is in agreement the above plan.  All questions were answered.  - Due to the patient that he can call his insurance to find out if he has coverage for chiropractic treatment.  I think it would be reasonable for him to try this type of treatment as well.  - Patient is working with the weight management clinic and is considering bariatric surgery.  His weight is certainly contributing to his low back pain.    6.  FOLLOW-UP:   Patient is going to follow-up with me in 2 months to monitor progress.  If he has questions or concerns in the meantime, he should not hesitate to call.      SUBJECTIVE:  Chavez Ca  Is a 52 year old male who presents today in consultation at request of Dr. Franco for new patient evaluation of low back pain.  Patient reports he began to have pain 13 years ago when he started gaining weight.  He states that his lower back pain was ignored by other healthcare providers because he had other issues.  He has a new primary care provider who has recommended that he seek treatment for his chronic low back pain.    Patient complains of right-sided low back pain.  Pain is located the right lower lumbar region and extends along the right PSIS.  He describes the pain as a stabbing pain.  He denies pain down the legs.  He has chronic numbness and tingling in both legs from the knees down from neuropathy.  He has left knee problems and has intermittent left knee weakness which gives way.  Back pain is aggravated with  prolonged standing and transitioning from seated to standing.  Pain is alleviated with sitting.  He has difficulty doing household chores such as dishes and other activities because of his pain.    Treatment to date:  - Multiple rounds of physical therapy.  Currently in physical therapy for leg pain and knee pain.  No physical therapy specifically for the lower back  - No history of spine injections  - No history of spine surgeries  - Tramadol 50 mg daily is somewhat helpful  - Amitriptyline 100 mg at bedtime for neuropathy is helpful  - Gabapentin was not helpful for neuropathy.  - Tylenol was not helpful    Current Outpatient Medications   Medication     albuterol (PROAIR HFA/PROVENTIL HFA/VENTOLIN HFA) 108 (90 Base) MCG/ACT inhaler     amitriptyline (ELAVIL) 100 MG tablet     azelaic acid (FINACIA) 15 % external gel     capsaicin (ZOSTRIX) 0.025 % external cream     carBAMazepine (TEGRETOL) 200 MG tablet     CETAPHIL (CETAPHIL) lotion     cetirizine (ZYRTEC) 10 MG tablet     COMPRESSION STOCKINGS     fluocinolone (SYNALAR) 0.01 % solution     fluticasone (FLONASE) 50 MCG/ACT nasal spray     furosemide (LASIX) 20 MG tablet     ketoconazole (NIZORAL) 2 % external shampoo     levalbuterol (XOPENEX) 1.25 MG/3ML neb solution     levothyroxine (SYNTHROID/LEVOTHROID) 50 MCG tablet     naltrexone (DEPADE/REVIA) 50 MG tablet     order for DME     order for DME     order for DME     phentermine (ADIPEX-P) 37.5 MG tablet     topiramate (TOPAMAX) 100 MG tablet     traMADol (ULTRAM) 50 MG tablet     traZODone (DESYREL) 100 MG tablet     zolpidem (AMBIEN) 10 MG tablet     No current facility-administered medications for this visit.       Allergies   Allergen Reactions     Fentanyl Itching     Patch     Meloxicam Other (See Comments)     Side pains.     Minocycline Rash     Patient reports adverse reaction was pigmentation which has resolved with drug discontinuation.       Past Medical History:   Diagnosis Date     Allergic  rhinitis      Benign positional vertigo 2011    never quite went away     Bilateral carpal tunnel syndrome 07/18/2015     Chronic sinusitis 2017    I don't know Please look     Chronic tonsillitis      Depressive disorder      Depressive disorder, not elsewhere classified 07/30/2012     Gastro-oesophageal reflux disease      Hearing problem      Hyperlipidemia associated with prediabetes      Hypertension 2008     Learning disability      Mild intermittent asthma in adult without complication      Neck mass     parapharyngeal, benign     Obesity, Class III, BMI 40-49.9 (morbid obesity) (H)      Psychological factors associated with another disorder 08/21/2012     Recurrent otitis media 2016    might be what the pain in my ears     Reduced vision      Tinnitus 2016    I would hear a steady light buzzing sound and other can't     Trigeminal neuralgia      Weakness 08/26/2011        Patient Active Problem List   Diagnosis     Physical deconditioning     Trigeminal neuralgia     VERN (obstructive sleep apnea)     Idiopathic edema     Depressive disorder, not elsewhere classified     Subglottic stenosis     Hyperlipidemia associated with prediabetes     Arthritis of both hands     Idiopathic progressive polyneuropathy     Varicose veins of left lower extremity     Pain in both wrists     Chronic pain of both shoulders     Neck pain, chronic     Seasonal allergies     Osteoarthritis of spine with radiculopathy, lumbar region     Morbid obesity (H)     Condyloma acuminata     Hypokalemia     Poor dentition     Benign positional vertigo     Dyspnea, multifactorial     Insomnia due to medical condition     Bilateral leg pain     Skin pain     Facial rash (possible roseacea?)     Seborrheic dermatitis of scalp     Intertrigo of adipose folds     Sternocostal pain     Prediabetes     Moderate persistent asthma without complication     Primary mild osteoarthritis of both knees     Microalbuminuria     Elevated TSH     Chronic  pain of both knees       Past Surgical History:   Procedure Laterality Date     BIOPSY  2011    To figure out what kind of tumor I had     COLONOSCOPY N/A 10/16/2020    Procedure: INCOMPLETE COLONOSCOPY;  Surgeon: Ham Cherry MD;  Location: [facility] OR     ENDOSCOPIC RETROGRADE CHOLANGIOPANCREATOGRAM N/A 8/27/2015    Procedure: COMBINED ENDOSCOPIC RETROGRADE CHOLANGIOPANCREATOGRAPHY, PLACE TUBE/STENT;  Surgeon: Baldomero Zacarias MD;  Location: [facility] OR     ENDOSCOPIC RETROGRADE CHOLANGIOPANCREATOGRAM N/A 11/6/2015    Procedure: COMBINED ENDOSCOPIC RETROGRADE CHOLANGIOPANCREATOGRAPHY, REMOVE FOREIGN BODY OR STENT/TUBE;  Surgeon: Baldomero Zacarias MD;  Location: [facility] OR     EXCISE LESION INTRAORAL  6/29/2011    Procedure:EXCISE LESION INTRAORAL; TRANSCERVICAL APPROACH TO LEFT PHARYNGEAL SPACE MASS REMOVAL ; Surgeon:BARBARA PHILLIPS; Location: OR     [facility] VASCULAR SURGERY PROCEDURE UNLIST  12/29/15     LAPAROSCOPIC CHOLECYSTECTOMY N/A 8/23/2015    Procedure: LAPAROSCOPIC CHOLECYSTECTOMY;  Surgeon: Kvng Witt MD;  Location: [facility] OR     LARYNGOSCOPY WITH BIOPSY(IES)  6/18/2014    Procedure: LARYNGOSCOPY WITH BIOPSY(IES);  Surgeon: Barbara Phillips MD;  Location:  OR     LASER CO2 LARYNGOSCOPY  12/7/2011    Procedure:LASER CO2 LARYNGOSCOPY; Micro-Direct Laryngoscopy with CO2 Laser Standby / Excision Tracheal Grandulization, Trach Tube Change / Kenalog application. / Balloon Dilation of Subglottic Stenosis.*Latex Safe Room* Trach Tube = Shiley 6.4mm I.D. / 10.8MM O.D. / 76MM  Cuffless.; Surgeon:BARBARA PHILLIPS; Location: OR     ORTHOPEDIC SURGERY  5/2016    Broken Right hand & Fracture Right shoulder     TRACHEOSTOMY  4/22/2011    Procedure:TRACHEOSTOMY; possible awake; Surgeon:BARBARA PHILLIPS; Location:[facility] OR     TRACHEOSTOMY  6/29/2011    Procedure:TRACHEOSTOMY; REMOVE AND REPLACE SHILEY 6 XLT; Surgeon:BARBARA PHILLIPS; Location: OR     VASCULAR  SURGERY  2008-[facility]       Family History   Adopted: Yes   Problem Relation Age of Onset     Family History Negative Other         Does not know family     Unknown/Adopted No family hx of        Social history: Patient is single.  He is disabled.  He denies tobacco, alcohol, illicit drug use.      ROS: Positive for weight gain, sexual dysfunction, headache, ringing in ears, difficulty swallowing, changes in vision, eye pain, chest pain, color changes in hands/feet, shortness of breath, cough, wheezing, diarrhea, difficulty urinating, joint pain, muscle pain, muscle fatigue, itching, imbalance, falls, dizziness, insomnia, excessive tiredness, depression.  Specifically negative for fevers,chills, appetite changes, unexplained weight loss.   Otherwise 13 systems reviewed are negative.  Please see the patient's intake questionnaire from today for details.      OBJECTIVE:  PHYSICAL EXAMINATION:    CONSTITUTIONAL:  Vital signs as above.  No acute distress.  The patient is well nourished and well groomed.    PSYCHIATRIC:  The patient is awake, alert, oriented to person, place, time and answering questions appropriately with clear speech.    HEENT: Normocephalic, atraumatic.  Sclera clear.  Neck is supple.  SKIN:  Skin over the face, bilateral lower extremities, and posterior torso is clean, dry, intact without rashes.    GAIT:  Gait is antalgic.  The patient is able to rise onto toes and heels bilaterally with support.  STANDING EXAMINATION: Tender to palpation right lower lumbar paraspinous muscles L4-5 and L5-S1.  Additional tenderness palpation right sacroiliac joint and along the right PSIS.  Lumbar flexion moderately restricted.  Lumbar extension severely restricted.  Lumbar facet loading maneuvers reproduce low back pain on the right.  MUSCLE STRENGTH:  The patient has 5/5 strength for the bilateral hip flexors, knee flexors/extensors, ankle dorsiflexors/plantar flexors, great toe extensors.  NEUROLOGICAL: 1+  patellar, and absent achilles reflexes bilaterally.  Negative Babinski's bilaterally.  No ankle clonus bilaterally.  Diminished sensation bilateral lower extremities in a stocking distribution up to the knees.  VASCULAR:   Bilateral lower extremities are warm.    ABDOMINAL: Obese.  LYMPH NODES:  No palpable or tender inguinal lymph nodes.  MUSCULOSKELETAL: Straight leg raise negative bilaterally.    RESULTS: I reviewed the MRI lumbar spine from [facility] imaging dated June 18, 2022.  At L3-4 there is a disc bulge which extends into the left neural foramen abutting the left L3 nerve root with mild left foraminal stenosis.  There is also mild right foraminal stenosis.  There were small bilateral facet synovial effusions.  At L4-5 there is mild disc degeneration and mild facet arthropathy with mild bilateral facet synovial effusions.  At L5-S1 there is a minimal disc protrusion and mild to moderate bilateral facet arthropathy with mild left foraminal stenosis.

## 2023-04-04 ENCOUNTER — OFFICE VISIT (OUTPATIENT)
Dept: PHYSICAL MEDICINE AND REHAB | Facility: CLINIC | Age: 53
End: 2023-04-04
Attending: ORTHOPAEDIC SURGERY
Payer: COMMERCIAL

## 2023-04-04 VITALS
SYSTOLIC BLOOD PRESSURE: 122 MMHG | WEIGHT: 315 LBS | HEART RATE: 132 BPM | DIASTOLIC BLOOD PRESSURE: 77 MMHG | BODY MASS INDEX: 60.5 KG/M2

## 2023-04-04 DIAGNOSIS — M47.816 LUMBAR FACET ARTHROPATHY: Primary | ICD-10-CM

## 2023-04-04 DIAGNOSIS — M79.18 MYOFASCIAL PAIN: ICD-10-CM

## 2023-04-04 PROCEDURE — 99204 OFFICE O/P NEW MOD 45 MIN: CPT | Performed by: PHYSICIAN ASSISTANT

## 2023-04-04 RX ORDER — NABUMETONE 500 MG/1
500 TABLET, FILM COATED ORAL 2 TIMES DAILY PRN
Qty: 60 TABLET | Refills: 1 | Status: SHIPPED | OUTPATIENT
Start: 2023-04-04 | End: 2023-10-23

## 2023-04-04 ASSESSMENT — PAIN SCALES - GENERAL: PAINLEVEL: WORST PAIN (10)

## 2023-04-04 NOTE — PATIENT INSTRUCTIONS
An order for physicaltherapy has been provided today.  Someone will call you to schedule physical therapy or you can call [phone] to schedule physical therapy.  It will be very important for you to do your physical therapy exercises on aregular basis to decrease your pain and prevent future flares of pain.      You can call your insurance to find out if you have coverage for chiropractic treatment.

## 2023-04-04 NOTE — LETTER
4/4/2023         RE: Chavez Ca  [address] [place] [address]        Dear Colleague,    Thank you for referring your patient, Chavez Ca, to the [facility]. Please see a copy of my visit note below.    ASSESSMENT: Chavez Ca is a 52 year old male with past medical history significant for idiopathic progressive polyneuropathy, trigeminal neuralgia, obstructive sleep apnea, moderate persistent asthma, morbid obesity, hyperlipidemia, prediabetes who presents today for new patient evaluation of chronic right low back pain without radicular symptoms.  Patient reports that pain is severe, rated 10 out of 10 today.  My review of an MRI lumbar spine shows mild lumbar degenerative changes.  There is mild bilateral foraminal stenosis L3-4 and left L5-S1.  There is lower lumbar facet arthropathy.  Patient appears to be primarily symptomatic from the lumbar facet arthropathy.  He had reproduction of pain with lumbar facet loading maneuvers on the right.  He has secondary myofascial pain.    PLAN:  A shared decision making model was used.  The patient's values and choices were respected.  The following represents what was discussed and decided upon by the physician assistant and the patient.      1.  DIAGNOSTIC TESTS: I reviewed the MRI lumbar spine from [facility] from June 18, 2022.  No further diagnostic tests were ordered.    2.  PHYSICAL THERAPY:  - Patient has had multiple rounds of physical therapy over the years.  He is currently in physical therapy for leg pain and knee pain.  - Entered a new referral for the patient begin physical therapy specifically for the lower back.    3.  MEDICATIONS:  - I prescribed nabumetone 500 milligrams twice daily as needed.  - Over-the-counter NSAIDs have not been helpful.  - We could consider adding a muscle relaxer if needed, such as methocarbamol 500 mg 3 times daily as needed.  - Patient is already on  amitriptyline 100 mg at bedtime for neuropathy.  - Patient already takes tramadol 1 tab daily as needed.  I told the patient I would not recommend escalating opiates for his chronic pain.    4.  INTERVENTIONS: No interventions were ordered today.  Patient is reluctant to consider interventional pain management.  He is going to trial conservative treatments first.  - If symptoms fail to improve, I would likely begin with right L3, L4, L5 medial branch blocks as a work-up toward radiofrequency ablation.  However, due to his body habitus I am not sure he will be technically possible to perform the injections.  - If he failed medial branch blocks we could also consider a sacroiliac joint injection on the right.      5.  PATIENT EDUCATION: Patient is in agreement the above plan.  All questions were answered.  - Due to the patient that he can call his insurance to find out if he has coverage for chiropractic treatment.  I think it would be reasonable for him to try this type of treatment as well.  - Patient is working with the weight management clinic and is considering bariatric surgery.  His weight is certainly contributing to his low back pain.    6.  FOLLOW-UP:   Patient is going to follow-up with me in 2 months to monitor progress.  If he has questions or concerns in the meantime, he should not hesitate to call.      SUBJECTIVE:  Chavez Ca  Is a 52 year old male who presents today in consultation at request of Dr. Franco for new patient evaluation of low back pain.  Patient reports he began to have pain 13 years ago when he started gaining weight.  He states that his lower back pain was ignored by other healthcare providers because he had other issues.  He has a new primary care provider who has recommended that he seek treatment for his chronic low back pain.    Patient complains of right-sided low back pain.  Pain is located the right lower lumbar region and extends along the right PSIS.  He describes  the pain as a stabbing pain.  He denies pain down the legs.  He has chronic numbness and tingling in both legs from the knees down from neuropathy.  He has left knee problems and has intermittent left knee weakness which gives way.  Back pain is aggravated with prolonged standing and transitioning from seated to standing.  Pain is alleviated with sitting.  He has difficulty doing household chores such as dishes and other activities because of his pain.    Treatment to date:  - Multiple rounds of physical therapy.  Currently in physical therapy for leg pain and knee pain.  No physical therapy specifically for the lower back  - No history of spine injections  - No history of spine surgeries  - Tramadol 50 mg daily is somewhat helpful  - Amitriptyline 100 mg at bedtime for neuropathy is helpful  - Gabapentin was not helpful for neuropathy.  - Tylenol was not helpful    Current Outpatient Medications   Medication     albuterol (PROAIR HFA/PROVENTIL HFA/VENTOLIN HFA) 108 (90 Base) MCG/ACT inhaler     amitriptyline (ELAVIL) 100 MG tablet     azelaic acid (FINACIA) 15 % external gel     capsaicin (ZOSTRIX) 0.025 % external cream     carBAMazepine (TEGRETOL) 200 MG tablet     CETAPHIL (CETAPHIL) lotion     cetirizine (ZYRTEC) 10 MG tablet     COMPRESSION STOCKINGS     fluocinolone (SYNALAR) 0.01 % solution     fluticasone (FLONASE) 50 MCG/ACT nasal spray     furosemide (LASIX) 20 MG tablet     ketoconazole (NIZORAL) 2 % external shampoo     levalbuterol (XOPENEX) 1.25 MG/3ML neb solution     levothyroxine (SYNTHROID/LEVOTHROID) 50 MCG tablet     naltrexone (DEPADE/REVIA) 50 MG tablet     order for DME     order for DME     order for DME     phentermine (ADIPEX-P) 37.5 MG tablet     topiramate (TOPAMAX) 100 MG tablet     traMADol (ULTRAM) 50 MG tablet     traZODone (DESYREL) 100 MG tablet     zolpidem (AMBIEN) 10 MG tablet     No current facility-administered medications for this visit.       Allergies   Allergen Reactions      Fentanyl Itching     Patch     Meloxicam Other (See Comments)     Side pains.     Minocycline Rash     Patient reports adverse reaction was pigmentation which has resolved with drug discontinuation.       Past Medical History:   Diagnosis Date     Allergic rhinitis      Benign positional vertigo 2011    never quite went away     Bilateral carpal tunnel syndrome 07/18/2015     Chronic sinusitis 2017    I don't know Please look     Chronic tonsillitis      Depressive disorder      Depressive disorder, not elsewhere classified 07/30/2012     Gastro-oesophageal reflux disease      Hearing problem      Hyperlipidemia associated with prediabetes      Hypertension 2008     Learning disability      Mild intermittent asthma in adult without complication      Neck mass     parapharyngeal, benign     Obesity, Class III, BMI 40-49.9 (morbid obesity) (H)      Psychological factors associated with another disorder 08/21/2012     Recurrent otitis media 2016    might be what the pain in my ears     Reduced vision      Tinnitus 2016    I would hear a steady light buzzing sound and other can't     Trigeminal neuralgia      Weakness 08/26/2011        Patient Active Problem List   Diagnosis     Physical deconditioning     Trigeminal neuralgia     VERN (obstructive sleep apnea)     Idiopathic edema     Depressive disorder, not elsewhere classified     Subglottic stenosis     Hyperlipidemia associated with prediabetes     Arthritis of both hands     Idiopathic progressive polyneuropathy     Varicose veins of left lower extremity     Pain in both wrists     Chronic pain of both shoulders     Neck pain, chronic     Seasonal allergies     Osteoarthritis of spine with radiculopathy, lumbar region     Morbid obesity (H)     Condyloma acuminata     Hypokalemia     Poor dentition     Benign positional vertigo     Dyspnea, multifactorial     Insomnia due to medical condition     Bilateral leg pain     Skin pain     Facial rash (possible  roseacea?)     Seborrheic dermatitis of scalp     Intertrigo of adipose folds     Sternocostal pain     Prediabetes     Moderate persistent asthma without complication     Primary mild osteoarthritis of both knees     Microalbuminuria     Elevated TSH     Chronic pain of both knees       Past Surgical History:   Procedure Laterality Date     BIOPSY  2011    To figure out what kind of tumor I had     COLONOSCOPY N/A 10/16/2020    Procedure: INCOMPLETE COLONOSCOPY;  Surgeon: Ham Cherry MD;  Location: [facility] OR     ENDOSCOPIC RETROGRADE CHOLANGIOPANCREATOGRAM N/A 8/27/2015    Procedure: COMBINED ENDOSCOPIC RETROGRADE CHOLANGIOPANCREATOGRAPHY, PLACE TUBE/STENT;  Surgeon: Baldomero Zacarias MD;  Location: [facility] OR     ENDOSCOPIC RETROGRADE CHOLANGIOPANCREATOGRAM N/A 11/6/2015    Procedure: COMBINED ENDOSCOPIC RETROGRADE CHOLANGIOPANCREATOGRAPHY, REMOVE FOREIGN BODY OR STENT/TUBE;  Surgeon: Baldomero Zacarias MD;  Location: [facility] OR     EXCISE LESION INTRAORAL  6/29/2011    Procedure:EXCISE LESION INTRAORAL; TRANSCERVICAL APPROACH TO LEFT PHARYNGEAL SPACE MASS REMOVAL ; Surgeon:BARBARA PHILLIPS; Location: OR     HC VASCULAR SURGERY PROCEDURE UNLIST  12/29/15     LAPAROSCOPIC CHOLECYSTECTOMY N/A 8/23/2015    Procedure: LAPAROSCOPIC CHOLECYSTECTOMY;  Surgeon: Kvng Witt MD;  Location: [facility] OR     LARYNGOSCOPY WITH BIOPSY(IES)  6/18/2014    Procedure: LARYNGOSCOPY WITH BIOPSY(IES);  Surgeon: Barbara Phillips MD;  Location:  OR     LASER CO2 LARYNGOSCOPY  12/7/2011    Procedure:LASER CO2 LARYNGOSCOPY; Micro-Direct Laryngoscopy with CO2 Laser Standby / Excision Tracheal Grandulization, Trach Tube Change / Kenalog application. / Balloon Dilation of Subglottic Stenosis.*Latex Safe Room* Trach Tube = Shiley 6.4mm I.D. / 10.8MM O.D. / 76MM  Cuffless.; Surgeon:BARBARA PHILLIPS; Location: OR     ORTHOPEDIC SURGERY  5/2016    Broken Right hand & Fracture Right shoulder      TRACHEOSTOMY  4/22/2011    Procedure:TRACHEOSTOMY; possible awake; Surgeon:EMELIA PHILLIPS; Location:[facility] OR     TRACHEOSTOMY  6/29/2011    Procedure:TRACHEOSTOMY; REMOVE AND REPLACE SHILEY 6 XLT; Surgeon:EMELIA PHILLIPS; Location:[facility] OR     VASCULAR SURGERY  2008-[facility]       Family History   Adopted: Yes   Problem Relation Age of Onset     Family History Negative Other         Does not know family     Unknown/Adopted No family hx of        Social history: Patient is single.  He is disabled.  He denies tobacco, alcohol, illicit drug use.      ROS: Positive for weight gain, sexual dysfunction, headache, ringing in ears, difficulty swallowing, changes in vision, eye pain, chest pain, color changes in hands/feet, shortness of breath, cough, wheezing, diarrhea, difficulty urinating, joint pain, muscle pain, muscle fatigue, itching, imbalance, falls, dizziness, insomnia, excessive tiredness, depression.  Specifically negative for fevers,chills, appetite changes, unexplained weight loss.   Otherwise 13 systems reviewed are negative.  Please see the patient's intake questionnaire from today for details.      OBJECTIVE:  PHYSICAL EXAMINATION:    CONSTITUTIONAL:  Vital signs as above.  No acute distress.  The patient is well nourished and well groomed.    PSYCHIATRIC:  The patient is awake, alert, oriented to person, place, time and answering questions appropriately with clear speech.    HEENT: Normocephalic, atraumatic.  Sclera clear.  Neck is supple.  SKIN:  Skin over the face, bilateral lower extremities, and posterior torso is clean, dry, intact without rashes.    GAIT:  Gait is antalgic.  The patient is able to rise onto toes and heels bilaterally with support.  STANDING EXAMINATION: Tender to palpation right lower lumbar paraspinous muscles L4-5 and L5-S1.  Additional tenderness palpation right sacroiliac joint and along the right PSIS.  Lumbar flexion moderately restricted.  Lumbar extension  severely restricted.  Lumbar facet loading maneuvers reproduce low back pain on the right.  MUSCLE STRENGTH:  The patient has 5/5 strength for the bilateral hip flexors, knee flexors/extensors, ankle dorsiflexors/plantar flexors, great toe extensors.  NEUROLOGICAL: 1+ patellar, and absent achilles reflexes bilaterally.  Negative Babinski's bilaterally.  No ankle clonus bilaterally.  Diminished sensation bilateral lower extremities in a stocking distribution up to the knees.  VASCULAR:   Bilateral lower extremities are warm.    ABDOMINAL: Obese.  LYMPH NODES:  No palpable or tender inguinal lymph nodes.  MUSCULOSKELETAL: Straight leg raise negative bilaterally.    RESULTS: I reviewed the MRI lumbar spine from [facility] dated June 18, 2022.  At L3-4 there is a disc bulge which extends into the left neural foramen abutting the left L3 nerve root with mild left foraminal stenosis.  There is also mild right foraminal stenosis.  There were small bilateral facet synovial effusions.  At L4-5 there is mild disc degeneration and mild facet arthropathy with mild bilateral facet synovial effusions.  At L5-S1 there is a minimal disc protrusion and mild to moderate bilateral facet arthropathy with mild left foraminal stenosis.        Again, thank you for allowing me to participate in the care of your patient.        Sincerely,        Teresa Ayon PA-C

## 2023-04-23 ENCOUNTER — HEALTH MAINTENANCE LETTER (OUTPATIENT)
Age: 53
End: 2023-04-23

## 2023-04-25 DIAGNOSIS — G47.01 INSOMNIA DUE TO MEDICAL CONDITION: ICD-10-CM

## 2023-04-26 RX ORDER — ZOLPIDEM TARTRATE 10 MG/1
TABLET ORAL
Qty: 30 TABLET | Refills: 0 | Status: SHIPPED | OUTPATIENT
Start: 2023-04-26 | End: 2023-10-23

## 2023-04-26 NOTE — TELEPHONE ENCOUNTER
ZOLPIDEM TARTRATE 10MG TABS      Last Written Prescription Date:  3-6-23  Last Fill Quantity: 30,   # refills: 0  Last Office Visit : 3-6-23  Future Office visit:  5-24-23    Routing refill request to provider for review/approval because:  Drug not on the [facility], [facility] or [facility] refill protocol or controlled substance

## 2023-04-28 DIAGNOSIS — I87.303 STASIS EDEMA OF BOTH LOWER EXTREMITIES: ICD-10-CM

## 2023-04-28 RX ORDER — FUROSEMIDE 20 MG
60 TABLET ORAL 2 TIMES DAILY
Qty: 540 TABLET | Refills: 0 | Status: SHIPPED | OUTPATIENT
Start: 2023-04-28 | End: 2023-10-23

## 2023-04-28 NOTE — TELEPHONE ENCOUNTER
furosemide (LASIX) 20 MG tablet      Last Written Prescription Date:  2/2/23  Last Fill Quantity: 540,   # refills: 0  Last Office Visit : 3/6/23  Future Office visit:  5/24/23    Routing refill request to provider for review/approval because:  Overdue for labs: Na, Cr, K+    90d ronny refill sent, routed to clinic staff for follow up

## 2023-05-02 NOTE — PROGRESS NOTES
[facility]    Outpatient Physical Therapy Discharge Note  Patient: Chavez Ca  : 1970    Beginning/End Dates of Reporting Period:  2018 to 10/1/2018 (late discharge note)    Referring Provider: Muriel Banda Diagnosis: Impaired gait and mobility due to sensory, force production, and balance deficits     Client Self Report: see initial eval      Goals:  Goal Identifier HEP   Goal Description Pt will independently demonstrate his home exercise program to maximize his ability to self-manage his condition and optimize his health.   Target Date 18   Date Met      Progress (detail required for progress note):       Goal Identifier TUG   Goal Description Pt will decrease his TUG time to no more than 13.5 seconds in order to minimize his falls risk and maximize his household mobility.    Target Date 18   Date Met      Progress (detail required for progress note):       Goal Identifier Gait speed   Goal Description Pt will complete the 25-foot walk test in no more than 9 seconds to improve his ability to walk to the bus station from his home.    Target Date 18   Date Met      Progress (detail required for progress note):       Goal Identifier Stairs   Goal Description Pt will negotiate 20 stairs with one railing, reciprocal pattern, and with knee pain no more than 1/10 to maximize his safety on his stairs at home.    Target Date 18   Date Met      Progress (detail required for progress note):       Goal Identifier 5xSTS   Goal Description Pt will demonstrate increased functional leg strength as measured by completing 5xSTS in 12 seconds or less.    Target Date 18   Date Met      Progress (detail required for progress note):       No progress due to patient no showing or cancelling all remaining appointments in the episode of care. No further contact received  from pt.     Plan:  Discharge from therapy.    Discharge:    Reason for Discharge: Patient has failed to schedule further appointments.  Medicare G-code: Patient did not attend a final scheduled session prior to discharge. Unable to determine discharge functional status.    Equipment Issued: None    Discharge Plan: Patient to continue home program.    Monisha Montanez, PT, DPT, NCS, ATP  Physical Therapist  [facility]  [address]  [address] [place] [address]  [email or web address]  [phone]  Fax: [phone]

## 2023-05-02 NOTE — ADDENDUM NOTE
Encounter addended by: Monisha Montanez, PT on: 5/2/2023 2:54 PM   Actions taken: Clinical Note Signed, Episode resolved

## 2023-05-24 ENCOUNTER — TELEPHONE (OUTPATIENT)
Dept: INTERNAL MEDICINE | Facility: CLINIC | Age: 53
End: 2023-05-24

## 2023-06-02 ENCOUNTER — HEALTH MAINTENANCE LETTER (OUTPATIENT)
Age: 53
End: 2023-06-02

## 2023-06-05 ENCOUNTER — THERAPY VISIT (OUTPATIENT)
Dept: SLEEP MEDICINE | Facility: CLINIC | Age: 53
End: 2023-06-05
Attending: INTERNAL MEDICINE
Payer: COMMERCIAL

## 2023-06-05 DIAGNOSIS — G47.33 OSA (OBSTRUCTIVE SLEEP APNEA): ICD-10-CM

## 2023-06-05 PROCEDURE — 95811 POLYSOM 6/>YRS CPAP 4/> PARM: CPT | Performed by: INTERNAL MEDICINE

## 2023-06-05 ASSESSMENT — SLEEP AND FATIGUE QUESTIONNAIRES
HOW LIKELY ARE YOU TO NOD OFF OR FALL ASLEEP IN A CAR, WHILE STOPPED FOR A FEW MINUTES IN TRAFFIC: MODERATE CHANCE OF DOZING
HOW LIKELY ARE YOU TO NOD OFF OR FALL ASLEEP WHILE WATCHING TV: MODERATE CHANCE OF DOZING
HOW LIKELY ARE YOU TO NOD OFF OR FALL ASLEEP WHILE SITTING QUIETLY AFTER LUNCH WITHOUT ALCOHOL: MODERATE CHANCE OF DOZING
HOW LIKELY ARE YOU TO NOD OFF OR FALL ASLEEP WHILE SITTING AND TALKING TO SOMEONE: MODERATE CHANCE OF DOZING
HOW LIKELY ARE YOU TO NOD OFF OR FALL ASLEEP WHILE LYING DOWN TO REST IN THE AFTERNOON WHEN CIRCUMSTANCES PERMIT: MODERATE CHANCE OF DOZING
HOW LIKELY ARE YOU TO NOD OFF OR FALL ASLEEP WHILE SITTING AND READING: MODERATE CHANCE OF DOZING
HOW LIKELY ARE YOU TO NOD OFF OR FALL ASLEEP WHILE SITTING INACTIVE IN A PUBLIC PLACE: MODERATE CHANCE OF DOZING
HOW LIKELY ARE YOU TO NOD OFF OR FALL ASLEEP WHEN YOU ARE A PASSENGER IN A CAR FOR AN HOUR WITHOUT A BREAK: MODERATE CHANCE OF DOZING

## 2023-06-06 ENCOUNTER — LAB (OUTPATIENT)
Dept: LAB | Facility: CLINIC | Age: 53
End: 2023-06-06
Payer: COMMERCIAL

## 2023-06-06 DIAGNOSIS — R06.00 DYSPNEA, UNSPECIFIED TYPE: ICD-10-CM

## 2023-06-06 DIAGNOSIS — R80.9 MICROALBUMINURIA: ICD-10-CM

## 2023-06-06 DIAGNOSIS — R73.03 PREDIABETES: ICD-10-CM

## 2023-06-06 DIAGNOSIS — G47.33 OSA (OBSTRUCTIVE SLEEP APNEA): ICD-10-CM

## 2023-06-06 DIAGNOSIS — E66.813 CLASS 3 SEVERE OBESITY DUE TO EXCESS CALORIES WITH SERIOUS COMORBIDITY AND BODY MASS INDEX (BMI) OF 50.0 TO 59.9 IN ADULT (H): ICD-10-CM

## 2023-06-06 DIAGNOSIS — R94.6 ABNORMAL FINDING ON THYROID FUNCTION TEST: ICD-10-CM

## 2023-06-06 DIAGNOSIS — E66.2 HYPOVENTILATION ASSOCIATED WITH OBESITY SYNDROME (H): ICD-10-CM

## 2023-06-06 DIAGNOSIS — E66.01 CLASS 3 SEVERE OBESITY DUE TO EXCESS CALORIES WITH SERIOUS COMORBIDITY AND BODY MASS INDEX (BMI) OF 50.0 TO 59.9 IN ADULT (H): ICD-10-CM

## 2023-06-06 DIAGNOSIS — J45.20 MILD INTERMITTENT ASTHMA IN ADULT WITHOUT COMPLICATION: ICD-10-CM

## 2023-06-06 LAB
ANION GAP SERPL CALCULATED.3IONS-SCNC: 10 MMOL/L (ref 7–15)
BASE EXCESS BLDV CALC-SCNC: 2.3 MMOL/L (ref -7.7–1.9)
BASOPHILS # BLD AUTO: 0.1 10E3/UL (ref 0–0.2)
BASOPHILS NFR BLD AUTO: 1 %
BUN SERPL-MCNC: 13.6 MG/DL (ref 6–20)
CALCIUM SERPL-MCNC: 9.5 MG/DL (ref 8.6–10)
CHLORIDE SERPL-SCNC: 102 MMOL/L (ref 98–107)
CREAT SERPL-MCNC: 0.98 MG/DL (ref 0.67–1.17)
DEPRECATED HCO3 PLAS-SCNC: 28 MMOL/L (ref 22–29)
EOSINOPHIL # BLD AUTO: 0.2 10E3/UL (ref 0–0.7)
EOSINOPHIL NFR BLD AUTO: 2 %
ERYTHROCYTE [DISTWIDTH] IN BLOOD BY AUTOMATED COUNT: 14.2 % (ref 10–15)
GFR SERPL CREATININE-BSD FRML MDRD: >90 ML/MIN/1.73M2
GLUCOSE SERPL-MCNC: 123 MG/DL (ref 70–99)
HBA1C MFR BLD: 6.2 %
HCO3 BLDV-SCNC: 29 MMOL/L (ref 21–28)
HCT VFR BLD AUTO: 41.8 % (ref 40–53)
HGB BLD-MCNC: 13.5 G/DL (ref 13.3–17.7)
IMM GRANULOCYTES # BLD: 0.1 10E3/UL
IMM GRANULOCYTES NFR BLD: 1 %
LYMPHOCYTES # BLD AUTO: 1.9 10E3/UL (ref 0.8–5.3)
LYMPHOCYTES NFR BLD AUTO: 19 %
MCH RBC QN AUTO: 28.8 PG (ref 26.5–33)
MCHC RBC AUTO-ENTMCNC: 32.3 G/DL (ref 31.5–36.5)
MCV RBC AUTO: 89 FL (ref 78–100)
MONOCYTES # BLD AUTO: 0.7 10E3/UL (ref 0–1.3)
MONOCYTES NFR BLD AUTO: 7 %
NEUTROPHILS # BLD AUTO: 7.1 10E3/UL (ref 1.6–8.3)
NEUTROPHILS NFR BLD AUTO: 70 %
NRBC # BLD AUTO: 0 10E3/UL
NRBC BLD AUTO-RTO: 0 /100
NT-PROBNP SERPL-MCNC: 80 PG/ML (ref 0–900)
O2/TOTAL GAS SETTING VFR VENT: 21 %
PCO2 BLDV: 51 MM HG (ref 40–50)
PH BLDV: 7.36 [PH] (ref 7.32–7.43)
PLATELET # BLD AUTO: 234 10E3/UL (ref 150–450)
PO2 BLDV: 28 MM HG (ref 25–47)
POTASSIUM SERPL-SCNC: 3.9 MMOL/L (ref 3.4–5.3)
RBC # BLD AUTO: 4.69 10E6/UL (ref 4.4–5.9)
SODIUM SERPL-SCNC: 140 MMOL/L (ref 136–145)
T4 FREE SERPL-MCNC: 1.07 NG/DL (ref 0.9–1.7)
TSH SERPL DL<=0.005 MIU/L-ACNC: 4.59 UIU/ML (ref 0.3–4.2)
WBC # BLD AUTO: 10 10E3/UL (ref 4–11)

## 2023-06-06 PROCEDURE — 80048 BASIC METABOLIC PNL TOTAL CA: CPT | Performed by: PATHOLOGY

## 2023-06-06 PROCEDURE — 36415 COLL VENOUS BLD VENIPUNCTURE: CPT | Performed by: PATHOLOGY

## 2023-06-06 PROCEDURE — 84443 ASSAY THYROID STIM HORMONE: CPT | Performed by: PATHOLOGY

## 2023-06-06 PROCEDURE — 83880 ASSAY OF NATRIURETIC PEPTIDE: CPT | Performed by: PATHOLOGY

## 2023-06-06 PROCEDURE — 83036 HEMOGLOBIN GLYCOSYLATED A1C: CPT | Performed by: PEDIATRICS

## 2023-06-06 PROCEDURE — 85025 COMPLETE CBC W/AUTO DIFF WBC: CPT | Performed by: PATHOLOGY

## 2023-06-06 PROCEDURE — 82803 BLOOD GASES ANY COMBINATION: CPT | Performed by: PATHOLOGY

## 2023-06-06 PROCEDURE — 84439 ASSAY OF FREE THYROXINE: CPT | Performed by: PATHOLOGY

## 2023-06-06 NOTE — PATIENT INSTRUCTIONS
[facility]    1. Your sleep study will be reviewed by a sleep physician within the next few days.     2. Please follow up in the sleep clinic as scheduled, or, make an appointment with your sleep provider to be seen within two weeks to discuss the results of the sleep study.    3. If you have any questions or problems with your treatment plan, please contact your sleep clinic provider at [phone] to further manage your condition.    4. Please review your attached medication list, and, at your follow-up appointment advise your sleep clinic provider about any changes.    5. Go to [email or web address] for more information about your sleep problems.    Leticia Osorio, RPSGT  June 6, 2023   [employer]

## 2023-06-06 NOTE — NURSING NOTE
Completed a split night PSG per provider order.    Preliminary AHI 41.3.  A final therapeutic PAP pressure was not achieved.    Supine REM was not seen on therapeutic pressure.    Patient reports feeling not refreshed in AM.

## 2023-06-07 ENCOUNTER — MYC MEDICAL ADVICE (OUTPATIENT)
Dept: SLEEP MEDICINE | Facility: CLINIC | Age: 53
End: 2023-06-07
Payer: COMMERCIAL

## 2023-06-07 DIAGNOSIS — G47.33 OSA (OBSTRUCTIVE SLEEP APNEA): Primary | ICD-10-CM

## 2023-06-07 LAB — SLPCOMP: NORMAL

## 2023-06-09 ENCOUNTER — TELEPHONE (OUTPATIENT)
Dept: SLEEP MEDICINE | Facility: CLINIC | Age: 53
End: 2023-06-09

## 2023-06-13 DIAGNOSIS — R94.6 ABNORMAL FINDING ON THYROID FUNCTION TEST: Primary | ICD-10-CM

## 2023-06-28 NOTE — TELEPHONE ENCOUNTER
Left a message for patient to contact sleep center to schedule a return visit. Patient left MyChart message stating he would like to be seen prior to CPAP start.   Shannon Lynn MA

## 2023-07-17 ENCOUNTER — TELEPHONE (OUTPATIENT)
Dept: OTOLARYNGOLOGY | Facility: CLINIC | Age: 53
End: 2023-07-17
Payer: COMMERCIAL

## 2023-07-22 PROBLEM — M25.561 CHRONIC PAIN OF BOTH KNEES: Status: RESOLVED | Noted: 2023-03-20 | Resolved: 2023-07-22

## 2023-07-22 PROBLEM — G89.29 CHRONIC PAIN OF BOTH KNEES: Status: RESOLVED | Noted: 2023-03-20 | Resolved: 2023-07-22

## 2023-07-22 PROBLEM — M25.562 CHRONIC PAIN OF BOTH KNEES: Status: RESOLVED | Noted: 2023-03-20 | Resolved: 2023-07-22

## 2023-08-03 ENCOUNTER — PRE VISIT (OUTPATIENT)
Dept: CARDIOLOGY | Facility: CLINIC | Age: 53
End: 2023-08-03
Payer: COMMERCIAL

## 2023-08-03 ENCOUNTER — TELEPHONE (OUTPATIENT)
Dept: CARDIOLOGY | Facility: CLINIC | Age: 53
End: 2023-08-03
Payer: COMMERCIAL

## 2023-08-03 DIAGNOSIS — I50.32 CHRONIC DIASTOLIC HEART FAILURE (H): ICD-10-CM

## 2023-08-03 DIAGNOSIS — G47.33 OSA (OBSTRUCTIVE SLEEP APNEA): Primary | ICD-10-CM

## 2023-08-03 NOTE — TELEPHONE ENCOUNTER
[facility] Call Center    Phone Message    May a detailed message be left on voicemail: yes     Reason for Call: Other: Pt is scheduled for 08/09/23 and scheduled labs for 08/07/23 and would like to know what labs will be done and if he needs to fast. Please return call to advise.      Action Taken: Other: Cardiology    Travel Screening: Not Applicable    Thank you!  [facility]

## 2023-08-03 NOTE — TELEPHONE ENCOUNTER
"----- Message from Jennifer Whiteside RN sent at 8/3/2023  9:49 AM CDT -----  Regarding: offer sooner appt  Hello,    This patient is on the waitlist for New Cardiology, but he seems like little \"diastolic heart failure assessment-like\" to me.  Would you please call and offer him an appt with Dr. Zaldivar for 8/9/23, Please use New HF visit type, \"diastolic heart failure assessment\", will need labs prior.      OK to take VAD/Tx spot.    If he takes it, please remove from waitlist.    Thanks!  Bogdan    "

## 2023-08-17 ENCOUNTER — DOCUMENTATION ONLY (OUTPATIENT)
Dept: INTERNAL MEDICINE | Facility: CLINIC | Age: 53
End: 2023-08-17
Payer: COMMERCIAL

## 2023-08-17 NOTE — PROGRESS NOTES
Type of Form Received:     Form Received (Date) 8/17/23   Form Filled out No   Placed in provider folder Yes

## 2023-09-21 ENCOUNTER — APPOINTMENT (OUTPATIENT)
Dept: GENERAL RADIOLOGY | Facility: CLINIC | Age: 53
End: 2023-09-21
Payer: COMMERCIAL

## 2023-09-21 ENCOUNTER — HOSPITAL ENCOUNTER (OUTPATIENT)
Facility: CLINIC | Age: 53
Setting detail: OBSERVATION
Discharge: HOME | End: 2023-09-23
Attending: EMERGENCY MEDICINE
Payer: COMMERCIAL

## 2023-09-21 ENCOUNTER — APPOINTMENT (OUTPATIENT)
Dept: ULTRASOUND IMAGING | Facility: CLINIC | Age: 53
End: 2023-09-21
Payer: COMMERCIAL

## 2023-09-21 DIAGNOSIS — M79.605 LEFT LEG PAIN: ICD-10-CM

## 2023-09-21 DIAGNOSIS — R53.81 PHYSICAL DECONDITIONING: Primary | ICD-10-CM

## 2023-09-21 DIAGNOSIS — R06.00 DYSPNEA, UNSPECIFIED TYPE: ICD-10-CM

## 2023-09-21 DIAGNOSIS — R06.00 DYSPNEA, PAROXYSMAL NOCTURNAL: ICD-10-CM

## 2023-09-21 DIAGNOSIS — M54.41 CHRONIC RIGHT-SIDED LOW BACK PAIN WITH RIGHT-SIDED SCIATICA: ICD-10-CM

## 2023-09-21 DIAGNOSIS — G89.29 CHRONIC RIGHT-SIDED LOW BACK PAIN WITH RIGHT-SIDED SCIATICA: ICD-10-CM

## 2023-09-21 DIAGNOSIS — G89.29 OTHER CHRONIC PAIN: ICD-10-CM

## 2023-09-21 DIAGNOSIS — M79.604 RIGHT LEG PAIN: ICD-10-CM

## 2023-09-21 DIAGNOSIS — M17.0 PRIMARY OSTEOARTHRITIS OF BOTH KNEES: ICD-10-CM

## 2023-09-21 DIAGNOSIS — M54.41 ACUTE BACK PAIN WITH SCIATICA, RIGHT: ICD-10-CM

## 2023-09-21 DIAGNOSIS — Z11.52 ENCOUNTER FOR SCREENING LABORATORY TESTING FOR SEVERE ACUTE RESPIRATORY SYNDROME CORONAVIRUS 2 (SARS-COV-2): ICD-10-CM

## 2023-09-21 LAB
ALBUMIN UR-MCNC: 70 MG/DL
ANION GAP SERPL CALCULATED.3IONS-SCNC: 11 MMOL/L (ref 7–15)
APPEARANCE UR: CLEAR
BASOPHILS # BLD AUTO: 0.1 10E3/UL (ref 0–0.2)
BASOPHILS NFR BLD AUTO: 1 %
BILIRUB UR QL STRIP: NEGATIVE
BUN SERPL-MCNC: 14.2 MG/DL (ref 6–20)
CALCIUM SERPL-MCNC: 9.3 MG/DL (ref 8.6–10)
CHLORIDE SERPL-SCNC: 99 MMOL/L (ref 98–107)
COLOR UR AUTO: YELLOW
CREAT SERPL-MCNC: 0.87 MG/DL (ref 0.67–1.17)
DEPRECATED HCO3 PLAS-SCNC: 26 MMOL/L (ref 22–29)
EGFRCR SERPLBLD CKD-EPI 2021: >90 ML/MIN/1.73M2
EOSINOPHIL # BLD AUTO: 0.2 10E3/UL (ref 0–0.7)
EOSINOPHIL NFR BLD AUTO: 3 %
ERYTHROCYTE [DISTWIDTH] IN BLOOD BY AUTOMATED COUNT: 13.9 % (ref 10–15)
GLUCOSE SERPL-MCNC: 94 MG/DL (ref 70–99)
GLUCOSE UR STRIP-MCNC: NEGATIVE MG/DL
HCT VFR BLD AUTO: 45.9 % (ref 40–53)
HGB BLD-MCNC: 14.8 G/DL (ref 13.3–17.7)
HGB UR QL STRIP: NEGATIVE
HOLD SPECIMEN: NORMAL
HOLD SPECIMEN: NORMAL
IMM GRANULOCYTES # BLD: 0 10E3/UL
IMM GRANULOCYTES NFR BLD: 0 %
KETONES UR STRIP-MCNC: NEGATIVE MG/DL
LEUKOCYTE ESTERASE UR QL STRIP: NEGATIVE
LYMPHOCYTES # BLD AUTO: 1.7 10E3/UL (ref 0.8–5.3)
LYMPHOCYTES NFR BLD AUTO: 19 %
MAGNESIUM SERPL-MCNC: 2.2 MG/DL (ref 1.7–2.3)
MCH RBC QN AUTO: 28.8 PG (ref 26.5–33)
MCHC RBC AUTO-ENTMCNC: 32.2 G/DL (ref 31.5–36.5)
MCV RBC AUTO: 89 FL (ref 78–100)
MONOCYTES # BLD AUTO: 0.7 10E3/UL (ref 0–1.3)
MONOCYTES NFR BLD AUTO: 8 %
MUCOUS THREADS #/AREA URNS LPF: PRESENT /LPF
NEUTROPHILS # BLD AUTO: 6.2 10E3/UL (ref 1.6–8.3)
NEUTROPHILS NFR BLD AUTO: 69 %
NITRATE UR QL: NEGATIVE
NRBC # BLD AUTO: 0 10E3/UL
NRBC BLD AUTO-RTO: 0 /100
PH UR STRIP: 6 [PH] (ref 5–7)
PHOSPHATE SERPL-MCNC: 3.4 MG/DL (ref 2.5–4.5)
PLATELET # BLD AUTO: 249 10E3/UL (ref 150–450)
POTASSIUM SERPL-SCNC: 4 MMOL/L (ref 3.4–5.3)
RBC # BLD AUTO: 5.14 10E6/UL (ref 4.4–5.9)
RBC URINE: 3 /HPF
SODIUM SERPL-SCNC: 136 MMOL/L (ref 136–145)
SP GR UR STRIP: 1.02 (ref 1–1.03)
SQUAMOUS EPITHELIAL: <1 /HPF
TROPONIN T SERPL HS-MCNC: <6 NG/L
TSH SERPL DL<=0.005 MIU/L-ACNC: 2.78 UIU/ML (ref 0.3–4.2)
UROBILINOGEN UR STRIP-MCNC: NORMAL MG/DL
WBC # BLD AUTO: 8.9 10E3/UL (ref 4–11)
WBC URINE: 1 /HPF

## 2023-09-21 PROCEDURE — 250N000013 HC RX MED GY IP 250 OP 250 PS 637: Performed by: EMERGENCY MEDICINE

## 2023-09-21 PROCEDURE — 73521 X-RAY EXAM HIPS BI 2 VIEWS: CPT | Mod: 26 | Performed by: RADIOLOGY

## 2023-09-21 PROCEDURE — 93970 EXTREMITY STUDY: CPT

## 2023-09-21 PROCEDURE — 99222 1ST HOSP IP/OBS MODERATE 55: CPT | Performed by: PHYSICIAN ASSISTANT

## 2023-09-21 PROCEDURE — 73560 X-RAY EXAM OF KNEE 1 OR 2: CPT | Mod: 26 | Performed by: RADIOLOGY

## 2023-09-21 PROCEDURE — 73560 X-RAY EXAM OF KNEE 1 OR 2: CPT | Mod: 50

## 2023-09-21 PROCEDURE — 80048 BASIC METABOLIC PNL TOTAL CA: CPT | Performed by: EMERGENCY MEDICINE

## 2023-09-21 PROCEDURE — 83735 ASSAY OF MAGNESIUM: CPT | Performed by: PHYSICIAN ASSISTANT

## 2023-09-21 PROCEDURE — 99285 EMERGENCY DEPT VISIT HI MDM: CPT | Mod: 25 | Performed by: EMERGENCY MEDICINE

## 2023-09-21 PROCEDURE — 93970 EXTREMITY STUDY: CPT | Mod: 26 | Performed by: RADIOLOGY

## 2023-09-21 PROCEDURE — 84443 ASSAY THYROID STIM HORMONE: CPT | Performed by: PHYSICIAN ASSISTANT

## 2023-09-21 PROCEDURE — 84100 ASSAY OF PHOSPHORUS: CPT | Performed by: PHYSICIAN ASSISTANT

## 2023-09-21 PROCEDURE — 72100 X-RAY EXAM L-S SPINE 2/3 VWS: CPT | Mod: 26 | Performed by: RADIOLOGY

## 2023-09-21 PROCEDURE — G0378 HOSPITAL OBSERVATION PER HR: HCPCS

## 2023-09-21 PROCEDURE — 36415 COLL VENOUS BLD VENIPUNCTURE: CPT | Performed by: EMERGENCY MEDICINE

## 2023-09-21 PROCEDURE — 73522 X-RAY EXAM HIPS BI 3-4 VIEWS: CPT

## 2023-09-21 PROCEDURE — 81001 URINALYSIS AUTO W/SCOPE: CPT | Performed by: EMERGENCY MEDICINE

## 2023-09-21 PROCEDURE — 85025 COMPLETE CBC W/AUTO DIFF WBC: CPT | Performed by: EMERGENCY MEDICINE

## 2023-09-21 PROCEDURE — 84484 ASSAY OF TROPONIN QUANT: CPT

## 2023-09-21 PROCEDURE — 72100 X-RAY EXAM L-S SPINE 2/3 VWS: CPT

## 2023-09-21 PROCEDURE — 87086 URINE CULTURE/COLONY COUNT: CPT | Performed by: EMERGENCY MEDICINE

## 2023-09-21 PROCEDURE — 83880 ASSAY OF NATRIURETIC PEPTIDE: CPT | Performed by: PHYSICIAN ASSISTANT

## 2023-09-21 PROCEDURE — 99284 EMERGENCY DEPT VISIT MOD MDM: CPT | Performed by: EMERGENCY MEDICINE

## 2023-09-21 RX ORDER — ONDANSETRON 4 MG/1
4 TABLET, ORALLY DISINTEGRATING ORAL EVERY 6 HOURS PRN
Status: DISCONTINUED | OUTPATIENT
Start: 2023-09-21 | End: 2023-09-23 | Stop reason: HOSPADM

## 2023-09-21 RX ORDER — IBUPROFEN 600 MG/1
600 TABLET, FILM COATED ORAL ONCE
Status: COMPLETED | OUTPATIENT
Start: 2023-09-21 | End: 2023-09-21

## 2023-09-21 RX ORDER — PROCHLORPERAZINE 25 MG
25 SUPPOSITORY, RECTAL RECTAL EVERY 12 HOURS PRN
Status: DISCONTINUED | OUTPATIENT
Start: 2023-09-21 | End: 2023-09-23 | Stop reason: HOSPADM

## 2023-09-21 RX ORDER — ACETAMINOPHEN 500 MG
1000 TABLET ORAL ONCE
Status: COMPLETED | OUTPATIENT
Start: 2023-09-21 | End: 2023-09-21

## 2023-09-21 RX ORDER — PROCHLORPERAZINE MALEATE 10 MG
10 TABLET ORAL EVERY 6 HOURS PRN
Status: DISCONTINUED | OUTPATIENT
Start: 2023-09-21 | End: 2023-09-23 | Stop reason: HOSPADM

## 2023-09-21 RX ORDER — ONDANSETRON 2 MG/ML
4 INJECTION INTRAMUSCULAR; INTRAVENOUS EVERY 6 HOURS PRN
Status: DISCONTINUED | OUTPATIENT
Start: 2023-09-21 | End: 2023-09-23 | Stop reason: HOSPADM

## 2023-09-21 RX ADMIN — IBUPROFEN 600 MG: 600 TABLET, FILM COATED ORAL at 18:45

## 2023-09-21 RX ADMIN — ACETAMINOPHEN 1000 MG: 500 TABLET ORAL at 18:44

## 2023-09-21 ASSESSMENT — ACTIVITIES OF DAILY LIVING (ADL)
ADLS_ACUITY_SCORE: 39
ADLS_ACUITY_SCORE: 37
ADLS_ACUITY_SCORE: 37
ADLS_ACUITY_SCORE: 35
ADLS_ACUITY_SCORE: 35

## 2023-09-21 NOTE — ED PROVIDER NOTES
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    Patient reports that he is having back pain.  He states he cannot walk even 15 feet to his bathroom.  He is not able to perform daily tasks such as getting dressed.  He is unable to bathe or shower and has recently had to try to give himself sponge baths which are not effective.  He has had back pain in the past which has been worsening over months.  He also reports that his legs, pelvis have started hurting, even when he sitting.  He is profusely sweating, states has been worsening for months.   He also has noted rashes developing.  He has increased in weight recently, this was a high BP.  His leg pain and back pain has been occurring for years, has grown more severe this past year.  No fevers.  No new chest pain.  Patient has shortness of breath at baseline.  Patient reports he simply cannot wait for an outpatient visit in October as he is unable to care for himself at home.      Past Medical History  Past Medical History:   Diagnosis Date    Allergic rhinitis     Benign positional vertigo 2011    never quite went away    Bilateral carpal tunnel syndrome 07/18/2015    Chronic sinusitis 2017    I don't know Please look    Chronic tonsillitis     Depressive disorder     Depressive disorder, not elsewhere classified 07/30/2012    Gastro-oesophageal reflux disease     Hearing problem     Hyperlipidemia associated with prediabetes     Hypertension 2008    Learning disability     Mild intermittent asthma in adult without complication     Neck mass     parapharyngeal, benign    Obesity, Class III, BMI  40-49.9 (morbid obesity) (H)     Psychological factors associated with another disorder 08/21/2012    Recurrent otitis media 2016    might be what the pain in my ears    Reduced vision     Tinnitus 2016    I would hear a steady light buzzing sound and other can't    Trigeminal neuralgia     Weakness 08/26/2011     Past Surgical History:   Procedure Laterality Date    BIOPSY  2011    To figure out what kind of tumor I had    COLONOSCOPY N/A 10/16/2020    Procedure: INCOMPLETE COLONOSCOPY;  Surgeon: Ham Cherry MD;  Location: [facility] OR    ENDOSCOPIC RETROGRADE CHOLANGIOPANCREATOGRAM N/A 8/27/2015    Procedure: COMBINED ENDOSCOPIC RETROGRADE CHOLANGIOPANCREATOGRAPHY, PLACE TUBE/STENT;  Surgeon: Baldomero Zacarias MD;  Location: [facility] OR    ENDOSCOPIC RETROGRADE CHOLANGIOPANCREATOGRAM N/A 11/6/2015    Procedure: COMBINED ENDOSCOPIC RETROGRADE CHOLANGIOPANCREATOGRAPHY, REMOVE FOREIGN BODY OR STENT/TUBE;  Surgeon: Baldomero Zacarias MD;  Location: [facility] OR    EXCISE LESION INTRAORAL  6/29/2011    Procedure:EXCISE LESION INTRAORAL; TRANSCERVICAL APPROACH TO LEFT PHARYNGEAL SPACE MASS REMOVAL ; Surgeon:BARBARA PHILLIPS; Location: OR    HC VASCULAR SURGERY PROCEDURE UNLIST  12/29/15    LAPAROSCOPIC CHOLECYSTECTOMY N/A 8/23/2015    Procedure: LAPAROSCOPIC CHOLECYSTECTOMY;  Surgeon: Kvng Witt MD;  Location: [facility] OR    LARYNGOSCOPY WITH BIOPSY(IES)  6/18/2014    Procedure: LARYNGOSCOPY WITH BIOPSY(IES);  Surgeon: Barbara Phillips MD;  Location:  OR    LASER CO2 LARYNGOSCOPY  12/7/2011    Procedure:LASER CO2 LARYNGOSCOPY; Micro-Direct Laryngoscopy with CO2 Laser Standby / Excision Tracheal Grandulization, Trach Tube Change / Kenalog application. / Balloon Dilation of Subglottic Stenosis.*Latex Safe Room* Trach Tube = Shiley 6.4mm I.D. / 10.8MM O.D. / 76MM  Cuffless.; Surgeon:BARBARA PHILLIPS; Location: OR    ORTHOPEDIC SURGERY  5/2016    Broken Right hand & Fracture Right  shoulder    TRACHEOSTOMY  4/22/2011    Procedure:TRACHEOSTOMY; possible awake; Surgeon:EMELIA PHILLIPS; Location:[facility] OR    TRACHEOSTOMY  6/29/2011    Procedure:TRACHEOSTOMY; REMOVE AND REPLACE SHILEY 6 XLT; Surgeon:EMELIA PHILLIPS; Location:[facility] OR    VASCULAR SURGERY  2008-[facility]     albuterol (PROAIR HFA/PROVENTIL HFA/VENTOLIN HFA) 108 (90 Base) MCG/ACT inhaler  amitriptyline (ELAVIL) 100 MG tablet  COMPRESSION STOCKINGS  fluticasone (FLONASE) 50 MCG/ACT nasal spray  furosemide (LASIX) 20 MG tablet  ketoconazole (NIZORAL) 2 % external shampoo  levalbuterol (XOPENEX) 1.25 MG/3ML neb solution  nabumetone (RELAFEN) 500 MG tablet  order for DME  order for DME  order for DME  phentermine (ADIPEX-P) 37.5 MG tablet  topiramate (TOPAMAX) 100 MG tablet  traMADol (ULTRAM) 50 MG tablet  traZODone (DESYREL) 100 MG tablet  zolpidem (AMBIEN) 10 MG tablet      Allergies   Allergen Reactions    Fentanyl Itching     Patch    Meloxicam Other (See Comments)     Side pains.    Minocycline Rash     Patient reports adverse reaction was pigmentation which has resolved with drug discontinuation.     Family History  Family History   Adopted: Yes   Problem Relation Age of Onset    Family History Negative Other         Does not know family    Unknown/Adopted No family hx of      Social History   Social History     Tobacco Use    Smoking status: Never    Smokeless tobacco: Never    Tobacco comments:     Never started   Substance Use Topics    Alcohol use: No     Alcohol/week: 0.0 standard drinks of alcohol     Comment: rarely    Drug use: No         A medically appropriate review of systems was performed with pertinent positives and negatives noted in the HPI, and all other systems negative.    Physical Exam   BP: (!) 144/96  Pulse: 115  Temp: 98.7  F (37.1  C)  Resp: 20  SpO2: 96 %  Physical Exam  General: awake, alert, patient acutely diaphoretic, breathing heavily  Head: normal cephalic  HEENT: pupils equal, conjugate  "gaze intact  Neck: Supple  CV: regular rate and rhythm without murmur  Lungs: clear to auscultation  Abd: soft, non-tender, no guarding, no peritoneal signs.  Patient does have significant redness and yeast in the pannus.  EXT: lower extremities without swelling or edema  Neuro: awake, answers questions appropriately.  Patient has decreased strength in right-sided hip flexion, otherwise 4+ out of 5 strength in his bilateral lower extremities.  Patient has significant difficulty with any kind of mobility.  Back: Patient has tenderness over his midline low back and his right SI joint which per chart review is baseline.      ED Course, Procedures, & Data      Procedures            No results found for any visits on 09/21/23.  Medications   acetaminophen (TYLENOL) tablet 1,000 mg (has no administration in time range)   ibuprofen (ADVIL/MOTRIN) tablet 600 mg (has no administration in time range)     Labs Ordered and Resulted from Time of ED Arrival to Time of ED Departure - No data to display  No orders to display          Critical care was not performed.     Medical Decision Making  The patient's presentation was of moderate complexity (a chronic illness mild to moderate exacerbation, progression, or side effect of treatment).    The patient's evaluation involved:  Review of 3+ old notes  Review of 3+ old labs and imaging  Review of 3+ new labs  Discussion with the observation DARWIN about admission,    The patient's management necessitated high risk (a decision regarding hospitalization).    Assessment & Plan    Chavez is a 52-year-old male who presents to the emergency department back pain.  On exam patient appears diaphoretic, uncomfortable, and breathing heavily with conversation though he notes that the diaphoresis, breathing and back pain have been present for quite some time.  He is primarily here given his ongoing back pain for \"months\".    Patient is denying any new red flag symptoms such as fever, bowel or " "bladder dysfunction, or new neuro extremity dysfunction.  He does note that he had a hard time moving around his house and doing his ADLs secondary to his pain which again he notes has been present for \"months\".     I am not worried about acute pathology as he reports that everything has been chronic x2 months at least, he is denying any new red flag symptoms however patient is not even able to ambulate from chair to wheelchair without assistance.  I am not sure how he has been managing at home.  He needs physical therapy, Occupational Therapy, and likely placement.  Will admit to the ED observation unit.    I have reviewed the nursing notes. I have reviewed the findings, diagnosis, plan and need for follow up with the patient.    New Prescriptions    No medications on file       Final diagnoses:   Chronic right-sided low back pain with right-sided sciatica     I, Marcela Gloria, am serving as a trained medical scribe to document services personally performed by Del Hernandez MD, based on the provider's statements to me.     I, Del Hernandez MD, was physically present and have reviewed and verified the accuracy of this note documented by Marcela Gloria.    Del Hernandez MD  [facility] EMERGENCY DEPARTMENT  9/21/2023     Del Hernandez MD  09/21/23 1746    "

## 2023-09-21 NOTE — ED TRIAGE NOTES
Pt arrives with a couple months of back pain, leg pain, pelvic pain and sweats that have  been going on for a couple months. He says he cannot get into his regular doctor until October. He said he saw his home health nurse last week. She said his last bp was 148 and she checked an hr later and it was still 148 systolic.      Pt says he is chronically short of breath.

## 2023-09-22 ENCOUNTER — APPOINTMENT (OUTPATIENT)
Dept: CARDIOLOGY | Facility: CLINIC | Age: 53
End: 2023-09-22
Payer: COMMERCIAL

## 2023-09-22 ENCOUNTER — APPOINTMENT (OUTPATIENT)
Dept: PHYSICAL THERAPY | Facility: CLINIC | Age: 53
End: 2023-09-22
Payer: COMMERCIAL

## 2023-09-22 LAB
LVEF ECHO: NORMAL
NT-PROBNP SERPL-MCNC: <36 PG/ML (ref 0–900)

## 2023-09-22 PROCEDURE — 97116 GAIT TRAINING THERAPY: CPT | Mod: GP

## 2023-09-22 PROCEDURE — 999N000111 HC STATISTIC OT IP EVAL DEFER

## 2023-09-22 PROCEDURE — 250N000013 HC RX MED GY IP 250 OP 250 PS 637: Performed by: PHYSICIAN ASSISTANT

## 2023-09-22 PROCEDURE — 999N000208 ECHOCARDIOGRAM COMPLETE

## 2023-09-22 PROCEDURE — 97530 THERAPEUTIC ACTIVITIES: CPT | Mod: GP

## 2023-09-22 PROCEDURE — G0378 HOSPITAL OBSERVATION PER HR: HCPCS

## 2023-09-22 PROCEDURE — 87086 URINE CULTURE/COLONY COUNT: CPT | Performed by: EMERGENCY MEDICINE

## 2023-09-22 PROCEDURE — 99232 SBSQ HOSP IP/OBS MODERATE 35: CPT | Mod: FS | Performed by: INTERNAL MEDICINE

## 2023-09-22 PROCEDURE — 93306 TTE W/DOPPLER COMPLETE: CPT | Mod: 26 | Performed by: INTERNAL MEDICINE

## 2023-09-22 PROCEDURE — 255N000002 HC RX 255 OP 636: Performed by: INTERNAL MEDICINE

## 2023-09-22 PROCEDURE — 999N000128 HC STATISTIC PERIPHERAL IV START W/O US GUIDANCE

## 2023-09-22 PROCEDURE — 97161 PT EVAL LOW COMPLEX 20 MIN: CPT | Mod: GP

## 2023-09-22 RX ORDER — TRAMADOL HYDROCHLORIDE 50 MG/1
50 TABLET ORAL EVERY 6 HOURS PRN
Status: DISCONTINUED | OUTPATIENT
Start: 2023-09-22 | End: 2023-09-23 | Stop reason: HOSPADM

## 2023-09-22 RX ORDER — NALOXONE HYDROCHLORIDE 0.4 MG/ML
0.2 INJECTION, SOLUTION INTRAMUSCULAR; INTRAVENOUS; SUBCUTANEOUS
Status: DISCONTINUED | OUTPATIENT
Start: 2023-09-22 | End: 2023-09-23 | Stop reason: HOSPADM

## 2023-09-22 RX ORDER — LIDOCAINE 4 G/G
3 PATCH TOPICAL
Status: DISCONTINUED | OUTPATIENT
Start: 2023-09-22 | End: 2023-09-23 | Stop reason: HOSPADM

## 2023-09-22 RX ORDER — NALOXONE HYDROCHLORIDE 0.4 MG/ML
0.4 INJECTION, SOLUTION INTRAMUSCULAR; INTRAVENOUS; SUBCUTANEOUS
Status: DISCONTINUED | OUTPATIENT
Start: 2023-09-22 | End: 2023-09-23 | Stop reason: HOSPADM

## 2023-09-22 RX ORDER — ACETAMINOPHEN 500 MG
1000 TABLET ORAL EVERY 6 HOURS PRN
Status: DISCONTINUED | OUTPATIENT
Start: 2023-09-22 | End: 2023-09-23 | Stop reason: HOSPADM

## 2023-09-22 RX ADMIN — MICONAZOLE NITRATE: 20 POWDER TOPICAL at 15:23

## 2023-09-22 RX ADMIN — LIDOCAINE PATCH 4% 3 PATCH: 40 PATCH TOPICAL at 09:25

## 2023-09-22 RX ADMIN — HUMAN ALBUMIN MICROSPHERES AND PERFLUTREN 5 ML: 10; .22 INJECTION, SOLUTION INTRAVENOUS at 11:41

## 2023-09-22 RX ADMIN — ACETAMINOPHEN 1000 MG: 500 TABLET ORAL at 15:22

## 2023-09-22 RX ADMIN — MICONAZOLE NITRATE: 20 POWDER TOPICAL at 09:26

## 2023-09-22 RX ADMIN — MENTHOL 1 PATCH: 205.5 PATCH TOPICAL at 21:38

## 2023-09-22 RX ADMIN — MICONAZOLE NITRATE: 20 POWDER TOPICAL at 00:27

## 2023-09-22 RX ADMIN — MICONAZOLE NITRATE: 20 POWDER TOPICAL at 20:57

## 2023-09-22 RX ADMIN — ACETAMINOPHEN 1000 MG: 500 TABLET ORAL at 06:09

## 2023-09-22 RX ADMIN — ACETAMINOPHEN 1000 MG: 500 TABLET ORAL at 21:38

## 2023-09-22 RX ADMIN — TRAMADOL HYDROCHLORIDE 50 MG: 50 TABLET, COATED ORAL at 21:37

## 2023-09-22 ASSESSMENT — ACTIVITIES OF DAILY LIVING (ADL)
ADLS_ACUITY_SCORE: 39
ADLS_ACUITY_SCORE: 41
ADLS_ACUITY_SCORE: 39
DEPENDENT_IADLS:: CLEANING;COOKING;LAUNDRY;SHOPPING;MEAL PREPARATION;TRANSPORTATION
ADLS_ACUITY_SCORE: 39

## 2023-09-22 ASSESSMENT — ENCOUNTER SYMPTOMS
PAIN SEVERITY NOW: MODERATE
SUBJECTIVE PATIENT PAIN CONTROL: PARTIALLY CONTROLLED
DIETARY ISSUES: ADEQUATE INTAKE
ACTIVITY IMPAIRMENT: IMPAIRED DUE TO PAIN
SUBJECTIVE PAIN PROGRESSION: UNCHANGED

## 2023-09-22 NOTE — PLAN OF CARE
Observation Goal Outcome Evaluation:       diagnostic tests and consults completed and resulted : Not met  -vital signs normal or at patient baseline : Met  -tolerating oral intake to maintain hydration : met  -adequate pain control on oral analgesics : not met   -dyspnea improved and O2 sats greater than 88% on room air or prior home oxygen levels : met  -returns to baseline functional status : not met  -safe disposition plan has been identified : not met      Neuro: A&Ox4.   Cardiac: SR. VSS.   Respiratory: Sating 96% on RA.  GI/: Adequate urine output. BM X1  Diet/appetite: Tolerating regular diet. Eating well.  Activity:  Assist of one up to chair and in halls.  Pain: At acceptable level on current regimen.   Skin: No new deficits noted.  LDA's: PIV patent and functioning.   Plan: Continue with POC. Notify primary team with changes.

## 2023-09-22 NOTE — PLAN OF CARE
Goal Outcome Evaluation:    -diagnostic tests and consults completed and resulted: In Progress, UA sent, pt down for Xray    -vital signs normal or at patient baseline: Not met.  BP (!) 150/105 (BP Location: Left arm, Patient Position: Sitting, Cuff Size: Adult Large)   Pulse 83   Temp 98.1  F (36.7  C) (Oral)   Resp 18   SpO2 100% on RA    -tolerating oral intake to maintain hydration: Met    -adequate pain control on oral analgesics: Not met    -dyspnea improved and O2 sats greater than 88% on room air or prior home oxygen levels: Progressing, O2 sats WNL ~98%, dyspnea at baseline    -returns to baseline functional status: Not met    -safe disposition plan has been identified: Not met, PT and OT to see him in the morning.    Pt A/Ox4, sitting up in bed  Nurse to notify provider when observation goals have been met and patient is ready for discharge.

## 2023-09-22 NOTE — PROGRESS NOTES
ED OBSERVATION PROGRESS NOTE:  S: Chavez Ca is a 52 year old male admitted on 9/21/2023. 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    Chief Complaint   Patient presents with    Back Pain    Pelvic Pain    Sweats     1. Physical deconditioning    2. Chronic right-sided low back pain with right-sided sciatica    3. Dyspnea, multifactorial    4. Primary mild osteoarthritis of both knees        Problem List:  Patient Active Problem List   Diagnosis    Physical deconditioning    Trigeminal neuralgia    VERN (obstructive sleep apnea)    Idiopathic edema    Depressive disorder, not elsewhere classified    Subglottic stenosis    Hyperlipidemia associated with prediabetes    Arthritis of both hands    Varicose veins of left lower extremity    Neck pain, chronic    Seasonal allergies    Osteoarthritis of spine with radiculopathy, lumbar region    Morbid obesity (H)    Condyloma acuminata    Hypokalemia    Poor dentition    Benign positional vertigo    Dyspnea, multifactorial    Insomnia due to medical condition    Skin pain    Facial rash (possible roseacea?)    Seborrheic dermatitis of scalp    Intertrigo of adipose folds    Sternocostal pain    Prediabetes    Moderate persistent asthma without complication    Primary mild osteoarthritis of both knees    Microalbuminuria    Elevated TSH    Chronic right-sided low back pain with right-sided sciatica       MEDS:   No current outpatient medications on file.       ALLERGIES:    Allergies   Allergen Reactions    Fentanyl Itching     Patch    Meloxicam Other (See Comments)     Side pains.    Minocycline Rash     Patient reports adverse reaction was pigmentation which has  resolved with drug discontinuation.       O:BP (!) 127/90 (BP Location: Left arm, Cuff Size: Adult Large)   Pulse 96   Temp 97.8  F (36.6  C) (Oral)   Resp 18   SpO2 98%     Physical Exam   Constitutional: Pt is oriented to person, place, and time.Pt appears well-developed and well-nourished.   HENT:   Head: Normocephalic and atraumatic.   Eyes: Conjunctivae are normal. Pupils are equal, round, and reactive to light.   Neck: Normal range of motion. Neck supple.   Cardiovascular: Normal rate, regular rhythm, normal heart sounds and intact distal pulses.    Pulmonary/Chest: Effort normal and breath sounds normal. No respiratory distress. Pt has no wheezes. Pt has no rales  Abdominal: Soft. Bowel sounds are normal. Pt exhibits no distension and no mass. No tenderness. Pt has no rebound and no guarding.   Musculoskeletal: Normal range of motion. Pt exhibits no edema.   Neurological: Pt is alert and oriented to person, place, and time. Normal reflexes.   Skin: Skin is warm and dry. No rash noted.   Psychiatric: Pt has a normal mood and affect. Behavior is normal. Judgment and thought content normal.        Assessment & Plan  Chavez Ca is a 52 year old male admitted on 9/21/2023. 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     ##. Back pain & Pelvic Pain  ##. Bilateral LE Pain  Chronic right lower back pain however over the last 2 months pain has become more severe and is having a difficult time doing any of his ADLs. Worse by touching. Reports pelvic pain, bilateral knee pain and ankle pain. Though he reports pains have been present for 10 years and has polyneuropathy but has had worsening pain more recently.  Takes over the counter tylenol and ibuprofen for pain. Fell down stairs ~ 4 steps about 3 months ago. Reports urinary urgency, chills, headaches. No history of kidney stones. Denies any dysuria, urinary incontinence, diarrhea, nausea, vomiting, fever, abdominal pain, lightheadedness, chest pain. Does not get any home care. In ED, , /96, RR 20, SaO2 96% on RA, Temp 98.7  F . Labs show normal BMP, CBC, Troponin HS. In the ED the patient was given tylenol 1gm po x 1 and ibuprofen 600mg po x 1. Patient was being admitted to the ED Observation Unit as he is not even able to ambulate from chair to wheelchair without assistance. UA checked and is negative. Doppler US to r/o DVT is negative. Xrays of Lumbar, Pelvic/Hip, Knee negative. Patient was seen by PT today. Patient found to be functioning at baseline. PT recommends HH with OP PT. Patient is agreeable. Patient continues to have bilateral lower extremity pain, however this improving. Anticipate discharge tomorrow.   -Tylenol prn pain  -Menthol to knees  -Lidoderm patch to right back and pelvis  -SW consult to assist with disposition      ##. Dyspnea  ##. History of subglottic stenosis  ##. History of benign parapharyngeal space pleomorphic adenoma, s/p resection in 2011  ##. Obesity Class III, BMI 49  ##. Possible Obesity Hypoventilation Syndrome  Patient with chronic CRUZ, which he attributes to history of ENT surgery and subglottic stenosis, obesity, VERN. Has LE edema. Last Echo 3/8/23 with Global and regional left ventricular function is normal with an EF of 60-65%; Right ventricular function, chamber size, wall motion, and thickness are normal. Has since discontinued use of Lasix. Resting Echo without ischemic findings.   - Strict intake/output  - Daily weights  - 2g Na restricted diet  - Telemetry  - Follow-up with ENT as outpatient per routine     ##. VERN  Likely contributor of ongoing dyspnea. Carries diagnosis of VERN, does not use CPAP at home, has  been evaluated for dental appliance, does not currently have due to poor dentition. Patient has discontinued use of phenteramine or topamax  - Overnight oximetry     ##. Idiopathic edema  ##. Chronic venous insufficiency     Exam c/w chronic venous stasis and dermatitis. Tender to touch bilateral LE. Has been sedentary and discontinued use of lasix. Doppler US to r/o DVT negative.   - Strict intake/output  - Daily weights    ##. Insomnia: Discontinued ambien and trazodone use           Diet: Combination Diet 2 gm NA Diet; Low Saturated Fat Diet, Low Saturated Fat Na <2400mg Diet  DVT Prophylaxis: Enoxaparin (Lovenox) SQ  Russo Catheter: Not present  Lines: None     Cardiac Monitoring: ACTIVE order. Indication: Tachyarrhythmias, acute (48 hours)  Code Status: Full Code       Signed:  Daina Swanson PA-C  September 22, 2023 at 5:06 PM

## 2023-09-22 NOTE — PLAN OF CARE
Goal Outcome Evaluation:    -diagnostic tests and consults completed and resulted: In Progress    -vital signs normal or at patient baseline: Progressing  /84 (BP Location: Left arm)   Pulse 91   Temp 98  F (36.7  C) (Oral)   Resp 18   SpO2 98%  on RA    -tolerating oral intake to maintain hydration: Met    -adequate pain control on oral analgesics: In progress    -dyspnea improved and O2 sats greater than 88% on room air or prior home oxygen levels: Progressing, O2 sats WNL ~98%, SOB at baseline    -returns to baseline functional status: Not met    -safe disposition plan has been identified: Not met, PT and OT to see him in the morning.    Nurse to notify provider when observation goals have been met and patient is ready for discharge.

## 2023-09-22 NOTE — H&P
[facility]    History and Physical - ED Observation Service        Date of Admission:  9/21/2023    Assessment & Plan      Chavez Ca is a 52 year old male admitted on 9/21/2023. 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     ##. Back pain & Pelvic Pain  ##. Bilateral LE Pain  Chronic right lower back pain however over the last 2 months pain has become more severe and is having a difficult time doing any of his ADLs. Worse by touching. Reports pelvic pain, bilateral knee pain and ankle pain. Though he reports pains have been present for 10 years and has polyneuropathy but has had worsening pain more recently. Takes over the counter tylenol and ibuprofen for pain. Fell down stairs ~ 4 steps about 3 months ago. Reports urinary urgency, chills, headaches. No history of kidney stones. Denies any dysuria, urinary incontinence, diarrhea, nausea, vomiting, fever, abdominal pain, lightheadedness, chest pain. Does not get any home care. In ED, , /96, RR 20, SaO2 96% on RA, Temp 98.7  F . Labs show normal BMP, CBC, Troponin HS. In the ED the patient was given tylenol 1gm po x 1 and ibuprofen 600mg po x 1. Patient is being admitted to the ED Observation Unit as he is not even able to ambulate from chair to wheelchair without assistance. He will need assessment by PT/OT and likely placement.   -Doppler US to r/o DVT  -Xrays of Lumbar, Pelvic/Hip, Knee  -Tylenol prn pain  -Menthol to knees  -Lidoderm patch to right back and pelvis  -Check UA  -PT/OT consult  -SW consult    ##. Dyspnea  ##. History of subglottic stenosis  ##. History of benign parapharyngeal space  pleomorphic adenoma, s/p resection in 2011  ##. Obesity Class III, BMI 49  ##. Possible Obesity Hypoventilation Syndrome  Patient with chronic CRUZ, which he attributes to history of ENT surgery and subglottic stenosis, obesity, VERN. Has LE edema. Last Echo 3/8/23 with Global and regional left ventricular function is normal with an EF of 60-65%; Right ventricular function, chamber size, wall motion, and thickness are normal. Has since discontinued use of Lasix. Patient has discontinued use of phenteramine or topamax  - Strict intake/output  - Daily weights  - Doppler US to r/o DVT  - Check BNP  - 2g Na restricted diet  - Resting Echo  - Telemetry  - Follow-up with ENT as outpatient per routine    ##. VERN: Likely contributor of ongoing dyspnea. Carries diagnosis of VERN, does not use CPAP at home, has been evaluated for dental appliance, does not currently have due to poor dentition.   - Overnight oximetry     ##. Idiopathic edema  ##. Chronic venous insufficiency     Exam c/w chronic venous stasis and dermatitis. Tender to touch bilateral LE. Has been sedentary and discontinued use of lasix  - Doppler US to r/o DVT  - Strict intake/output  - Daily weights    ##. Insomnia: Discontinued ambien and trazodone use       Diet: Combination Diet 2 gm NA Diet; Low Saturated Fat Diet, Low Saturated Fat Na <2400mg Diet  DVT Prophylaxis: Enoxaparin (Lovenox) SQ  Russo Catheter: Not present  Lines: None     Cardiac Monitoring: ACTIVE order. Indication: Tachyarrhythmias, acute (48 hours)  Code Status: Full Code    Clinically Significant Risk Factors Present on Admission                           # Asthma: noted on problem list        Disposition Plan      Expected Discharge Date: 09/23/2023                The patient's care was discussed with the Attending Physician, Dr. Arizmendi .    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     Per ED Note: \"Patient reports that he is having back pain.  He states he cannot walk even 15 feet to his bathroom.  He is not able to perform daily tasks such as getting dressed.  He is unable to bathe or shower and has recently had to try to give himself sponge baths which are not effective.  He has had back pain in the past which has been worsening over months.  He also reports that his legs, pelvis have started hurting, even when he sitting.  He is profusely sweating, states has been worsening for months.   He also has noted rashes developing.  He has increased in weight recently, this was a high BP.  His leg pain and back pain has been occurring for years, has grown more severe this past year.  No fevers.  No new chest pain.  Patient has shortness of breath at baseline.  Patient reports he simply cannot wait for an outpatient visit in October as he is unable to care for himself at home.\"    Patient states he has always had right lower back pain however over the last 2 months pain has become more severe and he is having a difficult time doing any of his ADLs. States right side pain is made worse by touching. Reports pelvic pain, bilateral knee pain and ankle pain. Though he reports pains have been present for 10 years and has polyneuropathy but has had worsening pain more " recently. Takes over the counter tylenol and ibuprofen for pain. Has not been taking all prescription medicines since June this year as he had several problems with medication refills and continuity and having to follow up with physicians. He just decided to discontinue everything. Fell down stairs ~ 4 steps about 3 months ago. Reports urinary urgency, chills, headaches. No history of kidney stones. Denies any dysuria, urinary incontinence, diarrhea, nausea, vomiting, fever, abdominal pain, lightheadedness, chest pain. Patient has missed or cancelled several out patient appointments for several reasons as he explains. Does not get any home care.     In the ED, , /96, RR 20, SaO2 96% on RA, Temp 98.7  F . Labs show normal BMP, CBC, Troponin HS. In the ED the patient was given tylenol 1gm po x 1 and ibuprofen 600mg po x 1. Patient is being admitted to the ED Observation Unit as he is not even able to ambulate from chair to wheelchair without assistance. He [facility].       Past Medical History    Past Medical History:   Diagnosis Date    Allergic rhinitis     Benign positional vertigo 2011    never quite went away    Bilateral carpal tunnel syndrome 07/18/2015    Chronic sinusitis 2017    I don't know Please look    Chronic tonsillitis     Depressive disorder     Depressive disorder, not elsewhere classified 07/30/2012    Gastro-oesophageal reflux disease     Hearing problem     Hyperlipidemia associated with prediabetes     Hypertension 2008    Learning disability     Mild intermittent asthma in adult without complication     Neck mass     parapharyngeal, benign    Obesity, Class III, BMI 40-49.9 (morbid obesity) (H)     Psychological factors associated with another disorder 08/21/2012    Recurrent otitis media 2016    might be what the pain in my ears    Reduced vision     Tinnitus 2016    I would hear a steady light buzzing sound and other can't    Trigeminal  neuralgia     Weakness 08/26/2011       Past Surgical History   Past Surgical History:   Procedure Laterality Date    BIOPSY  2011    To figure out what kind of tumor I had    COLONOSCOPY N/A 10/16/2020    Procedure: INCOMPLETE COLONOSCOPY;  Surgeon: Ham Cherry MD;  Location: [facility] OR    ENDOSCOPIC RETROGRADE CHOLANGIOPANCREATOGRAM N/A 8/27/2015    Procedure: COMBINED ENDOSCOPIC RETROGRADE CHOLANGIOPANCREATOGRAPHY, PLACE TUBE/STENT;  Surgeon: Baldomero Zacarias MD;  Location: [facility] OR    ENDOSCOPIC RETROGRADE CHOLANGIOPANCREATOGRAM N/A 11/6/2015    Procedure: COMBINED ENDOSCOPIC RETROGRADE CHOLANGIOPANCREATOGRAPHY, REMOVE FOREIGN BODY OR STENT/TUBE;  Surgeon: Baldomero Zacarias MD;  Location: [facility] OR    EXCISE LESION INTRAORAL  6/29/2011    Procedure:EXCISE LESION INTRAORAL; TRANSCERVICAL APPROACH TO LEFT PHARYNGEAL SPACE MASS REMOVAL ; Surgeon:BARBARA PHILLIPS; Location: OR     VASCULAR SURGERY PROCEDURE UNLIST  12/29/15    LAPAROSCOPIC CHOLECYSTECTOMY N/A 8/23/2015    Procedure: LAPAROSCOPIC CHOLECYSTECTOMY;  Surgeon: Kvng Witt MD;  Location: [facility] OR    LARYNGOSCOPY WITH BIOPSY(IES)  6/18/2014    Procedure: LARYNGOSCOPY WITH BIOPSY(IES);  Surgeon: Barbara Phillips MD;  Location:  OR    LASER CO2 LARYNGOSCOPY  12/7/2011    Procedure:LASER CO2 LARYNGOSCOPY; Micro-Direct Laryngoscopy with CO2 Laser Standby / Excision Tracheal Grandulization, Trach Tube Change / Kenalog application. / Balloon Dilation of Subglottic Stenosis.*Latex Safe Room* Trach Tube = Shiley 6.4mm I.D. / 10.8MM O.D. / 76MM  Cuffless.; Surgeon:BARBARA PHILLIPS; Location: OR    ORTHOPEDIC SURGERY  5/2016    Broken Right hand & Fracture Right shoulder    TRACHEOSTOMY  4/22/2011    Procedure:TRACHEOSTOMY; possible awake; Surgeon:BARBARA PHILLIPS; Location:[facility] OR    TRACHEOSTOMY  6/29/2011    Procedure:TRACHEOSTOMY; REMOVE AND REPLACE SHILEY 6 XLT; Surgeon:BARBARA PHILLIPS;  Location:[facility] OR    VASCULAR SURGERY  -Preasant       Prior to Admission Medications   Prior to Admission Medications   Prescriptions Last Dose Informant Patient Reported? Taking?   COMPRESSION STOCKINGS   No No   Si-30 mmHg knee high compression stockings.  Measure and fit.  Style and brand per patient preference.   albuterol (PROAIR HFA/PROVENTIL HFA/VENTOLIN HFA) 108 (90 Base) MCG/ACT inhaler   No No   Sig: Inhale 2 puffs into the lungs every 4 hours as needed for shortness of breath / dyspnea or wheezing   amitriptyline (ELAVIL) 100 MG tablet   No No   Sig: Take 1 tablet (100 mg) by mouth At Bedtime   fluticasone (FLONASE) 50 MCG/ACT nasal spray   No No   Sig: Spray 1 spray into both nostrils daily   furosemide (LASIX) 20 MG tablet   No No   Sig: Take 3 tablets (60 mg) by mouth 2 times daily *Labs needed before next refill*   ketoconazole (NIZORAL) 2 % external shampoo   No No   Sig: APPLY TO AFFECTED AREA AND WASH OFF AFTER 5 MINUTES   levalbuterol (XOPENEX) 1.25 MG/3ML neb solution   No No   Sig: Take 3 mLs (1.25 mg) by nebulization every 4 hours as needed for shortness of breath / dyspnea or wheezing   nabumetone (RELAFEN) 500 MG tablet   No No   Sig: Take 1 tablet (500 mg) by mouth 2 times daily as needed for moderate pain   order for DME   No No   Sig: Equipment being ordered: compression stockings   order for DME   No No   Sig: Compression stockings pair, patient preference    To be used 8 hours per day   order for DME   No No   Sig: Equipment being ordered: compression stockings    Needs to be faxed to   phentermine (ADIPEX-P) 37.5 MG tablet   No No   Sig: Take 1 tablet (37.5 mg) by mouth every morning   topiramate (TOPAMAX) 100 MG tablet   No No   Sig: Take 2 tablets (200 mg) by mouth 2 times daily   traMADol (ULTRAM) 50 MG tablet   No No   Sig: Take 1 po PRN recovery from pain at HS or after physical therapy. Do not take to enable activity.  (With refills, covers through 7/3/2023 or later)    traZODone (DESYREL) 100 MG tablet   No No   Sig: Take 1 tablet (100 mg) by mouth At Bedtime Call clinic to schedule follow up appointment. [phone]   zolpidem (AMBIEN) 10 MG tablet   No No   Sig: TAKE 1 TABLET BY MOUTH NIGHTLY AS NEEDED FOR SLEEP      Facility-Administered Medications: None        Review of Systems    All other ROS negative except those mentioned in above note.       Physical Exam   Vital Signs: Temp: 97.8  F (36.6  C) Temp src: Oral BP: 134/84 Pulse: 91   Resp: 20 SpO2: 96 % O2 Device: None (Room air)    Weight: 0 lbs 0 oz    Constitutional: morbidly obese, awake, alert, cooperative, no apparent distress, and appears stated age  Eyes: Lids and lashes normal, pupils equal, round and reactive to light, extra ocular muscles intact, sclera clear, conjunctiva normal  ENT: Normocephalic, without obvious abnormality, atraumatic, sinuses nontender on palpation, external ears without lesions, oral pharynx with moist mucous membranes, tonsils without erythema or exudates, gums normal and good dentition.  Hematologic / Lymphatic: no cervical lymphadenopathy  Respiratory: No increased work of breathing - what appears to be slight heavy breathing with movement consistent with morbid obesity;  good air exchange, clear to auscultation bilaterally, no crackles or wheezing  Cardiovascular: Normal apical impulse, regular rate and rhythm, normal S1 and S2, no S3 or S4, and no murmur noted  GI: No scars, normal bowel sounds, soft, distended and obese, non-tender, no masses palpated, no hepatosplenomegally  Skin: erythema and moist in folds  Musculoskeletal: There is no redness, warmth, or swelling of the joints. Tender to palpate bilateral knees and calf, hips and lumbar paraspinal areas. Full range of motion noted.  Motor strength is 5 out of 5 all extremities bilaterally.  Tone is normal.  Neurologic: Awake, alert, oriented to name, place and time.  Cranial nerves II-XII are grossly intact.  Motor is 5 out of  5 bilaterally.    Neuropsychiatric: General: normal, calm and normal eye contact     Medical Decision Making       **CLEAR ALL SELECTIONS**      Data     I have personally reviewed the following data over the past 24 hrs:    Trop: N/A BNP: N/A     TSH: N/A T4: N/A A1C: N/A       Imaging results reviewed over the past 24 hrs:   Recent Results (from the past 24 hour(s))   US Lower Extremity Venous Duplex Bilateral    Narrative    EXAMINATION: DOPPLER VENOUS ULTRASOUND OF BILATERAL LOWER EXTREMITIES,  9/21/2023 11:50 PM     COMPARISON: None.    HISTORY: Bilateral lower extremity pain, rule out DVT    TECHNIQUE:  Gray-scale evaluation with compression, spectral flow and  color Doppler assessment of the deep venous system of both legs from  groin to knee, and then at the ankles.    FINDINGS:  Technically limited visualization of flow on Doppler due to overlying  soft tissues/body habitus.    In both lower extremities, flow appears normal in the common femoral  veins    In both lower extremities the femoral, popliteal, and posterior tibial  veins demonstrate normal compressibility. Difficult to visualize  vessels on grayscale imaging. Technically limited evaluation of flow  also appears grossly normal. Mild subcutaneous edema over the  bilateral ankles.        Impression    IMPRESSION:  1. No definite evidence of deep venous thrombosis in either lower  extremity. Exam is technically limited due to body habitus.  2. Mild subcutaneous edema over the bilateral ankles.    I have personally reviewed the examination and initial interpretation  and I agree with the findings.    DAVID MUJICA MD         SYSTEM ID:  [id number]   XR Lumbar Spine 2/3 Views    Narrative    EXAM: XR LUMBAR SPINE 2/3 VIEWS  LOCATION: [facility]  DATE: 9/22/2023    INDICATION: lower back pain. Fall 3 months ago  COMPARISON: 06/18/2022      Impression    IMPRESSION: No fracture. Normal vertebral heights  and alignment. Mild endplate irregularities. Normal disc spaces and facets for age. Normal extraspinal structures.   XR Pelvis and Hip Bilateral 2 Views    Narrative    EXAM: XR PELVIS AND HIP BILATERAL 2 VIEWS  LOCATION: [facility]  DATE: 2023    INDICATION: pain in bilateral pelvis. Fall 3 months ago. r o fracture  COMPARISON: None.      Impression    IMPRESSION: Normal joint spaces and alignment. No fracture.   XR Knee Bilateral 1/2 Views    Narrative    EXAM: XR KNEE BILATERAL 1/2 VIEWS  LOCATION: [facility]  DATE: 2023    INDICATION: bilateral knee pain, fall 3 months ago, difficulty with ambulation  COMPARISON: 2022      Impression    IMPRESSION:   RIGHT KNEE: Moderate medial compartment joint space loss and lateral compartment joint space narrowing. Posterior patellar spurring with a small suprapatellar joint effusion. No evidence for fracture.    LEFT KNEE: Moderate medial compartment joint space loss and mild lateral compartment joint space narrowing. Mild posterior patellar spurring. Small joint effusion. No evidence for fracture.   Echocardiogram Complete   Result Value    LVEF  60-65%    Narrative    [id number]  [id number]  [id number]  [id number]^SKY^MARIO^MARY ANNE     [facility]  Echocardiography Laboratory  [address] [place] [address]     Name: GAMA FERRARI  MRN: [id number]  : 1970  Study Date: 2023 08:55 AM  Age: 52 yrs  Gender: Male  Patient Location: [email or web address]  Reason For Study: SOB  Ordering Physician: MARIO SWANSON  Performed By: Sundar Mcleod     BSA: 2.7 m2  Height: 67 in  Weight: 386 lb  HR: 76  BP: 150/195 mmHg  ______________________________________________________________________________  Procedure  Complete Portable Echo Adult. Contrast Optison. Echocardiogram with two-  dimensional, color and spectral Doppler  performed. TDS - morbid obesity,  scanned sitting up. Optison (NDC #5560-5806-99) given intravenously. Patient  was given 5 ml mixture of 3 ml Optison and 6 ml saline. 4 ml wasted.  ______________________________________________________________________________  Interpretation Summary  Global and regional left ventricular function is normal with an EF of 60-65%.  Global right ventricular function is normal.  No significant valvular abnormalities present.  Estimated mean right atrial pressure is normal.  No pericardial effusion is present.  ______________________________________________________________________________  Left Ventricle  Left ventricular size is normal. Left ventricular wall thickness is normal.  Global and regional left ventricular function is normal with an EF of 60-65%.     Right Ventricle  The right ventricle is normal size. Global right ventricular function is  normal.     Atria  Both atria appear normal.     Mitral Valve  On Doppler interrogation, there is no significant stenosis or regurgitation.     Aortic Valve  On Doppler interrogation, there is no significant stenosis or regurgitation.     Tricuspid Valve  On Doppler interrogation, there is no significant stenosis or regurgitation.  The peak velocity of the tricuspid regurgitant jet is not obtainable.  Pulmonary artery systolic pressure cannot be assessed.     Pulmonic Valve  On Doppler interrogation, there is no significant stenosis or regurgitation.     Vessels  The aorta root is normal. The inferior vena cava is normal. Estimated mean  right atrial pressure is normal.     Pericardium  No pericardial effusion is present.     Miscellaneous  No significant valvular abnormalities present.     Compared to Previous Study  This study was compared with the study from 3.2023 . No significant changes  noted.  ______________________________________________________________________________  MMode/2D Measurements & Calculations  IVSd: 1.2 cm  LVIDd: 3.9  cm  LVIDs: 2.7 cm  LVPWd: 1.4 cm  FS: 32.2 %  LV mass(C)d: 181.7 grams  LV mass(C)dI: 67.9 grams/m2  Ao root diam: 3.9 cm  LVOT diam: 2.4 cm  LVOT area: 4.5 cm2  Ao root diam index Ht(cm/m): 2.3  Ao root diam index BSA (cm/m2): 1.4  RWT: 0.69     Doppler Measurements & Calculations  MV E max dilcia: 60.3 cm/sec  MV A max dilcia: 82.8 cm/sec  MV E/A: 0.73  MV dec slope: 290.1 cm/sec2  MV dec time: 0.21 sec     PA acc time: 0.13 sec     ______________________________________________________________________________  Report approved by: Castro Cleveland 09/22/2023 12:00 PM

## 2023-09-22 NOTE — PROGRESS NOTES
Care Management Initial Consult    General Information  Assessment completed with: Patient, VM-chart review,    Type of CM/SW Visit: Initial Assessment    Primary Care Provider verified and updated as needed: Yes (Chavez Milligan [phone] [address] [place] [address] 4TH FLOOR, [facility] [address])   Readmission within the last 30 days:        Reason for Consult: utilization management concerns (Elevated Readmsiion Risk score)  Advance Care Planning: Advance Care Planning Reviewed: other (see comments) (did not discuss. EMR lists ACP documents, but no documents found)        Communication Assessment  Patient's communication style: spoken language (English or Bilingual)    Hearing Difficulty or Deaf: no      Cognitive  Cognitive/Neuro/Behavioral: WDL                      Living Environment:   People in home: other (see comments) (Chavez lives with 2 roommates)     Current living Arrangements: house      Able to return to prior arrangements: yes  Living Arrangement Comments: Chavez's bedroom is on the 2nd floor and although able to navigate stairs, he finds it difficult    Family/Social Support:  Care provided by: self  Provides care for: no one, unable/limited ability to care for self  Marital Status: Single  Other (specify) (roommates)          Description of Support System: Uninvolved    Support Assessment: Limited social contact and support, Lacks necessary supervision and assistance, Lacks adequate physical care, Lacks adequate emotional support    Current Resources:   Patient receiving home care services: No     Community Resources: Financial/Insurance  Equipment currently used at home: walker, rolling  Supplies currently used at home: None    Employment/Financial:  Employment Status: disabled        Financial Concerns: No concerns identified   Referral to Financial Worker: No     Does the patient's insurance plan have a 3 day qualifying hospital stay waiver?  Yes   Will the waiver be used for post-acute  placement? No    Lifestyle & Psychosocial Needs:  Social Determinants of Health     Food Insecurity: Not on file   Depression: Not at risk (4/4/2023)    PHQ-2     PHQ-2 Score: 2   Housing Stability: Not on file   Tobacco Use: Low Risk  (4/4/2023)    Patient History     Smoking Tobacco Use: Never     Smokeless Tobacco Use: Never     Passive Exposure: Not on file   Financial Resource Strain: Not on file   Alcohol Use: Not on file   Transportation Needs: Not on file   Physical Activity: Not on file   Interpersonal Safety: Not on file   Stress: Not on file   Social Connections: Not on file       Functional Status:  Prior to admission patient needed assistance:   Dependent ADLs:: Bathing, Dressing, Grooming (does not currently have an assistive device for ambulation but wants to get a seated walker.)  Dependent IADLs:: Cleaning, Cooking, Laundry, Shopping, Meal Preparation, Transportation       Mental Health Status:  Mental Health Status: Current Concern (by chart review hx of depression)  Mental Health Management: Other (see comment) (none)    Chemical Dependency Status:  Chemical Dependency Status: No Current Concerns           Values/Beliefs:  Spiritual, Cultural Beliefs, Voodoo Practices, Values that affect care: no          Additional Information:      Chavez Ca is a 52 year old male admitted on 9/21/2023. 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 (Radames Thompson PA;9/21/2023)      Care Management/Social Work Consult placed due to patient's complex medical history and elevated unplanned readmission risk score and for discharge planning. DOMINIQUE performed chart review to begin assessment.     1215 DOMINIQUE  "met with Chavez at bedside to introduce self, explain SW role, explain the Medicare Outpatient Observation Notice (MOON), why he was receiving it, and to perform initial assessment. After introductions were made, SW provided unsigned MOON document; explained it, then addressed questions and concerns.    1222 Chavez signed TALAMANTES document acknowledging its receipt.     Chavez lives with two roommates in a 3 level house with a basement. His bedroom and bathroom are on the 2nd floor and the laundry is in the basement. He is able to navigate the stairs but is not able to do so multiple times. As a result he prefers to \"save up\" his laundry and do as many as possible loads per trip to the basement. However, he reports that both his roommates work from home-one has an office in the basement, and one on the main level. As a result, he is somewhat limited to where he can be during the work day, and is unable to keep up with his laundry as well as he would like.    Chavez reports that he struggles with dressing, grooming, and bathing due to his weight, pain and neuropathy. He also needs assistance with household chores. He uses public transportation for navigating the community, but the routes are 6 blocks away and he is unable to walk that far. He also struggles with connecting with resources. He told DOMINIQUE that he has an RN who calls him once per year, but \"doesn't do anything, just sends a letter to my doctor which he doesn't read anyway\". He provided RN's name and contact information (ALEJANDRA Leigh; ph: [phone]). DOMINIQUE agreed to contact RN to determine if RN was pt's RNCC through his insurance, and if so, request assistance with obtaining additional services for pt.     Chavez reported that about 11-12 years ago he went into adult foster care because of medical issues. He said that the owner of the group home where he lived didn't do anything to take care of him and he moved back to his current home as soon as he could. He " "also said that when he was approved for disability, he received \"back pay\", but the majority went to \"pay back\" for the services he received. He reported believing it went to the owner of the group home.      He told SW that at that time, he was also on the waiting list for the CADI waiver. When he was approved for disability, he took the remainder of his \"back pay\" and moved to [place] for some time, not being aware that if he moved out of state, he would no longer be eligible for the CADI waiver. He has struggled since with connecting to services.    DOMINIQUE provided emotional support via active and reflective listening and responding to feelings; normalized and validated feelings and experiences; and provided a supportive, non-anxious, and non-judgmental presence. No additional questions or concerns were reported. DOMINIQUE provided availability and contact information and excused self from Chavez's bedside.    1417 DOMINIQUE faxed TALAMANTES to [facility] ([phone]) and placed TALAMANTES in Chavez's chart.      1525   DOMINIQUE called  RN Reese ([phone]) who reported that she was not pt's CC, that she was with [employer], a [facility] partner, and she only spoke with patients once per year.    1534 Per PT recommendations, DOMINIQUE sent [facility] referral for PT to [facility] via [employer] [facility] accepted pt for service.    DOMINIQUE will continue to follow as needed.    MARIA T Oreilly, [facility]  ED/OBS   [facility]  Phone: [phone]  Pager: [phone]  Fax: [phone]     On-call pager, [phone], 4:00 pm to midnight      DOMINIQUE will continue to follow as needed.   "

## 2023-09-22 NOTE — PROGRESS NOTES
"OBS PT Eval     09/22/23 1100   Appointment Info   Signing Clinician's Name / Credentials (PT) Jag Pearce, PT, DPT   Quick Adds   Quick Adds Certification   Living Environment   People in Home other (see comments)  (roommates)   Current Living Arrangements house   Home Accessibility stairs within home   Number of Stairs, Within Home, Primary other (see comments)  (15)   Stair Railings, Within Home, Primary railing on left side (ascending)  (L sided ascending \"ledge\")   Living Environment Comments Patient lives in house with roommates but states he was trying to get into handicap accesible housing although that somehow failed and he gave up.   Self-Care   Usual Activity Tolerance moderate   Current Activity Tolerance moderate   Equipment Currently Used at Home walker, rolling   Fall history within last six months yes   Number of times patient has fallen within last six months 1  (3 months ago)   Activity/Exercise/Self-Care Comment Patient sleeps in recliner and ambualtes short distances at baseline. Patient does navigate stairs at home into basement but endorses limited endurance.   General Information   Onset of Illness/Injury or Date of Surgery 09/21/23   Referring Physician Radames Thompson PA   Patient/Family Therapy Goals Statement (PT) To get more support   Pertinent History of Current Problem (include personal factors and/or comorbidities that impact the POC) Per EMR \"Chavez Ca is a 52 year old male PMH of HLD, hypertension, depression, polyneuropathy,  parapharyngeal pleomorphic adenoma status post extensive excision and tracheostomy in 2011,  who presents to the ER for evaluation of back pain, sweats, pelvic pain.\"   General Observations activity: ambulate with assist   Cognition   Affect/Mental Status (Cognition) anxious   Cognitive Status Comments quite frustrated about variety of aspects of life   Posture    Posture Not impaired   Range of Motion (ROM)   Range of Motion ROM is WFL "   Strength (Manual Muscle Testing)   Strength (Manual Muscle Testing) strength is WFL   Bed Mobility   Bed Mobility supine-sit;sit-supine   Supine-Sit [place] (Bed Mobility) independent   Sit-Supine [place] (Bed Mobility) independent   Transfers   Transfers sit-stand transfer   Sit-Stand Transfer   Sit-Stand [place] (Transfers) modified independence   Assistive Device (Sit-Stand Transfers) walker, front-wheeled   Gait/Stairs (Locomotion)   [place] Level (Gait) modified independence   Assistive Device (Gait) walker, front-wheeled   Comment, (Gait/Stairs) steady gait, good stability, SOB   Balance   Balance Comments IND sitting, IND unsupported standing   Clinical Impression   Criteria for Skilled Therapeutic Intervention Yes, treatment indicated   PT Diagnosis (PT) impaired functional mobility   Influenced by the following impairments decreased activity tolerance, functional weakness   Functional limitations due to impairments bed mobility, transfers, gait   Clinical Presentation (PT Evaluation Complexity) Stable/Uncomplicated   Clinical Presentation Rationale clinical judgement   Clinical Decision Making (Complexity) low complexity   Planned Therapy Interventions (PT) balance training;bed mobility training;gait training;ROM (range of motion);stair training;strengthening;stretching;transfer training   Anticipated Equipment Needs at Discharge (PT) walker, rolling   Risk & Benefits of therapy have been explained evaluation/treatment results reviewed;care plan/treatment goals reviewed;risks/benefits reviewed;current/potential barriers reviewed;participants voiced agreement with care plan;participants included;patient   PT Total Evaluation Time   PT Maximus Low Complexity Minutes (28873) 15   Therapy Certification   Start of care date 09/22/23   Certification date from 09/22/23   Certification date to 10/06/23   Medical Diagnosis chronic R sided LBP with sciatica   Physical Therapy Goals   PT Frequency  5x/week   PT Goals Bed Mobility;Transfers;Gait;Stairs   PT: Bed Mobility Modified independent;Supine to/from sit   PT: Transfers Modified independent;Sit to/from stand;Assistive device   PT: Gait Modified independent;Assistive device;Greater than 200 feet   PT: Stairs Modified independent;Greater than 10 stairs;Goal Met   Interventions   Interventions Quick Adds Gait Training;Therapeutic Activity   PT Discharge Planning   PT Plan stairs & endurance   PT Discharge Recommendation (DC Rec) home;home with home care physical therapy   PT Rationale for DC Rec Patient mobilizing near baseline with chronic deficits noted. Recommend patient return home with HH PT. Will also benefit from SW/CC assist as patient is really looking for single level housing in the long term.   PT Brief overview of current status IND   Total Session Time   Total Session Time (sum of timed and untimed services) 15       Jag Pearce, PT, DPT  Pager #[phone]      [facility]  OUTPATIENT PHYSICAL THERAPY EVALUATION  PLAN OF TREATMENT FOR OUTPATIENT REHABILITATION  (COMPLETE FOR INITIAL CLAIMS ONLY)  Patient's Last Name, First Name, M.I.  YOB: 1970  Chavez Ca                        Provider's Name  [facility] Medical Record No.  [id number]                             Onset Date:  09/21/23   Start of Care Date:  09/22/23   Type:     _X_PT   ___OT   ___SLP Medical Diagnosis:  chronic R sided LBP with sciatica              PT Diagnosis:  impaired functional mobility Visits from SOC:  1     See note for plan of treatment, functional goals and certification details    I CERTIFY THE NEED FOR THESE SERVICES FURNISHED UNDER        THIS PLAN OF TREATMENT AND WHILE UNDER MY CARE     (Physician co-signature of this document indicates review and certification of the therapy plan).

## 2023-09-22 NOTE — PROGRESS NOTES
Chavez aC is a 52 year old male patient.  1. Chronic right-sided low back pain with right-sided sciatica      Past Medical History:   Diagnosis Date    Allergic rhinitis     Benign positional vertigo 2011    never quite went away    Bilateral carpal tunnel syndrome 07/18/2015    Chronic sinusitis 2017    I don't know Please look    Chronic tonsillitis     Depressive disorder     Depressive disorder, not elsewhere classified 07/30/2012    Gastro-oesophageal reflux disease     Hearing problem     Hyperlipidemia associated with prediabetes     Hypertension 2008    Learning disability     Mild intermittent asthma in adult without complication     Neck mass     parapharyngeal, benign    Obesity, Class III, BMI 40-49.9 (morbid obesity) (H)     Psychological factors associated with another disorder 08/21/2012    Recurrent otitis media 2016    might be what the pain in my ears    Reduced vision     Tinnitus 2016    I would hear a steady light buzzing sound and other can't    Trigeminal neuralgia     Weakness 08/26/2011     No current outpatient medications on file.     Allergies   Allergen Reactions    Fentanyl Itching     Patch    Meloxicam Other (See Comments)     Side pains.    Minocycline Rash     Patient reports adverse reaction was pigmentation which has resolved with drug discontinuation.     Principal Problem:    Chronic right-sided low back pain with right-sided sciatica    Blood pressure (!) 144/104, pulse 91, temperature 97.8  F (36.6  C), temperature source Oral, resp. rate 20, SpO2 96 %.    Subjective:  Symptoms:  Stable.  (Back and pelvic pain).    Diet:  Adequate intake.    Activity level: Impaired due to pain.    Pain:  He complains of pain that is moderate.  He reports pain is unchanged.  Pain is partially controlled.      Objective:  General Appearance:  Uncomfortable.    Vital signs: (most recent): Blood pressure (!) 144/104, pulse 91, temperature 97.8  F (36.6  C), temperature source Oral,  resp. rate 20, SpO2 96 %.  Vital signs are normal.    Output: Producing urine.    HEENT: Normal HEENT exam.    Lungs:  Normal effort and normal respiratory rate.  Breath sounds clear to auscultation.    Heart: Normal rate.  Regular rhythm.  S1 normal and S2 normal.    Abdomen: Abdomen is soft.  Bowel sounds are normal.   There is no abdominal tenderness.     Extremities: Normal range of motion.    Pulses: Distal pulses are intact.    Neurological: Patient is alert.    Pupils:  Pupils are equal, round, and reactive to light.    Skin:  Warm.      Assessment:    Condition: In stable condition.  Unchanged.   (52 yom with morbid obesity VERN  depression chronic pain leg edema presents with worsening low back and pelvic pain. XR neg, awaiting US PT OT SW input for disposition.).     The pt was seen and examined by myself. The case was reviewed and the plan was discussed with the DARWIN.    Maria Guadalupe Arizmendi MD, MD  9/22/2023

## 2023-09-22 NOTE — PROGRESS NOTES
Observation Goal Outcome Evaluation:  diagnostic tests and consults completed and resulted   -vital signs normal or at patient baseline : Met  -tolerating oral intake to maintain hydration : met  -adequate pain control on oral analgesics : not met   -dyspnea improved and O2 sats greater than 88% on room air or prior home oxygen levels : met  -returns to baseline functional status : not met  -safe disposition plan has been identified : not met

## 2023-09-22 NOTE — PLAN OF CARE
ED OT:    Occupational Therapy: Orders received. Chart reviewed and discussed with care team. Occupational therapy not indicated at this time. Per PT, pt moving near baseline and all needs to be met with single discipline follow.? Defer discharge recommendations to physical therapy.? Will complete orders.    Please feel free to re-consult if pt has change in status or new IP OT needs become apparent.

## 2023-09-23 VITALS
HEART RATE: 85 BPM | OXYGEN SATURATION: 96 % | TEMPERATURE: 97.4 F | DIASTOLIC BLOOD PRESSURE: 73 MMHG | RESPIRATION RATE: 18 BRPM | SYSTOLIC BLOOD PRESSURE: 126 MMHG

## 2023-09-23 LAB — BACTERIA UR CULT: NORMAL

## 2023-09-23 PROCEDURE — G0378 HOSPITAL OBSERVATION PER HR: HCPCS

## 2023-09-23 PROCEDURE — 250N000013 HC RX MED GY IP 250 OP 250 PS 637: Performed by: PHYSICIAN ASSISTANT

## 2023-09-23 PROCEDURE — 99238 HOSP IP/OBS DSCHRG MGMT 30/<: CPT | Performed by: INTERNAL MEDICINE

## 2023-09-23 RX ADMIN — TRAMADOL HYDROCHLORIDE 50 MG: 50 TABLET, COATED ORAL at 07:58

## 2023-09-23 RX ADMIN — MENTHOL 1 PATCH: 205.5 PATCH TOPICAL at 08:01

## 2023-09-23 RX ADMIN — ACETAMINOPHEN 1000 MG: 500 TABLET ORAL at 07:58

## 2023-09-23 RX ADMIN — LIDOCAINE PATCH 4% 3 PATCH: 40 PATCH TOPICAL at 07:59

## 2023-09-23 RX ADMIN — ACETAMINOPHEN 1000 MG: 500 TABLET ORAL at 15:02

## 2023-09-23 RX ADMIN — MICONAZOLE NITRATE: 20 POWDER TOPICAL at 13:08

## 2023-09-23 RX ADMIN — TRAMADOL HYDROCHLORIDE 50 MG: 50 TABLET, COATED ORAL at 15:02

## 2023-09-23 ASSESSMENT — ENCOUNTER SYMPTOMS
SUBJECTIVE PATIENT PAIN CONTROL: WELL CONTROLLED
SUBJECTIVE PAIN PROGRESSION: IMPROVING
PAIN SEVERITY NOW: MILD
ACTIVITY IMPAIRMENT: IMPAIRED DUE TO PAIN
DIETARY ISSUES: ADEQUATE INTAKE

## 2023-09-23 ASSESSMENT — ACTIVITIES OF DAILY LIVING (ADL)
ADLS_ACUITY_SCORE: 41

## 2023-09-23 NOTE — PLAN OF CARE
Obs Goals    -diagnostic tests and consults completed and resulted: Met.  -vital signs normal or at patient baseline: Met.  -tolerating oral intake to maintain hydration: Met.  -adequate pain control on oral analgesics: In progress.   -dyspnea improved and O2 sats greater than 88% on room air or prior home oxygen levels: Met.  -returns to baseline functional status: Met.  -safe disposition plan has been identified: In progress.    Nurse to notify provider when observation goals have been met and patient is ready for discharge.

## 2023-09-23 NOTE — PROGRESS NOTES
Chavez Ca is a 52 year old male patient.  1. Physical deconditioning    2. Chronic right-sided low back pain with right-sided sciatica    3. Dyspnea, multifactorial    4. Primary mild osteoarthritis of both knees      Past Medical History:   Diagnosis Date    Allergic rhinitis     Benign positional vertigo 2011    never quite went away    Bilateral carpal tunnel syndrome 07/18/2015    Chronic sinusitis 2017    I don't know Please look    Chronic tonsillitis     Depressive disorder     Depressive disorder, not elsewhere classified 07/30/2012    Gastro-oesophageal reflux disease     Hearing problem     Hyperlipidemia associated with prediabetes     Hypertension 2008    Learning disability     Mild intermittent asthma in adult without complication     Neck mass     parapharyngeal, benign    Obesity, Class III, BMI 40-49.9 (morbid obesity) (H)     Psychological factors associated with another disorder 08/21/2012    Recurrent otitis media 2016    might be what the pain in my ears    Reduced vision     Tinnitus 2016    I would hear a steady light buzzing sound and other can't    Trigeminal neuralgia     Weakness 08/26/2011     No current outpatient medications on file.     Allergies   Allergen Reactions    Fentanyl Itching     Patch    Meloxicam Other (See Comments)     Side pains.    Minocycline Rash     Patient reports adverse reaction was pigmentation which has resolved with drug discontinuation.     Principal Problem:    Chronic right-sided low back pain with right-sided sciatica    Blood pressure 139/80, pulse 80, temperature 97.6  F (36.4  C), temperature source Oral, resp. rate 18, SpO2 97 %.    Subjective:  Symptoms:  Improved.  (Leg pains).    Diet:  Adequate intake.    Activity level: Impaired due to pain.    Pain:  He complains of pain that is mild.  He reports pain is improving.  Pain is well controlled.      Objective:  General Appearance:  Comfortable.    Vital signs: (most recent): Blood pressure  139/80, pulse 80, temperature 97.6  F (36.4  C), temperature source Oral, resp. rate 18, SpO2 97 %.  Vital signs are normal.    Output: Producing urine.    HEENT: Normal HEENT exam.    Lungs:  Normal effort and normal respiratory rate.  Breath sounds clear to auscultation.    Heart: Normal rate.  Regular rhythm.  S1 normal and S2 normal.    Abdomen: Abdomen is soft.  Bowel sounds are normal.   There is no abdominal tenderness.     Extremities: Normal range of motion.    Pulses: Distal pulses are intact.    Neurological: Patient is alert.    Pupils:  Pupils are equal, round, and reactive to light.    Skin:  Warm.      Assessment:    Condition: In stable condition.  Improving.   (52 yom with morbid obesity presents with acute on chronic back and leg pains, improving with current regimen, appreciate PT input, plan to discharge today.).     The pt was seen and examined by myself, The case was reviewed and the plan was discussed with the DARWIN.    Maria Guadalupe Arizmendi MD, MD  9/23/2023

## 2023-09-23 NOTE — DISCHARGE SUMMARY
Discharge Summary    Chavez Ca MRN# [id number]   YOB: 1970 Age: 52 year old     Date of Admission:  9/21/2023  Date of Discharge:  9/23/2023  3:28 PM  Admitting Physician:  ELAINE Reynoso CNP  Discharge Physician:  Daina Swanson PA-C  Discharging Service:  Internal Medicine     Primary Provider: Chavez Milligan          Discharge Diagnosis:       Chronic right-sided low back pain with right-sided sciatica    * No resolved hospital problems. *               Discharge Disposition:     Discharged to home           Condition on Discharge:     Discharge condition: Stable   Code status on discharge: Full Code           Procedures:   Imaging performed:   Extremity x-rays  Lumbar spine x-rays             Discharge Medications:     Current Discharge Medication List        CONTINUE these medications which have NOT CHANGED    Details   albuterol (PROAIR HFA/PROVENTIL HFA/VENTOLIN HFA) 108 (90 Base) MCG/ACT inhaler Inhale 2 puffs into the lungs every 4 hours as needed for shortness of breath / dyspnea or wheezing  Qty: 18 g, Refills: 11    Comments: Pharmacy may dispense brand covered by insurance (Proair, or proventil or ventolin or generic albuterol inhaler)  Associated Diagnoses: Dyspnea, unspecified type; Mild intermittent asthma in adult without complication; Hypoventilation associated with obesity syndrome (H)      amitriptyline (ELAVIL) 100 MG tablet Take 1 tablet (100 mg) by mouth At Bedtime  Qty: 90 tablet, Refills: 3    Associated Diagnoses: Insomnia due to medical condition; Idiopathic progressive neuropathy      COMPRESSION STOCKINGS 20-30 mmHg knee high compression stockings.  Measure and fit.  Style and brand per patient preference.  Qty: 2 each, Refills: 0    Associated Diagnoses: Venous insufficiency (chronic) (peripheral)      fluticasone (FLONASE) 50 MCG/ACT nasal spray Spray 1 spray into both nostrils daily  Qty: 18.2 mL, Refills: 11    Associated Diagnoses:  Dyspnea, unspecified type; Mild intermittent asthma in adult without complication; Nasal congestion; Hypoventilation associated with obesity syndrome (H)      furosemide (LASIX) 20 MG tablet Take 3 tablets (60 mg) by mouth 2 times daily *Labs needed before next refill*  Qty: 540 tablet, Refills: 0    Associated Diagnoses: Stasis edema of both lower extremities      ketoconazole (NIZORAL) 2 % external shampoo APPLY TO AFFECTED AREA AND WASH OFF AFTER 5 MINUTES  Qty: 120 mL, Refills: 3    Associated Diagnoses: Seborrheic dermatitis of scalp      levalbuterol (XOPENEX) 1.25 MG/3ML neb solution Take 3 mLs (1.25 mg) by nebulization every 4 hours as needed for shortness of breath / dyspnea or wheezing  Qty: 90 mL, Refills: 1    Associated Diagnoses: Moderate persistent asthma in adult without complication; Tachycardia      nabumetone (RELAFEN) 500 MG tablet Take 1 tablet (500 mg) by mouth 2 times daily as needed for moderate pain  Qty: 60 tablet, Refills: 1    Associated Diagnoses: Lumbar facet arthropathy      !! order for DME Equipment being ordered: compression stockings    Needs to be faxed to  Qty: 1 Device, Refills: 0    Associated Diagnoses: Lower extremity edema; Stasis edema of both lower extremities      !! order for DME Compression stockings pair, patient preference    To be used 8 hours per day  Qty: 1 Device, Refills: 3    Associated Diagnoses: Lower extremity edema      !! order for DME Equipment being ordered: compression stockings  Qty: 2 each, Refills: 0    Associated Diagnoses: Stasis edema of both lower extremities      phentermine (ADIPEX-P) 37.5 MG tablet Take 1 tablet (37.5 mg) by mouth every morning  Qty: 90 tablet, Refills: 1    Associated Diagnoses: Class 3 severe obesity due to excess calories with serious comorbidity and body mass index (BMI) of 50.0 to 59.9 in adult (H)      topiramate (TOPAMAX) 100 MG tablet Take 2 tablets (200 mg) by mouth 2 times daily  Qty: 360 tablet, Refills: 3     Associated Diagnoses: Class 3 severe obesity due to excess calories with serious comorbidity and body mass index (BMI) of 50.0 to 59.9 in adult (H)      traMADol (ULTRAM) 50 MG tablet Take 1 po PRN recovery from pain at HS or after physical therapy. Do not take to enable activity.  (With refills, covers through 7/3/2023 or later)  Qty: 30 tablet, Refills: 3    Associated Diagnoses: Osteoarthritis of spine with radiculopathy, lumbar region; Idiopathic progressive polyneuropathy      traZODone (DESYREL) 100 MG tablet Take 1 tablet (100 mg) by mouth At Bedtime Call clinic to schedule follow up appointment. [phone]  Qty: 90 tablet, Refills: 3    Associated Diagnoses: Sleep disorder      zolpidem (AMBIEN) 10 MG tablet TAKE 1 TABLET BY MOUTH NIGHTLY AS NEEDED FOR SLEEP  Qty: 30 tablet, Refills: 0    Associated Diagnoses: Insomnia due to medical condition       !! - Potential duplicate medications found. Please discuss with provider.                Consultations:     Consultation during this admission received from PT             Brief History of Illness:   Chavez Ca is a 52 year old male admitted on 9/21/2023. 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          Hospital Course:     ##. Back pain & Pelvic Pain  ##. Bilateral LE Pain  Chronic right lower back pain however over the last 2 months pain has become more severe and is having a difficult time doing any of his ADLs. Worse by touching. Reports pelvic pain, bilateral knee pain and ankle pain. Though he reports pains have been present for 10 years and has polyneuropathy but has had worsening pain more recently. Takes over the counter tylenol and ibuprofen  for pain. Fell down stairs ~ 4 steps about 3 months ago. Reports urinary urgency, chills, headaches. No history of kidney stones. Denies any dysuria, urinary incontinence, diarrhea, nausea, vomiting, fever, abdominal pain, lightheadedness, chest pain. Does not get any home care. In ED, , /96, RR 20, SaO2 96% on RA, Temp 98.7  F . Labs show normal BMP, CBC, Troponin HS. In the ED the patient was given tylenol 1gm po x 1 and ibuprofen 600mg po x 1. Patient was being admitted to the ED Observation Unit as he is not even able to ambulate from chair to wheelchair without assistance. UA checked and is negative. Doppler US to r/o DVT is negative. Xrays of Lumbar, Pelvic/Hip, Knee negative. Patient was seen by PT today. Patient found to be functioning at baseline. PT recommends HH with OP PT.  Patient feeling much better today. Reports improved pain symptoms. He is HD stable. Patient discharged home with plans to follow up with outpatient PT.   -Tylenol prn pain  -Menthol to knees  -Lidoderm patch to right back and pelvis        ##. Dyspnea  ##. History of subglottic stenosis  ##. History of benign parapharyngeal space pleomorphic adenoma, s/p resection in 2011  ##. Obesity Class III, BMI 49  ##. Possible Obesity Hypoventilation Syndrome  Patient with chronic CRUZ, which he attributes to history of ENT surgery and subglottic stenosis, obesity, VERN. Has LE edema. Last Echo 3/8/23 with Global and regional left ventricular function is normal with an EF of 60-65%; Right ventricular function, chamber size, wall motion, and thickness are normal. Has since discontinued use of Lasix. Resting Echo without ischemic findings.   - 2g Na restricted diet  - Follow-up with ENT as outpatient per routine     ##. VERN  Likely contributor of ongoing dyspnea. Carries diagnosis of VERN, does not use CPAP at home, has been evaluated for dental appliance, does not currently have due to poor dentition. Patient has discontinued use of  phenteramine or topamax  - Overnight oximetry     ##. Idiopathic edema  ##. Chronic venous insufficiency     Exam c/w chronic venous stasis and dermatitis. Tender to touch bilateral LE. Has been sedentary and discontinued use of lasix. Doppler US to r/o DVT negative.      ##. Insomnia: Discontinued ambien and trazodone use               Final Day of Progress before Discharge:       Physical Exam:  Blood pressure 139/80, pulse 80, temperature 97.6  F (36.4  C), temperature source Oral, resp. rate 18, SpO2 97 %.    EXAM:  Physical Exam   Constitutional: Pt is oriented to person, place, and time.Pt appears well-developed and well-nourished.   HENT:   Head: Normocephalic and atraumatic.   Eyes: Conjunctivae are normal. Pupils are equal, round, and reactive to light.   Neck: Normal range of motion. Neck supple.   Cardiovascular: Normal rate, regular rhythm, normal heart sounds and intact distal pulses.    Pulmonary/Chest: Effort normal and breath sounds normal. No respiratory distress. Pt has no wheezes. Pt has no rales  Abdominal: Soft. Bowel sounds are normal. Pt exhibits no distension and no mass. No tenderness. Pt has no rebound and no guarding.   Musculoskeletal: Normal range of motion. Pt exhibits no edema.   Neurological: Pt is alert and oriented to person, place, and time. Normal reflexes.   Skin: Skin is warm and dry. No rash noted.   Psychiatric: Pt has a normal mood and affect. Behavior is normal. Judgment and thought content normal.            Data:  All laboratory data reviewed             Significant Results:     None  Results for orders placed or performed during the hospital encounter of 09/21/23   US Lower Extremity Venous Duplex Bilateral     Status: None    Narrative    EXAMINATION: DOPPLER VENOUS ULTRASOUND OF BILATERAL LOWER EXTREMITIES,  9/21/2023 11:50 PM     COMPARISON: None.    HISTORY: Bilateral lower extremity pain, rule out DVT    TECHNIQUE:  Gray-scale evaluation with compression, spectral  flow and  color Doppler assessment of the deep venous system of both legs from  groin to knee, and then at the ankles.    FINDINGS:  Technically limited visualization of flow on Doppler due to overlying  soft tissues/body habitus.    In both lower extremities, flow appears normal in the common femoral  veins    In both lower extremities the femoral, popliteal, and posterior tibial  veins demonstrate normal compressibility. Difficult to visualize  vessels on grayscale imaging. Technically limited evaluation of flow  also appears grossly normal. Mild subcutaneous edema over the  bilateral ankles.        Impression    IMPRESSION:  1. No definite evidence of deep venous thrombosis in either lower  extremity. Exam is technically limited due to body habitus.  2. Mild subcutaneous edema over the bilateral ankles.    I have personally reviewed the examination and initial interpretation  and I agree with the findings.    DAVID MUJICA MD         SYSTEM ID:  [id number]   XR Lumbar Spine 2/3 Views     Status: None    Narrative    EXAM: XR LUMBAR SPINE 2/3 VIEWS  LOCATION: [facility]  DATE: 9/22/2023    INDICATION: lower back pain. Fall 3 months ago  COMPARISON: 06/18/2022      Impression    IMPRESSION: No fracture. Normal vertebral heights and alignment. Mild endplate irregularities. Normal disc spaces and facets for age. Normal extraspinal structures.   XR Pelvis and Hip Bilateral 2 Views     Status: None    Narrative    EXAM: XR PELVIS AND HIP BILATERAL 2 VIEWS  LOCATION: [facility]  DATE: 9/22/2023    INDICATION: pain in bilateral pelvis. Fall 3 months ago. r o fracture  COMPARISON: None.      Impression    IMPRESSION: Normal joint spaces and alignment. No fracture.   XR Knee Bilateral 1/2 Views     Status: None    Narrative    EXAM: XR KNEE BILATERAL 1/2 VIEWS  LOCATION: [facility]  CENTER  DATE: 9/22/2023    INDICATION: bilateral knee pain, fall 3 months ago, difficulty with ambulation  COMPARISON: 12/09/2022      Impression    IMPRESSION:   RIGHT KNEE: Moderate medial compartment joint space loss and lateral compartment joint space narrowing. Posterior patellar spurring with a small suprapatellar joint effusion. No evidence for fracture.    LEFT KNEE: Moderate medial compartment joint space loss and mild lateral compartment joint space narrowing. Mild posterior patellar spurring. Small joint effusion. No evidence for fracture.   Basic metabolic panel     Status: Normal   Result Value Ref Range    Sodium 136 136 - 145 mmol/L    Potassium 4.0 3.4 - 5.3 mmol/L    Chloride 99 98 - 107 mmol/L    Carbon Dioxide (CO2) 26 22 - 29 mmol/L    Anion Gap 11 7 - 15 mmol/L    Urea Nitrogen 14.2 6.0 - 20.0 mg/dL    Creatinine 0.87 0.67 - 1.17 mg/dL    Calcium 9.3 8.6 - 10.0 mg/dL    Glucose 94 70 - 99 mg/dL    GFR Estimate >90 >60 mL/min/1.73m2   UA with Microscopic reflex to Culture     Status: Abnormal    Specimen: Urine, Midstream   Result Value Ref Range    Color Urine Yellow Colorless, Straw, Light Yellow, Yellow    Appearance Urine Clear Clear    Glucose Urine Negative Negative mg/dL    Bilirubin Urine Negative Negative    Ketones Urine Negative Negative mg/dL    Specific Gravity Urine 1.025 1.003 - 1.035    Blood Urine Negative Negative    pH Urine 6.0 5.0 - 7.0    Protein Albumin Urine 70 (A) Negative mg/dL    Urobilinogen Urine Normal Normal, 2.0 mg/dL    Nitrite Urine Negative Negative    Leukocyte Esterase Urine Negative Negative    Mucus Urine Present (A) None Seen /LPF    RBC Urine 3 (H) <=2 /HPF    WBC Urine 1 <=5 /HPF    Squamous Epithelials Urine <1 <=1 /HPF    Narrative    Urine Culture not indicated   Troponin T, High Sensitivity     Status: Normal   Result Value Ref Range    Troponin T, High Sensitivity <6 <=22 ng/L   CBC with platelets and differential     Status: None   Result Value Ref  Range    WBC Count 8.9 4.0 - 11.0 10e3/uL    RBC Count 5.14 4.40 - 5.90 10e6/uL    Hemoglobin 14.8 13.3 - 17.7 g/dL    Hematocrit 45.9 40.0 - 53.0 %    MCV 89 78 - 100 fL    MCH 28.8 26.5 - 33.0 pg    MCHC 32.2 31.5 - 36.5 g/dL    RDW 13.9 10.0 - 15.0 %    Platelet Count 249 150 - 450 10e3/uL    % Neutrophils 69 %    % Lymphocytes 19 %    % Monocytes 8 %    % Eosinophils 3 %    % Basophils 1 %    % Immature Granulocytes 0 %    NRBCs per 100 WBC 0 <1 /100    Absolute Neutrophils 6.2 1.6 - 8.3 10e3/uL    Absolute Lymphocytes 1.7 0.8 - 5.3 10e3/uL    Absolute Monocytes 0.7 0.0 - 1.3 10e3/uL    Absolute Eosinophils 0.2 0.0 - 0.7 10e3/uL    Absolute Basophils 0.1 0.0 - 0.2 10e3/uL    Absolute Immature Granulocytes 0.0 <=0.4 10e3/uL    Absolute NRBCs 0.0 10e3/uL   [place] Draw     Status: None    Narrative    The following orders were created for panel order [place] Draw.  Procedure                               Abnormality         Status                     ---------                               -----------         ------                     Extra Blue Top Tube[[id number]]                              Final result               Extra Red Top Tube[[id number]]                               Final result                 Please view results for these tests on the individual orders.   Extra Blue Top Tube     Status: None   Result Value Ref Range    Hold Specimen JIC    Extra Red Top Tube     Status: None   Result Value Ref Range    Hold Specimen JIC    TSH with free T4 reflex     Status: Normal   Result Value Ref Range    TSH 2.78 0.30 - 4.20 uIU/mL   Phosphorus     Status: Normal   Result Value Ref Range    Phosphorus 3.4 2.5 - 4.5 mg/dL   Magnesium     Status: Normal   Result Value Ref Range    Magnesium 2.2 1.7 - 2.3 mg/dL   Nt probnp inpatient     Status: Normal   Result Value Ref Range    N terminal Pro BNP Inpatient <36 0 - 900 pg/mL   Echocardiogram Complete     Status: None   Result Value Ref Range    LVEF  60-65%      Narrative    [id number]  [id number]  [id number]  [id number]^SKY^MARIO^MARY ANNE     [facility],[place]  Echocardiography Laboratory  [address] [place] [address]     Name: GAMA FERRARI  MRN: [id number]  : 1970  Study Date: 2023 08:55 AM  Age: 52 yrs  Gender: Male  [facility]  Reason For Study: SOB  Ordering Physician: MARIO SWANSON  Performed By: Sundar Mcleod     BSA: 2.7 m2  Height: 67 in  Weight: 386 lb  HR: 76  BP: 150/195 mmHg  ______________________________________________________________________________  Procedure  Complete Portable Echo Adult. Contrast Optison. Echocardiogram with two-  dimensional, color and spectral Doppler performed. TDS - morbid obesity,  scanned sitting up. Optison (NDC #1307-2517-73) given intravenously. Patient  was given 5 ml mixture of 3 ml Optison and 6 ml saline. 4 ml wasted.  ______________________________________________________________________________  Interpretation Summary  Global and regional left ventricular function is normal with an EF of 60-65%.  Global right ventricular function is normal.  No significant valvular abnormalities present.  Estimated mean right atrial pressure is normal.  No pericardial effusion is present.  ______________________________________________________________________________  Left Ventricle  Left ventricular size is normal. Left ventricular wall thickness is normal.  Global and regional left ventricular function is normal with an EF of 60-65%.     Right Ventricle  The right ventricle is normal size. Global right ventricular function is  normal.     Atria  Both atria appear normal.     Mitral Valve  On Doppler interrogation, there is no significant stenosis or regurgitation.     Aortic Valve  On Doppler interrogation, there is no significant stenosis or regurgitation.     Tricuspid Valve  On Doppler interrogation, there is no significant stenosis or regurgitation.  The  peak velocity of the tricuspid regurgitant jet is not obtainable.  Pulmonary artery systolic pressure cannot be assessed.     Pulmonic Valve  On Doppler interrogation, there is no significant stenosis or regurgitation.     Vessels  The aorta root is normal. The inferior vena cava is normal. Estimated mean  right atrial pressure is normal.     Pericardium  No pericardial effusion is present.     Miscellaneous  No significant valvular abnormalities present.     Compared to Previous Study  This study was compared with the study from 3.2023 . No significant changes  noted.  ______________________________________________________________________________  MMode/2D Measurements & Calculations  IVSd: 1.2 cm  LVIDd: 3.9 cm  LVIDs: 2.7 cm  LVPWd: 1.4 cm  FS: 32.2 %  LV mass(C)d: 181.7 grams  LV mass(C)dI: 67.9 grams/m2  Ao root diam: 3.9 cm  LVOT diam: 2.4 cm  LVOT area: 4.5 cm2  Ao root diam index Ht(cm/m): 2.3  Ao root diam index BSA (cm/m2): 1.4  RWT: 0.69     Doppler Measurements & Calculations  MV E max dilcia: 60.3 cm/sec  MV A max dilcia: 82.8 cm/sec  MV E/A: 0.73  MV dec slope: 290.1 cm/sec2  MV dec time: 0.21 sec     PA acc time: 0.13 sec     ______________________________________________________________________________  Report approved by: Castro Cleveland 09/22/2023 12:00 PM         Urine Culture     Status: None (Preliminary result)    Specimen: Urine, Clean Catch   Result Value Ref Range    Culture No growth, less than 1 day    Urine Culture     Status: None    Specimen: Urine, Midstream   Result Value Ref Range    Culture 10,000-50,000 CFU/mL Mixture of urogenital collin    CBC with platelets differential     Status: None    Narrative    The following orders were created for panel order CBC with platelets differential.  Procedure                               Abnormality         Status                     ---------                               -----------         ------                     CBC with platelets and  jessica..[[id number]]                      Final result                 Please view results for these tests on the individual orders.      Recent Results (from the past 48 hour(s))   US Lower Extremity Venous Duplex Bilateral    Narrative    EXAMINATION: DOPPLER VENOUS ULTRASOUND OF BILATERAL LOWER EXTREMITIES,  9/21/2023 11:50 PM     COMPARISON: None.    HISTORY: Bilateral lower extremity pain, rule out DVT    TECHNIQUE:  Gray-scale evaluation with compression, spectral flow and  color Doppler assessment of the deep venous system of both legs from  groin to knee, and then at the ankles.    FINDINGS:  Technically limited visualization of flow on Doppler due to overlying  soft tissues/body habitus.    In both lower extremities, flow appears normal in the common femoral  veins    In both lower extremities the femoral, popliteal, and posterior tibial  veins demonstrate normal compressibility. Difficult to visualize  vessels on grayscale imaging. Technically limited evaluation of flow  also appears grossly normal. Mild subcutaneous edema over the  bilateral ankles.        Impression    IMPRESSION:  1. No definite evidence of deep venous thrombosis in either lower  extremity. Exam is technically limited due to body habitus.  2. Mild subcutaneous edema over the bilateral ankles.    I have personally reviewed the examination and initial interpretation  and I agree with the findings.    DAVID MUJICA MD         SYSTEM ID:  [id number]   XR Lumbar Spine 2/3 Views    Narrative    EXAM: XR LUMBAR SPINE 2/3 VIEWS  LOCATION: [facility]  DATE: 9/22/2023    INDICATION: lower back pain. Fall 3 months ago  COMPARISON: 06/18/2022      Impression    IMPRESSION: No fracture. Normal vertebral heights and alignment. Mild endplate irregularities. Normal disc spaces and facets for age. Normal extraspinal structures.   XR Pelvis and Hip Bilateral 2 Views    Narrative    EXAM: XR PELVIS AND HIP  BILATERAL 2 VIEWS  LOCATION: [facility]  DATE: 2023    INDICATION: pain in bilateral pelvis. Fall 3 months ago. r o fracture  COMPARISON: None.      Impression    IMPRESSION: Normal joint spaces and alignment. No fracture.   XR Knee Bilateral 1/2 Views    Narrative    EXAM: XR KNEE BILATERAL 1/2 VIEWS  LOCATION: [facility]  DATE: 2023    INDICATION: bilateral knee pain, fall 3 months ago, difficulty with ambulation  COMPARISON: 2022      Impression    IMPRESSION:   RIGHT KNEE: Moderate medial compartment joint space loss and lateral compartment joint space narrowing. Posterior patellar spurring with a small suprapatellar joint effusion. No evidence for fracture.    LEFT KNEE: Moderate medial compartment joint space loss and mild lateral compartment joint space narrowing. Mild posterior patellar spurring. Small joint effusion. No evidence for fracture.   Echocardiogram Complete   Result Value    LVEF  60-65%    Narrative    [id number]  [id number]  [id number]  [id number]^SKY^MARIO^MARY ANNE     [facility],[place]  Echocardiography Laboratory  [address] [place] [id number]     Name: GAMA FERRARI  MRN: [id number]  : 1970  Study Date: 2023 08:55 AM  Age: 52 yrs  Gender: Male  Patient Location: [facility]  Reason For Study: SOB  Ordering Physician: MARIO SWANSON  Performed By: Sundar Mcleod     BSA: 2.7 m2  Height: 67 in  Weight: 386 lb  HR: 76  BP: 150/195 mmHg  ______________________________________________________________________________  Procedure  Complete Portable Echo Adult. Contrast Optison. Echocardiogram with two-  dimensional, color and spectral Doppler performed. TDS - morbid obesity,  scanned sitting up. Optison (NDC #9376-0196-63) given intravenously. Patient  was given 5 ml mixture of 3 ml Optison and 6 ml saline. 4 ml  wasted.  ______________________________________________________________________________  Interpretation Summary  Global and regional left ventricular function is normal with an EF of 60-65%.  Global right ventricular function is normal.  No significant valvular abnormalities present.  Estimated mean right atrial pressure is normal.  No pericardial effusion is present.  ______________________________________________________________________________  Left Ventricle  Left ventricular size is normal. Left ventricular wall thickness is normal.  Global and regional left ventricular function is normal with an EF of 60-65%.     Right Ventricle  The right ventricle is normal size. Global right ventricular function is  normal.     Atria  Both atria appear normal.     Mitral Valve  On Doppler interrogation, there is no significant stenosis or regurgitation.     Aortic Valve  On Doppler interrogation, there is no significant stenosis or regurgitation.     Tricuspid Valve  On Doppler interrogation, there is no significant stenosis or regurgitation.  The peak velocity of the tricuspid regurgitant jet is not obtainable.  Pulmonary artery systolic pressure cannot be assessed.     Pulmonic Valve  On Doppler interrogation, there is no significant stenosis or regurgitation.     Vessels  The aorta root is normal. The inferior vena cava is normal. Estimated mean  right atrial pressure is normal.     Pericardium  No pericardial effusion is present.     Miscellaneous  No significant valvular abnormalities present.     Compared to Previous Study  This study was compared with the study from 3.2023 . No significant changes  noted.  ______________________________________________________________________________  MMode/2D Measurements & Calculations  IVSd: 1.2 cm  LVIDd: 3.9 cm  LVIDs: 2.7 cm  LVPWd: 1.4 cm  FS: 32.2 %  LV mass(C)d: 181.7 grams  LV mass(C)dI: 67.9 grams/m2  Ao root diam: 3.9 cm  LVOT diam: 2.4 cm  LVOT area: 4.5 cm2  Ao root  diam index Ht(cm/m): 2.3  Ao root diam index BSA (cm/m2): 1.4  RWT: 0.69     Doppler Measurements & Calculations  MV E max dilcia: 60.3 cm/sec  MV A max dilcia: 82.8 cm/sec  MV E/A: 0.73  MV dec slope: 290.1 cm/sec2  MV dec time: 0.21 sec     PA acc time: 0.13 sec     ______________________________________________________________________________  Report approved by: Castro Cleveland 09/22/2023 12:00 PM                      Pending Results:     Unresulted Labs Ordered in the Past 30 Days of this Admission       Date and Time Order Name Status Description    9/21/2023  1:38 PM Urine Culture Preliminary                     Discharge Instructions and Follow-Up:     Discharge Procedure Orders   Home Care Referral   Referral Priority: Routine: Next available opening Referral Type: Home Health Therapies & Aides   Number of Visits Requested: 1     Reason for your hospital stay   Order Comments: Chronic back pain     Activity   Order Comments: Your activity upon discharge: activity as tolerated     Order Specific Question Answer Comments   Is discharge order? Yes      Follow Up and recommended labs and tests   Order Comments: Follow up with primary in 1-2 weeks     When to contact your care team   Order Comments: Return to the ED with fever, uncontrolled nausea, vomiting, unrelieved pain,  dizziness, chest pain, shortness of breath, loss of consciousness, and any new or concerning symptoms.     Discharge Instructions   Order Comments: You were admitted for low back pain, pelvis pain, and bilateral lower leg pain. All of the pinging done is negative for fractures of dislocation, it did show however some degenerative disease which is likely the cause of your pain. We recommend life style modification including regular exercises, healthy diet, and weight loss. Please resume home medications as before. Follow up with your primary as planned. Return if having worsening symptoms.     Full Code     Order Specific Question Answer  Comments   Code status determined by: Discussion with patient/ legal decision maker      Diet   Order Comments: Follow this diet upon discharge: Regular     Order Specific Question Answer Comments   Is discharge order? Yes           Attestation:  Daina LANETTE Swanson PA-C.

## 2023-09-23 NOTE — PROGRESS NOTES
diagnostic tests and consults completed and resulted: Met  -vital signs normal or at patient baseline: Met  -tolerating oral intake to maintain hydration: Met  -adequate pain control on oral analgesics: Met   -dyspnea improved and O2 sats greater than 88% on room air or prior home oxygen levels:Met  -returns to baseline functional status:Met   -safe disposition plan has been identified: In Progress

## 2023-09-23 NOTE — PROGRESS NOTES
Care Management Discharge Note    Discharge Date: 09/23/2023       Discharge Disposition: Home Care    Discharge Services: Transportation Services    Discharge DME:  Not assessed    Discharge Transportation: health plan transportation, family or friend will provide    Private pay costs discussed: Not applicable    Does the patient's insurance plan have a 3 day qualifying hospital stay waiver?  No    PAS Confirmation Code:  N/A  Patient/family educated on Medicare website which has current facility and service quality ratings: no    Education Provided on the Discharge Plan:    Persons Notified of Discharge Plans: Pt; RN; Charge RN; PA;  Patient/Family in Agreement with the Plan: other (see comments) (Pt expressed concern that he would not be able to meet his needs if discharging home.)    Handoff Referral Completed: No    Additional Information:  Chart reviewed. Case discussed with bedside RN and medical provider. Pt will discharge home today with [facility] PT; writer requested SW be added to orders.    Writer met with pt. Writer introduced self, discussed role and went over writer's availability. Pt requires a ride to get home. Writer confirmed address listed on facesheet as correct. Pt agreeable. Writer set up a transportation plus vouchered taxi for pt. Pt agreeable to discharge plan. Discussed the [facility] services, who will be provided, how they will be able to assist pt post discharge.     ________________    MARIA T Broderick, [facility]  ED/Observation   [facility]  Phone: [phone]  Pager: [phone]  Fax: [phone]    On-call pager, [phone], 4:00pm to midnight

## 2023-09-23 NOTE — PLAN OF CARE
Goal Outcome Evaluation:    -diagnostic tests and consults completed and resulted: Met.  -vital signs normal or at patient baseline: Met.  -tolerating oral intake to maintain hydration: Met.  -adequate pain control on oral analgesics: In progress.   -dyspnea improved and O2 sats greater than 88% on room air or prior home oxygen levels: Met.  -returns to baseline functional status: Met.  -safe disposition plan has been identified: In progress.     Nurse to notify provider when observation goals have been met and patient is ready for discharge.

## 2023-09-23 NOTE — PROGRESS NOTES
-diagnostic tests and consults completed and resulted: Met  -vital signs normal or at patient baseline: Met  -tolerating oral intake to maintain hydration: Met  -adequate pain control on oral analgesics: In progress   -dyspnea improved and O2 sats greater than 88% on room air or prior home oxygen levels:Met  -returns to baseline functional status:Met   -safe disposition plan has been identified: In Progress

## 2023-09-23 NOTE — PLAN OF CARE
Goal Outcome Evaluation:    /80 (BP Location: Left arm)   Pulse 80   Temp 97.6  F (36.4  C) (Oral)   Resp 18   SpO2 97%     -diagnostic tests and consults completed and resulted: Met.  -vital signs normal or at patient baseline: Met.  -tolerating oral intake to maintain hydration: Met.  -adequate pain control on oral analgesics: In progress.   -dyspnea improved and O2 sats greater than 88% on room air or prior home oxygen levels: Met.  -returns to baseline functional status: Met.  -safe disposition plan has been identified: In progress.     A&Ox4. Pt up to bathroom SBA with walker. VSS, afebrile. Tylenol and tramadol given x1 for c/o LLE pain. UC pending. Strict I&O. SW following for discharge planning.

## 2023-09-24 LAB — BACTERIA UR CULT: NORMAL

## 2023-09-26 NOTE — PLAN OF CARE
Physical Therapy Discharge Summary    Reason for therapy discharge:    Discharged to home.    Progress towards therapy goal(s). See goals on Care Plan in [facility] electronic health record for goal details.  Goals partially met.  Barriers to achieving goals:   discharge from facility.    Therapy recommendation(s):    Continued therapy is recommended.  Rationale/Recommendations:  [facility].

## 2023-09-30 ENCOUNTER — HEALTH MAINTENANCE LETTER (OUTPATIENT)
Age: 53
End: 2023-09-30

## 2023-10-03 ENCOUNTER — MEDICAL CORRESPONDENCE (OUTPATIENT)
Dept: HEALTH INFORMATION MANAGEMENT | Facility: CLINIC | Age: 53
End: 2023-10-03

## 2023-10-04 ASSESSMENT — SLEEP AND FATIGUE QUESTIONNAIRES
HOW LIKELY ARE YOU TO NOD OFF OR FALL ASLEEP IN A CAR, WHILE STOPPED FOR A FEW MINUTES IN TRAFFIC: HIGH CHANCE OF DOZING
HOW LIKELY ARE YOU TO NOD OFF OR FALL ASLEEP WHILE SITTING INACTIVE IN A PUBLIC PLACE: HIGH CHANCE OF DOZING
HOW LIKELY ARE YOU TO NOD OFF OR FALL ASLEEP WHILE SITTING AND READING: HIGH CHANCE OF DOZING
HOW LIKELY ARE YOU TO NOD OFF OR FALL ASLEEP WHILE SITTING QUIETLY AFTER LUNCH WITHOUT ALCOHOL: HIGH CHANCE OF DOZING
HOW LIKELY ARE YOU TO NOD OFF OR FALL ASLEEP WHEN YOU ARE A PASSENGER IN A CAR FOR AN HOUR WITHOUT A BREAK: HIGH CHANCE OF DOZING
HOW LIKELY ARE YOU TO NOD OFF OR FALL ASLEEP WHILE WATCHING TV: HIGH CHANCE OF DOZING
HOW LIKELY ARE YOU TO NOD OFF OR FALL ASLEEP WHILE LYING DOWN TO REST IN THE AFTERNOON WHEN CIRCUMSTANCES PERMIT: HIGH CHANCE OF DOZING
HOW LIKELY ARE YOU TO NOD OFF OR FALL ASLEEP WHILE SITTING AND TALKING TO SOMEONE: HIGH CHANCE OF DOZING

## 2023-10-18 ENCOUNTER — TELEPHONE (OUTPATIENT)
Dept: INTERNAL MEDICINE | Facility: CLINIC | Age: 53
End: 2023-10-18
Payer: MEDICAID

## 2023-10-18 NOTE — TELEPHONE ENCOUNTER
The patient has an appointment to discuss the [employer] form on 10/23/23.  Phil from home care was notified of this appt for the patient.    Sejal Glez on 10/18/2023 at 2:36 PM

## 2023-10-18 NOTE — TELEPHONE ENCOUNTER
Health Call Center    Phone Message    May a detailed message be left on voicemail: yes     Reason for Call: Form or Letter   Type or form/letter needing completion: [employer] Form     Phil was calling about the [employer] form that was dropped on in August, Once the form is completed signed and dated please call patient when it's ready for , also to address any questions or concerns thank you.    Provider:   Chavez Milligan MD   Date form needed: asap  Once completed: Patient will  at .    Action Taken: Message routed to:  [facility]    Travel Screening: Not Applicable

## 2023-10-19 ENCOUNTER — DOCUMENTATION ONLY (OUTPATIENT)
Dept: INTERNAL MEDICINE | Facility: CLINIC | Age: 53
End: 2023-10-19
Payer: MEDICAID

## 2023-10-19 NOTE — PROGRESS NOTES
Type of Form Received:     Form Received (Date) 10/19/23   Form Filled out No   Placed in provider folder Yes

## 2023-10-23 ENCOUNTER — OFFICE VISIT (OUTPATIENT)
Dept: INTERNAL MEDICINE | Facility: CLINIC | Age: 53
End: 2023-10-23
Payer: COMMERCIAL

## 2023-10-23 VITALS
BODY MASS INDEX: 64.95 KG/M2 | WEIGHT: 315 LBS | HEART RATE: 139 BPM | SYSTOLIC BLOOD PRESSURE: 114 MMHG | DIASTOLIC BLOOD PRESSURE: 68 MMHG | OXYGEN SATURATION: 93 %

## 2023-10-23 DIAGNOSIS — M17.0 PRIMARY OSTEOARTHRITIS OF BOTH KNEES: ICD-10-CM

## 2023-10-23 DIAGNOSIS — E11.69 HYPERLIPIDEMIA ASSOCIATED WITH TYPE 2 DIABETES MELLITUS (H): Primary | ICD-10-CM

## 2023-10-23 DIAGNOSIS — Z74.09 IMPAIRED MOBILITY: ICD-10-CM

## 2023-10-23 DIAGNOSIS — R06.00 DYSPNEA, UNSPECIFIED TYPE: ICD-10-CM

## 2023-10-23 DIAGNOSIS — F41.9 ANXIETY: ICD-10-CM

## 2023-10-23 DIAGNOSIS — Z00.00 PREVENTATIVE HEALTH CARE: ICD-10-CM

## 2023-10-23 DIAGNOSIS — R80.9 MICROALBUMINURIA: ICD-10-CM

## 2023-10-23 DIAGNOSIS — K08.9 POOR DENTITION: ICD-10-CM

## 2023-10-23 DIAGNOSIS — E78.5 HYPERLIPIDEMIA ASSOCIATED WITH TYPE 2 DIABETES MELLITUS (H): Primary | ICD-10-CM

## 2023-10-23 DIAGNOSIS — J38.6 SUBGLOTTIC STENOSIS: ICD-10-CM

## 2023-10-23 DIAGNOSIS — R79.89 ELEVATED TSH: ICD-10-CM

## 2023-10-23 DIAGNOSIS — Z23 NEED FOR PROPHYLACTIC VACCINATION AGAINST HEPATITIS B VIRUS: ICD-10-CM

## 2023-10-23 PROCEDURE — 90677 PCV20 VACCINE IM: CPT | Performed by: PEDIATRICS

## 2023-10-23 PROCEDURE — 99214 OFFICE O/P EST MOD 30 MIN: CPT | Mod: 25 | Performed by: PEDIATRICS

## 2023-10-23 PROCEDURE — 91320 SARSCV2 VAC 30MCG TRS-SUC IM: CPT | Performed by: PEDIATRICS

## 2023-10-23 PROCEDURE — 90480 ADMN SARSCOV2 VAC 1/ONLY CMP: CPT | Performed by: PEDIATRICS

## 2023-10-23 PROCEDURE — G0009 ADMIN PNEUMOCOCCAL VACCINE: HCPCS | Performed by: PEDIATRICS

## 2023-10-23 NOTE — ASSESSMENT & PLAN NOTE
- Disabled 4/19/2011      - His walker is inappropriately sized, reports continued struggles with ambulation as a result   - Insurance continues to change resulting in trouble with care coordination     - Recently broke oversized recliner  - Unable to find suitable replacement, all have broken       - Still unable to obtain new bed due to changing insurance

## 2023-10-23 NOTE — ASSESSMENT & PLAN NOTE
- Eye referral given today      - Labs, UA, and lipid panel ordered today     - COVID, PCV-20 given in clinic today

## 2023-10-23 NOTE — PROGRESS NOTES
Chavez is a 53 year old that presents in clinic today for the following:     Chief Complaint   Patient presents with    Follow Up    Hospital F/U     Pt was in ED 9/21/23    Forms     Pt needs [employer] forms filled out today    Dme     Pt needs assistance getting walker that is correct size           10/23/2023     2:51 PM   Additional Questions   Roomed by BRAIN Tang from encounters over the past 10 days    No data recorded       Jaime Peterson at 3:07 PM on 10/23/2023

## 2023-10-23 NOTE — ASSESSMENT & PLAN NOTE
- He needs an MRI, we will have to call around to find an appropriately sized machine   - Reports worsening pain, discoloration and LE edema

## 2023-10-23 NOTE — ASSESSMENT & PLAN NOTE
Reported rare instances of anxiety in public.  Included this in [employer] paperwork at his suggestion.

## 2023-10-23 NOTE — ASSESSMENT & PLAN NOTE
- Dr. Glasgow ENT established 10/2012    - Tumor was located in throat/neck, found to be benign, no hx of brain tumor

## 2023-10-23 NOTE — PROGRESS NOTES
"Dear patient. Thank you for visiting with me. I want you to feel respected, understood, and empowered. \"Respect\" is valuing you as much as I would a close family member. \"Empowerment\" happens when you are fully informed, and can make the best possible decision for you.  Please ask me questions!  Challenge anything that is not clear.    Medical records are primarily used as memory aids for me and my colleagues. Things to know about my documentation style:  - The 'problem list' includes current symptoms or diagnoses, and some problems that are resolved but may return. I use the past medical history for problems not expected to return.  - I use single quotation marks for things that you or I said, when I want to clarify who was speaking.  - I use double quotation marks when copying a term from elsewhere in your records. Italics (besides here) may also denote a quotation.  If you have questions or concerns, please contact me; I will reply as soon as time allows.    Context    Chavez Ca is a 53 year old  man , with concerns including:  Chief Complaint   Patient presents with    Follow Up    Hospital F/U     Pt was in ED 9/21/23    Forms     Pt needs [employer] forms filled out today    Dme     Pt needs assistance getting walker that is correct size     PCP: Chavez Milligan   Visit type: overview of problems, with several issues identified by patient discussed within limits of available time    /68 (BP Location: Right arm, Patient Position: Sitting, Cuff Size: Adult Regular)   Pulse (!) 139   Wt (!) 188.1 kg (414 lb 11.2 oz)   SpO2 93%   BMI 64.95 kg/m      Problems and progress      - I reminded the patient that due to his complexity I need to see him more often, advised for more regular virtual follow ups     - [employer] forms filled out today, while explaining to roomer he was very frustrated, explaining his HR of 139. He seemed calmer with me, later.      Problem List as of " 10/23/2023 Reviewed: 10/23/2023  9:35 PM by Chavez Milligan MD            Noted    1. Impaired mobility 10/23/2023     Last Assessment & Plan 10/23/2023 Office Visit Edited 10/23/2023  9:29 PM by Chavez Milligan MD      - Disabled 4/19/2011      - His walker is inappropriately sized, reports continued struggles with ambulation as a result   - Insurance continues to change resulting in trouble with care coordination     - Recently broke oversized recliner  - Unable to find suitable replacement, all have broken       - Still unable to obtain new bed due to changing insurance             Relevant Orders     Walker Order for DME - ONLY FOR DME    2. Hyperlipidemia associated with prediabetes - Primary 11/18/2014     Overview Deleted 8/9/2022  7:27 PM by Chavez Milligan MD              Last Assessment & Plan 10/23/2023 Office Visit Written 10/23/2023  9:26 PM by Chavez Milligan MD      due for rechecking          Relevant Orders     Adult Eye  Referral     HEMOGLOBIN A1C     Lipid panel reflex to direct LDL Non-fasting     Albumin Random Urine Quantitative with Creat Ratio    3. Preventative health care 10/23/2023     Last Assessment & Plan 10/23/2023 Office Visit Edited 10/23/2023  9:26 PM by Chavez Milligan MD      - Eye referral given today      - Labs, UA, and lipid panel ordered today     - COVID, PCV-20 given in clinic today          4. Poor dentition 3/16/2021     Last Assessment & Plan 10/23/2023 Office Visit Edited 10/23/2023  9:26 PM by Chavez Milligan MD      - Insurance won't cover replacement teeth, working on establishing with new provider          5. Elevated TSH 1/26/2023     Overview Signed 10/23/2023  9:28 PM by Chavez Milligan MD      Increases have been marginal. Clinically most notable because of weight, but not likely to be related.          Last Assessment & Plan 10/23/2023 Office Visit Written 10/23/2023  9:28 PM by Chavez Milligan  MD Jose      Will repeat TSH.          Relevant Orders     TSH with free T4 reflex    6. Microalbuminuria 1/1/2023     Overview Signed 1/1/2023  2:39 PM by Chavez Milligan MD      Unclear if related to HTN or diabetes.  Consider ARB.          Last Assessment & Plan 10/23/2023 Office Visit Written 10/23/2023  9:29 PM by Chavez Milligan MD      Due for rechecking         7. Dyspnea, multifactorial 5/4/2021     Overview Addendum 11/12/2022 11:40 AM by Chavez Milligan MD      Multifactorial: mild reactive airways, subglottic stenosis,   Includes dyspnea on exertion and at rest.  He has trouble breathing with a variety of odors, chemicals, cat litter box, a bonfire across the street in his neighborhood.    PFTs 11/10/2022 show a mixture of features.  He has a variety of FLV findings. FVC and FEV1 both decreased but ratio intact, and both improved with albuterol.            Last Assessment & Plan 10/23/2023 Office Visit Written 10/23/2023  9:29 PM by Chavez Milligan MD      included because of lab checking          Relevant Orders     Comprehensive metabolic panel (BMP + Alb, Alk Phos, ALT, AST, Total. Bili, TP)     TSH with free T4 reflex    8. Subglottic stenosis 6/27/2014     Overview Signed 7/7/2016  5:42 PM by Cassidy Martines MD      Complication of trach, placed 1/2012          Last Assessment & Plan 10/23/2023 Office Visit Edited 10/23/2023  9:29 PM by Chavez Milligan MD      - Dr. Pee REIS established 10/2012    - Tumor was located in throat/neck, found to be benign, no hx of brain tumor         9. Primary mild osteoarthritis of both knees 12/31/2022     Overview Addendum 12/31/2022  3:32 PM by Chavez Milligan MD      See bilateral leg pain. Arthritis is mild compared to deconditioning. Mild degenerative joints with small joint effusions (12/9/22)            Last Assessment & Plan 10/23/2023 Office Visit Edited 10/23/2023  9:30 PM by Chavez Milligan  MD Jose      - He needs an MRI, we will have to call around to find an appropriately sized machine   - Reports worsening pain, discoloration and LE edema            10. Anxiety 10/23/2023     Overview Signed 10/23/2023  4:14 PM by Taz uDnne      ~1/month           Last Assessment & Plan 10/23/2023 Office Visit Edited 10/23/2023  9:30 PM by Srini, Chavez Garcia MD      Reported rare instances of anxiety in public.  Included this in [employer] paperwork at his suggestion.          Additional issues:  After the patient had left, I saw that he requested removal for several other medications. He seems to have run through them stating they didn't work so should be removed. Most of today was needed for his paperwork, so I will leave most of the meds in place pending next evaluation.      Does not do myChart  Outstanding issues (Dr. Milligan)  --------------------------------------------  -- echo for concern about pulmonary HTN  -- Skin pain (like 'fish ') after seen by dermatology  -- diabetes? consider ARB and statin      CHRONIC CARE MANAGEMENT at [facility]  -------------------------------------------  PENDING ISSUES for [facility]:   -------------------------------------------    ------------------------------------------------  ONGOING SPECIALTY CARE BY:  ------------------------------------------------  -- Weight management: endo/weight Dr. Fortino Chang  -- ENT and sleep apnea: Dr. Zafar Glasgow  -- Knee and legs: Sports Dr. Zurita  -- Hand: pending  -- Asthma, pulmonary HTN: Dr. Montano      ----------------------------------------------------------------------------------  OUTSIDE PERSONNEL FOR INSURANCE, CADI, ETC:    ----------------------------------------------------------------------------------  SUPPLIERS AND VENDORS:   ---------------------------------------------------------------------------------      About this visit:  Time note (e4, 30'): The total time (on the date of service) for this  service was 36 minutes, including discussion/face-to-face, chart review, interpretation not otherwise reported, documentation, and updating of the computerized record.      Scribe Disclosure:   I, Taz Dunne, am serving as a scribe; to document services personally performed by Dr. Chavez Milligan- -based on data collection and the provider's statements to me.     Provider Disclosure:  I agree with above History, Review of Systems, Physical exam and Plan.  I have reviewed the content of the documentation and have edited it as needed. I have personally performed the services documented here and the documentation accurately represents those services and the decisions I have made.      Electronically signed by:  Dr. Chavez Milligan

## 2023-10-24 ENCOUNTER — TELEPHONE (OUTPATIENT)
Dept: INTERNAL MEDICINE | Facility: CLINIC | Age: 53
End: 2023-10-24
Payer: MEDICAID

## 2023-10-24 NOTE — TELEPHONE ENCOUNTER
[facility] Call Center    Phone Message    May a detailed message be left on voicemail: yes     Reason for Call: Patient calling to say that he is having issues with DME medical equipment at  Medical equipment. Patient is going to call his insurance to see where he can get his DME. Please call to discuss further.      Action Taken: Message routed to:  [facility]    Travel Screening: Not Applicable

## 2023-10-27 ENCOUNTER — DOCUMENTATION ONLY (OUTPATIENT)
Dept: INTERNAL MEDICINE | Facility: CLINIC | Age: 53
End: 2023-10-27
Payer: MEDICAID

## 2023-10-27 NOTE — PROGRESS NOTES
Type of Form Received:     Form Received (Date) 10/27/23   Form Filled out Yes 10/31/23   Placed in provider folder Yes

## 2023-10-30 ENCOUNTER — MEDICAL CORRESPONDENCE (OUTPATIENT)
Dept: INTERNAL MEDICINE | Facility: CLINIC | Age: 53
End: 2023-10-30
Payer: MEDICAID

## 2023-10-31 ENCOUNTER — DOCUMENTATION ONLY (OUTPATIENT)
Dept: INTERNAL MEDICINE | Facility: CLINIC | Age: 53
End: 2023-10-31
Payer: MEDICAID

## 2023-10-31 ENCOUNTER — TELEPHONE (OUTPATIENT)
Dept: INTERNAL MEDICINE | Facility: CLINIC | Age: 53
End: 2023-10-31
Payer: MEDICAID

## 2023-10-31 NOTE — TELEPHONE ENCOUNTER
[facility] Call [facility]    Phone Message    May a detailed message be left on voicemail: yes     Reason for Call: Order(s): Home Care Orders: Skilled Nursing: Eval and treat orders    Action Taken: Message routed to:  [facility]      Travel Screening: Not Applicable

## 2023-10-31 NOTE — TELEPHONE ENCOUNTER
Called [facility] and gave verbal orders per Dr. Milligan for Order(s): Home Care Orders: Skilled Nursing: Eval and treat orders and [facility]: 1x per week for 5 weeks for help with bathing         Danial Barnes CMA (AAMA) at 12:20 PM on 10/31/2023

## 2023-10-31 NOTE — PROGRESS NOTES
Type of Form Received:     Form Received (Date) 10/31/23   Form Filled out Yes 11/20/23   Placed in provider folder Yes

## 2023-11-03 ENCOUNTER — DOCUMENTATION ONLY (OUTPATIENT)
Dept: INTERNAL MEDICINE | Facility: CLINIC | Age: 53
End: 2023-11-03

## 2023-11-03 ENCOUNTER — LAB (OUTPATIENT)
Dept: LAB | Facility: CLINIC | Age: 53
End: 2023-11-03
Payer: COMMERCIAL

## 2023-11-03 ENCOUNTER — OFFICE VISIT (OUTPATIENT)
Dept: INTERNAL MEDICINE | Facility: CLINIC | Age: 53
End: 2023-11-03
Payer: COMMERCIAL

## 2023-11-03 VITALS
OXYGEN SATURATION: 100 % | SYSTOLIC BLOOD PRESSURE: 137 MMHG | BODY MASS INDEX: 64.95 KG/M2 | DIASTOLIC BLOOD PRESSURE: 92 MMHG | HEART RATE: 100 BPM | HEIGHT: 67 IN

## 2023-11-03 DIAGNOSIS — M47.26 OSTEOARTHRITIS OF SPINE WITH RADICULOPATHY, LUMBAR REGION: ICD-10-CM

## 2023-11-03 DIAGNOSIS — M17.0 PRIMARY OSTEOARTHRITIS OF BOTH KNEES: Primary | ICD-10-CM

## 2023-11-03 DIAGNOSIS — R79.89 ELEVATED TSH: ICD-10-CM

## 2023-11-03 DIAGNOSIS — G47.33 OSA (OBSTRUCTIVE SLEEP APNEA): ICD-10-CM

## 2023-11-03 DIAGNOSIS — E11.69 HYPERLIPIDEMIA ASSOCIATED WITH TYPE 2 DIABETES MELLITUS (H): ICD-10-CM

## 2023-11-03 DIAGNOSIS — E78.5 HYPERLIPIDEMIA ASSOCIATED WITH TYPE 2 DIABETES MELLITUS (H): ICD-10-CM

## 2023-11-03 DIAGNOSIS — R06.00 DYSPNEA, UNSPECIFIED TYPE: ICD-10-CM

## 2023-11-03 DIAGNOSIS — I50.32 CHRONIC DIASTOLIC HEART FAILURE (H): ICD-10-CM

## 2023-11-03 DIAGNOSIS — I89.0 LYMPHEDEMA: ICD-10-CM

## 2023-11-03 DIAGNOSIS — G60.3 IDIOPATHIC PROGRESSIVE POLYNEUROPATHY: Chronic | ICD-10-CM

## 2023-11-03 DIAGNOSIS — I87.2 VENOUS INSUFFICIENCY (CHRONIC) (PERIPHERAL): ICD-10-CM

## 2023-11-03 LAB
ALBUMIN SERPL BCG-MCNC: 3.9 G/DL (ref 3.5–5.2)
ALP SERPL-CCNC: 90 U/L (ref 40–129)
ALT SERPL W P-5'-P-CCNC: 37 U/L (ref 0–70)
ANION GAP SERPL CALCULATED.3IONS-SCNC: 10 MMOL/L (ref 7–15)
AST SERPL W P-5'-P-CCNC: 35 U/L (ref 0–45)
BILIRUB SERPL-MCNC: 0.2 MG/DL
BUN SERPL-MCNC: 12.5 MG/DL (ref 6–20)
CALCIUM SERPL-MCNC: 9.1 MG/DL (ref 8.6–10)
CHLORIDE SERPL-SCNC: 99 MMOL/L (ref 98–107)
CHOLEST SERPL-MCNC: 199 MG/DL
CREAT SERPL-MCNC: 0.86 MG/DL (ref 0.67–1.17)
DEPRECATED HCO3 PLAS-SCNC: 27 MMOL/L (ref 22–29)
EGFRCR SERPLBLD CKD-EPI 2021: >90 ML/MIN/1.73M2
ERYTHROCYTE [DISTWIDTH] IN BLOOD BY AUTOMATED COUNT: 14.1 % (ref 10–15)
GLUCOSE SERPL-MCNC: 100 MG/DL (ref 70–99)
HBA1C MFR BLD: 6.7 %
HCT VFR BLD AUTO: 41.8 % (ref 40–53)
HDLC SERPL-MCNC: 51 MG/DL
HGB BLD-MCNC: 13.6 G/DL (ref 13.3–17.7)
LDLC SERPL CALC-MCNC: 120 MG/DL
MCH RBC QN AUTO: 28.6 PG (ref 26.5–33)
MCHC RBC AUTO-ENTMCNC: 32.5 G/DL (ref 31.5–36.5)
MCV RBC AUTO: 88 FL (ref 78–100)
NONHDLC SERPL-MCNC: 148 MG/DL
NT-PROBNP SERPL-MCNC: 69 PG/ML (ref 0–900)
PLATELET # BLD AUTO: 244 10E3/UL (ref 150–450)
POTASSIUM SERPL-SCNC: 4.2 MMOL/L (ref 3.4–5.3)
PROT SERPL-MCNC: 7.2 G/DL (ref 6.4–8.3)
RBC # BLD AUTO: 4.75 10E6/UL (ref 4.4–5.9)
SODIUM SERPL-SCNC: 136 MMOL/L (ref 135–145)
TRIGL SERPL-MCNC: 142 MG/DL
TSH SERPL DL<=0.005 MIU/L-ACNC: 3.53 UIU/ML (ref 0.3–4.2)
WBC # BLD AUTO: 6.3 10E3/UL (ref 4–11)

## 2023-11-03 PROCEDURE — 80053 COMPREHEN METABOLIC PANEL: CPT | Performed by: PATHOLOGY

## 2023-11-03 PROCEDURE — 99000 SPECIMEN HANDLING OFFICE-LAB: CPT | Performed by: PATHOLOGY

## 2023-11-03 PROCEDURE — 36415 COLL VENOUS BLD VENIPUNCTURE: CPT | Performed by: PATHOLOGY

## 2023-11-03 PROCEDURE — 99214 OFFICE O/P EST MOD 30 MIN: CPT | Performed by: INTERNAL MEDICINE

## 2023-11-03 PROCEDURE — 84443 ASSAY THYROID STIM HORMONE: CPT | Performed by: PATHOLOGY

## 2023-11-03 PROCEDURE — 83036 HEMOGLOBIN GLYCOSYLATED A1C: CPT | Performed by: PEDIATRICS

## 2023-11-03 PROCEDURE — 85027 COMPLETE CBC AUTOMATED: CPT | Performed by: PATHOLOGY

## 2023-11-03 PROCEDURE — 80061 LIPID PANEL: CPT | Performed by: PATHOLOGY

## 2023-11-03 PROCEDURE — 83880 ASSAY OF NATRIURETIC PEPTIDE: CPT | Performed by: PATHOLOGY

## 2023-11-03 RX ORDER — TRAMADOL HYDROCHLORIDE 50 MG/1
TABLET ORAL
Qty: 30 TABLET | Refills: 0 | Status: SHIPPED | OUTPATIENT
Start: 2023-11-03 | End: 2023-12-16

## 2023-11-03 RX ORDER — ACETAMINOPHEN 500 MG
500-1000 TABLET ORAL EVERY 6 HOURS PRN
COMMUNITY
End: 2024-02-12

## 2023-11-03 NOTE — PROGRESS NOTES
Chavez is a 53 year old that presents in clinic today for the following:     Chief Complaint   Patient presents with    Follow Up     Follow up on knees           11/3/2023     1:55 PM   Additional Questions   Roomed by Leticia Govea       Screenings from encounters over the past 10 days    No data recorded       Leticia Govea MA at 2:06 PM on 11/3/2023

## 2023-11-03 NOTE — PROGRESS NOTES
"  Assessment & Plan     Venous insufficiency (chronic) (peripheral)  Discussed the need for consultation with lymphedema clinic to advised on appropriate compression therapy.  - COMPRESSION STOCKINGS; 20-30 mmHg knee high compression stockings.  Measure and fit.  Style and brand per patient preference.    Osteoarthritis of spine with radiculopathy, lumbar region  Reviewed previous notes with patient's primary care provider.  Prescription for tramadol was provided.  Additional prescriptions should be discussed by patient with his current primary care provider.  - traMADol (ULTRAM) 50 MG tablet; Take one tablet at bedtime as needed  - Social Work Referral Specific Sites Only - See Locations in Order; Future    Idiopathic progressive polyneuropathy    - traMADol (ULTRAM) 50 MG tablet; Take one tablet at bedtime as needed    Primary osteoarthritis of both knees  Reviewed approach to management of osteoarthritis with patient.  He has been seen by sports medicine.  Unclear on the role of MRI and management of current symptoms.  Patient may not be a surgical candidate due to several chronic conditions that may need to be addressed prior to surgical interventions.  Therefore, recommend consultation with orthopedic surgery provider prior to obtaining MRI.  Referral was placed today.    - Orthopedic  Referral    Lymphedema  Referral to lymphedema clinic was placed today.- Lymphedema Therapy Referral; Future      I spent a total of 30 minutes on the day of the visit.   Time spent by me doing chart review, history and exam, documentation and further activities per the note       BMI:   Estimated body mass index is 64.95 kg/m  as calculated from the following:    Height as of this encounter: 1.702 m (5' 7\").    Weight as of 10/23/23: 188.1 kg (414 lb 11.2 oz).           No follow-ups on file.    Charu Aguilar MD  [facility] [place]    [place]   Chavez is a 53 year old, " "presenting for the following health issues:  Follow Up (Follow up on knees)      11/3/2023     1:55 PM   Additional Questions   Roomed by Leticia PETERSON     Patient is here for follow-up on several issues.  He would like a prescription for tramadol given that he is having significant issues with pain in his back as well as his knees.  He previously had a prescription for tramadol for nighttime and would like a refill.  Patient also reports that he is able to find an MRI scanner that can accommodate his body habitus and would like an order for MRI of the knees.        Review of Systems   Constitutional, HEENT, cardiovascular, pulmonary, GI, , musculoskeletal, neuro, skin, endocrine and psych systems are negative, except as otherwise noted.      Objective    BP (!) 137/92 (BP Location: Right arm, Patient Position: Sitting, Cuff Size: Adult Large)   Pulse 100   Ht 1.702 m (5' 7\")   SpO2 100%   BMI 64.95 kg/m    Body mass index is 64.95 kg/m .  Physical Exam   GENERAL: healthy, alert and no distress  EYES: Eyes grossly normal to inspection, PERRL and conjunctivae and sclerae normal  RESP: normal effort, no wheezing  SKIN: no suspicious lesions or rashes  PSYCH: mentation appears normal, affect normal/bright                      "

## 2023-11-05 NOTE — PROGRESS NOTES
Type of Form Received:     Form Received (Date) 11/3/23   Form Filled out Yes 11/20/23   Placed in provider folder Yes

## 2023-11-06 ENCOUNTER — DOCUMENTATION ONLY (OUTPATIENT)
Dept: INTERNAL MEDICINE | Facility: CLINIC | Age: 53
End: 2023-11-06

## 2023-11-06 NOTE — PROGRESS NOTES
Type of Form Received:     Form Received (Date) 11/6/23   Form Filled out Yes 11/20/23   Placed in provider folder Yes

## 2023-11-07 NOTE — TELEPHONE ENCOUNTER
DIAGNOSIS: Arthritis of both hands /Dr Milligan/ [facility] and medicaid/ ortho con   APPOINTMENT DATE: 1.10.23   NOTES STATUS DETAILS   OFFICE NOTE from referring provider Internal 12.31.22 Chavez Milligan MD  11.16.22     OFFICE NOTE from other specialist Internal 6.22.16 Cassidy Martines MD    5.31.16 Barbara Hinson MD    5.13.16 Felipe Knight MD    More in epic   DISCHARGE SUMMARY from hospital Internal    DISCHARGE REPORT from the ER Internal 5.27.16 Jose Humphrey MD--mhfv    5.7.16 Barbara Simpson MD--fv   MEDICATION LIST Internal    XRAYS (IMAGES & REPORTS) Internal 11.16.22 B hand  5.31.16 R hand  5.13.16 R wrist  5.7.16 R hand  3.14.16 R wrist              Partial Purse String (Intermediate) Text: Given the location of the defect and the characteristics of the surrounding skin an intermediate purse string closure was deemed most appropriate.  Undermining was performed circumferentially around the surgical defect.  A purse string suture was then placed and tightened. Wound tension only allowed a partial closure of the circular defect.

## 2023-11-07 NOTE — TELEPHONE ENCOUNTER
DIAGNOSIS: primary osteoarthritis of both knees/Dr. Aguilar/[facility]/ortho con    APPOINTMENT DATE: 11/08/23   NOTES STATUS DETAILS   OFFICE NOTE from referring provider Internal 11/03/23 - Charu Aguilar MD   OFFICE NOTE from other specialist Internal 09/23/23 - Daina Swanson PA-C     04/04/23 - eTresa Ayon PA-C     03/20/23 - Perla Bryan, PT     03/10/23 - Shelley Zurita MD     + More in Epic   MEDICATION LIST Internal    XRAYS  & INJECTIONS (IMAGES & REPORTS) Internal XR MORE KNEE:  - 09/21/23, 12/09/2, 07/21/22

## 2023-11-08 ENCOUNTER — PRE VISIT (OUTPATIENT)
Dept: ORTHOPEDICS | Facility: CLINIC | Age: 53
End: 2023-11-08

## 2023-11-09 ENCOUNTER — TELEPHONE (OUTPATIENT)
Dept: INTERNAL MEDICINE | Facility: CLINIC | Age: 53
End: 2023-11-09
Payer: MEDICAID

## 2023-11-09 NOTE — TELEPHONE ENCOUNTER
[facility] Call Center    Phone Message    May a detailed message be left on voicemail: yes     Reason for Call: Other: Caller would like update on forms for orders received by our clinic on 11/6/23.       Action Taken: Other: pcc    Travel Screening: Not Applicable

## 2023-11-10 NOTE — TELEPHONE ENCOUNTER
Left a voicemail that Dr Milligan is not in clinic, he will sign forms when he is back in clinic. Unless urgent and ok for another Dr to sign. Left clinic number if further questions.

## 2023-11-13 ENCOUNTER — DOCUMENTATION ONLY (OUTPATIENT)
Dept: INTERNAL MEDICINE | Facility: CLINIC | Age: 53
End: 2023-11-13
Payer: MEDICAID

## 2023-11-13 NOTE — PROGRESS NOTES
Type of Form Received:     Form Received (Date) 11/13/23   Form Filled out Yes 11/20/23   Placed in provider folder Yes

## 2023-11-14 ENCOUNTER — OFFICE VISIT (OUTPATIENT)
Dept: ORTHOPEDICS | Facility: CLINIC | Age: 53
End: 2023-11-14
Attending: INTERNAL MEDICINE
Payer: COMMERCIAL

## 2023-11-14 VITALS — WEIGHT: 315 LBS | BODY MASS INDEX: 65.78 KG/M2

## 2023-11-14 DIAGNOSIS — I89.0 LYMPHEDEMA: ICD-10-CM

## 2023-11-14 DIAGNOSIS — M17.0 PRIMARY OSTEOARTHRITIS OF BOTH KNEES: Primary | ICD-10-CM

## 2023-11-14 PROCEDURE — 99214 OFFICE O/P EST MOD 30 MIN: CPT | Performed by: FAMILY MEDICINE

## 2023-11-14 NOTE — LETTER
11/14/2023      RE: Chavez Ca  [address] [place] [address]     Dear Colleague,    Thank you for referring your patient, Chavez Ca, to the [facility] SPORTS MEDICINE [facility]. Please see a copy of my visit note below.      [facility]  SPORTS & ORTHOPEDIC CLINIC VISIT     Nov 14, 2023        ASSESSMENT & PLAN    52 yo with lymphedema and exacerbation of OA of bilateral knees    Reviewed imaging and assessment with patient in detail  Provided with referral to pool PT  Assisted with scheduling appointment for lymphedema treatment  He will schedule follow up in our clinic for U/S guided knee injections      Antione Abrams MD  [facility] SPORTS MEDICINE [facility]    -----  Chief Complaint   Patient presents with     Right Knee - Pain     Left Knee - Pain       SUBJECTIVE  Chavez Ca is a/an 53 year old male who is seen as a self referral for evaluation of  bilateral knee pain.     The patient is seen by themselves.  The patient is Right handed    Onset: September. Patient describes injury as having bilateral knee and leg pain.   Location of Pain: bilateral knee and leg   Worsened by: Weightbearing   Better with: Tramadol PRN   Treatments tried: Tramadol, therapy for upper and lower body.   Associated symptoms: no distal numbness or tingling; denies swelling or warmth    Orthopedic/Surgical history: NO  Social History/Occupation: No       REVIEW OF SYSTEMS:  Do you have fever, chills, weight loss? No  Do you have any vision problems? No  Do you have any chest pain or edema? No  Do you have any shortness of breath or wheezing?  No  Do you have stomach problems? No  Do you have any numbness or focal weakness? No  Do you have diabetes? Yes, pre diabetic   Do you have problems with bleeding or clotting? No  Do you have an rashes or other skin lesions? Yes, both lower legs     OBJECTIVE:  There were no vitals taken for this  visit.     Patient is alert, No acute distress, pleasant and conversational.    Gait: WC    bilateral knee:   Skin intact.   Tense pitting edema of bilateral lower legs to upper calf. Faint, poorly demarcated erythema bilaterally.   No effusion    AROM: Zero to approximately 115  limited by pain.    Palpation: No medial or lateral facet joint tenderness.  Ttp over medial joint line    Special Tests:  none    Full Isometric quad strength, extensor mechanism in place     Neurovascularly intact in the lower extremity    Hip and Ankle with full AROM and nontender      RADIOLOGY:    3 view xrays of bilateral knees performed 9/21/23 and reviewed independently demonstrating no actue fx. Tricompartmental djd worst in medial compartment. See EMR for formal radiology report.                Again, thank you for allowing me to participate in the care of your patient.      Sincerely,    Antione Abrams MD

## 2023-11-14 NOTE — PROGRESS NOTES
[facility] AND SUR[facility]ERY CENTER  SPORTS & ORTHOPEDIC CLINIC VISIT     Nov 14, 2023        ASSESSMENT & PLAN    54 yo with lymphedema and exacerbation of OA of bilateral knees    Reviewed imaging and assessment with patient in detail  Provided with referral to pool PT  Assisted with scheduling appointment for lymphedema treatment  He will schedule follow up in our clinic for U/S guided knee injections      Antione Abrams MD  [facility] SPORTS MEDICINE CLINIC [place]    -----  Chief Complaint   Patient presents with    Right Knee - Pain    Left Knee - Pain       SUBJECTIVE  Chavez Ca is a/an 53 year old male who is seen as a self referral for evaluation of  bilateral knee pain.     The patient is seen by themselves.  The patient is Right handed    Onset: September. Patient describes injury as having bilateral knee and leg pain.   Location of Pain: bilateral knee and leg   Worsened by: Weightbearing   Better with: Tramadol PRN   Treatments tried: Tramadol, therapy for upper and lower body.   Associated symptoms: no distal numbness or tingling; denies swelling or warmth    Orthopedic/Surgical history: NO  Social History/Occupation: No       REVIEW OF SYSTEMS:  Do you have fever, chills, weight loss? No  Do you have any vision problems? No  Do you have any chest pain or edema? No  Do you have any shortness of breath or wheezing?  No  Do you have stomach problems? No  Do you have any numbness or focal weakness? No  Do you have diabetes? Yes, pre diabetic   Do you have problems with bleeding or clotting? No  Do you have an rashes or other skin lesions? Yes, both lower legs     OBJECTIVE:  There were no vitals taken for this visit.     Patient is alert, No acute distress, pleasant and conversational.    Gait: WC    bilateral knee:   Skin intact.   Tense pitting edema of bilateral lower legs to upper calf. Faint, poorly demarcated erythema bilaterally.   No effusion    AROM: Zero to approximately  115  limited by pain.    Palpation: No medial or lateral facet joint tenderness.  Ttp over medial joint line    Special Tests:  none    Full Isometric quad strength, extensor mechanism in place     Neurovascularly intact in the lower extremity    Hip and Ankle with full AROM and nontender      RADIOLOGY:    3 view xrays of bilateral knees performed 9/21/23 and reviewed independently demonstrating no actue fx. Tricompartmental djd worst in medial compartment. See EMR for formal radiology report.

## 2023-11-15 ENCOUNTER — PATIENT OUTREACH (OUTPATIENT)
Dept: CARE COORDINATION | Facility: CLINIC | Age: 53
End: 2023-11-15
Payer: MEDICAID

## 2023-11-15 NOTE — PROGRESS NOTES
Social Work - Intervention  [facility]  Data/Intervention:    Patient Name: Chavez Ca Goes By: Chavez HUERTAS/Age: 1970 (53 year old)     Visit Type: telephone  Referral Source: Dr. Aguilar, [facility]  Reason for Referral: Resources, transportation    Collaborated With:    -Patient  -[employer]     Psychosocial Information/Concerns:  Patient needs a new Lift Chair. He reported he is breaking the chairs he is buying on his own.  explained that this is not something his medical insurance will cover, but once he gets on a CADI waiver, they could cover it.   Patient reported he needs a size appropriate walker as his current walker is not the correct size.   Transportation concerns. Patient was not aware of having medical rides through his insurance. Explained [employer] process. Has and uses [employer].  Patient is in the process of getting on the CADI waiver. CADI assessment is scheduled for 2023. Patient reported struggling to complete daily tasks, as he cannot climb stairs. Patient also reported that he has difficulty put on on compression socks.   Patient has housing concerns. Is considering a move to [facility] (currently working with [facility]) vs Subsidized Housing. Patient is wondering about how to get help finding subsidized housing.  explained the availability of Housing Stabilization Services.      Intervention/Education/Resources Provided:   called [employer] to initiate ride benefits and schedule a ride for upcoming appointment.  Ride approved for .   Per Patient's request,  sent Patient a [employer] message with the information on how to schedule future rides through [employer] as well as how to obtain Housing Stabilization Services.        Assessment/Plan:  Patient would greatly benefit from being on the CADI waiver as it will offer him a lot of community supports and assistance with getting the equipment he needs that's not covered by  insurance.     Patient will utilize resources provided at his leisure.  will remain available as needed.     Provided patient/family with contact information and availability.    Marly Martinez [facility]  Clinical , Outpatient Specialty Clinics  [facility]  Direct Phone: [phone]

## 2023-11-16 ENCOUNTER — TELEPHONE (OUTPATIENT)
Dept: DERMATOLOGY | Facility: CLINIC | Age: 53
End: 2023-11-16

## 2023-11-16 ENCOUNTER — TELEPHONE (OUTPATIENT)
Dept: INTERNAL MEDICINE | Facility: CLINIC | Age: 53
End: 2023-11-16

## 2023-11-16 ENCOUNTER — TELEPHONE (OUTPATIENT)
Dept: ORTHOPEDICS | Facility: CLINIC | Age: 53
End: 2023-11-16

## 2023-11-16 NOTE — TELEPHONE ENCOUNTER
Physical therapy referral was faxed to [facility]. I called the patient to inform him that this was faxed. All questions were answered at this time. Marcela Roth ATC on 11/16/2023 at 11:23 AM

## 2023-11-16 NOTE — TELEPHONE ENCOUNTER
[facility] Call Center    Phone Message    May a detailed message be left on voicemail: yes     Reason for Call: Symptoms or Concerns     If patient has red-flag symptoms, warm transfer to triage line    Current symptom or concern: pain in legs, red spots on legs      Are there any new or worsening symptoms? Yes: Pt requesting call back to discuss his next steps, pt stated he was seen in ortho for his knees but pt stated ortho can not see the pt for the redness of his legs and they are in a lot of pain

## 2023-11-16 NOTE — TELEPHONE ENCOUNTER
Patient is asking for his referral from Dr. Abrams for pool therapy for his legs be faxed to [facility] to fax #[phone]. They are needing the order prior to scheduling appt.     Please call patient to confirm once this has been sent or to discuss, at [phone], any time, okay to leave detailed msg.

## 2023-11-16 NOTE — TELEPHONE ENCOUNTER
[facility] Call Center    Phone Message    May a detailed message be left on voicemail: yes     Reason for Call: Other: Pt was seen by sports medicine for lyphedema and he was told he was scheduled wrong and needed to be seen by dermatology. Diagnosis not in protocols. Please advise. Thanks      Action Taken: Other: Derm    Travel Screening: Not Applicable

## 2023-11-16 NOTE — TELEPHONE ENCOUNTER
Patient saw Dr. Aguilar on 11/03/23 for leg swelling. Pt was refer to Lymphedema Therapy.  Pt is schedule to see Lymphedema on 11/24/23.  Message sent to RN to review and advise as pt now has leg pain, redness.        Danial Barnes CMA ([facility]) at 8:55 AM on 11/16/2023

## 2023-11-18 DIAGNOSIS — Z53.9 DIAGNOSIS NOT YET DEFINED: Primary | ICD-10-CM

## 2023-11-19 ENCOUNTER — MEDICAL CORRESPONDENCE (OUTPATIENT)
Dept: HEALTH INFORMATION MANAGEMENT | Facility: CLINIC | Age: 53
End: 2023-11-19
Payer: MEDICAID

## 2023-11-21 ENCOUNTER — MYC MEDICAL ADVICE (OUTPATIENT)
Dept: OTOLARYNGOLOGY | Facility: CLINIC | Age: 53
End: 2023-11-21
Payer: MEDICAID

## 2023-11-24 ENCOUNTER — THERAPY VISIT (OUTPATIENT)
Dept: PHYSICAL THERAPY | Facility: CLINIC | Age: 53
End: 2023-11-24
Attending: INTERNAL MEDICINE
Payer: COMMERCIAL

## 2023-11-24 ENCOUNTER — DOCUMENTATION ONLY (OUTPATIENT)
Dept: INTERNAL MEDICINE | Facility: CLINIC | Age: 53
End: 2023-11-24

## 2023-11-24 DIAGNOSIS — I89.0 LYMPHEDEMA OF BOTH LOWER EXTREMITIES: Primary | ICD-10-CM

## 2023-11-24 PROCEDURE — 97535 SELF CARE MNGMENT TRAINING: CPT | Mod: GP | Performed by: PHYSICAL THERAPIST

## 2023-11-24 PROCEDURE — 97162 PT EVAL MOD COMPLEX 30 MIN: CPT | Mod: GP | Performed by: PHYSICAL THERAPIST

## 2023-11-24 PROCEDURE — 97140 MANUAL THERAPY 1/> REGIONS: CPT | Mod: GP | Performed by: PHYSICAL THERAPIST

## 2023-11-24 NOTE — PROGRESS NOTES
"PHYSICAL THERAPY EVALUATION  Type of Visit: Evaluation    See electronic medical record for Abuse and Falls Screening details.    Subjective       Presenting condition or subjective complaint: Both legs are swelling and red and are in extreme pain  Date of onset: 11 (worsening recently, was not able to don socks)    Relevant medical history: Arthritis; Asthma; Bladder or bowel problems; Change in skin color; Chest pain; Cold or hot arm or leg; COPD; Depression; Hearing problems; Heart problems; High blood pressure; Osteoarthritis; Overweight; Pain at night or rest; Significant weakness; Sleep disorder like apnea; Vision problems   Dates & types of surgery: Take the time and look at MyChart 90% of them are still listed    Prior diagnostic imaging/testing results: X-ray     Prior therapy history for the same diagnosis, illness or injury: Yes 2020 Lymphedema Therapy for both legs.  He was told to due massage but that caused him to \"throw his arm out.\" Worn compression socks since .     Prior Level of Function  Transfers: Assistive equipment  Ambulation: Assistive equipment  ADL:  independent or not doing (shower, socks)  IADL: Laundry, Medication management    Living Environment  Social support: Alone   Type of home: House; 2-story; Basement   Stairs to enter the home: Yes 3 Is there a railing: No   Ramp: No   Stairs inside the home: Yes 35 Is there a railing: No   Help at home: None  Equipment owned: Walker with wheels; Lift chair     Employment: No    Hobbies/Interests: Getting My Health Back    Patient goals for therapy: I would like to walk sleep get dressed and fit in shoes pants without the pain and swelling without begging for anyone to help    Pain assessment: Pain present  Location: B Lower legs/Ratin/10 (was 10/10 earlier this week before he got the lift chair)     Objective       EDEMA EVALUATION  Additional history:  Body part affected by edema: Both my legs Both my knees My hole body " looks and feels effected  If cancer related, treatment:    If not cancer related, problems with veins or cause of swelling: Yes I have issues with veins And I been told a lot of things But nothing has been done to really fix the issue  Distance able to walk: Zero to 1 foot to 15 feet while in extreme pain in both legs and feet  Time able to stand: I am in extreme pain the first second I stand up But I push myself to  one place without sitting for 30 seconds before I sit  Sensation problems in hands/feet: Yes tingle numbness and sharp pain in both hands and both feet and both legs  Edema etiology: Chronic Venous Insufficiency, obesity, immobility    FUNCTIONAL SCALES  LLIS: 67    Cognitive Status Examination  Orientation: Oriented to person, place and time   Level of Consciousness: Alert  Follows Commands and Answers Questions: 100% of the time  Personal Safety and Judgement: Intact  Memory: Intact    EDEMA  Skin Condition: Dryness, Intact, Pitting 2+ pre-tib.  Legs are tight with think hyperkeratosis at feet, scaly skin around the ankles with decreased elasticity. Legs are pink with area of rash on the Lateral R shin  Scar: No  Capillary Refill:   Radial Pulse:   Dorsal Pedal Pulse:   Stemmer Sign: +  Ulceration: No  Pt does have scabs from scratch on his legs.    GIRTH MEASUREMENTS: Refer to separate girth measurement flowsheet.     VOLUME LE 10-40cm  Right LE (mL) 3926.29   Left LE (mL) 3990.9   LE Volume Comparison LLE volume greater than RLE volume   % Difference <2%   Trunk Volume Thighs full with pitting and lower trunk distended with fibrotic pitting edema      RANGE OF MOTION:  Pt able to demonstrate ankle ROM for effective muscle pumps.  Unable to full extend the L knee secondary to OA and pain.  He is limited in reach to feet due to abdomen.  STRENGTH:  Deconditioning due to injury of R shoulder and pain with OA affecting mobility. He now has a walker which will allow for more walking.   POSTURE:  WFL  PALPATION: Pt does not report tenderness in legs to palpation but they are painful all the time when his pant legs touch or rub on the legs.   ACTIVITIES OF DAILY LIVING: pt has been working with home care to use a sock aid to don compression socks but arrives today without socks on.  He is not able to reach down to feet to don without assist but having assessment for assisted living facility soon.    BED MOBILITY: Min Assist, sleeping in his new lift chair, sounds like he has not slept in a bed for some time as he currently is staying with a friend until able to secure stable housing, he is unable to lie flat so will try to use CADI waiver for hospital bed.  TRANSFERS:  independent with walker but slow  GAIT/LOCOMOTION: slow due to pain, uses 4WW with heavy support on arms  BALANCE:  not formally assessed, declines falls, moves slowly  SENSATION:  generally intact, areas of hyperkeratosis without sensation  VASCULAR:  venous distention noted in lower legs  COORDINATION: Gross Motor Coordination: intact  MUSCLE TONE: WNL    Assessment & Plan   CLINICAL IMPRESSIONS  Medical Diagnosis: lymphedema    Treatment Diagnosis: Lymphedema   Impression/Assessment: Patient is a 53 year old male with pain from swelling, impaired mobility and poor clothing fit complaints.  The following significant findings have been identified: Pain, Decreased ROM/flexibility, Decreased strength, Edema, Impaired gait, Decreased activity tolerance, and risk of infection and wound . These impairments interfere with their ability to perform self care tasks, household chores, household mobility, and community mobility as compared to previous level of function.     Clinical Decision Making (Complexity):  Clinical Presentation: Evolving/Changing  Clinical Presentation Rationale: based on medical and personal factors listed in PT evaluation  Clinical Decision Making (Complexity): Moderate complexity    PLAN OF CARE  Treatment  Interventions:  Interventions: Manual Therapy, Therapeutic Exercise, Self-Care/Home Management, Gradient Compression Bandaging, Fit for garments    Long Term Goals     PT Goal 1  Goal Identifier: Lymphedema Home Program  Goal Description: Pt will be independent with drainage exercise, self massage, and skin care to best manage swelling to decrease symptoms.  Target Date: 02/21/24  PT Goal 2  Goal Identifier: Volume  Goal Description: Pt will have 5% reduction in B LE volume for improved tissue integrity, decreased risk of infection, and improved fit of clothing  Target Date: 02/21/24  PT Goal 3  Goal Identifier: LLIS  Goal Description: Pt will have at least 8 point reduction in score on subsequent assessment to reflect the MCID in impact of swelling on quality of life.  Goal Progress: initial 67  Target Date: 02/21/24  PT Goal 4  Goal Identifier: Maintenance  Goal Description: Pt will use compression garments as instructed AND home management tools/program for long term management of chronic condition to prevent tissue fibrosis, infection, wound and other secondary sequelae  Target Date: 05/21/24      Frequency of Treatment: 3x/week for 6 weeks, then decrease to 1x/week for 1 month, 1x/mo for 3-4 months  Duration of Treatment: 6 months    Recommended Referrals to Other Professionals:  recently finished home care in order to start edema treatment, plans to do pool therapy in January after edema completed  Education Assessment:   Learner/Method: Patient;Listening  Education Comments: plan, need to be able to don socks in order to start therapy    Risks and benefits of evaluation/treatment have been explained.   Patient/Family/caregiver agrees with Plan of Care.     Evaluation Time:     PT Eval, Moderate Complexity Minutes (33328): 16       Signing Clinician: Cathy Carroll PT      [facility]                                                                                   OUTPATIENT  PHYSICAL THERAPY      PLAN OF TREATMENT FOR OUTPATIENT REHABILITATION   Patient's Last Name, First Name, PRIYAJodiVEGAJodi  Chavez Ca YOB: 1970   Provider's Name   [facility]   Medical Record No.  [id number]     Onset Date: 01/01/11 (worsening recently, was not able to don socks)  Start of Care Date: 11/24/23     Medical Diagnosis:  lymphedema      PT Treatment Diagnosis:  Lymphedema Plan of Treatment  Frequency/Duration: 3x/week for 6 weeks, then decrease to 1x/week for 1 month, 1x/mo for 3-4 months/ 6 months    Certification date from 11/24/23 to 02/21/24         See note for plan of treatment details and functional goals     Cathy Carroll, PT                         I CERTIFY THE NEED FOR THESE SERVICES FURNISHED UNDER        THIS PLAN OF TREATMENT AND WHILE UNDER MY CARE     (Physician attestation of this document indicates review and certification of the therapy plan).              Referring Provider:  Charu Aguilar    Initial Assessment  See Epic Evaluation- Start of Care Date: 11/24/23

## 2023-11-24 NOTE — PATIENT INSTRUCTIONS
Leg Edema Management    Elevate your legs whenever possible including raising feet when sitting and taking opportunity to lie down for short periods during the day if able.     Avoid long time standing or sitting. If standing try to sit and elevate, if sitting get up to walk around for about 1 minute for every 30 minutes seated. Even if you are in the recliner, try to get up every 30minutes to move    Drink lots of water to flush edema, about 8 glasses or more daily throughout the day unless otherwise instructed by medical team.     Avoid excess salt in diet.     Try to eat protein with each meal and snack    Take all medications, especially diuretics as prescribed.     Avoid constriction of clothing or positioning     Heat and cold can increase swelling but causing more blood flow and fluid filtering into tissues or conversely slowing the flow of lymph in the cold.     Take good care of your skin by keeping it clean and dry, use lotion, avoid scratching    Signs of infection: redness, heat, fever, increase in swelling, pain.  Call your doctor or go to urgent care if signs are present    Exercise with movement and deep breathing to facilitate lymph flow.   150 minutes per week of aerobic exercise (walking, bike at the gym) for heart health, even if just multiple short sessions.

## 2023-11-25 NOTE — PROGRESS NOTES
Type of Form Received:     Form Received (Date) 11/24/23   Form Filled out Yes, faxed 12/1   Placed in provider folder Yes

## 2023-11-27 ENCOUNTER — THERAPY VISIT (OUTPATIENT)
Dept: PHYSICAL THERAPY | Facility: CLINIC | Age: 53
End: 2023-11-27
Attending: INTERNAL MEDICINE
Payer: COMMERCIAL

## 2023-11-27 DIAGNOSIS — I89.0 LYMPHEDEMA OF BOTH LOWER EXTREMITIES: Primary | ICD-10-CM

## 2023-11-27 PROCEDURE — 97140 MANUAL THERAPY 1/> REGIONS: CPT | Mod: GP | Performed by: PHYSICAL THERAPIST

## 2023-11-27 PROCEDURE — 97535 SELF CARE MNGMENT TRAINING: CPT | Mod: GP | Performed by: PHYSICAL THERAPIST

## 2023-11-28 ENCOUNTER — OFFICE VISIT (OUTPATIENT)
Dept: ORTHOPEDICS | Facility: CLINIC | Age: 53
End: 2023-11-28
Payer: COMMERCIAL

## 2023-11-28 DIAGNOSIS — M17.0 PRIMARY OSTEOARTHRITIS OF BOTH KNEES: Primary | ICD-10-CM

## 2023-11-28 PROCEDURE — 20611 DRAIN/INJ JOINT/BURSA W/US: CPT | Mod: 50 | Performed by: FAMILY MEDICINE

## 2023-11-28 RX ADMIN — LIDOCAINE HYDROCHLORIDE 3 ML: 10 INJECTION, SOLUTION EPIDURAL; INFILTRATION; INTRACAUDAL; PERINEURAL at 11:44

## 2023-11-28 RX ADMIN — LIDOCAINE HYDROCHLORIDE 2 ML: 10 INJECTION, SOLUTION EPIDURAL; INFILTRATION; INTRACAUDAL; PERINEURAL at 11:44

## 2023-11-28 RX ADMIN — TRIAMCINOLONE ACETONIDE 40 MG: 40 INJECTION, SUSPENSION INTRA-ARTICULAR; INTRAMUSCULAR at 11:44

## 2023-11-28 NOTE — NURSING NOTE
[address] [address]  Dept: [phone]  ______________________________________________________________________________    Patient: Chavez Ca   : 1970   MRN: [id number]   2023    INVASIVE PROCEDURE SAFETY CHECKLIST    Date: 2023   Procedure:Bilateral knee USG CSI   Patient Name: Chavez Ca  MRN: [id number]  YOB: 1970    Action: Complete sections as appropriate. Any discrepancy results in a HARD COPY until resolved.     PRE PROCEDURE:  Patient ID verified with 2 identifiers (name and  or MRN): Yes  Procedure and site verified with patient/designee (when able): Yes  Accurate consent documentation in medical record: Yes  H&P (or appropriate assessment) documented in medical record: Yes  H&P must be up to 20 days prior to procedure and updates within 24 hours of procedure as applicable: Yes  Relevant diagnostic and radiology test results appropriately labeled and displayed as applicable: Yes  Procedure site(s) marked with provider initials: NA    TIMEOUT:  Time-Out performed immediately prior to starting procedure, including verbal and active participation of all team members addressing the following:Yes  * Correct patient identify  * Confirmed that the correct side and site are marked  * An accurate procedure consent form  * Agreement on the procedure to be done  * Correct patient position  * Relevant images and results are properly labeled and appropriately displayed  * The need to administer antibiotics or fluids for irrigation purposes during the procedure as applicable   * Safety precautions based on patient history or medication use    DURING PROCEDURE: Verification of correct person, site, and procedures any time the responsibility for care of the patient is transferred to another member of the care team.       Prior to injection, verified patient identity using patient's name  and date of birth.  Due to injection administration, patient instructed to remain in clinic for 15 minutes  afterwards, and to report any adverse reaction to me immediately.    Joint injection was performed.      Drug Amount Wasted:  None.  Vial/Syringe: Single dose vial  Expiration Date:  7/1/25 5/1/27      Mariela Danielson, [facility]  November 28, 2023

## 2023-11-28 NOTE — LETTER
11/28/2023      RE: Chavez Ca  [address] [place] [address]     Dear Colleague,    Thank you for referring your patient, Chavez Ca, to the [facility] [place]. Please see a copy of my visit note below.    [facility]  SPORTS & ORTHOPEDIC CLINIC VISIT     Nov 28, 2023      Ultrasound-guided bilateral knee injection    Date/Time: 11/28/2023 11:44 AM    Performed by: Antione Abrams MD  Authorized by: Antione Abrams MD    Indications:  Osteoarthritis  Needle Size:  22 G  Guidance: ultrasound    Approach:  Lateral  Location:  Knee  Laterality:  Bilateral      Medications (Right):  40 mg triamcinolone 40 MG/ML; 2 mL lidocaine (PF) 1 %; 3 mL lidocaine (PF) 1 %  Medications (Left):  40 mg triamcinolone 40 MG/ML; 3 mL lidocaine (PF) 1 %; 2 mL lidocaine (PF) 1 %  Outcome:  Tolerated well, no immediate complications  Procedure discussed: discussed risks, benefits, and alternatives    Consent Given by:  Patient  Timeout: timeout called immediately prior to procedure    Prep: patient was prepped and draped in usual sterile fashion     PROCEDURE: Ultrasound-guided bilateral knee injection   The patient was apprised of the risks and the benefits of the procedure written consent was signed by the patient.   The patient was positioned lying supine on exam table with knee resting in a slightly flexed position.   The area of the superiolateral right knee was cleaned with a chlorhexadine swab.  Ultrasound was necessary to localized the joint space due to body habitus due to obesity and lymphedema.  Using sterile technique the skin was anesthetized with 3.0 mL 1% lidocaine, a 22-gauge 3.5 in needle was introduced into the suprapatellar pouch under direct and continuous ultrasound guidance.  A solution of 40 mg of triamcinolone along with 2 mL of 1% lidocaine was injected easily and seen flowing into the joint space on  ultrasound.  Images were captured and saved to the permanent record.  The identical procedure was then repeated on the left side.  There were no complications. The patient tolerated the procedure well. There was minimal bleeding.   The patient was instructed to ice and provided gentle compression to the knee upon leaving clinic and refrain from overuse over the next 2 days.   The patient was instructed to go to the emergency room with any unusual pain, swelling, or redness occurred in the injected area.     Antione Abrams MD

## 2023-11-28 NOTE — PROGRESS NOTES
[facility]  SPORTS & ORTHOPEDIC CLINIC VISIT     Nov 28, 2023      Ultrasound-guided bilateral knee injection    Date/Time: 11/28/2023 11:44 AM    Performed by: Antione Abrams MD  Authorized by: Antione Abrams MD    Indications:  Osteoarthritis  Needle Size:  22 G  Guidance: ultrasound    Approach:  Lateral  Location:  Knee  Laterality:  Bilateral      Medications (Right):  40 mg triamcinolone 40 MG/ML; 2 mL lidocaine (PF) 1 %; 3 mL lidocaine (PF) 1 %  Medications (Left):  40 mg triamcinolone 40 MG/ML; 3 mL lidocaine (PF) 1 %; 2 mL lidocaine (PF) 1 %  Outcome:  Tolerated well, no immediate complications  Procedure discussed: discussed risks, benefits, and alternatives    Consent Given by:  Patient  Timeout: timeout called immediately prior to procedure    Prep: patient was prepped and draped in usual sterile fashion     PROCEDURE: Ultrasound-guided bilateral knee injection   The patient was apprised of the risks and the benefits of the procedure written consent was signed by the patient.   The patient was positioned lying supine on exam table with knee resting in a slightly flexed position.   The area of the superiolateral right knee was cleaned with a chlorhexadine swab.  Ultrasound was necessary to localized the joint space due to body habitus due to obesity and lymphedema.  Using sterile technique the skin was anesthetized with 3.0 mL 1% lidocaine, a 22-gauge 3.5 in needle was introduced into the suprapatellar pouch under direct and continuous ultrasound guidance.  A solution of 40 mg of triamcinolone along with 2 mL of 1% lidocaine was injected easily and seen flowing into the joint space on ultrasound.  Images were captured and saved to the permanent record.  The identical procedure was then repeated on the left side.  There were no complications. The patient tolerated the procedure well. There was minimal bleeding.   The patient was instructed to ice and provided  gentle compression to the knee upon leaving clinic and refrain from overuse over the next 2 days.   The patient was instructed to go to the emergency room with any unusual pain, swelling, or redness occurred in the injected area.     Antione Abrams MD

## 2023-11-29 ENCOUNTER — THERAPY VISIT (OUTPATIENT)
Dept: PHYSICAL THERAPY | Facility: CLINIC | Age: 53
End: 2023-11-29
Attending: INTERNAL MEDICINE
Payer: COMMERCIAL

## 2023-11-29 VITALS — BODY MASS INDEX: 64.37 KG/M2 | WEIGHT: 315 LBS

## 2023-11-29 DIAGNOSIS — I89.0 LYMPHEDEMA OF BOTH LOWER EXTREMITIES: Primary | ICD-10-CM

## 2023-11-29 PROCEDURE — 97140 MANUAL THERAPY 1/> REGIONS: CPT | Mod: GP | Performed by: PHYSICAL THERAPIST

## 2023-11-29 PROCEDURE — 97110 THERAPEUTIC EXERCISES: CPT | Mod: GP | Performed by: PHYSICAL THERAPIST

## 2023-11-30 ENCOUNTER — ANCILLARY PROCEDURE (OUTPATIENT)
Dept: MRI IMAGING | Facility: CLINIC | Age: 53
End: 2023-11-30
Attending: PEDIATRICS
Payer: COMMERCIAL

## 2023-11-30 ENCOUNTER — MEDICAL CORRESPONDENCE (OUTPATIENT)
Dept: HEALTH INFORMATION MANAGEMENT | Facility: CLINIC | Age: 53
End: 2023-11-30

## 2023-11-30 PROCEDURE — 73721 MRI JNT OF LWR EXTRE W/O DYE: CPT | Mod: RT | Performed by: RADIOLOGY

## 2023-11-30 PROCEDURE — 73721 MRI JNT OF LWR EXTRE W/O DYE: CPT | Mod: LT | Performed by: RADIOLOGY

## 2023-12-01 ENCOUNTER — THERAPY VISIT (OUTPATIENT)
Dept: PHYSICAL THERAPY | Facility: CLINIC | Age: 53
End: 2023-12-01
Attending: INTERNAL MEDICINE
Payer: COMMERCIAL

## 2023-12-01 DIAGNOSIS — I89.0 LYMPHEDEMA OF BOTH LOWER EXTREMITIES: Primary | ICD-10-CM

## 2023-12-01 PROCEDURE — 97110 THERAPEUTIC EXERCISES: CPT | Mod: GP | Performed by: PHYSICAL THERAPIST

## 2023-12-01 PROCEDURE — 97140 MANUAL THERAPY 1/> REGIONS: CPT | Mod: GP | Performed by: PHYSICAL THERAPIST

## 2023-12-04 ENCOUNTER — DOCUMENTATION ONLY (OUTPATIENT)
Dept: INTERNAL MEDICINE | Facility: CLINIC | Age: 53
End: 2023-12-04
Payer: MEDICAID

## 2023-12-04 NOTE — PROGRESS NOTES
Type of Form Received:     Form Received (Date) 12/4/23   Form Filled out Yes, faxed 12/12   Placed in provider folder Yes

## 2023-12-06 RX ORDER — LIDOCAINE HYDROCHLORIDE 10 MG/ML
2 INJECTION, SOLUTION EPIDURAL; INFILTRATION; INTRACAUDAL; PERINEURAL
Status: DISCONTINUED | OUTPATIENT
Start: 2023-11-28 | End: 2024-05-30

## 2023-12-06 RX ORDER — TRIAMCINOLONE ACETONIDE 40 MG/ML
40 INJECTION, SUSPENSION INTRA-ARTICULAR; INTRAMUSCULAR
Status: DISCONTINUED | OUTPATIENT
Start: 2023-11-28 | End: 2024-05-30

## 2023-12-06 RX ORDER — LIDOCAINE HYDROCHLORIDE 10 MG/ML
3 INJECTION, SOLUTION EPIDURAL; INFILTRATION; INTRACAUDAL; PERINEURAL
Status: DISCONTINUED | OUTPATIENT
Start: 2023-11-28 | End: 2024-05-30

## 2023-12-07 ENCOUNTER — THERAPY VISIT (OUTPATIENT)
Dept: PHYSICAL THERAPY | Facility: CLINIC | Age: 53
End: 2023-12-07
Attending: INTERNAL MEDICINE
Payer: COMMERCIAL

## 2023-12-07 DIAGNOSIS — I89.0 LYMPHEDEMA OF BOTH LOWER EXTREMITIES: Primary | ICD-10-CM

## 2023-12-07 PROCEDURE — 97140 MANUAL THERAPY 1/> REGIONS: CPT | Mod: GP | Performed by: PHYSICAL THERAPIST

## 2023-12-08 ENCOUNTER — THERAPY VISIT (OUTPATIENT)
Dept: PHYSICAL THERAPY | Facility: CLINIC | Age: 53
End: 2023-12-08
Attending: INTERNAL MEDICINE
Payer: COMMERCIAL

## 2023-12-08 DIAGNOSIS — I89.0 LYMPHEDEMA OF BOTH LOWER EXTREMITIES: Primary | ICD-10-CM

## 2023-12-08 PROCEDURE — 97140 MANUAL THERAPY 1/> REGIONS: CPT | Mod: GP | Performed by: PHYSICAL THERAPIST

## 2023-12-08 PROCEDURE — 97535 SELF CARE MNGMENT TRAINING: CPT | Mod: GP | Performed by: PHYSICAL THERAPIST

## 2023-12-11 ENCOUNTER — OFFICE VISIT (OUTPATIENT)
Dept: OTOLARYNGOLOGY | Facility: CLINIC | Age: 53
End: 2023-12-11
Payer: COMMERCIAL

## 2023-12-11 ENCOUNTER — MEDICAL CORRESPONDENCE (OUTPATIENT)
Dept: INTERNAL MEDICINE | Facility: CLINIC | Age: 53
End: 2023-12-11

## 2023-12-11 ENCOUNTER — THERAPY VISIT (OUTPATIENT)
Dept: PHYSICAL THERAPY | Facility: CLINIC | Age: 53
End: 2023-12-11
Attending: INTERNAL MEDICINE
Payer: COMMERCIAL

## 2023-12-11 ENCOUNTER — OFFICE VISIT (OUTPATIENT)
Dept: SLEEP MEDICINE | Facility: CLINIC | Age: 53
End: 2023-12-11
Payer: COMMERCIAL

## 2023-12-11 VITALS
BODY MASS INDEX: 49.44 KG/M2 | HEIGHT: 67 IN | WEIGHT: 315 LBS | HEART RATE: 101 BPM | DIASTOLIC BLOOD PRESSURE: 103 MMHG | OXYGEN SATURATION: 97 % | SYSTOLIC BLOOD PRESSURE: 160 MMHG | RESPIRATION RATE: 20 BRPM

## 2023-12-11 VITALS
SYSTOLIC BLOOD PRESSURE: 150 MMHG | DIASTOLIC BLOOD PRESSURE: 69 MMHG | BODY MASS INDEX: 49.44 KG/M2 | TEMPERATURE: 98.9 F | HEART RATE: 124 BPM | WEIGHT: 315 LBS | HEIGHT: 67 IN

## 2023-12-11 DIAGNOSIS — G47.33 OSA (OBSTRUCTIVE SLEEP APNEA): Primary | ICD-10-CM

## 2023-12-11 DIAGNOSIS — I89.0 LYMPHEDEMA OF BOTH LOWER EXTREMITIES: Primary | ICD-10-CM

## 2023-12-11 PROCEDURE — 99213 OFFICE O/P EST LOW 20 MIN: CPT | Mod: 25 | Performed by: OTOLARYNGOLOGY

## 2023-12-11 PROCEDURE — 99215 OFFICE O/P EST HI 40 MIN: CPT

## 2023-12-11 PROCEDURE — 31575 DIAGNOSTIC LARYNGOSCOPY: CPT | Performed by: OTOLARYNGOLOGY

## 2023-12-11 PROCEDURE — 97535 SELF CARE MNGMENT TRAINING: CPT | Mod: GP | Performed by: PHYSICAL THERAPIST

## 2023-12-11 ASSESSMENT — PAIN SCALES - GENERAL: PAINLEVEL: WORST PAIN (10)

## 2023-12-11 NOTE — PATIENT INSTRUCTIONS
CPAP machines are often rent-to-own over 3-12 months depending on your insurance. During the first 3 months, insurances will often want to see at least 4 hours of use on 70% of nights over a 30 day period. Ideally, you would wear it whenever sleeping, but 4 hours is where health benefits really start.     If you don't like the first mask you get, you can exchange it once in the first month for free. Otherwise, insurance will cover new supplies about every 3 months. They will give you paperwork explaining how often to clean and replace parts when you get the machine.    We have people called our sleep therapy management team who will be checking in on you a few times in the first month. They can access data from your machine and help troubleshoot any problems you may have.    I will see you back about 2-3 months after getting started on the CPAP. That appointment will be made once you get the CPAP.    ELAINE White CNP    Your BMI is Body mass index is 64.03 kg/m .    What is BMI?  Body mass index (BMI) is one way to tell whether you are at a healthy weight, overweight, or obese. It measures your weight in relation to your height.  A BMI of 18.5 to 24.9 is in the healthy range. A person with a BMI of 25 to 29.9 is considered overweight, and someone with a BMI of 30 or greater is considered obese.  Another way to find out if you are at risk for health problems caused by overweight and obesity is to measure your waist. If you are a woman and your waist is more than 35 inches, or if you are a man and your waist is more than 40 inches, your risk of disease may be higher.  More than two-thirds of American adults are considered overweight or obese. Being overweight or obese increases the risk for further weight gain.  Excess weight may lead to heart disease and diabetes. Creating and following plans for healthy eating and physical activity may help you improve your health.    Methods for maintaining or losing  weight.  Weight control is part of healthy lifestyle and includes exercise, emotional health, and healthy eating habits.  Careful eating habits lifelong is the mainstay of weight control.  Though there are significant health benefits from weight loss, long-term weight loss with diet alone may be very difficult to achieve- studies show long-term success with dietary management in less than 10% of people. Attaining a healthy weight may be especially difficult to achieve in those with severe obesity. In some cases, medications, devices and surgical management might be considered.    What can you do?  If you are overweight or obese and are interested in methods for weight loss, you should discuss this with your provider. In addition, we recommend that you review healthy life styles and methods for weight loss available through the National Institutes of Health patient information sites:   http://win.niddk.nih.gov/publications/index.htm

## 2023-12-11 NOTE — NURSING NOTE
Scheduled 3 month follow up. Rechecked BP. BP was 134/90. Printed AVS.    Marta Hawley   Clinic Assistant  [facility]

## 2023-12-11 NOTE — LETTER
12/11/2023       RE: Chavez Ca  [address]  [place] [address]     Dear Colleague,    Thank you for referring your patient, Chavez Ca, to the [facility] [place] [facility]. Please see a copy of my visit note below.    HISTORY OF PRESENT ILLNESS:   Chavez Ca is a 53 year old year old male who presents today for follow up  on his sleep study.  He has substantial sleep apnea and is being fitted for a new mask.  He also has a number of other issues including difficulty with getting and staying asleep.    PHYSICAL EXAMINATION:  In no acute distress. Normal mood, normal affect, alert, and appropriate. Head is normocephalic. Cranial nerve VII is House-Backmann I out of VI bilaterally. Breathing without difficulty or stridor. Eyes are anicteric.     At that point verbal consent is obtained and flexible laryngoscopy is performed.  The nose shows no abnormality, the nasopharynx is normal, the oropharynx is normal, the larynx shows normal anatomy with normal vocal fold movement.  There is some thicker mucus around in the hypopharynx but generally clear.    53-year-old with obstructive sleep apnea.  This point he will work on CPAP and we will see him back in March to see how he is doing.  20 minutes spent with the patient, on chart review and documentation of the date of the visit.    ASSESSMENT/PLAN:   Chavez Ca is a 53 year old year old male who presents today for follow up. I will call him back in 3 months time.     FOLLOW UP: 3 months     Scribe Disclosure:   I, Sybil Aly, am serving as a scribe; to document services personally performed by Zafar Glasgow MD -based on data collection and the provider's statements to me.     Provider Disclosure:  I agree with above History, Review of Systems, Physical exam and Plan.  I have reviewed the content of the documentation and have edited it as  needed. I have personally performed the services documented here and the documentation accurately represents those services and the decisions I have made.      Electronically signed by:  Zafar Glasgow MD        Again, thank you for allowing me to participate in the care of your patient.      Sincerely,    Zafar Glasgow MD

## 2023-12-11 NOTE — PROGRESS NOTES
HISTORY OF PRESENT ILLNESS:   Chavez aC is a 53 year old year old male who presents today for follow up  on his sleep study.  He has substantial sleep apnea and is being fitted for a new mask.  He also has a number of other issues including difficulty with getting and staying asleep.    PHYSICAL EXAMINATION:  In no acute distress. Normal mood, normal affect, alert, and appropriate. Head is normocephalic. Cranial nerve VII is House-Backmann I out of VI bilaterally. Breathing without difficulty or stridor. Eyes are anicteric.     At that point verbal consent is obtained and flexible laryngoscopy is performed.  The nose shows no abnormality, the nasopharynx is normal, the oropharynx is normal, the larynx shows normal anatomy with normal vocal fold movement.  There is some thicker mucus around in the hypopharynx but generally clear.    53-year-old with obstructive sleep apnea.  This point he will work on CPAP and we will see him back in March to see how he is doing.  20 minutes spent with the patient, on chart review and documentation of the date of the visit.    ASSESSMENT/PLAN:   Chavez Ca is a 53 year old year old male who presents today for follow up. I will call him back in 3 months time.     FOLLOW UP: 3 months     Scribe Disclosure:   I, Sybil Aly, am serving as a scribe; to document services personally performed by Zafar Glasgow MD -based on data collection and the provider's statements to me.     Provider Disclosure:  I agree with above History, Review of Systems, Physical exam and Plan.  I have reviewed the content of the documentation and have edited it as needed. I have personally performed the services documented here and the documentation accurately represents those services and the decisions I have made.      Electronically signed by:  Zafar Glasgow MD

## 2023-12-11 NOTE — PATIENT INSTRUCTIONS
You were seen in the ENT Clinic today by Dr. Glasgow. If you have any questions or concerns after your appointment, please contact us (see below)      2.   Please return to clinic as scheduled with Dr. Glasgow on 3/11/24    How to Contact Us:  Send a [employer] message to your provider. Our team will respond to you via [employer]. Occasionally, we will need to call you to get further information.  For urgent matters (Monday-Friday), call the ENT Clinic: [phone] and speak with a call center team member - they will route your call appropriately.   If you'd like to speak directly with a nurse, please find our contact information below. We do our best to check voicemail frequently throughout the day, and will work to call you back within 1-2 days. For urgent matters, please use the general clinic phone numbers listed above.      Mesha HAIRSTON RN  ENT RN Care Coordinator  Direct: [phone]  Adriana NINO LPN  Direct: [phone]

## 2023-12-11 NOTE — PROGRESS NOTES
Sleep Study Follow-Up Visit:    Date on this visit: 12/11/2023    Chavez Ca comes in today for follow-up of his sleep study done on 06/05/2023 at the [facility] for insomnia, previously diagnosed mild to moderate VERN, and hypersomnolence.          These findings were reviewed with patient.     Past medical/surgical history, family history, social history, medications and allergies were reviewed.      Problem List:  Patient Active Problem List    Diagnosis Date Noted    Lymphedema of both lower extremities 11/24/2023     Priority: Medium    Preventative health care 10/23/2023     Priority: Medium    Impaired mobility 10/23/2023     Priority: Medium    Anxiety 10/23/2023     Priority: Medium     ~1/month       Chronic right-sided low back pain with right-sided sciatica 09/21/2023     Priority: Medium    Elevated TSH 01/26/2023     Priority: Medium     Increases have been marginal. Clinically most notable because of weight, but not likely to be related.      Microalbuminuria 01/01/2023     Priority: Medium     Unclear if related to HTN or diabetes.  Consider ARB.      Primary mild osteoarthritis of both knees 12/31/2022     Priority: Medium     See bilateral leg pain. Arthritis is mild compared to deconditioning. Mild degenerative joints with small joint effusions (12/9/22)        Moderate persistent asthma without complication 11/12/2022     Priority: Medium     Modest reactive airway disease on PFTs 11/10/22. Also has additional causes of dyspnea (including some restrictive disease from body habitus)      Facial rash (possible roseacea?) 08/09/2022     Priority: Medium     Seeing dermatology      Seborrheic dermatitis of scalp 08/09/2022     Priority: Medium     Ketoconazole shampoo used at varying times      Intertrigo of adipose folds 08/09/2022     Priority: Medium    Insomnia due to medical condition 05/28/2022     Priority: Medium     Multietiology, including pain  Trying  zolpidem again.      Skin pain 01/01/2022     Priority: Medium     Skin pain 'like a fish ', heat sensation, and sweating sudden onset and very severe.   Helped with having a fan blowing on him most of the time.      Dyspnea, multifactorial 05/04/2021     Priority: Medium     Multifactorial: mild reactive airways, subglottic stenosis,   Includes dyspnea on exertion and at rest.  He has trouble breathing with a variety of odors, chemicals, cat litter box, a bonfire across the street in his neighborhood.    PFTs 11/10/2022 show a mixture of features.  He has a variety of FLV findings. FVC and FEV1 both decreased but ratio intact, and both improved with albuterol.        Morbid obesity (H) 03/16/2021     Priority: Medium     Followed by Dr. Chang since 2012.  On topiramate/phentermine        Condyloma acuminata 03/16/2021     Priority: Medium     Right inguinal fold, shave biopsy      Hypokalemia 03/16/2021     Priority: Medium     Due to furosemide      Poor dentition 03/16/2021     Priority: Medium    Neck pain, chronic 07/22/2016     Priority: Medium    Varicose veins of left lower extremity 06/22/2016     Priority: Medium     12/29/15 - Left GSV ablation with varicose vein microphlebectomy      Sternocostal pain 03/08/2016     Priority: Medium     Nonexertional right side point chest pain, sharp and sudden, helped somewhat by pressure.      Prediabetes 03/08/2016     Priority: Medium     Max A1c was 6.0 (5/4/2021).      Osteoarthritis of spine with radiculopathy, lumbar region 01/11/2016     Priority: Medium     Needs MRI.  Progression mild multilevel degenerative changes most pronounced at L3-L4.      Arthritis of both hands 07/18/2015     Priority: Medium     Scattered mild degeneration and post-fracture deformities (11/16/22)      Hyperlipidemia associated with prediabetes 11/18/2014     Priority: Medium    Subglottic stenosis 06/27/2014     Priority: Medium     Complication of trach, placed 1/2012       Depressive disorder, not elsewhere classified 07/30/2012     Priority: Medium    Idiopathic edema 04/13/2012     Priority: Medium     LE edema.  With varicosities.  Followed with lymphedema clinic and Dr. Davies      Trigeminal neuralgia 10/21/2011     Priority: Medium    Physical deconditioning 08/26/2011     Priority: Medium     Due to obesity, pain, and worsening dyspnea.       VERN (obstructive sleep apnea) 04/19/2011     Priority: Medium     Not able to use CPAP due to choking (has tried two devices).  Saw someone about a dental device, they have not been willing to fit because of dental fractures, and they want him to see the sleep provider again.      PSG [facility] 12/2010  Severe VERN    Oxygen Sly 51%  CPAP [id number]      Seasonal allergies 04/19/2011     Priority: Medium    Benign positional vertigo 2011     Priority: Medium     never quite went away        Impression/Plan:  (G47.33) VERN (obstructive sleep apnea) (primary encounter diagnosis)  Comment: Chavez presents to the sleep clinic to review the results of his sleep study done on 06/05/2023. The results of his sleep study were reviewed in depth. Informed Chavez he has severe obstructive sleep apnea with sleep associated hypoxemia without hypoventilation. CPAP was titrated from 5-14 cmH2O with a nasal pillows mask at 20-30 degrees HOB elevated. Recommended Auto-PAP 7-11 cmH2O with nasal pillows mask and HOB elevated to 20-30 degrees. Chavez agrees with this plan and is willing to give CPAP another try.    Plan: Comprehensive DME  An order was placed for Auto-PAP 7-11 cm H2O. We reviewed compliance goals, mask exchange policy, and patient was enrolled in [facility]. Patient is getting a new bed that will allow him to elevate his HOB. Patient states he is willing to give CPAP a try but ultimately wants another tracheostomy. Patient's blood pressure is elevated x2 today. He is not diagnosed with hypertension but has an appointment scheduled  with cardiology in March 2024.      He will follow up with me in about 3 month(s) to document compliance with his new machine.     Total time spent reviewing medical records, history and physical examination, review of previous testing and interpretation as well as documentation on this date: 45 minutes     ELAINE White CNP    CC: Chavez Milligan

## 2023-12-11 NOTE — NURSING NOTE
"Chief Complaint   Patient presents with    RECHECK     Follow up     Height 1.702 m (5' 7\"), weight (!) 185.1 kg (408 lb).  Lacho Parker LPN    "

## 2023-12-12 ENCOUNTER — MYC MEDICAL ADVICE (OUTPATIENT)
Dept: INTERNAL MEDICINE | Facility: CLINIC | Age: 53
End: 2023-12-12

## 2023-12-12 ENCOUNTER — OFFICE VISIT (OUTPATIENT)
Dept: DERMATOLOGY | Facility: CLINIC | Age: 53
End: 2023-12-12
Payer: COMMERCIAL

## 2023-12-12 DIAGNOSIS — B35.3 TINEA PEDIS OF BOTH FEET: Primary | ICD-10-CM

## 2023-12-12 DIAGNOSIS — I87.2 VENOUS STASIS DERMATITIS OF BOTH LOWER EXTREMITIES: ICD-10-CM

## 2023-12-12 DIAGNOSIS — B35.1 ONYCHOMYCOSIS: ICD-10-CM

## 2023-12-12 PROCEDURE — 99214 OFFICE O/P EST MOD 30 MIN: CPT | Performed by: STUDENT IN AN ORGANIZED HEALTH CARE EDUCATION/TRAINING PROGRAM

## 2023-12-12 RX ORDER — TERBINAFINE HYDROCHLORIDE 250 MG/1
250 TABLET ORAL DAILY
Qty: 84 TABLET | Refills: 0 | Status: SHIPPED | OUTPATIENT
Start: 2023-12-12 | End: 2024-04-09

## 2023-12-12 NOTE — LETTER
12/12/2023       RE: Chavez Ca  [address] [place] [address]     Dear Colleague,    Thank you for referring your patient, Chavez Ca, to the [facility] DERMATOLOGY [facility] [place] at [facility]. Please see a copy of my visit note below.    [facility] Dermatology Note  Encounter Date: Dec 12, 2023  Office Visit     Dermatology Problem List:  1. Onychomycosis of the nails   2. Hyperkeratosis of the plantar side of the foot   3. Swelling bilaterally of the left and right leg that the patient reported as painful and pruritic. The swelling started November 7th and resolved a few weeks later.     ____________________________________________    Assessment & Plan:     # Onychomycosis of Nails  # Tinea pedis  - KOH prep that returned abundant yeast but no hyphae; however, onychochauxis and moccasin scaling-- treat empirically w/ terbinafine   - 12 weeks of terbinafine treatment was prescribed    # 2. Swelling of the legs   #Stasis dermatitis   - Patient was counseled that the swelling was due to stasis dermatitis and improves with greater movement of the legs     Procedures Performed:   - KOH Prep     Follow-up: 3 Months after medication     Staff and Medical Student:     Dr. He Muñoz MD  [facility]     Reji Guerra, MS1   [facility] [employer]     ____________________________________________    CC: Chavez Ca reports of past swelling of his legs bilaterally that were deeply pruritic and painful (leaving a scar from itching) that resolved a couple of weeks ago. Additionally, he notes greater scale and pain in his ankle and foot with onychomycosis of his nails.     HPI:  Mr. Chavez Ca is a(n) 53 year old male with a history of lymphedema and hypertension who presents today as a new patient for past swelling of his legs, scale of his feet and angles, and onychomycosis of his  nails.     Patient is otherwise feeling well, without additional skin concerns.    Labs Reviewed:  N/A    Physical Exam:  Vitals: There were no vitals taken for this visit.  SKIN: Focused examination of Leg, Foot, and Ankle was performed   - KOH Prep from scale of the both feet   - scaling on bilateral feet in a moccasin distribution  - yellowing and thickening of multiple nails on bilateral feet  - 1-2+ pitting edema to the mid shin bilaterally   - No other lesions of concern on areas examined.     Medications:  Current Outpatient Medications   Medication    acetaminophen (TYLENOL) 500 MG tablet    amitriptyline (ELAVIL) 100 MG tablet    COMPRESSION STOCKINGS    levalbuterol (XOPENEX) 1.25 MG/3ML neb solution    phentermine (ADIPEX-P) 37.5 MG tablet    traMADol (ULTRAM) 50 MG tablet    traZODone (DESYREL) 100 MG tablet     Current Facility-Administered Medications   Medication    lidocaine (PF) (XYLOCAINE) 1 % injection 2 mL    lidocaine (PF) (XYLOCAINE) 1 % injection 2 mL    lidocaine (PF) (XYLOCAINE) 1 % injection 3 mL    lidocaine (PF) (XYLOCAINE) 1 % injection 3 mL    triamcinolone (KENALOG-40) injection 40 mg    triamcinolone (KENALOG-40) injection 40 mg      Past Medical History:   Patient Active Problem List   Diagnosis    Physical deconditioning    Trigeminal neuralgia    VERN (obstructive sleep apnea)    Idiopathic edema    Depressive disorder, not elsewhere classified    Subglottic stenosis    Hyperlipidemia associated with prediabetes    Arthritis of both hands    Varicose veins of left lower extremity    Neck pain, chronic    Seasonal allergies    Osteoarthritis of spine with radiculopathy, lumbar region    Morbid obesity (H)    Condyloma acuminata    Hypokalemia    Poor dentition    Benign positional vertigo    Dyspnea, multifactorial    Insomnia due to medical condition    Skin pain    Facial rash (possible roseacea?)    Seborrheic dermatitis of scalp    Intertrigo of adipose folds    Sternocostal pain     Prediabetes    Moderate persistent asthma without complication    Primary mild osteoarthritis of both knees    Microalbuminuria    Elevated TSH    Chronic right-sided low back pain with right-sided sciatica    Preventative health care    Impaired mobility    Anxiety    Lymphedema of both lower extremities     Past Medical History:   Diagnosis Date    Allergic rhinitis     Arthritis     Benign positional vertigo 2011    never quite went away    Bilateral carpal tunnel syndrome 07/18/2015    Chronic sinusitis 2017    I don't know Please look    Chronic tonsillitis     COPD (chronic obstructive pulmonary disease) (H) 11/2010    That is the date i got first sleep study results    Depressive disorder     Depressive disorder, not elsewhere classified 07/30/2012    Gastro-oesophageal reflux disease     Hearing problem     Hyperlipidemia associated with prediabetes     Hypertension 2008    Learning disability     Mild intermittent asthma in adult without complication     Neck mass     parapharyngeal, benign    Obesity, Class III, BMI 40-49.9 (morbid obesity) (H)     Psychological factors associated with another disorder 08/21/2012    Recurrent otitis media 2016    might be what the pain in my ears    Reduced vision     Tinnitus 2016    I would hear a steady light buzzing sound and other can't    Trigeminal neuralgia     Weakness 08/26/2011       CC No referring provider defined for this encounter. on close of this encounter.      Attestation with edits by He Muñoz MD at 12/12/2023  3:41 PM:  I have personally examined this patient and agree with the medical student's documentation and plan of care. I have reviewed and amended the medical student's note as necessary.The documentation accurately reflects my clinical observations, diagnoses, treatment and follow-up plans.     He Muñoz MD  Dermatology Staff      Dermatology Rooming Note    Chavez Ca's goals for this visit include:   Chief Complaint    Patient presents with    Autoimmune Disease     Red painful spots on legs. Also has concern about lymphedema      Fazal Bey, EMT-B

## 2023-12-12 NOTE — PROGRESS NOTES
[facility] Dermatology Note  Encounter Date: Dec 12, 2023  Office Visit     Dermatology Problem List:  1. Onychomycosis of the nails   2. Hyperkeratosis of the plantar side of the foot   3. Swelling bilaterally of the left and right leg that the patient reported as painful and pruritic. The swelling started November 7th and resolved a few weeks later.     ____________________________________________    Assessment & Plan:     # Onychomycosis of Nails  # Tinea pedis  - KOH prep that returned abundant yeast but no hyphae; however, onychochauxis and moccasin scaling-- treat empirically w/ terbinafine   - 12 weeks of terbinafine treatment was prescribed    # 2. Swelling of the legs   #Stasis dermatitis   - Patient was counseled that the swelling was due to stasis dermatitis and improves with greater movement of the legs     Procedures Performed:   - KOH Prep     Follow-up: 3 Months after medication     Staff and Medical Student:     Dr. He Muñoz MD  [facility]     Reji Guerra, MS1   [facility] [employer]     ____________________________________________    CC: Chavez Ca reports of past swelling of his legs bilaterally that were deeply pruritic and painful (leaving a scar from itching) that resolved a couple of weeks ago. Additionally, he notes greater scale and pain in his ankle and foot with onychomycosis of his nails.     HPI:  Mr. Chavez Ca is a(n) 53 year old male with a history of lymphedema and hypertension who presents today as a new patient for past swelling of his legs, scale of his feet and angles, and onychomycosis of his nails.     Patient is otherwise feeling well, without additional skin concerns.    Labs Reviewed:  N/A    Physical Exam:  Vitals: There were no vitals taken for this visit.  SKIN: Focused examination of Leg, Foot, and Ankle was performed   - KOH Prep from scale of the both feet   - scaling on bilateral feet in a moccasin  distribution  - yellowing and thickening of multiple nails on bilateral feet  - 1-2+ pitting edema to the mid shin bilaterally   - No other lesions of concern on areas examined.     Medications:  Current Outpatient Medications   Medication    acetaminophen (TYLENOL) 500 MG tablet    amitriptyline (ELAVIL) 100 MG tablet    COMPRESSION STOCKINGS    levalbuterol (XOPENEX) 1.25 MG/3ML neb solution    phentermine (ADIPEX-P) 37.5 MG tablet    traMADol (ULTRAM) 50 MG tablet    traZODone (DESYREL) 100 MG tablet     Current Facility-Administered Medications   Medication    lidocaine (PF) (XYLOCAINE) 1 % injection 2 mL    lidocaine (PF) (XYLOCAINE) 1 % injection 2 mL    lidocaine (PF) (XYLOCAINE) 1 % injection 3 mL    lidocaine (PF) (XYLOCAINE) 1 % injection 3 mL    triamcinolone (KENALOG-40) injection 40 mg    triamcinolone (KENALOG-40) injection 40 mg      Past Medical History:   Patient Active Problem List   Diagnosis    Physical deconditioning    Trigeminal neuralgia    VERN (obstructive sleep apnea)    Idiopathic edema    Depressive disorder, not elsewhere classified    Subglottic stenosis    Hyperlipidemia associated with prediabetes    Arthritis of both hands    Varicose veins of left lower extremity    Neck pain, chronic    Seasonal allergies    Osteoarthritis of spine with radiculopathy, lumbar region    Morbid obesity (H)    Condyloma acuminata    Hypokalemia    Poor dentition    Benign positional vertigo    Dyspnea, multifactorial    Insomnia due to medical condition    Skin pain    Facial rash (possible roseacea?)    Seborrheic dermatitis of scalp    Intertrigo of adipose folds    Sternocostal pain    Prediabetes    Moderate persistent asthma without complication    Primary mild osteoarthritis of both knees    Microalbuminuria    Elevated TSH    Chronic right-sided low back pain with right-sided sciatica    Preventative health care    Impaired mobility    Anxiety    Lymphedema of both lower extremities     Past  Medical History:   Diagnosis Date    Allergic rhinitis     Arthritis     Benign positional vertigo 2011    never quite went away    Bilateral carpal tunnel syndrome 07/18/2015    Chronic sinusitis 2017    I don't know Please look    Chronic tonsillitis     COPD (chronic obstructive pulmonary disease) (H) 11/2010    That is the date i got first sleep study results    Depressive disorder     Depressive disorder, not elsewhere classified 07/30/2012    Gastro-oesophageal reflux disease     Hearing problem     Hyperlipidemia associated with prediabetes     Hypertension 2008    Learning disability     Mild intermittent asthma in adult without complication     Neck mass     parapharyngeal, benign    Obesity, Class III, BMI 40-49.9 (morbid obesity) (H)     Psychological factors associated with another disorder 08/21/2012    Recurrent otitis media 2016    might be what the pain in my ears    Reduced vision     Tinnitus 2016    I would hear a steady light buzzing sound and other can't    Trigeminal neuralgia     Weakness 08/26/2011       CC No referring provider defined for this encounter. on close of this encounter.

## 2023-12-12 NOTE — PROGRESS NOTES
Dermatology Rooming Note    Chavez Ca's goals for this visit include:   Chief Complaint   Patient presents with    Autoimmune Disease     Red painful spots on legs. Also has concern about lymphedema      Fazal Bey, EMT-B

## 2023-12-12 NOTE — NURSING NOTE
Dermatology Rooming Note    Chavez Ca's goals for this visit include:   Chief Complaint   Patient presents with    Autoimmune Disease     Red painful spots on legs      Fazal Bey, EMT-B

## 2023-12-13 ENCOUNTER — THERAPY VISIT (OUTPATIENT)
Dept: PHYSICAL THERAPY | Facility: CLINIC | Age: 53
End: 2023-12-13
Attending: INTERNAL MEDICINE
Payer: COMMERCIAL

## 2023-12-13 DIAGNOSIS — I89.0 LYMPHEDEMA OF BOTH LOWER EXTREMITIES: Primary | ICD-10-CM

## 2023-12-13 PROCEDURE — 97140 MANUAL THERAPY 1/> REGIONS: CPT | Mod: GP | Performed by: PHYSICAL THERAPIST

## 2023-12-13 PROCEDURE — 97535 SELF CARE MNGMENT TRAINING: CPT | Mod: GP | Performed by: PHYSICAL THERAPIST

## 2023-12-13 PROCEDURE — 97110 THERAPEUTIC EXERCISES: CPT | Mod: GP | Performed by: PHYSICAL THERAPIST

## 2023-12-16 ENCOUNTER — MYC REFILL (OUTPATIENT)
Dept: INTERNAL MEDICINE | Facility: CLINIC | Age: 53
End: 2023-12-16

## 2023-12-16 DIAGNOSIS — M47.26 OSTEOARTHRITIS OF SPINE WITH RADICULOPATHY, LUMBAR REGION: ICD-10-CM

## 2023-12-16 DIAGNOSIS — G60.3 IDIOPATHIC PROGRESSIVE POLYNEUROPATHY: Chronic | ICD-10-CM

## 2023-12-18 RX ORDER — TRAMADOL HYDROCHLORIDE 50 MG/1
TABLET ORAL
Qty: 30 TABLET | Refills: 0 | Status: SHIPPED | OUTPATIENT
Start: 2023-12-18 | End: 2024-01-11

## 2023-12-20 ENCOUNTER — TRANSFERRED RECORDS (OUTPATIENT)
Dept: HEALTH INFORMATION MANAGEMENT | Facility: CLINIC | Age: 53
End: 2023-12-20

## 2023-12-20 ENCOUNTER — DOCUMENTATION ONLY (OUTPATIENT)
Dept: SLEEP MEDICINE | Facility: CLINIC | Age: 53
End: 2023-12-20
Payer: COMMERCIAL

## 2023-12-20 ENCOUNTER — THERAPY VISIT (OUTPATIENT)
Dept: PHYSICAL THERAPY | Facility: CLINIC | Age: 53
End: 2023-12-20
Attending: INTERNAL MEDICINE
Payer: COMMERCIAL

## 2023-12-20 DIAGNOSIS — I89.0 LYMPHEDEMA OF BOTH LOWER EXTREMITIES: Primary | ICD-10-CM

## 2023-12-20 DIAGNOSIS — G47.33 OBSTRUCTIVE SLEEP APNEA (ADULT) (PEDIATRIC): Primary | ICD-10-CM

## 2023-12-20 PROCEDURE — 97110 THERAPEUTIC EXERCISES: CPT | Mod: GP | Performed by: PHYSICAL THERAPIST

## 2023-12-20 PROCEDURE — 97140 MANUAL THERAPY 1/> REGIONS: CPT | Mod: GP | Performed by: PHYSICAL THERAPIST

## 2023-12-20 PROCEDURE — 97535 SELF CARE MNGMENT TRAINING: CPT | Mod: GP | Performed by: PHYSICAL THERAPIST

## 2023-12-20 NOTE — PROGRESS NOTES
Patient was offered choice of vendor and chose [facility].  Patient Chavez Ca was set up at [place] on December 20, 2023. Patient received a Resmed Airsense 10 Pressures were set at  7-11 cm H2O.   Patient s ramp is 4 cm H2O for Auto and FLEX/EPR is EPR, 3.  Patient received a Resmed Mask name: AIRFIT P10  Pillow mask size Small, heated tubing and heated humidifier.  Patient has the following compliance requirements: using and visit requirements  Patient has a follow up on TBD.  John Lentz

## 2024-01-04 ENCOUNTER — THERAPY VISIT (OUTPATIENT)
Dept: PHYSICAL THERAPY | Facility: CLINIC | Age: 54
End: 2024-01-04
Attending: INTERNAL MEDICINE
Payer: COMMERCIAL

## 2024-01-04 DIAGNOSIS — I89.0 LYMPHEDEMA OF BOTH LOWER EXTREMITIES: Primary | ICD-10-CM

## 2024-01-04 PROCEDURE — 97140 MANUAL THERAPY 1/> REGIONS: CPT | Mod: GP | Performed by: PHYSICAL THERAPIST

## 2024-01-04 PROCEDURE — 97535 SELF CARE MNGMENT TRAINING: CPT | Mod: GP | Performed by: PHYSICAL THERAPIST

## 2024-01-04 NOTE — PROGRESS NOTES
[facility] Rehabilitation Service    Outpatient Physical Therapy Progress Note     PLAN  Continue therapy per current plan of care.    Beginning/End Dates of Progress Note Reporting Period:  11/24/23 to 01/04/2024    Referring Provider:  Charu Aguilar     01/04/24 0500   Appointment Info   Signing clinician's name / credentials Cathy Carroll, PT,CLT-LATOYA   Visits Used 10- Progress note   Medical Diagnosis lymphedema   PT Tx Diagnosis Lymphedema   Quick Adds Certification   Progress Note/Certification   Start of Care Date 11/24/23   Onset of illness/injury or Date of Surgery 01/01/11  (worsening recently, was not able to don socks)   Therapy Frequency decreased to 1x/week as needed and will further reduce once in garments   Predicted Duration 5 mo   Certification date from 11/24/23   Certification date to 02/21/24   Progress Note Due Date 01/04/24   Progress Note Completed Date 11/24/23   PT Goal 1   Goal Identifier Lymphedema Home Program   Goal Description Pt will be independent with drainage exercise, self massage, and skin care to best manage swelling to decrease symptoms.   Target Date 02/21/24   PT Goal 2   Goal Identifier Volume   Goal Description Pt will have 5% reduction in B LE volume for improved tissue integrity, decreased risk of infection, and improved fit of clothing   Target Date 02/21/24   Date Met 12/08/23   PT Goal 3   Goal Identifier LLIS   Goal Description Pt will have at least 8 point reduction in score on subsequent assessment to reflect the MCID in impact of swelling on quality of life.   Goal Progress initial 67   Target Date 02/21/24   PT Goal 4   Goal Identifier Maintenance   Goal Description Pt will use compression garments as instructed AND home management tools/program for long term management of chronic condition to prevent tissue fibrosis, infection, wound and other secondary sequelae   Target Date  05/21/24   Subjective Report   Subjective Report Fell and had to cancel last week.  He was not able to lift his foot high enough onto a curb. His hands and his phone took the brunt of it because he was holding the phone.  Been going to PT but finds it stupid.  he was told that pool therapy is not in his plan becuase of the stairs but recommended to go to the [facility] for pool. Patient was given estimate of 2 weeks before the next step for his intermediate transition.   Objective Measure 1   Objective Measure R Volume 10-40cm   Details 3276.95   Objective Measure 2   Objective Measure L Volume 10-40cm   Details 3362.75   Objective Measure 3   Objective Measure Edema   Details weight up to 420#   Manual Therapy   Manual Therapy: Mobilization, MFR, MLD, friction massage minutes (91748) 12   Manual Therapy 2 assessment   Manual Therapy 2 - Details Swelling is a little more puffy, pitting just 1+, not worsening. Measurements are increased slightly bilaterally but still much decreased from start of care. Pt was asked about what he thought might have triggered the increased swelling around start of care becasue he seems to be fairly stable at a baseline volume even with a 12# weight increase. Pt is unsure.  Skin care completed.  GCB not indicated as swelling of lower legs is stable   Patient Response/Progress stable overall despite slight increases, weight also increased more than volume of fluid which is less than 50mL per lower leg.   Self Care/home Management   ADL/Home Mgmt Training (07814) 42   Self Care 1 home management and risk reduction education   Self Care 1 - Details Pt reports the holidays caused weight gain and he still is only on Tramadol and one other medication but no lasix or his weight loss medications. pt encouraged to take an approach of what he can do and hold himself accountable for his choices regarding movement each day, safety with mobility (phone while walking), drinking good amounts of  "walker, making good nutrition choices as far as portions and avoiding excess salt and sugars, processed and fried foods.  Pt complians of aches in the knees and ankles and again reminded that he still has to keep moving and weight management is marrero in symptom management, plus don't cancel provider visits, and don't get side tracked but address the things he really feels are most important.   Self Care 2 Compression   Self Care 2 - Details Unfortunately patients garments are not delivered yet. Fitter did not have an estimate of when they may come in. Pt arrived in his [employer] size L knee highs but they are sitting about 4\" below the knee. Following session these are replaced, they do keep swelling stable.   Education   Learner/Method Patient   Education Comments lifestyle   Plan   Home program compression socks during day, mindful and accountable or nutrition, hydration, sleep and movement   Updates to plan of care cancel next visit incase the garments are still not available but return next week.   Plan for next session compression garment, LLIS, discharge   Total Session Time   Timed Code Treatment Minutes 54   Total Treatment Time (sum of timed and untimed services) 54       "

## 2024-01-10 ASSESSMENT — ASTHMA QUESTIONNAIRES
QUESTION_3 LAST FOUR WEEKS HOW OFTEN DID YOUR ASTHMA SYMPTOMS (WHEEZING, COUGHING, SHORTNESS OF BREATH, CHEST TIGHTNESS OR PAIN) WAKE YOU UP AT NIGHT OR EARLIER THAN USUAL IN THE MORNING: FOUR OR MORE NIGHTS A WEEK
QUESTION_5 LAST FOUR WEEKS HOW WOULD YOU RATE YOUR ASTHMA CONTROL: NOT CONTROLLED AT ALL
ACT_TOTALSCORE: 11
QUESTION_2 LAST FOUR WEEKS HOW OFTEN HAVE YOU HAD SHORTNESS OF BREATH: MORE THAN ONCE A DAY
ACT_TOTALSCORE: 11
QUESTION_1 LAST FOUR WEEKS HOW MUCH OF THE TIME DID YOUR ASTHMA KEEP YOU FROM GETTING AS MUCH DONE AT WORK, SCHOOL OR AT HOME: SOME OF THE TIME
QUESTION_4 LAST FOUR WEEKS HOW OFTEN HAVE YOU USED YOUR RESCUE INHALER OR NEBULIZER MEDICATION (SUCH AS ALBUTEROL): NOT AT ALL

## 2024-01-10 ASSESSMENT — PATIENT HEALTH QUESTIONNAIRE - PHQ9
SUM OF ALL RESPONSES TO PHQ QUESTIONS 1-9: 22
SUM OF ALL RESPONSES TO PHQ QUESTIONS 1-9: 22
10. IF YOU CHECKED OFF ANY PROBLEMS, HOW DIFFICULT HAVE THESE PROBLEMS MADE IT FOR YOU TO DO YOUR WORK, TAKE CARE OF THINGS AT HOME, OR GET ALONG WITH OTHER PEOPLE: EXTREMELY DIFFICULT

## 2024-01-11 ENCOUNTER — IMMUNIZATION (OUTPATIENT)
Dept: NURSING | Facility: CLINIC | Age: 54
End: 2024-01-11
Payer: COMMERCIAL

## 2024-01-11 ENCOUNTER — OFFICE VISIT (OUTPATIENT)
Dept: INTERNAL MEDICINE | Facility: CLINIC | Age: 54
End: 2024-01-11
Payer: COMMERCIAL

## 2024-01-11 VITALS
OXYGEN SATURATION: 97 % | HEART RATE: 106 BPM | WEIGHT: 315 LBS | RESPIRATION RATE: 18 BRPM | SYSTOLIC BLOOD PRESSURE: 124 MMHG | DIASTOLIC BLOOD PRESSURE: 90 MMHG | BODY MASS INDEX: 65.14 KG/M2

## 2024-01-11 DIAGNOSIS — E66.01 CLASS 3 SEVERE OBESITY DUE TO EXCESS CALORIES WITH SERIOUS COMORBIDITY AND BODY MASS INDEX (BMI) OF 50.0 TO 59.9 IN ADULT (H): ICD-10-CM

## 2024-01-11 DIAGNOSIS — M47.26 OSTEOARTHRITIS OF SPINE WITH RADICULOPATHY, LUMBAR REGION: ICD-10-CM

## 2024-01-11 DIAGNOSIS — G47.01 INSOMNIA DUE TO MEDICAL CONDITION: ICD-10-CM

## 2024-01-11 DIAGNOSIS — E66.813 CLASS 3 SEVERE OBESITY DUE TO EXCESS CALORIES WITH SERIOUS COMORBIDITY AND BODY MASS INDEX (BMI) OF 50.0 TO 59.9 IN ADULT (H): ICD-10-CM

## 2024-01-11 DIAGNOSIS — G60.3 IDIOPATHIC PROGRESSIVE NEUROPATHY: ICD-10-CM

## 2024-01-11 DIAGNOSIS — G47.9 SLEEP DISORDER: ICD-10-CM

## 2024-01-11 DIAGNOSIS — G60.3 IDIOPATHIC PROGRESSIVE POLYNEUROPATHY: Chronic | ICD-10-CM

## 2024-01-11 DIAGNOSIS — R60.0 LOWER EXTREMITY EDEMA: Primary | ICD-10-CM

## 2024-01-11 PROCEDURE — G0008 ADMIN INFLUENZA VIRUS VAC: HCPCS | Mod: GZ

## 2024-01-11 PROCEDURE — 90686 IIV4 VACC NO PRSV 0.5 ML IM: CPT | Mod: GZ

## 2024-01-11 PROCEDURE — 99214 OFFICE O/P EST MOD 30 MIN: CPT | Performed by: NURSE PRACTITIONER

## 2024-01-11 RX ORDER — AMITRIPTYLINE HYDROCHLORIDE 100 MG/1
100 TABLET ORAL AT BEDTIME
Qty: 90 TABLET | Refills: 3 | Status: SHIPPED | OUTPATIENT
Start: 2024-01-11 | End: 2024-04-09

## 2024-01-11 RX ORDER — TRAMADOL HYDROCHLORIDE 50 MG/1
50 TABLET ORAL AT BEDTIME
Qty: 30 TABLET | Refills: 0 | Status: SHIPPED | OUTPATIENT
Start: 2024-01-11 | End: 2024-02-12

## 2024-01-11 RX ORDER — TRAZODONE HYDROCHLORIDE 100 MG/1
100 TABLET ORAL AT BEDTIME
Qty: 90 TABLET | Refills: 3 | Status: SHIPPED | OUTPATIENT
Start: 2024-01-11 | End: 2024-04-09

## 2024-01-11 RX ORDER — PHENTERMINE HYDROCHLORIDE 37.5 MG/1
37.5 TABLET ORAL EVERY MORNING
Qty: 90 TABLET | Refills: 1 | Status: SHIPPED | OUTPATIENT
Start: 2024-01-11 | End: 2024-04-09

## 2024-01-11 RX ORDER — TOPIRAMATE 100 MG/1
100 TABLET, FILM COATED ORAL 2 TIMES DAILY
Qty: 180 TABLET | Refills: 1 | Status: SHIPPED | OUTPATIENT
Start: 2024-01-11 | End: 2024-02-12

## 2024-01-11 RX ORDER — FUROSEMIDE 20 MG
60 TABLET ORAL 2 TIMES DAILY
Qty: 540 TABLET | Refills: 1 | Status: SHIPPED | OUTPATIENT
Start: 2024-01-11 | End: 2024-04-09

## 2024-01-11 RX ORDER — ZOLPIDEM TARTRATE 10 MG/1
10 TABLET ORAL
Qty: 30 TABLET | Refills: 1 | Status: SHIPPED | OUTPATIENT
Start: 2024-01-11 | End: 2024-03-14

## 2024-01-11 NOTE — PROGRESS NOTES
Assessment & Plan     Insomnia due to medical condition  Sleep is apparently a major issue for him.  He is on zolpidem, trazodone, and amitriptyline.  He is due for refills.  - amitriptyline (ELAVIL) 100 MG tablet; Take 1 tablet (100 mg) by mouth at bedtime  - zolpidem (AMBIEN) 10 MG tablet; Take 1 tablet (10 mg) by mouth nightly as needed for sleep    Idiopathic progressive neuropathy  Stable on amitriptyline  - amitriptyline (ELAVIL) 100 MG tablet; Take 1 tablet (100 mg) by mouth at bedtime    Sleep disorder  As above  - traZODone (DESYREL) 100 MG tablet; Take 1 tablet (100 mg) by mouth at bedtime    Class 3 severe obesity due to excess calories with serious comorbidity and body mass index (BMI) of 50.0 to 59.9 in adult (H)  Working with the weight loss clinic and has an appointment coming up with his weight loss specialist soon.  Would like refills of his phentermine and Topamax  - phentermine (ADIPEX-P) 37.5 MG tablet; Take 1 tablet (37.5 mg) by mouth every morning  - topiramate (TOPAMAX) 100 MG tablet; Take 1 tablet (100 mg) by mouth 2 times daily    Lower extremity edema  Furosemide 60 mg twice daily.  It looks like he may have been on potassium chloride back in 2022.  When he comes back to see me we should recheck a BMP  - furosemide (LASIX) 20 MG tablet; Take 3 tablets (60 mg) by mouth 2 times daily    Osteoarthritis of spine with radiculopathy, lumbar region  On chronic tramadol.  It seems as though he is doing physical therapy right now, possibly pool therapy at [facility]  - traMADol (ULTRAM) 50 MG tablet; Take 1 tablet (50 mg) by mouth at bedtime Take one tablet at bedtime as needed    Idiopathic progressive polyneuropathy  - traMADol (ULTRAM) 50 MG tablet; Take 1 tablet (50 mg) by mouth at bedtime Take one tablet at bedtime as needed    Patient Instructions   Refills of your medications provided today.    I like to see you back in the clinic in 2 to 3 weeks for a more comprehensive review of your  medical history.    Make sure that you are keeping up with all of your referrals/appointments.        Hebert Hdez, [facility]    Colby Byrne is a 53 year old, presenting for the following health issues:  Establish Care (EC/Go over meds-Referrals )      1/11/2024     7:08 AM   Additional Questions   Roomed by Mariana       History of Present Illness       Reason for visit:  Get a new primary doctor to better my health issuesHe consumes 0 sweetened beverage(s) daily.He exercises with enough effort to increase his heart rate 9 or less minutes per day.  He exercises with enough effort to increase his heart rate 3 or less days per week.   He is taking medications regularly.     Here to establish care.  He was 25 minutes late for his appointment today.  All we really talked about today where his med refills.  He is going to come back and see me in 2 to 3 weeks      Review of Systems   Constitutional, HEENT, cardiovascular, pulmonary, gi and gu systems are negative, except as otherwise noted.      Objective    BP (!) 124/90   Pulse 106   Resp 18   Wt (!) 188.7 kg (415 lb 14.4 oz)   SpO2 97%   BMI 65.14 kg/m    Body mass index is 65.14 kg/m .  Physical Exam   Patient is morbidly obese.  Walks with a walker

## 2024-01-11 NOTE — PATIENT INSTRUCTIONS
Refills of your medications provided today.    I like to see you back in the clinic in 2 to 3 weeks for a more comprehensive review of your medical history.    Make sure that you are keeping up with all of your referrals/appointments.

## 2024-01-16 ENCOUNTER — OFFICE VISIT (OUTPATIENT)
Dept: PODIATRY | Facility: CLINIC | Age: 54
End: 2024-01-16
Payer: COMMERCIAL

## 2024-01-16 VITALS
TEMPERATURE: 97.6 F | BODY MASS INDEX: 49.44 KG/M2 | DIASTOLIC BLOOD PRESSURE: 98 MMHG | SYSTOLIC BLOOD PRESSURE: 149 MMHG | RESPIRATION RATE: 24 BRPM | HEIGHT: 67 IN | OXYGEN SATURATION: 95 % | HEART RATE: 98 BPM | WEIGHT: 315 LBS

## 2024-01-16 DIAGNOSIS — L74.4 ANHIDROSIS: ICD-10-CM

## 2024-01-16 DIAGNOSIS — M20.42 HAMMER TOES OF BOTH FEET: ICD-10-CM

## 2024-01-16 DIAGNOSIS — M20.41 HAMMER TOES OF BOTH FEET: ICD-10-CM

## 2024-01-16 DIAGNOSIS — E11.9 TYPE 2 DIABETES MELLITUS WITHOUT COMPLICATION, WITHOUT LONG-TERM CURRENT USE OF INSULIN (H): ICD-10-CM

## 2024-01-16 DIAGNOSIS — B35.1 ONYCHOMYCOSIS: Primary | ICD-10-CM

## 2024-01-16 PROCEDURE — 99203 OFFICE O/P NEW LOW 30 MIN: CPT | Mod: 25 | Performed by: PODIATRIST

## 2024-01-16 PROCEDURE — 11721 DEBRIDE NAIL 6 OR MORE: CPT | Performed by: PODIATRIST

## 2024-01-16 RX ORDER — AMMONIUM LACTATE 12 G/100G
CREAM TOPICAL 2 TIMES DAILY
Qty: 385 G | Refills: 3 | Status: SHIPPED | OUTPATIENT
Start: 2024-01-16

## 2024-01-16 ASSESSMENT — PAIN SCALES - GENERAL: PAINLEVEL: SEVERE PAIN (7)

## 2024-01-16 NOTE — LETTER
1/16/2024         RE: Chavez Ca  [address]  [place] [address]        Dear Colleague,    Thank you for referring your patient, Chavez Ca, to the [facility]. Please see a copy of my visit note below.          FOOT AND ANKLE SURGERY/PODIATRY CONSULT NOTE         ASSESSMENT:   Anhidrosis  Onychomycosis  DM 2  Hammertoe      TREATMENT:  -I discussed the patient that he does have significantly dry/flaky skin on the plantar aspect of bilateral feet.  No signs of infection.    -I recommend he avoid soaking his feet and I will start him on Lac-Hydrin to be applied twice daily.    -I debrided nails 1 through 10 in length and thickness.  Patient consented to nail trimming today and did sign ABN form.    -Patient's questions invited and answered.  He was encouraged to call my office with any further questions or concerns.     30 minutes spent on the day of encounter doing chart review, history and exam, documentation, and further activities as noted.     Migue Quigley DPM  [facility] Podiatry/Foot & Ankle Surgery      HPI: I was asked to see Chavez Ca today for dry flaky skin on both feet.  Patient reports he tried over-the-counter lotions with minimal success.  He does soak his feet on a regular basis and tries to remove callus tissue but is having difficulty reaching his feet.  He would also like his nails trimmed today.  Past medical history significant for type 2 diabetes.    Past Medical History:   Diagnosis Date     Allergic rhinitis      Arthritis      Benign positional vertigo 2011    never quite went away     Bilateral carpal tunnel syndrome 07/18/2015     Chronic sinusitis 2017    I don't know Please look     Chronic tonsillitis      COPD (chronic obstructive pulmonary disease) (H) 11/2010    That is the date i got first sleep study results     Depressive disorder      Depressive disorder, not elsewhere classified 07/30/2012      Gastro-oesophageal reflux disease      Hearing problem      Hyperlipidemia associated with prediabetes      Hypertension 2008     Learning disability      Mild intermittent asthma in adult without complication      Neck mass     parapharyngeal, benign     Obesity, Class III, BMI 40-49.9 (morbid obesity) (H)      Psychological factors associated with another disorder 08/21/2012     Recurrent otitis media 2016    might be what the pain in my ears     Reduced vision      Tinnitus 2016    I would hear a steady light buzzing sound and other can't     Trigeminal neuralgia      Weakness 08/26/2011         Social History     Socioeconomic History     Marital status: Single     Spouse name: Not on file     Number of children: Not on file     Years of education: Not on file     Highest education level: Not on file   Occupational History     Not on file   Tobacco Use     Smoking status: Never     Passive exposure: Never     Smokeless tobacco: Never     Tobacco comments:     Never had anything in my life   Vaping Use     Vaping Use: Never used   Substance and Sexual Activity     Alcohol use: No     Comment: rarely     Drug use: Never     Sexual activity: Never   Other Topics Concern     Parent/sibling w/ CABG, MI or angioplasty before 65F 55M? No      Service Not Asked     Blood Transfusions Not Asked     Caffeine Concern Not Asked     Occupational Exposure Not Asked     Hobby Hazards Not Asked     Sleep Concern Not Asked     Stress Concern Not Asked     Weight Concern Not Asked     Special Diet Not Asked     Back Care Not Asked     Exercise Yes     Comment: 2-3x/wk     Bike Helmet Not Asked     Seat Belt Not Asked     Self-Exams Not Asked   Social History Narrative    Room 'no bigger than a senior care cell,' the friend who owns the house recently got , and goes back and forth. He has been put in adult foster care before. The patient is single.  Has 1 child. Pt adopted at age 14.      Social Determinants of Health      Financial Resource Strain: Low Risk  (12/25/2023)    Financial Resource Strain      Within the past 12 months, have you or your family members you live with been unable to get utilities (heat, electricity) when it was really needed?: No   Food Insecurity: Low Risk  (12/25/2023)    Food Insecurity      Within the past 12 months, did you worry that your food would run out before you got money to buy more?: No      Within the past 12 months, did the food you bought just not last and you didn t have money to get more?: No   Transportation Needs: High Risk (12/25/2023)    Transportation Needs      Within the past 12 months, has lack of transportation kept you from medical appointments, getting your medicines, non-medical meetings or appointments, work, or from getting things that you need?: Yes   Physical Activity: Not on file   Stress: Not on file   Social Connections: Not on file   Interpersonal Safety: Low Risk  (11/3/2023)    Interpersonal Safety      Do you feel physically and emotionally safe where you currently live?: Yes      Within the past 12 months, have you been hit, slapped, kicked or otherwise physically hurt by someone?: No      Within the past 12 months, have you been humiliated or emotionally abused in other ways by your partner or ex-partner?: No   Housing Stability: Low Risk  (12/25/2023)    Housing Stability      Do you have housing? : Yes      Are you worried about losing your housing?: No   Recent Concern: Housing Stability - High Risk (12/10/2023)    Housing Stability      Do you have housing? : No      Are you worried about losing your housing?: Yes            Allergies   Allergen Reactions     Fentanyl Itching     Patch     Meloxicam Other (See Comments)     Side pains.     Minocycline Rash     Patient reports adverse reaction was pigmentation which has resolved with drug discontinuation.         MEDICATIONS:   Current Outpatient Medications   Medication     acetaminophen (TYLENOL) 500 MG  "tablet     amitriptyline (ELAVIL) 100 MG tablet     ammonium lactate (AMLACTIN) 12 % external cream     COMPRESSION STOCKINGS     furosemide (LASIX) 20 MG tablet     levalbuterol (XOPENEX) 1.25 MG/3ML neb solution     phentermine (ADIPEX-P) 37.5 MG tablet     terbinafine (LAMISIL) 250 MG tablet     topiramate (TOPAMAX) 100 MG tablet     traMADol (ULTRAM) 50 MG tablet     traZODone (DESYREL) 100 MG tablet     zolpidem (AMBIEN) 10 MG tablet     Current Facility-Administered Medications   Medication     lidocaine (PF) (XYLOCAINE) 1 % injection 2 mL     lidocaine (PF) (XYLOCAINE) 1 % injection 2 mL     lidocaine (PF) (XYLOCAINE) 1 % injection 3 mL     lidocaine (PF) (XYLOCAINE) 1 % injection 3 mL     triamcinolone (KENALOG-40) injection 40 mg     triamcinolone (KENALOG-40) injection 40 mg        Family History   Adopted: Yes   Problem Relation Age of Onset     Family History Negative Other         Does not know family     Unknown/Adopted No family hx of           Review of Systems - 10 point Review of Systems is negative except for dry skin which is noted in HPI.    OBJECTIVE:  Appearance: alert, well appearing, and in no distress.    VITAL SIGNS: BP (!) 149/98 (BP Location: Left arm, Patient Position: Sitting, Cuff Size: Adult Large)   Pulse 98   Temp 97.6  F (36.4  C) (Oral)   Resp 24   Ht 1.702 m (5' 7\")   Wt (!) 189.1 kg (416 lb 12.8 oz)   SpO2 95%   BMI 65.28 kg/m        General appearance: Patient is alert and fully cooperative with history & exam.  No sign of distress is noted during the visit.     Psychiatric: Affect is pleasant & appropriate.  Patient appears motivated to improve health.     Respiratory: Breathing is regular & unlabored while sitting.     HEENT: Hearing is intact to spoken word.  Speech is clear.  No gross evidence of visual impairment that would impact ambulation.      Vascular: Dorsalis pedis palpable.  Mild edema bilateral lower extremities.  Dermatologic: Nails 1-10 elongated, thick, " yellow with subungal debris. Trophic changes noted including diminished hair growth and shiny skin. Dry, flaky skin with hyperkeratotic buildup plantar bilateral feet.   Neurologic: Diminished light touch bilateral feet.  Musculoskeletal: Contracted digits bilateral feet.          Again, thank you for allowing me to participate in the care of your patient.        Sincerely,        Migue Quigley DPM

## 2024-01-16 NOTE — PATIENT INSTRUCTIONS
Your provider would like you to start using Lac-Hydrin    Apply this to the affected area twice per day

## 2024-01-16 NOTE — PROGRESS NOTES
FOOT AND ANKLE SURGERY/PODIATRY CONSULT NOTE         ASSESSMENT:   Anhidrosis  Onychomycosis  DM 2  Hammertoe      TREATMENT:  -I discussed the patient that he does have significantly dry/flaky skin on the plantar aspect of bilateral feet.  No signs of infection.    -I recommend he avoid soaking his feet and I will start him on Lac-Hydrin to be applied twice daily.    -I debrided nails 1 through 10 in length and thickness.  Patient consented to nail trimming today and did sign ABN form.    -Patient's questions invited and answered.  He was encouraged to call my office with any further questions or concerns.     30 minutes spent on the day of encounter doing chart review, history and exam, documentation, and further activities as noted.     Migue Quigley DPM  [facility] Podiatry/Foot & Ankle Surgery      HPI: I was asked to see Chavez Ca today for dry flaky skin on both feet.  Patient reports he tried over-the-counter lotions with minimal success.  He does soak his feet on a regular basis and tries to remove callus tissue but is having difficulty reaching his feet.  He would also like his nails trimmed today.  Past medical history significant for type 2 diabetes.    Past Medical History:   Diagnosis Date    Allergic rhinitis     Arthritis     Benign positional vertigo 2011    never quite went away    Bilateral carpal tunnel syndrome 07/18/2015    Chronic sinusitis 2017    I don't know Please look    Chronic tonsillitis     COPD (chronic obstructive pulmonary disease) (H) 11/2010    That is the date i got first sleep study results    Depressive disorder     Depressive disorder, not elsewhere classified 07/30/2012    Gastro-oesophageal reflux disease     Hearing problem     Hyperlipidemia associated with prediabetes     Hypertension 2008    Learning disability     Mild intermittent asthma in adult without complication     Neck mass     parapharyngeal, benign    Obesity, Class III, BMI 40-49.9  (morbid obesity) (H)     Psychological factors associated with another disorder 08/21/2012    Recurrent otitis media 2016    might be what the pain in my ears    Reduced vision     Tinnitus 2016    I would hear a steady light buzzing sound and other can't    Trigeminal neuralgia     Weakness 08/26/2011         Social History     Socioeconomic History    Marital status: Single     Spouse name: Not on file    Number of children: Not on file    Years of education: Not on file    Highest education level: Not on file   Occupational History    Not on file   Tobacco Use    Smoking status: Never     Passive exposure: Never    Smokeless tobacco: Never    Tobacco comments:     Never had anything in my life   Vaping Use    Vaping Use: Never used   Substance and Sexual Activity    Alcohol use: No     Comment: rarely    Drug use: Never    Sexual activity: Never   Other Topics Concern    Parent/sibling w/ CABG, MI or angioplasty before 65F 55M? No     Service Not Asked    Blood Transfusions Not Asked    Caffeine Concern Not Asked    Occupational Exposure Not Asked    Hobby Hazards Not Asked    Sleep Concern Not Asked    Stress Concern Not Asked    Weight Concern Not Asked    Special Diet Not Asked    Back Care Not Asked    Exercise Yes     Comment: 2-3x/wk    Bike Helmet Not Asked    Seat Belt Not Asked    Self-Exams Not Asked   Social History Narrative    Room 'no bigger than a alf cell,' the friend who owns the house recently got , and goes back and forth. He has been put in adult foster care before. The patient is single.  Has 1 child. Pt adopted at age 14.      Social Determinants of Health     Financial Resource Strain: Low Risk  (12/25/2023)    Financial Resource Strain     Within the past 12 months, have you or your family members you live with been unable to get utilities (heat, electricity) when it was really needed?: No   Food Insecurity: Low Risk  (12/25/2023)    Food Insecurity     Within the past 12  months, did you worry that your food would run out before you got money to buy more?: No     Within the past 12 months, did the food you bought just not last and you didn t have money to get more?: No   Transportation Needs: High Risk (12/25/2023)    Transportation Needs     Within the past 12 months, has lack of transportation kept you from medical appointments, getting your medicines, non-medical meetings or appointments, work, or from getting things that you need?: Yes   Physical Activity: Not on file   Stress: Not on file   Social Connections: Not on file   Interpersonal Safety: Low Risk  (11/3/2023)    Interpersonal Safety     Do you feel physically and emotionally safe where you currently live?: Yes     Within the past 12 months, have you been hit, slapped, kicked or otherwise physically hurt by someone?: No     Within the past 12 months, have you been humiliated or emotionally abused in other ways by your partner or ex-partner?: No   Housing Stability: Low Risk  (12/25/2023)    Housing Stability     Do you have housing? : Yes     Are you worried about losing your housing?: No   Recent Concern: Housing Stability - High Risk (12/10/2023)    Housing Stability     Do you have housing? : No     Are you worried about losing your housing?: Yes            Allergies   Allergen Reactions    Fentanyl Itching     Patch    Meloxicam Other (See Comments)     Side pains.    Minocycline Rash     Patient reports adverse reaction was pigmentation which has resolved with drug discontinuation.         MEDICATIONS:   Current Outpatient Medications   Medication    acetaminophen (TYLENOL) 500 MG tablet    amitriptyline (ELAVIL) 100 MG tablet    ammonium lactate (AMLACTIN) 12 % external cream    COMPRESSION STOCKINGS    furosemide (LASIX) 20 MG tablet    levalbuterol (XOPENEX) 1.25 MG/3ML neb solution    phentermine (ADIPEX-P) 37.5 MG tablet    terbinafine (LAMISIL) 250 MG tablet    topiramate (TOPAMAX) 100 MG tablet    traMADol  "(ULTRAM) 50 MG tablet    traZODone (DESYREL) 100 MG tablet    zolpidem (AMBIEN) 10 MG tablet     Current Facility-Administered Medications   Medication    lidocaine (PF) (XYLOCAINE) 1 % injection 2 mL    lidocaine (PF) (XYLOCAINE) 1 % injection 2 mL    lidocaine (PF) (XYLOCAINE) 1 % injection 3 mL    lidocaine (PF) (XYLOCAINE) 1 % injection 3 mL    triamcinolone (KENALOG-40) injection 40 mg    triamcinolone (KENALOG-40) injection 40 mg        Family History   Adopted: Yes   Problem Relation Age of Onset    Family History Negative Other         Does not know family    Unknown/Adopted No family hx of           Review of Systems - 10 point Review of Systems is negative except for dry skin which is noted in HPI.    OBJECTIVE:  Appearance: alert, well appearing, and in no distress.    VITAL SIGNS: BP (!) 149/98 (BP Location: Left arm, Patient Position: Sitting, Cuff Size: Adult Large)   Pulse 98   Temp 97.6  F (36.4  C) (Oral)   Resp 24   Ht 1.702 m (5' 7\")   Wt (!) 189.1 kg (416 lb 12.8 oz)   SpO2 95%   BMI 65.28 kg/m        General appearance: Patient is alert and fully cooperative with history & exam.  No sign of distress is noted during the visit.     Psychiatric: Affect is pleasant & appropriate.  Patient appears motivated to improve health.     Respiratory: Breathing is regular & unlabored while sitting.     HEENT: Hearing is intact to spoken word.  Speech is clear.  No gross evidence of visual impairment that would impact ambulation.      Vascular: Dorsalis pedis palpable.  Mild edema bilateral lower extremities.  Dermatologic: Nails 1-10 elongated, thick, yellow with subungal debris. Trophic changes noted including diminished hair growth and shiny skin. Dry, flaky skin with hyperkeratotic buildup plantar bilateral feet.   Neurologic: Diminished light touch bilateral feet.  Musculoskeletal: Contracted digits bilateral feet.        "

## 2024-01-17 NOTE — TELEPHONE ENCOUNTER
DIAGNOSIS: Both of my legs issues And if that means other parts of my body then the Doctor will know let me know what Doctor I need to see     APPOINTMENT DATE: 1.18.24   NOTES STATUS DETAILS   OFFICE NOTE from other specialist Internal 1.11.24  Lemuel  FP    1.4.24 + francisco Carroll  PT    11.28.23, 11.14.23  Jose Alberto  [facility]    11.3.23  Jeff Moran encounters in Epic   MEDICATION LIST Internal    MRI Internal    XRAYS (IMAGES & REPORTS) Internal

## 2024-01-18 ENCOUNTER — OFFICE VISIT (OUTPATIENT)
Dept: ORTHOPEDICS | Facility: CLINIC | Age: 54
End: 2024-01-18
Payer: COMMERCIAL

## 2024-01-18 ENCOUNTER — PRE VISIT (OUTPATIENT)
Dept: ORTHOPEDICS | Facility: CLINIC | Age: 54
End: 2024-01-18

## 2024-01-18 DIAGNOSIS — M17.0 PRIMARY OSTEOARTHRITIS OF BOTH KNEES: Primary | ICD-10-CM

## 2024-01-18 DIAGNOSIS — M79.604 BILATERAL LEG PAIN: ICD-10-CM

## 2024-01-18 DIAGNOSIS — M79.605 BILATERAL LEG PAIN: ICD-10-CM

## 2024-01-18 PROCEDURE — 99214 OFFICE O/P EST MOD 30 MIN: CPT | Performed by: FAMILY MEDICINE

## 2024-01-18 NOTE — PROGRESS NOTES
[facility]  SPORTS & ORTHOPEDIC CLINIC VISIT     Jan 18, 2024        ASSESSMENT & PLAN    53-year-old with bilateral lower leg pain that is multifactorial.  He does have a history of osteoarthritis in his bilateral knees and has benefit from injections.  However his current discomfort is more likely related to phlebitis due to varicosities as well as his lymphedema.    Reviewed imaging and assessment with patient in detail  Reviewed patient's chart in detail as well as how to coordinate follow-up with physical therapy and the lymphedema clinic as he is awaiting new compression stockings, which I believe will help alleviate his current symptoms.  He additionally would be interested in physical therapy and pool therapy through [facility] and referral will be faxed.    Antione Abrams MD  [facility]IC[facility] [place]      Total time: Greater than 30 minutes were spent during the visit in direct face-to-face contact and discussion as well as chart review and documentation.    -----  Chief Complaint   Patient presents with    Left Leg - Pain    Right Leg - Pain       SUBJECTIVE  Chavez Ca is a/an 53 year old male who is seen as a self referral for evaluation of  bilateral leg pain. Wanting to get bilateral lower leg MRI's. [facility] does not think he can get into the pool there and recommend [facility] goes to [facility]. Chavez would like pool therapy, land therapy, and occupational therapy.    The patient is seen by themselves.  The patient is Right handed    Onset: Many years. Patient describes injury as having bilateral lower leg pain with some skin irritation.  Location of Pain: bilateral shins   Worsened by: NA   Better with: Compression sleeves   Treatments tried: Leg wrapping and compression socks   Associated symptoms: swelling    Orthopedic/Surgical history: YES   Social History/Occupation: No       REVIEW OF SYSTEMS:  Do you have fever, chills, weight loss?  No  Do you have any vision problems? No  Do you have any chest pain or edema? No  Do you have any shortness of breath or wheezing?  No  Do you have stomach problems? No  Do you have any numbness or focal weakness? No  Do you have diabetes? No  Do you have problems with bleeding or clotting? No  Do you have an rashes or other skin lesions? No    OBJECTIVE:  There were no vitals taken for this visit.     Bilateral lower extremities: 1+ pitting edema bilaterally.  Symmetric.  Cap refill brisk.  Diffusely tender about the lower legs.  No specific focal tenderness.    RADIOLOGY:    MRI of the right lower leg dated 2/4/2023.  Per radiology report:  Impression: Examination degraded by motion.     1. No acute osseous abnormality.     2. Fatty atrophy of calf musculature. Lack of associated edema  suggests chronicity.     3. Subcutaneous edema.

## 2024-01-18 NOTE — LETTER
1/18/2024      RE: Chavez Ca  [address]  [place] [address]     Dear Colleague,    Thank you for referring your patient, Chavez Ca, to the [facility]. Please see a copy of my visit note below.      [facility]  SPORTS & ORTHOPEDIC CLINIC VISIT     Jan 18, 2024        ASSESSMENT & PLAN    53-year-old with bilateral lower leg pain that is multifactorial.  He does have a history of osteoarthritis in his bilateral knees and has benefit from injections.  However his current discomfort is more likely related to phlebitis due to varicosities as well as his lymphedema.    Reviewed imaging and assessment with patient in detail  Reviewed patient's chart in detail as well as how to coordinate follow-up with physical therapy and the lymphedema clinic as he is awaiting new compression stockings, which I believe will help alleviate his current symptoms.  He additionally would be interested in physical therapy and pool therapy through [facility] and referral will be faxed.    Antione Abrams MD  [facility]      Total time: Greater than 30 minutes were spent during the visit in direct face-to-face contact and discussion as well as chart review and documentation.    -----  Chief Complaint   Patient presents with     Left Leg - Pain     Right Leg - Pain       SUBJECTIVE  Chavez Ca is a/an 53 year old male who is seen as a self referral for evaluation of  bilateral leg pain. Wanting to get bilateral lower leg MRI's. [facility] does not think he can get into the pool there and recommend [facility] goes to [facility]. Chavez would like pool therapy, land therapy, and occupational therapy.    The patient is seen by themselves.  The patient is Right handed    Onset: Many years. Patient describes injury as having bilateral lower leg pain with some skin irritation.  Location of Pain: bilateral shins    Worsened by: NA   Better with: Compression sleeves   Treatments tried: Leg wrapping and compression socks   Associated symptoms: swelling    Orthopedic/Surgical history: YES   Social History/Occupation: No       REVIEW OF SYSTEMS:  Do you have fever, chills, weight loss? No  Do you have any vision problems? No  Do you have any chest pain or edema? No  Do you have any shortness of breath or wheezing?  No  Do you have stomach problems? No  Do you have any numbness or focal weakness? No  Do you have diabetes? No  Do you have problems with bleeding or clotting? No  Do you have an rashes or other skin lesions? No    OBJECTIVE:  There were no vitals taken for this visit.     Bilateral lower extremities: 1+ pitting edema bilaterally.  Symmetric.  Cap refill brisk.  Diffusely tender about the lower legs.  No specific focal tenderness.    RADIOLOGY:    MRI of the right lower leg dated 2/4/2023.  Per radiology report:  Impression: Examination degraded by motion.     1. No acute osseous abnormality.     2. Fatty atrophy of calf musculature. Lack of associated edema  suggests chronicity.     3. Subcutaneous edema.                 Again, thank you for allowing me to participate in the care of your patient.      Sincerely,    Antione Abrams MD

## 2024-01-19 ENCOUNTER — TELEPHONE (OUTPATIENT)
Dept: ORTHOPEDICS | Facility: CLINIC | Age: 54
End: 2024-01-19

## 2024-01-19 NOTE — TELEPHONE ENCOUNTER
[facility] Call Center    Phone Message    May a detailed message be left on voicemail: yes     Reason for Call: Other: [facility] calling because they received PT referral from Dr. Abrams. They need a patient demographic page faxed to them at [phone].     Action Taken: Message routed to:  [facility] ([facility] Sports    Travel Screening: Not Applicable

## 2024-01-19 NOTE — TELEPHONE ENCOUNTER
Re-faxed the PT orders to [phone] with the demographic sheet included.        Pedro Luis Zamora M.A., LAT, ATC  Certified Athletic Trainer

## 2024-01-22 ENCOUNTER — MEDICAL CORRESPONDENCE (OUTPATIENT)
Dept: HEALTH INFORMATION MANAGEMENT | Facility: CLINIC | Age: 54
End: 2024-01-22
Payer: MEDICAID

## 2024-01-25 ENCOUNTER — TELEPHONE (OUTPATIENT)
Dept: OPHTHALMOLOGY | Facility: CLINIC | Age: 54
End: 2024-01-25

## 2024-01-25 ENCOUNTER — OFFICE VISIT (OUTPATIENT)
Dept: INTERNAL MEDICINE | Facility: CLINIC | Age: 54
End: 2024-01-25
Payer: COMMERCIAL

## 2024-01-25 VITALS
SYSTOLIC BLOOD PRESSURE: 118 MMHG | TEMPERATURE: 98.6 F | BODY MASS INDEX: 49.44 KG/M2 | HEIGHT: 67 IN | DIASTOLIC BLOOD PRESSURE: 80 MMHG | RESPIRATION RATE: 22 BRPM | OXYGEN SATURATION: 98 % | HEART RATE: 58 BPM | WEIGHT: 315 LBS

## 2024-01-25 DIAGNOSIS — L71.9 ROSACEA: ICD-10-CM

## 2024-01-25 DIAGNOSIS — G47.01 INSOMNIA DUE TO MEDICAL CONDITION: ICD-10-CM

## 2024-01-25 DIAGNOSIS — E78.5 HYPERLIPIDEMIA ASSOCIATED WITH TYPE 2 DIABETES MELLITUS (H): ICD-10-CM

## 2024-01-25 DIAGNOSIS — R60.0 LOWER EXTREMITY EDEMA: ICD-10-CM

## 2024-01-25 DIAGNOSIS — E11.69 HYPERLIPIDEMIA ASSOCIATED WITH TYPE 2 DIABETES MELLITUS (H): ICD-10-CM

## 2024-01-25 DIAGNOSIS — M25.562 PAIN IN BOTH KNEES, UNSPECIFIED CHRONICITY: ICD-10-CM

## 2024-01-25 DIAGNOSIS — F32.A DEPRESSION, UNSPECIFIED DEPRESSION TYPE: ICD-10-CM

## 2024-01-25 DIAGNOSIS — G47.33 OSA (OBSTRUCTIVE SLEEP APNEA): ICD-10-CM

## 2024-01-25 DIAGNOSIS — E11.9 TYPE 2 DIABETES MELLITUS WITHOUT COMPLICATION, WITHOUT LONG-TERM CURRENT USE OF INSULIN (H): Primary | ICD-10-CM

## 2024-01-25 DIAGNOSIS — E66.01 MORBID OBESITY (H): ICD-10-CM

## 2024-01-25 DIAGNOSIS — K08.9 POOR DENTITION: ICD-10-CM

## 2024-01-25 DIAGNOSIS — M25.561 PAIN IN BOTH KNEES, UNSPECIFIED CHRONICITY: ICD-10-CM

## 2024-01-25 LAB
ANION GAP SERPL CALCULATED.3IONS-SCNC: 11 MMOL/L (ref 7–15)
BUN SERPL-MCNC: 15 MG/DL (ref 6–20)
CALCIUM SERPL-MCNC: 9.6 MG/DL (ref 8.6–10)
CHLORIDE SERPL-SCNC: 101 MMOL/L (ref 98–107)
CREAT SERPL-MCNC: 1.17 MG/DL (ref 0.67–1.17)
DEPRECATED HCO3 PLAS-SCNC: 24 MMOL/L (ref 22–29)
EGFRCR SERPLBLD CKD-EPI 2021: 74 ML/MIN/1.73M2
GLUCOSE SERPL-MCNC: 86 MG/DL (ref 70–99)
HBA1C MFR BLD: 6.1 % (ref 0–5.6)
POTASSIUM SERPL-SCNC: 3.4 MMOL/L (ref 3.4–5.3)
SODIUM SERPL-SCNC: 136 MMOL/L (ref 135–145)

## 2024-01-25 PROCEDURE — 83036 HEMOGLOBIN GLYCOSYLATED A1C: CPT | Performed by: NURSE PRACTITIONER

## 2024-01-25 PROCEDURE — 99214 OFFICE O/P EST MOD 30 MIN: CPT | Performed by: NURSE PRACTITIONER

## 2024-01-25 PROCEDURE — 80048 BASIC METABOLIC PNL TOTAL CA: CPT | Performed by: NURSE PRACTITIONER

## 2024-01-25 PROCEDURE — 36415 COLL VENOUS BLD VENIPUNCTURE: CPT | Performed by: NURSE PRACTITIONER

## 2024-01-25 NOTE — PROGRESS NOTES
Assessment & Plan     Type 2 diabetes mellitus without complication, without long-term current use of insulin (H)  He is not currently using any diabetes medications.  His hemoglobin A1c was 6.7 when we last checked.  He is due for another A1c today.  Perhaps given his morbidly obese status, weight loss clinic could consider a GLP-1 agonist  - HEMOGLOBIN A1C; Future    Pain in both knees, unspecified chronicity  He was recently told that some of his pain in his legs is due to varicosities.  He is working on getting some compression devices from the vascular clinic    Insomnia due to medical condition  He restarted his trazodone and his zolpidem.  He slept very deeply the other day and titrated back on the dose of zolpidem and trazodone a bit.  He does not want to make any permanent changes to the dosing on those medications right now    VERN (obstructive sleep apnea)  It is really important that he start using his CPAP machine.  He has not done that yet.  He is in the process of moving to [place] right now, into a more assisted living type facility    Poor dentition  He is in the process of working on getting a new dentist here in [place]    Morbid obesity (H)  He would like to establish care with a new weight loss provider in the [facility], now that he is moved  - Adult Comprehensive Weight Management  Referral; Future    Rosacea  Wants to see a dermatologist  - Adult Dermatology  Referral; Future    Lower extremity edema  Restarted furosemide 60 mg twice daily.  History of hypokalemia.  Recheck BMP today.  He understands that he will need to restart potassium.    - Basic metabolic panel; Future    Depression, unspecified depression type  He admits that his mood is not good.  He had seen a psychiatrist in the past but feels as though that care was suboptimal.  He may be a candidate for duloxetine given his chronic musculoskeletal pain.    Patient Instructions   We need to recheck your  "hemoglobin A1c today.  Your last hemoglobin A1c had risen above prediabetic range into type II diabetic range.    I placed a new referral to our weight loss clinic in [place].    It is very important that you start using your CPAP machine.  This will help with your lower extremity edema.    Go and see the dentist for your teeth.    I placed a referral to dermatology.  Call dermatology consultants at [phone].    We need to recheck your potassium levels today.  If your potassium levels are low we will need to restart your potassium supplement.    We should discuss getting you on some depression medication when I see you back here        Colby Byrne is a 53 year old, presenting for the following health issues:    Establish Care (Not fasting - need referral - moving to [facility] next week )      1/25/2024     9:44 AM   Additional Questions   Roomed by Rhiannon POWERS     History of Present Illness       Reason for visit:  Get a new primary doctor to better my health issuesHe consumes 0 sweetened beverage(s) daily.He exercises with enough effort to increase his heart rate 9 or less minutes per day.  He exercises with enough effort to increase his heart rate 3 or less days per week.   He is taking medications regularly.       Here for follow-up discussion on some of his chronic medical issues.      Objective    /80 (BP Location: Right arm, Patient Position: Sitting, Cuff Size: Adult Regular)   Pulse 58   Temp 98.6  F (37  C)   Resp 22   Ht 1.702 m (5' 7\")   Wt (!) 180.5 kg (398 lb)   SpO2 98%   BMI 62.34 kg/m    Body mass index is 62.34 kg/m .  Physical Exam   Patient is morbidly obese.  Poor dentition.  Walks with a walker.        Signed Electronically by: Hebert Hdez, CNP    "

## 2024-01-25 NOTE — PATIENT INSTRUCTIONS
We need to recheck your hemoglobin A1c today.  Your last hemoglobin A1c had risen above prediabetic range into type II diabetic range.    I placed a new referral to our weight loss clinic in [place].    It is very important that you start using your CPAP machine.  This will help with your lower extremity edema.    Go and see the dentist for your teeth.    I placed a referral to dermatology.  Call dermatology consultants at [phone].    We need to recheck your potassium levels today.  If your potassium levels are low we will need to restart your potassium supplement.    We should discuss getting you on some depression medication when I see you back here

## 2024-01-27 ENCOUNTER — OFFICE VISIT (OUTPATIENT)
Dept: OPHTHALMOLOGY | Facility: CLINIC | Age: 54
End: 2024-01-27
Attending: PEDIATRICS
Payer: COMMERCIAL

## 2024-01-27 DIAGNOSIS — E78.5 HYPERLIPIDEMIA ASSOCIATED WITH TYPE 2 DIABETES MELLITUS (H): ICD-10-CM

## 2024-01-27 DIAGNOSIS — H52.4 MYOPIA OF BOTH EYES WITH ASTIGMATISM AND PRESBYOPIA: Primary | ICD-10-CM

## 2024-01-27 DIAGNOSIS — H52.13 MYOPIA OF BOTH EYES WITH ASTIGMATISM AND PRESBYOPIA: Primary | ICD-10-CM

## 2024-01-27 DIAGNOSIS — E11.69 HYPERLIPIDEMIA ASSOCIATED WITH TYPE 2 DIABETES MELLITUS (H): ICD-10-CM

## 2024-01-27 DIAGNOSIS — H04.123 DRY EYES, BILATERAL: ICD-10-CM

## 2024-01-27 DIAGNOSIS — H52.203 MYOPIA OF BOTH EYES WITH ASTIGMATISM AND PRESBYOPIA: Primary | ICD-10-CM

## 2024-01-27 PROCEDURE — 92015 DETERMINE REFRACTIVE STATE: CPT | Performed by: OPTOMETRIST

## 2024-01-27 PROCEDURE — 92004 COMPRE OPH EXAM NEW PT 1/>: CPT | Performed by: OPTOMETRIST

## 2024-01-27 ASSESSMENT — CONF VISUAL FIELD
OD_INFERIOR_TEMPORAL_RESTRICTION: 0
METHOD: COUNTING FINGERS
OD_NORMAL: 1
OD_SUPERIOR_TEMPORAL_RESTRICTION: 0
OD_SUPERIOR_NASAL_RESTRICTION: 0
OS_SUPERIOR_TEMPORAL_RESTRICTION: 0
OS_INFERIOR_NASAL_RESTRICTION: 0
OD_INFERIOR_NASAL_RESTRICTION: 0
OS_SUPERIOR_NASAL_RESTRICTION: 0
OS_NORMAL: 1
OS_INFERIOR_TEMPORAL_RESTRICTION: 0

## 2024-01-27 ASSESSMENT — REFRACTION_MANIFEST
OD_AXIS: 110
OS_ADD: +2.50
OS_SPHERE: -0.50
OS_AXIS: 095
OD_SPHERE: -0.75
OD_ADD: +2.50
OS_CYLINDER: +0.50
OD_CYLINDER: +0.75

## 2024-01-27 ASSESSMENT — VISUAL ACUITY
OS_SC+: -1
OD_SC+: -3
METHOD: SNELLEN - LINEAR
OS_SC: 20/20
OD_SC: 20/25

## 2024-01-27 ASSESSMENT — TONOMETRY
IOP_METHOD: ICARE
OS_IOP_MMHG: 16
OD_IOP_MMHG: 12

## 2024-01-27 NOTE — NURSING NOTE
Chief Complaints and History of Present Illnesses   Patient presents with    COMPREHENSIVE EYE EXAM     Chief Complaint(s) and History of Present Illness(es)       COMPREHENSIVE EYE EXAM              Laterality: both eyes    Course: gradually worsening    Associated symptoms: redness and itching.  Negative for eye pain, flashes and floaters    Treatments tried: artificial tears              Comments    Chavez Ca is a(n) 53 year old male who presents for a comprehensive exam. Last eye exam was several year(s) ago. Since exam, vision has worsened. Some itching and redness. Uses lubricating drops. No flashes and floaters. No eye pain.     Lab Results       Component                Value               Date                       A1C                      6.1                 01/25/2024                 A1C                      6.7                 11/03/2023                 A1C                      6.2                 06/06/2023                 A1C                      6.0                 05/04/2021                 A1C                      5.8                 09/04/2020                 A1C                      5.9                 04/07/2020                 A1C                      5.8                 08/07/2018                 A1C                      5.6                 04/05/2017              Leonardo Sharp COT 10:53 AM January 27, 2024

## 2024-01-27 NOTE — PROGRESS NOTES
Assessment & Plan       Chavez Ca is a 53 year old male with the following diagnoses:   1. Myopia of both eyes with astigmatism and presbyopia    2. Hyperlipidemia associated with type 2 diabetes mellitus (H)    3. Dry eyes, bilateral          New prescription for glasses   Dry eyes Systane during the day Refresh gel at night  Recheck 6 mths    Patient disposition:   No follow-ups on file.          Complete documentation of historical and exam elements from today's encounter can be found in the full encounter summary report (not reduplicated in this progress note). I personally obtained the chief complaint(s) and history of present illness.  I confirmed and edited as necessary the review of systems, past medical/surgical history, family history, social history, and examination findings as documented by others; and I examined the patient myself. I personally reviewed the relevant tests, images, and reports as documented above. I formulated and edited as necessary the assessment and plan and discussed the findings and management plan with the patient and family.  Dr. Elvis Goins

## 2024-01-27 NOTE — PATIENT INSTRUCTIONS
New prescription for glasses   Dry eyes Systane during the day Refresh gel at night  Recheck 6 mths

## 2024-01-30 ENCOUNTER — TELEPHONE (OUTPATIENT)
Dept: INTERNAL MEDICINE | Facility: CLINIC | Age: 54
End: 2024-01-30
Payer: MEDICAID

## 2024-01-30 NOTE — TELEPHONE ENCOUNTER
Received call from [facility] [place]  Phone: [phone]  Fax: [phone]     Requesting (signed) Med List be faxed (ASAP) as patient is being admitted today.  They did receive the plan of care, but received incorrect med list.

## 2024-02-01 ENCOUNTER — THERAPY VISIT (OUTPATIENT)
Dept: PHYSICAL THERAPY | Facility: CLINIC | Age: 54
End: 2024-02-01
Attending: INTERNAL MEDICINE
Payer: COMMERCIAL

## 2024-02-01 DIAGNOSIS — I89.0 LYMPHEDEMA OF BOTH LOWER EXTREMITIES: Primary | ICD-10-CM

## 2024-02-01 PROCEDURE — 97535 SELF CARE MNGMENT TRAINING: CPT | Mod: GP | Performed by: PHYSICAL THERAPIST

## 2024-02-01 NOTE — PROGRESS NOTES
DISCHARGE  Reason for Discharge: Patient has met all goals.  Did not meet maintenance due to discharging before ability to assess.    Equipment Issued: [employer]wear knee high Large, custom compreshort    Discharge Plan: Patient to continue home program.    Referring Provider:  Charu Aguilar     02/01/24 0500   Appointment Info   Signing clinician's name / credentials Cathy Carroll, PT,CLT-LATOYA   Visits Used 11   Medical Diagnosis lymphedema   PT Tx Diagnosis Lymphedema   Quick Adds Certification   Progress Note/Certification   Start of Care Date 11/24/23   Onset of illness/injury or Date of Surgery 01/01/11  (worsening recently, was not able to don socks)   Therapy Frequency decreased to 1x/week as needed and will further reduce once in garments   Predicted Duration 5 mo   Certification date from 11/24/23   Certification date to 02/21/24   Progress Note Completed Date 01/04/23   PT Goal 1   Goal Identifier Lymphedema Home Program   Goal Description Pt will be independent with drainage exercise, self massage, and skin care to best manage swelling to decrease symptoms.   Goal Progress Pt is independent with exercise and knows the massage, he does need help for skin care but living in [facility] where he can get the care.   Target Date 02/21/24   Date Met 02/01/24   PT Goal 2   Goal Identifier Volume   Goal Description Pt will have 5% reduction in B LE volume for improved tissue integrity, decreased risk of infection, and improved fit of clothing   Target Date 02/21/24   Date Met 12/08/23   PT Goal 3   Goal Identifier LLIS   Goal Description Pt will have at least 8 point reduction in score on subsequent assessment to reflect the MCID in impact of swelling on quality of life.   Goal Progress 44 (initial was 67)   Target Date 02/21/24   Date Met 02/01/24   PT Goal 4   Goal Identifier Maintenance   Goal Description Pt will use compression garments as instructed AND home management tools/program for long term  management of chronic condition to prevent tissue fibrosis, infection, wound and other secondary sequelae   Target Date 05/21/24   Goal Progress not assessed, pt requested discharge on the date of this note   Subjective Report   Subjective Report Pt moved, can't find some of his belongings.  Pt back on the weight loss medications and has decreased weight 20#. Goal for today is to get in garments and assess fit.   Objective Measure 1   Objective Measure R Volume 10-40cm   Details Not taken today, reduction maintained   Objective Measure 2   Objective Measure L Volume 10-40cm   Details not taken today reduction maintained   Objective Measure 3   Objective Measure Edema   Details weight down to 398, pitting 1+ without compression since 1/30/24   Manual Therapy   Manual Therapy 2 assessment   Manual Therapy 2 - Details Pitting decreased even without compression, thighs softer   Self Care/home Management   ADL/Home Mgmt Training (00575) 42   Self Care 1 home management and risk reduction education   Self Care 1 - Details Reminders to keep up with massage, exercise, use the Nustep at his new residence, continue to watch salt and diet, drink lots of water.   Self Care 2 Garment fitting   Self Care 2 - Details Pt can't find his compression, so good to have new compression.  the knee highs stockings [employer]wear are fitting well, go up just below the knee.  Pt assisted with donning the leggings, increased time to pull all the way up in back. Pt reports  he has help with bathroom management so will have help with clothing.  Pt ambulates about 60 with his walker to make sure the garments stay up with movement and does a few sit to stands.  The fit proximally is good, they are not tight around the knee or lower legs but this may help they to stay up around the waist. Pt is content with fit.  He is encouraged to get remeasured in 6mo for new leggings especially if he continues to have weight loss. Pt in agreement.   Patient  Response/Progress requests dischrage, feels like he knows what to do and wants to do therapy at [facility] pool, plus distance is far now   Skilled Intervention fitting assessment, home management at NH   Education   Learner/Method Patient   Education Comments home plan   Plan   Home program compression, elevation, walk/Nustep   Updates to plan of care discharge   Total Session Time   Timed Code Treatment Minutes 42   Total Treatment Time (sum of timed and untimed services) 42

## 2024-02-12 ENCOUNTER — OFFICE VISIT (OUTPATIENT)
Dept: INTERNAL MEDICINE | Facility: CLINIC | Age: 54
End: 2024-02-12
Payer: COMMERCIAL

## 2024-02-12 VITALS
WEIGHT: 315 LBS | BODY MASS INDEX: 49.44 KG/M2 | OXYGEN SATURATION: 99 % | HEART RATE: 113 BPM | RESPIRATION RATE: 22 BRPM | SYSTOLIC BLOOD PRESSURE: 126 MMHG | HEIGHT: 67 IN | DIASTOLIC BLOOD PRESSURE: 84 MMHG | TEMPERATURE: 98 F

## 2024-02-12 DIAGNOSIS — M47.26 OSTEOARTHRITIS OF SPINE WITH RADICULOPATHY, LUMBAR REGION: ICD-10-CM

## 2024-02-12 DIAGNOSIS — R60.9 EDEMA, UNSPECIFIED TYPE: Primary | ICD-10-CM

## 2024-02-12 DIAGNOSIS — G60.3 IDIOPATHIC PROGRESSIVE POLYNEUROPATHY: Chronic | ICD-10-CM

## 2024-02-12 DIAGNOSIS — E66.01 CLASS 3 SEVERE OBESITY DUE TO EXCESS CALORIES WITH SERIOUS COMORBIDITY AND BODY MASS INDEX (BMI) OF 50.0 TO 59.9 IN ADULT (H): ICD-10-CM

## 2024-02-12 DIAGNOSIS — Z78.9 IMPAIRED MOBILITY AND ACTIVITIES OF DAILY LIVING: ICD-10-CM

## 2024-02-12 DIAGNOSIS — F32.A DEPRESSION, UNSPECIFIED DEPRESSION TYPE: ICD-10-CM

## 2024-02-12 DIAGNOSIS — E66.813 CLASS 3 SEVERE OBESITY DUE TO EXCESS CALORIES WITH SERIOUS COMORBIDITY AND BODY MASS INDEX (BMI) OF 50.0 TO 59.9 IN ADULT (H): ICD-10-CM

## 2024-02-12 DIAGNOSIS — Z74.09 IMPAIRED MOBILITY AND ACTIVITIES OF DAILY LIVING: ICD-10-CM

## 2024-02-12 LAB
ANION GAP SERPL CALCULATED.3IONS-SCNC: 13 MMOL/L (ref 7–15)
BUN SERPL-MCNC: 17.2 MG/DL (ref 6–20)
CALCIUM SERPL-MCNC: 9.9 MG/DL (ref 8.6–10)
CHLORIDE SERPL-SCNC: 103 MMOL/L (ref 98–107)
CREAT SERPL-MCNC: 1.18 MG/DL (ref 0.67–1.17)
DEPRECATED HCO3 PLAS-SCNC: 22 MMOL/L (ref 22–29)
EGFRCR SERPLBLD CKD-EPI 2021: 73 ML/MIN/1.73M2
GLUCOSE SERPL-MCNC: 97 MG/DL (ref 70–99)
POTASSIUM SERPL-SCNC: 3.8 MMOL/L (ref 3.4–5.3)
SODIUM SERPL-SCNC: 138 MMOL/L (ref 135–145)

## 2024-02-12 PROCEDURE — 36415 COLL VENOUS BLD VENIPUNCTURE: CPT | Performed by: NURSE PRACTITIONER

## 2024-02-12 PROCEDURE — 80048 BASIC METABOLIC PNL TOTAL CA: CPT | Performed by: NURSE PRACTITIONER

## 2024-02-12 PROCEDURE — 99214 OFFICE O/P EST MOD 30 MIN: CPT | Performed by: NURSE PRACTITIONER

## 2024-02-12 RX ORDER — TRAMADOL HYDROCHLORIDE 50 MG/1
50 TABLET ORAL 2 TIMES DAILY
Qty: 60 TABLET | Refills: 0 | Status: SHIPPED | OUTPATIENT
Start: 2024-02-12 | End: 2024-03-14

## 2024-02-12 RX ORDER — TOPIRAMATE 100 MG/1
200 TABLET, FILM COATED ORAL 2 TIMES DAILY
Qty: 360 TABLET | Refills: 1 | Status: SHIPPED | OUTPATIENT
Start: 2024-02-12 | End: 2024-03-14

## 2024-02-12 RX ORDER — DULOXETIN HYDROCHLORIDE 30 MG/1
30 CAPSULE, DELAYED RELEASE ORAL DAILY
Qty: 90 CAPSULE | Refills: 0 | Status: SHIPPED | OUTPATIENT
Start: 2024-02-12 | End: 2024-03-14

## 2024-02-12 RX ORDER — ACETAMINOPHEN 500 MG
1000 TABLET ORAL 3 TIMES DAILY
Start: 2024-02-12

## 2024-02-12 ASSESSMENT — ASTHMA QUESTIONNAIRES
QUESTION_5 LAST FOUR WEEKS HOW WOULD YOU RATE YOUR ASTHMA CONTROL: SOMEWHAT CONTROLLED
ACT_TOTALSCORE: 16
QUESTION_4 LAST FOUR WEEKS HOW OFTEN HAVE YOU USED YOUR RESCUE INHALER OR NEBULIZER MEDICATION (SUCH AS ALBUTEROL): NOT AT ALL
QUESTION_1 LAST FOUR WEEKS HOW MUCH OF THE TIME DID YOUR ASTHMA KEEP YOU FROM GETTING AS MUCH DONE AT WORK, SCHOOL OR AT HOME: NONE OF THE TIME
QUESTION_2 LAST FOUR WEEKS HOW OFTEN HAVE YOU HAD SHORTNESS OF BREATH: MORE THAN ONCE A DAY
ACT_TOTALSCORE: 16
QUESTION_3 LAST FOUR WEEKS HOW OFTEN DID YOUR ASTHMA SYMPTOMS (WHEEZING, COUGHING, SHORTNESS OF BREATH, CHEST TIGHTNESS OR PAIN) WAKE YOU UP AT NIGHT OR EARLIER THAN USUAL IN THE MORNING: TWO OR THREE NIGHTS A WEEK

## 2024-02-12 NOTE — PROGRESS NOTES
Assessment & Plan     Osteoarthritis of spine with radiculopathy, lumbar region  He is now doing his pool therapy and is seeing benefit.  He is getting relief from the tramadol.  He needs updated orders to reflect twice daily dosing on the tramadol, and 3 times daily dosing on the Tylenol for his assisted living facility.    - traMADol (ULTRAM) 50 MG tablet; Take 1 tablet (50 mg) by mouth 2 times daily  - acetaminophen (TYLENOL) 500 MG tablet; Take 2 tablets (1,000 mg) by mouth 3 times daily  - Miscellaneous Order for DME - ONLY FOR DME    Idiopathic progressive polyneuropathy  We will see about adding Cymbalta to his regimen in an attempt to improve his chronic pain but also improve his neuropathy and depression symptoms.    Class 3 severe obesity due to excess calories with serious comorbidity and body mass index (BMI) of 50.0 to 59.9 in adult (H)  He is set up to see the weight loss clinic in May.  For now he wants to continue on Topamax.  - topiramate (TOPAMAX) 100 MG tablet; Take 2 tablets (200 mg) by mouth 2 times daily    Edema, unspecified type  We rechecked his labs at his last visit with his potassium low side normal but now he is taking his furosemide more frequently.  Review of his chart shows that in the past he had been using 60 mEq of potassium chloride daily while on furosemide 60 mg BID.     - Basic metabolic panel; Future  - Basic metabolic panel    Depression, unspecified depression type  PHQ-9 is 22.  Start Cymbalta.  Follow-up in 1 month.  If his mood is still poor, increase to 60 mg  - DULoxetine (CYMBALTA) 30 MG capsule; Take 1 capsule (30 mg) by mouth daily    Impaired mobility and activities of daily living  Requesting paper prescription for hospital bed and electric scooter  - Hospital Bed Order for DME - ONLY FOR DME    Patient Instructions   Recheck potassium levels today.    If you need potassium supplementation, I will send in a prescription to your pharmacy.    I change the  "prescriptions for the tramadol, Topamax, and Tylenol.    Start duloxetine 30 mg daily.    Paper prescriptions for hospital bed and electric scooter provided.    Follow-up in 1 month, sooner if needed      Colby Byrne is a 53 year old, presenting for the following health issues:    Follow Up (2 week follow up - weight down 13 lb since last visit - go over medications - might need to go back on K+)      2/12/2024    12:52 PM   Additional Questions   Roomed by Rhiannon POWERS     Here for follow-up visit.    He wants to review some of his medications as well as discuss his depression.  He has had depression for many years.  Was raised in foster care/different group home settings.    Says his mood is mostly a result of his chronic medical issues.  He does have a  that he works with closely.    Not necessarily interested in therapy at this time      Objective    /84 (BP Location: Right arm, Patient Position: Sitting, Cuff Size: Adult Regular)   Pulse 113   Temp 98  F (36.7  C)   Resp 22   Ht 1.702 m (5' 7\")   Wt (!) 175.1 kg (386 lb)   SpO2 99%   BMI 60.46 kg/m    Body mass index is 60.46 kg/m .  Physical Exam   Patient morbidly obese and walking with a walker.        Signed Electronically by: Hebert Hdez CNP    "

## 2024-02-12 NOTE — PATIENT INSTRUCTIONS
Recheck potassium levels today.    If you need potassium supplementation, I will send in a prescription to your pharmacy.    I change the prescriptions for the tramadol, Topamax, and Tylenol.    Start duloxetine 30 mg daily.    Paper prescriptions for hospital bed and electric scooter provided.    Follow-up in 1 month, sooner if needed

## 2024-02-12 NOTE — LETTER
February 12, 2024      Chavez Ca  [address] [place] [address]        To Whom It May Concern:    Chavez Ca was seen in our clinic. It is my recommendation that he adhere to a low sodium diet.       Sincerely,      Hebert Hdez

## 2024-02-13 ENCOUNTER — TELEPHONE (OUTPATIENT)
Dept: INTERNAL MEDICINE | Facility: CLINIC | Age: 54
End: 2024-02-13
Payer: MEDICAID

## 2024-02-13 DIAGNOSIS — R60.9 IDIOPATHIC EDEMA: Primary | ICD-10-CM

## 2024-02-13 RX ORDER — POTASSIUM CHLORIDE 1500 MG/1
20 TABLET, EXTENDED RELEASE ORAL 2 TIMES DAILY
Qty: 180 TABLET | Refills: 1 | Status: SHIPPED | OUTPATIENT
Start: 2024-02-13 | End: 2024-03-14

## 2024-02-13 NOTE — TELEPHONE ENCOUNTER
Please call patient.  Let him know that his potassium levels have been holding steady.  However, because he has been using his furosemide more frequently, I want to restart his potassium supplement to help avoid low potassium levels moving forward.    I sent a prescription for potassium chloride tablets.  I want him to take a 20 mill equivalent tablet twice daily with his furosemide.    He needs to follow-up with me in 1 month and we can recheck his labs again at that time.

## 2024-03-12 ENCOUNTER — OFFICE VISIT (OUTPATIENT)
Dept: ORTHOPEDICS | Facility: CLINIC | Age: 54
End: 2024-03-12
Payer: COMMERCIAL

## 2024-03-12 VITALS — BODY MASS INDEX: 59.16 KG/M2 | WEIGHT: 315 LBS

## 2024-03-12 DIAGNOSIS — M17.0 PRIMARY OSTEOARTHRITIS OF BOTH KNEES: Primary | ICD-10-CM

## 2024-03-12 PROCEDURE — 20611 DRAIN/INJ JOINT/BURSA W/US: CPT | Mod: 50 | Performed by: FAMILY MEDICINE

## 2024-03-12 RX ORDER — TRIAMCINOLONE ACETONIDE 40 MG/ML
40 INJECTION, SUSPENSION INTRA-ARTICULAR; INTRAMUSCULAR
Status: SHIPPED | OUTPATIENT
Start: 2024-03-12

## 2024-03-12 RX ORDER — TRIAMCINOLONE ACETONIDE 40 MG/ML
40 INJECTION, SUSPENSION INTRA-ARTICULAR; INTRAMUSCULAR
Status: DISCONTINUED | OUTPATIENT
Start: 2024-03-12 | End: 2024-05-30

## 2024-03-12 RX ORDER — LIDOCAINE HYDROCHLORIDE 10 MG/ML
2 INJECTION, SOLUTION EPIDURAL; INFILTRATION; INTRACAUDAL; PERINEURAL
Status: DISCONTINUED | OUTPATIENT
Start: 2024-03-12 | End: 2024-05-30

## 2024-03-12 RX ORDER — LIDOCAINE HYDROCHLORIDE 10 MG/ML
4 INJECTION, SOLUTION EPIDURAL; INFILTRATION; INTRACAUDAL; PERINEURAL
Status: DISCONTINUED | OUTPATIENT
Start: 2024-03-12 | End: 2024-05-30

## 2024-03-12 RX ORDER — LIDOCAINE HYDROCHLORIDE 10 MG/ML
4 INJECTION, SOLUTION EPIDURAL; INFILTRATION; INTRACAUDAL; PERINEURAL
Status: SHIPPED | OUTPATIENT
Start: 2024-03-12

## 2024-03-12 RX ADMIN — TRIAMCINOLONE ACETONIDE 40 MG: 40 INJECTION, SUSPENSION INTRA-ARTICULAR; INTRAMUSCULAR at 08:56

## 2024-03-12 RX ADMIN — LIDOCAINE HYDROCHLORIDE 2 ML: 10 INJECTION, SOLUTION EPIDURAL; INFILTRATION; INTRACAUDAL; PERINEURAL at 08:56

## 2024-03-12 RX ADMIN — LIDOCAINE HYDROCHLORIDE 4 ML: 10 INJECTION, SOLUTION EPIDURAL; INFILTRATION; INTRACAUDAL; PERINEURAL at 08:56

## 2024-03-12 NOTE — NURSING NOTE
[address] [address]  Dept: [phone]  ______________________________________________________________________________    Patient: Chavez Ca   : 1970   MRN: [id number]   2024    INVASIVE PROCEDURE SAFETY CHECKLIST    Date: 2024   Procedure:Bilateral knee USG CSI   Patient Name: Chavez Ca  MRN: [id number]  YOB: 1970    Action: Complete sections as appropriate. Any discrepancy results in a HARD COPY until resolved.     PRE PROCEDURE:  Patient ID verified with 2 identifiers (name and  or MRN): Yes  Procedure and site verified with patient/designee (when able): Yes  Accurate consent documentation in medical record: Yes  H&P (or appropriate assessment) documented in medical record: Yes  H&P must be up to 20 days prior to procedure and updates within 24 hours of procedure as applicable: Yes  Relevant diagnostic and radiology test results appropriately labeled and displayed as applicable: Yes  Procedure site(s) marked with provider initials: NA    TIMEOUT:  Time-Out performed immediately prior to starting procedure, including verbal and active participation of all team members addressing the following:Yes  * Correct patient identify  * Confirmed that the correct side and site are marked  * An accurate procedure consent form  * Agreement on the procedure to be done  * Correct patient position  * Relevant images and results are properly labeled and appropriately displayed  * The need to administer antibiotics or fluids for irrigation purposes during the procedure as applicable   * Safety precautions based on patient history or medication use    DURING PROCEDURE: Verification of correct person, site, and procedures any time the responsibility for care of the patient is transferred to another member of the care team.       Prior to injection, verified patient identity using patient's name and  date of birth.  Due to injection administration, patient instructed to remain in clinic for 15 minutes  afterwards, and to report any adverse reaction to me immediately.    Joint injection was performed.      Drug Amount Wasted:  None.  Vial/Syringe: Single dose vial  Expiration Date:  11/1/25 5/1/27      Mariela Danielson ATC  March 12, 2024

## 2024-03-12 NOTE — PROGRESS NOTES
[facility]  SPORTS & ORTHOPEDIC CLINIC VISIT     Mar 12, 2024      U/S guided bilateral knee injection    Date/Time: 3/12/2024 8:56 AM    Performed by: Antione Abrams MD  Authorized by: Antione Abrams MD    Indications:  Osteoarthritis  Needle Size:  22 G  Guidance: ultrasound    Approach:  Lateral  Location:  Knee  Laterality:  Bilateral      Medications (Right):  40 mg triamcinolone 40 MG/ML; 4 mL lidocaine (PF) 1 %; 2 mL lidocaine (PF) 1 %  Medications (Left):  40 mg triamcinolone 40 MG/ML; 4 mL lidocaine (PF) 1 %; 2 mL lidocaine (PF) 1 %  Outcome:  Tolerated well, no immediate complications  Procedure discussed: discussed risks, benefits, and alternatives    Consent Given by:  Patient  Timeout: timeout called immediately prior to procedure    Prep: patient was prepped and draped in usual sterile fashion     PROCEDURE: Ultrasound-guided bilateral knee injection   The patient was apprised of the risks and the benefits of the procedure written consent was signed by the patient.   The patient was positioned lying supine on exam table with knee resting in a slightly flexed position.   The area of the superiolateral right knee was cleaned with a chlorhexadine swab.  Ultrasound was necessary to localize the joint space due to body habitus.  Using sterile technique the skin was anesthetized with 2.5mL 1% lidocaine, a 22-gauge 3.5 in needle was introduced into the suprapatellar pouch under direct and continuous ultrasound guidance.  A solution of 40 mg of triamcinolone along with 4 mL of 1% lidocaine was injected easily and seen flowing into the joint space on ultrasound.  Images were captured and saved to the permanent record. Next the identical procedure was repeated on the left side.   There were no complications. The patient tolerated the procedure well. There was minimal bleeding.   The patient was instructed to ice and provided gentle compression to the knee upon leaving  clinic and refrain from overuse over the next 2 days.   The patient was instructed to go to the emergency room with any unusual pain, swelling, or redness occurred in the injected area.     Antione Abrams MD

## 2024-03-12 NOTE — LETTER
3/12/2024      RE: Chavez Ca  [address] [address]     Dear Colleague,    Thank you for referring your patient, Chavez Ca, to the [facility] SPORTS MEDICINE CLINIC [place]. Please see a copy of my visit note below.    [facility]  SPORTS & ORTHOPEDIC CLINIC VISIT     Mar 12, 2024      U/S guided bilateral knee injection    Date/Time: 3/12/2024 8:56 AM    Performed by: Antione Abrams MD  Authorized by: Antione Abrams MD    Indications:  Osteoarthritis  Needle Size:  22 G  Guidance: ultrasound    Approach:  Lateral  Location:  Knee  Laterality:  Bilateral      Medications (Right):  40 mg triamcinolone 40 MG/ML; 4 mL lidocaine (PF) 1 %; 2 mL lidocaine (PF) 1 %  Medications (Left):  40 mg triamcinolone 40 MG/ML; 4 mL lidocaine (PF) 1 %; 2 mL lidocaine (PF) 1 %  Outcome:  Tolerated well, no immediate complications  Procedure discussed: discussed risks, benefits, and alternatives    Consent Given by:  Patient  Timeout: timeout called immediately prior to procedure    Prep: patient was prepped and draped in usual sterile fashion     PROCEDURE: Ultrasound-guided bilateral knee injection   The patient was apprised of the risks and the benefits of the procedure written consent was signed by the patient.   The patient was positioned lying supine on exam table with knee resting in a slightly flexed position.   The area of the superiolateral right knee was cleaned with a chlorhexadine swab.  Ultrasound was necessary to localize the joint space due to body habitus.  Using sterile technique the skin was anesthetized with 2.5mL 1% lidocaine, a 22-gauge 3.5 in needle was introduced into the suprapatellar pouch under direct and continuous ultrasound guidance.  A solution of 40 mg of triamcinolone along with 4 mL of 1% lidocaine was injected easily and seen flowing into the joint space on ultrasound.  Images were captured and saved to  the permanent record. Next the identical procedure was repeated on the left side.   There were no complications. The patient tolerated the procedure well. There was minimal bleeding.   The patient was instructed to ice and provided gentle compression to the knee upon leaving clinic and refrain from overuse over the next 2 days.   The patient was instructed to go to the emergency room with any unusual pain, swelling, or redness occurred in the injected area.     Antione Abrams MD            Again, thank you for allowing me to participate in the care of your patient.      Sincerely,    Antione Abrams MD

## 2024-03-14 DIAGNOSIS — G60.3 IDIOPATHIC PROGRESSIVE NEUROPATHY: ICD-10-CM

## 2024-03-14 DIAGNOSIS — G47.9 SLEEP DISORDER: ICD-10-CM

## 2024-03-14 DIAGNOSIS — B35.3 TINEA PEDIS OF BOTH FEET: ICD-10-CM

## 2024-03-14 DIAGNOSIS — L74.4 ANHIDROSIS: ICD-10-CM

## 2024-03-14 DIAGNOSIS — R60.9 IDIOPATHIC EDEMA: ICD-10-CM

## 2024-03-14 DIAGNOSIS — E66.01 CLASS 3 SEVERE OBESITY DUE TO EXCESS CALORIES WITH SERIOUS COMORBIDITY AND BODY MASS INDEX (BMI) OF 50.0 TO 59.9 IN ADULT (H): ICD-10-CM

## 2024-03-14 DIAGNOSIS — G47.01 INSOMNIA DUE TO MEDICAL CONDITION: ICD-10-CM

## 2024-03-14 DIAGNOSIS — F32.A DEPRESSION, UNSPECIFIED DEPRESSION TYPE: ICD-10-CM

## 2024-03-14 DIAGNOSIS — E66.813 CLASS 3 SEVERE OBESITY DUE TO EXCESS CALORIES WITH SERIOUS COMORBIDITY AND BODY MASS INDEX (BMI) OF 50.0 TO 59.9 IN ADULT (H): ICD-10-CM

## 2024-03-14 DIAGNOSIS — M47.26 OSTEOARTHRITIS OF SPINE WITH RADICULOPATHY, LUMBAR REGION: ICD-10-CM

## 2024-03-14 DIAGNOSIS — G60.3 IDIOPATHIC PROGRESSIVE POLYNEUROPATHY: Chronic | ICD-10-CM

## 2024-03-14 RX ORDER — TOPIRAMATE 100 MG/1
200 TABLET, FILM COATED ORAL 2 TIMES DAILY
Qty: 360 TABLET | Refills: 0 | Status: SHIPPED | OUTPATIENT
Start: 2024-03-14 | End: 2024-06-11

## 2024-03-14 RX ORDER — TRAMADOL HYDROCHLORIDE 50 MG/1
50 TABLET ORAL 2 TIMES DAILY
Qty: 60 TABLET | Refills: 0 | Status: SHIPPED | OUTPATIENT
Start: 2024-03-14 | End: 2024-04-12

## 2024-03-14 RX ORDER — POTASSIUM CHLORIDE 1500 MG/1
20 TABLET, EXTENDED RELEASE ORAL 2 TIMES DAILY
Qty: 180 TABLET | Refills: 0 | Status: SHIPPED | OUTPATIENT
Start: 2024-03-14 | End: 2024-04-09

## 2024-03-14 RX ORDER — DULOXETIN HYDROCHLORIDE 30 MG/1
30 CAPSULE, DELAYED RELEASE ORAL DAILY
Qty: 90 CAPSULE | Refills: 0 | Status: SHIPPED | OUTPATIENT
Start: 2024-03-14 | End: 2024-04-09

## 2024-03-14 RX ORDER — ZOLPIDEM TARTRATE 10 MG/1
10 TABLET ORAL
Qty: 30 TABLET | Refills: 1 | Status: SHIPPED | OUTPATIENT
Start: 2024-03-14 | End: 2024-04-09

## 2024-03-14 NOTE — TELEPHONE ENCOUNTER
S-(situation): Patient calling requesting medications be sent [facility]    B-(background): Patient states that he will be out of these medications on March 17 th.     A-(assessment): Patient would like to switch pharmacies because his old pharmacy was messing up refills and sending them too early.     R-(recommendations): Routing to provider for review. Pended medications.

## 2024-03-14 NOTE — TELEPHONE ENCOUNTER
General Call      Reason for Call:      Patient is wanting to switch back to [facility]      What are your questions or concerns:      Patient stating he is having issues with medications being filled every 6-10 days       Patient is needing new scripts sent for 90 days rx's     Tramadol for 30 days    Will route encounter and warm transfer to RN to assist further      Could we [facility] this [facility] to [facility] or would you prefer to receive a phone call?:   Patient would prefer a phone call   Okay to leave a detailed message?: Yes at Cell number on file:    Telephone Information:   Mobile [phone]

## 2024-03-26 ENCOUNTER — OFFICE VISIT (OUTPATIENT)
Dept: PODIATRY | Facility: CLINIC | Age: 54
End: 2024-03-26
Payer: COMMERCIAL

## 2024-03-26 VITALS
DIASTOLIC BLOOD PRESSURE: 68 MMHG | OXYGEN SATURATION: 99 % | SYSTOLIC BLOOD PRESSURE: 112 MMHG | BODY MASS INDEX: 58.22 KG/M2 | HEART RATE: 111 BPM | WEIGHT: 315 LBS

## 2024-03-26 DIAGNOSIS — B35.1 ONYCHOMYCOSIS: ICD-10-CM

## 2024-03-26 DIAGNOSIS — I89.0 LYMPHEDEMA OF BOTH LOWER EXTREMITIES: Primary | ICD-10-CM

## 2024-03-26 DIAGNOSIS — L74.4 ANHIDROSIS: ICD-10-CM

## 2024-03-26 PROCEDURE — 11721 DEBRIDE NAIL 6 OR MORE: CPT | Performed by: PODIATRIST

## 2024-03-26 PROCEDURE — 99214 OFFICE O/P EST MOD 30 MIN: CPT | Mod: 25 | Performed by: PODIATRIST

## 2024-03-26 RX ORDER — POTASSIUM CHLORIDE 1500 MG/1
TABLET, EXTENDED RELEASE ORAL
COMMUNITY
Start: 2024-03-09 | End: 2024-04-09

## 2024-03-26 NOTE — PROGRESS NOTES
FOOT AND ANKLE SURGERY/PODIATRY PROGRESS NOTE        ASSESSMENT:   Anhidrosis  Onychomycosis  Lymphedema  DM 2  Hammertoe      TREATMENT:  -I discussed with the patient that he continues to have dry, thickened skin on the plantar aspect of both feet greatest along bilateral heels.  I recommend he continue with Lac-Hydrin cream twice daily.  Avoid soaking of his feet.    -Patient request nail trim today and has signed ABN form.  I debrided nails 1 through 10 in length and thickness.  Referral given to local [facility] providers for routine footcare every 10 weeks.    -Patient requests additional compression stockings.  Referral given for compression stockings.    -Patient's questions invited and answered.  He was encouraged to call my office with any further questions or concerns.     30 minutes spent on the day of encounter doing chart review, history and exam, documentation, and further activities as noted.     Migue Quigley DPM  [facility]/Foot & Ankle Surgery      HPI: Chavez Ca was seen again today for concerns related to bilateral leg swelling and fungal nails.  Patient reports that he is taking oral terbinafine at this time as directed per his dermatologist.  He request nail trim today.    Past Medical History:   Diagnosis Date    Allergic rhinitis     Arthritis     Benign positional vertigo 2011    never quite went away    Bilateral carpal tunnel syndrome 07/18/2015    Chronic sinusitis 2017    I don't know Please look    Chronic tonsillitis     COPD (chronic obstructive pulmonary disease) (H) 11/2010    That is the date i got first sleep study results    Depressive disorder     Depressive disorder, not elsewhere classified 07/30/2012    Gastro-oesophageal reflux disease     Hearing problem     Hyperlipidemia associated with prediabetes     Hypertension 2008    Learning disability     Mild intermittent asthma in adult without complication     Neck mass     parapharyngeal, benign     Obesity, Class III, BMI 40-49.9 (morbid obesity) (H)     Psychological factors associated with another disorder 08/21/2012    Recurrent otitis media 2016    might be what the pain in my ears    Reduced vision     Tinnitus 2016    I would hear a steady light buzzing sound and other can't    Trigeminal neuralgia     Weakness 08/26/2011       Past Surgical History:   Procedure Laterality Date    ABDOMEN SURGERY  08/23/2015    Gallstones removed with cutting a big area in belly    BIOPSY  2011    To figure out what kind of tumor I had    COLONOSCOPY N/A 10/16/2020    Procedure: INCOMPLETE COLONOSCOPY;  Surgeon: Ham Cherry MD;  Location: [facility] OR    ENDOSCOPIC RETROGRADE CHOLANGIOPANCREATOGRAM N/A 08/27/2015    Procedure: COMBINED ENDOSCOPIC RETROGRADE CHOLANGIOPANCREATOGRAPHY, PLACE TUBE/STENT;  Surgeon: Baldomero Zacarias MD;  Location: [facility] OR    ENDOSCOPIC RETROGRADE CHOLANGIOPANCREATOGRAM N/A 11/06/2015    Procedure: COMBINED ENDOSCOPIC RETROGRADE CHOLANGIOPANCREATOGRAPHY, REMOVE FOREIGN BODY OR STENT/TUBE;  Surgeon: Baldomero Zacarias MD;  Location: [facility] OR    EXCISE LESION INTRAORAL  06/29/2011    Procedure:EXCISE LESION INTRAORAL; TRANSCERVICAL APPROACH TO LEFT PHARYNGEAL SPACE MASS REMOVAL ; Surgeon:BARBARA PHILLIPS; Location:[facility] OR    HC VASCULAR SURGERY PROCEDURE UNLIST  12/29/2015    LAPAROSCOPIC CHOLECYSTECTOMY N/A 08/23/2015    Procedure: LAPAROSCOPIC CHOLECYSTECTOMY;  Surgeon: Kvng Witt MD;  Location: [facility] OR    LARYNGOSCOPY WITH BIOPSY(IES)  06/18/2014    Procedure: LARYNGOSCOPY WITH BIOPSY(IES);  Surgeon: Barbara Phillips MD;  Location: [facility] OR    LASER CO2 LARYNGOSCOPY  12/07/2011    Procedure:LASER CO2 LARYNGOSCOPY; Micro-Direct Laryngoscopy with CO2 Laser Standby / Excision Tracheal Grandulization, Trach Tube Change / Kenalog application. / Balloon Dilation of Subglottic Stenosis.*Latex Safe Room* Trach Tube = Shiley 6.4mm I.D. / 10.8MM O.D. / 76MM  Cuffless.;  Surgeon:EMELIA PHILLIPS; Location:[facility] OR    ORTHOPEDIC SURGERY  05/2016    Broken Right hand & Fracture Right shoulder    REPAIR PTOSIS  6/2000    A quarter size cyst on left eyelid    TRACHEOSTOMY  04/22/2011    Procedure:TRACHEOSTOMY; possible awake; Surgeon:EMELIA PHILLIPS; Location:[facility] OR    TRACHEOSTOMY  06/29/2011    Procedure:TRACHEOSTOMY; REMOVE AND REPLACE SHILEY 6 XLT; Surgeon:EMELIA PHILLIPS; Location:[facility] OR    VASCULAR SURGERY  2008-[facility]       Allergies   Allergen Reactions    Fentanyl Itching     Patch    Meloxicam Other (See Comments)     Side pains.    Minocycline Rash     Patient reports adverse reaction was pigmentation which has resolved with drug discontinuation.         Current Outpatient Medications:     acetaminophen (TYLENOL) 500 MG tablet, Take 2 tablets (1,000 mg) by mouth 3 times daily, Disp: , Rfl:     amitriptyline (ELAVIL) 100 MG tablet, Take 1 tablet (100 mg) by mouth at bedtime, Disp: 90 tablet, Rfl: 3    ammonium lactate (AMLACTIN) 12 % external cream, Apply topically 2 times daily, Disp: 385 g, Rfl: 3    COMPRESSION STOCKINGS, 20-30 mmHg knee high compression stockings.  Measure and fit.  Style and brand per patient preference., Disp: 2 each, Rfl: 0    DULoxetine (CYMBALTA) 30 MG capsule, Take 1 capsule (30 mg) by mouth daily, Disp: 90 capsule, Rfl: 0    furosemide (LASIX) 20 MG tablet, Take 3 tablets (60 mg) by mouth 2 times daily, Disp: 540 tablet, Rfl: 1    phentermine (ADIPEX-P) 37.5 MG tablet, Take 1 tablet (37.5 mg) by mouth every morning, Disp: 90 tablet, Rfl: 1    potassium chloride ER (K-TAB) 20 MEQ CR tablet, Take 1 tablet (20 mEq) by mouth 2 times daily, Disp: 180 tablet, Rfl: 0    terbinafine (LAMISIL) 250 MG tablet, Take 1 tablet (250 mg) by mouth daily, Disp: 84 tablet, Rfl: 0    topiramate (TOPAMAX) 100 MG tablet, Take 2 tablets (200 mg) by mouth 2 times daily, Disp: 360 tablet, Rfl: 0    traMADol (ULTRAM) 50 MG tablet, Take 1 tablet  (50 mg) by mouth 2 times daily, Disp: 60 tablet, Rfl: 0    traZODone (DESYREL) 100 MG tablet, Take 1 tablet (100 mg) by mouth at bedtime, Disp: 90 tablet, Rfl: 3    zolpidem (AMBIEN) 10 MG tablet, Take 1 tablet (10 mg) by mouth nightly as needed for sleep, Disp: 30 tablet, Rfl: 1    potassium chloride eloisa ER (KLOR-CON M20) 20 MEQ CR tablet, , Disp: , Rfl:     Current Facility-Administered Medications:     lidocaine (PF) (XYLOCAINE) 1 % injection 2 mL, 2 mL, , , Antione Abrams MD, 2 mL at 03/12/24 0856    lidocaine (PF) (XYLOCAINE) 1 % injection 2 mL, 2 mL, , , Antione Abrams MD, 2 mL at 03/12/24 0856    lidocaine (PF) (XYLOCAINE) 1 % injection 2 mL, 2 mL, , , Antione Abrams MD, 2 mL at 11/28/23 1144    lidocaine (PF) (XYLOCAINE) 1 % injection 2 mL, 2 mL, , , Antione Abrams MD, 2 mL at 11/28/23 1144    lidocaine (PF) (XYLOCAINE) 1 % injection 3 mL, 3 mL, , , Antione Abrams MD, 3 mL at 11/28/23 1144    lidocaine (PF) (XYLOCAINE) 1 % injection 3 mL, 3 mL, , , Antione Abrams MD, 3 mL at 11/28/23 1144    lidocaine (PF) (XYLOCAINE) 1 % injection 4 mL, 4 mL, , , Antione Abrams MD, 4 mL at 03/12/24 0856    lidocaine (PF) (XYLOCAINE) 1 % injection 4 mL, 4 mL, , , Antione Abrams MD, 4 mL at 03/12/24 0856    triamcinolone (KENALOG-40) injection 40 mg, 40 mg, , , Antione Abrams MD, 40 mg at 03/12/24 0856    triamcinolone (KENALOG-40) injection 40 mg, 40 mg, , , Antione Abrams MD, 40 mg at 03/12/24 0856    triamcinolone (KENALOG-40) injection 40 mg, 40 mg, , , Antione Abrams MD, 40 mg at 11/28/23 1144    triamcinolone (KENALOG-40) injection 40 mg, 40 mg, , , Antione Abrams MD, 40 mg at 11/28/23 1144    Family History   Adopted: Yes   Problem Relation Age of Onset    Family History Negative Other         Does not know family    Unknown/Adopted No family hx of        Social History     Socioeconomic History     Marital status: Single     Spouse name: Not on file    Number of children: Not on file    Years of education: Not on file    Highest education level: Not on file   Occupational History    Not on file   Tobacco Use    Smoking status: Never     Passive exposure: Never    Smokeless tobacco: Never    Tobacco comments:     Never had anything in my life   Vaping Use    Vaping Use: Never used   Substance and Sexual Activity    Alcohol use: Not Currently     Comment: I used to have a drink or two twice a year many years ago    Drug use: Never    Sexual activity: Not Currently     Partners: Female     Birth control/protection: None     Comment: I don't need protection since I haven't had sex in 17 years   Other Topics Concern    Parent/sibling w/ CABG, MI or angioplasty before 65F 55M? No     Service Not Asked    Blood Transfusions Not Asked    Caffeine Concern Not Asked    Occupational Exposure Not Asked    Hobby Hazards Not Asked    Sleep Concern Not Asked    Stress Concern Not Asked    Weight Concern Not Asked    Special Diet Not Asked    Back Care Not Asked    Exercise Yes     Comment: 2-3x/wk    Bike Helmet Not Asked    Seat Belt Not Asked    Self-Exams Not Asked   Social History Narrative    Room 'no bigger than a skilled nursing cell,' the friend who owns the house recently got , and goes back and forth. He has been put in adult foster care before. The patient is single.  Has 1 child. Pt adopted at age 14.      Social Determinants of Health     Financial Resource Strain: Low Risk  (12/25/2023)    Financial Resource Strain     Within the past 12 months, have you or your family members you live with been unable to get utilities (heat, electricity) when it was really needed?: No   Food Insecurity: Low Risk  (12/25/2023)    Food Insecurity     Within the past 12 months, did you worry that your food would run out before you got money to buy more?: No     Within the past 12 months, did the food you bought just not last  and you didn t have money to get more?: No   Transportation Needs: High Risk (12/25/2023)    Transportation Needs     Within the past 12 months, has lack of transportation kept you from medical appointments, getting your medicines, non-medical meetings or appointments, work, or from getting things that you need?: Yes   Physical Activity: Not on file   Stress: Not on file   Social Connections: Not on file   Interpersonal Safety: Low Risk  (11/3/2023)    Interpersonal Safety     Do you feel physically and emotionally safe where you currently live?: Yes     Within the past 12 months, have you been hit, slapped, kicked or otherwise physically hurt by someone?: No     Within the past 12 months, have you been humiliated or emotionally abused in other ways by your partner or ex-partner?: No   Housing Stability: Low Risk  (12/25/2023)    Housing Stability     Do you have housing? : Yes     Are you worried about losing your housing?: No   Recent Concern: Housing Stability - High Risk (12/10/2023)    Housing Stability     Do you have housing? : No     Are you worried about losing your housing?: Yes       10 point Review of Systems is negative except for fungal nails which is noted in HPI.     /68   Pulse 111   Wt (!) 168.6 kg (371 lb 11.2 oz)   SpO2 99%   BMI 58.22 kg/m      BMI= Body mass index is 58.22 kg/m .    OBJECTIVE:  General appearance: Patient is alert and fully cooperative with history & exam.  No sign of distress is noted during the visit.    Vascular: Dorsalis pedis palpable.  Mild edema bilateral lower extremities.  Dermatologic: Nails 1-10 elongated, thick, yellow with subungal debris. Trophic changes noted including diminished hair growth and shiny skin. Dry, flaky skin with hyperkeratotic buildup plantar bilateral feet.   Neurologic: Diminished light touch bilateral feet.  Musculoskeletal: Contracted digits bilateral feet.      Imaging:     POC US GUIDANCE NEEDLE PLACEMENT    Result Date:  3/12/2024  Ultrasound-guided bilateral knee injection.  See encounter from today's date for procedure details. Antione Abrams MD

## 2024-03-26 NOTE — LETTER
3/26/2024         RE: Chavez Ca  [address] [facility] [id number]        Dear Colleague,    Thank you for referring your patient, Chavez Ca, to the [facility]. Please see a copy of my visit note below.        FOOT AND ANKLE SURGERY/PODIATRY PROGRESS NOTE        ASSESSMENT:   Anhidrosis  Onychomycosis  Lymphedema  DM 2  Hammertoe      TREATMENT:  -I discussed with the patient that he continues to have dry, thickened skin on the plantar aspect of both feet greatest along bilateral heels.  I recommend he continue with Lac-Hydrin cream twice daily.  Avoid soaking of his feet.    -Patient request nail trim today and has signed ABN form.  I debrided nails 1 through 10 in length and thickness.  Referral given to local [facility] providers for routine footcare every 10 weeks.    -Patient requests additional compression stockings.  Referral given for compression stockings.    -Patient's questions invited and answered.  He was encouraged to call my office with any further questions or concerns.     30 minutes spent on the day of encounter doing chart review, history and exam, documentation, and further activities as noted.     Migue Quigley DPM  [facility] Podiatry/Foot & Ankle Surgery      HPI: Chavez Ca was seen again today for concerns related to bilateral leg swelling and fungal nails.  Patient reports that he is taking oral terbinafine at this time as directed per his dermatologist.  He request nail trim today.    Past Medical History:   Diagnosis Date     Allergic rhinitis      Arthritis      Benign positional vertigo 2011    never quite went away     Bilateral carpal tunnel syndrome 07/18/2015     Chronic sinusitis 2017    I don't know Please look     Chronic tonsillitis      COPD (chronic obstructive pulmonary disease) (H) 11/2010    That is the date i got first sleep study results     Depressive disorder      Depressive disorder, not  elsewhere classified 07/30/2012     Gastro-oesophageal reflux disease      Hearing problem      Hyperlipidemia associated with prediabetes      Hypertension 2008     Learning disability      Mild intermittent asthma in adult without complication      Neck mass     parapharyngeal, benign     Obesity, Class III, BMI 40-49.9 (morbid obesity) (H)      Psychological factors associated with another disorder 08/21/2012     Recurrent otitis media 2016    might be what the pain in my ears     Reduced vision      Tinnitus 2016    I would hear a steady light buzzing sound and other can't     Trigeminal neuralgia      Weakness 08/26/2011       Past Surgical History:   Procedure Laterality Date     ABDOMEN SURGERY  08/23/2015    Gallstones removed with cutting a big area in belly     BIOPSY  2011    To figure out what kind of tumor I had     COLONOSCOPY N/A 10/16/2020    Procedure: INCOMPLETE COLONOSCOPY;  Surgeon: Ham Cherry MD;  Location: [facility] OR     ENDOSCOPIC RETROGRADE CHOLANGIOPANCREATOGRAM N/A 08/27/2015    Procedure: COMBINED ENDOSCOPIC RETROGRADE CHOLANGIOPANCREATOGRAPHY, PLACE TUBE/STENT;  Surgeon: Baldomero Zacarias MD;  Location: [facility] OR     ENDOSCOPIC RETROGRADE CHOLANGIOPANCREATOGRAM N/A 11/06/2015    Procedure: COMBINED ENDOSCOPIC RETROGRADE CHOLANGIOPANCREATOGRAPHY, REMOVE FOREIGN BODY OR STENT/TUBE;  Surgeon: Baldomero Zacarias MD;  Location: [facility] OR     EXCISE LESION INTRAORAL  06/29/2011    Procedure:EXCISE LESION INTRAORAL; TRANSCERVICAL APPROACH TO LEFT PHARYNGEAL SPACE MASS REMOVAL ; Surgeon:BARBARA PHILLIPS; Location:[facility] OR     HC VASCULAR SURGERY PROCEDURE UNLIST  12/29/2015     LAPAROSCOPIC CHOLECYSTECTOMY N/A 08/23/2015    Procedure: LAPAROSCOPIC CHOLECYSTECTOMY;  Surgeon: Kvng Witt MD;  Location: [facility] OR     LARYNGOSCOPY WITH BIOPSY(IES)  06/18/2014    Procedure: LARYNGOSCOPY WITH BIOPSY(IES);  Surgeon: Barbara Phillips MD;  Location: [facility] OR     LASER CO2  LARYNGOSCOPY  12/07/2011    Procedure:LASER CO2 LARYNGOSCOPY; Micro-Direct Laryngoscopy with CO2 Laser Standby / Excision Tracheal Grandulization, Trach Tube Change / Kenalog application. / Balloon Dilation of Subglottic Stenosis.*Latex Safe Room* Trach Tube = Shiley 6.4mm I.D. / 10.8MM O.D. / 76MM  Cuffless.; Surgeon:EMELIA PHILLIPS; Location:[facility] OR     ORTHOPEDIC SURGERY  05/2016    Broken Right hand & Fracture Right shoulder     REPAIR PTOSIS  6/2000    A quarter size cyst on left eyelid     TRACHEOSTOMY  04/22/2011    Procedure:TRACHEOSTOMY; possible awake; Surgeon:EMELIA PHILLIPS; Location:[facility] OR     TRACHEOSTOMY  06/29/2011    Procedure:TRACHEOSTOMY; REMOVE AND REPLACE SHILEY 6 XLT; Surgeon:EMELIA PHILLIPS; Location:[facility] OR     VASCULAR SURGERY  2008-[facility]       Allergies   Allergen Reactions     Fentanyl Itching     Patch     Meloxicam Other (See Comments)     Side pains.     Minocycline Rash     Patient reports adverse reaction was pigmentation which has resolved with drug discontinuation.         Current Outpatient Medications:      acetaminophen (TYLENOL) 500 MG tablet, Take 2 tablets (1,000 mg) by mouth 3 times daily, Disp: , Rfl:      amitriptyline (ELAVIL) 100 MG tablet, Take 1 tablet (100 mg) by mouth at bedtime, Disp: 90 tablet, Rfl: 3     ammonium lactate (AMLACTIN) 12 % external cream, Apply topically 2 times daily, Disp: 385 g, Rfl: 3     COMPRESSION STOCKINGS, 20-30 mmHg knee high compression stockings.  Measure and fit.  Style and brand per patient preference., Disp: 2 each, Rfl: 0     DULoxetine (CYMBALTA) 30 MG capsule, Take 1 capsule (30 mg) by mouth daily, Disp: 90 capsule, Rfl: 0     furosemide (LASIX) 20 MG tablet, Take 3 tablets (60 mg) by mouth 2 times daily, Disp: 540 tablet, Rfl: 1     phentermine (ADIPEX-P) 37.5 MG tablet, Take 1 tablet (37.5 mg) by mouth every morning, Disp: 90 tablet, Rfl: 1     potassium chloride ER (K-TAB) 20 MEQ CR tablet, Take 1  tablet (20 mEq) by mouth 2 times daily, Disp: 180 tablet, Rfl: 0     terbinafine (LAMISIL) 250 MG tablet, Take 1 tablet (250 mg) by mouth daily, Disp: 84 tablet, Rfl: 0     topiramate (TOPAMAX) 100 MG tablet, Take 2 tablets (200 mg) by mouth 2 times daily, Disp: 360 tablet, Rfl: 0     traMADol (ULTRAM) 50 MG tablet, Take 1 tablet (50 mg) by mouth 2 times daily, Disp: 60 tablet, Rfl: 0     traZODone (DESYREL) 100 MG tablet, Take 1 tablet (100 mg) by mouth at bedtime, Disp: 90 tablet, Rfl: 3     zolpidem (AMBIEN) 10 MG tablet, Take 1 tablet (10 mg) by mouth nightly as needed for sleep, Disp: 30 tablet, Rfl: 1     potassium chloride eloisa ER (KLOR-CON M20) 20 MEQ CR tablet, , Disp: , Rfl:     Current Facility-Administered Medications:      lidocaine (PF) (XYLOCAINE) 1 % injection 2 mL, 2 mL, , , Antione Abrams MD, 2 mL at 03/12/24 0856     lidocaine (PF) (XYLOCAINE) 1 % injection 2 mL, 2 mL, , , Antione Abrams MD, 2 mL at 03/12/24 0856     lidocaine (PF) (XYLOCAINE) 1 % injection 2 mL, 2 mL, , , Antione Abrams MD, 2 mL at 11/28/23 1144     lidocaine (PF) (XYLOCAINE) 1 % injection 2 mL, 2 mL, , , Antione Abrams MD, 2 mL at 11/28/23 1144     lidocaine (PF) (XYLOCAINE) 1 % injection 3 mL, 3 mL, , , Antione Abrams MD, 3 mL at 11/28/23 1144     lidocaine (PF) (XYLOCAINE) 1 % injection 3 mL, 3 mL, , , Antione Abrams MD, 3 mL at 11/28/23 1144     lidocaine (PF) (XYLOCAINE) 1 % injection 4 mL, 4 mL, , , Antione Abrams MD, 4 mL at 03/12/24 0856     lidocaine (PF) (XYLOCAINE) 1 % injection 4 mL, 4 mL, , , Antione Abrams MD, 4 mL at 03/12/24 0856     triamcinolone (KENALOG-40) injection 40 mg, 40 mg, , , Antione Abrams MD, 40 mg at 03/12/24 0856     triamcinolone (KENALOG-40) injection 40 mg, 40 mg, , , Antione Abrams MD, 40 mg at 03/12/24 0856     triamcinolone (KENALOG-40) injection 40 mg, 40 mg, , , Antione Abrams MD,  40 mg at 11/28/23 1144     triamcinolone (KENALOG-40) injection 40 mg, 40 mg, , , Antione Abrams MD, 40 mg at 11/28/23 1144    Family History   Adopted: Yes   Problem Relation Age of Onset     Family History Negative Other         Does not know family     Unknown/Adopted No family hx of        Social History     Socioeconomic History     Marital status: Single     Spouse name: Not on file     Number of children: Not on file     Years of education: Not on file     Highest education level: Not on file   Occupational History     Not on file   Tobacco Use     Smoking status: Never     Passive exposure: Never     Smokeless tobacco: Never     Tobacco comments:     Never had anything in my life   Vaping Use     Vaping Use: Never used   Substance and Sexual Activity     Alcohol use: Not Currently     Comment: I used to have a drink or two twice a year many years ago     Drug use: Never     Sexual activity: Not Currently     Partners: Female     Birth control/protection: None     Comment: I don't need protection since I haven't had sex in 17 years   Other Topics Concern     Parent/sibling w/ CABG, MI or angioplasty before 65F 55M? No      Service Not Asked     Blood Transfusions Not Asked     Caffeine Concern Not Asked     Occupational Exposure Not Asked     Hobby Hazards Not Asked     Sleep Concern Not Asked     Stress Concern Not Asked     Weight Concern Not Asked     Special Diet Not Asked     Back Care Not Asked     Exercise Yes     Comment: 2-3x/wk     Bike Helmet Not Asked     Seat Belt Not Asked     Self-Exams Not Asked   Social History Narrative    Room 'no bigger than a [employer] cell,' the friend who owns the house recently got , and goes back and forth. He has been put in adult foster care before. The patient is single.  Has 1 child. Pt adopted at age 14.      Social Determinants of Health     Financial Resource Strain: Low Risk  (12/25/2023)    Financial Resource Strain      Within the past 12  months, have you or your family members you live with been unable to get utilities (heat, electricity) when it was really needed?: No   Food Insecurity: Low Risk  (12/25/2023)    Food Insecurity      Within the past 12 months, did you worry that your food would run out before you got money to buy more?: No      Within the past 12 months, did the food you bought just not last and you didn t have money to get more?: No   Transportation Needs: High Risk (12/25/2023)    Transportation Needs      Within the past 12 months, has lack of transportation kept you from medical appointments, getting your medicines, non-medical meetings or appointments, work, or from getting things that you need?: Yes   Physical Activity: Not on file   Stress: Not on file   Social Connections: Not on file   Interpersonal Safety: Low Risk  (11/3/2023)    Interpersonal Safety      Do you feel physically and emotionally safe where you currently live?: Yes      Within the past 12 months, have you been hit, slapped, kicked or otherwise physically hurt by someone?: No      Within the past 12 months, have you been humiliated or emotionally abused in other ways by your partner or ex-partner?: No   Housing Stability: Low Risk  (12/25/2023)    Housing Stability      Do you have housing? : Yes      Are you worried about losing your housing?: No   Recent Concern: Housing Stability - High Risk (12/10/2023)    Housing Stability      Do you have housing? : No      Are you worried about losing your housing?: Yes       10 point Review of Systems is negative except for fungal nails which is noted in HPI.     /68   Pulse 111   Wt (!) 168.6 kg (371 lb 11.2 oz)   SpO2 99%   BMI 58.22 kg/m      BMI= Body mass index is 58.22 kg/m .    OBJECTIVE:  General appearance: Patient is alert and fully cooperative with history & exam.  No sign of distress is noted during the visit.    Vascular: Dorsalis pedis palpable.  Mild edema bilateral lower  extremities.  Dermatologic: Nails 1-10 elongated, thick, yellow with subungal debris. Trophic changes noted including diminished hair growth and shiny skin. Dry, flaky skin with hyperkeratotic buildup plantar bilateral feet.   Neurologic: Diminished light touch bilateral feet.  Musculoskeletal: Contracted digits bilateral feet.      Imaging:     POC US GUIDANCE NEEDLE PLACEMENT    Result Date: 3/12/2024  Ultrasound-guided bilateral knee injection.  See encounter from today's date for procedure details. Antione Abrams MD          Again, thank you for allowing me to participate in the care of your patient.        Sincerely,        Migue Quigley DPM

## 2024-03-26 NOTE — PATIENT INSTRUCTIONS
Podiatry Clinics that offer foot and toenail care  [facility]  [facility]  [phone]    Foot and Ankle Clinics, [facility]  [phone]    [facility]  [place] - Dr. Garcia on Tuesdays  [phone]    [place] [facility]  [place]  [phone]    [facility]  [facility]  [phone]    [facility]  [phone]    [facility]  [phone]      [facility]  *Nurse comes to your home for nail care.  Daniela Sheppard RN Foot Specialist  [phone]

## 2024-04-08 NOTE — PROGRESS NOTES
I really need you as the sleep Dr to help me with the Cpap machne At this time I have not used it I have so much going on with medical issues I am stressed out a ton I will start it when I do move into the new place. Pt appt notes

## 2024-04-09 ENCOUNTER — OFFICE VISIT (OUTPATIENT)
Dept: INTERNAL MEDICINE | Facility: CLINIC | Age: 54
End: 2024-04-09
Payer: COMMERCIAL

## 2024-04-09 ENCOUNTER — OFFICE VISIT (OUTPATIENT)
Dept: SLEEP MEDICINE | Facility: CLINIC | Age: 54
End: 2024-04-09
Payer: COMMERCIAL

## 2024-04-09 VITALS
HEIGHT: 67 IN | RESPIRATION RATE: 20 BRPM | BODY MASS INDEX: 49.44 KG/M2 | HEART RATE: 107 BPM | TEMPERATURE: 98 F | OXYGEN SATURATION: 99 % | WEIGHT: 315 LBS | SYSTOLIC BLOOD PRESSURE: 116 MMHG | DIASTOLIC BLOOD PRESSURE: 68 MMHG

## 2024-04-09 VITALS
HEIGHT: 67 IN | BODY MASS INDEX: 49.44 KG/M2 | DIASTOLIC BLOOD PRESSURE: 76 MMHG | WEIGHT: 315 LBS | RESPIRATION RATE: 20 BRPM | OXYGEN SATURATION: 99 % | SYSTOLIC BLOOD PRESSURE: 110 MMHG | HEART RATE: 101 BPM

## 2024-04-09 DIAGNOSIS — Z23 NEED FOR PROPHYLACTIC VACCINATION AGAINST HEPATITIS B VIRUS: ICD-10-CM

## 2024-04-09 DIAGNOSIS — G47.9 SLEEP DISORDER: ICD-10-CM

## 2024-04-09 DIAGNOSIS — R60.9 IDIOPATHIC EDEMA: ICD-10-CM

## 2024-04-09 DIAGNOSIS — Z91.81 AT MAXIMUM RISK FOR FALL: ICD-10-CM

## 2024-04-09 DIAGNOSIS — M62.81 GENERALIZED MUSCLE WEAKNESS: ICD-10-CM

## 2024-04-09 DIAGNOSIS — E66.813 CLASS 3 SEVERE OBESITY DUE TO EXCESS CALORIES WITH SERIOUS COMORBIDITY AND BODY MASS INDEX (BMI) OF 50.0 TO 59.9 IN ADULT (H): ICD-10-CM

## 2024-04-09 DIAGNOSIS — E66.01 CLASS 3 SEVERE OBESITY DUE TO EXCESS CALORIES WITH SERIOUS COMORBIDITY AND BODY MASS INDEX (BMI) OF 50.0 TO 59.9 IN ADULT (H): ICD-10-CM

## 2024-04-09 DIAGNOSIS — G47.33 OSA (OBSTRUCTIVE SLEEP APNEA): Primary | ICD-10-CM

## 2024-04-09 DIAGNOSIS — R60.0 LOWER EXTREMITY EDEMA: ICD-10-CM

## 2024-04-09 DIAGNOSIS — Z23 NEED FOR SHINGLES VACCINE: ICD-10-CM

## 2024-04-09 DIAGNOSIS — G47.01 INSOMNIA DUE TO MEDICAL CONDITION: ICD-10-CM

## 2024-04-09 DIAGNOSIS — E11.9 TYPE 2 DIABETES MELLITUS WITHOUT COMPLICATION, WITHOUT LONG-TERM CURRENT USE OF INSULIN (H): ICD-10-CM

## 2024-04-09 DIAGNOSIS — K08.9 POOR DENTITION: ICD-10-CM

## 2024-04-09 DIAGNOSIS — F32.A DEPRESSION, UNSPECIFIED DEPRESSION TYPE: ICD-10-CM

## 2024-04-09 DIAGNOSIS — G60.3 IDIOPATHIC PROGRESSIVE NEUROPATHY: ICD-10-CM

## 2024-04-09 LAB
ANION GAP SERPL CALCULATED.3IONS-SCNC: 11 MMOL/L (ref 7–15)
BUN SERPL-MCNC: 14.1 MG/DL (ref 6–20)
CALCIUM SERPL-MCNC: 9.7 MG/DL (ref 8.6–10)
CHLORIDE SERPL-SCNC: 103 MMOL/L (ref 98–107)
CREAT SERPL-MCNC: 1.14 MG/DL (ref 0.67–1.17)
DEPRECATED HCO3 PLAS-SCNC: 27 MMOL/L (ref 22–29)
EGFRCR SERPLBLD CKD-EPI 2021: 76 ML/MIN/1.73M2
GLUCOSE SERPL-MCNC: 105 MG/DL (ref 70–99)
POTASSIUM SERPL-SCNC: 4.5 MMOL/L (ref 3.4–5.3)
SODIUM SERPL-SCNC: 141 MMOL/L (ref 135–145)

## 2024-04-09 PROCEDURE — 36415 COLL VENOUS BLD VENIPUNCTURE: CPT | Performed by: NURSE PRACTITIONER

## 2024-04-09 PROCEDURE — 99214 OFFICE O/P EST MOD 30 MIN: CPT | Performed by: NURSE PRACTITIONER

## 2024-04-09 PROCEDURE — 80048 BASIC METABOLIC PNL TOTAL CA: CPT | Performed by: NURSE PRACTITIONER

## 2024-04-09 PROCEDURE — G2211 COMPLEX E/M VISIT ADD ON: HCPCS | Performed by: NURSE PRACTITIONER

## 2024-04-09 PROCEDURE — 99214 OFFICE O/P EST MOD 30 MIN: CPT | Performed by: INTERNAL MEDICINE

## 2024-04-09 RX ORDER — PHENTERMINE HYDROCHLORIDE 37.5 MG/1
37.5 TABLET ORAL EVERY MORNING
Qty: 90 TABLET | Refills: 1 | Status: SHIPPED | OUTPATIENT
Start: 2024-04-09 | End: 2024-05-29

## 2024-04-09 RX ORDER — FUROSEMIDE 20 MG
60 TABLET ORAL 2 TIMES DAILY
Qty: 540 TABLET | Refills: 3 | Status: SHIPPED | OUTPATIENT
Start: 2024-04-09

## 2024-04-09 RX ORDER — AMITRIPTYLINE HYDROCHLORIDE 100 MG/1
100 TABLET ORAL AT BEDTIME
Qty: 90 TABLET | Refills: 3 | Status: SHIPPED | OUTPATIENT
Start: 2024-04-09 | End: 2024-05-29

## 2024-04-09 RX ORDER — ZOLPIDEM TARTRATE 10 MG/1
10 TABLET ORAL
Qty: 30 TABLET | Refills: 1 | Status: SHIPPED | OUTPATIENT
Start: 2024-04-09 | End: 2024-05-29

## 2024-04-09 RX ORDER — TRAZODONE HYDROCHLORIDE 100 MG/1
100 TABLET ORAL AT BEDTIME
Qty: 90 TABLET | Refills: 3 | Status: SHIPPED | OUTPATIENT
Start: 2024-04-09 | End: 2024-05-29

## 2024-04-09 RX ORDER — POTASSIUM CHLORIDE 1500 MG/1
40 TABLET, EXTENDED RELEASE ORAL DAILY
Qty: 180 TABLET | Refills: 0 | Status: SHIPPED | OUTPATIENT
Start: 2024-04-09 | End: 2024-07-10

## 2024-04-09 RX ORDER — DULOXETIN HYDROCHLORIDE 60 MG/1
60 CAPSULE, DELAYED RELEASE ORAL DAILY
Qty: 90 CAPSULE | Refills: 0 | Status: SHIPPED | OUTPATIENT
Start: 2024-04-09 | End: 2024-07-10

## 2024-04-09 ASSESSMENT — ASTHMA QUESTIONNAIRES
QUESTION_3 LAST FOUR WEEKS HOW OFTEN DID YOUR ASTHMA SYMPTOMS (WHEEZING, COUGHING, SHORTNESS OF BREATH, CHEST TIGHTNESS OR PAIN) WAKE YOU UP AT NIGHT OR EARLIER THAN USUAL IN THE MORNING: NOT AT ALL
QUESTION_5 LAST FOUR WEEKS HOW WOULD YOU RATE YOUR ASTHMA CONTROL: NOT CONTROLLED AT ALL
QUESTION_2 LAST FOUR WEEKS HOW OFTEN HAVE YOU HAD SHORTNESS OF BREATH: NOT AT ALL
ACT_TOTALSCORE: 21
ACT_TOTALSCORE: 21
QUESTION_1 LAST FOUR WEEKS HOW MUCH OF THE TIME DID YOUR ASTHMA KEEP YOU FROM GETTING AS MUCH DONE AT WORK, SCHOOL OR AT HOME: NONE OF THE TIME
QUESTION_4 LAST FOUR WEEKS HOW OFTEN HAVE YOU USED YOUR RESCUE INHALER OR NEBULIZER MEDICATION (SUCH AS ALBUTEROL): NOT AT ALL

## 2024-04-09 ASSESSMENT — SLEEP AND FATIGUE QUESTIONNAIRES
HOW LIKELY ARE YOU TO NOD OFF OR FALL ASLEEP WHILE SITTING INACTIVE IN A PUBLIC PLACE: HIGH CHANCE OF DOZING
HOW LIKELY ARE YOU TO NOD OFF OR FALL ASLEEP WHEN YOU ARE A PASSENGER IN A CAR FOR AN HOUR WITHOUT A BREAK: HIGH CHANCE OF DOZING
HOW LIKELY ARE YOU TO NOD OFF OR FALL ASLEEP WHILE WATCHING TV: HIGH CHANCE OF DOZING
HOW LIKELY ARE YOU TO NOD OFF OR FALL ASLEEP WHILE SITTING AND TALKING TO SOMEONE: HIGH CHANCE OF DOZING
HOW LIKELY ARE YOU TO NOD OFF OR FALL ASLEEP WHILE SITTING AND READING: HIGH CHANCE OF DOZING
HOW LIKELY ARE YOU TO NOD OFF OR FALL ASLEEP WHILE LYING DOWN TO REST IN THE AFTERNOON WHEN CIRCUMSTANCES PERMIT: HIGH CHANCE OF DOZING
HOW LIKELY ARE YOU TO NOD OFF OR FALL ASLEEP IN A CAR, WHILE STOPPED FOR A FEW MINUTES IN TRAFFIC: HIGH CHANCE OF DOZING
HOW LIKELY ARE YOU TO NOD OFF OR FALL ASLEEP WHILE SITTING QUIETLY AFTER LUNCH WITHOUT ALCOHOL: HIGH CHANCE OF DOZING

## 2024-04-09 ASSESSMENT — ENCOUNTER SYMPTOMS: BACK PAIN: 1

## 2024-04-09 NOTE — PROGRESS NOTES
Assessment & Plan     Insomnia due to medical condition  He uses multiple medications for sleep, including zolpidem and trazodone.  We talked about how it is very important that he follow-up with his sleep medicine doctor.  - zolpidem (AMBIEN) 10 MG tablet; Take 1 tablet (10 mg) by mouth nightly as needed for sleep  - amitriptyline (ELAVIL) 100 MG tablet; Take 1 tablet (100 mg) by mouth at bedtime    Idiopathic progressive neuropathy  Severe neuropathy for which she uses amitriptyline.  We have also got him started on duloxetine to see if this would help.  Since starting the duloxetine 30 mg, he has not noticed much benefit.  Increase to 60 mg.  Follow-up in 1 month  - amitriptyline (ELAVIL) 100 MG tablet; Take 1 tablet (100 mg) by mouth at bedtime    Lower extremity edema  He uses furosemide 60 mg twice daily.  Recheck BMP.  We did start him on potassium supplement 40 mEq daily at his last appointment  - furosemide (LASIX) 20 MG tablet; Take 3 tablets (60 mg) by mouth 2 times daily  - Basic metabolic panel; Future    Class 3 severe obesity due to excess calories with serious comorbidity and body mass index (BMI) of 50.0 to 59.9 in adult (H)  We restarted his phentermine and Topamax for him.  He has a follow-up appointment with the weight loss clinic.  With his history of type 2 diabetes, he would be a good candidate for GLP-1 agonist  - phentermine (ADIPEX-P) 37.5 MG tablet; Take 1 tablet (37.5 mg) by mouth every morning    VERN (obstructive sleep apnea)  Still not using his CPAP.  It sounds like he is relying on his nursing staff at his assisted living facility to help him set this up.  He is having a hard time getting adequate help at home in this regard    Depression, unspecified depression type  Sab adequately controlled.  Increase Cymbalta to 60 mg.  Follow-up in 1 month  - DULoxetine (CYMBALTA) 60 MG capsule; Take 1 capsule (60 mg) by mouth daily for 90 days    Type 2 diabetes mellitus without complication,  without long-term current use of insulin (H)  Hemoglobin A1c 5 months ago came back at 6.7%, it did drop in January down to 6.1%.  He is not currently on any diabetes medications although he would likely benefit from GLP-1 agonist    Poor dentition  He is still in the process of getting established with a new dentist    Generalized muscle weakness  He would like orders for a hospital bed and electric scooter.  This was provided for him today.  He has severe mobility limitations, and walks with a 4 wheeled walker (with seat).  He is at high risk for falls.  Currently doing physical therapy with pool therapy to help with chronic knee pain.  - Hospital Bed Order for DME - ONLY FOR DME  - Miscellaneous Order for DME - (Use only if a more specific DME order does not already exist    At maximum risk for fall  As above.  - Hospital Bed Order for DME - ONLY FOR DME  - Miscellaneous Order for DME - (Use only if a more specific DME order does not already exist    The longitudinal plan of care for the diagnosis(es)/condition(s) as documented were addressed during this visit. Due to the added complexity in care, I will continue to support Chavez in the subsequent management and with ongoing continuity of care.     Patient Instructions   The telephone number for [facility] is [phone].  [place] [phone].  [place] is [phone].    I did place orders for scooter and hospital bed.    Increase duloxetine to 60 mg daily.    Recheck potassium and kidney labs today.    Work on scheduling appointment with dentist.    I changed orders on the potassium supplement to 40 mg once daily.    Refills of the trazodone, zolpidem, phentermine, furosemide, amitriptyline provided.    You need to follow-up with the sleep medicine team to discuss your sleep apnea    Follow-up with me in 1 month              BMI  Estimated body mass index is 57.32 kg/m  as calculated from the following:    Height as of this  "encounter: 1.702 m (5' 7\").    Weight as of this encounter: 166 kg (366 lb).             Colby Byrne is a 53 year old, presenting for the following health issues:  Follow Up (Depression. Review meds and refills. Needs new referrals for bed and scooter.), Neck Pain (Can't turn head to the right. X 2weeks), Knee Pain (Bilateral. Lt worse.), and Back Pain (Lower back pain.)      4/9/2024     6:54 AM   Additional Questions   Roomed by Sonia YEAGER   Accompanied by bianka     Knee Pain    Back Pain     History of Present Illness       Back Pain:  He presents for follow up of back pain. Patient's back pain is a chronic problem.  Location of back pain:  Right lower back  Description of back pain: stabbing  Back pain spreads: nowhere    Since patient first noticed back pain, pain is: gradually worsening  Does back pain interfere with his job:  Yes       Reason for visit:  Back pain And Right side Neck pain can't turn neck to the right And Left Knee pain And go over Medications and clean up Mychart  Symptom onset:  1-2 weeks ago  Symptoms include:  Can't turn my neck to the right My back hurts as soon as I stant up My left leg hurts all the time worse as soon as I move it or put preasure on it  Symptom intensity:  Severe  Symptom progression:  Worsening  Had these symptoms before:  Yes  Has tried/received treatment for these symptoms:  Yes  Previous treatment was successful:  No  What makes it worse:  Yes moving But I need to move to keep my health moving forward not backwards  What makes it better:  Not really I take my tramadol and maximum tylenol to try help with the painHe consumes 0 sweetened beverage(s) daily.He exercises with enough effort to increase his heart rate 30 to 60 minutes per day.  He exercises with enough effort to increase his heart rate 5 days per week.   He is taking medications regularly.                     Objective    /68   Pulse 107   Temp 98  F (36.7  C)   Resp 20   Ht 1.702 m (5' 7\")   " Wt (!) 166 kg (366 lb)   SpO2 99%   BMI 57.32 kg/m    Body mass index is 57.32 kg/m .  Physical Exam   +1 pitting edema bilateral lower extremities.  Poor dentition.              Signed Electronically by: Hebert Hdez, STEPHANIE

## 2024-04-09 NOTE — LETTER
4/9/2024        RE: Chavez Ca  [address] [facility] [address]        I really need you as the sleep Dr to help me with the Cpap machne At this time I have not used it I have so much going on with medical issues I am stressed out a ton I will start it when I do move into the new place. Pt appt notes              [facility]   Outpatient Sleep Medicine Follow-up Visit  April 9, 2024    Name: Chavez Ca MRN# [id number]   Age: 53 year old YOB: 1970     Date of Consultation: April 9, 2024  Consultation is requested by: No referring provider defined for this encounter.  Primary care provider: Hebert Hdez           Assessment and Plan:     Sleep Diagnoses:  Severe obstructive sleep apnea with hypoventilation and hypoxemia with normalization of sleep disruption on effective CPAP of 14    Comorbid conditions:  BMI 61  Moderate persistent asthma  Chronic anxiety  Depression  Type 2 diabetes    Summary Recommendations:  Please give copy for patient to carry to assisted living regarding assistance in placing CPAP near chair and providing distilled water for humidifier.         History of Present Illness:     Chavez Ca is a 53 year old [facility] hypoventilation and hypoxemia with normalization of sleep disruption on effective CPAP of 14 on polysomnography 6/2023 (AHI 49 with peak pCO2 52 and 5 minutes of hypoxemia less than 90% saturation).  He has a remote history of tracheostomy in 2011 for left pharyngeal pleomorphic adenoma without recurrence and is interested in restarting CPAP provided in December but would need assistance with device. Repeat tracheostomy was previously discussed but is not currently indicated and would add cares and potential health risks.        Most Recent SCALES:    EPWORTH SLEEPINESS SCALE WITHIN 1 YEAR    Sitting and reading 3     Watching TV 3    Sitting, inactive in a public place (theatre or mtg.) 3     As a passenger in a car 3    Lying down to rest in the afternoon when circumstance permit 3    Sitting and talking to someone 3    Sitting quietly after lunch without alcohol 3    In a car, while stopped for a few minutes in traffic 3    TOTAL SCORE 24    Normal < 11         0--none    1--mild    2--moderate  3--severe      INSOMNIA SEVERITY INDEX WITHIN 1 YEAR   Difficulty falling asleep 3   Difficult staying asleep 3   Problems waking up to early 3   How SATISFIED/DISSATISFIED are you with your CURRENT sleep pattern? 4   How NOTICEABLE to others do you think your sleep pattern is in terms of your quality of life? 4   How WORRIED/DISTRESSED are you about your current sleep pattern? 4   To what extent do you consider your sleep problem to INTERFERE with your daily fuctioning(e.g. daytime fatigue, mood, ability to function at work/daily chores, concentration, mood,etc.) CURRENTLY? 4   INSOMNIA SEVERITY INDEX TOTAL SCORE 25    --absence of insomnia (0-7); sub-threshold insomnia (8-14); moderate insomnia (15-21); and severe insomnia (22-28)--               Medications:     Current Outpatient Medications   Medication Sig Dispense Refill     acetaminophen (TYLENOL) 500 MG tablet Take 2 tablets (1,000 mg) by mouth 3 times daily       amitriptyline (ELAVIL) 100 MG tablet Take 1 tablet (100 mg) by mouth at bedtime 90 tablet 3     ammonium lactate (AMLACTIN) 12 % external cream Apply topically 2 times daily 385 g 3     COMPRESSION STOCKINGS 20-30 mmHg knee high compression stockings.  Measure and fit.  Style and brand per patient preference. 2 each 0     DULoxetine (CYMBALTA) 30 MG capsule Take 1 capsule (30 mg) by mouth daily 90 capsule 0     furosemide (LASIX) 20 MG tablet Take 3 tablets (60 mg) by mouth 2 times daily 540 tablet 1     phentermine (ADIPEX-P) 37.5 MG tablet Take 1 tablet (37.5 mg) by mouth every morning 90 tablet 1     potassium  chloride eloisa ER (KLOR-CON M20) 20 MEQ CR tablet  (Patient not taking: Reported on 3/26/2024)       potassium chloride ER (K-TAB) 20 MEQ CR tablet Take 1 tablet (20 mEq) by mouth 2 times daily 180 tablet 0     terbinafine (LAMISIL) 250 MG tablet Take 1 tablet (250 mg) by mouth daily 84 tablet 0     topiramate (TOPAMAX) 100 MG tablet Take 2 tablets (200 mg) by mouth 2 times daily 360 tablet 0     traMADol (ULTRAM) 50 MG tablet Take 1 tablet (50 mg) by mouth 2 times daily 60 tablet 0     traZODone (DESYREL) 100 MG tablet Take 1 tablet (100 mg) by mouth at bedtime 90 tablet 3     zolpidem (AMBIEN) 10 MG tablet Take 1 tablet (10 mg) by mouth nightly as needed for sleep 30 tablet 1     Current Facility-Administered Medications   Medication Dose Route Frequency Provider Last Rate Last Admin     lidocaine (PF) (XYLOCAINE) 1 % injection 2 mL  2 mL   Antione Abrams MD   2 mL at 03/12/24 0856     lidocaine (PF) (XYLOCAINE) 1 % injection 2 mL  2 mL   Antione Abrams MD   2 mL at 03/12/24 0856     lidocaine (PF) (XYLOCAINE) 1 % injection 2 mL  2 mL   Antione Abrams MD   2 mL at 11/28/23 1144     lidocaine (PF) (XYLOCAINE) 1 % injection 2 mL  2 mL   Antione Abrams MD   2 mL at 11/28/23 1144     lidocaine (PF) (XYLOCAINE) 1 % injection 3 mL  3 mL   Antione Abrams MD   3 mL at 11/28/23 1144     lidocaine (PF) (XYLOCAINE) 1 % injection 3 mL  3 mL   Antione Abrams MD   3 mL at 11/28/23 1144     lidocaine (PF) (XYLOCAINE) 1 % injection 4 mL  4 mL   Antione Abrams MD   4 mL at 03/12/24 0856     lidocaine (PF) (XYLOCAINE) 1 % injection 4 mL  4 mL   Antione Abrams MD   4 mL at 03/12/24 0856     triamcinolone (KENALOG-40) injection 40 mg  40 mg   Antione Abrams MD   40 mg at 03/12/24 0856     triamcinolone (KENALOG-40) injection 40 mg  40 mg   Antione Abrams MD   40 mg at 03/12/24 0856     triamcinolone (KENALOG-40) injection 40 mg  40 mg    Antione Abrams MD   40 mg at 11/28/23 1144     triamcinolone (KENALOG-40) injection 40 mg  40 mg   Antione Abrams MD   40 mg at 11/28/23 1144        Allergies   Allergen Reactions     Fentanyl Itching     Patch     Meloxicam Other (See Comments)     Side pains.     Minocycline Rash     Patient reports adverse reaction was pigmentation which has resolved with drug discontinuation.            Problem List:     Patient Active Problem List   Diagnosis     Physical deconditioning     Trigeminal neuralgia     VERN (obstructive sleep apnea)     Idiopathic edema     Depressive disorder, not elsewhere classified     Subglottic stenosis     Hyperlipidemia associated with prediabetes     Arthritis of both hands     Varicose veins of left lower extremity     Neck pain, chronic     Seasonal allergies     Osteoarthritis of spine with radiculopathy, lumbar region     Morbid obesity (H)     Condyloma acuminata     Hypokalemia     Poor dentition     Benign positional vertigo     Dyspnea, multifactorial     Insomnia due to medical condition     Skin pain     Facial rash (possible roseacea?)     Seborrheic dermatitis of scalp     Intertrigo of adipose folds     Sternocostal pain     Type 2 diabetes mellitus without complication, without long-term current use of insulin (H)     Moderate persistent asthma without complication     Primary mild osteoarthritis of both knees     Microalbuminuria     Elevated TSH     Chronic right-sided low back pain with right-sided sciatica     Preventative health care     Impaired mobility     Anxiety     Lymphedema of both lower extremities     Onychomycosis     Anhidrosis            Past Medical History:     Does not need 02 supplement at night   Past Medical History:   Diagnosis Date     Allergic rhinitis      Arthritis      Benign positional vertigo 2011    never quite went away     Bilateral carpal tunnel syndrome 07/18/2015     Chronic sinusitis 2017    I don't know Please look      Chronic tonsillitis      COPD (chronic obstructive pulmonary disease) (H) 11/2010    That is the date i got first sleep study results     Depressive disorder      Depressive disorder, not elsewhere classified 07/30/2012     Gastro-oesophageal reflux disease      Hearing problem      Hyperlipidemia associated with prediabetes      Hypertension 2008     Learning disability      Mild intermittent asthma in adult without complication      Neck mass     parapharyngeal, benign     Obesity, Class III, BMI 40-49.9 (morbid obesity) (H)      Psychological factors associated with another disorder 08/21/2012     Recurrent otitis media 2016    might be what the pain in my ears     Reduced vision      Tinnitus 2016    I would hear a steady light buzzing sound and other can't     Trigeminal neuralgia      Weakness 08/26/2011             Past Surgical History:    No h/o  upper airway surgery  Past Surgical History:   Procedure Laterality Date     ABDOMEN SURGERY  08/23/2015    Gallstones removed with cutting a big area in belly     BIOPSY  2011    To figure out what kind of tumor I had     COLONOSCOPY N/A 10/16/2020    Procedure: INCOMPLETE COLONOSCOPY;  Surgeon: Ham Cherry MD;  [facility]     ENDOSCOPIC RETROGRADE CHOLANGIOPANCREATOGRAM N/A 08/27/2015    Procedure: COMBINED ENDOSCOPIC RETROGRADE CHOLANGIOPANCREATOGRAPHY, PLACE TUBE/STENT;  Surgeon: Baldomero Zacarias MD;  Location: [facility] OR     ENDOSCOPIC RETROGRADE CHOLANGIOPANCREATOGRAM N/A 11/06/2015    Procedure: COMBINED ENDOSCOPIC RETROGRADE CHOLANGIOPANCREATOGRAPHY, REMOVE FOREIGN BODY OR STENT/TUBE;  Surgeon: Baldomero Zacarias MD;  Location:  OR     EXCISE LESION INTRAORAL  06/29/2011    Procedure:EXCISE LESION INTRAORAL; TRANSCERVICAL APPROACH TO LEFT PHARYNGEAL SPACE MASS REMOVAL ; Surgeon:EMELIA PHILLIPS; Location: OR     HC VASCULAR SURGERY PROCEDURE UNLIST  12/29/2015     LAPAROSCOPIC CHOLECYSTECTOMY N/A 08/23/2015    Procedure:  LAPAROSCOPIC CHOLECYSTECTOMY;  Surgeon: Kvng Witt MD;  [facility]     LARYNGOSCOPY WITH BIOPSY(IES)  06/18/2014    Procedure: LARYNGOSCOPY WITH BIOPSY(IES);  Surgeon: Barbara Phillips MD;  Location:  OR     LASER CO2 LARYNGOSCOPY  12/07/2011    Procedure:LASER CO2 LARYNGOSCOPY; Micro-Direct Laryngoscopy with CO2 Laser Standby / Excision Tracheal Grandulization, Trach Tube Change / Kenalog application. / Balloon Dilation of Subglottic Stenosis.*Latex Safe Room* Trach Tube = Shiley 6.4mm I.D. / 10.8MM O.D. / 76MM  Cuffless.; Surgeon:BARBARA PHILLIPS; Location: OR     ORTHOPEDIC SURGERY  05/2016    Broken Right hand & Fracture Right shoulder     REPAIR PTOSIS  6/2000    A quarter size cyst on left eyelid     TRACHEOSTOMY  04/22/2011    Procedure:TRACHEOSTOMY; possible awake; Surgeon:BARBARA PHILLIPS; Location:[facility] OR     TRACHEOSTOMY  06/29/2011    Procedure:TRACHEOSTOMY; REMOVE AND REPLACE SHILEY 6 XLT; Surgeon:BARBARA PHILLIPS; Location: OR     VASCULAR SURGERY  2008-[facility]              Physical Examination:       Reported vitals:  pulse 101, /76  SpO2 99% BMI 57.53  GENERAL: alert and no distress  EYES: Eyes grossly normal to inspection.  No discharge or erythema, or obvious scleral/conjunctival abnormalities.  RESP: No audible wheeze, cough, or visible cyanosis.    SKIN: Visible skin clear. No significant rash, abnormal pigmentation or lesions.  NEURO: Cranial nerves grossly intact.  Mentation and speech appropriate for age.  PSYCH: Appropriate affect, tone, and pace of words        Copy to: Hebert Hdez MD 4/9/2024         Total time spent reviewing medical records including previous testing and interpretation as well as direct patient contact and documentation on this date: 30 minutes       Sincerely,        ABI HAMEED MD

## 2024-04-09 NOTE — LETTER
April 10, 2024      [facility]  C/o  Chavez Ca  [address] [facility] [address]        To Whom It May Concern,     Mr. Ca needs evening assistance with his CPAP device to treat very severe breathing insufficiency while sleeping.  Please ensure that his CPAP machine is available before sleep and that he has access to his masks.          Sincerely,        ABI HAMEED MD

## 2024-04-09 NOTE — PATIENT INSTRUCTIONS
The telephone number for [facility] is [phone].  [facility] [phone].  [place] is [phone].    I did place orders for scooter and hospital bed.    Increase duloxetine to 60 mg daily.    Recheck potassium and kidney labs today.    Work on scheduling appointment with dentist.    I changed orders on the potassium supplement to 40 mg once daily.    Refills of the trazodone, zolpidem, phentermine, furosemide, amitriptyline provided.    You need to follow-up with the sleep medicine team to discuss your sleep apnea    Follow-up with me in 1 month

## 2024-04-09 NOTE — PROGRESS NOTES
[facility]   Outpatient Sleep Medicine Follow-up Visit  April 9, 2024    Name: Chavez Ca MRN# [id number]   Age: 53 year old YOB: 1970     Date of Consultation: April 9, 2024  Consultation is requested by: No referring provider defined for this encounter.  Primary care provider: Hebert Hdez           Assessment and Plan:     Sleep Diagnoses:  Severe obstructive sleep apnea with hypoventilation and hypoxemia with normalization of sleep disruption on effective CPAP of 14    Comorbid conditions:  BMI 61  Moderate persistent asthma  Chronic anxiety  Depression  Type 2 diabetes    Summary Recommendations:  Please give copy for patient to carry to assisted living regarding assistance in placing CPAP near chair and providing distilled water for humidifier.         History of Present Illness:     Chavez Ca is a 53 year old male resident of [facility] withs evere obstructive sleep apnea with hypoventilation and hypoxemia with normalization of sleep disruption on effective CPAP of 14 on polysomnography 6/2023 (AHI 49 with peak pCO2 52 and 5 minutes of hypoxemia less than 90% saturation).  He has a remote history of tracheostomy in 2011 for left pharyngeal pleomorphic adenoma without recurrence and is interested in restarting CPAP provided in December but would need assistance with device. Repeat tracheostomy was previously discussed but is not currently indicated and would add cares and potential health risks.        Most Recent SCALES:    EPWORTH SLEEPINESS SCALE WITHIN 1 YEAR    Sitting and reading 3    Watching TV 3    Sitting, inactive in a public place (theatre or mtg.) 3     As a passenger in a car 3    Lying down to rest in the afternoon when circumstance permit 3    Sitting and talking to someone 3    Sitting quietly after lunch without alcohol 3    In a car, while stopped for a few minutes in traffic 3    TOTAL SCORE 24     Normal < 11         0--none    1--mild    2--moderate  3--severe      INSOMNIA SEVERITY INDEX WITHIN 1 YEAR   Difficulty falling asleep 3   Difficult staying asleep 3   Problems waking up to early 3   How SATISFIED/DISSATISFIED are you with your CURRENT sleep pattern? 4   How NOTICEABLE to others do you think your sleep pattern is in terms of your quality of life? 4   How WORRIED/DISTRESSED are you about your current sleep pattern? 4   To what extent do you consider your sleep problem to INTERFERE with your daily fuctioning(e.g. daytime fatigue, mood, ability to function at work/daily chores, concentration, mood,etc.) CURRENTLY? 4   INSOMNIA SEVERITY INDEX TOTAL SCORE 25    --absence of insomnia (0-7); sub-threshold insomnia (8-14); moderate insomnia (15-21); and severe insomnia (22-28)--               Medications:     Current Outpatient Medications   Medication Sig Dispense Refill    acetaminophen (TYLENOL) 500 MG tablet Take 2 tablets (1,000 mg) by mouth 3 times daily      amitriptyline (ELAVIL) 100 MG tablet Take 1 tablet (100 mg) by mouth at bedtime 90 tablet 3    ammonium lactate (AMLACTIN) 12 % external cream Apply topically 2 times daily 385 g 3    COMPRESSION STOCKINGS 20-30 mmHg knee high compression stockings.  Measure and fit.  Style and brand per patient preference. 2 each 0    DULoxetine (CYMBALTA) 30 MG capsule Take 1 capsule (30 mg) by mouth daily 90 capsule 0    furosemide (LASIX) 20 MG tablet Take 3 tablets (60 mg) by mouth 2 times daily 540 tablet 1    phentermine (ADIPEX-P) 37.5 MG tablet Take 1 tablet (37.5 mg) by mouth every morning 90 tablet 1    potassium chloride eloisa ER (KLOR-CON M20) 20 MEQ CR tablet  (Patient not taking: Reported on 3/26/2024)      potassium chloride ER (K-TAB) 20 MEQ CR tablet Take 1 tablet (20 mEq) by mouth 2 times daily 180 tablet 0    terbinafine (LAMISIL) 250 MG tablet Take 1 tablet (250 mg) by mouth daily 84 tablet 0    topiramate (TOPAMAX) 100 MG tablet Take 2  tablets (200 mg) by mouth 2 times daily 360 tablet 0    traMADol (ULTRAM) 50 MG tablet Take 1 tablet (50 mg) by mouth 2 times daily 60 tablet 0    traZODone (DESYREL) 100 MG tablet Take 1 tablet (100 mg) by mouth at bedtime 90 tablet 3    zolpidem (AMBIEN) 10 MG tablet Take 1 tablet (10 mg) by mouth nightly as needed for sleep 30 tablet 1     Current Facility-Administered Medications   Medication Dose Route Frequency Provider Last Rate Last Admin    lidocaine (PF) (XYLOCAINE) 1 % injection 2 mL  2 mL   Antione Abrams MD   2 mL at 03/12/24 0856    lidocaine (PF) (XYLOCAINE) 1 % injection 2 mL  2 mL   Antione Abrams MD   2 mL at 03/12/24 0856    lidocaine (PF) (XYLOCAINE) 1 % injection 2 mL  2 mL   Antione Abrams MD   2 mL at 11/28/23 1144    lidocaine (PF) (XYLOCAINE) 1 % injection 2 mL  2 mL   Antione Abrams MD   2 mL at 11/28/23 1144    lidocaine (PF) (XYLOCAINE) 1 % injection 3 mL  3 mL   Antione Abrams MD   3 mL at 11/28/23 1144    lidocaine (PF) (XYLOCAINE) 1 % injection 3 mL  3 mL   Antione Abrams MD   3 mL at 11/28/23 1144    lidocaine (PF) (XYLOCAINE) 1 % injection 4 mL  4 mL   Antione Abrams MD   4 mL at 03/12/24 0856    lidocaine (PF) (XYLOCAINE) 1 % injection 4 mL  4 mL   Antione Abrams MD   4 mL at 03/12/24 0856    triamcinolone (KENALOG-40) injection 40 mg  40 mg   Antione Abrams MD   40 mg at 03/12/24 0856    triamcinolone (KENALOG-40) injection 40 mg  40 mg   Antione Abrams MD   40 mg at 03/12/24 0856    triamcinolone (KENALOG-40) injection 40 mg  40 mg   Antione Abrams MD   40 mg at 11/28/23 1144    triamcinolone (KENALOG-40) injection 40 mg  40 mg   Antione Abrams MD   40 mg at 11/28/23 1144        Allergies   Allergen Reactions    Fentanyl Itching     Patch    Meloxicam Other (See Comments)     Side pains.    Minocycline Rash     Patient reports adverse reaction was pigmentation  which has resolved with drug discontinuation.            Problem List:     Patient Active Problem List   Diagnosis    Physical deconditioning    Trigeminal neuralgia    VERN (obstructive sleep apnea)    Idiopathic edema    Depressive disorder, not elsewhere classified    Subglottic stenosis    Hyperlipidemia associated with prediabetes    Arthritis of both hands    Varicose veins of left lower extremity    Neck pain, chronic    Seasonal allergies    Osteoarthritis of spine with radiculopathy, lumbar region    Morbid obesity (H)    Condyloma acuminata    Hypokalemia    Poor dentition    Benign positional vertigo    Dyspnea, multifactorial    Insomnia due to medical condition    Skin pain    Facial rash (possible roseacea?)    Seborrheic dermatitis of scalp    Intertrigo of adipose folds    Sternocostal pain    Type 2 diabetes mellitus without complication, without long-term current use of insulin (H)    Moderate persistent asthma without complication    Primary mild osteoarthritis of both knees    Microalbuminuria    Elevated TSH    Chronic right-sided low back pain with right-sided sciatica    Preventative health care    Impaired mobility    Anxiety    Lymphedema of both lower extremities    Onychomycosis    Anhidrosis            Past Medical History:     Does not need 02 supplement at night   Past Medical History:   Diagnosis Date    Allergic rhinitis     Arthritis     Benign positional vertigo 2011    never quite went away    Bilateral carpal tunnel syndrome 07/18/2015    Chronic sinusitis 2017    I don't know Please look    Chronic tonsillitis     COPD (chronic obstructive pulmonary disease) (H) 11/2010    That is the date i got first sleep study results    Depressive disorder     Depressive disorder, not elsewhere classified 07/30/2012    Gastro-oesophageal reflux disease     Hearing problem     Hyperlipidemia associated with prediabetes     Hypertension 2008    Learning disability     Mild intermittent asthma in  adult without complication     Neck mass     parapharyngeal, benign    Obesity, Class III, BMI 40-49.9 (morbid obesity) (H)     Psychological factors associated with another disorder 08/21/2012    Recurrent otitis media 2016    might be what the pain in my ears    Reduced vision     Tinnitus 2016    I would hear a steady light buzzing sound and other can't    Trigeminal neuralgia     Weakness 08/26/2011             Past Surgical History:    No h/o  upper airway surgery  Past Surgical History:   Procedure Laterality Date    ABDOMEN SURGERY  08/23/2015    Gallstones removed with cutting a big area in belly    BIOPSY  2011    To figure out what kind of tumor I had    COLONOSCOPY N/A 10/16/2020    Procedure: INCOMPLETE COLONOSCOPY;  Surgeon: Ham Cherry MD;  Location: [facility] OR    ENDOSCOPIC RETROGRADE CHOLANGIOPANCREATOGRAM N/A 08/27/2015    Procedure: COMBINED ENDOSCOPIC RETROGRADE CHOLANGIOPANCREATOGRAPHY, PLACE TUBE/STENT;  Surgeon: Baldomero Zacarias MD;  Location[facility] OR    ENDOSCOPIC RETROGRADE CHOLANGIOPANCREATOGRAM N/A 11/06/2015    Procedure: COMBINED ENDOSCOPIC RETROGRADE CHOLANGIOPANCREATOGRAPHY, REMOVE FOREIGN BODY OR STENT/TUBE;  Surgeon: Baldomero Zacarias MD;  Location: [facility] OR    EXCISE LESION INTRAORAL  06/29/2011    Procedure:EXCISE LESION INTRAORAL; TRANSCERVICAL APPROACH TO LEFT PHARYNGEAL SPACE MASS REMOVAL ; Surgeon:BARBARA PHILLIPS; Location:[facility] OR    HC VASCULAR SURGERY PROCEDURE UNLIST  12/29/2015    LAPAROSCOPIC CHOLECYSTECTOMY N/A 08/23/2015    Procedure: LAPAROSCOPIC CHOLECYSTECTOMY;  Surgeon: Kvng Witt MD;  Location: [facility] OR    LARYNGOSCOPY WITH BIOPSY(IES)  06/18/2014    Procedure: LARYNGOSCOPY WITH BIOPSY(IES);  Surgeon: Barbara Phillips MD;  Location: [facility] OR    LASER CO2 LARYNGOSCOPY  12/07/2011    Procedure:LASER CO2 LARYNGOSCOPY; Micro-Direct Laryngoscopy with CO2 Laser Standby / Excision Tracheal Grandulization, Trach Tube Change / Kenalog  application. / Balloon Dilation of Subglottic Stenosis.*Latex Safe Room* Trach Tube = Shiley 6.4mm I.D. / 10.8MM O.D. / 76MM  Cuffless.; Surgeon:EMELIA PHILLIPS; Location:[facility] OR    ORTHOPEDIC SURGERY  05/2016    Broken Right hand & Fracture Right shoulder    REPAIR PTOSIS  6/2000    A quarter size cyst on left eyelid    TRACHEOSTOMY  04/22/2011    Procedure:TRACHEOSTOMY; possible awake; Surgeon:EMELIA PHILLIPS; Location:[facility] OR    TRACHEOSTOMY  06/29/2011    Procedure:TRACHEOSTOMY; REMOVE AND REPLACE SHILEY 6 XLT; Surgeon:EMELIA PHILLIPS; Location: OR    VASCULAR SURGERY  2008-[facility]              Physical Examination:       Reported vitals:  pulse 101, /76  SpO2 99% BMI 57.53  GENERAL: alert and no distress  EYES: Eyes grossly normal to inspection.  No discharge or erythema, or obvious scleral/conjunctival abnormalities.  RESP: No audible wheeze, cough, or visible cyanosis.    SKIN: Visible skin clear. No significant rash, abnormal pigmentation or lesions.  NEURO: Cranial nerves grossly intact.  Mentation and speech appropriate for age.  PSYCH: Appropriate affect, tone, and pace of words        Copy to: Hebert Hdez MD 4/9/2024         Total time spent reviewing medical records including previous testing and interpretation as well as direct patient contact and documentation on this date: 30 minutes

## 2024-04-09 NOTE — PATIENT INSTRUCTIONS

## 2024-04-12 ENCOUNTER — MYC REFILL (OUTPATIENT)
Dept: INTERNAL MEDICINE | Facility: CLINIC | Age: 54
End: 2024-04-12
Payer: COMMERCIAL

## 2024-04-12 DIAGNOSIS — G60.3 IDIOPATHIC PROGRESSIVE POLYNEUROPATHY: Chronic | ICD-10-CM

## 2024-04-12 DIAGNOSIS — M47.26 OSTEOARTHRITIS OF SPINE WITH RADICULOPATHY, LUMBAR REGION: ICD-10-CM

## 2024-04-15 RX ORDER — TRAMADOL HYDROCHLORIDE 50 MG/1
50 TABLET ORAL 2 TIMES DAILY
Qty: 60 TABLET | Refills: 0 | Status: SHIPPED | OUTPATIENT
Start: 2024-04-15 | End: 2024-05-15

## 2024-04-27 ENCOUNTER — HEALTH MAINTENANCE LETTER (OUTPATIENT)
Age: 54
End: 2024-04-27
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
## 2024-05-01 ENCOUNTER — OFFICE VISIT (OUTPATIENT)
Dept: FAMILY MEDICINE | Facility: CLINIC | Age: 54
End: 2024-05-01
Payer: COMMERCIAL

## 2024-05-01 VITALS
HEART RATE: 101 BPM | RESPIRATION RATE: 20 BRPM | SYSTOLIC BLOOD PRESSURE: 111 MMHG | TEMPERATURE: 97.4 F | BODY MASS INDEX: 49.44 KG/M2 | OXYGEN SATURATION: 99 % | DIASTOLIC BLOOD PRESSURE: 83 MMHG | WEIGHT: 315 LBS | HEIGHT: 67 IN

## 2024-05-01 DIAGNOSIS — E11.9 TYPE 2 DIABETES MELLITUS WITHOUT COMPLICATION, WITHOUT LONG-TERM CURRENT USE OF INSULIN (H): Primary | ICD-10-CM

## 2024-05-01 DIAGNOSIS — E66.01 CLASS 3 SEVERE OBESITY DUE TO EXCESS CALORIES WITH SERIOUS COMORBIDITY AND BODY MASS INDEX (BMI) OF 50.0 TO 59.9 IN ADULT (H): ICD-10-CM

## 2024-05-01 DIAGNOSIS — Z74.09 IMPAIRED MOBILITY: ICD-10-CM

## 2024-05-01 DIAGNOSIS — E66.813 CLASS 3 SEVERE OBESITY DUE TO EXCESS CALORIES WITH SERIOUS COMORBIDITY AND BODY MASS INDEX (BMI) OF 50.0 TO 59.9 IN ADULT (H): ICD-10-CM

## 2024-05-01 DIAGNOSIS — M54.41 CHRONIC RIGHT-SIDED LOW BACK PAIN WITH RIGHT-SIDED SCIATICA: ICD-10-CM

## 2024-05-01 DIAGNOSIS — G89.29 CHRONIC RIGHT-SIDED LOW BACK PAIN WITH RIGHT-SIDED SCIATICA: ICD-10-CM

## 2024-05-01 DIAGNOSIS — E88.810 METABOLIC SYNDROME X: ICD-10-CM

## 2024-05-01 DIAGNOSIS — G47.33 OSA (OBSTRUCTIVE SLEEP APNEA): ICD-10-CM

## 2024-05-01 DIAGNOSIS — R80.9 MICROALBUMINURIA: ICD-10-CM

## 2024-05-01 PROCEDURE — 99215 OFFICE O/P EST HI 40 MIN: CPT | Performed by: FAMILY MEDICINE

## 2024-05-01 RX ORDER — KETOCONAZOLE 20 MG/G
CREAM TOPICAL
COMMUNITY
Start: 2024-04-11 | End: 2024-05-29

## 2024-05-01 RX ORDER — TIRZEPATIDE 2.5 MG/.5ML
2.5 INJECTION, SOLUTION SUBCUTANEOUS WEEKLY
Qty: 2 ML | Refills: 0 | Status: SHIPPED | OUTPATIENT
Start: 2024-05-01 | End: 2024-05-23

## 2024-05-01 RX ORDER — KETOCONAZOLE 20 MG/ML
SHAMPOO TOPICAL
COMMUNITY
Start: 2024-04-11 | End: 2024-05-29

## 2024-05-01 NOTE — ASSESSMENT & PLAN NOTE
53 year old year old male in clinic today to discuss treatment of the following conditions through diet and lifestyle modification and weight loss:  1. Class 3 severe obesity due to excess calories with serious comorbidity and body mass index (BMI) of 50.0 to 59.9 in adult (H)    2. Type 2 diabetes mellitus without complication, without long-term current use of insulin (H)    3. Impaired mobility    4. Chronic right-sided low back pain with right-sided sciatica      INTAKE: This patient presents for discussion of metabolic health and weight.  He has met with obesity specialist in the past he has been on and off of high-dose phentermine/topiramate with positive outcomes.  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  He currently lives in assisted living which has been helpful as he has been more physically active since he moved there.  He is down about 50 pounds.  He lives in pain most days.  His chart has had a diagnosis of diabetes on it since at least 2016 although there is some question of the veracity of that diagnosis.  He recently had a hemoglobin A1c of 6.7%.  This individual is high risk for diabetes at all the consequences (indeed he has albuminuria).  I believe it is in his interest going forward to be managed as though he has diabetes and I agree with the diagnosis of type 2 diabetes it has been on his chart for so long.  We reviewed nutrition.  He has some nutritional limitations but in general is drawn towards healthy foods.  Currently, I think it is unlikely that he is over eating on a day-to-day basis.  - Given the amount of weight that he needs to lose a GLP-1 receptor agonist is appropriate.  Tirzepatide (Mounjaro) prescribed.  We will titrate according to the normal schedule.  We discussed supply issues as well as side effects.  No personal/family history of thyroid cancer.  No personal history of pancreatitis.  - He will meet with our dietitian  later this week to review nutrition.  He may need specific instructions for the kitchen at his assisted living.  I suggested 3 meals per day that contain protein into meals that contain vegetables/fruits.  - I would like to see him back in 6-8 weeks.  - We discussed that we will need to be informed that he was successful in getting his medicine.  When I hear that he has started Mounjaro I will send in the 5 mg and 7.5 mg/week dosing.  - Iatrogenic weight gain: Amitriptyline is likely contributory.  Duloxetine might be contributory to weight gain.  The patient is not confident that amitriptyline has been helpful for his neuropathic pain.  Once on a stable dose of tirzepatide it might make sense to slightly decrease his wean to see if it even helps with neuropathy (or sleep).

## 2024-05-01 NOTE — PATIENT INSTRUCTIONS
100+ grams of protein per day  800 grams of veggies/fruit day    Limit carbohydrates to 100 grams or less per day

## 2024-05-03 ENCOUNTER — VIRTUAL VISIT (OUTPATIENT)
Dept: SURGERY | Facility: CLINIC | Age: 54
End: 2024-05-03
Payer: COMMERCIAL

## 2024-05-03 DIAGNOSIS — E11.9 TYPE 2 DIABETES MELLITUS WITHOUT COMPLICATION, WITHOUT LONG-TERM CURRENT USE OF INSULIN (H): ICD-10-CM

## 2024-05-03 DIAGNOSIS — E66.01 OBESITY, CLASS III, BMI 40-49.9 (MORBID OBESITY) (H): Primary | ICD-10-CM

## 2024-05-03 PROCEDURE — 97802 MEDICAL NUTRITION INDIV IN: CPT | Mod: 95

## 2024-05-03 NOTE — PATIENT INSTRUCTIONS
Eat Better ? Move More ? Live Well    Eat 3 nutrient-rich meals each day     Don't skip meals--it will cause you to overeat later in the day!     Eating fiber (vegetables/fruits/whole grains) and protein with meals helps you stay full longer     Choose foods with less than 10 grams of sugar and 5 grams of fat per serving to prevent excess calories and weight re-gain   Eat around the same times each day to develop a routine eating schedule    Avoid snacking unless physically hungry.   Planned snacks: 1-2 times per day and no more than 150 calories    Eat protein first    Protein helps with healing, maintaining adequate muscle mass, reducing hunger and optimizing nutritional status    Aim for  grams of protein per day   Fill up on Fiber    Fiber comes from plants--fruits, veggies, whole grains, nuts/seeds and beans    Fiber is low in calories, high in phytonutrients and helps you stay full longer    Aim for 25-35 grams per day by eating fiber with meals and snacks  Eat [id number]    Take 20-30 minutes to eat each meal by taking small bites, chewing foods to applesauce consistency or 20-30 times before you swallow    Eating foods too fast can delay satiety/fullness signals and increase overeating   Slow down your eating by using toddler utensils, putting your fork/spoon down between bites and not watching TV or emailing during meals!   Keep a Journal          Writing down what you eat, how you feel and when you are active helps you identify new changes to work on from week to week          Look for ways to cut 100 calories from your current diet 2-3 times per day  Drink 64 ounces of 0-Calorie drinks between meals    Water    Zero calorie Propel  or Vitamin Water      SoBe [place]  Zero Calories    Crystal Light , Sugar-Free Victorino-Aid , and other sugar-free lemonade or flavored andrew    Keep Caffeine to less than 300mg per day ie: 3-6oz cups coffee     Work up to 45-60 minutes of physical activity most days of  the week    Helps with losing weight and prevent regaining those extra pounds!     Do a combo of cardio (walking/water exercises) and strength training (lifting weights/Vinyasa yoga)    Avoid Mindless Eating    Be present when you eat--take note of the smell, taste and quality of your food    Make a list of alternative activities you could do to prevent eating out of boredom/stress  Go for a walk, call a friend, chew gum, paint your nails, re-organize the garage, etc      LEAN PROTEIN SOURCES    Protein Source Portion Calories Grams of Protein                           Nonfat, plain Greek yogurt    (10 grams sugar or less) 3/4 cup (6 oz)  12-17   Light Yogurt (10 grams sugar or less) 3/4 cup (6 oz)  6-8   Protein Shake 1 shake 110-180 15-30   Skim/1% Milk or lactose-free milk 1 cup ( 8 oz)  8   Plain or light, flavored soymilk 1 cup  7-8   Plain or light, hemp milk 1 cup 110 6   Fat Free or 1% Cottage Cheese 1/2 cup 90 15   Part skim ricotta cheese 1/2 cup 100 14   Part skim or reduced fat cheese slices 1/4cup, 3 dice 65-80 8     Mozzarella String Cheese 1 80 8   Canned tuna, chicken, crab or salmon  (canned in water)  1/2 cup 100 15-20   White fish (broiled, grilled, baked) 3 ounces 100 21   [place]/Tuna (broiled, grilled, baked) 3 ounces 150-180 21   Shrimp, Scallops, Lobster, Crab 3 ounces 100 21   Pork loin, Pork Tenderloin 3 ounces 150 21   Boneless, skinless chicken /turkey breast                          (broiled, grilled, baked) 3 ounces 120 21   [place], [place], [place], and Venison 3 ounces 120 21   Lean cuts of red meat and pork (sirloin,   round, tenderloin, flank, ground 93%-96%) 3 ounces 170 21   Lean or Extra Lean Ground Turkey 1/2 cup 150 20   90-95% Lean [place] Burger 1 poonam 140-180 21   Low-fat casserole with lean meat 3/4 cup 200 17   Luncheon Meats                                                        (turkey, lean ham, roast beef, chicken) 3 ounces 100 21   Egg (boiled,  poached, scrambled) 1 Egg 60 7   Egg Substitute 1/2 cup 70 10   Nuts (limit to 1 serving per day)  3 Tbsp. 150 7   Nut [place] (peanut, almond)  Limit to 1 serving or less daily 1 Tbsp. 90 4   Soy Burger (varies) 1  10-15   Edamame  1/2 cup ~95 9   Garbanzo, Black, [place] Beans 1/2 cup 110 7   Refried Beans 1/2 cup 100 7   Kidney and Lima beans 1/2 cup 110 7   Tempeh 3 oz 175 18   Vegan crumbles 1/2 cup 100 14   Tofu 1/2 cup 110 14   Chili (beans and extra lean beef or turkey) 1 cup 200 23   Lentil Stew/Soup 1 cup 150 12   Black Bean Soup 1 cup 175 12     Carbohydrates  Carbohydrates fuel your body with glucose (sugar)--the energy your body needs so you can do your daily activities.  Carbohydrates offer an immediate source of energy for your body. They provide the fuel for your muscles and organs, such as your brain.     Types of Carbohydrates     Complex Carbohydrates are higher in fiber and keep you feeling full longer--helping you eat less.   These are found in nearly all plant-based foods and usually take longer for the body to digest.  They are most commonly found in whole-wheat bread, whole-grain pasta, brown rice, starchy vegetables,   and fruits  Refined Carbohydrates require almost NO WORK for digestion and break down into glucose more quickly   than complex carbohydrates. Refined carbohydrates are usually high in calories and low in nutrients and fiber--  eating more of these can lead to weight gain.  Thinking about eliminating carbohydrates???  If you do not eat enough carbohydrates, the following can occur:  Fatigue  Muscle cramps  Poor mental function  Fatigue easily results from deprivation of carbohydrates, which is seen in people who fast, possibly interfering with activities of daily living.      Thinking about eliminating carbohydrates???  If you do not eat enough carbohydrates, the following can occur:  Fatigue  Muscle cramps  Poor mental function  Fatigue easily results from deprivation of  carbohydrates, which is seen in people who fast, possibly interfering with activities of daily living    Carbohydrates are your body's first choice for fuel. If given a choice of several types of foods simultaneously, your body will use the energy from carbohydrates first.    What foods contain carbohydrates?  Choose the following foods containing carbohydrates (the BEST ones to eat):   Fruit-fresh, frozen, canned in their own juices  Whole grains:  Whole-wheat breads  Brown rice  Oatmeal  Whole-grain cereals  Other starchy foods containing a minimum of 3 grams (g) fiber/100 calories  The ingredient label should list whole wheat or whole grain as one of the first ingredients (bulgur, quinoa, buckwheat, millet, spelt, faro, kasha)  Milk or yogurt (a natural source of carbohydrates):  Low-fat milk  Fat-free milk  Yogurt   Beans or legumes     Starchy vegetables, raw or frozen:  Potatoes  Peas  Corn    AVOID or limit the following foods containing carbohydrates:  Refined sugars, such as in:  Candy  Desserts-ice cream, cakes, pies, brownies, frozen yogurt, sherbet/sorbet  Cookies  White flour: bread/pasta/crackers/rice/tortillas  Sugary snacks: sweetened cereal, granola bars, cereal bars, donuts, muffins, bagels  Sugary Drinks:  Fruit Juice, Smoothies  Sports Drinks  Regular Soda    What are typical serving sizes or portions?  The following are some serving and portion sizes for foods containing carbohydrates:  One medium piece of fruit, about 4?5 ounces (oz) (-tennis ball)  1 cup (C) berries or melon    C canned fruit    C juice (100% vegetable)    C starchy vegetables, cooked or chopped  One slice whole-grain bread  ? C brown rice, quinoa, buckwheat, millet, spelt, faro, kasha    C oatmeal (dry)    C bulgur  One small tortilla (less than 6inch diameter)    C wheat germ  1 oz pretzels     C flaked cereal        Calorie-Controlled Sample Meal Plans    Examples of small healthy meals    Breakfast   Omelet made with   cup  to   cup egg substitute or 2 eggs    cup chopped vegetables  1-2 tbsp. of light cheese     cup salsa  Medium banana    1 cup non-fat plain, Greek yogurt mixed with 1 cup berries and 1-2 Tbsp nuts or cereal   -3/4 cup skim or 1% cottage cheese    cup unsweetened whole-grain cereal  1/2 cup of fresh strawberries  Whole-wheat English muffin or mini bagel, 1 scrambled egg and 1 slice Swiss cheese   Small orange  Protein Bar or Shake (15-30 grams protein and 15-25 grams Carbohydrates)    cup cottage cheese, low-fat    cup fresh fruit    11 ounces of Slim Fast Low Carb (only), Ruthie's Advantage, EAS Carb Control    Lunch/Dinner  2-3 slices roasted turkey breast  1 tbsp. of fat free mayonnaise  2 slices of  whole-wheat bread, Medium apple  10 baby carrots with 1 tbsp. of low-fat dip     cup water packed tuna or chicken  1 tablespoons of low-fat mayonnaise  1-2 tbsp. dill relish  1 serving of whole-grain crackers  1 cup of strawberries   6 inch turkey sub sandwich with light mayonnaise,   cup cottage cheese                                                                                                                                                      Black bean and low-fat cheese on a whole wheat tortilla with salsa and light sour cream  Grilled chicken sandwich  Tossed salad with light dressing    Baked potato with 3/4 cup of extra lean ground beef, light shredded cheese and salsa  Fresh fruit                                                 Chicken chunks with lettuce and vegetables stuffed in jessie  Steamed broccoli                                                 3 oz boneless/skinless chicken breast  1/2 cup brown rice with stir-fried vegetables    grapefruit  3 ounces of salmon, trout, or tuna  1 cup of steamed asparagus  1 small slice whole grain Italian bread  Broiled white or pink fish  3/4 cup whole wheat pasta with tomatoes  3/4 cup of roasted red peppers  3 oz. of extra lean (93/7) hamburger on a Arnold's  [place] Thins  Tossed salad with light dressing       Black bean or [place]can bean soup with grated mozzarella cheese    of a flour tortilla    3 ounces of grilled pork loin with 1 tbsp. of low-sugar barbeque sauce, 1 cup of green beans seasoned with pepper  Small dinner roll or   cup of grapefruit sections    1-2 cups of torn latisha    cup of garbanzo beans or diced skinless chicken breast  5-6 cherry tomatoes  1  tbsp. of crumbled feta cheese  1 tbsp. of roasted soy nuts  1 tsp. of olive oil and 2-3 Tbsp. of balsamic or red wine vinegar  Small whole-wheat dinner roll or   cup of cut up pineapple      150 Calories or Less Snack Ideas   1 hardboiled egg with   cup berries  1 small apple with 1 hardboiled egg  10 almonds with   cup berries  2 clementines with 1 light string cheese  1 light string cheese with   sliced apple  1 light string cheese wrapped in 2 slices of turkey  5 100% whole wheat crackers (e.g. Triscuit) with 1 light string cheese    c. cottage cheese with   cup fruit and 1 Tbsp sunflower seeds     cup cottage cheese with   of an avocado     can tuna fish with 1 cup sliced cucumbers   2 oz turkey slices with 1 cup carrots  1 container (6 oz) of low sugar (less than 10 grams of sugar) greek yogurt   3 Tablespoons of hummus with 1 cup sliced bell peppers, carrots or vegetable of your choice  4 Tablespoons ranch dip made with plain Greek Yogurt and 3 mini cucumbers  1/4 cup nuts (any kind)  1 Tablespoon peanut butter with 1 stalk celery   1 dill pickle wrapped in 1-2 slices of deli ham with 1 tsp of light cream cheese  5 100% whole wheat crackers (e.g. Triscuit) with 1 tsp each of guacamole/avocado topped with a cherry tomato - season with pepper or Everything Bagel Seasoning

## 2024-05-03 NOTE — LETTER
5/3/2024         RE: Chavez Ca  [address] [address]        Dear Colleague,    Thank you for referring your patient, Chavez Ca, to the [facility] [place]. Please see a copy of my visit note below.    Chavez Ca is a 53 year old who is being evaluated via a billable video visit.        How would you like to obtain your AVS? MyChart  If the video visit is dropped, the invitation should be resent by: Text to cell phone: [phone]  Will anyone else be joining your video visit? Yes -  at assisted living          Medical Weight Loss Initial Diet Evaluation  Assessment:  This patient was referred by Dr. Calle for MNT as treatment for Morbid obesity which is impacting T2DM.    Chavez is presenting today for a new weight management nutrition consultation. Pt has had an initial appointment with Dr. Rodriguez.    Weight loss medication: Phentermine. Topamax. Mounjaro (tirzepatide)    Personal Goals: learn what proper foods to eat.    Anthropometrics:    Pt's weight is 357 lbs  Initial weight: 357 lbs  Weight change: n/a  BMI: 55.51  Ideal body weight: 66.7 kg (146 lb 15.9 oz)  Adjusted ideal body weight: 104.8 kg (231 lb 0.6 oz)  Estimated RMR (Calhoun-St Thierryor equation):  2425 kcals x 1.2 (sedentary) = 2910 kcals (for weight maintenance)    Recommended Protein Intake:  grams of protein/day    Medical History:  Patient Active Problem List   Diagnosis     Physical deconditioning     Trigeminal neuralgia     VERN (obstructive sleep apnea)     Idiopathic edema     Depression     Subglottic stenosis     Dyslipidemia     Arthritis of both hands     Varicose veins of left lower extremity     Neck pain, chronic     Seasonal allergies     Osteoarthritis of spine with radiculopathy, lumbar region     Class 3 severe obesity due to excess calories with serious comorbidity and body mass index (BMI) of 50.0 to 59.9 in  "adult (H)     Condyloma acuminata     Hypokalemia     Poor dentition     Benign positional vertigo     Dyspnea, multifactorial     Insomnia due to medical condition     Skin pain     Facial rash (possible roseacea?)     Seborrheic dermatitis of scalp     Intertrigo of adipose folds     Sternocostal pain     Type 2 diabetes mellitus without complication, without long-term current use of insulin (H)     Moderate persistent asthma without complication     Primary mild osteoarthritis of both knees     Microalbuminuria     Elevated TSH     Chronic right-sided low back pain with right-sided sciatica     Preventative health care     Impaired mobility     Anxiety     Lymphedema of both lower extremities     Onychomycosis     Anhidrosis     Metabolic syndrome X      Diabetes: T2DM  HbA1c:    Hemoglobin A1C   Date Value Ref Range Status   01/25/2024 6.1 (H) 0.0 - 5.6 % Final     Comment:     Normal <5.7%   Prediabetes 5.7-6.4%    Diabetes 6.5% or higher     Note: Adopted from ADA consensus guidelines.   05/04/2021 6.0 (H) 0 - 5.6 % Final     Comment:     Normal <5.7% Prediabetes 5.7-6.4%  Diabetes 6.5% or higher - adopted from ADA   consensus guidelines.       Nutrition History:   Food allergies/intolerances/cultural or religous food customs: No     Weight loss history: Patient living in assisted living. Does not buy his own groceries. Facility will prepare meals for him. Reports he does not eat much. Grazes throughout the day.   Patient reports he wants a list of foods provided that he can have in his room as he does not eat \"full meals\".  Is down ~70 lbs since moving into assisted living.  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     Vitamins/Mineral Supplementation: see med list    Dietary Recall:  Breakfast: skips   Lunch: dry cereal   Dinner: grazes on snacks  Typical Snacks: yogurt, fruit, jovani cereal     Beverages:   Water      Exercise: hx with working physical labor     Nutrition Diagnosis (PES " statement):   Morbid obesity related to excessive energy intake as evidence by BMI of 55.51       Disordered Eating Pattern (NB 1.5) related to obsessive desire, intake of food exceeding RMR as evidenced by frequent grazing, skipping meals     Nutrition Intervention  Food and/or Nutrient Delivery   Placed emphasis on importance of developing a healthy meal routine, aiming for 3 meals a day and no snacks.  Discussed using a protein supplement as a meal replacement.  Nutrition Education   Discussed with patient how to build a meal: the importance of including a lean/low fat protein at each meal, include a source of vegetables at a minimum of lunch and dinner and limiting refine carbohydrate intake.  Educated on sources of lean protein, portion sizes, the amount of grams found in each source. Recommend patient to aim for 20-30g protein at each meal.  Nutrition Counseling   Encouraged importance of developing routine exercise for health benefits and weight loss.  Discussed mindful eating techniques such as eating off smaller places/bowls, taking 20-30 minutes to eat in a calm/relaxed environment without distractions.      Goals established by patient:   Increase protein intake at meals  Have consistency with meals (3 meals per day) avoid grazing habits   Pair al carb food choices with a protein source.      Handouts provided:  Intro to MWM  Snack pairing     Assessment/Plan:    Pt will follow up in 1 month(s) with bariatrician and 2 month(s) with dietitian.       Video-Visit Details    Type of service:  Video Visit    Video Start Time (time video started): 10:39 AM    Video End Time (time video stopped): 11:02 AM    Originating Location (pt. Location): Assisted Living      Distant Location (provider location):  On-site    Mode of Communication:  Video Conference via [facility]    Physician has received verbal consent for a Video Visit from the patient? Yes      Jennifer Leigh RD         Again, thank you for allowing  me to participate in the care of your patient.        Sincerely,        Jennifer Leigh RD

## 2024-05-03 NOTE — PROGRESS NOTES
Chavez Ca is a 53 year old who is being evaluated via a billable video visit.        How would you like to obtain your AVS? MyChart  If the video visit is dropped, the invitation should be resent by: Text to cell phone: [phone]  Will anyone else be joining your video visit? Yes -  at assisted living          Medical Weight Loss Initial Diet Evaluation  Assessment:  This patient was referred by Dr. Calle for MNT as treatment for Morbid obesity which is impacting T2DM.    Chavez is presenting today for a new weight management nutrition consultation. Pt has had an initial appointment with Dr. Rodriguez.    Weight loss medication: Phentermine. Topamax. Mounjaro (tirzepatide)    Personal Goals: learn what proper foods to eat.    Anthropometrics:    Pt's weight is 357 lbs  Initial weight: 357 lbs  Weight change: n/a  BMI: 55.51  Ideal body weight: 66.7 kg (146 lb 15.9 oz)  Adjusted ideal body weight: 104.8 kg (231 lb 0.6 oz)  Estimated RMR ([place]-St Jeor equation):  2425 kcals x 1.2 (sedentary) = 2910 kcals (for weight maintenance)    Recommended Protein Intake:  grams of protein/day    Medical History:  Patient Active Problem List   Diagnosis    Physical deconditioning    Trigeminal neuralgia    VERN (obstructive sleep apnea)    Idiopathic edema    Depression    Subglottic stenosis    Dyslipidemia    Arthritis of both hands    Varicose veins of left lower extremity    Neck pain, chronic    Seasonal allergies    Osteoarthritis of spine with radiculopathy, lumbar region    Class 3 severe obesity due to excess calories with serious comorbidity and body mass index (BMI) of 50.0 to 59.9 in adult (H)    Condyloma acuminata    Hypokalemia    Poor dentition    Benign positional vertigo    Dyspnea, multifactorial    Insomnia due to medical condition    Skin pain    Facial rash (possible roseacea?)    Seborrheic dermatitis of scalp    Intertrigo of adipose folds    Sternocostal pain     "Type 2 diabetes mellitus without complication, without long-term current use of insulin (H)    Moderate persistent asthma without complication    Primary mild osteoarthritis of both knees    Microalbuminuria    Elevated TSH    Chronic right-sided low back pain with right-sided sciatica    Preventative health care    Impaired mobility    Anxiety    Lymphedema of both lower extremities    Onychomycosis    Anhidrosis    Metabolic syndrome X      Diabetes: T2DM  HbA1c:    Hemoglobin A1C   Date Value Ref Range Status   01/25/2024 6.1 (H) 0.0 - 5.6 % Final     Comment:     Normal <5.7%   Prediabetes 5.7-6.4%    Diabetes 6.5% or higher     Note: Adopted from ADA consensus guidelines.   05/04/2021 6.0 (H) 0 - 5.6 % Final     Comment:     Normal <5.7% Prediabetes 5.7-6.4%  Diabetes 6.5% or higher - adopted from ADA   consensus guidelines.       Nutrition History:   Food allergies/intolerances/cultural or religous food customs: No     Weight loss history: Patient living in assisted living. Does not buy his own groceries. Facility will prepare meals for him. Reports he does not eat much. Grazes throughout the day.   Patient reports he wants a list of foods provided that he can have in his room as he does not eat \"full meals\".  Is down ~70 lbs since moving into assisted living.  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     Vitamins/Mineral Supplementation: see med list    Dietary Recall:  Breakfast: skips   Lunch: dry cereal   Dinner: grazes on snacks  Typical Snacks: yogurt, fruit, jovani cereal     Beverages:   Water      Exercise: hx with working physical labor     Nutrition Diagnosis (PES statement):   Morbid obesity related to excessive energy intake as evidence by BMI of 55.51       Disordered Eating Pattern (NB 1.5) related to obsessive desire, intake of food exceeding RMR as evidenced by frequent grazing, skipping meals     Nutrition Intervention  Food and/or Nutrient Delivery   Placed emphasis on " importance of developing a healthy meal routine, aiming for 3 meals a day and no snacks.  Discussed using a protein supplement as a meal replacement.  Nutrition Education   Discussed with patient how to build a meal: the importance of including a lean/low fat protein at each meal, include a source of vegetables at a minimum of lunch and dinner and limiting refine carbohydrate intake.  Educated on sources of lean protein, portion sizes, the amount of grams found in each source. Recommend patient to aim for 20-30g protein at each meal.  Nutrition Counseling   Encouraged importance of developing routine exercise for health benefits and weight loss.  Discussed mindful eating techniques such as eating off smaller places/bowls, taking 20-30 minutes to eat in a calm/relaxed environment without distractions.      Goals established by patient:   Increase protein intake at meals  Have consistency with meals (3 meals per day) avoid grazing habits   Pair al carb food choices with a protein source.      Handouts provided:  Intro to MWM  Snack pairing     Assessment/Plan:    Pt will follow up in 1 month(s) with bariatrician and 2 month(s) with dietitian.       Video-Visit Details    Type of service:  Video Visit    Video Start Time (time video started): 10:39 AM    Video End Time (time video stopped): 11:02 AM    Originating Location (pt. Location): Assisted Living      Distant Location (provider location):  On-site    Mode of Communication:  Video Conference via [facility]    Physician has received verbal consent for a Video Visit from the patient? Yes      Jennifer Leigh RD

## 2024-05-07 ENCOUNTER — DOCUMENTATION ONLY (OUTPATIENT)
Dept: SLEEP MEDICINE | Facility: CLINIC | Age: 54
End: 2024-05-07
Payer: COMMERCIAL

## 2024-05-15 ENCOUNTER — MYC REFILL (OUTPATIENT)
Dept: INTERNAL MEDICINE | Facility: CLINIC | Age: 54
End: 2024-05-15
Payer: COMMERCIAL

## 2024-05-15 DIAGNOSIS — G60.3 IDIOPATHIC PROGRESSIVE POLYNEUROPATHY: Chronic | ICD-10-CM

## 2024-05-15 DIAGNOSIS — M47.26 OSTEOARTHRITIS OF SPINE WITH RADICULOPATHY, LUMBAR REGION: ICD-10-CM

## 2024-05-15 RX ORDER — TRAMADOL HYDROCHLORIDE 50 MG/1
50 TABLET ORAL 2 TIMES DAILY
Qty: 60 TABLET | Refills: 0 | Status: SHIPPED | OUTPATIENT
Start: 2024-05-15 | End: 2024-06-11

## 2024-05-29 ENCOUNTER — OFFICE VISIT (OUTPATIENT)
Dept: FAMILY MEDICINE | Facility: CLINIC | Age: 54
End: 2024-05-29
Payer: COMMERCIAL

## 2024-05-29 VITALS
TEMPERATURE: 97.8 F | RESPIRATION RATE: 18 BRPM | DIASTOLIC BLOOD PRESSURE: 87 MMHG | WEIGHT: 315 LBS | SYSTOLIC BLOOD PRESSURE: 127 MMHG | OXYGEN SATURATION: 99 % | HEART RATE: 100 BPM | BODY MASS INDEX: 49.44 KG/M2 | HEIGHT: 67 IN

## 2024-05-29 DIAGNOSIS — G47.01 INSOMNIA DUE TO MEDICAL CONDITION: ICD-10-CM

## 2024-05-29 DIAGNOSIS — E66.01 CLASS 3 SEVERE OBESITY DUE TO EXCESS CALORIES WITH SERIOUS COMORBIDITY AND BODY MASS INDEX (BMI) OF 50.0 TO 59.9 IN ADULT (H): ICD-10-CM

## 2024-05-29 DIAGNOSIS — E88.810 METABOLIC SYNDROME X: ICD-10-CM

## 2024-05-29 DIAGNOSIS — E66.813 CLASS 3 SEVERE OBESITY DUE TO EXCESS CALORIES WITH SERIOUS COMORBIDITY AND BODY MASS INDEX (BMI) OF 50.0 TO 59.9 IN ADULT (H): ICD-10-CM

## 2024-05-29 DIAGNOSIS — E11.9 TYPE 2 DIABETES MELLITUS WITHOUT COMPLICATION, WITHOUT LONG-TERM CURRENT USE OF INSULIN (H): Primary | ICD-10-CM

## 2024-05-29 PROCEDURE — G2211 COMPLEX E/M VISIT ADD ON: HCPCS | Performed by: FAMILY MEDICINE

## 2024-05-29 PROCEDURE — 99214 OFFICE O/P EST MOD 30 MIN: CPT | Performed by: FAMILY MEDICINE

## 2024-05-29 RX ORDER — TIRZEPATIDE 5 MG/.5ML
5 INJECTION, SOLUTION SUBCUTANEOUS WEEKLY
Qty: 2 ML | Refills: 0 | Status: SHIPPED | OUTPATIENT
Start: 2024-05-29 | End: 2024-06-20

## 2024-05-29 RX ORDER — TIRZEPATIDE 7.5 MG/.5ML
7.5 INJECTION, SOLUTION SUBCUTANEOUS WEEKLY
Qty: 2 ML | Refills: 0 | Status: SHIPPED | OUTPATIENT
Start: 2024-05-29 | End: 2024-06-20

## 2024-05-29 RX ORDER — TIRZEPATIDE 2.5 MG/.5ML
2.5 INJECTION, SOLUTION SUBCUTANEOUS
COMMUNITY
End: 2024-05-30

## 2024-05-29 NOTE — PATIENT INSTRUCTIONS
To whom it may concern;    Chavez is a patient of my clinic and is participating in Comprehensive Weight Management (a metabolic health program) through  [facility].  We are working with him to improve overall nutrition.  We have given him the following nutritional goals given today:  - Dramatic increase of protein consumption.  His goal is 100 g/day if at all possible.  Examples of protein include eggs, dairy, meat of any type, fish or poultry.  He could also explore non-animal-based forms of protein including beans, milligrams etc.  - Increase vegetables and fruit.  I have asked him to target 6 cups of vegetables and whole pieces of fruit in a given day.    - Avoid processed and refined carbohydrates.  Examples of these include grains, pasta, rice, sugar.  - He also has a goal of not drinking calories with the exception of milk (if he wants to).  -Avoid foods that are fried whenever possible.  He has a nutritional goal of targeting baked or sautéed foods.    Lastly, we have made a number of medication changes.  Please see the attached medication list and ensure that his med list is up-to-date and accurate.    Sincerely,       Tony Calle MD  Diplomate - American Board [employer] Obesity [employer]

## 2024-05-29 NOTE — PROGRESS NOTES
Assessment & Plan   Problem List Items Addressed This Visit       Class 3 severe obesity due to excess calories with serious comorbidity and body mass index (BMI) of 50.0 to 59.9 in adult (H)    Relevant Medications    tirzepatide (MOUNJARO) 2.5 MG/0.5ML pen    tirzepatide (MOUNJARO) 5 MG/0.5ML pen    tirzepatide (MOUNJARO) 7.5 MG/0.5ML pen    Insomnia due to medical condition     Patient did not appreciate much difference as he went off of these medicines.  This was not the recommended adjustment of treatment.  However, given lack of change we will continue to withhold these medicines as they do have weight gain properties potentially.         Metabolic syndrome X    Type 2 diabetes mellitus without complication, without long-term current use of insulin (H) - Primary     53 year old year old male in clinic today to discuss treatment of the following conditions through diet and lifestyle modification and weight loss:  1. Type 2 diabetes mellitus without complication, without long-term current use of insulin (H)    2. Class 3 severe obesity due to excess calories with serious comorbidity and body mass index (BMI) of 50.0 to 59.9 in adult (H)    3. Metabolic syndrome X      The patient's weight loss result since the last visit was successful based on ongoing loss.  The patient expresses some frustration that his nursing home/assisted living is unable to meet his nutritional needs.  He has been purchasing extra food with his own funds which is a challenge financially.  Resulted in increasing physical activity.  No side effects thus far on tirzepatide.  The patient reflected on her discussion about medications and potential weight gain and has not been on the medications intended to help facilitate sleep over the past month.  He states that his symptoms at a regular time his sleep has not changed all that much.  I encouraged him to continue to develop a sleep routine and focus on sleep hygiene.  - Continue  "tirzepatide.  We will titrate with a plan to go to at least 10 mg/week.  - A note was provided for the staff at his living facility explaining his nutritional needs.  - For now, it is reasonable to continue to withhold medicines that he discontinued.  - We will plan a follow-up in 6-8 weeks.         Relevant Medications    tirzepatide (MOUNJARO) 2.5 MG/0.5ML pen    tirzepatide (MOUNJARO) 5 MG/0.5ML pen    tirzepatide (MOUNJARO) 7.5 MG/0.5ML pen        The longitudinal plan of care for the diagnosis(es)/condition(s) as documented were addressed during this visit. Due to the added complexity in care, I will continue to support Chavez in the subsequent management and with ongoing continuity of care.     BMI  Estimated body mass index is 54.57 kg/m  as calculated from the following:    Height as of this encounter: 1.708 m (5' 7.25\").    Weight as of this encounter: 159.2 kg (351 lb).   Weight management plan: Specific weight management program called Comprehensive Weight Management discussed    Subjective   Chavez is a 53 year old, presenting for the following health issues:  Weight Loss (/)        5/29/2024    11:34 AM   Additional Questions   Roomed by as   Accompanied by self         5/29/2024    11:34 AM   Patient Reported Additional Medications   Patient reports taking the following new medications no     Patient presents for treatment of chronic, comorbid conditions using intensive therapeutic lifestyle interventions including nutrition, physical activity, and behavior management.   - successes:  \"I only lost 6 lbs.\"   - struggles: improving nutrition at his home has been a challenge.  \"I had to buy my own food.\"  Supplementing with yogurt and cereal.    - he has stopped taking amitriptyline, zolpidem and trazodone.  Sleep was poor and remains poor because of disruption to sleep at his home.    - movement: continues to try to walk   - tracking/journaling: ad randal.    - following nutritional plan: limited by the " "food that is served at his institutional home.  Deviations from plan: cereal?   - hunger: Stable.    - medication benefits: unclear. side effects: none            Objective    /87 (BP Location: Left arm, Patient Position: Right side, Cuff Size: Adult Large)   Pulse 100   Temp 97.8  F (36.6  C) (Oral)   Resp 18   Ht 1.708 m (5' 7.25\")   Wt (!) 159.2 kg (351 lb)   SpO2 99%   BMI 54.57 kg/m    Body mass index is 54.57 kg/m .  Physical Exam  Nursing note reviewed.   Constitutional:       General: He is not in acute distress.     Appearance: Normal appearance. He is obese. He is not ill-appearing.   HENT:      Head: Normocephalic and atraumatic.   Eyes:      Extraocular Movements: Extraocular movements intact.      Conjunctiva/sclera: Conjunctivae normal.   Pulmonary:      Effort: Pulmonary effort is normal.   Neurological:      Mental Status: He is alert and oriented to person, place, and time.   Psychiatric:         Attention and Perception: Attention normal.         Mood and Affect: Mood normal.         Speech: Speech normal.         Thought Content: Thought content normal.                  Signed Electronically by: Tony Rodriguez MD    "

## 2024-05-29 NOTE — LETTER
May 29, 2024      Chavez Ca  [address] [facility] [address]    To whom it may concern;    Chavez is a patient of my clinic and is participating in Comprehensive Weight Management (a metabolic health program) through [facility].  We are working with him to improve overall nutrition.  We have given him the following nutritional goals given today:  - Dramatic increase of protein consumption.  His goal is 100 g/day if at all possible.  Examples of protein include eggs, dairy, meat of any type, fish or poultry.  He could also explore non-animal-based forms of protein including beans, milligrams etc.  - Increase vegetables and fruit.  I have asked him to target 6 cups of vegetables and whole pieces of fruit in a given day.    - Avoid processed and refined carbohydrates.  Examples of these include grains, pasta, rice, sugar.  - He also has a goal of not drinking calories with the exception of milk (if he wants to).  -Avoid foods that are fried whenever possible.  He has a nutritional goal of targeting baked or sautéed foods.    Lastly, we have made a number of medication changes.  Please see the attached medication list and ensure that his med list is up-to-date and accurate.    Sincerely,       Tony Calle MD  Diplomate - American Board of Obesity Medicine

## 2024-05-29 NOTE — ASSESSMENT & PLAN NOTE
53 year old year old male in clinic today to discuss treatment of the following conditions through diet and lifestyle modification and weight loss:  1. Type 2 diabetes mellitus without complication, without long-term current use of insulin (H)    2. Class 3 severe obesity due to excess calories with serious comorbidity and body mass index (BMI) of 50.0 to 59.9 in adult (H)    3. Metabolic syndrome X      The patient's weight loss result since the last visit was successful based on ongoing loss.  The patient expresses some frustration that his nursing home/assisted living is unable to meet his nutritional needs.  He has been purchasing extra food with his own funds which is a challenge financially.  Resulted in increasing physical activity.  No side effects thus far on tirzepatide.  The patient reflected on her discussion about medications and potential weight gain and has not been on the medications intended to help facilitate sleep over the past month.  He states that his symptoms at a regular time his sleep has not changed all that much.  I encouraged him to continue to develop a sleep routine and focus on sleep hygiene.  - Continue tirzepatide.  We will titrate with a plan to go to at least 10 mg/week.  - A note was provided for the staff at his living facility explaining his nutritional needs.  - For now, it is reasonable to continue to withhold medicines that he discontinued.  - We will plan a follow-up in 6-8 weeks.

## 2024-05-29 NOTE — ASSESSMENT & PLAN NOTE
Patient did not appreciate much difference as he went off of these medicines.  This was not the recommended adjustment of treatment.  However, given lack of change we will continue to withhold these medicines as they do have weight gain properties potentially.

## 2024-05-30 ENCOUNTER — OFFICE VISIT (OUTPATIENT)
Dept: INTERNAL MEDICINE | Facility: CLINIC | Age: 54
End: 2024-05-30
Payer: COMMERCIAL

## 2024-05-30 VITALS
BODY MASS INDEX: 49.44 KG/M2 | OXYGEN SATURATION: 99 % | TEMPERATURE: 98.3 F | SYSTOLIC BLOOD PRESSURE: 118 MMHG | DIASTOLIC BLOOD PRESSURE: 78 MMHG | WEIGHT: 315 LBS | HEART RATE: 100 BPM | RESPIRATION RATE: 18 BRPM | HEIGHT: 67 IN

## 2024-05-30 DIAGNOSIS — F32.A DEPRESSION, UNSPECIFIED DEPRESSION TYPE: ICD-10-CM

## 2024-05-30 DIAGNOSIS — F11.90 CHRONIC, CONTINUOUS USE OF OPIOIDS: ICD-10-CM

## 2024-05-30 DIAGNOSIS — E11.9 TYPE 2 DIABETES MELLITUS WITHOUT COMPLICATION, WITHOUT LONG-TERM CURRENT USE OF INSULIN (H): ICD-10-CM

## 2024-05-30 DIAGNOSIS — Z79.899 MEDICATION MANAGEMENT: Primary | ICD-10-CM

## 2024-05-30 DIAGNOSIS — G47.33 OSA (OBSTRUCTIVE SLEEP APNEA): ICD-10-CM

## 2024-05-30 DIAGNOSIS — E66.01 MORBID OBESITY (H): ICD-10-CM

## 2024-05-30 LAB
AMPHETAMINES UR QL SCN: NORMAL
BARBITURATES UR QL SCN: NORMAL
BENZODIAZ UR QL SCN: NORMAL
BZE UR QL SCN: NORMAL
CANNABINOIDS UR QL SCN: NORMAL
FENTANYL UR QL: NORMAL
HBA1C MFR BLD: 5.8 % (ref 0–5.6)
OPIATES UR QL SCN: NORMAL
PCP QUAL URINE (ROCHE): NORMAL

## 2024-05-30 PROCEDURE — 80307 DRUG TEST PRSMV CHEM ANLYZR: CPT | Performed by: NURSE PRACTITIONER

## 2024-05-30 PROCEDURE — 83036 HEMOGLOBIN GLYCOSYLATED A1C: CPT | Performed by: NURSE PRACTITIONER

## 2024-05-30 PROCEDURE — G2211 COMPLEX E/M VISIT ADD ON: HCPCS | Performed by: NURSE PRACTITIONER

## 2024-05-30 PROCEDURE — 36415 COLL VENOUS BLD VENIPUNCTURE: CPT | Performed by: NURSE PRACTITIONER

## 2024-05-30 PROCEDURE — 99214 OFFICE O/P EST MOD 30 MIN: CPT | Performed by: NURSE PRACTITIONER

## 2024-05-30 ASSESSMENT — ANXIETY QUESTIONNAIRES
3. WORRYING TOO MUCH ABOUT DIFFERENT THINGS: NEARLY EVERY DAY
IF YOU CHECKED OFF ANY PROBLEMS ON THIS QUESTIONNAIRE, HOW DIFFICULT HAVE THESE PROBLEMS MADE IT FOR YOU TO DO YOUR WORK, TAKE CARE OF THINGS AT HOME, OR GET ALONG WITH OTHER PEOPLE: EXTREMELY DIFFICULT
7. FEELING AFRAID AS IF SOMETHING AWFUL MIGHT HAPPEN: NEARLY EVERY DAY
8. IF YOU CHECKED OFF ANY PROBLEMS, HOW DIFFICULT HAVE THESE MADE IT FOR YOU TO DO YOUR WORK, TAKE CARE OF THINGS AT HOME, OR GET ALONG WITH OTHER PEOPLE?: EXTREMELY DIFFICULT
GAD7 TOTAL SCORE: 21
1. FEELING NERVOUS, ANXIOUS, OR ON EDGE: NEARLY EVERY DAY
6. BECOMING EASILY ANNOYED OR IRRITABLE: NEARLY EVERY DAY
5. BEING SO RESTLESS THAT IT IS HARD TO SIT STILL: NEARLY EVERY DAY
7. FEELING AFRAID AS IF SOMETHING AWFUL MIGHT HAPPEN: NEARLY EVERY DAY
GAD7 TOTAL SCORE: 21
4. TROUBLE RELAXING: NEARLY EVERY DAY
2. NOT BEING ABLE TO STOP OR CONTROL WORRYING: NEARLY EVERY DAY

## 2024-05-30 NOTE — PATIENT INSTRUCTIONS
If you are looking to establish with a different sleep medicine provider I placed a referral for you.  Please call [phone] to make an appointment.    Recheck hemoglobin A1c today.    We need to do a urine drug screen today for your tramadol.    Lets have you stay on the duloxetine for now.    No other changes to prescriptions.    Follow-up in 1 month

## 2024-05-30 NOTE — PROGRESS NOTES
Assessment & Plan     Chronic, continuous use of opioids  Continues on tramadol 50 mg tablet twice daily.  About 2 start pool therapy twice per week for his chronic lower extremity pain  - Urine Drug Screen; Future  - Urine Drug Screen      Type 2 diabetes mellitus without complication, without long-term current use of insulin (H)  Recently started on Mounjaro for his diabetes and morbid obesity.  Recheck A1c.  Appears to be tolerating that medication well  - HEMOGLOBIN A1C; Future  - HEMOGLOBIN A1C    Morbid obesity (H)  Now established at the weight loss clinic.  On Mounjaro    VERN (obstructive sleep apnea)  Having a lot of issues with insurance coverage for his CPAP machine.  He wants to establish care with a new sleep medicine clinic closer to the [facility]  - Adult Sleep Eval & Management Referral; Future    Depression, unspecified depression type  He has been dealing with a lot of psychosocial stressors.  He is not entirely satisfied with his living situation at his group home/assisted living facility.  He has been dealing with issues as it relates to medical bills, etc.    Plan is to keep him on the duloxetine for now.  We will continue to reassess.  Follow-up in 1 month for review of multiple medical issues.    Of note, patient had me sign a written order so that he could have 4 showers per week with help from his assisted living facility staff.  This was done for him today.  Diagnosis: Excessive diaphoresis.    Patient Instructions   If you are looking to establish with a different sleep medicine provider I placed a referral for you.  Please call [phone] to make an appointment.    Recheck hemoglobin A1c today.    We need to do a urine drug screen today for your tramadol.    Lets have you stay on the duloxetine for now.    No other changes to prescriptions.    Follow-up in 1 month    The longitudinal plan of care for the diagnosis(es)/condition(s) as documented were addressed during this visit. Due  "to the added complexity in care, I will continue to support Chavez in the subsequent management and with ongoing continuity of care.                  Subjective   Chavez is a 53 year old, presenting for the following health issues:    Follow Up (1 month follow up, discuss refills)      5/30/2024    10:38 AM   Additional Questions   Roomed by Rhiannon POWERS     History of Present Illness       Back Pain:  He presents for follow up of back pain. Patient's back pain is a chronic problem.  Location of back pain:  Right lower back  Description of back pain: sharp  Back pain spreads: nowhere    Since patient first noticed back pain, pain is: rapidly worsening  Does back pain interfere with his job:  Not applicable       Mental Health Follow-up:  Patient presents to follow-up on Depression & Anxiety.Patient's depression since last visit has been:  Worse  The patient is not having other symptoms associated with depression.  Patient's anxiety since last visit has been:  Worse  The patient is not having other symptoms associated with anxiety.  Any significant life events: financial concerns  Patient is not feeling anxious or having panic attacks.  Patient has no concerns about alcohol or drug use.    Reason for visit:  I need refills added to my meds I want to disguss upping my tramadol    He eats 0-1 servings of fruits and vegetables daily.He consumes 0 sweetened beverage(s) daily.He exercises with enough effort to increase his heart rate 30 to 60 minutes per day.  He exercises with enough effort to increase his heart rate 5 days per week.   He is taking medications regularly.           Objective    /78 (BP Location: Right arm, Patient Position: Sitting, Cuff Size: Adult Regular)   Pulse 100   Temp 98.3  F (36.8  C)   Resp 18   Ht 1.708 m (5' 7.25\")   Wt (!) 160.1 kg (353 lb)   SpO2 99%   BMI 54.88 kg/m    Body mass index is 54.88 kg/m .  Physical Exam   Patient is morbidly obese.  He ruminates on a variety of " different subjects, and frequently has to be redirected        Signed Electronically by: Hebert Hdez, CNP

## 2024-06-11 ENCOUNTER — MYC REFILL (OUTPATIENT)
Dept: INTERNAL MEDICINE | Facility: CLINIC | Age: 54
End: 2024-06-11
Payer: COMMERCIAL

## 2024-06-11 DIAGNOSIS — E66.813 CLASS 3 SEVERE OBESITY DUE TO EXCESS CALORIES WITH SERIOUS COMORBIDITY AND BODY MASS INDEX (BMI) OF 50.0 TO 59.9 IN ADULT (H): ICD-10-CM

## 2024-06-11 DIAGNOSIS — G60.3 IDIOPATHIC PROGRESSIVE POLYNEUROPATHY: Chronic | ICD-10-CM

## 2024-06-11 DIAGNOSIS — M47.26 OSTEOARTHRITIS OF SPINE WITH RADICULOPATHY, LUMBAR REGION: ICD-10-CM

## 2024-06-11 DIAGNOSIS — E66.01 CLASS 3 SEVERE OBESITY DUE TO EXCESS CALORIES WITH SERIOUS COMORBIDITY AND BODY MASS INDEX (BMI) OF 50.0 TO 59.9 IN ADULT (H): ICD-10-CM

## 2024-06-12 RX ORDER — TRAMADOL HYDROCHLORIDE 50 MG/1
50 TABLET ORAL 2 TIMES DAILY
Qty: 60 TABLET | Refills: 0 | Status: SHIPPED | OUTPATIENT
Start: 2024-06-12 | End: 2024-07-10

## 2024-06-12 RX ORDER — TOPIRAMATE 100 MG/1
200 TABLET, FILM COATED ORAL 2 TIMES DAILY
Qty: 360 TABLET | Refills: 0 | Status: SHIPPED | OUTPATIENT
Start: 2024-06-12 | End: 2024-09-12

## 2024-06-13 ENCOUNTER — TRANSFERRED RECORDS (OUTPATIENT)
Dept: HEALTH INFORMATION MANAGEMENT | Facility: CLINIC | Age: 54
End: 2024-06-13
Payer: COMMERCIAL

## 2024-07-06 ENCOUNTER — HEALTH MAINTENANCE LETTER (OUTPATIENT)
Age: 54
End: 2024-07-06

## 2024-07-10 ENCOUNTER — MYC REFILL (OUTPATIENT)
Dept: INTERNAL MEDICINE | Facility: CLINIC | Age: 54
End: 2024-07-10
Payer: COMMERCIAL

## 2024-07-10 DIAGNOSIS — R60.0 LOWER EXTREMITY EDEMA: ICD-10-CM

## 2024-07-10 DIAGNOSIS — G60.3 IDIOPATHIC PROGRESSIVE POLYNEUROPATHY: Chronic | ICD-10-CM

## 2024-07-10 DIAGNOSIS — M47.26 OSTEOARTHRITIS OF SPINE WITH RADICULOPATHY, LUMBAR REGION: ICD-10-CM

## 2024-07-10 DIAGNOSIS — F32.A DEPRESSION, UNSPECIFIED DEPRESSION TYPE: ICD-10-CM

## 2024-07-10 DIAGNOSIS — R60.9 IDIOPATHIC EDEMA: ICD-10-CM

## 2024-07-10 RX ORDER — FUROSEMIDE 20 MG
60 TABLET ORAL 2 TIMES DAILY
Qty: 540 TABLET | Refills: 3 | OUTPATIENT
Start: 2024-07-10

## 2024-07-10 RX ORDER — POTASSIUM CHLORIDE 1500 MG/1
40 TABLET, EXTENDED RELEASE ORAL DAILY
Qty: 180 TABLET | Refills: 2 | Status: SHIPPED | OUTPATIENT
Start: 2024-07-10

## 2024-07-11 RX ORDER — DULOXETIN HYDROCHLORIDE 60 MG/1
60 CAPSULE, DELAYED RELEASE ORAL DAILY
Qty: 90 CAPSULE | Refills: 0 | Status: SHIPPED | OUTPATIENT
Start: 2024-07-11 | End: 2024-09-18

## 2024-07-11 RX ORDER — TRAMADOL HYDROCHLORIDE 50 MG/1
50 TABLET ORAL 2 TIMES DAILY
Qty: 60 TABLET | Refills: 0 | Status: SHIPPED | OUTPATIENT
Start: 2024-07-11 | End: 2024-08-15

## 2024-07-14 ASSESSMENT — ANXIETY QUESTIONNAIRES
5. BEING SO RESTLESS THAT IT IS HARD TO SIT STILL: MORE THAN HALF THE DAYS
8. IF YOU CHECKED OFF ANY PROBLEMS, HOW DIFFICULT HAVE THESE MADE IT FOR YOU TO DO YOUR WORK, TAKE CARE OF THINGS AT HOME, OR GET ALONG WITH OTHER PEOPLE?: EXTREMELY DIFFICULT
6. BECOMING EASILY ANNOYED OR IRRITABLE: MORE THAN HALF THE DAYS
4. TROUBLE RELAXING: MORE THAN HALF THE DAYS
3. WORRYING TOO MUCH ABOUT DIFFERENT THINGS: MORE THAN HALF THE DAYS
1. FEELING NERVOUS, ANXIOUS, OR ON EDGE: MORE THAN HALF THE DAYS
IF YOU CHECKED OFF ANY PROBLEMS ON THIS QUESTIONNAIRE, HOW DIFFICULT HAVE THESE PROBLEMS MADE IT FOR YOU TO DO YOUR WORK, TAKE CARE OF THINGS AT HOME, OR GET ALONG WITH OTHER PEOPLE: EXTREMELY DIFFICULT
7. FEELING AFRAID AS IF SOMETHING AWFUL MIGHT HAPPEN: MORE THAN HALF THE DAYS
GAD7 TOTAL SCORE: 14
7. FEELING AFRAID AS IF SOMETHING AWFUL MIGHT HAPPEN: MORE THAN HALF THE DAYS
GAD7 TOTAL SCORE: 14
2. NOT BEING ABLE TO STOP OR CONTROL WORRYING: MORE THAN HALF THE DAYS

## 2024-07-15 ENCOUNTER — OFFICE VISIT (OUTPATIENT)
Dept: INTERNAL MEDICINE | Facility: CLINIC | Age: 54
End: 2024-07-15
Payer: COMMERCIAL

## 2024-07-15 VITALS
SYSTOLIC BLOOD PRESSURE: 112 MMHG | RESPIRATION RATE: 18 BRPM | HEIGHT: 67 IN | OXYGEN SATURATION: 93 % | DIASTOLIC BLOOD PRESSURE: 70 MMHG | TEMPERATURE: 98.3 F | WEIGHT: 315 LBS | HEART RATE: 96 BPM | BODY MASS INDEX: 49.44 KG/M2

## 2024-07-15 DIAGNOSIS — M47.26 OSTEOARTHRITIS OF SPINE WITH RADICULOPATHY, LUMBAR REGION: ICD-10-CM

## 2024-07-15 DIAGNOSIS — F32.A DEPRESSION, UNSPECIFIED DEPRESSION TYPE: ICD-10-CM

## 2024-07-15 DIAGNOSIS — R53.81 PHYSICAL DECONDITIONING: ICD-10-CM

## 2024-07-15 DIAGNOSIS — E88.810 METABOLIC SYNDROME X: ICD-10-CM

## 2024-07-15 DIAGNOSIS — R07.89 STERNOCOSTAL PAIN: ICD-10-CM

## 2024-07-15 DIAGNOSIS — Z79.899 MEDICATION MANAGEMENT: Primary | ICD-10-CM

## 2024-07-15 DIAGNOSIS — K08.9 POOR DENTITION: ICD-10-CM

## 2024-07-15 PROCEDURE — 99214 OFFICE O/P EST MOD 30 MIN: CPT | Performed by: NURSE PRACTITIONER

## 2024-07-15 PROCEDURE — G2211 COMPLEX E/M VISIT ADD ON: HCPCS | Performed by: NURSE PRACTITIONER

## 2024-07-15 RX ORDER — TIRZEPATIDE 7.5 MG/.5ML
7.5 INJECTION, SOLUTION SUBCUTANEOUS WEEKLY
COMMUNITY
Start: 2024-06-26 | End: 2024-09-18

## 2024-07-15 RX ORDER — CELECOXIB 200 MG/1
200 CAPSULE ORAL 2 TIMES DAILY
Qty: 60 CAPSULE | Refills: 3 | Status: SHIPPED | OUTPATIENT
Start: 2024-07-15 | End: 2024-09-18

## 2024-07-15 NOTE — LETTER
July 15, 2024      Chavez Ca  [address] [facility] [address]        To Whom It May Concern:    Chavez is cleared to return to pool therapy. His chest discomfort is not of major concern and is not felt to be of cardiac etiology.       Sincerely,      Hebert Hdez

## 2024-07-15 NOTE — PATIENT INSTRUCTIONS
Note provided so that you can resume pool therapy.    Start Celebrex 200 mg twice daily.  You can take this with your tramadol.    Follow-up with weight loss clinic to discuss increasing Mounjaro.    Follow-up with sleep medicine team.    Follow-up in 3 months with me

## 2024-07-16 NOTE — PROGRESS NOTES
Assessment & Plan     Osteoarthritis of spine with radiculopathy, lumbar region  He continues on tramadol 50 mg twice daily.  He asked that I increase the dose today, due to pain in his bilateral knees as well as spine.    He is getting injections in his knees, but the effect has not yet taken hold.    I told him I do not want to titrate up on his opioid pain medication.  He has tried Tylenol without much success.    I told him we could try using Celebrex.  He was warned about the risks associated to NSAID use, including gastrointestinal bleeding, impaired kidney function, and cardiac risks.  He accepts these risks and wishes to proceed.      - celecoxib (CELEBREX) 200 MG capsule; Take 1 capsule (200 mg) by mouth 2 times daily    Physical deconditioning  He is still in the process of trying to get an electric scooter.  His  is working on this with him.  He walks with a walker currently.  Although it is painful for him to ambulate, I think it is beneficial for him to be as active as possible as this will help with his weight loss    Sternocostal pain  He was doing some pool therapy for his knees.  He complained of an acute flare of his sternocostal pain and the physical therapists told him he could not resume his pool therapy unless he has a note from his PCP.    Given that he has a chronic issue with sternal costal pain intermittently, patient feels strongly that this is not a cardiac issue.  I tend to agree.  Physical exam is unremarkable today.  Normal vital signs.  No dyspnea on exertion.    Medication management  Given his history of type 2 diabetes, he may benefit from a statin.  We will revisit this discussion when he comes back and sees me later this fall    Metabolic syndrome X  Now seeing the weight loss clinic.  Titrating up on his Mounjaro.  I congratulated him on some significant weight loss.    Poor dentition  He did see the dentist 1 time for cleaning.  It sounds like he is having some  "issues with insurance coverage (?).    Depression, unspecified depression type  His mood is improved a bit on the Cymbalta 60 mg.  I was hoping that this would also help with some of his chronic pain issues.  However, he continues to have some significant complaints as it pertains to his assisted living/group home.  As long as he continues to have issues with his living situation, I think his mood is going to continue to be a problem.    Patient Instructions   Note provided so that you can resume pool therapy.    Start Celebrex 200 mg twice daily.  You can take this with your tramadol.    Follow-up with weight loss clinic to discuss increasing Mounjaro.    Follow-up with sleep medicine team.    Follow-up in 3 months with me        The longitudinal plan of care for the diagnosis(es)/condition(s) as documented were addressed during this visit. Due to the added complexity in care, I will continue to support Chavez in the subsequent management and with ongoing continuity of care.                  Subjective   Chavez is a 53 year old, presenting for the following health issues:  Follow Up (Discuss Tramadol dosages increase  - weight down 30 lbs)      7/15/2024     3:42 PM   Additional Questions   Roomed by Rhiannon PETERSON     Here for routine follow-up appointment.  He continues to lose weight on the Mounjaro and is very satisfied with that.    Continues to have problems getting his electric scooter and is dissatisfied with his living situation.  He does not think that the administrative staff at his assisted living facility do a very good job.    He has an appointment with sleep medicine coming up soon.    Objective    /70 (BP Location: Right arm, Patient Position: Sitting, Cuff Size: Adult Regular)   Pulse 96   Temp 98.3  F (36.8  C)   Resp 18   Ht 1.708 m (5' 7.25\")   Wt 146.5 kg (323 lb)   SpO2 93%   BMI 50.21 kg/m    Body mass index is 50.21 kg/m .  Physical Exam   Patient is  morbidly obese but is " nontoxic appearing and in no acute distress              Signed Electronically by: Hebert Hdez, CNP

## 2024-07-17 ENCOUNTER — OFFICE VISIT (OUTPATIENT)
Dept: FAMILY MEDICINE | Facility: CLINIC | Age: 54
End: 2024-07-17
Payer: COMMERCIAL

## 2024-07-17 VITALS
BODY MASS INDEX: 49.44 KG/M2 | HEIGHT: 67 IN | RESPIRATION RATE: 20 BRPM | SYSTOLIC BLOOD PRESSURE: 118 MMHG | HEART RATE: 99 BPM | WEIGHT: 315 LBS | OXYGEN SATURATION: 93 % | DIASTOLIC BLOOD PRESSURE: 81 MMHG | TEMPERATURE: 97.6 F

## 2024-07-17 DIAGNOSIS — R79.89 ELEVATED TSH: ICD-10-CM

## 2024-07-17 DIAGNOSIS — E11.9 TYPE 2 DIABETES MELLITUS WITHOUT COMPLICATION, WITHOUT LONG-TERM CURRENT USE OF INSULIN (H): Primary | ICD-10-CM

## 2024-07-17 DIAGNOSIS — E78.5 DYSLIPIDEMIA: ICD-10-CM

## 2024-07-17 DIAGNOSIS — E66.813 CLASS 3 SEVERE OBESITY DUE TO EXCESS CALORIES WITH SERIOUS COMORBIDITY AND BODY MASS INDEX (BMI) OF 50.0 TO 59.9 IN ADULT (H): ICD-10-CM

## 2024-07-17 DIAGNOSIS — E66.01 CLASS 3 SEVERE OBESITY DUE TO EXCESS CALORIES WITH SERIOUS COMORBIDITY AND BODY MASS INDEX (BMI) OF 50.0 TO 59.9 IN ADULT (H): ICD-10-CM

## 2024-07-17 PROCEDURE — G2211 COMPLEX E/M VISIT ADD ON: HCPCS | Performed by: FAMILY MEDICINE

## 2024-07-17 PROCEDURE — 99214 OFFICE O/P EST MOD 30 MIN: CPT | Performed by: FAMILY MEDICINE

## 2024-07-17 RX ORDER — TIRZEPATIDE 10 MG/.5ML
10 INJECTION, SOLUTION SUBCUTANEOUS WEEKLY
Qty: 2 ML | Refills: 3 | Status: SHIPPED | OUTPATIENT
Start: 2024-07-17 | End: 2024-09-25

## 2024-07-17 NOTE — ASSESSMENT & PLAN NOTE
53 year old year old male in clinic today to discuss treatment of the following conditions through diet and lifestyle modification and weight loss:  1. Type 2 diabetes mellitus without complication, without long-term current use of insulin (H)    2. Class 3 severe obesity due to excess calories with serious comorbidity and body mass index (BMI) of 50.0 to 59.9 in adult (H)    3. Dyslipidemia    4. Elevated TSH      The patient's weight loss result since the last visit was successful based on ongoing weight loss.  He has done well on mounjaro. Minimal side effects.  He struggles to get his facility to provide nutrition that fits with his needs.  Nevertheless, he has been losing weight. Minimal side effects. We will increase to 10 mg which might be a maintenance dose.  We will plan a follow up in 3-6 months.  Labs reviewed from PCP and treatment team.     BMI: Body mass index is 50.2 kg/m .  Ideal body weight: 66.7 kg (146 lb 15.9 oz)  Adjusted ideal body weight: 98.6 kg (217 lb 5.7 oz)    Current therapies:    Medications: YES mounjaro   - Starting weight for GLP1 receptor agonist: 357 lbs   - Total weight loss: 35 lbs (9.8%)    Nutritional goals/strategies: reviewed.   - caloric restriction: Yes   - time restriction: NO   - diet (macronutrient) framework: high protein/low carbohydrate     Movement:   - remains limited.

## 2024-07-17 NOTE — PROGRESS NOTES
Assessment & Plan   Problem List Items Addressed This Visit       Class 3 severe obesity due to excess calories with serious comorbidity and body mass index (BMI) of 50.0 to 59.9 in adult (H)    Relevant Medications    tirzepatide (MOUNJARO) 10 MG/0.5ML pen    Dyslipidemia    Elevated TSH    Type 2 diabetes mellitus without complication, without long-term current use of insulin (H) - Primary     53 year old year old male in clinic today to discuss treatment of the following conditions through diet and lifestyle modification and weight loss:  1. Type 2 diabetes mellitus without complication, without long-term current use of insulin (H)    2. Class 3 severe obesity due to excess calories with serious comorbidity and body mass index (BMI) of 50.0 to 59.9 in adult (H)    3. Dyslipidemia    4. Elevated TSH      The patient's weight loss result since the last visit was successful based on ongoing weight loss.  He has done well on mounjaro. Minimal side effects.  He struggles to get his facility to provide nutrition that fits with his needs.  Nevertheless, he has been losing weight. Minimal side effects. We will increase to 10 mg which might be a maintenance dose.  We will plan a follow up in 3-6 months.  Labs reviewed from PCP and treatment team.     BMI: Body mass index is 50.2 kg/m .  Ideal body weight: 66.7 kg (146 lb 15.9 oz)  Adjusted ideal body weight: 98.6 kg (217 lb 5.7 oz)    Current therapies:    Medications: YES mounjaro   - Starting weight for GLP1 receptor agonist: 357 lbs   - Total weight loss: 35 lbs (9.8%)    Nutritional goals/strategies: reviewed.   - caloric restriction: Yes   - time restriction: NO   - diet (macronutrient) framework: high protein/low carbohydrate     Movement:   - remains limited.          Relevant Medications    tirzepatide (MOUNJARO) 10 MG/0.5ML pen     The longitudinal plan of care for the diagnosis(es)/condition(s) as documented were addressed during this visit. Due to the added  "complexity in care, I will continue to support Chavez in the subsequent management and with ongoing continuity of care.    Subjective   Chavez is a 53 year old, presenting for the following health issues:  Weight Loss (Follow-up )        7/17/2024     8:55 AM   Additional Questions   Roomed by xl     Patient presents for treatment of chronic, comorbid conditions using intensive therapeutic lifestyle interventions including nutrition, physical activity, and behavior management.   - continues to struggle to get meals that fit with his meal plan at his nursing home.  Menus have not been posted recently. He has to demand veggies.     - physical activity is limited.  He has struggled to get a scooter.  He tries to remain active.    - hunger: Stable. Improved.   - medication benefits: helpful.  side effects: none                 Objective    /81 (BP Location: Left arm, Patient Position: Sitting, Cuff Size: Adult Large)   Pulse 99   Temp 97.6  F (36.4  C) (Oral)   Resp 20   Ht 1.708 m (5' 7.25\")   Wt 146.5 kg (322 lb 14.4 oz)   SpO2 93%   BMI 50.20 kg/m    Body mass index is 50.2 kg/m .  Physical Exam  Nursing note reviewed.   Constitutional:       General: He is not in acute distress.     Appearance: Normal appearance. He is obese. He is not ill-appearing.   HENT:      Head: Normocephalic and atraumatic.   Eyes:      Extraocular Movements: Extraocular movements intact.      Conjunctiva/sclera: Conjunctivae normal.   Pulmonary:      Effort: Pulmonary effort is normal.   Neurological:      Mental Status: He is alert and oriented to person, place, and time.   Psychiatric:         Attention and Perception: Attention normal.         Mood and Affect: Mood normal.         Speech: Speech normal.         Thought Content: Thought content normal.                    Signed Electronically by: Tony Rodriguez MD    "

## 2024-07-18 ENCOUNTER — OFFICE VISIT (OUTPATIENT)
Dept: ORTHOPEDICS | Facility: CLINIC | Age: 54
End: 2024-07-18
Payer: COMMERCIAL

## 2024-07-18 DIAGNOSIS — M17.0 PRIMARY OSTEOARTHRITIS OF BOTH KNEES: Primary | ICD-10-CM

## 2024-07-18 PROCEDURE — 99213 OFFICE O/P EST LOW 20 MIN: CPT | Performed by: FAMILY MEDICINE

## 2024-07-18 NOTE — LETTER
7/18/2024      RE: Chavez Ca  [address] [facility] [address]     Dear Colleague,    Thank you for referring your patient, Chavez Ca, to the [facility] SPORTS MEDICINE [facility]. Please see a copy of my visit note below.      [facility]ERY [facility]  SPORTS & ORTHOPEDIC CLINIC VISIT     Jul 18, 2024        ASSESSMENT & PLAN    54 yo with bilateral knee pain due to osteoarthritis. Had initial improvement with steroid injection, but effectiveness has waned    Reviewed imaging and assessment with patient in detail  Encouraged him to continue PT, and pool PT  Will refer to orthotics for knee brace to be used for comfort and stability  He is interested in trying viscosupplementation injection. Will pursue prior authorization    Antione Abrams MD  [facility] MEDICINE [facility]    -----  Chief Complaint   Patient presents with     Left Knee - Follow Up       SUBJECTIVE  Chavez Ca is a/an 53 year old male who is seen for follow up of bilateral knee and leg pain.     The patient is seen by themselves.    Date of injury: Chronic   Date of Last Visit: 3/12/24   Symptoms: worsened  Worsened by: Everything   Better with: Compression sleeves   Treatment to date: Leg wrapping, compression socks, CSI 3/12/24 that provided 0 days of relief.   Associated symptoms: swelling        REVIEW OF SYSTEMS:  See HPI     OBJECTIVE:  There were no vitals taken for this visit.     No significant exam this visit.     RADIOLOGY:    No new imaging this visit        Again, thank you for allowing me to participate in the care of your patient.      Sincerely,    Antione Abrams MD

## 2024-07-18 NOTE — PROGRESS NOTES
[facility] CLINICS AND SURGERY CENTER  SPORTS & ORTHOPEDIC CLINIC VISIT     Jul 18, 2024        ASSESSMENT & PLAN    54 yo with bilateral knee pain due to osteoarthritis. Had initial improvement with steroid injection, but effectiveness has waned    Reviewed imaging and assessment with patient in detail  Encouraged him to continue PT, and pool PT  Will refer to orthotics for knee brace to be used for comfort and stability  He is interested in trying viscosupplementation injection. Will pursue prior authorization    Antione Abrams MD  [facility] SPORTS MEDICINE CLINIC [place]    -----  Chief Complaint   Patient presents with    Left Knee - Follow Up       SUBJECTIVE  Chavez Trevor Ca is a/an 53 year old male who is seen for follow up of bilateral knee and leg pain.     The patient is seen by themselves.    Date of injury: Chronic   Date of Last Visit: 3/12/24   Symptoms: worsened  Worsened by: Everything   Better with: Compression sleeves   Treatment to date: Leg wrapping, compression socks, CSI 3/12/24 that provided 0 days of relief.   Associated symptoms: swelling        REVIEW OF SYSTEMS:  See HPI     OBJECTIVE:  There were no vitals taken for this visit.     No significant exam this visit.     RADIOLOGY:    No new imaging this visit

## 2024-08-15 ENCOUNTER — MYC REFILL (OUTPATIENT)
Dept: INTERNAL MEDICINE | Facility: CLINIC | Age: 54
End: 2024-08-15
Payer: COMMERCIAL

## 2024-08-15 DIAGNOSIS — G60.3 IDIOPATHIC PROGRESSIVE POLYNEUROPATHY: Chronic | ICD-10-CM

## 2024-08-15 DIAGNOSIS — M47.26 OSTEOARTHRITIS OF SPINE WITH RADICULOPATHY, LUMBAR REGION: ICD-10-CM

## 2024-08-15 RX ORDER — TRAMADOL HYDROCHLORIDE 50 MG/1
50 TABLET ORAL 2 TIMES DAILY
Qty: 60 TABLET | Refills: 0 | Status: SHIPPED | OUTPATIENT
Start: 2024-08-15 | End: 2024-09-12

## 2024-08-22 ENCOUNTER — TELEPHONE (OUTPATIENT)
Dept: INTERNAL MEDICINE | Facility: CLINIC | Age: 54
End: 2024-08-22
Payer: COMMERCIAL

## 2024-08-22 DIAGNOSIS — G50.0 TRIGEMINAL NEURALGIA: Primary | ICD-10-CM

## 2024-08-22 NOTE — LETTER
August 22, 2024      Chavez Ca  [address] [facility] [address]        ORDERS:    Okay for patient to administer his own medications.

## 2024-08-22 NOTE — TELEPHONE ENCOUNTER
Outgoing call to Pauly, she needs PCP to sign an order expressing it is ok for the patient to administer his own medications. He has had a recent move and was previously administering his own medications and now requires an order that allows their staff to permit this. Please fax order to [phone]    Outgoing call to patient to follow up. Pt expressed frustrations as he did not call and was not aware his assisted living calling.

## 2024-08-22 NOTE — TELEPHONE ENCOUNTER
Order/Referral Request    Who is requesting: PT    Orders being requested: self administering signed orders    Reason service is needed/diagnosis: na    When are orders needed by: asap    Has this been discussed with Provider: No    Does patient have a preference on a Group/Provider/Facility? na    Does patient have an appointment scheduled?: No    Where to send orders: Fax    Could we send this information to you in [employer] or would you prefer to receive a phone call?:   Patient would prefer a phone call   Okay to leave a detailed message?: Yes at Cell number on file:    Telephone Information:   Mobile [phone]

## 2024-08-22 NOTE — TELEPHONE ENCOUNTER
I signed the order that was teed up.  I also printed off a paper order that can be faxed to the facility.  It is in my outbox

## 2024-09-09 ENCOUNTER — OFFICE VISIT (OUTPATIENT)
Dept: ORTHOPEDICS | Facility: CLINIC | Age: 54
End: 2024-09-09
Payer: COMMERCIAL

## 2024-09-09 VITALS — WEIGHT: 308 LBS | BODY MASS INDEX: 47.88 KG/M2

## 2024-09-09 DIAGNOSIS — M17.0 PRIMARY OSTEOARTHRITIS OF BOTH KNEES: Primary | ICD-10-CM

## 2024-09-09 PROCEDURE — 20611 DRAIN/INJ JOINT/BURSA W/US: CPT | Mod: 50 | Performed by: FAMILY MEDICINE

## 2024-09-09 RX ORDER — LIDOCAINE HYDROCHLORIDE 10 MG/ML
3 INJECTION, SOLUTION EPIDURAL; INFILTRATION; INTRACAUDAL; PERINEURAL
Status: SHIPPED | OUTPATIENT
Start: 2024-09-09

## 2024-09-09 RX ADMIN — LIDOCAINE HYDROCHLORIDE 3 ML: 10 INJECTION, SOLUTION EPIDURAL; INFILTRATION; INTRACAUDAL; PERINEURAL at 08:32

## 2024-09-09 NOTE — NURSING NOTE
[address] [address]  Dept: [phone]  ______________________________________________________________________________    Patient: Chavez Ca   : 1970   MRN: [id number]   2024    INVASIVE PROCEDURE SAFETY CHECKLIST    Date: 2024   Procedure:Bilateral knee Synvisc One with lidocaine USG   Patient Name: Chavez Ca  MRN: [id number]  YOB: 1970    Action: Complete sections as appropriate. Any discrepancy results in a HARD COPY until resolved.     PRE PROCEDURE:  Patient ID verified with 2 identifiers (name and  or MRN): Yes  Procedure and site verified with patient/designee (when able): Yes  Accurate consent documentation in medical record: Yes  H&P (or appropriate assessment) documented in medical record: Yes  H&P must be up to 20 days prior to procedure and updates within 24 hours of procedure as applicable: Yes  Relevant diagnostic and radiology test results appropriately labeled and displayed as applicable: Yes  Procedure site(s) marked with provider initials: NA    TIMEOUT:  Time-Out performed immediately prior to starting procedure, including verbal and active participation of all team members addressing the following:Yes  * Correct patient identify  * Confirmed that the correct side and site are marked  * An accurate procedure consent form  * Agreement on the procedure to be done  * Correct patient position  * Relevant images and results are properly labeled and appropriately displayed  * The need to administer antibiotics or fluids for irrigation purposes during the procedure as applicable   * Safety precautions based on patient history or medication use    DURING PROCEDURE: Verification of correct person, site, and procedures any time the responsibility for care of the patient is transferred to another member of the care team.       Prior to injection, verified patient  identity using patient's name and date of birth.  Due to injection administration, patient instructed to remain in clinic for 15 minutes  afterwards, and to report any adverse reaction to me immediately.    Joint injection was performed.      Drug Amount Wasted:  None.  Vial/Syringe: Syringe  Expiration Date:  2/1/27      Mariela Danielson ATC  September 9, 2024

## 2024-09-09 NOTE — PROGRESS NOTES
[facility] AND SURGERY CENTER  SPORTS & ORTHOPEDIC CLINIC VISIT     Sep 9, 2024      Ultrasound-guided bilateral knee injection    Date/Time: 9/9/2024 8:32 AM    Performed by: Antione Abrams MD  Authorized by: Antione Abrams MD    Indications:  Osteoarthritis  Needle Size:  21 G  Guidance: ultrasound    Approach:  Lateral  Location:  Knee  Laterality:  Bilateral      Medications (Right):  48 mg hylan 48 MG/6ML; 3 mL lidocaine (PF) 1 %  Medications (Left):  48 mg hylan 48 MG/6ML; 3 mL lidocaine (PF) 1 %  Outcome:  Tolerated well, no immediate complications  Procedure discussed: discussed risks, benefits, and alternatives    Consent Given by:  Patient  Timeout: timeout called immediately prior to procedure    Prep: patient was prepped and draped in usual sterile fashion      PROCEDURE: Ultrasound-guided bilateral knee Synvisc One injection   The patient was apprised of the risks and the benefits of the procedure written consent was signed by the patient.   The patient was positioned lying supine on exam table with knee resting in a slightly flexed position.   The area of the superiolateral right knee was cleaned with a chlorhexadine swab.  Ultrasound was necessary to localized the joint space due to body habitus due to obesity and lymphedema.  Using sterile technique the skin was anesthetized with 3.0 mL 1% lidocaine, a 22-gauge 1.5 in needle was introduced into the suprapatellar pouch under direct and continuous ultrasound guidance.  A solution of 48 mg of hyaluronic acid was injected easily and seen flowing into the joint space on ultrasound.  Images were captured and saved to the permanent record.  The identical procedure was then repeated on the left side.  There were no complications. The patient tolerated the procedure well. There was minimal bleeding.   The patient was instructed to ice and provided gentle compression to the knee upon leaving clinic and refrain from overuse over the  next 2 days.   The patient was instructed to go to the emergency room with any unusual pain, swelling, or redness occurred in the injected area.     Antione Abrams MD

## 2024-09-12 ENCOUNTER — MYC REFILL (OUTPATIENT)
Dept: INTERNAL MEDICINE | Facility: CLINIC | Age: 54
End: 2024-09-12

## 2024-09-12 DIAGNOSIS — G60.3 IDIOPATHIC PROGRESSIVE POLYNEUROPATHY: Chronic | ICD-10-CM

## 2024-09-12 DIAGNOSIS — E66.01 CLASS 3 SEVERE OBESITY DUE TO EXCESS CALORIES WITH SERIOUS COMORBIDITY AND BODY MASS INDEX (BMI) OF 50.0 TO 59.9 IN ADULT (H): ICD-10-CM

## 2024-09-12 DIAGNOSIS — E66.813 CLASS 3 SEVERE OBESITY DUE TO EXCESS CALORIES WITH SERIOUS COMORBIDITY AND BODY MASS INDEX (BMI) OF 50.0 TO 59.9 IN ADULT (H): ICD-10-CM

## 2024-09-12 DIAGNOSIS — M47.26 OSTEOARTHRITIS OF SPINE WITH RADICULOPATHY, LUMBAR REGION: ICD-10-CM

## 2024-09-13 RX ORDER — TRAMADOL HYDROCHLORIDE 50 MG/1
50 TABLET ORAL 2 TIMES DAILY
Qty: 60 TABLET | Refills: 0 | Status: SHIPPED | OUTPATIENT
Start: 2024-09-13

## 2024-09-13 RX ORDER — TOPIRAMATE 100 MG/1
200 TABLET, FILM COATED ORAL 2 TIMES DAILY
Qty: 360 TABLET | Refills: 0 | Status: SHIPPED | OUTPATIENT
Start: 2024-09-13

## 2024-09-14 ENCOUNTER — HEALTH MAINTENANCE LETTER (OUTPATIENT)
Age: 54
End: 2024-09-14

## 2024-09-18 ENCOUNTER — OFFICE VISIT (OUTPATIENT)
Dept: INTERNAL MEDICINE | Facility: CLINIC | Age: 54
End: 2024-09-18
Payer: COMMERCIAL

## 2024-09-18 VITALS
HEART RATE: 100 BPM | RESPIRATION RATE: 18 BRPM | BODY MASS INDEX: 47.4 KG/M2 | DIASTOLIC BLOOD PRESSURE: 68 MMHG | SYSTOLIC BLOOD PRESSURE: 98 MMHG | TEMPERATURE: 97.7 F | HEIGHT: 67 IN | WEIGHT: 302 LBS | OXYGEN SATURATION: 100 %

## 2024-09-18 DIAGNOSIS — E11.9 TYPE 2 DIABETES MELLITUS WITHOUT COMPLICATION, WITHOUT LONG-TERM CURRENT USE OF INSULIN (H): Primary | ICD-10-CM

## 2024-09-18 DIAGNOSIS — F32.A DEPRESSION, UNSPECIFIED DEPRESSION TYPE: ICD-10-CM

## 2024-09-18 DIAGNOSIS — F39 UNSPECIFIED MOOD (AFFECTIVE) DISORDER (H): ICD-10-CM

## 2024-09-18 DIAGNOSIS — Z79.899 CONTROLLED SUBSTANCE AGREEMENT SIGNED: ICD-10-CM

## 2024-09-18 DIAGNOSIS — Z78.9 IMPAIRED MOBILITY AND ACTIVITIES OF DAILY LIVING: ICD-10-CM

## 2024-09-18 DIAGNOSIS — I83.813 VARICOSE VEINS OF BOTH LOWER EXTREMITIES WITH PAIN: ICD-10-CM

## 2024-09-18 DIAGNOSIS — G89.29 CHRONIC PAIN OF BOTH KNEES: ICD-10-CM

## 2024-09-18 DIAGNOSIS — M47.26 OSTEOARTHRITIS OF SPINE WITH RADICULOPATHY, LUMBAR REGION: ICD-10-CM

## 2024-09-18 DIAGNOSIS — F11.20 CONTINUOUS OPIOID DEPENDENCE (H): ICD-10-CM

## 2024-09-18 DIAGNOSIS — Z74.09 IMPAIRED MOBILITY AND ACTIVITIES OF DAILY LIVING: ICD-10-CM

## 2024-09-18 DIAGNOSIS — M25.561 CHRONIC PAIN OF BOTH KNEES: ICD-10-CM

## 2024-09-18 DIAGNOSIS — M25.562 CHRONIC PAIN OF BOTH KNEES: ICD-10-CM

## 2024-09-18 LAB
EST. AVERAGE GLUCOSE BLD GHB EST-MCNC: 117 MG/DL
HBA1C MFR BLD: 5.7 % (ref 0–5.6)

## 2024-09-18 PROCEDURE — G2211 COMPLEX E/M VISIT ADD ON: HCPCS | Performed by: NURSE PRACTITIONER

## 2024-09-18 PROCEDURE — 83036 HEMOGLOBIN GLYCOSYLATED A1C: CPT | Performed by: NURSE PRACTITIONER

## 2024-09-18 PROCEDURE — 99214 OFFICE O/P EST MOD 30 MIN: CPT | Performed by: NURSE PRACTITIONER

## 2024-09-18 PROCEDURE — G0008 ADMIN INFLUENZA VIRUS VAC: HCPCS | Performed by: NURSE PRACTITIONER

## 2024-09-18 PROCEDURE — 90673 RIV3 VACCINE NO PRESERV IM: CPT | Performed by: NURSE PRACTITIONER

## 2024-09-18 PROCEDURE — 36415 COLL VENOUS BLD VENIPUNCTURE: CPT | Performed by: NURSE PRACTITIONER

## 2024-09-18 RX ORDER — ROSUVASTATIN CALCIUM 10 MG/1
10 TABLET, COATED ORAL DAILY
Qty: 90 TABLET | Refills: 3 | Status: SHIPPED | OUTPATIENT
Start: 2024-09-18

## 2024-09-18 NOTE — PATIENT INSTRUCTIONS
Paper copy of prescription for walker provided today.    Please sign the controlled substance agreement.  This is for the tramadol.    Flu shot today.    Recheck hemoglobin A1c.    Due to your history of diabetes, we should probably have you on a statin cholesterol medication to lower your risk of heart attack and stroke.  Please start rosuvastatin 10 mg tablet daily.    Referral for PT/pool therapy ordered today.    Vascular medicine referral placed today.    Follow-up in 3 months.    Congratulations on the weight loss.  Good work so far.    Go ahead and stop the duloxetine and Celebrex for now.  If mood worsens, please let me know

## 2024-09-18 NOTE — LETTER
Opioid / Opioid Plus Controlled Substance Agreement    This is an agreement between you and your provider about the safe and appropriate use of controlled substance/opioids prescribed by your care team. Controlled substances are medicines that can cause physical and mental dependence (abuse).    There are strict laws about having and using these medicines. We here at [facility] are committing to working with you in your efforts to get better. To support you in this work, we ll help you schedule regular office appointments for medicine refills. If we must cancel or change your appointment for any reason, we ll make sure you have enough medicine to last until your next appointment.     As a Provider, I will:  Listen carefully to your concerns and treat you with respect.   Recommend a treatment plan that I believe is in your best interest. This plan may involve therapies other than opioid pain medication.   Talk with you often about the possible benefits, and the risk of harm of any medicine that we prescribe for you.   Provide a plan on how to taper (discontinue or go off) using this medicine if the decision is made to stop its use.    As a Patient, I understand that opioid(s):   Are a controlled substance prescribed by my care team to help me function or work and manage my condition(s).   Are strong medicines and can cause serious side effects such as:  Drowsiness, which can seriously affect my driving ability  A lower breathing rate, enough to cause death  Harm to my thinking ability   Depression   Abuse of and addiction to this medicine  Need to be taken exactly as prescribed. Combining opioids with certain medicines or chemicals (such as illegal drugs, sedatives, sleeping pills, and benzodiazepines) can be dangerous or even fatal. If I stop opioids suddenly, I may have severe withdrawal symptoms.  Do not work for all types of pain nor for all patients. If they re not helpful, I may be asked to stop  them.        The risks, benefits and side effects of these medicine(s) were explained to me. I agree that:  I will take part in other treatments as advised by my care team. This may be psychiatry or counseling, physical therapy, behavioral therapy, group treatment or a referral to a specialist.     I will keep all my appointments. I understand that this is part of the monitoring of opioids. My care team may require an office visit for EVERY opioid/controlled substance refill. If I miss appointments or don t follow instructions, my care team may stop my medicine.    I will take my medicines as prescribed. I will not change the dose or schedule unless my care team tells me to. There will be no refills if I run out early.     I may be asked to come to the clinic and complete a urine drug test or complete a pill count at any time. If I don t give a urine sample or participate in a pill count, the care team may stop my medicine.    I will only receive prescriptions from this clinic for chronic pain. If I am treated by another provider for acute pain issues, I will tell them that I am taking opioid pain medication for chronic pain and that I have a treatment agreement with this provider. I will inform my [facility] care team within one business day if I am given a prescription for any pain medication by another healthcare provider. My [facility] care team can contact other providers and pharmacists about my use of any medicines.    It is up to me to make sure that I don t run out of my medicines on weekends or holidays. If my care team is willing to refill my opioid prescription without a visit, I must request refills only during office hours. Refills may take up to 3 business days to process. I will use one pharmacy to fill all my opioid and other controlled substance prescriptions. I will notify the clinic about any changes to my insurance or medication availability.    I am responsible for my  prescriptions. If the medicine/prescription is lost, stolen or destroyed, it will not be replaced. I also agree not to share controlled substance medicines with anyone.    I am aware I should not use any illegal or recreational drugs. I agree not to drink alcohol unless my care team says I can.       If I enroll in the [place] Medical Cannabis program, I will tell my care team prior to my next refill.     I will tell my care team right away if I become pregnant, have a new medical problem treated outside of my regular clinic, or have a change in my medications.    I understand that this medicine can affect my thinking, judgment and reaction time. Alcohol and drugs affect the brain and body, which can affect the safety of my driving. Being under the influence of alcohol or drugs can affect my decision-making, behaviors, personal safety, and the safety of others. Driving while impaired (DWI) can occur if a person is driving, operating, or in physical control of a car, motorcycle, boat, snowmobile, ATV, motorbike, off-road vehicle, or any other motor vehicle ([place] Statute 169A.20). I understand the risk if I choose to drive or operate any vehicle or machinery.    I understand that if I do not follow any of the conditions above, my prescriptions or treatment may be stopped or changed.          Opioids  What You Need to Know    What are opioids?   Opioids are pain medicines that must be prescribed by a doctor. They are also known as narcotics.     Examples are:   morphine (MS Contin, Deepa)  oxycodone (Oxycontin)  oxycodone and acetaminophen (Percocet)  hydrocodone and acetaminophen (Vicodin, Norco)   fentanyl patch (Duragesic)   hydromorphone (Dilaudid)   methadone  codeine (Tylenol #3)     What do opioids do well?   Opioids are best for severe short-term pain such as after a surgery or injury. They may work well for cancer pain. They may help some people with long-lasting (chronic) pain.     What do opioids NOT do  well?   Opioids never get rid of pain entirely, and they don t work well for most patients with chronic pain. Opioids don t reduce swelling, one of the causes of pain.                                    Other ways to manage chronic pain and improve function include:     Treat the health problem that may be causing pain  Anti-inflammation medicines, which reduce swelling and tenderness, such as ibuprofen (Advil, Motrin) or naproxen (Aleve)  Acetaminophen (Tylenol)  Antidepressants and anti-seizure medicines, especially for nerve pain  Topical treatments such as patches or creams  Injections or nerve blocks  Chiropractic or osteopathic treatment  Acupuncture, massage, deep breathing, meditation, visual imagery, aromatherapy  Use heat or ice at the pain site  Physical therapy   Exercise  Stop smoking  Take part in therapy       Risks and side effects     Talk to your doctor before you start or decide to keep taking opioids. Possible side effects include:    Lowering your breathing rate enough to cause death  Overdose, including death, especially if taking higher than prescribed doses  Worse depression symptoms; less pleasure in things you usually enjoy  Feeling tired or sluggish  Slower thoughts or cloudy thinking  Being more sensitive to pain over time; pain is harder to control  Trouble sleeping or restless sleep  Changes in hormone levels (for example, less testosterone)  Changes in sex drive or ability to have sex  Constipation  Unsafe driving  Itching and sweating  Dizziness  Nausea, throwing up and dry mouth    What else should I know about opioids?    Opioids may lead to dependence, tolerance, or addiction.    Dependence means that if you stop or reduce the medicine too quickly, you will have withdrawal symptoms. These include loose poop (diarrhea), jitters, flu-like symptoms, nervousness and tremors. Dependence is not the same as addiction.                     Tolerance means needing higher doses over time to  get the same effect. This may increase the chance of serious side effects.    Addiction is when people improperly use a substance that harms their body, their mind or their relations with others. Use of opiates can cause a relapse of addiction if you have a history of drug or alcohol abuse.    People who have used opioids for a long time may have a lower quality of life, worse depression, higher levels of pain and more visits to doctors.    You can overdose on opioids. Take these steps to lower your risk of overdose:    Recognize the signs:  Signs of overdose include decrease or loss of consciousness (blackout), slowed breathing, trouble waking up and blue lips. If someone is worried about overdose, they should call 911.    Talk to your doctor about Narcan (naloxone).   If you are at risk for overdose, you may be given a prescription for Narcan. This medicine very quickly reverses the effects of opioids.   If you overdose, a friend or family member can give you Narcan while waiting for the ambulance. They need to know the signs of overdose and how to give Narcan.     Don't use alcohol or street drugs.   Taking them with opioids can cause death.    Do not take any of these medicines unless your doctor says it s OK. Taking these with opioids can cause death:  Benzodiazepines, such as lorazepam (Ativan), alprazolam (Xanax) or diazepam (Valium)  Muscle relaxers, such as cyclobenzaprine (Flexeril)  Sleeping pills like zolpidem (Ambien)   Other opioids      How to keep you and other people safe while taking opioids:    Never share your opioids with others.  Opioid medicines are regulated by the Drug Enforcement Agency (JUNAID). Selling or sharing medications is a criminal act.    2. Be sure to store opioids in a secure place, locked up if possible. Young children can easily swallow them and overdose.    3. When you are traveling with your medicines, keep them in the original bottles. If you use a pill box, be sure you also  carry a copy of your medicine list from your clinic or pharmacy.    4. Safe disposal of opioids    Most pharmacies have places to get rid of medicine, called disposal kiosks. Medicine disposal options are also available in every [facility]. Search your county and  medication disposal  to find more options. You can find more details at:  https://www.pca.[facility].[place]./living-green/managing-unwanted-medications     I agree that my provider, clinic care team, and pharmacy may work with any city, state or federal law enforcement agency that investigates the misuse, sale, or other diversion of my controlled medicine. I will allow my provider to discuss my care with, or share a copy of, this agreement with any other treating provider, pharmacy or emergency room where I receive care.    I have read this agreement and have asked questions about anything I did not understand.    _______________________________________________________  Patient Signature - Chavez Ca _____________________                   Date     _______________________________________________________  Provider Signature - Hebert Hdez, CNP   _____________________                   Date     _______________________________________________________  Witness Signature (required if provider not present while patient signing)   _____________________                   Date

## 2024-09-18 NOTE — PROGRESS NOTES
Assessment & Plan     Type 2 diabetes mellitus without complication, without long-term current use of insulin (H)  He has had good success with the Mounjaro and is lost considerable weight.  Due for A1c.  We talked about reducing his cardiovascular risk via a statin medication.  He is agreeable to rosuvastatin.  We can recheck lipids and CMP when he comes back to see me in 3 months  - HEMOGLOBIN A1C; Future  - rosuvastatin (CRESTOR) 10 MG tablet; Take 1 tablet (10 mg) by mouth daily.    Osteoarthritis of spine with radiculopathy, lumbar region  He needs to reinitiate his pool therapy.  I put an order in for him today so that he could restart that at [facility] in [place]    Chronic pain of both knees  Unfortunately he did not see much improvement with his pain after starting Celebrex at his last appointment.  He would like to discontinue this medication.  He is due for CSA agreement on his tramadol.  This was completed today  - Physical Therapy  Referral; Future    Impaired mobility and activities of daily living  He would like an order for new walker.  Four-wheel rollator type walker with seat.  This was given to him today  - Walker Order for DME - ONLY FOR DME    Depression, unspecified depression type  He is not sure that the duloxetine has made much of a difference in his mood.  Most of his mood issues stem from his issues with his nursing home situation    Unspecified mood (affective) disorder (H24)  As above    Continuous opioid dependence (H)  CSA signed for tramadol, 50 mg tablet twice daily.  He is tolerating this medication well with no ill side effects    Controlled substance agreement signed  As above    Varicose veins of both lower extremities with pain  Painful varicosities bilateral lower extremities.  He tells me that he has seen the vascular clinic in the past, circa 2016.  He is interested in getting back and having a discussion about how he can manage his pain.  He does wear  "compression stockings.  - Vascular Medicine Referral; Future    Patient Instructions   Paper copy of prescription for walker provided today.    Please sign the controlled substance agreement.  This is for the tramadol.    Flu shot today.    Recheck hemoglobin A1c.    Due to your history of diabetes, we should probably have you on a statin cholesterol medication to lower your risk of heart attack and stroke.  Please start rosuvastatin 10 mg tablet daily.    Referral for PT/pool therapy ordered today.    Vascular medicine referral placed today.    Follow-up in 3 months.    Congratulations on the weight loss.  Good work so far.    Go ahead and stop the duloxetine and Celebrex for now.  If mood worsens, please let me know      The longitudinal plan of care for the diagnosis(es)/condition(s) as documented were addressed during this visit. Due to the added complexity in care, I will continue to support Chavez in the subsequent management and with ongoing continuity of care.                  Subjective   Chavez is a 53 year old, presenting for the following health issues:  Follow Up (Multiple issues)      9/18/2024     6:57 AM   Additional Questions   Roomed by      History of Present Illness       Reason for visit:  Getting referral for pool therapy and referral for another issue   He is taking medications regularly.       Here for routine checkup      Objective    BP 98/68 (BP Location: Right arm, Patient Position: Sitting, Cuff Size: Adult Regular)   Pulse 100   Temp 97.7  F (36.5  C) (Oral)   Resp 18   Ht 1.708 m (5' 7.25\")   Wt 137 kg (302 lb)   SpO2 100%   BMI 46.95 kg/m    Body mass index is 46.95 kg/m .  Physical Exam   He has onychomycosis involving all toes bilaterally.  Otherwise diabetic foot exam is unremarkable today.  He has some obvious swollen varicosities in his right and lower shin areas.  These are painful to palpation.              Signed Electronically by: Hebert Hdez CNP    "

## 2024-09-19 ENCOUNTER — PATIENT OUTREACH (OUTPATIENT)
Dept: CARE COORDINATION | Facility: CLINIC | Age: 54
End: 2024-09-19
Payer: COMMERCIAL

## 2024-09-25 ENCOUNTER — OFFICE VISIT (OUTPATIENT)
Dept: FAMILY MEDICINE | Facility: CLINIC | Age: 54
End: 2024-09-25
Payer: COMMERCIAL

## 2024-09-25 VITALS
HEART RATE: 86 BPM | BODY MASS INDEX: 47.78 KG/M2 | SYSTOLIC BLOOD PRESSURE: 120 MMHG | WEIGHT: 304.4 LBS | TEMPERATURE: 97.8 F | RESPIRATION RATE: 20 BRPM | DIASTOLIC BLOOD PRESSURE: 79 MMHG | HEIGHT: 67 IN

## 2024-09-25 DIAGNOSIS — E11.9 TYPE 2 DIABETES MELLITUS WITHOUT COMPLICATION, WITHOUT LONG-TERM CURRENT USE OF INSULIN (H): ICD-10-CM

## 2024-09-25 DIAGNOSIS — G47.33 OSA (OBSTRUCTIVE SLEEP APNEA): ICD-10-CM

## 2024-09-25 DIAGNOSIS — E78.5 DYSLIPIDEMIA: Primary | ICD-10-CM

## 2024-09-25 DIAGNOSIS — E66.01 CLASS 3 SEVERE OBESITY DUE TO EXCESS CALORIES WITH SERIOUS COMORBIDITY AND BODY MASS INDEX (BMI) OF 50.0 TO 59.9 IN ADULT (H): ICD-10-CM

## 2024-09-25 DIAGNOSIS — E66.813 CLASS 3 SEVERE OBESITY DUE TO EXCESS CALORIES WITH SERIOUS COMORBIDITY AND BODY MASS INDEX (BMI) OF 50.0 TO 59.9 IN ADULT (H): ICD-10-CM

## 2024-09-25 PROCEDURE — 99214 OFFICE O/P EST MOD 30 MIN: CPT | Performed by: FAMILY MEDICINE

## 2024-09-25 PROCEDURE — G2211 COMPLEX E/M VISIT ADD ON: HCPCS | Performed by: FAMILY MEDICINE

## 2024-09-25 RX ORDER — TIRZEPATIDE 10 MG/.5ML
10 INJECTION, SOLUTION SUBCUTANEOUS WEEKLY
Qty: 2 ML | Refills: 3 | Status: CANCELLED | OUTPATIENT
Start: 2024-09-25

## 2024-09-25 RX ORDER — TIRZEPATIDE 12.5 MG/.5ML
12.5 INJECTION, SOLUTION SUBCUTANEOUS WEEKLY
Qty: 2 ML | Refills: 3 | Status: SHIPPED | OUTPATIENT
Start: 2024-09-25

## 2024-09-25 NOTE — PROGRESS NOTES
Assessment & Plan   Problem List Items Addressed This Visit       Class 3 severe obesity due to excess calories with serious comorbidity and body mass index (BMI) of 50.0 to 59.9 in adult (H)    Relevant Medications    tirzepatide (MOUNJARO) 12.5 MG/0.5ML pen    Dyslipidemia - Primary    VERN (obstructive sleep apnea)    Type 2 diabetes mellitus without complication, without long-term current use of insulin (H)     53 year old year old male in clinic today to discuss treatment of the following conditions through diet and lifestyle modification and weight loss:  1. Dyslipidemia    2. Type 2 diabetes mellitus without complication, without long-term current use of insulin (H)    3. Class 3 severe obesity due to excess calories with serious comorbidity and body mass index (BMI) of 50.0 to 59.9 in adult (H)    4. VERN (obstructive sleep apnea)      The patient's weight loss result since the last visit was successful based on weight loss.  Diabetic control has improved.  He continues to struggle with weight.  He is interested in optimizing nutrition but generally struggles to have access to food.  I completed a form which was provided to him by his  with [facility] with the recommendation that he gets 80+ grams of protein per day.  It is unclear whether or not his nursing home/TCU will be able to accommodate this nutrition.  Because of some of the nutrition he has been reliant on we will increase his GLP-1 receptor agonist to 12.5 mg/week.  He is on a statin.  Non-smoker.  Unclear why he is not currently on an aspirin but should be considered at some point.  I like to see him back in a couple of months to review progress.         Relevant Medications    tirzepatide (MOUNJARO) 12.5 MG/0.5ML pen      The longitudinal plan of care for the diagnosis(es)/condition(s) as documented were addressed during this visit. Due to the added complexity in care, I will continue to support Chavez in the subsequent management  "and with ongoing continuity of care.    Subjective   Chavez is a 53 year old, presenting for the following health issues:  Forms and Refill Request        9/25/2024     9:53 AM   Additional Questions   Roomed by xl     Patient presents for treatment of chronic, comorbid conditions using intensive therapeutic lifestyle interventions including nutrition, physical activity, and behavior management.   - successes: continues to fight for improved nutrition., Struggling to get DME (scooter, walker).  Eats well when he can.   - struggles: home offers pasta and potatoes.  Not enough veggies.    - movement: limited by body pain, Might get knee pain.    - now in a new living situation.  Struggling with new home as well.     - tracking/journaling: aware of protein content      History of Present Illness       Reason for visit:  Getting referral for pool therapy and referral for another issue   He is taking medications regularly.        Objective    /79 (BP Location: Left arm, Patient Position: Sitting, Cuff Size: Adult Large)   Pulse 86   Temp 97.8  F (36.6  C) (Oral)   Resp 20   Ht 1.708 m (5' 7.25\")   Wt 138.1 kg (304 lb 6.4 oz)   BMI 47.32 kg/m    Body mass index is 47.32 kg/m .  Physical Exam  Nursing note reviewed.   Constitutional:       General: He is not in acute distress.     Appearance: Normal appearance. He is obese. He is not ill-appearing.   HENT:      Head: Normocephalic and atraumatic.   Eyes:      Extraocular Movements: Extraocular movements intact.      Conjunctiva/sclera: Conjunctivae normal.   Pulmonary:      Effort: Pulmonary effort is normal.   Neurological:      Mental Status: He is alert and oriented to person, place, and time.   Psychiatric:         Attention and Perception: Attention normal.         Mood and Affect: Mood normal.         Speech: Speech normal.         Thought Content: Thought content normal.                  Signed Electronically by: Tony Rodriguez MD    "

## 2024-09-25 NOTE — ASSESSMENT & PLAN NOTE
53 year old year old male in clinic today to discuss treatment of the following conditions through diet and lifestyle modification and weight loss:  1. Dyslipidemia    2. Type 2 diabetes mellitus without complication, without long-term current use of insulin (H)    3. Class 3 severe obesity due to excess calories with serious comorbidity and body mass index (BMI) of 50.0 to 59.9 in adult (H)    4. VERN (obstructive sleep apnea)      The patient's weight loss result since the last visit was successful based on weight loss.  Diabetic control has improved.  He continues to struggle with weight.  He is interested in optimizing nutrition but generally struggles to have access to food.  I completed a form which was provided to him by his  with [facility] with the recommendation that he gets 80+ grams of protein per day.  It is unclear whether or not his nursing home/TCU will be able to accommodate this nutrition.  Because of some of the nutrition he has been reliant on we will increase his GLP-1 receptor agonist to 12.5 mg/week.  He is on a statin.  Non-smoker.  Unclear why he is not currently on an aspirin but should be considered at some point.  I like to see him back in a couple of months to review progress.

## 2024-09-29 NOTE — PROGRESS NOTES
"Chavez Ca is a 49 year old male who is being evaluated via a billable telephone visit.      The patient has been notified of following:     \"This telephone visit will be conducted via a call between you and your physician/provider. We have found that certain health care needs can be provided without the need for a physical exam.  This service lets us provide the care you need with a short phone conversation.  If a prescription is necessary we can send it directly to your pharmacy.  If lab work is needed we can place an order for that and you can then stop by our lab to have the test done at a later time.    Telephone visits are billed at different rates depending on your insurance coverage. During this emergency period, for some insurers they may be billed the same as an in-person visit.  Please reach out to your insurance provider with any questions.    If during the course of the call the physician/provider feels a telephone visit is not appropriate, you will not be charged for this service.\"    Patient has given verbal consent for Telephone visit?  Yes    What phone number would you like to be contacted at?  [phone]    How would you like to obtain your AVS? Shanahart    Phone call duration: 15 minutes.  I explained the conditions and plans (more than 50% of time was counseling/coordination of weight management).    Fortino Chang MD      " 36.8

## 2024-10-07 NOTE — PATIENT INSTRUCTIONS
Chavez    Thank you for entrusting your care with us at the [facility].      We are prescribing some compression stockings for you. I have included different suppliers that should help you get measured and fitting to ensure proper fitting socks. You should wear these stockings as much as you can. It is especially important to wear them with long periods of standing, sitting, long car rides or if you will be flying. Compression socks should get refilled every 4-6 months. They do not need to be worn at night while in bed. It is recommended to wear compression level of 20-30mmhg or higher from toes to knees.      We will performed an ultrasound today 10/9/24. Dr Mcguire will call you with the results and options. Or if a follow up appointment is required. You may call if you have any questions [phone].        Varicose Veins    Varicose veins are twisted, enlarged veins near the surface of the skin. They develop most often in the legs and ankles.    Some people may be more likely than others to get varicose veins because of several things. These include aging, pregnancy, being overweight, or because a parent has them. Standing or sitting for long periods of time can also increase risk of varicose veins.    Follow-up care is a key part of your treatment and safety. Be sure to make and go to all appointments, and call your doctor if you are having problems. It's also a good idea to know your test results and keep a list of the medicines you take.      Varicose veins are caused by weakened valves and veins in your legs. Normally, one-way valves in your veins keep blood flowing from your legs up toward your heart. When these valves don't work as they should, blood collects in your legs, and pressure builds up. The veins become weak, large, and twisted.    How can you care for yourself at home?  Wear compression stockings during the day to help relieve symptoms and improve blood flow. Talk to  your doctor about which ones to get and where to get them.  Prop up your legs at or above the level of your heart when possible. Try to do this for about 30 minutes at a time, about 3 times a day. This helps keep the blood from pooling in your lower legs and improves blood flow to the rest of your body.  Avoid sitting and standing for long periods. This puts added stress on your veins.  Stay at a healthy weight. Lose weight if you need to.  Try to take several short walks every day.  Get at least 30 minutes of exercise on most days of the week. Walking is a good choice. You also may want to do other activities, such as running, swimming, cycling, or playing tennis or team sports.  Do calf muscle exercises every day. When you are sitting down, rotate your feet at the ankles in both directions, making small circles. Extend your legs, and point and flex your feet.  Avoid crossing your legs at the knees when sitting.  Take good care of your skin. Treat cuts and scrapes on your legs right away. Keep your legs clean and moisturized to prevent drying and cracking. Prevent sunburns.  Do not smoke. Smoking can make varicose veins worse. If you need help quitting, talk to your doctor about stop-smoking programs and medicines. These can increase your chances of quitting for good.  If you bump your leg so hard that you know it is likely to bruise, prop up your leg and apply ice or cold packs right away. Apply the ice or cold pack for 10 to 20 minutes, 3 or more times a day. Put a thin cloth between the ice and your skin.  If you cut or scratch the skin over a vein, it may bleed a lot. Prop up your leg and apply firm pressure for a full 15 minutes.  If you have a blood clot in a varicose vein, you may have tenderness and swelling over the vein. The vein may feel firm. Be sure to call your doctor right away if you have these symptoms. If your doctor has told you how to care for the clot, follow the instructions.     Care may  include the following:    Prop up your leg and apply a damp cloth that is warm or cool.  Ask your doctor if you can take an over-the-counter pain medicine, such as acetaminophen (Tylenol), ibuprofen (Advil, Motrin), or naproxen (Aleve). Be safe with medicines. Read and follow all instructions on the label.    When should you call for help?     Call 911 anytime you think you may need emergency care. For example, call if:    You have sudden chest pain and shortness of breath, or you cough up blood.    Call your doctor now or seek immediate medical care if:    You have signs of a blood clot in your leg (called a deep vein thrombosis), such as:  Pain in your calf, back of the knee, thigh, or groin.  Swelling in the leg or groin.  A color change on the leg or groin. The skin may be reddish or purplish, depending on your usual skin color.  A varicose vein begins to bleed and you cannot stop it.  You have a tender lump in your leg.  You get an open sore.    Watch closely for changes in your health, and be sure to contact your doctor if:    Your varicose vein symptoms do not improve with home treatment.    Current as of: December 19, 2022  Author: [employer]wise Staff  Medical Review:Pablo Haddad MD - Family Medicine & PRITESH Andersen MD - Internal Medicine & Jared Wisdom MD - Family Medicine & Lacho Zamudio MD - Family Medicine & Félix Tolbert MD - Diagnostic Radiology    Please bring your compression prescription to a home medical supply store. Here is a list of locations but not limited to.     [facility]  [address] [place] [address]  Phone: [phone]  Fax: [phone] [facility] Bld[facility].  [address] [place] [address]  Phone: [phone]  Fax: [phone]   [facility]  [address] [facility]  [place] [address]  Phone: [phone]  Fax: [phone] [facility]  [address] [place] [address]  Phone: [phone]  Fax: [phone]   [facility]  [address] [place] [address]  Phone: [phone] [facility]   [address] ([facility] [place] [address]  Phone: [phone]   [facility]  [address]  [address] [place] [address]      Phone: [phone]  Fax: [phone] [place]  [address] [place] [address]      Phone: [phone]  Fax: [phone]     [facility] [email or web address]  [address], [place] [address]  [phone]    [facility]  [email or web address]  [address] [place] [address]  Phone: [phone]     [address] [place] [address]  Phone: [phone]    [address] [place] [address]  Phone: [phone] [address], [place] [address]  Phone: [phone] [address], [place] [address]  Phone: [phone]    [facility] [email or web address]  [address], [place] [address]  Phone: [phone]    [address], [place] [address]  Phone: [phone]    [address], [place] [address]  Phone: [phone]    Audie Allen  [email or web address]  [phone]

## 2024-10-08 ENCOUNTER — OFFICE VISIT (OUTPATIENT)
Dept: VASCULAR SURGERY | Facility: CLINIC | Age: 54
End: 2024-10-08
Attending: NURSE PRACTITIONER
Payer: COMMERCIAL

## 2024-10-08 VITALS
TEMPERATURE: 97.4 F | HEART RATE: 94 BPM | OXYGEN SATURATION: 100 % | SYSTOLIC BLOOD PRESSURE: 110 MMHG | DIASTOLIC BLOOD PRESSURE: 74 MMHG

## 2024-10-08 DIAGNOSIS — I83.893 SYMPTOMATIC VARICOSE VEINS OF BOTH LOWER EXTREMITIES: Primary | ICD-10-CM

## 2024-10-08 DIAGNOSIS — I83.813 VARICOSE VEINS OF BOTH LOWER EXTREMITIES WITH PAIN: ICD-10-CM

## 2024-10-08 PROCEDURE — G0463 HOSPITAL OUTPT CLINIC VISIT: HCPCS | Performed by: SPECIALIST

## 2024-10-08 PROCEDURE — 99203 OFFICE O/P NEW LOW 30 MIN: CPT | Performed by: SPECIALIST

## 2024-10-08 ASSESSMENT — PAIN SCALES - GENERAL: PAINLEVEL: WORST PAIN (10)

## 2024-10-08 NOTE — Clinical Note
US  done 10/10/24 Dr Gomez had left and will call patient with results. R> left symptomatic VV, left stab phlebectomy option but right worse and doesn't reflux. No option in right. Patient will RTC PRN if Dr Mcguire feels left lower extremity stab phlebectomy is required.

## 2024-10-08 NOTE — PROGRESS NOTES
Vascular Clinic Rooming Questions        Patient is here for a consult to discuss Varicose Veins. The patient does  wear compressions. The patient has varicose veins that are problematic in bilateral legs. Patient states their varicose veins are bothersome when standing, sitting, working, and household chores, The patient has been using medications for their leg discomfort. US scheduled 10/9/24.Continue conservative treatment has done > 3 months with continued Symptomatic VV. Bilateral venous insuffiencey US  done 10/10/24 Dr Gomez had left and will call patient with results. R> left symptomatic VV, left stab phlebectomy option. No option in right. Patient will RTC PRN if Dr Mcguire feels left lower extremity stab phlebectomy is required.      /74   Pulse 94   Temp 97.4  F (36.3  C)   SpO2 100%     The provider has been notified that the patient has concerns of hx of vein procedure in 2017.     Questions patient would like addressed today are: N/A.    Refills are needed: N/A    Has homecare services and agency name:  No

## 2024-10-09 ENCOUNTER — ANCILLARY PROCEDURE (OUTPATIENT)
Dept: VASCULAR ULTRASOUND | Facility: CLINIC | Age: 54
End: 2024-10-09
Attending: SPECIALIST
Payer: COMMERCIAL

## 2024-10-09 DIAGNOSIS — I83.893 SYMPTOMATIC VARICOSE VEINS OF BOTH LOWER EXTREMITIES: ICD-10-CM

## 2024-10-09 DIAGNOSIS — I83.813 VARICOSE VEINS OF BOTH LOWER EXTREMITIES WITH PAIN: ICD-10-CM

## 2024-10-09 PROCEDURE — 93970 EXTREMITY STUDY: CPT | Mod: 26 | Performed by: STUDENT IN AN ORGANIZED HEALTH CARE EDUCATION/TRAINING PROGRAM

## 2024-10-09 PROCEDURE — 93970 EXTREMITY STUDY: CPT

## 2024-10-10 ENCOUNTER — TELEPHONE (OUTPATIENT)
Dept: VASCULAR SURGERY | Facility: CLINIC | Age: 54
End: 2024-10-10
Payer: COMMERCIAL

## 2024-10-10 NOTE — CONFIDENTIAL NOTE
Vein clinic    Called patient twice yesterday and today to call results left message, normal US results no major insuffiencies.        John Mcguire MD  General Surgery [phone]  Vascular Surgery [phone]

## 2024-10-10 NOTE — TELEPHONE ENCOUNTER
"Patient called and spoke with phone staff about ultrasound results.  Dr. Mcguire has been trying to reach patient.  Patient's response to phone staff was that he missed the calls from Dr. Mcguire because he is busy and we have to respect his time as well. Call transferred to triage.  Normal US and patient does not need a closure of veins as no reflux present.  Attempted several times to discuss results with patient, however he kept interrupting and was yelling. Offered appointment to patient to further discuss this with provider.  Patient demanded he be scheduled in the next week or so and he was not going to wait for a few months to see provider.  Offered patient a virtual visit so he could be seen sooner as Dr. Mcguire's schedule is full until the end of November.  Patient states he does \"not do virtual visits\".  Patient scheduled for end of November.   "

## 2024-10-16 ENCOUNTER — MYC REFILL (OUTPATIENT)
Dept: INTERNAL MEDICINE | Facility: CLINIC | Age: 54
End: 2024-10-16
Payer: COMMERCIAL

## 2024-10-16 DIAGNOSIS — G60.3 IDIOPATHIC PROGRESSIVE POLYNEUROPATHY: Chronic | ICD-10-CM

## 2024-10-16 DIAGNOSIS — R60.9 IDIOPATHIC EDEMA: ICD-10-CM

## 2024-10-16 DIAGNOSIS — M47.26 OSTEOARTHRITIS OF SPINE WITH RADICULOPATHY, LUMBAR REGION: ICD-10-CM

## 2024-10-16 RX ORDER — TRAMADOL HYDROCHLORIDE 50 MG/1
50 TABLET ORAL 2 TIMES DAILY
Qty: 60 TABLET | Refills: 0 | Status: SHIPPED | OUTPATIENT
Start: 2024-10-16

## 2024-10-16 RX ORDER — POTASSIUM CHLORIDE 1500 MG/1
40 TABLET, EXTENDED RELEASE ORAL DAILY
Qty: 180 TABLET | Refills: 2 | OUTPATIENT
Start: 2024-10-16

## 2024-10-17 ENCOUNTER — APPOINTMENT (OUTPATIENT)
Dept: CT IMAGING | Facility: CLINIC | Age: 54
End: 2024-10-17
Payer: COMMERCIAL

## 2024-10-17 ENCOUNTER — HOSPITAL ENCOUNTER (EMERGENCY)
Facility: CLINIC | Age: 54
Discharge: HOME | End: 2024-10-17
Attending: EMERGENCY MEDICINE | Admitting: EMERGENCY MEDICINE
Payer: COMMERCIAL

## 2024-10-17 VITALS
TEMPERATURE: 98.2 F | HEART RATE: 94 BPM | WEIGHT: 299 LBS | OXYGEN SATURATION: 97 % | DIASTOLIC BLOOD PRESSURE: 65 MMHG | SYSTOLIC BLOOD PRESSURE: 123 MMHG | BODY MASS INDEX: 46.93 KG/M2 | RESPIRATION RATE: 16 BRPM | HEIGHT: 67 IN

## 2024-10-17 DIAGNOSIS — R10.9 ABDOMINAL PAIN: ICD-10-CM

## 2024-10-17 LAB
ALBUMIN SERPL BCG-MCNC: 4.2 G/DL (ref 3.5–5.2)
ALBUMIN UR-MCNC: NEGATIVE MG/DL
ALP SERPL-CCNC: 70 U/L (ref 40–150)
ALT SERPL W P-5'-P-CCNC: 21 U/L (ref 0–70)
ANION GAP SERPL CALCULATED.3IONS-SCNC: 14 MMOL/L (ref 7–15)
APPEARANCE UR: CLEAR
AST SERPL W P-5'-P-CCNC: 25 U/L (ref 0–45)
BASOPHILS # BLD AUTO: 0.1 10E3/UL (ref 0–0.2)
BASOPHILS NFR BLD AUTO: 1 %
BILIRUB SERPL-MCNC: 0.2 MG/DL
BILIRUB UR QL STRIP: NEGATIVE
BUN SERPL-MCNC: 15 MG/DL (ref 6–20)
CALCIUM SERPL-MCNC: 9.4 MG/DL (ref 8.8–10.4)
CHLORIDE SERPL-SCNC: 104 MMOL/L (ref 98–107)
COLOR UR AUTO: COLORLESS
CREAT SERPL-MCNC: 0.95 MG/DL (ref 0.67–1.17)
EGFRCR SERPLBLD CKD-EPI 2021: >90 ML/MIN/1.73M2
EOSINOPHIL # BLD AUTO: 0.2 10E3/UL (ref 0–0.7)
EOSINOPHIL NFR BLD AUTO: 1 %
ERYTHROCYTE [DISTWIDTH] IN BLOOD BY AUTOMATED COUNT: 14.3 % (ref 10–15)
GLUCOSE SERPL-MCNC: 92 MG/DL (ref 70–99)
GLUCOSE UR STRIP-MCNC: NEGATIVE MG/DL
HCO3 SERPL-SCNC: 23 MMOL/L (ref 22–29)
HCT VFR BLD AUTO: 39.8 % (ref 40–53)
HGB BLD-MCNC: 13.3 G/DL (ref 13.3–17.7)
HGB UR QL STRIP: NEGATIVE
HYALINE CASTS: 1 /LPF
IMM GRANULOCYTES # BLD: 0 10E3/UL
IMM GRANULOCYTES NFR BLD: 0 %
KETONES UR STRIP-MCNC: NEGATIVE MG/DL
LEUKOCYTE ESTERASE UR QL STRIP: NEGATIVE
LIPASE SERPL-CCNC: 36 U/L (ref 13–60)
LYMPHOCYTES # BLD AUTO: 2.7 10E3/UL (ref 0.8–5.3)
LYMPHOCYTES NFR BLD AUTO: 26 %
MCH RBC QN AUTO: 29.4 PG (ref 26.5–33)
MCHC RBC AUTO-ENTMCNC: 33.4 G/DL (ref 31.5–36.5)
MCV RBC AUTO: 88 FL (ref 78–100)
MONOCYTES # BLD AUTO: 0.8 10E3/UL (ref 0–1.3)
MONOCYTES NFR BLD AUTO: 8 %
MUCOUS THREADS #/AREA URNS LPF: PRESENT /LPF
NEUTROPHILS # BLD AUTO: 6.7 10E3/UL (ref 1.6–8.3)
NEUTROPHILS NFR BLD AUTO: 64 %
NITRATE UR QL: NEGATIVE
NRBC # BLD AUTO: 0 10E3/UL
NRBC BLD AUTO-RTO: 0 /100
PH UR STRIP: 5.5 [PH] (ref 5–7)
PLATELET # BLD AUTO: 351 10E3/UL (ref 150–450)
POTASSIUM SERPL-SCNC: 3.2 MMOL/L (ref 3.4–5.3)
PROT SERPL-MCNC: 7.5 G/DL (ref 6.4–8.3)
RBC # BLD AUTO: 4.52 10E6/UL (ref 4.4–5.9)
RBC URINE: <1 /HPF
SODIUM SERPL-SCNC: 141 MMOL/L (ref 135–145)
SP GR UR STRIP: 1.01 (ref 1–1.03)
SQUAMOUS EPITHELIAL: <1 /HPF
UROBILINOGEN UR STRIP-MCNC: <2 MG/DL
WBC # BLD AUTO: 10.4 10E3/UL (ref 4–11)
WBC URINE: <1 /HPF

## 2024-10-17 PROCEDURE — 85025 COMPLETE CBC W/AUTO DIFF WBC: CPT | Performed by: EMERGENCY MEDICINE

## 2024-10-17 PROCEDURE — 80053 COMPREHEN METABOLIC PANEL: CPT | Performed by: EMERGENCY MEDICINE

## 2024-10-17 PROCEDURE — 83690 ASSAY OF LIPASE: CPT | Performed by: EMERGENCY MEDICINE

## 2024-10-17 PROCEDURE — 74177 CT ABD & PELVIS W/CONTRAST: CPT

## 2024-10-17 PROCEDURE — 250N000013 HC RX MED GY IP 250 OP 250 PS 637

## 2024-10-17 PROCEDURE — 250N000011 HC RX IP 250 OP 636

## 2024-10-17 PROCEDURE — 81003 URINALYSIS AUTO W/O SCOPE: CPT

## 2024-10-17 PROCEDURE — 36415 COLL VENOUS BLD VENIPUNCTURE: CPT | Performed by: EMERGENCY MEDICINE

## 2024-10-17 PROCEDURE — 99285 EMERGENCY DEPT VISIT HI MDM: CPT | Mod: 25

## 2024-10-17 PROCEDURE — 96374 THER/PROPH/DIAG INJ IV PUSH: CPT | Mod: 59

## 2024-10-17 RX ORDER — IOPAMIDOL 755 MG/ML
90 INJECTION, SOLUTION INTRAVASCULAR ONCE
Status: COMPLETED | OUTPATIENT
Start: 2024-10-17 | End: 2024-10-17

## 2024-10-17 RX ORDER — HYDROMORPHONE HYDROCHLORIDE 1 MG/ML
0.5 INJECTION, SOLUTION INTRAMUSCULAR; INTRAVENOUS; SUBCUTANEOUS ONCE
Status: COMPLETED | OUTPATIENT
Start: 2024-10-17 | End: 2024-10-17

## 2024-10-17 RX ORDER — MAGNESIUM HYDROXIDE/ALUMINUM HYDROXICE/SIMETHICONE 120; 1200; 1200 MG/30ML; MG/30ML; MG/30ML
15 SUSPENSION ORAL ONCE
Status: COMPLETED | OUTPATIENT
Start: 2024-10-17 | End: 2024-10-17

## 2024-10-17 RX ORDER — DICYCLOMINE HCL 20 MG
20 TABLET ORAL 2 TIMES DAILY
Qty: 20 TABLET | Refills: 0 | Status: SHIPPED | OUTPATIENT
Start: 2024-10-17 | End: 2024-10-27

## 2024-10-17 RX ADMIN — HYDROMORPHONE HYDROCHLORIDE 0.5 MG: 1 INJECTION, SOLUTION INTRAMUSCULAR; INTRAVENOUS; SUBCUTANEOUS at 22:30

## 2024-10-17 RX ADMIN — ALUMINUM HYDROXIDE, MAGNESIUM HYDROXIDE, AND SIMETHICONE 15 ML: 1200; 120; 1200 SUSPENSION ORAL at 21:41

## 2024-10-17 RX ADMIN — IOPAMIDOL 90 ML: 755 INJECTION, SOLUTION INTRAVENOUS at 22:40

## 2024-10-17 ASSESSMENT — ACTIVITIES OF DAILY LIVING (ADL)
ADLS_ACUITY_SCORE: 40
ADLS_ACUITY_SCORE: 40

## 2024-10-17 ASSESSMENT — COLUMBIA-SUICIDE SEVERITY RATING SCALE - C-SSRS
6. HAVE YOU EVER DONE ANYTHING, STARTED TO DO ANYTHING, OR PREPARED TO DO ANYTHING TO END YOUR LIFE?: NO
2. HAVE YOU ACTUALLY HAD ANY THOUGHTS OF KILLING YOURSELF IN THE PAST MONTH?: NO
1. IN THE PAST MONTH, HAVE YOU WISHED YOU WERE DEAD OR WISHED YOU COULD GO TO SLEEP AND NOT WAKE UP?: NO

## 2024-10-18 NOTE — ED TRIAGE NOTES
"Patient presents to ED from [facility] where he had to eat food for lunch (lasagna) that he did not want to eat \"cause it is not healthy\" @1500, not feeling well ever since then and told by [facility] to come in here, also c/o chronic bilateral knee pain.  Bebe Curtis RN.......10/17/2024 8:55 PM     Triage Assessment (Adult)       Row Name 10/17/24 2053          Triage Assessment    Airway WDL WDL        Respiratory WDL    Respiratory WDL WDL        Skin Circulation/Temperature WDL    Skin Circulation/Temperature WDL WDL        Cardiac WDL    Cardiac WDL WDL        Peripheral/Neurovascular WDL    Peripheral Neurovascular WDL X;neurovascular assessment lower        Cognitive/Neuro/Behavioral WDL    Cognitive/Neuro/Behavioral WDL WDL        LLE Neurovascular Assessment    Sensation LLE tenderness present        RLE Neurovascular Assessment    Sensation RLE tenderness present                     "

## 2024-10-18 NOTE — ED PROVIDER NOTES
"Emergency Department Midlevel Supervisory Note     I had a face to face encounter with this patient seen by the Advanced Practice Provider ([facility]). I personally made/approved the management plan and take responsibility for the patient management. I personally saw patient and performed a substantive portion of the visit including all aspects of the medical decision making.     ED Course:  10:32 PM Natali Flynn PA-C staffed patient with me. I agree with their assessment and plan of management, and I will see the patient.  2300 I met with the patient to introduce myself, gather additional history, perform my initial exam, and discuss the plan.     Brief HPI:     Chavez Ca is a 54 year old male who presents for evaluation of abdominal pain and knee pain.    The patient developed abdominal pain after eating lasagna approximately 3 hours prior to ER arrival. No complaints of nausea, vomiting, diarrhea, fever, or any other associated symptoms at this time.    I, Javier Walden, am serving as a scribe to document services personally performed by Dr. Judit Hooper, based on my observations and the provider's statements to me.   I, Dr. Judit Hooper attest that Javier Walden was acting in a scribe capacity, has observed my performance of the services and has documented them in accordance with my direction.    Brief Physical Exam: /69   Pulse 88   Temp 98.2  F (36.8  C) (Temporal)   Resp 16   Ht 1.702 m (5' 7\")   Wt 135.6 kg (299 lb)   SpO2 98%   BMI 46.83 kg/m    Constitutional:  Alert, in no acute distress  EYES: Conjunctivae clear  HENT:  Atraumatic  Respiratory:  Respirations even, unlabored, in no acute respiratory distress  Cardiovascular:  Regular rate and rhythm, good peripheral perfusion  GI: Soft, non-distended, mild tenderness noticed in right lower quadrant.  Musculoskeletal:  Moves all 4 extremities equally, grossly symmetrical strength  Integument: Warm & dry. No appreciable rash, " erythema.  Neurologic:  Alert & oriented, speech clear and fluent, no focal deficits noted  Psych: Normal mood and affect       MDM:  Afebrile.  Vital signs are unremarkable.  Patient is presenting with lower abdominal pain.  Ate lasagna, thinks that it may have upset his stomach.  No nausea or vomiting.  Overall looks well.  Exam is fairly unremarkable.  Labs for patient here without acute processes noted.  Normal LFTs.  Urinalysis unremarkable.  CT scan independently reviewed unremarkable.  Patient without significant pain or discomfort at this time.  Will discharge patient back to assisted living.      ED Course as of 10/17/24 2321   Thu Oct 17, 2024   2127  I met with the patient to gather history and perform an initial exam.   2232 I staffed the patient with Dr. Hooper.       No diagnosis found.      Labs and Imaging:  Results for orders placed or performed during the hospital encounter of 10/17/24   Comprehensive metabolic panel   Result Value Ref Range    Sodium 141 135 - 145 mmol/L    Potassium 3.2 (L) 3.4 - 5.3 mmol/L    Carbon Dioxide (CO2) 23 22 - 29 mmol/L    Anion Gap 14 7 - 15 mmol/L    Urea Nitrogen 15.0 6.0 - 20.0 mg/dL    Creatinine 0.95 0.67 - 1.17 mg/dL    GFR Estimate >90 >60 mL/min/1.73m2    Calcium 9.4 8.8 - 10.4 mg/dL    Chloride 104 98 - 107 mmol/L    Glucose 92 70 - 99 mg/dL    Alkaline Phosphatase 70 40 - 150 U/L    AST 25 0 - 45 U/L    ALT 21 0 - 70 U/L    Protein Total 7.5 6.4 - 8.3 g/dL    Albumin 4.2 3.5 - 5.2 g/dL    Bilirubin Total 0.2 <=1.2 mg/dL   Result Value Ref Range    Lipase 36 13 - 60 U/L   CBC with platelets and differential   Result Value Ref Range    WBC Count 10.4 4.0 - 11.0 10e3/uL    RBC Count 4.52 4.40 - 5.90 10e6/uL    Hemoglobin 13.3 13.3 - 17.7 g/dL    Hematocrit 39.8 (L) 40.0 - 53.0 %    MCV 88 78 - 100 fL    MCH 29.4 26.5 - 33.0 pg    MCHC 33.4 31.5 - 36.5 g/dL    RDW 14.3 10.0 - 15.0 %    Platelet Count 351 150 - 450 10e3/uL    % Neutrophils 64 %    % Lymphocytes  26 %    % Monocytes 8 %    % Eosinophils 1 %    % Basophils 1 %    % Immature Granulocytes 0 %    NRBCs per 100 WBC 0 <1 /100    Absolute Neutrophils 6.7 1.6 - 8.3 10e3/uL    Absolute Lymphocytes 2.7 0.8 - 5.3 10e3/uL    Absolute Monocytes 0.8 0.0 - 1.3 10e3/uL    Absolute Eosinophils 0.2 0.0 - 0.7 10e3/uL    Absolute Basophils 0.1 0.0 - 0.2 10e3/uL    Absolute Immature Granulocytes 0.0 <=0.4 10e3/uL    Absolute NRBCs 0.0 10e3/uL   UA with Microscopic reflex to Culture    Specimen: Urine, Midstream   Result Value Ref Range    Color Urine Colorless Colorless, Straw, Light Yellow, Yellow    Appearance Urine Clear Clear    Glucose Urine Negative Negative mg/dL    Bilirubin Urine Negative Negative    Ketones Urine Negative Negative mg/dL    Specific Gravity Urine 1.008 1.001 - 1.030    Blood Urine Negative Negative    pH Urine 5.5 5.0 - 7.0    Protein Albumin Urine Negative Negative mg/dL    Urobilinogen Urine <2.0 <2.0 mg/dL    Nitrite Urine Negative Negative    Leukocyte Esterase Urine Negative Negative    Mucus Urine Present (A) None Seen /LPF    RBC Urine <1 <=2 /HPF    WBC Urine <1 <=5 /HPF    Squamous Epithelials Urine <1 <=1 /HPF    Hyaline Casts Urine 1 <=2 /LPF       I have reviewed the relevant laboratory studies above.    I independently interpreted the following imaging study(s):   CT scan abdomen pelvis        Procedures:  I was present for the key portions of procedures documented in [facility]/midlevel note, see midlevel note for further details.    Judit Hooper MD  [facility] EMERGENCY ROOM  [address] [id number] [phone]       Judit Hooper MD  10/17/24 4239

## 2024-10-18 NOTE — ED PROVIDER NOTES
Emergency Department Encounter   NAME: Chavez Ca  AGE: 54 year old male  YOB: 1970  MRN: [id number]    PCP: Hebert Hdez  ED PROVIDER: Natali Flynn PA-C    Evaluation Date & Time:   10/17/2024  9:10 PM    CHIEF COMPLAINT:  Abdominal Pain and Knee Pain      Impression and Plan   MDM: 54-year-old male with a history of hypokalemia, hyperlipidemia, COPD, and type 2 diabetes presents for evaluation of abdominal pain.  Started about 3 hours after he ate some lasagna.  No nausea, vomiting, diarrhea, fever.  On arrival here patient is slightly tachycardic at 113, but otherwise vitally stable. Afebrile. On my exam patient is in no acute distress.  He has generalized abdominal tenderness palpation, worst in the right lower quadrant.  No peritoneal signs.  Differential includes pancreatitis, hepatobiliary obstruction, appendicitis, gastritis, diverticulitis, colitis.  History and examination is less consistent with obstruction, mesenteric ischemia, or aortic syndrome.  We discussed plans for labs, imaging, and pain control here with Dilaudid and GI cocktail which patient is agreeable to.    Labs here without leukocytosis concerning for ongoing systemic infection or inflammatory response.  No anemia.  Mild hypokalemia which patient has a history of.  He supplements with oral potassium.  We discussed having this rechecked with his primary care provider in the next 2 days which she is agreeable to.  Encouraged him to continue using his oral supplementation.  No ADONIS.  Normal LFTs, hepatobiliary obstruction is unlikely.  Normal lipase, pancreatitis unlikely.  UA without signs of infection.  CT without evidence of acute abnormality.  No diverticulitis or appendicitis.    I reassessed the patient.  He is feeling much better after GI cocktail and Dilaudid.  Discussed reassuring workup.  Reviewed symptomatic management.  I prescribed him Bentyl to help with his pain at home as he is describing more  of a cramping pain now.  Reviewed plan for discharge home and outpatient follow-up for abdominal pain as well as hypokalemia.  We reviewed strict return precautions and patient was discharged home in stable condition.      I have staffed the patient with Dr. Hooper, ED physician, who will evaluate the patient and agrees with all aspects of today's care.     ED Course as of 10/18/24 0017   u Oct 17, 2024   2127  I met with the patient to gather history and perform an initial exam.   2232 I staffed the patient with Dr. Hooepr.   2336 I rechecked on the patient.        Medical Decision Making  Obtained supplemental history:Supplemental history obtained?: No  Reviewed external records: External records reviewed?: Documented in chart  Care impacted by chronic illness:Documented in Chart  Care significantly affected by social determinants of health:N/A  Did you consider but not order tests?: Work up considered but not performed and documented in chart, if applicable  Did you interpret images independently?: Independent interpretation of ECG and images noted in documentation, when applicable.  Consultation discussion with other provider:Did you involve another provider (consultant, , pharmacy, etc.)?: No  Discharge. I prescribed additional prescription strength medication(s) as charted. N/A.   At the conclusion of the encounter I discussed the results of all the tests and the disposition. The questions were answered. The patient or family acknowledged understanding and was agreeable with the care plan.    Not Applicable       FINAL IMPRESSION:    ICD-10-CM    1. Abdominal pain  R10.9             MEDICATIONS GIVEN IN THE EMERGENCY DEPARTMENT:  Medications   alum & mag hydroxide-simethicone (MAALOX) suspension 15 mL (15 mLs Oral $Given 10/17/24 2141)   HYDROmorphone (PF) (DILAUDID) injection 0.5 mg (0.5 mg Intravenous $Given 10/17/24 2230)   iopamidol (ISOVUE-370) solution 90 mL (90 mLs Intravenous $Given 10/17/24 2240)          NEW PRESCRIPTIONS STARTED AT TODAY'S ED VISIT:  Discharge Medication List as of 10/17/2024 11:45 PM        START taking these medications    Details   dicyclomine (BENTYL) 20 MG tablet Take 1 tablet (20 mg) by mouth 2 times daily for 10 days., Disp-20 tablet, R-0, E-Prescribe               HPI   Patient information was obtained from: patient    Use of Intrepreter: N/A     Chavez Ca is a 54 year old male with a pertinent history of obesity, hypertension, hyperlipidemia, type 2 diabetes, and COPD who presents to the ED by walk-in for evaluation of abdominal pain.     Patient reports he ate lasagna today that was old and caused him to have an upset stomach at 1500. He then developed right-sided low abdominal pain at [id number] tonight where he was unable to relax. He talked to his  at his assisted living facility to see if he could get more medication and they told him to come in as only the nurses are allowed to give medication.  No fever, nausea, vomiting, diarrhea.  He has had abdominal surgery, but he is unsure what was done.  No dysuria or hematuria.    REVIEW OF SYSTEMS:  Pertinent positive and negative symptoms per HPI.       Medical History     Past Medical History:   Diagnosis Date    Allergic rhinitis     Arthritis     Benign positional vertigo 2011    Bilateral carpal tunnel syndrome 07/18/2015    Chronic sinusitis 2017    Chronic tonsillitis     COPD (chronic obstructive pulmonary disease) (H) 11/2010    Depressive disorder     Depressive disorder, not elsewhere classified 07/30/2012    Gastro-oesophageal reflux disease     Hearing problem     Hyperlipidemia associated with prediabetes     Hypertension 2008    Learning disability     Mild intermittent asthma in adult without complication     Neck mass     Obesity, Class III, BMI 40-49.9 (morbid obesity) (H)     Psychological factors associated with another disorder 08/21/2012    Recurrent otitis media 2016    Reduced vision      Tinnitus 2016    Trigeminal neuralgia     Weakness 08/26/2011       Past Surgical History:   Procedure Laterality Date    ABDOMEN SURGERY  08/23/2015    Gallstones removed with cutting a big area in belly    BIOPSY  2011    To figure out what kind of tumor I had    COLONOSCOPY N/A 10/16/2020    Procedure: INCOMPLETE COLONOSCOPY;  Surgeon: Ham Cherry MD;  Location: [facility] OR    ENDOSCOPIC RETROGRADE CHOLANGIOPANCREATOGRAM N/A 08/27/2015    Procedure: COMBINED ENDOSCOPIC RETROGRADE CHOLANGIOPANCREATOGRAPHY, PLACE TUBE/STENT;  Surgeon: Baldomero Zacarias MD;  Location: [facility] OR    ENDOSCOPIC RETROGRADE CHOLANGIOPANCREATOGRAM N/A 11/06/2015    Procedure: COMBINED ENDOSCOPIC RETROGRADE CHOLANGIOPANCREATOGRAPHY, REMOVE FOREIGN BODY OR STENT/TUBE;  Surgeon: Baldomero Zacarias MD;  Location:  OR    EXCISE LESION INTRAORAL  06/29/2011    Procedure:EXCISE LESION INTRAORAL; TRANSCERVICAL APPROACH TO LEFT PHARYNGEAL SPACE MASS REMOVAL ; Surgeon:BARBARA PHILLIPS; Location: OR     VASCULAR SURGERY PROCEDURE UNLIST  12/29/2015    LAPAROSCOPIC CHOLECYSTECTOMY N/A 08/23/2015    Procedure: LAPAROSCOPIC CHOLECYSTECTOMY;  Surgeon: Kvng Witt MD;  Location: [facility] OR    LARYNGOSCOPY WITH BIOPSY(IES)  06/18/2014    Procedure: LARYNGOSCOPY WITH BIOPSY(IES);  Surgeon: Barbara Phillips MD;  Location:  OR    LASER CO2 LARYNGOSCOPY  12/07/2011    Procedure:LASER CO2 LARYNGOSCOPY; Micro-Direct Laryngoscopy with CO2 Laser Standby / Excision Tracheal Grandulization, Trach Tube Change / Kenalog application. / Balloon Dilation of Subglottic Stenosis.*Latex Safe Room* Trach Tube = Shiley 6.4mm I.D. / 10.8MM O.D. / 76MM  Cuffless.; Surgeon:BARBARA PHILLIPS; Location: OR    ORTHOPEDIC SURGERY  05/2016    Broken Right hand & Fracture Right shoulder    REPAIR PTOSIS  6/2000    A quarter size cyst on left eyelid    TRACHEOSTOMY  04/22/2011    Procedure:TRACHEOSTOMY; possible awake;  "Surgeon:EMELIA PHILLIPS; Location:[facility] OR    TRACHEOSTOMY  06/29/2011    Procedure:TRACHEOSTOMY; REMOVE AND REPLACE SHILEY 6 XLT; Surgeon:EMELIA PHILLIPS; Location:[facility] OR    VASCULAR SURGERY  2008-[facility]       Family History   Adopted: Yes   Problem Relation Age of Onset    Family History Negative Other         Does not know family    Unknown/Adopted No family hx of        Social History     Tobacco Use    Smoking status: Never     Passive exposure: Never    Smokeless tobacco: Never    Tobacco comments:     Never had anything in my life   Vaping Use    Vaping status: Never Used   Substance Use Topics    Alcohol use: Not Currently     Comment: I used to have a drink or two twice a year many years ago    Drug use: Never       dicyclomine (BENTYL) 20 MG tablet  acetaminophen (TYLENOL) 500 MG tablet  ammonium lactate (AMLACTIN) 12 % external cream  COMPRESSION STOCKINGS  furosemide (LASIX) 20 MG tablet  potassium chloride ER (K-TAB) 20 MEQ CR tablet  rosuvastatin (CRESTOR) 10 MG tablet  SELF ADMINISTER MEDICATIONS: BEHAVIORAL SERVICES  tirzepatide (MOUNJARO) 12.5 MG/0.5ML pen  topiramate (TOPAMAX) 100 MG tablet  traMADol (ULTRAM) 50 MG tablet          Physical Exam     First Vitals:  Patient Vitals for the past 24 hrs:   BP Temp Temp src Pulse Resp SpO2 Height Weight   10/17/24 2256 -- -- -- 94 -- 97 % -- --   10/17/24 2255 123/65 -- -- 93 -- 98 % -- --   10/17/24 2229 112/69 -- -- 88 -- 98 % -- --   10/17/24 2051 132/64 98.2  F (36.8  C) Temporal 113 16 97 % 1.702 m (5' 7\") 135.6 kg (299 lb)       PHYSICAL EXAM:   General Appearance:  Alert, cooperative, no distress, appears stated age  Respiratory: No distress. Lungs clear to ausculation bilaterally. No wheezes, rhonchi or stridor  Cardiovascular: Regular rate and rhythm, no murmur. Normal cap refill. No peripheral edema  GI: Generalized abdominal tenderness to palpation, worst in the right lower quadrant.  No rigidity, rebound, guarding, or " distention.  : No CVA tenderness  Musculoskeletal: Moving all extremities. No gross deformities  Integument: Warm, dry, no rashes or lesions  Psych: Normal mood and affect      Results     LAB:  All pertinent labs reviewed and interpreted  Labs Ordered and Resulted from Time of ED Arrival to Time of ED Departure   COMPREHENSIVE METABOLIC PANEL - Abnormal       Result Value    Sodium 141      Potassium 3.2 (*)     Carbon Dioxide (CO2) 23      Anion Gap 14      Urea Nitrogen 15.0      Creatinine 0.95      GFR Estimate >90      Calcium 9.4      Chloride 104      Glucose 92      Alkaline Phosphatase 70      AST 25      ALT 21      Protein Total 7.5      Albumin 4.2      Bilirubin Total 0.2     CBC WITH PLATELETS AND DIFFERENTIAL - Abnormal    WBC Count 10.4      RBC Count 4.52      Hemoglobin 13.3      Hematocrit 39.8 (*)     MCV 88      MCH 29.4      MCHC 33.4      RDW 14.3      Platelet Count 351      % Neutrophils 64      % Lymphocytes 26      % Monocytes 8      % Eosinophils 1      % Basophils 1      % Immature Granulocytes 0      NRBCs per 100 WBC 0      Absolute Neutrophils 6.7      Absolute Lymphocytes 2.7      Absolute Monocytes 0.8      Absolute Eosinophils 0.2      Absolute Basophils 0.1      Absolute Immature Granulocytes 0.0      Absolute NRBCs 0.0     ROUTINE UA WITH MICROSCOPIC REFLEX TO CULTURE - Abnormal    Color Urine Colorless      Appearance Urine Clear      Glucose Urine Negative      Bilirubin Urine Negative      Ketones Urine Negative      Specific Gravity Urine 1.008      Blood Urine Negative      pH Urine 5.5      Protein Albumin Urine Negative      Urobilinogen Urine <2.0      Nitrite Urine Negative      Leukocyte Esterase Urine Negative      Mucus Urine Present (*)     RBC Urine <1      WBC Urine <1      Squamous Epithelials Urine <1      Hyaline Casts Urine 1     LIPASE - Normal    Lipase 36         RADIOLOGY:  CT Abdomen Pelvis w Contrast   Final Result   IMPRESSION:    1.  No acute  findings in the abdomen or pelvis.            I, Faith Barnes, am serving as a scribe to document services personally performed by Natali Flynn PA-C, based on my observation and the provider's statements to me. I, Natali Flynn PA-C attest that Faith Barnes is acting in a scribe capacity, has observed my performance of the services and has documented them in accordance with my direction.       Natali Flynn PA-C   Emergency Medicine   [facility] EMERGENCY ROOM      Natali Flynn PA-C  10/18/24 0017

## 2024-10-28 NOTE — PROGRESS NOTES
[facility]      Assessment:     1. varicose veins and spider veins, bilateral   2.  Leg pain , bilateral   3. No Insuffiencey of bilateral legs  4. Morbid obesity  5. Chronic pain     Plan:     1. Treatment options of conservative therapy of stockings use, exercise, weight loss, elevating legs when possible.    2. Script for compression stockings 20-30 mm hg  3. Ultrasound to evaluate legs for incompetency of both deep and superficial system .   4. Surgical treatment, discussed as potential but US negative. Would recommend compression and conservative measures. Also discussed weight lose management and options through our system   5. Follow up:  as needed .   6. Call for any questions concerns or issues    Subjective:      Chavez Ca is a 54 year old male  who was referred by Hebert Hdez  for evaluation of varicose veins. Symptoms include pain, aching, fatigue, burning, edema, and dermatitis. Patient has history of leg selling, pain and vein issues that have progressed. Pain and symptoms have affected daily living and work activities needing medications. Here for evaluation today. no stocking or compression devic use    Allergies:Fentanyl, Meloxicam, and Minocycline    Past Medical History:   Diagnosis Date    Allergic rhinitis     Arthritis     Benign positional vertigo 2011    never quite went away    Bilateral carpal tunnel syndrome 07/18/2015    Chronic sinusitis 2017    I don't know Please look    Chronic tonsillitis     COPD (chronic obstructive pulmonary disease) (H) 11/2010    That is the date i got first sleep study results    Depressive disorder     Depressive disorder, not elsewhere classified 07/30/2012    Gastro-oesophageal reflux disease     Hearing problem     Hyperlipidemia associated with prediabetes     Hypertension 2008    Learning disability     Mild intermittent asthma in adult without complication     Neck mass     parapharyngeal, benign    Obesity,  Class III, BMI 40-49.9 (morbid obesity) (H)     Psychological factors associated with another disorder 08/21/2012    Recurrent otitis media 2016    might be what the pain in my ears    Reduced vision     Tinnitus 2016    I would hear a steady light buzzing sound and other can't    Trigeminal neuralgia     Weakness 08/26/2011       Past Surgical History:   Procedure Laterality Date    ABDOMEN SURGERY  08/23/2015    Gallstones removed with cutting a big area in belly    BIOPSY  2011    To figure out what kind of tumor I had    COLONOSCOPY N/A 10/16/2020    Procedure: INCOMPLETE COLONOSCOPY;  Surgeon: Ham Cherry MD;  Location: [facility] OR    ENDOSCOPIC RETROGRADE CHOLANGIOPANCREATOGRAM N/A 08/27/2015    Procedure: COMBINED ENDOSCOPIC RETROGRADE CHOLANGIOPANCREATOGRAPHY, PLACE TUBE/STENT;  Surgeon: Baldomero Zacarias MD;  Location: [facility] OR    ENDOSCOPIC RETROGRADE CHOLANGIOPANCREATOGRAM N/A 11/06/2015    Procedure: COMBINED ENDOSCOPIC RETROGRADE CHOLANGIOPANCREATOGRAPHY, REMOVE FOREIGN BODY OR STENT/TUBE;  Surgeon: Baldomero Zacarias MD;  Location: [facility] OR    EXCISE LESION INTRAORAL  06/29/2011    Procedure:EXCISE LESION INTRAORAL; TRANSCERVICAL APPROACH TO LEFT PHARYNGEAL SPACE MASS REMOVAL ; Surgeon:BARBARA PHILLIPS; Location:[facility] OR    HC VASCULAR SURGERY PROCEDURE UNLIST  12/29/2015    LAPAROSCOPIC CHOLECYSTECTOMY N/A 08/23/2015    Procedure: LAPAROSCOPIC CHOLECYSTECTOMY;  Surgeon: Kvng Witt MD;  Location: [facility] OR    LARYNGOSCOPY WITH BIOPSY(IES)  06/18/2014    Procedure: LARYNGOSCOPY WITH BIOPSY(IES);  Surgeon: Barbara Phillips MD;  Location: [facility]    LASER CO2 LARYNGOSCOPY  12/07/2011    Procedure:LASER CO2 LARYNGOSCOPY; Micro-Direct Laryngoscopy with CO2 Laser Standby / Excision Tracheal Grandulization, Trach Tube Change / Kenalog application. / Balloon Dilation of Subglottic Stenosis.*Latex Safe Room* Trach Tube = Shiley 6.4mm I.D. / 10.8MM O.D. / 76MM  Cuffless.;  Surgeon:EMELIA PHILLIPS; Location:[facility] OR    ORTHOPEDIC SURGERY  05/2016    Broken Right hand & Fracture Right shoulder    REPAIR PTOSIS  6/2000    A quarter size cyst on left eyelid    TRACHEOSTOMY  04/22/2011    Procedure:TRACHEOSTOMY; possible awake; Surgeon:EMELIA PHILLIPS; Location:[facility] OR    TRACHEOSTOMY  06/29/2011    Procedure:TRACHEOSTOMY; REMOVE AND REPLACE SHILEY 6 XLT; Surgeon:EMELIA PHILLIPS; [facility]    VASCULAR SURGERY  2008-[facility]         Current Outpatient Medications:     acetaminophen (TYLENOL) 500 MG tablet, Take 2 tablets (1,000 mg) by mouth 3 times daily, Disp: , Rfl:     ammonium lactate (AMLACTIN) 12 % external cream, Apply topically 2 times daily, Disp: 385 g, Rfl: 3    COMPRESSION STOCKINGS, 20-30 mmHg knee high compression stockings.  Measure and fit.  Style and brand per patient preference., Disp: 2 each, Rfl: 0    furosemide (LASIX) 20 MG tablet, Take 3 tablets (60 mg) by mouth 2 times daily, Disp: 540 tablet, Rfl: 3    potassium chloride ER (K-TAB) 20 MEQ CR tablet, Take 2 tablets (40 mEq) by mouth daily, Disp: 180 tablet, Rfl: 2    rosuvastatin (CRESTOR) 10 MG tablet, Take 1 tablet (10 mg) by mouth daily., Disp: 90 tablet, Rfl: 3    SELF ADMINISTER MEDICATIONS: BEHAVIORAL SERVICES, 1 each See Admin Instructions., Disp: 90 each, Rfl: 0    tirzepatide (MOUNJARO) 12.5 MG/0.5ML pen, Inject 12.5 mg subcutaneously once a week., Disp: 2 mL, Rfl: 3    topiramate (TOPAMAX) 100 MG tablet, Take 2 tablets (200 mg) by mouth 2 times daily., Disp: 360 tablet, Rfl: 0    traMADol (ULTRAM) 50 MG tablet, Take 1 tablet (50 mg) by mouth 2 times daily., Disp: 60 tablet, Rfl: 0    Current Facility-Administered Medications:     hylan (SYNVISC ONE) injection 48 mg, 48 mg, , , , 48 mg at 09/09/24 0832    hylan (SYNVISC ONE) injection 48 mg, 48 mg, , , , 48 mg at 09/09/24 0832    lidocaine (PF) (XYLOCAINE) 1 % injection 3 mL, 3 mL, , , , 3 mL at 09/09/24 7773    lidocaine  (PF) (XYLOCAINE) 1 % injection 3 mL, 3 mL, , , , 3 mL at 09/09/24 0832    lidocaine (PF) (XYLOCAINE) 1 % injection 4 mL, 4 mL, , , Antione Abrams MD, 4 mL at 03/12/24 0856    triamcinolone (KENALOG-40) injection 40 mg, 40 mg, , , Antione Abrams MD, 40 mg at 03/12/24 0856     Family History   Adopted: Yes   Problem Relation Age of Onset    Family History Negative Other         Does not know family    Unknown/Adopted No family hx of         reports that he has never smoked. He has never been exposed to tobacco smoke. He has never used smokeless tobacco. He reports that he does not currently use alcohol. He reports that he does not use drugs.      Review of Systems:    Pertinent items are noted in HPI.  Patient has symptomatic veins and changes of bilateral legs. These have progressed to the point of causing symptoms on a daily basis. This causes issues with daily activities and chores such as mowing lawn, outdoor upkeep, and standing for long lengths of time       Objective:     Vitals:    10/08/24 1509   BP: 110/74   Pulse: 94   Temp: 97.4  F (36.3  C)   SpO2: 100%     There is no height or weight on file to calculate BMI.    EXAM:  GENERAL: This is a well-developed 54 year old male who appears his stated age  HEAD: normocephalic  HEENT: Pupils equal and reactive bilaterally  MOUTH: mucus membranes intact. Normal dentation  CARDIAC: RRR without murmur  CHEST/LUNG:  Clear to auscultation bilaterally  ABDOMEN: Soft, nontender, nondistended, no masses noted   NEUROLOGIC: Focally intact, nonfocal, alert and oriented x 3  INTEGUMENT: No open lesions or ulcers  VASCULAR: Pulses intact, symmetrical upper and lower extremities. There areskin changes consistent with chronic venous insufficiency. Varicose veins present in bilateral greater saphenous distribution. Spider veins present bilateral.                                    Side:: Bilateral  VCSS  PAIN:: Severe: Daily, severe limiting activities or  requiring regular use of pain meds  Varicose Veins:: Moderate: Multiple: great saphenous confined to calf and thigh  Venous Edema:: Moderate: Afternoon swelling, above ankle  Skin Pigmentation:: Mild: Diffuse, but limited in area and old (brown)  Inflamation:: Absent: None  Induration:: Absent: None  Number of active ulcers:: 0  Active ulcer duration:: None  Active ulcer diameter:: None  Compression Therapy:: Full compliance stockings + elevation  VCSS Score:: 11  CEAP:: Skin pigmentation in the gaiter area (lipoderma tosclerosis)  Patient reported symptoms  Heaviness: some of the time  Achiness: most of the time  Swelling: most of the time  Throbbing: a good bit of the time  Itching: some of the time  Impact on work/activity: all of the time    Imaging:    Reviewed his us and called with results 4 times no answer left a message on voicemail.      Exam Information    Exam Date Exam Time Accession # Performing Department Results    10/9/24  3:35 PM [id number] [facility] [place]      PACS Images     Show images for US Venous Competency Bilateral     Study Result    Narrative & Impression   BILATERAL Venous Insufficiency Ultrasound (Date: 10/09/24)    BILATERAL Lower Extremity          Indication:  Surveillance Bilateral Legs Symptomatic Varicose Veins, Pain, and Swelling. (Patient uses walker)      Previous: None     Patient History: Swelling and Morbid Obesity     Presenting Symptoms:  Swelling, Varicose Veins, and Pain     Technique:   Supine and Reverse Trendelenburg Ultrasound of the Deep and Superficial Veins with Valsalva and Compression Augmentation Maneuvers. Duplex Imaging is performed utilizing gray-scale, Two-dimensional images, color-flow imaging, Doppler waveform analysis, and Spectral doppler imaging done with provacative maneuvers.      Incompetency Criteria:  Deep vein reflux reported when greater than 1000 msec flow reversal. Superficial vein reflux  reported when equal to or greater than 600 msec flow reversal.  vein reflux reported as greater than 350 msec flow reversal.      Right  Leg Deep Veins    CFV SFJ DFV PROX FV   PROX FV MID FV DIST POP V. PERON.   V. PTV'S   Compressibility  (FC,PC,NC) FC FC FC FC FC FC FC FC FC   Reflux -   - - - - -   -         Right Leg Superficial Veins  Location SFJ PROX THIGH MID THIGH KNEE MID CALF   GSV (mm) 5.0 3.9 4.1 4.8 3.5   Reflux - - - - -   AASV (mm) 5.8           Reflux  -              Location SPJ PROX CALF MID CALF   SSV (mm) 3.9 3.2 3.9   Reflux - - -      Left  Leg Deep Veins    CFV SFJ DFV PROX FV   PROX FV MID FV DIST POP V. PERON.   V. PTV'S   Compressibility  (FC,PC,NC) FC FC FC FC FC FC FC FC FC   Reflux -   - - - - -   -         Left Leg Superficial Veins  Location SFJ PROX THIGH MID THIGH KNEE MID CALF   GSV (mm) 6.7 2.2 3.4 3.1 2.5   Reflux - - - - -   AASV (mm) 2.9           Reflux -           PASV (mm) 2.6           Reflux -              Location SPJ PROX CALF MID CALF   SSV (mm) 4.1 5.2 4.2   Reflux - - -      Comments: No incompetent  veins visualized. Left GSV Mid Calf Varicose Vein Anterior Branch Incompetent ( 5.8mm/ 3.5 sec)         Impression:       Right Deep Vein Findings: Patent deep venous system with no evidence of DVT and without deep venous reflux        Left Deep Vein Findings: Patent deep venous system with no evidence of DVT and without deep venous reflux        Superficial Vein Findings:      Right Greater Saphenous Vein: Patent Greater Saphenous Vein without evidence of reflux.     Right Small Saphenous Vein: Patent Small Saphenous Vein without evidence of reflux.     Left Greater Saphenous Vein: Patent Greater Saphenous Vein without evidence of reflux.     Left Small Saphenous Vein: Patent Small Saphenous Vein without evidence of reflux.     Perforating and Accessory Veins: Right accessory vein without evidence of reflux.  Left accessory vein without evidence of  reflux.  Left  vein without evidence of reflux.      Reference:     Compressibility: FC= Fully compressible, PC= Partially compressible, NC= Non-compressible, NV= Not Visualized     Reflux: (+) Incompetent  (-) Competent, (NV) = Not Visualized     Interpretation criteria:          Duration of Retrograde flow (milliseconds)  Category Deep Veins Superficial Veins  veins   Competent < 1000ms < 500ms < 350ms   Incompetent > 1000ms > 500ms > 350ms            John Mcguire MD  General Surgery [phone]  Vascular Surgery [phone]

## 2024-10-29 ASSESSMENT — ASTHMA QUESTIONNAIRES
QUESTION_2 LAST FOUR WEEKS HOW OFTEN HAVE YOU HAD SHORTNESS OF BREATH: MORE THAN ONCE A DAY
ACT_TOTALSCORE: 13
QUESTION_5 LAST FOUR WEEKS HOW WOULD YOU RATE YOUR ASTHMA CONTROL: NOT CONTROLLED AT ALL
QUESTION_1 LAST FOUR WEEKS HOW MUCH OF THE TIME DID YOUR ASTHMA KEEP YOU FROM GETTING AS MUCH DONE AT WORK, SCHOOL OR AT HOME: NONE OF THE TIME
QUESTION_3 LAST FOUR WEEKS HOW OFTEN DID YOUR ASTHMA SYMPTOMS (WHEEZING, COUGHING, SHORTNESS OF BREATH, CHEST TIGHTNESS OR PAIN) WAKE YOU UP AT NIGHT OR EARLIER THAN USUAL IN THE MORNING: FOUR OR MORE NIGHTS A WEEK
ACT_TOTALSCORE: 13
QUESTION_4 LAST FOUR WEEKS HOW OFTEN HAVE YOU USED YOUR RESCUE INHALER OR NEBULIZER MEDICATION (SUCH AS ALBUTEROL): NOT AT ALL

## 2024-10-30 ENCOUNTER — OFFICE VISIT (OUTPATIENT)
Dept: INTERNAL MEDICINE | Facility: CLINIC | Age: 54
End: 2024-10-30
Payer: COMMERCIAL

## 2024-10-30 VITALS
HEART RATE: 94 BPM | SYSTOLIC BLOOD PRESSURE: 102 MMHG | HEIGHT: 67 IN | RESPIRATION RATE: 16 BRPM | TEMPERATURE: 98 F | WEIGHT: 292 LBS | BODY MASS INDEX: 45.83 KG/M2 | OXYGEN SATURATION: 100 % | DIASTOLIC BLOOD PRESSURE: 68 MMHG

## 2024-10-30 DIAGNOSIS — Z59.819 HOUSING INSECURITY: ICD-10-CM

## 2024-10-30 DIAGNOSIS — E11.9 TYPE 2 DIABETES MELLITUS WITHOUT COMPLICATION, WITHOUT LONG-TERM CURRENT USE OF INSULIN (H): ICD-10-CM

## 2024-10-30 DIAGNOSIS — Z79.899 MEDICATION MANAGEMENT: Primary | ICD-10-CM

## 2024-10-30 PROBLEM — J45.40 MODERATE PERSISTENT ASTHMA WITHOUT COMPLICATION: Status: RESOLVED | Noted: 2022-11-12 | Resolved: 2024-10-30

## 2024-10-30 PROCEDURE — G2211 COMPLEX E/M VISIT ADD ON: HCPCS | Performed by: NURSE PRACTITIONER

## 2024-10-30 PROCEDURE — 90480 ADMN SARSCOV2 VAC 1/ONLY CMP: CPT | Performed by: NURSE PRACTITIONER

## 2024-10-30 PROCEDURE — 99214 OFFICE O/P EST MOD 30 MIN: CPT | Performed by: NURSE PRACTITIONER

## 2024-10-30 PROCEDURE — 91320 SARSCV2 VAC 30MCG TRS-SUC IM: CPT | Performed by: NURSE PRACTITIONER

## 2024-10-30 SDOH — ECONOMIC STABILITY - HOUSING INSECURITY: HOUSING INSTABILITY UNSPECIFIED: Z59.819
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
## 2024-10-30 NOTE — PATIENT INSTRUCTIONS
Referral to clinical pharmacist to review your medications and conditions for possible medication optimization.    Congratulations on your weight loss, you are down over 140 pounds.    I gave you a letter today, stating no evidence of acute neurological issues or recent stroke.    Lets plan on following up in 3 months

## 2024-10-30 NOTE — LETTER
"October 30, 2024      Chavez Ca  [address] [facility] [address]        To Whom It May Concern:    Chavez Ca was seen in our clinic.     He has a normal neurological exam today with no evidence of recent stroke.  He does not have a history of atypical migraine, amnesia events, absence seizures, etc.    He does not have a history of \"blacking out.\"  There is no evidence on exam of him having done so recently.          Sincerely,      Hebert Hdez              "

## 2024-10-30 NOTE — LETTER
October 30, 2024      Chavez Ca  [address] [facility] [address]        To Whom It May Concern:    Chavez Ca was seen in our clinic. He may return to work with the following: {work restrictions for clinic work note:371846} on or about ***.      Sincerely,      Hebert Hdez

## 2024-11-07 ENCOUNTER — TELEPHONE (OUTPATIENT)
Dept: INTERNAL MEDICINE | Facility: CLINIC | Age: 54
End: 2024-11-07
Payer: COMMERCIAL

## 2024-11-07 ENCOUNTER — OFFICE VISIT (OUTPATIENT)
Dept: PHARMACY | Facility: CLINIC | Age: 54
End: 2024-11-07
Attending: NURSE PRACTITIONER
Payer: COMMERCIAL

## 2024-11-07 VITALS — DIASTOLIC BLOOD PRESSURE: 76 MMHG | SYSTOLIC BLOOD PRESSURE: 113 MMHG | WEIGHT: 289.3 LBS | BODY MASS INDEX: 45.31 KG/M2

## 2024-11-07 DIAGNOSIS — M17.0 PRIMARY OSTEOARTHRITIS OF BOTH KNEES: ICD-10-CM

## 2024-11-07 DIAGNOSIS — E78.5 DYSLIPIDEMIA: ICD-10-CM

## 2024-11-07 DIAGNOSIS — R60.9 IDIOPATHIC EDEMA: ICD-10-CM

## 2024-11-07 DIAGNOSIS — E11.9 TYPE 2 DIABETES MELLITUS WITHOUT COMPLICATION, WITHOUT LONG-TERM CURRENT USE OF INSULIN (H): ICD-10-CM

## 2024-11-07 DIAGNOSIS — E66.813 CLASS 3 SEVERE OBESITY DUE TO EXCESS CALORIES WITH SERIOUS COMORBIDITY AND BODY MASS INDEX (BMI) OF 50.0 TO 59.9 IN ADULT (H): ICD-10-CM

## 2024-11-07 DIAGNOSIS — L85.3 DRY SKIN: ICD-10-CM

## 2024-11-07 DIAGNOSIS — G89.29 CHRONIC RIGHT-SIDED LOW BACK PAIN WITH RIGHT-SIDED SCIATICA: Primary | ICD-10-CM

## 2024-11-07 DIAGNOSIS — M54.41 CHRONIC RIGHT-SIDED LOW BACK PAIN WITH RIGHT-SIDED SCIATICA: Primary | ICD-10-CM

## 2024-11-07 DIAGNOSIS — E66.01 CLASS 3 SEVERE OBESITY DUE TO EXCESS CALORIES WITH SERIOUS COMORBIDITY AND BODY MASS INDEX (BMI) OF 50.0 TO 59.9 IN ADULT (H): ICD-10-CM

## 2024-11-07 PROCEDURE — 99207 PR NO CHARGE LOS: CPT

## 2024-11-07 RX ORDER — METHOCARBAMOL 500 MG/1
500 TABLET, FILM COATED ORAL 3 TIMES DAILY PRN
Qty: 90 TABLET | Refills: 1 | Status: SHIPPED | OUTPATIENT
Start: 2024-11-07

## 2024-11-07 NOTE — PATIENT INSTRUCTIONS
"Recommendations from today's MTM visit:                                                    MTM (medication therapy management) is a service provided by a clinical pharmacist designed to help you get the most of out of your medicines.   Today we reviewed what your medicines are for, how to know if they are working, that your medicines are safe and how to make your medicine regimen as easy as possible.      Start monitoring your blood pressure and checking your blood sugars at home when you are feeling lightheaded   Start methocarbamol 500mg three times daily as needed for back pain.    Follow-up: Return in 2 weeks (on 11/21/2024) for Follow up, with me, in person.    It was great speaking with you today.  I value your experience and would be very thankful for your time in providing feedback in our clinic survey. In the next few days, you may receive an email or text message from [employer] with a link to a survey related to your  clinical pharmacist.\"     To schedule another MTM appointment, please call the clinic directly or you may call the MTM scheduling line at [phone].    My Clinical Pharmacist's contact information:                                                      Please feel free to contact me with any questions or concerns you have.      Alaina Lim, PharmD  Medication Therapy Management (MTM) Pharmacist   "

## 2024-11-07 NOTE — TELEPHONE ENCOUNTER
Patient Quality Outreach    Patient is due for the following:   Physical Annual Wellness Visit    Next Steps:   Patient was scheduled for 12/18/2024    Type of outreach:    Phone, spoke to patient/parent. Patient asked to have his appointment on the 12/18 to be switched to [facility] since he had that appointment already scheduled.      Questions for provider review:    None           Beverly Rivera

## 2024-11-07 NOTE — PROGRESS NOTES
Medication Therapy Management (MTM) Encounter    ASSESSMENT:                            Medication Adherence/Access: See below for considerations.    Diabetes /Weight management   A1c at goal <7%. May benefit from increasing Mounjaro however would like to confirm he is not experiencing low blood glucose that are causing his lightheaded symptoms. At max dose topiramate for weight loss.     Edema    Blood pressure at goal <130/80. Furosemide could be contributing to his lightheadedness. Encouraged him to use water fountains to fill up water bottles. Patient should monitor blood pressure when he is symptomatic.      Hyperlipidemia   Recheck fasting lipids in 1 month, aim for LDL <100.      Osteoarthritis of knees/Low back pain  Worsened. Recommended he try methocarbamol with lower risk of sedation as he has a long history of medication trials.    Dry skin  Stable when he is able to use.     PLAN:                            Start monitoring your blood pressure and checking your blood sugars at home when you are feeling lightheaded   Start methocarbamol 500mg three times daily as needed for back pain.    Follow-up: Return in 2 weeks (on 11/21/2024) for Follow up, with me, in person.    SUBJECTIVE/OBJECTIVE:                          Chavez Ca is a 54 year old male seen for an initial visit. He was referred to me from Sonido Hdez CNP.      Reason for visit: comprehensive medication review     Allergies/ADRs: Reviewed in chart  Past Medical History: Reviewed in chart  Tobacco: He reports that he has never smoked. He has never been exposed to tobacco smoke. He has never used smokeless tobacco.  Alcohol: Less than 1 beverage / month    Medication Adherence/Access: Lives in assisted living however he sets up his own medications. If going out, he will bring his doses with in Ziploc bag.   Having significant issues with housing. Does have a  through [facility] but communication can be slow and things do slip through  the cracks - he just lost MA for example despite submitting all of the paperwork.    Diabetes /Weight management   Mounjaro 12.5mg weekly - Takes on Fridays  Topamax 200mg twice daily for weight loss    Denies symptoms of hypoglycemia but reports he usually feels warm/sweaty.  Patient is not experiencing side effects.    Diet/Exercise: limited due to pain     Blood sugar monitoring: never  Eye exam is up to date  Foot exam is up to date    Edema    Furosemide 60mg twice daily   Potassium chloride 40mEq daily  Compression stockings    About a week ago, he has noticed lightheadedness that occurs suddenly when standing. Also has shortness of breath. He feels he could have fallen if not for his walker. He would drink more water but is buying his own water now because his assisted living doesn't change the water filters.   Swelling has gone up because he can see his varicose veins more. Tries to elevate his legs but can be painful too.   Patient does not self-monitor blood pressure.      Hyperlipidemia   Rosuvastatin 10mg once daily - just started a couple months ago  Patient reports no significant myalgias or other side effects.  The 10-year ASCVD risk score (Rajinder BATEMAN, et al., 2019) is: 8.3%    Values used to calculate the score:      Age: 54 years      Sex: Male      Is Non- : No      Diabetic: Yes      Tobacco smoker: No      Systolic Blood Pressure: 113 mmHg      Is BP treated: No      HDL Cholesterol: 51 mg/dL      Total Cholesterol: 199 mg/dL     Osteoarthritis of knees/Low back pain  Tramadol 50mg twice daily - wonders if he can increase it   Acetaminophen 1000mg three times daily - patient reports he does not take every day. Lately has been taking more because of increased knee pain  He still has not heard back about scooter which has been ordered twice now    He has back pain and knee pain. He is hoping to avoid knee surgery.   2 months since last injection knee and didn't help.   Trying  to do more walking/standing without overdoing it.   He is now back in pool therapy which is helpful  Has been on duloxetine, celecoxib, nabumetone, Voltaren, amitriptyline, gabapentin. Does not want to retry any of the medications he has been taken off of.  in the past.     Dry skin  Ammonium lactate 12% lotion twice daily   Patient uses for cracked skin on feet. Tries to use as much as he can but limited with compression stockings      Today's Vitals: /76   Wt 289 lb 4.8 oz (131.2 kg)   BMI 45.31 kg/m    ----------------    I spent 75 minutes with this patient today. All changes were made via collaborative practice agreement with Hebert Hdez CNP. A copy of the visit note was provided to the patient's provider(s).    A summary of these recommendations was given to the patient.    Alaina Lim, PharmD  Medication Therapy Management (MTM) Pharmacist       Medication Therapy Recommendations  Chronic right-sided low back pain with right-sided sciatica   1 Current Medication: traMADol (ULTRAM) 50 MG tablet   Current Medication Sig: Take 1 tablet (50 mg) by mouth 2 times daily.   Rationale: Synergistic therapy - Needs additional medication therapy - Indication   Recommendation: Start Medication - methocarbamol 500 MG tablet   Status: Accepted per CPA   Identified Date: 11/7/2024 Completed Date: 11/7/2024         Idiopathic edema   1 Current Medication: furosemide (LASIX) 20 MG tablet   Current Medication Sig: Take 3 tablets (60 mg) by mouth 2 times daily   Rationale: Medication requires monitoring - Needs additional monitoring   Recommendation: Self-Monitoring   Status: Accepted - no CPA Needed   Identified Date: 11/7/2024 Completed Date: 11/7/2024         Type 2 diabetes mellitus without complication, without long-term current use of insulin (H)   1 Current Medication: tirzepatide (MOUNJARO) 12.5 MG/0.5ML pen   Current Medication Sig: Inject 12.5 mg subcutaneously once a week.   Rationale: Medication requires  monitoring - Needs additional monitoring   Recommendation: Self-Monitoring   Status: Accepted per CPA   Identified Date: 11/7/2024 Completed Date: 11/7/2024
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
## 2024-11-18 ENCOUNTER — OFFICE VISIT (OUTPATIENT)
Dept: PHARMACY | Facility: CLINIC | Age: 54
End: 2024-11-18
Payer: COMMERCIAL

## 2024-11-18 VITALS
HEART RATE: 85 BPM | WEIGHT: 285.8 LBS | BODY MASS INDEX: 44.76 KG/M2 | DIASTOLIC BLOOD PRESSURE: 70 MMHG | SYSTOLIC BLOOD PRESSURE: 109 MMHG

## 2024-11-18 DIAGNOSIS — R60.9 IDIOPATHIC EDEMA: ICD-10-CM

## 2024-11-18 DIAGNOSIS — G89.29 CHRONIC RIGHT-SIDED LOW BACK PAIN WITH RIGHT-SIDED SCIATICA: ICD-10-CM

## 2024-11-18 DIAGNOSIS — M54.41 CHRONIC RIGHT-SIDED LOW BACK PAIN WITH RIGHT-SIDED SCIATICA: ICD-10-CM

## 2024-11-18 DIAGNOSIS — E66.01 CLASS 3 SEVERE OBESITY DUE TO EXCESS CALORIES WITH SERIOUS COMORBIDITY AND BODY MASS INDEX (BMI) OF 50.0 TO 59.9 IN ADULT (H): ICD-10-CM

## 2024-11-18 DIAGNOSIS — E11.9 TYPE 2 DIABETES MELLITUS WITHOUT COMPLICATION, WITHOUT LONG-TERM CURRENT USE OF INSULIN (H): ICD-10-CM

## 2024-11-18 DIAGNOSIS — F39 UNSPECIFIED MOOD (AFFECTIVE) DISORDER (H): Primary | ICD-10-CM

## 2024-11-18 DIAGNOSIS — M17.0 PRIMARY OSTEOARTHRITIS OF BOTH KNEES: ICD-10-CM

## 2024-11-18 DIAGNOSIS — E66.813 CLASS 3 SEVERE OBESITY DUE TO EXCESS CALORIES WITH SERIOUS COMORBIDITY AND BODY MASS INDEX (BMI) OF 50.0 TO 59.9 IN ADULT (H): ICD-10-CM

## 2024-11-18 PROCEDURE — 99207 PR NO CHARGE LOS: CPT

## 2024-11-18 RX ORDER — DULOXETIN HYDROCHLORIDE 30 MG/1
30 CAPSULE, DELAYED RELEASE ORAL DAILY
Qty: 7 CAPSULE | Refills: 0 | Status: SHIPPED | OUTPATIENT
Start: 2024-11-18

## 2024-11-18 RX ORDER — DULOXETIN HYDROCHLORIDE 60 MG/1
60 CAPSULE, DELAYED RELEASE ORAL DAILY
Qty: 60 CAPSULE | Refills: 1 | Status: SHIPPED | OUTPATIENT
Start: 2024-11-18

## 2024-11-18 NOTE — PATIENT INSTRUCTIONS
"Dr Mcguire would like you to work on weight loss and return to clinic in 4 months.      It is important to remember that venous reflux disease is a life-long disease, and you should wear compression stockings as much as you can. They do not need to be worn at night while in bed. It is especially important to wear compression with long periods of sitting, standing, long car rides or if you will be flying. Compression socks should get refilled every 4-6 months. If you do a lot of standing it is good to do calf raises to help keep the blood pumping. If you sit a lot at work, it is good to get up periodically to walk around. Elevation of the foot of your bed 4-6\" helps the blood return back to where it is needed. We can refill your compression prescription for 1 year otherwise your primary care provider is able to refill them for you. Below is a list of places you may go to get your compression stockings.    Please call us with any issues or questions [phone].    [facility]  [address] [place] [address]  Phone: [phone]  Fax: [phone] [facility] Bld[facility].  [address] [place] [address]  Phone: [phone]  Fax: [phone]   [facility]  [address] [place] [address]  Phone: [phone]  Fax: [phone] [facility]  [address]  [address] [place] [address]  Phone: [phone]  Fax: [phone]   [facility]  [address] [place] [address]  Phone: [phone] [facility]   [address] ([facility]  [place] [address]  Phone: [phone]   [place] [facility]  [address]  [address] [place] [address]      Phone: [phone]  Fax: [phone] [address]" [place] [place] [address]      Phone: [phone]  Fax: [phone]     [facility] [email or web address]  [address], [place] [address]  [phone]    [facility]  [email or web address]  [address] [place] [address]  Phone: [phone]     [address] [place] [address]  Phone: [phone] [address], [place] [address]  Phone: [phone] [address], [place] [address]  Phone: [phone]    [facility] [email or web address]  [address], [place] [address]  Phone: [phone]    [address], [place] [address]  Phone: [phone]    [address], [place] [address]  Phone: [phone]    [facility]  [email or web address]  [phone]
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
## 2024-11-18 NOTE — PATIENT INSTRUCTIONS
"Recommendations from today's MTM visit:                                                         Restart duloxetine 30mg once daily for 1 week, then increase to 60mg once daily.    Follow-up: Return in 8 weeks (on 1/16/2025) for Follow up, with me, in person.    It was great speaking with you today.  I value your experience and would be very thankful for your time in providing feedback in our clinic survey. In the next few days, you may receive an email or text message from [employer] with a link to a survey related to your  clinical pharmacist.\"     To schedule another MTM appointment, please call the clinic directly or you may call the MTM scheduling line at [phone].    My Clinical Pharmacist's contact information:                                                      Please feel free to contact me with any questions or concerns you have.      Alaina Lim, PharmD  Medication Therapy Management (MTM) Pharmacist   "

## 2024-11-26 ENCOUNTER — OFFICE VISIT (OUTPATIENT)
Dept: VASCULAR SURGERY | Facility: CLINIC | Age: 54
End: 2024-11-26
Attending: SPECIALIST
Payer: COMMERCIAL

## 2024-11-26 VITALS
DIASTOLIC BLOOD PRESSURE: 83 MMHG | HEART RATE: 66 BPM | TEMPERATURE: 97.6 F | BODY MASS INDEX: 44.65 KG/M2 | OXYGEN SATURATION: 99 % | WEIGHT: 285.1 LBS | SYSTOLIC BLOOD PRESSURE: 111 MMHG

## 2024-11-26 DIAGNOSIS — I83.893 SYMPTOMATIC VARICOSE VEINS OF BOTH LOWER EXTREMITIES: Primary | ICD-10-CM

## 2024-11-26 PROCEDURE — G0463 HOSPITAL OUTPT CLINIC VISIT: HCPCS | Performed by: SPECIALIST

## 2024-11-26 PROCEDURE — 99213 OFFICE O/P EST LOW 20 MIN: CPT | Performed by: SPECIALIST

## 2024-11-26 ASSESSMENT — PAIN SCALES - GENERAL: PAINLEVEL_OUTOF10: EXTREME PAIN (8)

## 2024-11-26 NOTE — PROGRESS NOTES
[facility]        Patient is here for a  follow up  to discuss  US results aricose vein(s). The patient has varicose veins that are problematic in bilateral legs. Patient states their varicose veins are bothersome when standing, sitting, working, and household chores.     Patient has been using pain medication or anti-inflammatory's. Patient has had recent imaging on legs done. Patient has done conservative measures which include: compression stockings, elevation, exercise, and weight loss. Treatment has been unsuccessful due to continued symptoms..     Educated patient to work on conservative treatments: compression stockings, elevation, exercise, and weight loss.  US reviewed no ablation options.Dr Mcguire would like BMI below 36 before will offer stab phlebectomy. RTC in 4 months.    Pt is currently taking no meds that would impact our treatment plan.    /83   Pulse 66   Temp 97.6  F (36.4  C)   Wt 285 lb 1.6 oz (129.3 kg)   SpO2 99%   BMI 44.65 kg/m

## 2024-11-26 NOTE — PROGRESS NOTES
Vascular Clinic Rooming Questions      Patient is here for 1 month follow up  for Varicose Veins. The patient does  wear compressions. The patient has varicose veins that are problematic in bilateral legs. Patient states their varicose veins are bothersome when standing, sitting, working, and household chores. The patient has been using medications for their leg discomfort.      /83   Pulse 66   Temp 97.6  F (36.4  C)   Wt 285 lb 1.6 oz (129.3 kg)   SpO2 99%   BMI 44.65 kg/m      The provider has been notified that the patient has concerns of ultrasound results and next steps.     Questions patient would like addressed today are: N/A.    Refills are needed: N/A    Has homecare services and agency name:  No

## 2024-11-26 NOTE — PROGRESS NOTES
[facility] Surgery Follow up    HPI:    54 year old year old male who returns for a follow up.  He is here for follow-up.  He had an ultrasound done last month try to get a hold of him actually for 5 times and eventually just left a message on his machine explained in detail the results.  He comes in today to discuss those with me.  Continues wear compression.  Significant medical history and issues.  With morbid obesity BMI 47 he states he is lost 140 pounds.        Allergies:Fentanyl, Meloxicam, and Minocycline    Past Medical History:   Diagnosis Date    Allergic rhinitis     Arthritis     Benign positional vertigo 2011    never quite went away    Bilateral carpal tunnel syndrome 07/18/2015    Chronic sinusitis 2017    I don't know Please look    Chronic tonsillitis     COPD (chronic obstructive pulmonary disease) (H) 11/2010    That is the date i got first sleep study results    Depressive disorder     Depressive disorder, not elsewhere classified 07/30/2012    Gastro-oesophageal reflux disease     Hearing problem     Hyperlipidemia associated with prediabetes     Hypertension 2008    Learning disability     Mild intermittent asthma in adult without complication     Neck mass     parapharyngeal, benign    Obesity, Class III, BMI 40-49.9 (morbid obesity) (H)     Psychological factors associated with another disorder 08/21/2012    Recurrent otitis media 2016    might be what the pain in my ears    Reduced vision     Tinnitus 2016    I would hear a steady light buzzing sound and other can't    Trigeminal neuralgia     Weakness 08/26/2011       Past Surgical History:   Procedure Laterality Date    ABDOMEN SURGERY  08/23/2015    Gallstones removed with cutting a big area in belly    BIOPSY  2011    To figure out what kind of tumor I had    COLONOSCOPY N/A 10/16/2020    Procedure: INCOMPLETE COLONOSCOPY;  Surgeon: Ham Cherry MD;  [facility]: [facility]    ENDOSCOPIC RETROGRADE CHOLANGIOPANCREATOGRAM N/A 08/27/2015     Procedure: COMBINED ENDOSCOPIC RETROGRADE CHOLANGIOPANCREATOGRAPHY, PLACE TUBE/STENT;  Surgeon: Baldomero Zacarias MD;  Location:  OR    ENDOSCOPIC RETROGRADE CHOLANGIOPANCREATOGRAM N/A 11/06/2015    Procedure: COMBINED ENDOSCOPIC RETROGRADE CHOLANGIOPANCREATOGRAPHY, REMOVE FOREIGN BODY OR STENT/TUBE;  Surgeon: Baldomero Zacarias MD;  Location:  OR    EXCISE LESION INTRAORAL  06/29/2011    Procedure:EXCISE LESION INTRAORAL; TRANSCERVICAL APPROACH TO LEFT PHARYNGEAL SPACE MASS REMOVAL ; Surgeon:BARBARA PHILLIPS; Location: OR     VASCULAR SURGERY PROCEDURE UNLIST  12/29/2015    LAPAROSCOPIC CHOLECYSTECTOMY N/A 08/23/2015    Procedure: LAPAROSCOPIC CHOLECYSTECTOMY;  Surgeon: Kvng Witt MD;  Location:  OR    LARYNGOSCOPY WITH BIOPSY(IES)  06/18/2014    Procedure: LARYNGOSCOPY WITH BIOPSY(IES);  Surgeon: Barbara Phillips MD;  Location:  OR    LASER CO2 LARYNGOSCOPY  12/07/2011    Procedure:LASER CO2 LARYNGOSCOPY; Micro-Direct Laryngoscopy with CO2 Laser Standby / Excision Tracheal Grandulization, Trach Tube Change / Kenalog application. / Balloon Dilation of Subglottic Stenosis.*Latex Safe Room* Trach Tube = Shiley 6.4mm I.D. / 10.8MM O.D. / 76MM  Cuffless.; Surgeon:BARBARA PHILLIPS; Location: OR    ORTHOPEDIC SURGERY  05/2016    Broken Right hand & Fracture Right shoulder    REPAIR PTOSIS  6/2000    A quarter size cyst on left eyelid    TRACHEOSTOMY  04/22/2011    Procedure:TRACHEOSTOMY; possible awake; Surgeon:BARBARA PHILLIPS; Location: OR    TRACHEOSTOMY  06/29/2011    Procedure:TRACHEOSTOMY; REMOVE AND REPLACE SHILEY 6 XLT; Surgeon:BARBARA PHILLIPS; Location: OR    VASCULAR SURGERY  2008-[facility]       CURRENT MEDS:  Current Outpatient Medications   Medication Sig Dispense Refill    acetaminophen (TYLENOL) 500 MG tablet Take 2 tablets (1,000 mg) by mouth 3 times daily      ammonium lactate (AMLACTIN) 12 % external cream Apply  topically 2 times daily 385 g 3    blood glucose (NO BRAND SPECIFIED) lancets standard Use to test blood sugar 1 times daily or as directed. 100 each 3    blood glucose (NO BRAND SPECIFIED) test strip Use to test blood sugar 1 times daily or as directed. 100 strip 3    blood glucose monitoring (NO BRAND SPECIFIED) meter device kit Use to test blood sugar 1 times daily or as directed. 1 kit 0    COMPRESSION STOCKINGS 20-30 mmHg knee high compression stockings.  Measure and fit.  Style and brand per patient preference. 2 each 0    DULoxetine (CYMBALTA) 30 MG capsule Take 1 capsule (30 mg) by mouth daily. For 1 week, the increase to 60mg capsules 7 capsule 0    DULoxetine (CYMBALTA) 60 MG capsule Take 1 capsule (60 mg) by mouth daily. 60 capsule 1    furosemide (LASIX) 20 MG tablet Take 3 tablets (60 mg) by mouth 2 times daily 540 tablet 3    methocarbamol (ROBAXIN) 500 MG tablet Take 1 tablet (500 mg) by mouth 3 times daily as needed for muscle spasms. 90 tablet 1    potassium chloride ER (K-TAB) 20 MEQ CR tablet Take 2 tablets (40 mEq) by mouth daily 180 tablet 2    rosuvastatin (CRESTOR) 10 MG tablet Take 1 tablet (10 mg) by mouth daily. 90 tablet 3    tirzepatide (MOUNJARO) 12.5 MG/0.5ML pen Inject 12.5 mg subcutaneously once a week. 2 mL 3    topiramate (TOPAMAX) 100 MG tablet Take 2 tablets (200 mg) by mouth 2 times daily. 360 tablet 0    traMADol (ULTRAM) 50 MG tablet Take 1 tablet (50 mg) by mouth 2 times daily. 60 tablet 0     Current Facility-Administered Medications   Medication Dose Route Frequency Provider Last Rate Last Admin    hylan (SYNVISC ONE) injection 48 mg  48 mg      48 mg at 09/09/24 0832    hylan (SYNVISC ONE) injection 48 mg  48 mg      48 mg at 09/09/24 0832    lidocaine (PF) (XYLOCAINE) 1 % injection 3 mL  3 mL      3 mL at 09/09/24 0832    lidocaine (PF) (XYLOCAINE) 1 % injection 3 mL  3 mL      3 mL at 09/09/24 0832    lidocaine (PF) (XYLOCAINE) 1 % injection 4 mL  4 mL   Antione Abrams  MD Jl   4 mL at 03/12/24 0856    triamcinolone (KENALOG-40) injection 40 mg  40 mg   Antione Abrmas MD   40 mg at 03/12/24 0856       Family History   Adopted: Yes   Problem Relation Age of Onset    Family History Negative Other         Does not know family    Unknown/Adopted No family hx of         reports that he has never smoked. He has never been exposed to tobacco smoke. He has never used smokeless tobacco. He reports that he does not currently use alcohol. He reports that he does not use drugs.    Review of Systems:  Negative except leg pain and swelling.  Varicose veins bilateral legs she has had procedures interventions in the past he said his last 1 was a stab phlebectomy was done in [place] secondary to no one wanting to do the surgery here in [place]. Otherwise twelve system of review is negative.      OBJECTIVE:  Vitals:    11/26/24 0913   BP: 111/83   Pulse: 66   Temp: 97.6  F (36.4  C)   SpO2: 99%   Weight: 129.3 kg (285 lb 1.6 oz)     Body mass index is 44.65 kg/m .    Status: doing well, having discomfort, and has discoloration    EXAM:  GENERAL: This is a well-developed 54 year old male who appears his stated age  HEAD: normocephalic  HEENT: Pupils equal and reactive bilaterally  CARDIAC: RRR without murmur  CHEST/LUNG:  Clear to auscultation  ABDOMEN: Soft, nontender, nondistended, no masses    NEUROLOGIC: Focally intact, nonfocal  VASCULAR: Pulses intact, symmetrical upper and lower extremities.                                  Side:: Bilateral  VCSS  PAIN:: Severe: Daily, severe limiting activities or requiring regular use of pain meds  Varicose Veins:: Moderate: Multiple: great saphenous confined to calf and thigh  Venous Edema:: Moderate: Afternoon swelling, above ankle  Skin Pigmentation:: Mild: Diffuse, but limited in area and old (brown)  Inflamation:: Absent: None  Induration:: Absent: None  Number of active ulcers:: 0  Active ulcer duration:: None  Active ulcer  diameter:: None  Compression Therapy:: Full compliance stockings + elevation  VCSS Score:: 11  CEAP:: Skin pigmentation in the gaiter area (lipoderma tosclerosis)    LABS:  Lab Results   Component Value Date    WBC 10.4 10/17/2024    WBC 7.2 05/13/2021    HGB 13.3 10/17/2024    HGB 14.5 05/13/2021    HCT 39.8 10/17/2024    HCT 43.7 05/13/2021    MCV 88 10/17/2024    MCV 88 05/13/2021     10/17/2024     05/13/2021       Lab Results   Component Value Date    ALT 21 10/17/2024    AST 25 10/17/2024    ALKPHOS 70 10/17/2024        Images:     Reviewed the ultrasound with him today.  We discussed those results.  This shows he has competency of both his deep systems bilaterally and his superficial systems bilaterally.  No major incompetency's are noted.    Exam Information    Exam Date Exam Time Accession # Performing Department Results    10/9/24  3:35 PM [id number] [facility] [place]      PACS Images     Show images for US Venous Competency Bilateral     Study Result    Narrative & Impression   BILATERAL Venous Insufficiency Ultrasound (Date: 10/09/24)    BILATERAL Lower Extremity          Indication:  Surveillance Bilateral Legs Symptomatic Varicose Veins, Pain, and Swelling. (Patient uses walker)      Previous: None     Patient History: Swelling and Morbid Obesity     Presenting Symptoms:  Swelling, Varicose Veins, and Pain     Technique:   Supine and Reverse Trendelenburg Ultrasound of the Deep and Superficial Veins with Valsalva and Compression Augmentation Maneuvers. Duplex Imaging is performed utilizing gray-scale, Two-dimensional images, color-flow imaging, Doppler waveform analysis, and Spectral doppler imaging done with provacative maneuvers.      Incompetency Criteria:  Deep vein reflux reported when greater than 1000 msec flow reversal. Superficial vein reflux reported when equal to or greater than 600 msec flow reversal.  vein reflux  reported as greater than 350 msec flow reversal.      Right  Leg Deep Veins    CFV SFJ DFV PROX FV   PROX FV MID FV DIST POP V. PERON.   V. PTV'S   Compressibility  (FC,PC,NC) FC FC FC FC FC FC FC FC FC   Reflux -   - - - - -   -         Right Leg Superficial Veins  Location SFJ PROX THIGH MID THIGH KNEE MID CALF   GSV (mm) 5.0 3.9 4.1 4.8 3.5   Reflux - - - - -   AASV (mm) 5.8           Reflux  -              Location SPJ PROX CALF MID CALF   SSV (mm) 3.9 3.2 3.9   Reflux - - -      Left  Leg Deep Veins    CFV SFJ DFV PROX FV   PROX FV MID FV DIST POP V. PERON.   V. PTV'S   Compressibility  (FC,PC,NC) FC FC FC FC FC FC FC FC FC   Reflux -   - - - - -   -         Left Leg Superficial Veins  Location SFJ PROX THIGH MID THIGH KNEE MID CALF   GSV (mm) 6.7 2.2 3.4 3.1 2.5   Reflux - - - - -   AASV (mm) 2.9           Reflux -           PASV (mm) 2.6           Reflux -              Location SPJ PROX CALF MID CALF   SSV (mm) 4.1 5.2 4.2   Reflux - - -      Comments: No incompetent  veins visualized. Left GSV Mid Calf Varicose Vein Anterior Branch Incompetent ( 5.8mm/ 3.5 sec)         Impression:       Right Deep Vein Findings: Patent deep venous system with no evidence of DVT and without deep venous reflux        Left Deep Vein Findings: Patent deep venous system with no evidence of DVT and without deep venous reflux        Superficial Vein Findings:      Right Greater Saphenous Vein: Patent Greater Saphenous Vein without evidence of reflux.     Right Small Saphenous Vein: Patent Small Saphenous Vein without evidence of reflux.     Left Greater Saphenous Vein: Patent Greater Saphenous Vein without evidence of reflux.     Left Small Saphenous Vein: Patent Small Saphenous Vein without evidence of reflux.     Perforating and Accessory Veins: Right accessory vein without evidence of reflux.  Left accessory vein without evidence of reflux.  Left  vein without evidence of reflux.      Reference:      Compressibility: FC= Fully compressible, PC= Partially compressible, NC= Non-compressible, NV= Not Visualized     Reflux: (+) Incompetent  (-) Competent, (NV) = Not Visualized     Interpretation criteria:          Duration of Retrograde flow (milliseconds)  Category Deep Veins Superficial Veins  veins   Competent < 1000ms < 500ms < 350ms   Incompetent > 1000ms > 500ms > 350ms          Assessment/Plan:   Symptomatic varicose veins of both lower extremities     At this time point is really options are stab phlebectomy is significant mount of veins and I think you could do stab phlebectomy for him but it would be 1 leg at a time and I want him to get to BMI this less than 36 which is in open by definition for most centers morbid obesity.  The reason being is that I think he would have a lot of bleeding problems and issues and is high risk for clotting problems secondary to his weight and the risk factors that go along with any kind of surgical intervention at this BMI.  So at this time point our goal is to continue to lose weight which she is doing and when she gets to a BMI of 36 or under then would consider doing surgery for him if he  continues to have issues and problems.    No follow-ups on file.     John Mcguire MD ,MD  [facility] Department of Surgery

## 2024-12-03 ENCOUNTER — MYC REFILL (OUTPATIENT)
Dept: INTERNAL MEDICINE | Facility: CLINIC | Age: 54
End: 2024-12-03
Payer: COMMERCIAL

## 2024-12-03 DIAGNOSIS — E66.813 CLASS 3 SEVERE OBESITY DUE TO EXCESS CALORIES WITH SERIOUS COMORBIDITY AND BODY MASS INDEX (BMI) OF 50.0 TO 59.9 IN ADULT (H): ICD-10-CM

## 2024-12-03 DIAGNOSIS — E66.01 CLASS 3 SEVERE OBESITY DUE TO EXCESS CALORIES WITH SERIOUS COMORBIDITY AND BODY MASS INDEX (BMI) OF 50.0 TO 59.9 IN ADULT (H): ICD-10-CM

## 2024-12-03 DIAGNOSIS — G60.3 IDIOPATHIC PROGRESSIVE POLYNEUROPATHY: Chronic | ICD-10-CM

## 2024-12-03 DIAGNOSIS — M47.26 OSTEOARTHRITIS OF SPINE WITH RADICULOPATHY, LUMBAR REGION: ICD-10-CM

## 2024-12-03 RX ORDER — TOPIRAMATE 100 MG/1
200 TABLET, FILM COATED ORAL 2 TIMES DAILY
Qty: 360 TABLET | Refills: 0 | Status: SHIPPED | OUTPATIENT
Start: 2024-12-03

## 2024-12-03 RX ORDER — TRAMADOL HYDROCHLORIDE 50 MG/1
50 TABLET ORAL 2 TIMES DAILY
Qty: 60 TABLET | Refills: 0 | Status: SHIPPED | OUTPATIENT
Start: 2024-12-03

## 2024-12-09 ENCOUNTER — TELEPHONE (OUTPATIENT)
Dept: INTERNAL MEDICINE | Facility: CLINIC | Age: 54
End: 2024-12-09
Payer: COMMERCIAL

## 2024-12-09 NOTE — TELEPHONE ENCOUNTER
FYI - Status Update    Who is Calling: Vivienne ROBERTS from facility    Update: Pt had a fall 12/9/2024, no injuries, leg buckled and fell. Bumped head a little but no injuries but is complaining of pain on left side.    Does caller want a call/response back: Yes     Could we send this information to you in [facility] or would you prefer to receive a phone call?:   Patient would prefer a phone call   Okay to leave a detailed message?: Yes at Other phone number:  [phone], Vivienne, RN

## 2024-12-09 NOTE — TELEPHONE ENCOUNTER
Call to patient. Having a lot of pain in his left leg, pain is mostly in the knee. Left knee gave out which caused him to fall. Orthopedic provider is recommending surgery but needs to meet again with provider to further discuss. Gets 'gel injections' in the knee. Having a hard time moving around. Using a scooter and walker.     Issues with transportation,  is working on it. Does not have transportation right now.    Tylenol does not do anything, but he has continued to take it. Heat and ice not helping.     Patient has appointment with PCP. We discussed ED visit if he is unable to walk or put weight on his leg. Patient stated understanding and plans to follow-up with provider at next week's appointment.

## 2024-12-10 ENCOUNTER — HOSPITAL ENCOUNTER (EMERGENCY)
Facility: CLINIC | Age: 54
Discharge: HOME | End: 2024-12-10
Attending: EMERGENCY MEDICINE | Admitting: EMERGENCY MEDICINE
Payer: COMMERCIAL

## 2024-12-10 ENCOUNTER — APPOINTMENT (OUTPATIENT)
Dept: ULTRASOUND IMAGING | Facility: CLINIC | Age: 54
End: 2024-12-10
Attending: EMERGENCY MEDICINE
Payer: COMMERCIAL

## 2024-12-10 ENCOUNTER — APPOINTMENT (OUTPATIENT)
Dept: RADIOLOGY | Facility: CLINIC | Age: 54
End: 2024-12-10
Attending: EMERGENCY MEDICINE
Payer: COMMERCIAL

## 2024-12-10 VITALS
WEIGHT: 281 LBS | HEIGHT: 67 IN | OXYGEN SATURATION: 98 % | RESPIRATION RATE: 16 BRPM | TEMPERATURE: 97.5 F | DIASTOLIC BLOOD PRESSURE: 78 MMHG | BODY MASS INDEX: 44.1 KG/M2 | SYSTOLIC BLOOD PRESSURE: 122 MMHG | HEART RATE: 70 BPM

## 2024-12-10 DIAGNOSIS — M25.562 LEFT KNEE PAIN, UNSPECIFIED CHRONICITY: ICD-10-CM

## 2024-12-10 DIAGNOSIS — M17.12 OSTEOARTHRITIS OF LEFT KNEE, UNSPECIFIED OSTEOARTHRITIS TYPE: ICD-10-CM

## 2024-12-10 PROCEDURE — 73562 X-RAY EXAM OF KNEE 3: CPT | Mod: LT

## 2024-12-10 PROCEDURE — 99284 EMERGENCY DEPT VISIT MOD MDM: CPT | Mod: 25

## 2024-12-10 PROCEDURE — 93971 EXTREMITY STUDY: CPT | Mod: LT

## 2024-12-10 ASSESSMENT — COLUMBIA-SUICIDE SEVERITY RATING SCALE - C-SSRS
2. HAVE YOU ACTUALLY HAD ANY THOUGHTS OF KILLING YOURSELF IN THE PAST MONTH?: NO
1. IN THE PAST MONTH, HAVE YOU WISHED YOU WERE DEAD OR WISHED YOU COULD GO TO SLEEP AND NOT WAKE UP?: NO
6. HAVE YOU EVER DONE ANYTHING, STARTED TO DO ANYTHING, OR PREPARED TO DO ANYTHING TO END YOUR LIFE?: NO

## 2024-12-10 NOTE — DISCHARGE INSTRUCTIONS
Please continue your tramadol, Tylenol at home for the pain.  X-ray and the ultrasound were negative, no sign of DVT, Baker's cyst, or fracture to the knee itself.  I suspect the fall caused some local inflammation and made your arthritis worse temporarily.  This should improve with ice, rest today, and you can slowly start to resume your normal therapies tomorrow.    Make sure you wear your brace at home until the pain improves.    Please make sure that you keep your appointment with primary care next week, and if your pain is not under control by tomorrow I recommend reaching out to your orthopedic surgeon to schedule follow-up visit.

## 2024-12-10 NOTE — ED PROVIDER NOTES
EMERGENCY DEPARTMENT ENCOUNTER      NAME: Chavez Ca  AGE: 54 year old male  YOB: 1970  MRN: [id number]  EVALUATION DATE & TIME: No admission date for patient encounter.    PCP: Hebret Hdez    ED PROVIDER: Sim Muñoz M.D.      Chief Complaint   Patient presents with    Fall    Knee Injury         FINAL IMPRESSION:  1. Left knee pain, unspecified chronicity    2. Osteoarthritis of left knee, unspecified osteoarthritis type          ED COURSE & MEDICAL DECISION MAKING:    Pertinent Labs & Imaging studies reviewed below.  All EKGs below represent my independent interpretation.   ED Course as of 12/10/24 1221   Tue Dec 10, 2024   1016 Patient is a 54-year-old gentleman who has chronic pain to the left knee due to arthritis, with increased pain after a fall landing directly on the knee yesterday.  Some swelling noticed behind the knee today.  On exam he is able to bear weight, is uncomfortable, no ligamentous instability.  X-ray ordered to rule out patellar injury, ultrasound of leg to rule out DVT, but also to screen for Baker's cyst as source of pain   1054 XR Knee Left 3 Views   Moderate to severe osteoarthrosis of the medial compartment, progressed. Mild osteoarthrosis of the lateral compartment, progressed. Mild patellofemoral osteoarthrosis, progressed. No fracture, effusion, or calcified intra-articular body.   1200 I updated the patient.  He has a knee wrap at home.  He is assisted living and undergoes PT/OT already.  I recommended continued treatment of his pain at home with his Toradol and Tylenol.  I recommended intermittent ice, knee wrap, and orthopedic follow-up.       Additional ED Course timestamps entered by medical scribe:  09:50 AM I met with the patient and obtained initial history.  11:54 AM I met with the patient and updated them on the plan of care.    Medical Decision Making  Obtained supplemental history:Supplemental history obtained?: No  Reviewed  "external records: External records reviewed?: No  Care impacted by chronic illness:Documented in Chart  Care significantly affected by social determinants of health:Access to Medical Care  Did you consider but not order tests?: Work up considered but not performed and documented in chart, if applicable  Did you interpret images independently?: Independent interpretation of ECG and images noted in documentation, when applicable.  Consultation discussion with other provider: Phone conversation with consultants will be documented in the ED Course  Discharge. I recommended the patient continue their current prescription strength medication(s): toradol, tylenol. N/A.    MIPS Criteria Documentation: Not Applicable    MEDICATIONS GIVEN IN THE EMERGENCY:  Medications - No data to display      NEW PRESCRIPTIONS STARTED AT TODAY'S ER VISIT  New Prescriptions    No medications on file          =================================================================    HPI    Chavez Ca is a 54 year old male who presents to this ED for evaluation of fall injury.    Patient states his left knee has been bothering him and gave out causing him to fall. Patient states he landed on his knee and when a nurse looked it over said he was fine. Patient states when a nurse looked at it today he had swelling in his knee mainly in the back. Patient states his knee has been in pain, and injections for treatment ave given no relief. Patient states he has a limp when walking and it is hard to put weight on his leg. Patient states the pain has continued to get worse. Patient states his pain is currently a 10/10. Of note, patient states he is taking Lasiks, Tramadol, and Tylenol.      VITALS:  /78   Pulse 70   Temp 97.5  F (36.4  C) (Temporal)   Resp 16   Ht 1.702 m (5' 7\")   Wt 127.5 kg (281 lb)   SpO2 98%   BMI 44.01 kg/m      PHYSICAL EXAM    Constitutional: Well developed, well nourished. Comfortable appearing. In wheelchair " but able to transition independently.  HENT: Normocephalic, atraumatic, mucous membranes moist, nose normal  Eyes: PERRL, EOMI, conjunctiva normal, no discharge.  Neck- Supple, gross ROM intact.   Respiratory: Normal work of breathing, normal rate, speaks in full sentences  Cardiovascular: Normal heart rate  Musculoskeletal: Tenderness to left anterior knee, no deformity, able to bear weight. No ligamentous instability with varus/valgus stress. Negative anterior drawer.  Neurologic: Alert & oriented x 3, cranial nerves grossly intact. Normal gross coordination  Psychiatric: Affect normal, cooperative.          I, Pedro Mosley am serving as a scribe to document services personally performed by Dr. Sim Muñoz based on my observation and the provider's statements to me. I, Sim Muñoz MD attest that Pedro Mosley is acting in a scribe capacity, has observed my performance of the services and has documented them in accordance with my direction.    Sim Muñoz M.D.  Emergency Medicine  [facility] EMERGENCY ROOM  [address] [address] [id number] [phone]  Dept: [phone]       Sim Muñoz MD  12/10/24 1222

## 2024-12-10 NOTE — ED TRIAGE NOTES
Patient presents to ED via EMS from [facility], patient had a fall yesterday when he tipped over trying to put his shoes on, landed on L knee on carpet, taking Tramadol, Tylenol and Robaxin without relief, denies hitting head, not on thinners.  Bebe Curtis RN.......12/10/2024 9:34 AM     Triage Assessment (Adult)       Row Name 12/10/24 0932          Triage Assessment    Airway WDL WDL        Respiratory WDL    Respiratory WDL WDL        Skin Circulation/Temperature WDL    Skin Circulation/Temperature WDL WDL        Cardiac WDL    Cardiac WDL WDL        Peripheral/Neurovascular WDL    Peripheral Neurovascular WDL X;neurovascular assessment lower        Cognitive/Neuro/Behavioral WDL    Cognitive/Neuro/Behavioral WDL WDL        LLE Neurovascular Assessment    Sensation LLE tenderness present

## 2024-12-16 SDOH — HEALTH STABILITY: PHYSICAL HEALTH: ON AVERAGE, HOW MANY DAYS PER WEEK DO YOU ENGAGE IN MODERATE TO STRENUOUS EXERCISE (LIKE A BRISK WALK)?: 7 DAYS

## 2024-12-16 SDOH — HEALTH STABILITY: PHYSICAL HEALTH: ON AVERAGE, HOW MANY MINUTES DO YOU ENGAGE IN EXERCISE AT THIS LEVEL?: 120 MIN

## 2024-12-16 ASSESSMENT — SOCIAL DETERMINANTS OF HEALTH (SDOH): HOW OFTEN DO YOU GET TOGETHER WITH FRIENDS OR RELATIVES?: ONCE A WEEK

## 2024-12-18 ENCOUNTER — OFFICE VISIT (OUTPATIENT)
Dept: INTERNAL MEDICINE | Facility: CLINIC | Age: 54
End: 2024-12-18
Payer: COMMERCIAL

## 2024-12-18 VITALS
WEIGHT: 278.5 LBS | SYSTOLIC BLOOD PRESSURE: 120 MMHG | RESPIRATION RATE: 16 BRPM | OXYGEN SATURATION: 100 % | TEMPERATURE: 98 F | HEIGHT: 67 IN | BODY MASS INDEX: 43.71 KG/M2 | HEART RATE: 96 BPM | DIASTOLIC BLOOD PRESSURE: 81 MMHG

## 2024-12-18 DIAGNOSIS — M54.41 CHRONIC RIGHT-SIDED LOW BACK PAIN WITH RIGHT-SIDED SCIATICA: ICD-10-CM

## 2024-12-18 DIAGNOSIS — E78.5 DYSLIPIDEMIA: ICD-10-CM

## 2024-12-18 DIAGNOSIS — F11.20 CONTINUOUS OPIOID DEPENDENCE (H): ICD-10-CM

## 2024-12-18 DIAGNOSIS — I83.93 VARICOSE VEINS OF BOTH LOWER EXTREMITIES, UNSPECIFIED WHETHER COMPLICATED: ICD-10-CM

## 2024-12-18 DIAGNOSIS — G89.29 CHRONIC RIGHT-SIDED LOW BACK PAIN WITH RIGHT-SIDED SCIATICA: ICD-10-CM

## 2024-12-18 DIAGNOSIS — E66.01 SEVERE OBESITY (BMI >= 40) (H): ICD-10-CM

## 2024-12-18 DIAGNOSIS — E11.9 TYPE 2 DIABETES MELLITUS WITHOUT COMPLICATION, WITHOUT LONG-TERM CURRENT USE OF INSULIN (H): ICD-10-CM

## 2024-12-18 DIAGNOSIS — Z12.5 SCREENING FOR PROSTATE CANCER: ICD-10-CM

## 2024-12-18 DIAGNOSIS — Z00.00 ENCOUNTER FOR ANNUAL WELLNESS EXAM IN MEDICARE PATIENT: Primary | ICD-10-CM

## 2024-12-18 DIAGNOSIS — G47.33 OSA (OBSTRUCTIVE SLEEP APNEA): ICD-10-CM

## 2024-12-18 PROBLEM — E66.813 CLASS 3 SEVERE OBESITY DUE TO EXCESS CALORIES WITH SERIOUS COMORBIDITY AND BODY MASS INDEX (BMI) OF 50.0 TO 59.9 IN ADULT (H): Status: RESOLVED | Noted: 2021-03-16 | Resolved: 2024-12-18

## 2024-12-18 LAB
CHOLEST SERPL-MCNC: 135 MG/DL
EST. AVERAGE GLUCOSE BLD GHB EST-MCNC: 117 MG/DL
FASTING STATUS PATIENT QL REPORTED: YES
HBA1C MFR BLD: 5.7 % (ref 0–5.6)
HDLC SERPL-MCNC: 43 MG/DL
LDLC SERPL CALC-MCNC: 73 MG/DL
NONHDLC SERPL-MCNC: 92 MG/DL
PSA SERPL DL<=0.01 NG/ML-MCNC: 0.24 NG/ML (ref 0–3.5)
TRIGL SERPL-MCNC: 95 MG/DL

## 2024-12-18 PROCEDURE — 80061 LIPID PANEL: CPT | Performed by: NURSE PRACTITIONER

## 2024-12-18 PROCEDURE — 36415 COLL VENOUS BLD VENIPUNCTURE: CPT | Performed by: NURSE PRACTITIONER

## 2024-12-18 PROCEDURE — 83036 HEMOGLOBIN GLYCOSYLATED A1C: CPT | Performed by: NURSE PRACTITIONER

## 2024-12-18 PROCEDURE — 99396 PREV VISIT EST AGE 40-64: CPT | Mod: 25 | Performed by: NURSE PRACTITIONER

## 2024-12-18 PROCEDURE — G0103 PSA SCREENING: HCPCS | Performed by: NURSE PRACTITIONER

## 2024-12-18 ASSESSMENT — PATIENT HEALTH QUESTIONNAIRE - PHQ9
10. IF YOU CHECKED OFF ANY PROBLEMS, HOW DIFFICULT HAVE THESE PROBLEMS MADE IT FOR YOU TO DO YOUR WORK, TAKE CARE OF THINGS AT HOME, OR GET ALONG WITH OTHER PEOPLE: EXTREMELY DIFFICULT
SUM OF ALL RESPONSES TO PHQ QUESTIONS 1-9: 23
SUM OF ALL RESPONSES TO PHQ QUESTIONS 1-9: 23

## 2024-12-18 NOTE — PROGRESS NOTES
"Preventive Care Visit  [facility]  Hebert Hdez, CNP, Nurse Practitioner - Family  Dec 18, 2024      Assessment & Plan     Encounter for annual wellness exam in Medicare patient  Mac is in the process of finding a new place to live.  He does not like his current assisted living facility      VERN (obstructive sleep apnea)  In the past he has had issues with insurance coverage for his CPAP machine.    Severe obesity (BMI >= 40) (H)  He has lost over 130 pounds on the Mounjaro    Type 2 diabetes mellitus without complication, without long-term current use of insulin (H)  Hemoglobin A1c 5.7% when checked in September.  - Hemoglobin A1c; Future    Continuous opioid dependence (H)  Continues on tramadol.  He asked that I increase his tramadol dosage today but I declined.  Benefits do not outweigh risks as a pertains to fall risk, etc.    Varicose veins of both lower extremities, unspecified whether complicated  He saw the vascular clinic and was told he may be candidate for intervention if he were to get his BMI down a bit further    Dyslipidemia  Continues on rosuvastatin due for labs  - Lipid Profile (Chol, Trig, HDL, LDL calc); Future    Screening for prostate cancer  - PSA, screen; Future    Patient Instructions   I do not want to increase your pain medications.    As long as you continue to lose weight, eventually we will be able to treat your varicose veins and move forward with knee replacement surgery for treatment of your chronic pain.    Update hemoglobin A1c and cholesterol labs today.    Colonoscopy due in 2030.    Update PSA today.    Follow-up in 3 months              BMI  Estimated body mass index is 43.62 kg/m  as calculated from the following:    Height as of this encounter: 1.702 m (5' 7\").    Weight as of this encounter: 126.3 kg (278 lb 8 oz).       Depression Screening Follow Up        12/18/2024     7:02 AM   PHQ   PHQ-9 Total Score 23    Q9: Thoughts of better off " dead/self-harm past 2 weeks Not at all        Patient-reported           Follow Up Actions Taken  Patient counseled, no additional follow up at this time.     Counseling  Appropriate preventive services were addressed with this patient via screening, questionnaire, or discussion as appropriate for fall prevention, nutrition, physical activity, Tobacco-use cessation, social engagement, weight loss and cognition.  Checklist reviewing preventive services available has been given to the patient.  Reviewed patient's diet, addressing concerns and/or questions.   The patient was instructed to see the dentist every 6 months.   He is at risk for psychosocial distress and has been provided with information to reduce risk.   Discussed possible causes of fatigue. Updated plan of care.  Patient reported difficulty with activities of daily living were addressed today.The patient was provided with written information regarding signs of hearing loss.   Information on urinary incontinence and treatment options given to patient.   The patient's PHQ-9 score is consistent with severe depression. He was provided with information regarding depression.     I have reviewed Opioid Use Disorder and Substance Use Disorder risk factors and made any needed referrals.           Colby Byrne is a 54 year old, presenting for the following:  Annual Visit        12/18/2024     7:01 AM   Additional Questions   Roomed by ROBBIE SHIPLEY   Accompanied by Self           HPI  Here for AWV        Health Care Directive  Patient does not have a Health Care Directive: Discussed advance care planning with patient; however, patient declined at this time.      12/16/2024   General Health   How would you rate your overall physical health? (!) POOR   Feel stress (tense, anxious, or unable to sleep) Very much      (!) STRESS CONCERN      12/16/2024   Nutrition   Diet: Other   If other, please elaborate: High protein diet meals            12/16/2024   Exercise    Days per week of moderate/strenous exercise 7 days   Average minutes spent exercising at this level 120 min            12/16/2024   Social Factors   Frequency of gathering with friends or relatives Once a week   Worry food won't last until get money to buy more Yes   Food not last or not have enough money for food? Yes   Do you have housing? (Housing is defined as stable permanent housing and does not include staying ouside in a car, in a tent, in an abandoned building, in an overnight shelter, or couch-surfing.) Yes   Are you worried about losing your housing? Yes   Lack of transportation? Yes   Unable to get utilities (heat,electricity)? No   Want help with housing or utility concern? No      (!) FOOD SECURITY CONCERN PRESENT (!) TRANSPORTATION CONCERN PRESENT(!) HOUSING CONCERN PRESENT      12/16/2024   Fall Risk   Fallen 2 or more times in the past year? Yes    Trouble with walking or balance? Yes        Patient-reported           12/16/2024   Activities of Daily Living- Home Safety   Needs help with the following daily activites Telephone use    Transportation    Shopping    Preparing meals    Housework    Bathing    Laundry    Dressing   Safety concerns in the home None of the above       Multiple values from one day are sorted in reverse-chronological order         12/16/2024   Dental   Dentist two times every year? (!) NO            12/16/2024   Hearing Screening   Hearing concerns? (!) I NEED TO ASK PEOPLE TO SPEAK UP OR REPEAT THEMSELVES.    (!) TROUBLE UNDERSTANDING SOFT OR WHISPERED SPEECH.    (!) TROUBLE UNDERSTANDING SPEECH ON THE TELEPHONE       Multiple values from one day are sorted in reverse-chronological order         12/16/2024   Driving Risk Screening   Patient/family members have concerns about driving No            12/16/2024   General Alertness/Fatigue Screening   Have you been more tired than usual lately? (!) YES            12/16/2024   Urinary Incontinence Screening   Bothered by  leaking urine in past 6 months Yes            12/16/2024   TB Screening   Were you born outside of the [place]? No          Today's PHQ-9 Score:       12/18/2024     7:02 AM   PHQ-9 SCORE   PHQ-9 Total Score MyChart 23 (Severe depression)   PHQ-9 Total Score 23        Patient-reported         12/16/2024   Substance Use   Alcohol more than 3/day or more than 7/wk No   Do you have a current opioid prescription? (!) YES   How severe/bad is pain from 1 to 10? 10/10   Do you use any other substances recreationally? No             No data to display              Low Risk (0-3)  Moderate Risk (4-7)  High Risk (>8)  Social History     Tobacco Use    Smoking status: Never     Passive exposure: Never    Smokeless tobacco: Never    Tobacco comments:     Never had anything in my life   Vaping Use    Vaping status: Never Used   Substance Use Topics    Alcohol use: Not Currently     Comment: I used to have a drink or two twice a year many years ago    Drug use: Never       Last PSA:   PSA   Date Value Ref Range Status   11/03/2020 0.34 0 - 4 ug/L Final     Comment:     Assay Method:  Chemiluminescence using Siemens Vista analyzer     ASCVD Risk   The 10-year ASCVD risk score (Rajinder BATEMAN, et al., 2019) is: 9.2%    Values used to calculate the score:      Age: 54 years      Sex: Male      Is Non- : No      Diabetic: Yes      Tobacco smoker: No      Systolic Blood Pressure: 120 mmHg      Is BP treated: No      HDL Cholesterol: 51 mg/dL      Total Cholesterol: 199 mg/dL            Reviewed and updated as needed this visit by Provider                      Current providers sharing in care for this patient include:  Patient Care Team:  Hebert Hdez CNP as PCP - General (Nurse Practitioner - Family)  Miki Pack MD as MD (Otolaryngology)  Bernie Josue PT as Physical Therapist  Santosh Agudelo MD as MD (Cardiology)  Chinmay Vivas MD as MD (Neurology)  Gaston  Fortino JIMENEZ MD as Referring Physician (Internal Medicine)  Fernando Nguyen DPM (Podiatry)  Kvng Witt MD as MD (Surgery)  Chavez Hubbard MD as MD (Radiology)  Luis Oliver MD as Resident  Santosh Roth MD as MD (Dermapathology)  Sarah Carrillo MD as MD (Internal Medicine)  Santosh Roth MD as MD (Dermapathology)  Marino Al MD as Resident (Radiology)  Zafar Glasgow MD as MD (Otolaryngology)  Andrez Anthony MD as MD (Dermatology)  Marcela Abreu MD as MD (Neurology)  Glen Aguirre MD as MD (Internal Medicine)  Chavez Milligan MD as Referring Physician (Internal Medicine - Pediatrics)  Joe Gaona MD as MD (Dermatology)  Geraldo Paez MD as Fellow (Student in organized health care education/training program)  Eugene Ortega MD as Assigned Sleep Provider  Elvis Goins OD (Optometry)  Antione Abrams MD as Assigned Musculoskeletal Provider  Hebert Hdez CNP as Assigned Pain Medication Provider  Hebert Hdez CNP as Assigned PCP  Migue Quigley DPM as Assigned Surgical Provider  Alaina Lim as Pharmacist (Pharmacist)  Alaina Lim as Assigned MTM Pharmacist    The following health maintenance items are reviewed in Epic and correct as of today:  Health Maintenance   Topic Date Due    HF ACTION PLAN  Never done    ASTHMA ACTION PLAN  Never done    ADVANCE CARE PLANNING  Never done    MEDICARE ANNUAL WELLNESS VISIT  Never done    HEPATITIS A IMMUNIZATION (1 of 2 - Risk 2-dose series) Never done    ZOSTER IMMUNIZATION (1 of 2) Never done    MICROALBUMIN  11/16/2023    ANNUAL REVIEW OF HM ORDERS  10/23/2024    LIPID  11/03/2024    A1C  12/18/2024    EYE EXAM  01/27/2025    ASTHMA CONTROL TEST  04/30/2025    URINE DRUG SCREEN  05/30/2025    KAIN ASSESSMENT  07/15/2025    DIABETIC FOOT EXAM  09/18/2025    CONTROLLED SUBSTANCE AGREEMENT FOR CHRONIC PAIN MANAGEMENT  09/19/2025    ALT   "10/17/2025    BMP  10/17/2025    CBC  10/17/2025    DTAP/TDAP/TD IMMUNIZATION (2 - Td or Tdap) 11/05/2025    PHQ-9  12/18/2025    COLORECTAL CANCER SCREENING  10/16/2030    RSV VACCINE (1 - 1-dose 75+ series) 10/03/2044    TSH W/FREE T4 REFLEX  Completed    HEPATITIS C SCREENING  Completed    HIV SCREENING  Completed    PHQ-2 (once per calendar year)  Completed    INFLUENZA VACCINE  Completed    Pneumococcal Vaccine: Pediatrics (0 to 5 Years) and At-Risk Patients (6 to 64 Years)  Completed    COVID-19 Vaccine  Completed    Medicare Annual MT Pharmacist Visit (once per calendar year)  Completed    HPV IMMUNIZATION  Aged Out    MENINGITIS IMMUNIZATION  Aged Out    RSV MONOCLONAL ANTIBODY  Aged Out    HEPATITIS B IMMUNIZATION  Discontinued            Objective    Exam  /81 (BP Location: Right arm, Patient Position: Sitting, Cuff Size: Adult Regular)   Pulse 96   Temp 98  F (36.7  C) (Oral)   Resp 16   Ht 1.702 m (5' 7\")   Wt 126.3 kg (278 lb 8 oz)   SpO2 100%   BMI 43.62 kg/m     Estimated body mass index is 43.62 kg/m  as calculated from the following:    Height as of this encounter: 1.702 m (5' 7\").    Weight as of this encounter: 126.3 kg (278 lb 8 oz).    Physical Exam  GENERAL: alert and no distress  NECK: no adenopathy, no asymmetry, masses, or scars  RESP: lungs clear to auscultation - no rales, rhonchi or wheezes  CV: regular rate and rhythm, normal S1 S2, no S3 or S4, no murmur, click or rub, no peripheral edema  ABDOMEN: soft, nontender, no hepatosplenomegaly, no masses and bowel sounds normal  MS: no gross musculoskeletal defects noted, no edema         12/18/2024   Mini Cog   Clock Draw Score 2 Normal   3 Item Recall 3 objects recalled   Mini Cog Total Score 5                 Signed Electronically by: Hebert Hdez CNP    Answers submitted by the patient for this visit:  Patient Health Questionnaire (Submitted on 12/18/2024)  If you checked off any problems, how difficult have these " problems made it for you to do your work, take care of things at home, or get along with other people?: Extremely difficult  PHQ9 TOTAL SCORE: 23

## 2024-12-18 NOTE — PATIENT INSTRUCTIONS
I do not want to increase your pain medications.    As long as you continue to lose weight, eventually we will be able to treat your varicose veins and move forward with knee replacement surgery for treatment of your chronic pain.    Update hemoglobin A1c and cholesterol labs today.    Colonoscopy due in 2030.    Update PSA today.    Follow-up in 3 months

## 2024-12-19 ENCOUNTER — OFFICE VISIT (OUTPATIENT)
Dept: ORTHOPEDICS | Facility: CLINIC | Age: 54
End: 2024-12-19
Payer: COMMERCIAL

## 2024-12-19 DIAGNOSIS — M17.0 PRIMARY OSTEOARTHRITIS OF BOTH KNEES: Primary | ICD-10-CM

## 2024-12-19 NOTE — LETTER
12/19/2024      RE: Chavez Ca  [address]  [place] [address]     Dear Colleague,    Thank you for referring your patient, Chavez Ca, to the [facility] SPORTS MEDICINE [facility]. Please see a copy of my visit note below.      [facility]  SPORTS & ORTHOPEDIC CLINIC VISIT     Dec 19, 2024        ASSESSMENT & PLAN    54-year-old with osteoarthritis of the bilateral knees.  Had some initial improvement with injections though this is waned.    Reviewed imaging and assessment with patient in detail  Provided new referral to physical therapy, specifically pool therapy  He can continue to use topical diclofenac and Tylenol as needed for pain  He will continue to use his braces as needed for comfort  Referral to surgeon to discuss arthroplasty was provided patient request    Antioen Abrams MD  [facility]IC[facility]    -----  Chief Complaint   Patient presents with     Left Knee - Pain     Right Knee - Follow Up       SUBJECTIVE  Chavez Ca is a/an 54 year old male who is seen for follow up of bilateral knee pain. Would like to discuss knee replacement.    The patient is seen by themselves.    Date of injury: Chronic   Date of Last Visit: 9/9/24   Symptoms: no change  Worsened by: walking, bending, WB   Better with: NA  Treatment to date: Tylenol, home exercises, corticosteroid injection (most recent date: 3/12/24) that provided  3 month(s) of relief, viscosupplementation injection (most recent date: 9/9/24), and tramdol, compression socks   Associated symptoms: tenderness on L anterior knee         REVIEW OF SYSTEMS:  See HPI     OBJECTIVE:  There were no vitals taken for this visit.     No significant exam this visit    RADIOLOGY:    No new imaging        Again, thank you for allowing me to participate in the care of your patient.      Sincerely,    Antione Abrams MD

## 2024-12-19 NOTE — PROGRESS NOTES
[facility]  SPORTS & ORTHOPEDIC CLINIC VISIT     Dec 19, 2024        ASSESSMENT & PLAN    54-year-old with osteoarthritis of the bilateral knees.  Had some initial improvement with injections though this is waned.    Reviewed imaging and assessment with patient in detail  Provided new referral to physical therapy, specifically pool therapy  He can continue to use topical diclofenac and Tylenol as needed for pain  He will continue to use his braces as needed for comfort  Referral to surgeon to discuss arthroplasty was provided patient request    Antione Abrams MD  [facility] SPORTS MEDICINE CLINIC [place]    -----  Chief Complaint   Patient presents with    Left Knee - Pain    Right Knee - Follow Up       SUBJECTIVE  Chavez Ca is a/an 54 year old male who is seen for follow up of bilateral knee pain. Would like to discuss knee replacement.    The patient is seen by themselves.    Date of injury: Chronic   Date of Last Visit: 9/9/24   Symptoms: no change  Worsened by: walking, bending, WB   Better with: NA  Treatment to date: Tylenol, home exercises, corticosteroid injection (most recent date: 3/12/24) that provided  3 month(s) of relief, viscosupplementation injection (most recent date: 9/9/24), and tramdol, compression socks   Associated symptoms: tenderness on L anterior knee         REVIEW OF SYSTEMS:  See HPI     OBJECTIVE:  There were no vitals taken for this visit.     No significant exam this visit    RADIOLOGY:    No new imaging

## 2024-12-19 NOTE — PATIENT INSTRUCTIONS
Today you were seen for knee pain that is due to osteoarthritis   -referral to orthopedic surgery to discuss knee replacement was placed  -we also placed a new referral for pool therapy  -continue to use braces for comfort  -it is also ok to use tylenol and diclofenac gel as needed for pain  -follow up in our clinic if you would like to try another injection

## 2025-01-15 ASSESSMENT — KOOS JR
HOW SEVERE IS YOUR KNEE STIFFNESS AFTER FIRST WAKING IN MORNING: EXTREME
GOING UP OR DOWN STAIRS: MILD
TWISING OR PIVOTING ON KNEE: SEVERE
KOOS JR SCORING: 34.17
STANDING UPRIGHT: EXTREME
RISING FROM SITTING: EXTREME
STRAIGHTENING KNEE FULLY: MILD
BENDING TO THE FLOOR TO PICK UP OBJECT: EXTREME

## 2025-01-16 ENCOUNTER — OFFICE VISIT (OUTPATIENT)
Dept: PHARMACY | Facility: CLINIC | Age: 55
End: 2025-01-16
Payer: COMMERCIAL

## 2025-01-16 ENCOUNTER — OFFICE VISIT (OUTPATIENT)
Dept: ORTHOPEDICS | Facility: CLINIC | Age: 55
End: 2025-01-16
Attending: FAMILY MEDICINE
Payer: COMMERCIAL

## 2025-01-16 VITALS — SYSTOLIC BLOOD PRESSURE: 100 MMHG | BODY MASS INDEX: 44.26 KG/M2 | DIASTOLIC BLOOD PRESSURE: 78 MMHG | WEIGHT: 276.3 LBS

## 2025-01-16 VITALS — BODY MASS INDEX: 44.52 KG/M2 | HEIGHT: 66 IN | WEIGHT: 277 LBS

## 2025-01-16 DIAGNOSIS — L85.3 DRY SKIN: ICD-10-CM

## 2025-01-16 DIAGNOSIS — F39 UNSPECIFIED MOOD (AFFECTIVE) DISORDER: ICD-10-CM

## 2025-01-16 DIAGNOSIS — R60.9 IDIOPATHIC EDEMA: ICD-10-CM

## 2025-01-16 DIAGNOSIS — M17.0 PRIMARY OSTEOARTHRITIS OF BOTH KNEES: ICD-10-CM

## 2025-01-16 DIAGNOSIS — M17.0 PRIMARY OSTEOARTHRITIS OF BOTH KNEES: Primary | ICD-10-CM

## 2025-01-16 DIAGNOSIS — E11.9 TYPE 2 DIABETES MELLITUS WITHOUT COMPLICATION, WITHOUT LONG-TERM CURRENT USE OF INSULIN (H): Primary | ICD-10-CM

## 2025-01-16 DIAGNOSIS — M47.26 OSTEOARTHRITIS OF SPINE WITH RADICULOPATHY, LUMBAR REGION: ICD-10-CM

## 2025-01-16 DIAGNOSIS — E78.5 DYSLIPIDEMIA: ICD-10-CM

## 2025-01-16 DIAGNOSIS — E66.01 CLASS 3 SEVERE OBESITY DUE TO EXCESS CALORIES WITH SERIOUS COMORBIDITY AND BODY MASS INDEX (BMI) OF 50.0 TO 59.9 IN ADULT (H): ICD-10-CM

## 2025-01-16 DIAGNOSIS — E66.813 CLASS 3 SEVERE OBESITY DUE TO EXCESS CALORIES WITH SERIOUS COMORBIDITY AND BODY MASS INDEX (BMI) OF 50.0 TO 59.9 IN ADULT (H): ICD-10-CM

## 2025-01-16 PROCEDURE — 99213 OFFICE O/P EST LOW 20 MIN: CPT | Performed by: FAMILY MEDICINE

## 2025-01-16 RX ORDER — LIDOCAINE 50 MG/G
2 PATCH TOPICAL EVERY 24 HOURS
Qty: 30 PATCH | Refills: 3 | Status: SHIPPED | OUTPATIENT
Start: 2025-01-16

## 2025-01-16 RX ORDER — TIRZEPATIDE 15 MG/.5ML
15 INJECTION, SOLUTION SUBCUTANEOUS
Qty: 2 ML | Refills: 2 | Status: SHIPPED | OUTPATIENT
Start: 2025-01-16

## 2025-01-16 RX ORDER — ACETAMINOPHEN 500 MG
1000 TABLET ORAL EVERY 6 HOURS
Qty: 240 TABLET | Refills: 2 | Status: SHIPPED | OUTPATIENT
Start: 2025-01-16

## 2025-01-16 ASSESSMENT — PATIENT HEALTH QUESTIONNAIRE - PHQ9: SUM OF ALL RESPONSES TO PHQ QUESTIONS 1-9: 22

## 2025-01-16 NOTE — PROGRESS NOTES
[facility]  SPORTS & ORTHOPEDIC CLINIC VISIT     Jan 16, 2025        ASSESSMENT & PLAN    53 yo with bilateral knee pain due to OA    Reviewed imaging and assessment with patient in detail  Reordered PT  Ok to continue with injections if he wants, but these have been increasingly ineffective. No injections for 3 mo prior to surgery  Applauded his previous efforts at weight loss and encouraged him to continue    Antione Abrams MD  [facility] SPORTS MEDICINE [facility]    -----  Chief Complaint   Patient presents with    Left Knee - Pain, Follow Up    Right Knee - Pain, Follow Up       SUBJECTIVE  Chavez Trevor Ca is a/an 54 year old male who is seen for follow up of bilateral knee pain. Saw  and needs to loose 27 pounds and have dental work prior to surgery.     The patient is seen by themselves.    Date of injury: Chronic   Date of Last Visit: 12/19/24   Symptoms: no change  Worsened by: Walking, bending, WB   Better with: NA  Treatment to date: Tylenol, home exercises, corticosteroid injection, viscosupplementation, tramadol, compression socks   Associated symptoms: no distal numbness or tingling; denies swelling or warmth        REVIEW OF SYSTEMS:  See HPI     OBJECTIVE:  There were no vitals taken for this visit.     No exam this visit    RADIOLOGY:   No new imaging

## 2025-01-16 NOTE — NURSING NOTE
Depression Screening Follow-up        1/16/2025    10:03 AM   PHQ   PHQ-9 Total Score 22   Q9: Thoughts of better off dead/self-harm past 2 weeks Not at all         1/16/2025    10:03 AM   Last PHQ-9   1.  Little interest or pleasure in doing things 3   2.  Feeling down, depressed, or hopeless 3   3.  Trouble falling or staying asleep, or sleeping too much 3   4.  Feeling tired or having little energy 3   5.  Poor appetite or overeating 2   6.  Feeling bad about yourself 3   7.  Trouble concentrating 2   8.  Moving slowly or restless 3   Q9: Thoughts of better off dead/self-harm past 2 weeks 0   PHQ-9 Total Score 22   Difficulty at work, home, or with people Extremely dIfficult         Does the patient currently have a mental health provider?  Yes, patient was referred back to current mental health provider. On medication and has regular check in with PCP.     Teressa Moreno RN

## 2025-01-16 NOTE — PATIENT INSTRUCTIONS
"Recommendations from today's MTM visit:                                                         Increase to Mounjaro 15mg weekly  Try lidocaine 5% patches for your knees once a day. Remove after 12 hours.   Increase Tylenol to 1000mg four times a day.     Follow-up: Return in 13 weeks (on 4/17/2025) for Follow up, with me, in person.    It was great speaking with you today.  I value your experience and would be very thankful for your time in providing feedback in our clinic survey. In the next few days, you may receive an email or text message from [employer] with a link to a survey related to your  clinical pharmacist.\"     To schedule another MTM appointment, please call the clinic directly or you may call the MTM scheduling line at [phone].    My Clinical Pharmacist's contact information:                                                      Please feel free to contact me with any questions or concerns you have.      Alaina Lim, PharmD  Medication Therapy Management (MTM) Pharmacist   "

## 2025-01-16 NOTE — PROGRESS NOTES
Medication Therapy Management (MTM) Encounter    ASSESSMENT:                            Medication Adherence/Access: See below for considerations.    Diabetes /Weight management   A1c at goal <7%. Recommend increasing Mounjaro for weight loss. At max dose topiramate for weight loss.      Hyperlipidemia  LDL at goal <100 on rosuvastatin moderate intensity.    Mood disorder  No benefit with duloxetine.     Edema    Blood pressure at goal <130/80.      Osteoarthritis of knees/Low back pain  Trial lidocaine patches today and increasing Tylenol up to max dose of 4000mg/day as needed. We could also retry Celebrex in the future if patient open to it.    Dry skin  Stable.     PLAN:                            Increase to Mounjaro 15mg weekly  Try lidocaine 5% patches for your knees once a day. Remove after 12 hours.   Increase Tylenol to 1000mg four times a day.     Follow-up: Return in 13 weeks (on 4/17/2025) for Follow up, with me, in person.    SUBJECTIVE/OBJECTIVE:                          Chavez Ca is a 54 year old male seen for a follow-up visit from 11/18/24.       Reason for visit: comprehensive medication review     Allergies/ADRs: Reviewed in chart  Past Medical History: Reviewed in chart  Tobacco: He reports that he has never smoked. He has never been exposed to tobacco smoke. He has never used smokeless tobacco.  Alcohol: Less than 1 beverages / week    Medication Adherence/Access: Lives in assisted living however he sets up his own medications. If going out, he will bring his doses with in Ziploc bag.   Having significant issues with housing. Does have a  through [facility] but communication can be slow and things do slip through the cracks - he just lost MA for example despite submitting all of the paperwork.  Would like medications to have refills so that they can be delivered at the same time and not have gaps in adherence    Diabetes   /Weight management   Mounjaro 12.5mg weekly - Takes on  Fridays. Interested in increasing for weight loss.   Topamax 200mg twice daily for weight loss    Denies symptoms of hypoglycemia but reports he usually feels warm/sweaty.  Patient is not experiencing side effects.  Diet/Exercise: limited due to pain. Needs BMI <40 for knee replacement     Blood sugar monitoring: hasn't been checking because he has not felt symptoms of hypoglycemia  Eye exam in the last 12 months? Not assessed  Foot exam is up to date    Hyperlipidemia   Rosuvastatin 10mg once daily   Patient reports no significant myalgias or other side effects.  The 10-year ASCVD risk score (Rajinder BATEMAN, et al., 2019) is: 5.1%    Values used to calculate the score:      Age: 54 years      Sex: Male      Is Non- : No      Diabetic: Yes      Tobacco smoker: No      Systolic Blood Pressure: 100 mmHg      Is BP treated: No      HDL Cholesterol: 43 mg/dL      Total Cholesterol: 135 mg/dL     Mental Health   Mood disorder  Duloxetine 60mg once daily - He wants to be taken off of duloxetine, has not noticed any benefit for pain, does not want to continue.      Edema    Furosemide 60mg twice daily   Potassium chloride 40mEq daily  Compression stockings     Drinking plenty of water. Has not felt lightheaded.  Swelling has gone up because he can see his varicose veins more. Tries to elevate his legs but can be painful too.   Patient reports that his blood pressure was 90s/80s when nurses check.    Osteoarthritis of knees/Low back pain  Tramadol 50mg twice daily   Acetaminophen 1000mg three times daily - patient reports he does not take every day. Lately has been taking more because of increased knee pain  Methocarbamol 500mg three times daily as needed     Patient reports back pain has not been bothering as much, mostly knee/leg pain and swelling. He is using ice. He has seen Celebrex.     Surgeon wants BMI to be 40 (250-255 lbs)  Making appointment for teeth to be pulled for knee surgery as well.      2 months since last injection knee and didn't help.   Trying to do more walking/standing without overdoing it.   He is now back in pool therapy which is helpful  Has been on duloxetine, celecoxib, nabumetone, Voltaren, amitriptyline, gabapentin. Does not want to retry any of the medications he has been taken off of in the past.     Dry skin  Ammonium lactate 12% cream twice daily   Applies to bottom of feet/heel. Reports this works very well.    Today's Vitals: /78   Wt 276 lb 4.8 oz (125.3 kg)   BMI 44.26 kg/m    ----------------    I spent 35 minutes with this patient today. All changes were made via collaborative practice agreement with Hebert Hdez CNP.     A summary of these recommendations was given to the patient.    Alaina Lim, PharmD  Medication Therapy Management (MTM) Pharmacist         Medication Therapy Recommendations  Class 3 severe obesity due to excess calories with serious comorbidity and body mass index (BMI) of 50.0 to 59.9 in adult (H)   1 Current Medication: tirzepatide (MOUNJARO) 12.5 MG/0.5ML pen (Discontinued)   Current Medication Sig: Inject 12.5 mg subcutaneously once a week.   Rationale: Dose too low - Dosage too low - Effectiveness   Recommendation: Increase Dose   Status: Accepted per CPA   Identified Date: 1/16/2025 Completed Date: 1/16/2025         Primary osteoarthritis of both knees   1 Current Medication: acetaminophen (TYLENOL) 500 MG tablet (Discontinued)   Current Medication Sig: Take 2 tablets (1,000 mg) by mouth 3 times daily   Rationale: Dose too low - Dosage too low - Effectiveness   Recommendation: Increase Dose   Status: Accepted per CPA   Identified Date: 1/16/2025 Completed Date: 1/16/2025         2 Current Medication: traMADol (ULTRAM) 50 MG tablet   Current Medication Sig: Take 1 tablet (50 mg) by mouth 2 times daily.   Rationale: Synergistic therapy - Needs additional medication therapy - Indication   Recommendation: Start Medication - lidocaine    Status: Accepted per CPA   Identified Date: 1/16/2025 Completed Date: 1/16/2025

## 2025-01-16 NOTE — PROGRESS NOTES
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  He has severe symptoms that limit his activities of daily living.  He this is despite intra-articular injections use of walker and use of a motorized scooter.  We discussed knee replacement surgery and went over the risks and benefits and reviewed the templated radiographs of his  knee with implants as part of teaching.  We discussed that he has significant barriers to presurgical management including a BMI of 45 and multiple teeth that likely need to be extracted prior to her surgery.  He is aware of both of these barriers and is working on them.  He reports he is lost 150 pounds in the last year and he reports that he is actively pursuing a dentist to have his teeth extracted.  All the patient's questions were answered to the best of my ability    Plan: Follow-up 1 year goals as above for consideration of knee replacement surgery        Chief Complaint: Consult (Bilateral knee pain, left is worse. The right used to be worse. [facility]. Says cortisone before that helped a little. Had a fall 12/10/24. Uses a walker or scooter everyday for the past 2 years. Can before that. Has lost 150lb over the past year)      Physician:  Antione Abrams    HPI: Chavez Ca is a 54 year old male who presents today for evaluation of his knees.  Here reports multiple years of increasing knee symptoms.  He has addressed his knee pain and his diagnosis of osteoarthritis with weight loss (150 pounds) intra-articular injection of steroid and intra-articular injection of viscosupplementation.  He is also using a walker and a motorized scooter.    KOMIHIR Moreno  34.17  PROMIS Mental: (Patient-Rptd) (P) 5  PROMIS Physical (Patient-Rptd) (P) 4  PROMIS TOTAL (Patient-Rptd) (P) 9  [facility]:    MEDICAL HISTORY:   Past Medical History:   Diagnosis Date    Allergic rhinitis     Arthritis     Benign  positional vertigo 2011    never quite went away    Bilateral carpal tunnel syndrome 07/18/2015    Chronic sinusitis 2017    I don't know Please look    Chronic tonsillitis     COPD (chronic obstructive pulmonary disease) (H) 11/2010    That is the date i got first sleep study results    Depressive disorder     Depressive disorder, not elsewhere classified 07/30/2012    Gastro-oesophageal reflux disease     Hearing problem     Hyperlipidemia associated with prediabetes     Hypertension 2008    Learning disability     Mild intermittent asthma in adult without complication     Neck mass     parapharyngeal, benign    Obesity, Class III, BMI 40-49.9 (morbid obesity) (H)     Psychological factors associated with another disorder 08/21/2012    Recurrent otitis media 2016    might be what the pain in my ears    Reduced vision     Tinnitus 2016    I would hear a steady light buzzing sound and other can't    Trigeminal neuralgia     Weakness 08/26/2011       Medications:     Current Outpatient Medications:     acetaminophen (TYLENOL) 500 MG tablet, Take 2 tablets (1,000 mg) by mouth 3 times daily, Disp: , Rfl:     ammonium lactate (AMLACTIN) 12 % external cream, Apply topically 2 times daily, Disp: 385 g, Rfl: 3    blood glucose (NO BRAND SPECIFIED) lancets standard, Use to test blood sugar 1 times daily or as directed., Disp: 100 each, Rfl: 3    blood glucose (NO BRAND SPECIFIED) test strip, Use to test blood sugar 1 times daily or as directed., Disp: 100 strip, Rfl: 3    blood glucose monitoring (NO BRAND SPECIFIED) meter device kit, Use to test blood sugar 1 times daily or as directed., Disp: 1 kit, Rfl: 0    COMPRESSION STOCKINGS, 20-30 mmHg knee high compression stockings.  Measure and fit.  Style and brand per patient preference., Disp: 2 each, Rfl: 0    DULoxetine (CYMBALTA) 60 MG capsule, Take 1 capsule (60 mg) by mouth daily., Disp: 60 capsule, Rfl: 1    furosemide (LASIX) 20 MG tablet, Take 3 tablets (60 mg) by  mouth 2 times daily, Disp: 540 tablet, Rfl: 3    methocarbamol (ROBAXIN) 500 MG tablet, Take 1 tablet (500 mg) by mouth 3 times daily as needed for muscle spasms., Disp: 90 tablet, Rfl: 1    potassium chloride ER (K-TAB) 20 MEQ CR tablet, Take 2 tablets (40 mEq) by mouth daily, Disp: 180 tablet, Rfl: 2    rosuvastatin (CRESTOR) 10 MG tablet, Take 1 tablet (10 mg) by mouth daily., Disp: 90 tablet, Rfl: 3    tirzepatide (MOUNJARO) 12.5 MG/0.5ML pen, Inject 12.5 mg subcutaneously once a week., Disp: 2 mL, Rfl: 3    topiramate (TOPAMAX) 100 MG tablet, Take 2 tablets (200 mg) by mouth 2 times daily., Disp: 360 tablet, Rfl: 0    traMADol (ULTRAM) 50 MG tablet, Take 1 tablet (50 mg) by mouth 2 times daily., Disp: 60 tablet, Rfl: 0    Current Facility-Administered Medications:     hylan (SYNVISC ONE) injection 48 mg, 48 mg, , , , 48 mg at 09/09/24 0832    hylan (SYNVISC ONE) injection 48 mg, 48 mg, , , , 48 mg at 09/09/24 0832    lidocaine (PF) (XYLOCAINE) 1 % injection 3 mL, 3 mL, , , , 3 mL at 09/09/24 0832    lidocaine (PF) (XYLOCAINE) 1 % injection 3 mL, 3 mL, , , , 3 mL at 09/09/24 0832    lidocaine (PF) (XYLOCAINE) 1 % injection 4 mL, 4 mL, , , Antione Abrams MD, 4 mL at 03/12/24 0856    triamcinolone (KENALOG-40) injection 40 mg, 40 mg, , , Antione Abrams MD, 40 mg at 03/12/24 0856    Allergies: Fentanyl, Meloxicam, and Minocycline    SURGICAL HISTORY:   Past Surgical History:   Procedure Laterality Date    ABDOMEN SURGERY  08/23/2015    Gallstones removed with cutting a big area in belly    BIOPSY  2011    To figure out what kind of tumor I had    COLONOSCOPY N/A 10/16/2020    Procedure: INCOMPLETE COLONOSCOPY;  Surgeon: Ham Cherry MD;  Location: [facility] OR    ENDOSCOPIC RETROGRADE CHOLANGIOPANCREATOGRAM N/A 08/27/2015    Procedure: COMBINED ENDOSCOPIC RETROGRADE CHOLANGIOPANCREATOGRAPHY, PLACE TUBE/STENT;  Surgeon: Baldomero Zacarias MD;  Location: [facility] OR    ENDOSCOPIC RETROGRADE  CHOLANGIOPANCREATOGRAM N/A 11/06/2015    Procedure: COMBINED ENDOSCOPIC RETROGRADE CHOLANGIOPANCREATOGRAPHY, REMOVE FOREIGN BODY OR STENT/TUBE;  Surgeon: Baldomero Zacarias MD;  Location:  OR    EXCISE LESION INTRAORAL  06/29/2011    Procedure:EXCISE LESION INTRAORAL; TRANSCERVICAL APPROACH TO LEFT PHARYNGEAL SPACE MASS REMOVAL ; Surgeon:BARBARA PHILLIPS; Location: OR     VASCULAR SURGERY PROCEDURE UNLIST  12/29/2015    LAPAROSCOPIC CHOLECYSTECTOMY N/A 08/23/2015    Procedure: LAPAROSCOPIC CHOLECYSTECTOMY;  Surgeon: Kvng Witt MD;  Location:  OR    LARYNGOSCOPY WITH BIOPSY(IES)  06/18/2014    Procedure: LARYNGOSCOPY WITH BIOPSY(IES);  Surgeon: Barbara Phillips MD;  Location:  OR    LASER CO2 LARYNGOSCOPY  12/07/2011    Procedure:LASER CO2 LARYNGOSCOPY; Micro-Direct Laryngoscopy with CO2 Laser Standby / Excision Tracheal Grandulization, Trach Tube Change / Kenalog application. / Balloon Dilation of Subglottic Stenosis.*Latex Safe Room* Trach Tube = Shiley 6.4mm I.D. / 10.8MM O.D. / 76MM  Cuffless.; Surgeon:BARBARA PHILLIPS; Location: OR    ORTHOPEDIC SURGERY  05/2016    Broken Right hand & Fracture Right shoulder    REPAIR PTOSIS  6/2000    A quarter size cyst on left eyelid    TRACHEOSTOMY  04/22/2011    Procedure:TRACHEOSTOMY; possible awake; Surgeon:BARBARA PHILLIPS; Location: OR    TRACHEOSTOMY  06/29/2011    Procedure:TRACHEOSTOMY; REMOVE AND REPLACE SHILEY 6 XLT; Surgeon:BARBARA PHILLIPS; Location: OR    VASCULAR SURGERY  2008-[facility]       FAMILY HISTORY:   Family History   Adopted: Yes   Problem Relation Age of Onset    Family History Negative Other         Does not know family    Unknown/Adopted No family hx of        SOCIAL HISTORY:   Social History     Tobacco Use    Smoking status: Never     Passive exposure: Never    Smokeless tobacco: Never    Tobacco comments:     Never had anything in my life   Substance Use Topics     "Alcohol use: Not Currently     Comment: I used to have a drink or two twice a year many years ago       REVIEW OF SYSTEMS:  The comprehensive review of systems from the intake form was reviewed with the patient.  No fever, weight change or fatigue. No dry eyes. No oral ulcers, sore throat or voice change. No palpitations, syncope, angina or edema.  No chest pain, excessive sleepiness, shortness of breath or hemoptysis.   No abdominal pain, nausea, vomiting, diarrhea or heartburn.  No skin rash. No focal weakness or numbness. No bleeding or lymphadenopathy. No rhinitis or hives.     Exam:  On physical examination the patient appears the stated age, is in no acute distress, affect is appropriate, and breathing is non-labored.  Vitals are documented in the EMR and have been reviewed:    Ht 1.683 m (5' 6.25\")   Wt 125.6 kg (277 lb)   BMI 44.37 kg/m    5' 6.25\"  Body mass index is 44.37 kg/m .    Rises from chair: With effort  Gait: Walks slowly about the room both with and without a walker  Gains the exam table: With effort    Right  Knee  Appearance: benign  Clinical alignment: Neutral  Effusion: No  Tenderness to palpation: Medial and lateral joint line as well as at the patella tendon and the quad tendon  Extension: 5  Flexion: 125  Collateral ligaments: intact  Cruciate ligaments: grossly intact       Hip examination: benign hip ROM with groin or anterior thigh symptoms.     Distally, the circulatory, motor, and sensation exam is intact with 5/5 EHL, gastroc-soleus, and tibialis anterior.    Sensation to light touch is intact.    Dorsalis pedis and posterior tibialis pulses are palpable.    There are no sores on the feet, no bruising, and no lymphedema.    X-rays:     Kellgren-Easton grade 3-4 knees with medial greater than lateral joint space narrowing    "

## 2025-01-16 NOTE — LETTER
1/16/2025      RE: Chavez Ca  [address]  [place] [address]     Dear Colleague,    Thank you for referring your patient, Chavez Ca, to the [facility] SP[facility]. Please see a copy of my visit note below.      [facility]  SPORTS & ORTHOPEDIC CLINIC VISIT     Jan 16, 2025        ASSESSMENT & PLAN    55 yo with bilateral knee pain due to OA    Reviewed imaging and assessment with patient in detail  Reordered PT  Ok to continue with injections if he wants, but these have been increasingly ineffective. No injections for 3 mo prior to surgery  Applauded his previous efforts at weight loss and encouraged him to continue    Antione Abrams MD  [facility]    -----  Chief Complaint   Patient presents with     Left Knee - Pain, Follow Up     Right Knee - Pain, Follow Up       SUBJECTIVE  Chavez Ca is a/an 54 year old male who is seen for follow up of bilateral knee pain. Saw  and needs to loose 27 pounds and have dental work prior to surgery.     The patient is seen by themselves.    Date of injury: Chronic   Date of Last Visit: 12/19/24   Symptoms: no change  Worsened by: Walking, bending, WB   Better with: NA  Treatment to date: Tylenol, home exercises, corticosteroid injection, viscosupplementation, tramadol, compression socks   Associated symptoms: no distal numbness or tingling; denies swelling or warmth        REVIEW OF SYSTEMS:  See HPI     OBJECTIVE:  There were no vitals taken for this visit.     No exam this visit    RADIOLOGY:   No new imaging           Again, thank you for allowing me to participate in the care of your patient.      Sincerely,    Antione Abrams MD

## 2025-01-16 NOTE — LETTER
1/16/2025      Chavez Ca  [address] [facility] [address]      Dear Colleague,    Thank you for referring your patient, Chavez Ca, to the [facility] [place]. Please see a copy of my visit note below.    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  He has severe symptoms that limit his activities of daily living.  He this is despite intra-articular injections use of walker and use of a motorized scooter.  We discussed knee replacement surgery and went over the risks and benefits and reviewed the templated radiographs of his  knee with implants as part of teaching.  We discussed that he has significant barriers to presurgical management including a BMI of 45 and multiple teeth that likely need to be extracted prior to her surgery.  He is aware of both of these barriers and is working on them.  He reports he is lost 150 pounds in the last year and he reports that he is actively pursuing a dentist to have his teeth extracted.  All the patient's questions were answered to the best of my ability    Plan: Follow-up 1 year goals as above for consideration of knee replacement surgery        Chief Complaint: Consult (Bilateral knee pain, left is worse. The right used to be worse. Has been seeing Dr Abrams. Synvisc in Sept 2024 - no relief. Says cortisone before that helped a little. Had a fall 12/10/24. Uses a walker or scooter everyday for the past 2 years. Can before that. Has lost 150lb over the past year)      Physician:  Antione Abrams    HPI: Chavez Ca is a 54 year old male who presents today for evaluation of his knees.  Here reports multiple years of increasing knee symptoms.  He has addressed his knee pain and his diagnosis of osteoarthritis with weight loss (150 pounds) intra-articular injection of steroid and intra-articular injection of viscosupplementation.  He is also using a walker and a motorized  anupam.    RAPHAEL   34.17  PROMIS Mental: (Patient-Rptd) (P) 5  PROMIS Physical (Patient-Rptd) (P) 4  PROMIS TOTAL (Patient-Rptd) (P) 9  [facility]:    MEDICAL HISTORY:   Past Medical History:   Diagnosis Date     Allergic rhinitis      Arthritis      Benign positional vertigo 2011    never quite went away     Bilateral carpal tunnel syndrome 07/18/2015     Chronic sinusitis 2017    I don't know Please look     Chronic tonsillitis      COPD (chronic obstructive pulmonary disease) (H) 11/2010    That is the date i got first sleep study results     Depressive disorder      Depressive disorder, not elsewhere classified 07/30/2012     Gastro-oesophageal reflux disease      Hearing problem      Hyperlipidemia associated with prediabetes      Hypertension 2008     Learning disability      Mild intermittent asthma in adult without complication      Neck mass     parapharyngeal, benign     Obesity, Class III, BMI 40-49.9 (morbid obesity) (H)      Psychological factors associated with another disorder 08/21/2012     Recurrent otitis media 2016    might be what the pain in my ears     Reduced vision      Tinnitus 2016    I would hear a steady light buzzing sound and other can't     Trigeminal neuralgia      Weakness 08/26/2011       Medications:     Current Outpatient Medications:      acetaminophen (TYLENOL) 500 MG tablet, Take 2 tablets (1,000 mg) by mouth 3 times daily, Disp: , Rfl:      ammonium lactate (AMLACTIN) 12 % external cream, Apply topically 2 times daily, Disp: 385 g, Rfl: 3     blood glucose (NO BRAND SPECIFIED) lancets standard, Use to test blood sugar 1 times daily or as directed., Disp: 100 each, Rfl: 3     blood glucose (NO BRAND SPECIFIED) test strip, Use to test blood sugar 1 times daily or as directed., Disp: 100 strip, Rfl: 3     blood glucose monitoring (NO BRAND SPECIFIED) meter device kit, Use to test blood sugar 1 times daily or as directed., Disp: 1 kit, Rfl: 0     COMPRESSION STOCKINGS, 20-30 mmHg knee  high compression stockings.  Measure and fit.  Style and brand per patient preference., Disp: 2 each, Rfl: 0     DULoxetine (CYMBALTA) 60 MG capsule, Take 1 capsule (60 mg) by mouth daily., Disp: 60 capsule, Rfl: 1     furosemide (LASIX) 20 MG tablet, Take 3 tablets (60 mg) by mouth 2 times daily, Disp: 540 tablet, Rfl: 3     methocarbamol (ROBAXIN) 500 MG tablet, Take 1 tablet (500 mg) by mouth 3 times daily as needed for muscle spasms., Disp: 90 tablet, Rfl: 1     potassium chloride ER (K-TAB) 20 MEQ CR tablet, Take 2 tablets (40 mEq) by mouth daily, Disp: 180 tablet, Rfl: 2     rosuvastatin (CRESTOR) 10 MG tablet, Take 1 tablet (10 mg) by mouth daily., Disp: 90 tablet, Rfl: 3     tirzepatide (MOUNJARO) 12.5 MG/0.5ML pen, Inject 12.5 mg subcutaneously once a week., Disp: 2 mL, Rfl: 3     topiramate (TOPAMAX) 100 MG tablet, Take 2 tablets (200 mg) by mouth 2 times daily., Disp: 360 tablet, Rfl: 0     traMADol (ULTRAM) 50 MG tablet, Take 1 tablet (50 mg) by mouth 2 times daily., Disp: 60 tablet, Rfl: 0    Current Facility-Administered Medications:      hylan (SYNVISC ONE) injection 48 mg, 48 mg, , , , 48 mg at 09/09/24 0832     hylan (SYNVISC ONE) injection 48 mg, 48 mg, , , , 48 mg at 09/09/24 0832     lidocaine (PF) (XYLOCAINE) 1 % injection 3 mL, 3 mL, , , , 3 mL at 09/09/24 0832     lidocaine (PF) (XYLOCAINE) 1 % injection 3 mL, 3 mL, , , , 3 mL at 09/09/24 0832     lidocaine (PF) (XYLOCAINE) 1 % injection 4 mL, 4 mL, , , Antione Abrams MD, 4 mL at 03/12/24 0856     triamcinolone (KENALOG-40) injection 40 mg, 40 mg, , , Antione Abrams MD, 40 mg at 03/12/24 0856    Allergies: Fentanyl, Meloxicam, and Minocycline    SURGICAL HISTORY:   Past Surgical History:   Procedure Laterality Date     ABDOMEN SURGERY  08/23/2015    Gallstones removed with cutting a big area in belly     BIOPSY  2011    To figure out what kind of tumor I had     COLONOSCOPY N/A 10/16/2020    Procedure: INCOMPLETE  COLONOSCOPY;  Surgeon: Ham Cherry MD;  Location: [facility] OR     ENDOSCOPIC RETROGRADE CHOLANGIOPANCREATOGRAM N/A 08/27/2015    Procedure: COMBINED ENDOSCOPIC RETROGRADE CHOLANGIOPANCREATOGRAPHY, PLACE TUBE/STENT;  Surgeon: Baldomero Zacarias MD;  Location: [facility] OR     ENDOSCOPIC RETROGRADE CHOLANGIOPANCREATOGRAM N/A 11/06/2015    Procedure: COMBINED ENDOSCOPIC RETROGRADE CHOLANGIOPANCREATOGRAPHY, REMOVE FOREIGN BODY OR STENT/TUBE;  Surgeon: Baldomero Zacarias MD;  Location: [facility] OR     EXCISE LESION INTRAORAL  06/29/2011    Procedure:EXCISE LESION INTRAORAL; TRANSCERVICAL APPROACH TO LEFT PHARYNGEAL SPACE MASS REMOVAL ; Surgeon:BARBARA PHILLIPS; Location: OR     HC VASCULAR SURGERY PROCEDURE UNLIST  12/29/2015     LAPAROSCOPIC CHOLECYSTECTOMY N/A 08/23/2015    Procedure: LAPAROSCOPIC CHOLECYSTECTOMY;  Surgeon: Kvng Witt MD;  Location: [facility] OR     LARYNGOSCOPY WITH BIOPSY(IES)  06/18/2014    Procedure: LARYNGOSCOPY WITH BIOPSY(IES);  Surgeon: Barbara Phillips MD;  Location:  OR     LASER CO2 LARYNGOSCOPY  12/07/2011    Procedure:LASER CO2 LARYNGOSCOPY; Micro-Direct Laryngoscopy with CO2 Laser Standby / Excision Tracheal Grandulization, Trach Tube Change / Kenalog application. / Balloon Dilation of Subglottic Stenosis.*Latex Safe Room* Trach Tube = Shiley 6.4mm I.D. / 10.8MM O.D. / 76MM  Cuffless.; Surgeon:BARBARA PHILLIPS; Location: OR     ORTHOPEDIC SURGERY  05/2016    Broken Right hand & Fracture Right shoulder     REPAIR PTOSIS  6/2000    A quarter size cyst on left eyelid     TRACHEOSTOMY  04/22/2011    Procedure:TRACHEOSTOMY; possible awake; Surgeon:BARBARA PHILLIPS; Location:[facility] OR     TRACHEOSTOMY  06/29/2011    Procedure:TRACHEOSTOMY; REMOVE AND REPLACE SHILEY 6 XLT; Surgeon:BARBARA PHILLIPS; Location: OR     VASCULAR SURGERY  2008-[facility]       FAMILY HISTORY:   Family History   Adopted: Yes   Problem Relation Age of Onset      "Family History Negative Other         Does not know family     Unknown/Adopted No family hx of        SOCIAL HISTORY:   Social History     Tobacco Use     Smoking status: Never     Passive exposure: Never     Smokeless tobacco: Never     Tobacco comments:     Never had anything in my life   Substance Use Topics     Alcohol use: Not Currently     Comment: I used to have a drink or two twice a year many years ago       REVIEW OF SYSTEMS:  The comprehensive review of systems from the intake form was reviewed with the patient.  No fever, weight change or fatigue. No dry eyes. No oral ulcers, sore throat or voice change. No palpitations, syncope, angina or edema.  No chest pain, excessive sleepiness, shortness of breath or hemoptysis.   No abdominal pain, nausea, vomiting, diarrhea or heartburn.  No skin rash. No focal weakness or numbness. No bleeding or lymphadenopathy. No rhinitis or hives.     Exam:  On physical examination the patient appears the stated age, is in no acute distress, affect is appropriate, and breathing is non-labored.  Vitals are documented in the EMR and have been reviewed:    Ht 1.683 m (5' 6.25\")   Wt 125.6 kg (277 lb)   BMI 44.37 kg/m    5' 6.25\"  Body mass index is 44.37 kg/m .    Rises from chair: With effort  Gait: Walks slowly about the room both with and without a walker  Gains the exam table: With effort    Right  Knee  Appearance: benign  Clinical alignment: Neutral  Effusion: No  Tenderness to palpation: Medial and lateral joint line as well as at the patella tendon and the quad tendon  Extension: 5  Flexion: 125  Collateral ligaments: intact  Cruciate ligaments: grossly intact       Hip examination: benign hip ROM with groin or anterior thigh symptoms.     Distally, the circulatory, motor, and sensation exam is intact with 5/5 EHL, gastroc-soleus, and tibialis anterior.    Sensation to light touch is intact.    Dorsalis pedis and posterior tibialis pulses are palpable.    There are no " sores on the feet, no bruising, and no lymphedema.    X-rays:     Kellgren-Easton grade 3-4 knees with medial greater than lateral joint space narrowing      Again, thank you for allowing me to participate in the care of your patient.        Sincerely,        Otto Schuler MD    Electronically signed

## 2025-01-16 NOTE — NURSING NOTE
"Reason For Visit:   Chief Complaint   Patient presents with    Consult     Bilateral knee pain, left is worse. The right used to be worse. Has been seeing Dr Abrams. Synvisc in Sept 2024 - no relief. Says cortisone before that helped a little. Had a fall 12/10/24. Uses a walker or scooter everyday for the past 2 years. Can before that. Has lost 150lb over the past year       Ht 1.683 m (5' 6.25\")   Wt 125.6 kg (277 lb)   BMI 44.37 kg/m           Rhiannon Cervantes ATC    "

## 2025-01-30 ENCOUNTER — TELEPHONE (OUTPATIENT)
Dept: INTERNAL MEDICINE | Facility: CLINIC | Age: 55
End: 2025-01-30

## 2025-01-30 DIAGNOSIS — R60.0 LOWER EXTREMITY EDEMA: ICD-10-CM

## 2025-01-30 RX ORDER — FUROSEMIDE 20 MG/1
20 TABLET ORAL
Status: SHIPPED
Start: 2025-01-30

## 2025-01-30 NOTE — TELEPHONE ENCOUNTER
Outgoing call to patient. Relayed PCP's detailed message. Scheduled patient for appointment 2/3/25. No further questions/concerns at this time.

## 2025-01-30 NOTE — TELEPHONE ENCOUNTER
I have that he is using furosemide 60 mg twice daily.  He is taking this for lower extremity swelling.    Have him reduce the furosemide down to 20 mg twice daily.  Continue checking blood pressures.  He can come in and see me on Monday to review home blood pressure readings and to recheck his blood pressure in clinic

## 2025-01-30 NOTE — TELEPHONE ENCOUNTER
S-(situation): Patient calling in with,Mary Anne,who is the head nurse at the assisted living that he lives at. Patient had a episode of dizziness yesterday when he stood up.     B-(background):  Has been having episodes of dizziness for a while when he makes position changes. About 6 months this has been ongoing, he does get dizzy when he turns his head side to side.     A-(assessment):     When patient went to stand up last night he got dizzy, the staff at the facility took him blood pressure and it was 99/77     This Am /70 HR 96     The only new medication that was started was litocaine patch on the knee.     Patient stated that when he bends down he gets lightheaded - he has had this in the past.      R-(recommendations): routing to provider to advise if there are any recommendations for the assisted living staff or If they should continue to monitor.     Should he be seen sooner that 2/19/2025 in clinic? I did hold a spot on on Monday at 12 pm if you want him to be seen sooner.     Kamilla العراقي RN

## 2025-02-10 ENCOUNTER — PATIENT OUTREACH (OUTPATIENT)
Dept: CARE COORDINATION | Facility: CLINIC | Age: 55
End: 2025-02-10
Payer: COMMERCIAL

## 2025-02-10 NOTE — PROGRESS NOTES
Clinic Care Coordination Contact    Situation: Patient chart reviewed by care coordinator.    Background: Patient contacted  directly as he still had SW's contact information from a couple years ago.    Assessment: Patient is looking for help getting his wheelchair rides [place] through [place] insurance as he has recently obtained a power scooter and that level of transportation needs to be approved.    Patient is also looking for someone to help him get his PT appointments scheduled with Vanessa Menodza. Patient reports several referrals have been placed, but there has been no follow up.    Finally, Patient is looking for help with [employer] bills. Patient reported he has been billed for medical care despite having Medicare Advantage plan and Medical Assistance.      Plan/Recommendations: Patient confirmed that he sees PCP at [facility].  will forward Patient's requests on to their Care Management team for enrollment and follow up.    JOHN Rosales  Social Work Care Coordinator  Primary Care Clinic  [facility]  Direct Phone: [phone]

## (undated) DEVICE — KIT ENDO FIRST STEP DISINFECTANT 200ML W/POUCH EP-4

## (undated) DEVICE — WIPE PREMOIST CLEANSING WASHCLOTHS 7988

## (undated) DEVICE — ENDO CAP AND TUBING STERILE FOR ENDOGATOR  100130

## (undated) DEVICE — SOL WATER IRRIG 1000ML BOTTLE 2F7114

## (undated) DEVICE — ENDO SYSTEM WATER BOTTLE & TUBING W/CO2 FILTER 00711549

## (undated) DEVICE — PAD CHUX UNDERPAD 23X24" 7136

## (undated) DEVICE — PACK ENDOSCOPY GI CUSTOM UMMC

## (undated) DEVICE — KIT CONNECTOR FOR OLYMPUS ENDOSCOPES DEFENDO 100310

## (undated) RX ORDER — TRIAMCINOLONE ACETONIDE 40 MG/ML
INJECTION, SUSPENSION INTRA-ARTICULAR; INTRAMUSCULAR
Status: DISPENSED
Start: 2023-03-06

## (undated) RX ORDER — LIDOCAINE HYDROCHLORIDE 10 MG/ML
INJECTION, SOLUTION EPIDURAL; INFILTRATION; INTRACAUDAL; PERINEURAL
Status: DISPENSED
Start: 2024-03-12

## (undated) RX ORDER — LIDOCAINE HYDROCHLORIDE 20 MG/ML
INJECTION, SOLUTION EPIDURAL; INFILTRATION; INTRACAUDAL; PERINEURAL
Status: DISPENSED
Start: 2020-10-16

## (undated) RX ORDER — LIDOCAINE HYDROCHLORIDE 10 MG/ML
INJECTION, SOLUTION EPIDURAL; INFILTRATION; INTRACAUDAL; PERINEURAL
Status: DISPENSED
Start: 2023-03-06

## (undated) RX ORDER — FENTANYL CITRATE 50 UG/ML
INJECTION, SOLUTION INTRAMUSCULAR; INTRAVENOUS
Status: DISPENSED
Start: 2020-10-16

## (undated) RX ORDER — LIDOCAINE HYDROCHLORIDE 10 MG/ML
INJECTION, SOLUTION EPIDURAL; INFILTRATION; INTRACAUDAL; PERINEURAL
Status: DISPENSED
Start: 2024-09-09

## (undated) RX ORDER — FENTANYL CITRATE-0.9 % NACL/PF 10 MCG/ML
PLASTIC BAG, INJECTION (ML) INTRAVENOUS
Status: DISPENSED
Start: 2020-10-16

## (undated) RX ORDER — SIMETHICONE 40MG/0.6ML
SUSPENSION, DROPS(FINAL DOSAGE FORM)(ML) ORAL
Status: DISPENSED
Start: 2020-09-18

## (undated) RX ORDER — TRIAMCINOLONE ACETONIDE 40 MG/ML
INJECTION, SUSPENSION INTRA-ARTICULAR; INTRAMUSCULAR
Status: DISPENSED
Start: 2023-11-28

## (undated) RX ORDER — ALBUTEROL SULFATE 0.83 MG/ML
SOLUTION RESPIRATORY (INHALATION)
Status: DISPENSED
Start: 2022-11-10

## (undated) RX ORDER — SIMETHICONE 40MG/0.6ML
SUSPENSION, DROPS(FINAL DOSAGE FORM)(ML) ORAL
Status: DISPENSED
Start: 2020-09-17

## (undated) RX ORDER — LIDOCAINE HYDROCHLORIDE 10 MG/ML
INJECTION, SOLUTION EPIDURAL; INFILTRATION; INTRACAUDAL; PERINEURAL
Status: DISPENSED
Start: 2023-11-28

## (undated) RX ORDER — TRIAMCINOLONE ACETONIDE 40 MG/ML
INJECTION, SUSPENSION INTRA-ARTICULAR; INTRAMUSCULAR
Status: DISPENSED
Start: 2024-03-12